# Patient Record
Sex: MALE | Race: WHITE | NOT HISPANIC OR LATINO
[De-identification: names, ages, dates, MRNs, and addresses within clinical notes are randomized per-mention and may not be internally consistent; named-entity substitution may affect disease eponyms.]

---

## 2018-04-27 ENCOUNTER — APPOINTMENT (OUTPATIENT)
Dept: ORTHOPEDIC SURGERY | Facility: CLINIC | Age: 78
End: 2018-04-27
Payer: MEDICARE

## 2018-04-27 PROCEDURE — 99213 OFFICE O/P EST LOW 20 MIN: CPT | Mod: 25

## 2018-04-27 PROCEDURE — 20610 DRAIN/INJ JOINT/BURSA W/O US: CPT | Mod: RT

## 2018-04-27 PROCEDURE — 73564 X-RAY EXAM KNEE 4 OR MORE: CPT | Mod: RT

## 2018-07-31 ENCOUNTER — INPATIENT (INPATIENT)
Facility: HOSPITAL | Age: 78
LOS: 6 days | Discharge: HOME | DRG: 25 | End: 2018-08-07
Attending: NEUROLOGICAL SURGERY | Admitting: INTERNAL MEDICINE
Payer: MEDICARE

## 2018-07-31 ENCOUNTER — EMERGENCY (EMERGENCY)
Facility: HOSPITAL | Age: 78
LOS: 1 days | Discharge: HOME | End: 2018-07-31
Attending: EMERGENCY MEDICINE
Payer: MEDICARE

## 2018-07-31 VITALS
SYSTOLIC BLOOD PRESSURE: 138 MMHG | HEART RATE: 61 BPM | DIASTOLIC BLOOD PRESSURE: 94 MMHG | OXYGEN SATURATION: 96 % | TEMPERATURE: 98 F | RESPIRATION RATE: 15 BRPM

## 2018-07-31 VITALS
DIASTOLIC BLOOD PRESSURE: 85 MMHG | HEIGHT: 71 IN | OXYGEN SATURATION: 98 % | RESPIRATION RATE: 15 BRPM | SYSTOLIC BLOOD PRESSURE: 190 MMHG | WEIGHT: 212.97 LBS | TEMPERATURE: 98 F | HEART RATE: 67 BPM

## 2018-07-31 LAB
ALBUMIN SERPL ELPH-MCNC: 4.2 G/DL — SIGNIFICANT CHANGE UP (ref 3.3–5)
ALP SERPL-CCNC: 52 U/L — SIGNIFICANT CHANGE UP (ref 40–120)
ALT FLD-CCNC: 42 U/L — SIGNIFICANT CHANGE UP (ref 12–78)
ANION GAP SERPL CALC-SCNC: 7 MMOL/L — SIGNIFICANT CHANGE UP (ref 5–17)
APTT BLD: 25.8 SEC — LOW (ref 27.5–37.4)
AST SERPL-CCNC: 30 U/L — SIGNIFICANT CHANGE UP (ref 15–37)
BASOPHILS # BLD AUTO: 0.03 K/UL — SIGNIFICANT CHANGE UP (ref 0–0.2)
BASOPHILS NFR BLD AUTO: 0.4 % — SIGNIFICANT CHANGE UP (ref 0–2)
BILIRUB SERPL-MCNC: 0.7 MG/DL — SIGNIFICANT CHANGE UP (ref 0.2–1.2)
BUN SERPL-MCNC: 19 MG/DL — SIGNIFICANT CHANGE UP (ref 7–23)
CALCIUM SERPL-MCNC: 9.1 MG/DL — SIGNIFICANT CHANGE UP (ref 8.5–10.1)
CHLORIDE SERPL-SCNC: 108 MMOL/L — SIGNIFICANT CHANGE UP (ref 96–108)
CK MB BLD-MCNC: 1 % — SIGNIFICANT CHANGE UP (ref 0–3.5)
CK MB CFR SERPL CALC: 1.8 NG/ML — SIGNIFICANT CHANGE UP (ref 0–3.6)
CK SERPL-CCNC: 186 U/L — SIGNIFICANT CHANGE UP (ref 26–308)
CO2 SERPL-SCNC: 26 MMOL/L — SIGNIFICANT CHANGE UP (ref 22–31)
CREAT SERPL-MCNC: 1.2 MG/DL — SIGNIFICANT CHANGE UP (ref 0.5–1.3)
EOSINOPHIL # BLD AUTO: 0.02 K/UL — SIGNIFICANT CHANGE UP (ref 0–0.5)
EOSINOPHIL NFR BLD AUTO: 0.2 % — SIGNIFICANT CHANGE UP (ref 0–6)
GLUCOSE SERPL-MCNC: 90 MG/DL — SIGNIFICANT CHANGE UP (ref 70–99)
HCT VFR BLD CALC: 40.1 % — SIGNIFICANT CHANGE UP (ref 39–50)
HGB BLD-MCNC: 13.9 G/DL — SIGNIFICANT CHANGE UP (ref 13–17)
IMM GRANULOCYTES NFR BLD AUTO: 0.2 % — SIGNIFICANT CHANGE UP (ref 0–1.5)
INR BLD: 1.06 RATIO — SIGNIFICANT CHANGE UP (ref 0.88–1.16)
LIDOCAIN IGE QN: 164 U/L — SIGNIFICANT CHANGE UP (ref 73–393)
LYMPHOCYTES # BLD AUTO: 1.95 K/UL — SIGNIFICANT CHANGE UP (ref 1–3.3)
LYMPHOCYTES # BLD AUTO: 23.7 % — SIGNIFICANT CHANGE UP (ref 13–44)
MCHC RBC-ENTMCNC: 31.2 PG — SIGNIFICANT CHANGE UP (ref 27–34)
MCHC RBC-ENTMCNC: 34.7 GM/DL — SIGNIFICANT CHANGE UP (ref 32–36)
MCV RBC AUTO: 89.9 FL — SIGNIFICANT CHANGE UP (ref 80–100)
MONOCYTES # BLD AUTO: 0.54 K/UL — SIGNIFICANT CHANGE UP (ref 0–0.9)
MONOCYTES NFR BLD AUTO: 6.6 % — SIGNIFICANT CHANGE UP (ref 2–14)
NEUTROPHILS # BLD AUTO: 5.68 K/UL — SIGNIFICANT CHANGE UP (ref 1.8–7.4)
NEUTROPHILS NFR BLD AUTO: 68.9 % — SIGNIFICANT CHANGE UP (ref 43–77)
PLATELET # BLD AUTO: 215 K/UL — SIGNIFICANT CHANGE UP (ref 150–400)
POTASSIUM SERPL-MCNC: 4.2 MMOL/L — SIGNIFICANT CHANGE UP (ref 3.5–5.3)
POTASSIUM SERPL-SCNC: 4.2 MMOL/L — SIGNIFICANT CHANGE UP (ref 3.5–5.3)
PROT SERPL-MCNC: 8.4 G/DL — HIGH (ref 6–8.3)
PROTHROM AB SERPL-ACNC: 11.6 SEC — SIGNIFICANT CHANGE UP (ref 9.8–12.7)
RBC # BLD: 4.46 M/UL — SIGNIFICANT CHANGE UP (ref 4.2–5.8)
RBC # FLD: 12.5 % — SIGNIFICANT CHANGE UP (ref 10.3–14.5)
SODIUM SERPL-SCNC: 141 MMOL/L — SIGNIFICANT CHANGE UP (ref 135–145)
TROPONIN I SERPL-MCNC: <.015 NG/ML — SIGNIFICANT CHANGE UP (ref 0.01–0.04)
WBC # BLD: 8.24 K/UL — SIGNIFICANT CHANGE UP (ref 3.8–10.5)
WBC # FLD AUTO: 8.24 K/UL — SIGNIFICANT CHANGE UP (ref 3.8–10.5)

## 2018-07-31 PROCEDURE — 70450 CT HEAD/BRAIN W/O DYE: CPT | Mod: 26

## 2018-07-31 PROCEDURE — 71045 X-RAY EXAM CHEST 1 VIEW: CPT

## 2018-07-31 PROCEDURE — A9579: CPT

## 2018-07-31 PROCEDURE — 36415 COLL VENOUS BLD VENIPUNCTURE: CPT

## 2018-07-31 PROCEDURE — 82550 ASSAY OF CK (CPK): CPT

## 2018-07-31 PROCEDURE — 99285 EMERGENCY DEPT VISIT HI MDM: CPT | Mod: 25

## 2018-07-31 PROCEDURE — 70548 MR ANGIOGRAPHY NECK W/DYE: CPT | Mod: 26

## 2018-07-31 PROCEDURE — 70544 MR ANGIOGRAPHY HEAD W/O DYE: CPT | Mod: 26,59

## 2018-07-31 PROCEDURE — 83690 ASSAY OF LIPASE: CPT

## 2018-07-31 PROCEDURE — 70544 MR ANGIOGRAPHY HEAD W/O DYE: CPT

## 2018-07-31 PROCEDURE — 85730 THROMBOPLASTIN TIME PARTIAL: CPT

## 2018-07-31 PROCEDURE — 80053 COMPREHEN METABOLIC PANEL: CPT

## 2018-07-31 PROCEDURE — 85610 PROTHROMBIN TIME: CPT

## 2018-07-31 PROCEDURE — 85027 COMPLETE CBC AUTOMATED: CPT

## 2018-07-31 PROCEDURE — 99285 EMERGENCY DEPT VISIT HI MDM: CPT

## 2018-07-31 PROCEDURE — 70548 MR ANGIOGRAPHY NECK W/DYE: CPT

## 2018-07-31 PROCEDURE — 70450 CT HEAD/BRAIN W/O DYE: CPT

## 2018-07-31 PROCEDURE — 93005 ELECTROCARDIOGRAM TRACING: CPT

## 2018-07-31 PROCEDURE — 82962 GLUCOSE BLOOD TEST: CPT

## 2018-07-31 PROCEDURE — 84484 ASSAY OF TROPONIN QUANT: CPT

## 2018-07-31 PROCEDURE — 96374 THER/PROPH/DIAG INJ IV PUSH: CPT | Mod: 59

## 2018-07-31 PROCEDURE — 82553 CREATINE MB FRACTION: CPT

## 2018-07-31 PROCEDURE — 71045 X-RAY EXAM CHEST 1 VIEW: CPT | Mod: 26

## 2018-07-31 PROCEDURE — 70553 MRI BRAIN STEM W/O & W/DYE: CPT

## 2018-07-31 PROCEDURE — 70553 MRI BRAIN STEM W/O & W/DYE: CPT | Mod: 26

## 2018-07-31 RX ORDER — SODIUM CHLORIDE 9 MG/ML
3 INJECTION INTRAMUSCULAR; INTRAVENOUS; SUBCUTANEOUS ONCE
Qty: 0 | Refills: 0 | Status: COMPLETED | OUTPATIENT
Start: 2018-07-31 | End: 2018-07-31

## 2018-07-31 RX ADMIN — SODIUM CHLORIDE 3 MILLILITER(S): 9 INJECTION INTRAMUSCULAR; INTRAVENOUS; SUBCUTANEOUS at 16:55

## 2018-07-31 NOTE — ED ADULT NURSE NOTE - NSIMPLEMENTINTERV_GEN_ALL_ED
Implemented All Fall Risk Interventions:  [place] to call system. Call bell, personal items and telephone within reach. Instruct patient to call for assistance. Room bathroom lighting operational. Non-slip footwear when patient is off stretcher. Physically safe environment: no spills, clutter or unnecessary equipment. Stretcher in lowest position, wheels locked, appropriate side rails in place. Provide visual cue, wrist band, yellow gown, etc. Monitor gait and stability. Monitor for mental status changes and reorient to person, place, and time. Review medications for side effects contributing to fall risk. Reinforce activity limits and safety measures with patient and family.

## 2018-07-31 NOTE — ED ADULT TRIAGE NOTE - CHIEF COMPLAINT QUOTE
C/O PAIN AND TIGHTNESS TO l SIDE OF THROAT X 1 WEEK    DR OBREGON SENT HIM HERE BECAUSE SPEECH WAS SLURRED l SIDED DROOP  PT STATES BEEN GOING ON FOR A WEEK

## 2018-07-31 NOTE — ED PROVIDER NOTE - CARE PLAN
Principal Discharge DX:	Brain mass  Assessment and plan of treatment:	transfer  Secondary Diagnosis:	Facial droop

## 2018-07-31 NOTE — ED ADULT NURSE NOTE - OBJECTIVE STATEMENT
Pt presents to the Ed s/p slurred speech  and left facial droop, yet one can understand when he speaks, moving all of his extremities independently, all pulses are palpable . EKG done, pt placed on cardiac monitor.

## 2018-07-31 NOTE — ED PROVIDER NOTE - OBJECTIVE STATEMENT
Pt is a 78 yo male who presents to the ED with a cc of slurred speech and left sided facial droop.  PMHx of HTN, HLD.  Pt reports that symptoms first began approx 1 week ago.  He reports that at that time he noted a left side facial droop, some changes in his voice, and drooling out of the left side of his mouth.  He reports that bedside these symptoms he had no complaints or changes in activity and so he did not follow up with anyone regarding these.  Today he reports that he woke up and was having breakfast when he noted difficulty swallowing.  He felt like there was something stuck on the left side of his throat for several seconds but he was able to dislodged this.  He then reports that he went to play a double header.  Several people at the game commented on his left sided facial droop and so he became concerned and followed up with his PMD.  Pt was then sent to the ED for further elv.  He denies HA but does report and intermittent tingling to the left side of his head.  Reports further blurred vision out of his left eye.  Solitario N/V/D/C, CP, SOB, abd pain, ext numbness or weakness

## 2018-08-01 VITALS
SYSTOLIC BLOOD PRESSURE: 183 MMHG | TEMPERATURE: 98 F | DIASTOLIC BLOOD PRESSURE: 88 MMHG | HEART RATE: 84 BPM | OXYGEN SATURATION: 100 %

## 2018-08-01 DIAGNOSIS — D49.6 NEOPLASM OF UNSPECIFIED BEHAVIOR OF BRAIN: ICD-10-CM

## 2018-08-01 DIAGNOSIS — Z90.89 ACQUIRED ABSENCE OF OTHER ORGANS: Chronic | ICD-10-CM

## 2018-08-01 DIAGNOSIS — Z29.9 ENCOUNTER FOR PROPHYLACTIC MEASURES, UNSPECIFIED: ICD-10-CM

## 2018-08-01 DIAGNOSIS — C44.711 BASAL CELL CARCINOMA OF SKIN OF UNSPECIFIED LOWER LIMB, INCLUDING HIP: Chronic | ICD-10-CM

## 2018-08-01 DIAGNOSIS — E78.4 OTHER HYPERLIPIDEMIA: ICD-10-CM

## 2018-08-01 DIAGNOSIS — I10 ESSENTIAL (PRIMARY) HYPERTENSION: ICD-10-CM

## 2018-08-01 DIAGNOSIS — D17.0 BENIGN LIPOMATOUS NEOPLASM OF SKIN AND SUBCUTANEOUS TISSUE OF HEAD, FACE AND NECK: Chronic | ICD-10-CM

## 2018-08-01 LAB
ALBUMIN SERPL ELPH-MCNC: 4.3 G/DL — SIGNIFICANT CHANGE UP (ref 3.3–5)
ALP SERPL-CCNC: 45 U/L — SIGNIFICANT CHANGE UP (ref 40–120)
ALT FLD-CCNC: 26 U/L — SIGNIFICANT CHANGE UP (ref 10–45)
ANION GAP SERPL CALC-SCNC: 10 MMOL/L — SIGNIFICANT CHANGE UP (ref 5–17)
AST SERPL-CCNC: 22 U/L — SIGNIFICANT CHANGE UP (ref 10–40)
BILIRUB SERPL-MCNC: 0.8 MG/DL — SIGNIFICANT CHANGE UP (ref 0.2–1.2)
BUN SERPL-MCNC: 16 MG/DL — SIGNIFICANT CHANGE UP (ref 7–23)
CALCIUM SERPL-MCNC: 8.9 MG/DL — SIGNIFICANT CHANGE UP (ref 8.4–10.5)
CHLORIDE SERPL-SCNC: 104 MMOL/L — SIGNIFICANT CHANGE UP (ref 96–108)
CO2 SERPL-SCNC: 25 MMOL/L — SIGNIFICANT CHANGE UP (ref 22–31)
CREAT SERPL-MCNC: 1.13 MG/DL — SIGNIFICANT CHANGE UP (ref 0.5–1.3)
GLUCOSE SERPL-MCNC: 112 MG/DL — HIGH (ref 70–99)
POTASSIUM SERPL-MCNC: 4.1 MMOL/L — SIGNIFICANT CHANGE UP (ref 3.5–5.3)
POTASSIUM SERPL-SCNC: 4.1 MMOL/L — SIGNIFICANT CHANGE UP (ref 3.5–5.3)
PROT SERPL-MCNC: 7 G/DL — SIGNIFICANT CHANGE UP (ref 6–8.3)
SODIUM SERPL-SCNC: 139 MMOL/L — SIGNIFICANT CHANGE UP (ref 135–145)

## 2018-08-01 PROCEDURE — 99223 1ST HOSP IP/OBS HIGH 75: CPT

## 2018-08-01 PROCEDURE — 74177 CT ABD & PELVIS W/CONTRAST: CPT | Mod: 26

## 2018-08-01 PROCEDURE — 71260 CT THORAX DX C+: CPT | Mod: 26

## 2018-08-01 RX ORDER — HEPARIN SODIUM 5000 [USP'U]/ML
5000 INJECTION INTRAVENOUS; SUBCUTANEOUS EVERY 8 HOURS
Qty: 0 | Refills: 0 | Status: DISCONTINUED | OUTPATIENT
Start: 2018-08-01 | End: 2018-08-02

## 2018-08-01 RX ORDER — METOPROLOL TARTRATE 50 MG
0 TABLET ORAL
Qty: 0 | Refills: 0 | COMMUNITY

## 2018-08-01 RX ORDER — ASCORBIC ACID 60 MG
500 TABLET,CHEWABLE ORAL DAILY
Qty: 0 | Refills: 0 | Status: DISCONTINUED | OUTPATIENT
Start: 2018-08-01 | End: 2018-08-03

## 2018-08-01 RX ORDER — ALLOPURINOL 300 MG
300 TABLET ORAL DAILY
Qty: 0 | Refills: 0 | Status: DISCONTINUED | OUTPATIENT
Start: 2018-08-01 | End: 2018-08-03

## 2018-08-01 RX ORDER — ASPIRIN/CALCIUM CARB/MAGNESIUM 324 MG
81 TABLET ORAL DAILY
Qty: 0 | Refills: 0 | Status: DISCONTINUED | OUTPATIENT
Start: 2018-08-01 | End: 2018-08-01

## 2018-08-01 RX ORDER — ROSUVASTATIN CALCIUM 5 MG/1
20 TABLET ORAL AT BEDTIME
Qty: 0 | Refills: 0 | Status: DISCONTINUED | OUTPATIENT
Start: 2018-08-01 | End: 2018-08-03

## 2018-08-01 RX ORDER — LOSARTAN POTASSIUM 100 MG/1
50 TABLET, FILM COATED ORAL DAILY
Qty: 0 | Refills: 0 | Status: DISCONTINUED | OUTPATIENT
Start: 2018-08-01 | End: 2018-08-03

## 2018-08-01 RX ORDER — CHOLECALCIFEROL (VITAMIN D3) 125 MCG
2000 CAPSULE ORAL DAILY
Qty: 0 | Refills: 0 | Status: DISCONTINUED | OUTPATIENT
Start: 2018-08-01 | End: 2018-08-03

## 2018-08-01 RX ORDER — LEVETIRACETAM 250 MG/1
1000 TABLET, FILM COATED ORAL
Qty: 0 | Refills: 0 | Status: DISCONTINUED | OUTPATIENT
Start: 2018-08-01 | End: 2018-08-03

## 2018-08-01 RX ORDER — LEVETIRACETAM 250 MG/1
1000 TABLET, FILM COATED ORAL ONCE
Qty: 0 | Refills: 0 | Status: COMPLETED | OUTPATIENT
Start: 2018-08-01 | End: 2018-08-01

## 2018-08-01 RX ORDER — LOSARTAN POTASSIUM 100 MG/1
0 TABLET, FILM COATED ORAL
Qty: 0 | Refills: 0 | COMMUNITY

## 2018-08-01 RX ORDER — METOPROLOL TARTRATE 50 MG
25 TABLET ORAL DAILY
Qty: 0 | Refills: 0 | Status: DISCONTINUED | OUTPATIENT
Start: 2018-08-01 | End: 2018-08-03

## 2018-08-01 RX ORDER — PREGABALIN 225 MG/1
1000 CAPSULE ORAL DAILY
Qty: 0 | Refills: 0 | Status: DISCONTINUED | OUTPATIENT
Start: 2018-08-01 | End: 2018-08-03

## 2018-08-01 RX ORDER — NIFEDIPINE 30 MG
90 TABLET, EXTENDED RELEASE 24 HR ORAL DAILY
Qty: 0 | Refills: 0 | Status: DISCONTINUED | OUTPATIENT
Start: 2018-08-01 | End: 2018-08-03

## 2018-08-01 RX ADMIN — Medication 25 MILLIGRAM(S): at 05:40

## 2018-08-01 RX ADMIN — LEVETIRACETAM 1000 MILLIGRAM(S): 250 TABLET, FILM COATED ORAL at 21:09

## 2018-08-01 RX ADMIN — PREGABALIN 1000 MICROGRAM(S): 225 CAPSULE ORAL at 12:01

## 2018-08-01 RX ADMIN — Medication 90 MILLIGRAM(S): at 05:40

## 2018-08-01 RX ADMIN — LEVETIRACETAM 400 MILLIGRAM(S): 250 TABLET, FILM COATED ORAL at 01:53

## 2018-08-01 RX ADMIN — Medication 300 MILLIGRAM(S): at 12:01

## 2018-08-01 RX ADMIN — Medication 2000 UNIT(S): at 12:01

## 2018-08-01 RX ADMIN — LEVETIRACETAM 1000 MILLIGRAM(S): 250 TABLET, FILM COATED ORAL at 09:07

## 2018-08-01 RX ADMIN — Medication 81 MILLIGRAM(S): at 12:01

## 2018-08-01 RX ADMIN — ROSUVASTATIN CALCIUM 20 MILLIGRAM(S): 5 TABLET ORAL at 21:09

## 2018-08-01 RX ADMIN — Medication 1 TABLET(S): at 12:01

## 2018-08-01 RX ADMIN — LOSARTAN POTASSIUM 50 MILLIGRAM(S): 100 TABLET, FILM COATED ORAL at 05:40

## 2018-08-01 RX ADMIN — HEPARIN SODIUM 5000 UNIT(S): 5000 INJECTION INTRAVENOUS; SUBCUTANEOUS at 05:41

## 2018-08-01 RX ADMIN — Medication 500 MILLIGRAM(S): at 12:01

## 2018-08-01 RX ADMIN — HEPARIN SODIUM 5000 UNIT(S): 5000 INJECTION INTRAVENOUS; SUBCUTANEOUS at 14:52

## 2018-08-01 RX ADMIN — HEPARIN SODIUM 5000 UNIT(S): 5000 INJECTION INTRAVENOUS; SUBCUTANEOUS at 21:09

## 2018-08-01 NOTE — PROGRESS NOTE ADULT - SUBJECTIVE AND OBJECTIVE BOX
Patient seen and examined at bedside.    T(C): 36.7 (08-01-18 @ 00:32), Max: 36.9 (07-31-18 @ 16:36)  HR: 65 (08-01-18 @ 05:41) (61 - 84)  BP: 146/69 (08-01-18 @ 05:41) (129/80 - 190/85)  RR: 18 (08-01-18 @ 05:41) (14 - 18)  SpO2: 96% (08-01-18 @ 05:41) (95% - 100%)  Wt(kg): --    Exam:  AOx3, FC, PERRL, EOMI, V1-3 intact, no facial, palate cole symmetric, tongue midline, shrug 5/5  5/5 throughout, no drift  SILT  No clonus or babinski

## 2018-08-01 NOTE — CONSULT NOTE ADULT - SUBJECTIVE AND OBJECTIVE BOX
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  He then went to play baseball as scheduled for 4 hrs, then felt concerned went to see his PMD, who sent him to [facility] concerned for CVA.  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 for Neurosurgical evaluation.  Denies focal weakness, dysphagia, LOC, or seizure like activity. (01 Aug 2018 03:51)      the above notes were taken from the medical record here and I went over the case with the pt in the ER. He stated that he had nothing in the ROS to indicate he may have a primary lesion elsewhere Ie. cough fever bowel problems bone pain etc. He was seen by the neurosurgery team and at this point I would approach this pt like I would if I were seeing a pt with a unknown primary. In pts where the ROS is negative, a ct of the chest abdomen and pelvis is recommended. He did have a prostatectomy in  and his psa levels have been zero.       MEDICATIONS  (STANDING):  allopurinol 300 milliGRAM(s) Oral daily  ascorbic acid 500 milliGRAM(s) Oral daily  aspirin enteric coated 81 milliGRAM(s) Oral daily  cholecalciferol 2000 Unit(s) Oral daily  cyanocobalamin 1000 MICROGram(s) Oral daily  heparin  Injectable 5000 Unit(s) SubCutaneous every 8 hours  levETIRAcetam 1000 milliGRAM(s) Oral two times a day  losartan 50 milliGRAM(s) Oral daily  metoprolol succinate ER 25 milliGRAM(s) Oral daily  multivitamin 1 Tablet(s) Oral daily  NIFEdipine XL 90 milliGRAM(s) Oral daily  rosuvastatin 20 milliGRAM(s) Oral at bedtime    MEDICATIONS  (PRN):      T(F): 98 (18 @ 08:57), Max: 98.7 (18 @ 07:36)  HR: 65 (18 @ 08:57) (61 - 84)  BP: 118/64 (18 @ 08:57) (118/64 - 190/85)  RR: 18 (18 @ 08:57) (14 - 20)  SpO2: 98% (18 @ 08:57) (95% - 100%)    LABS:    CBC Full  -  ( 2018 17:19 )  WBC Count : 8.24 K/uL  Hemoglobin : 13.9 g/dL  Hematocrit : 40.1 %  Platelet Count - Automated : 215 K/uL  Mean Cell Volume : 89.9 fl  Mean Cell Hemoglobin : 31.2 pg  Mean Cell Hemoglobin Concentration : 34.7 gm/dL  Auto Neutrophil # : 5.68 K/uL  Auto Lymphocyte # : 1.95 K/uL  Auto Monocyte # : 0.54 K/uL  Auto Eosinophil # : 0.02 K/uL  Auto Basophil # : 0.03 K/uL  Auto Neutrophil % : 68.9 %  Auto Lymphocyte % : 23.7 %  Auto Monocyte % : 6.6 %  Auto Eosinophil % : 0.2 %  Auto Basophil % : 0.4 %        139  |  104  |  16  ----------------------------<  112<H>  4.1   |  25  |  1.13    Ca    8.9      01 Aug 2018 06:02    TPro  7.0  /  Alb  4.3  /  TBili  0.8  /  DBili  x   /  AST  22  /  ALT  26  /  AlkPhos  45      PT/INR - ( 2018 17:19 )   PT: 11.6 sec;   INR: 1.06 ratio         PTT - ( 2018 17:19 )  PTT:25.8 sec      ROS:  Negative except for:    PAST MEDICAL & SURGICAL HISTORY:  Basal cell carcinoma (BCC) of left lower leg: s/p resection  Prostate cancer: s/p radical prostatectomy  HLD (hyperlipidemia)  HTN (hypertension)  Lipoma of neck: s/p resection at age 30  Basal cell carcinoma (BCC) of lower leg: s/p resection  History of tonsillectomy: at age 30      SOCIAL HISTORY:    FAMILY HISTORY:  Family history of diabetes mellitus (Father): Father   Family history of prostate cancer in father (Father):   Family history of breast cancer in mother (Mother, Sibling): Mother and sister  Family history of lung cancer (Mother): mother , long standing smoker      Allergies    No Known Allergies    Intolerances        PHYSICAL EXAM  General: adult in NAD  HEENT: clear oropharynx, anicteric sclera, pink conjunctivae  Neck: supple  CV: normal S1S2 with no murmur rubs or gallops  Lungs: clear to auscultation, no wheezes, no rales  Abdomen: soft non-tender non-distended, no hepato/splenomegaly  Ext: no clubbing cyanosis or edema  Skin: no rashes and no petichiae  Neuro: alert and oriented X3 no focal deficits slight right facial droop    BLOOD SMEAR INTERPRETATION:    RADIOLOGY :

## 2018-08-01 NOTE — H&P ADULT - PMH
HLD (hyperlipidemia)    HTN (hypertension) Basal cell carcinoma (BCC) of left lower leg  s/p resection  HLD (hyperlipidemia)    HTN (hypertension)    Prostate cancer  s/p radical prostatectomy

## 2018-08-01 NOTE — H&P ADULT - ASSESSMENT
76 yo male, 76 yo male Hx HTN, HLD, prostate ca s/p prostatectomy, basal cell skin ca (resected from leg) p/w 1 wk of L facial droop, slurred speech and one episode of L neck muscle spasm admitted with R parietal brain lesion concerning for malignancy.

## 2018-08-01 NOTE — CONSULT NOTE ADULT - ASSESSMENT
Bethel Day, 78 yo male, xfer from OSH, had intermittent moderate L facial "twitching" today. MRI showed solidly enhancing right parietal mass, minimal edema.     Plan: medicine consult for met childs, ct cap w iggy, keppra 1 g bid, no decadron given lack of flair

## 2018-08-01 NOTE — PROGRESS NOTE ADULT - ATTENDING COMMENTS
Agree with resident note. Patient personally seen and examined. All current imaging studies reviewed.  for r craniotomy in am with stereotactic guidance  d/w pt in detail including all risks and possible complications

## 2018-08-01 NOTE — CONSULT NOTE ADULT - SUBJECTIVE AND OBJECTIVE BOX
p ([id number])     HPI:  Bethel Day, 78 yo male, xfer from OSH, had intermittent moderate L facial/throat "twitching" today, found to have R parietal mass on CT/MRI, transferred here for further evaluation.  Denies focal weakness, LOC, or sz otherwise.  No fever, no CP. MRI showed solidly enhancing right parietal mass, minimal edema.    PAST MEDICAL HISTORY   HLD (hyperlipidemia)  HTN (hypertension)    PAST SURGICAL HISTORY   No significant past surgical history        MEDICATIONS:  Antibiotics:    Neuro:    Anticoagulation:    Other:      SOCIAL HISTORY:   Occupation:   Marital Status:     FAMILY HISTORY:  No pertinent family history in first degree relatives      REVIEW OF SYSTEMS:  Check here if all are normal other than Neurological/HPI [x]  General:  Eyes:  ENT:  Cardiac:  Respiratory:  GI:  Musculoskeletal:   Skin:  Neurologic:   Psychiatric:     PHYSICAL EXAMINATION:   T(C): 36.7 (08-01-18 @ 00:32), Max: 36.9 (07-31-18 @ 16:36)  HR: 75 (08-01-18 @ 00:32) (61 - 84)  BP: 159/79 (08-01-18 @ 00:32) (138/94 - 190/85)  RR: 18 (08-01-18 @ 00:32) (14 - 18)  SpO2: 98% (08-01-18 @ 00:32) (96% - 100%)  Wt(kg): --Height (cm): 180.34 (07-31 @ 16:36)  Weight (kg): 96.6 (07-31 @ 16:36)    General Examination:     Neurologic Examination:           AOx3, FC, PERRL, EOMI, V1-3 intact, no facial, palate cole symmetric, tongue midline, shrug 5/5  5/5 throughout, no drift  SILT  No clonus or babinski    LABS:                        13.9   8.24  )-----------( 215      ( 31 Jul 2018 17:19 )             40.1     07-31    141  |  108  |  19  ----------------------------<  90  4.2   |  26  |  1.20    Ca    9.1      31 Jul 2018 17:20    TPro  8.4<H>  /  Alb  4.2  /  TBili  0.7  /  DBili  x   /  AST  30  /  ALT  42  /  AlkPhos  52  07-31    PT/INR - ( 31 Jul 2018 17:19 )   PT: 11.6 sec;   INR: 1.06 ratio         PTT - ( 31 Jul 2018 17:19 )  PTT:25.8 sec      RADIOLOGY & ADDITIONAL STUDIES: p ([id number])     HPI:  Bethel Day, 76 yo male, xfer from OSH, had intermittent moderate L facial/throat "twitching" today and slurring of speech for the past week, found to have R parietal mass on CT/MRI, transferred here for further evaluation.  Denies focal weakness, LOC, or sz otherwise.  No fever, no CP. MRI showed solidly enhancing right parietal mass, minimal edema.    PAST MEDICAL HISTORY   HLD (hyperlipidemia)  HTN (hypertension)    PAST SURGICAL HISTORY   No significant past surgical history        MEDICATIONS:  Antibiotics:    Neuro:    Anticoagulation:    Other:      SOCIAL HISTORY:   Occupation:   Marital Status:     FAMILY HISTORY:  No pertinent family history in first degree relatives      REVIEW OF SYSTEMS:  Check here if all are normal other than Neurological/HPI [x]  General:  Eyes:  ENT:  Cardiac:  Respiratory:  GI:  Musculoskeletal:   Skin:  Neurologic:   Psychiatric:     PHYSICAL EXAMINATION:   T(C): 36.7 (08-01-18 @ 00:32), Max: 36.9 (07-31-18 @ 16:36)  HR: 75 (08-01-18 @ 00:32) (61 - 84)  BP: 159/79 (08-01-18 @ 00:32) (138/94 - 190/85)  RR: 18 (08-01-18 @ 00:32) (14 - 18)  SpO2: 98% (08-01-18 @ 00:32) (96% - 100%)  Wt(kg): --Height (cm): 180.34 (07-31 @ 16:36)  Weight (kg): 96.6 (07-31 @ 16:36)    General Examination:     Neurologic Examination:           AOx3, FC, PERRL, EOMI, V1-3 intact, no facial, palate cole symmetric, tongue midline, shrug 5/5  5/5 throughout, no drift  SILT  No clonus or babinski    LABS:                        13.9   8.24  )-----------( 215      ( 31 Jul 2018 17:19 )             40.1     07-31    141  |  108  |  19  ----------------------------<  90  4.2   |  26  |  1.20    Ca    9.1      31 Jul 2018 17:20    TPro  8.4<H>  /  Alb  4.2  /  TBili  0.7  /  DBili  x   /  AST  30  /  ALT  42  /  AlkPhos  52  07-31    PT/INR - ( 31 Jul 2018 17:19 )   PT: 11.6 sec;   INR: 1.06 ratio         PTT - ( 31 Jul 2018 17:19 )  PTT:25.8 sec      RADIOLOGY & ADDITIONAL STUDIES:

## 2018-08-01 NOTE — ED ADULT NURSE NOTE - OBJECTIVE STATEMENT
76 yo M pmh of HTN, HLD, Prostate CA came to ED c/o right sided facial droop x 1 week.  PT transfer from [place] for neuro consult.  No complaints at this time.  Denies chest pain, back pain, SOB, fevers/chills, n/v/d, lightheadedness, dizziness, changes in urinary or bowel habits.  A&Ox4, neuro intact, +strength and sensation bilaterally.  VSS.  Skin w/d/i.  Safety and comfort maintained. Pending neuro consults.

## 2018-08-01 NOTE — ED ADULT NURSE NOTE - NEURO MENTATION
Called and spoke to patient regarding lab results. D-dimer negative, ESR normal.  Elevated CRP; no hx of the same per patient. Low Hgb, consistent with ROSA; recommended starting OTC ferrous sulfate once daily. Patient reports feeling better since starting the medication (PPI and naproxen) several days ago. Will not add steroids at this point, as pt feeling better and is following up with Dr. Ashli Young (PCP) next month for re-evaluation of symptoms. normal

## 2018-08-01 NOTE — H&P ADULT - HISTORY OF PRESENT ILLNESS
78 yo male, xfer from OS, had intermittent moderate L facial/throat "twitching" today, found to have R parietal mass on CT/MRI, transferred here for further evaluation.  Denies focal weakness, LOC, or sz otherwise.  No fever, no CP 78 yo male Hx HTN, HLD, prostate ca s/p prostatectomy, 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  He then went to play baseball as scheduled for 4 hrs, then felt concerned went to see his PMD, who sent him to [facility] concerned for CVA.  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  Denies focal weakness, dysphagia, LOC, or seizure like activity.

## 2018-08-01 NOTE — H&P ADULT - NSHPATTENDINGPLANDISCUSS_GEN_ALL_CORE
Dr. Mccracken who requested for initial evaluation/H&P and will assume care of this patient on 8/1/18 am.

## 2018-08-01 NOTE — ED PROVIDER NOTE - OBJECTIVE STATEMENT
78 yo male, xfer from OS, had intermittent moderate L facial/throat "twitching" today, found to have R parietal mass on CT/MRI, transferred here for further evaluation.  Denies focal weakness, LOC, or sz otherwise.  No fever, no CP.

## 2018-08-01 NOTE — H&P ADULT - NSHPLABSRESULTS_GEN_ALL_CORE
Labs personally reviewed  CXR film personally reviewed  EKG tracing personally reviewed Labs personally reviewed  Imaging films and reports personally reviewed  EKG tracing personally reviewed

## 2018-08-01 NOTE — H&P ADULT - FAMILY HISTORY
No pertinent family history in first degree relatives Mother  Still living? No  Family history of lung cancer, Age at diagnosis: Age Unknown  Family history of breast cancer in mother, Age at diagnosis: Age Unknown     Sibling  Still living? Yes, Estimated age: Age Unknown  Family history of breast cancer in mother, Age at diagnosis: Age Unknown     Father  Still living? No  Family history of prostate cancer in father, Age at diagnosis: Age Unknown  Family history of diabetes mellitus, Age at diagnosis: Age Unknown

## 2018-08-01 NOTE — CONSULT NOTE ADULT - ASSESSMENT
76 yo male Hx HTN, HLD, prostate ca s/p prostatectomy, 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  He then went to play baseball as scheduled for 4 hrs, then felt concerned went to see his PMD, who sent him to [facility] concerned for CVA.  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  Denies focal weakness, dysphagia, LOC, or seizure like activity. (01 Aug 2018 03:51)     8/1 the above notes were taken from the medical record here and I went over the case with the pt in the ER. He stated that he had nothing in the ROS to indicate he may have a primary lesion elsewhere Ie. cough fever bowel problems bone pain etc. He was seen by the neurosurgery team and at this point I would approach this pt like I would if I were seeing a pt with a unknown primary. In pts where the ROS is negative, a ct of the chest abdomen and pelvis is recommended. He did have a prostatectomy in 1995 and his psa levels have been zero.

## 2018-08-01 NOTE — H&P ADULT - PROBLEM SELECTOR PLAN 1
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  d/w Dr. De Santiago

## 2018-08-01 NOTE — ED ADULT NURSE NOTE - CHPI ED NUR SYMPTOMS NEG
no blurred vision/no change in level of consciousness/no confusion/no nausea/no numbness/no loss of consciousness/no weakness/no dizziness/no vomiting/no fever

## 2018-08-01 NOTE — ED ADULT NURSE REASSESSMENT NOTE - COMFORT CARE
plan of care explained/warm blanket provided/side rails up/darkened lights/treatment delay explained/wait time explained

## 2018-08-01 NOTE — H&P ADULT - PSH
No significant past surgical history Basal cell carcinoma (BCC) of lower leg  s/p resection  History of tonsillectomy  at age 30  Lipoma of neck  s/p resection at age 30

## 2018-08-01 NOTE — ED PROVIDER NOTE - PHYSICAL EXAMINATION
GEN: calm, cooperative, ENT: mucous membranes moist, HEAD: NCAT, CV: S1 S2 no murmurs, RESP: no respiratory distress, ABD: no abdominal TTP, MSK: moves all extremities, NEURO: awake, alert, oriented x3, FC, ROBERTS, no focal weakness noted, PSYCH: affect normal

## 2018-08-01 NOTE — H&P ADULT - NEUROLOGICAL DETAILS
loss of L facial folds, gobbled speech, moves all extremities/normal strength/responds to verbal commands/alert and oriented x 3

## 2018-08-01 NOTE — H&P ADULT - PROBLEM SELECTOR PLAN 2
- will control with Toprol, Nifedipine and Losartan  - last cardiac cath 2 yrs ago with "50% blockage of a small vessel and 20% of another", no chest pain, STEWART or syncope

## 2018-08-01 NOTE — ED ADULT NURSE NOTE - NSIMPLEMENTINTERV_GEN_ALL_ED
Implemented All Universal Safety Interventions:  [place] to call system. Call bell, personal items and telephone within reach. Instruct patient to call for assistance. Room bathroom lighting operational. Non-slip footwear when patient is off stretcher. Physically safe environment: no spills, clutter or unnecessary equipment. Stretcher in lowest position, wheels locked, appropriate side rails in place.

## 2018-08-01 NOTE — ED PROVIDER NOTE - INTERPRETATION
EKG reviewed for rate, rhythm, axis, intervals and segments, including QRS morphology, P wave appearance T wave appearance, MO interval, and QT interval.  I find the EKG to be unremarkable in all of these regards except as follows: 1st deg AVB

## 2018-08-01 NOTE — H&P ADULT - NSHPSOCIALHISTORY_GEN_ALL_CORE
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

## 2018-08-02 ENCOUNTER — TRANSCRIPTION ENCOUNTER (OUTPATIENT)
Age: 78
End: 2018-08-02

## 2018-08-02 PROBLEM — I10 ESSENTIAL (PRIMARY) HYPERTENSION: Chronic | Status: ACTIVE | Noted: 2018-07-31

## 2018-08-02 PROBLEM — E78.5 HYPERLIPIDEMIA, UNSPECIFIED: Chronic | Status: ACTIVE | Noted: 2018-07-31

## 2018-08-02 LAB
ANION GAP SERPL CALC-SCNC: 13 MMOL/L — SIGNIFICANT CHANGE UP (ref 5–17)
APTT BLD: 27.1 SEC — LOW (ref 27.5–37.4)
BLD GP AB SCN SERPL QL: NEGATIVE — SIGNIFICANT CHANGE UP
BUN SERPL-MCNC: 16 MG/DL — SIGNIFICANT CHANGE UP (ref 7–23)
CALCIUM SERPL-MCNC: 9.3 MG/DL — SIGNIFICANT CHANGE UP (ref 8.4–10.5)
CHLORIDE SERPL-SCNC: 103 MMOL/L — SIGNIFICANT CHANGE UP (ref 96–108)
CO2 SERPL-SCNC: 25 MMOL/L — SIGNIFICANT CHANGE UP (ref 22–31)
CREAT SERPL-MCNC: 1.18 MG/DL — SIGNIFICANT CHANGE UP (ref 0.5–1.3)
GLUCOSE SERPL-MCNC: 128 MG/DL — HIGH (ref 70–99)
HCT VFR BLD CALC: 47.2 % — SIGNIFICANT CHANGE UP (ref 39–50)
HGB BLD-MCNC: 15.9 G/DL — SIGNIFICANT CHANGE UP (ref 13–17)
INR BLD: 1.02 RATIO — SIGNIFICANT CHANGE UP (ref 0.88–1.16)
MCHC RBC-ENTMCNC: 31.2 PG — SIGNIFICANT CHANGE UP (ref 27–34)
MCHC RBC-ENTMCNC: 33.7 GM/DL — SIGNIFICANT CHANGE UP (ref 32–36)
MCV RBC AUTO: 92.5 FL — SIGNIFICANT CHANGE UP (ref 80–100)
PLATELET # BLD AUTO: 232 K/UL — SIGNIFICANT CHANGE UP (ref 150–400)
POTASSIUM SERPL-MCNC: 4.2 MMOL/L — SIGNIFICANT CHANGE UP (ref 3.5–5.3)
POTASSIUM SERPL-SCNC: 4.2 MMOL/L — SIGNIFICANT CHANGE UP (ref 3.5–5.3)
PROTHROM AB SERPL-ACNC: 11 SEC — SIGNIFICANT CHANGE UP (ref 9.8–12.7)
RBC # BLD: 5.1 M/UL — SIGNIFICANT CHANGE UP (ref 4.2–5.8)
RBC # FLD: 12.8 % — SIGNIFICANT CHANGE UP (ref 10.3–14.5)
RH IG SCN BLD-IMP: POSITIVE — SIGNIFICANT CHANGE UP
RH IG SCN BLD-IMP: POSITIVE — SIGNIFICANT CHANGE UP
SODIUM SERPL-SCNC: 141 MMOL/L — SIGNIFICANT CHANGE UP (ref 135–145)
WBC # BLD: 6.57 K/UL — SIGNIFICANT CHANGE UP (ref 3.8–10.5)
WBC # FLD AUTO: 6.57 K/UL — SIGNIFICANT CHANGE UP (ref 3.8–10.5)

## 2018-08-02 PROCEDURE — 70552 MRI BRAIN STEM W/DYE: CPT | Mod: 26

## 2018-08-02 PROCEDURE — 99223 1ST HOSP IP/OBS HIGH 75: CPT

## 2018-08-02 RX ORDER — SODIUM CHLORIDE 9 MG/ML
1000 INJECTION INTRAMUSCULAR; INTRAVENOUS; SUBCUTANEOUS
Qty: 0 | Refills: 0 | Status: DISCONTINUED | OUTPATIENT
Start: 2018-08-02 | End: 2018-08-03

## 2018-08-02 RX ADMIN — ROSUVASTATIN CALCIUM 20 MILLIGRAM(S): 5 TABLET ORAL at 21:08

## 2018-08-02 RX ADMIN — LEVETIRACETAM 1000 MILLIGRAM(S): 250 TABLET, FILM COATED ORAL at 18:14

## 2018-08-02 RX ADMIN — Medication 90 MILLIGRAM(S): at 05:03

## 2018-08-02 RX ADMIN — LEVETIRACETAM 1000 MILLIGRAM(S): 250 TABLET, FILM COATED ORAL at 05:03

## 2018-08-02 RX ADMIN — Medication 25 MILLIGRAM(S): at 05:03

## 2018-08-02 RX ADMIN — Medication 300 MILLIGRAM(S): at 11:24

## 2018-08-02 RX ADMIN — LOSARTAN POTASSIUM 50 MILLIGRAM(S): 100 TABLET, FILM COATED ORAL at 05:03

## 2018-08-02 RX ADMIN — HEPARIN SODIUM 5000 UNIT(S): 5000 INJECTION INTRAVENOUS; SUBCUTANEOUS at 05:03

## 2018-08-02 RX ADMIN — PREGABALIN 1000 MICROGRAM(S): 225 CAPSULE ORAL at 11:24

## 2018-08-02 RX ADMIN — Medication 1 TABLET(S): at 11:24

## 2018-08-02 RX ADMIN — Medication 500 MILLIGRAM(S): at 11:24

## 2018-08-02 RX ADMIN — Medication 2000 UNIT(S): at 11:24

## 2018-08-02 NOTE — CHART NOTE - NSCHARTNOTEFT_GEN_A_CORE
Risk Stratification for planned procedure:    Vitals/Labs/Chart reviewed. EKG reviewed. NSR, no ischemic changes.  Pt feels well. No Chest pain, No shortness of breath. Baseline very active person, exercise and run daily.   Pt is able to perform > 4 METs of activity.   No signs of acute ischemia nor acute cardio/pulmonary decompensation.    Discussed with Dr. Chin (pt's cardiologist).   2016 angio: 20% midLAD, 50% diagnonal, 25% RCA  2017 TTE: mild LVH, mod AR. normal EF.    Patient is at intermediate risk for high risk procedure (neurosurgical procedure)  No further work cardiac nor medical work up needed.   Medically optimized.    d/w pt, pt's wife and the son at bedside.     - Dr. FERNY Lynn ([facility])  - [phone]

## 2018-08-02 NOTE — PROGRESS NOTE ADULT - SUBJECTIVE AND OBJECTIVE BOX
Patient is a 77y old  Male who presents with a chief complaint of slurred speech and left facial droop (01 Aug 2018 10:07)      SUBJECTIVE / OVERNIGHT EVENTS:  Pt seen and examined at bedside.   No overnight event.  Feeling okay. anxious about the surgery tomorrow.  no cp, no sob, no n/v/d.   no vision changes.  +slurred speech  the son and the wife at bedside.      Vital Signs Last 24 Hrs  T(C): 36.5 (02 Aug 2018 16:53), Max: 36.8 (02 Aug 2018 08:22)  T(F): 97.7 (02 Aug 2018 16:53), Max: 98.3 (02 Aug 2018 08:22)  HR: 60 (02 Aug 2018 16:53) (60 - 70)  BP: 130/77 (02 Aug 2018 16:53) (125/68 - 132/73)  BP(mean): --  RR: 18 (02 Aug 2018 16:53) (18 - 18)  SpO2: 95% (02 Aug 2018 16:53) (93% - 95%)  I&O's Summary    01 Aug 2018 07:01  -  02 Aug 2018 07:00  --------------------------------------------------------  IN: 320 mL / OUT: 0 mL / NET: 320 mL    02 Aug 2018 07:01  -  02 Aug 2018 18:54  --------------------------------------------------------  IN: 240 mL / OUT: 0 mL / NET: 240 mL        PHYSICAL EXAM:  GENERAL: NAD, Comfortable, sitting up  HEAD:  Atraumatic, Normocephalic  EYES: EOMI, PERRLA, conjunctiva and sclera clear  NECK: Supple, No JVD  CHEST/LUNG: Clear to auscultation bilaterally; No wheeze  HEART: Regular rate and rhythm; No murmurs, rubs, or gallops  ABDOMEN: Soft, Nontender, Nondistended; Bowel sounds present  Neuro: AAO x 3, +facial droop, mild slurred speech, no focal deficit, 5/5 b/l extremities  EXTREMITIES:  2+ Peripheral Pulses, No clubbing, cyanosis, or edema  SKIN: No rashes or lesions    LABS:                        15.9   6.57  )-----------( 232      ( 02 Aug 2018 10:12 )             47.2     08-02    141  |  103  |  16  ----------------------------<  128<H>  4.2   |  25  |  1.18    Ca    9.3      02 Aug 2018 07:27    TPro  7.0  /  Alb  4.3  /  TBili  0.8  /  DBili  x   /  AST  22  /  ALT  26  /  AlkPhos  45  08-01    PT/INR - ( 02 Aug 2018 14:47 )   PT: 11.0 sec;   INR: 1.02 ratio         PTT - ( 02 Aug 2018 14:47 )  PTT:27.1 sec  CAPILLARY BLOOD GLUCOSE                RADIOLOGY & ADDITIONAL TESTS:    Imaging Personally Reviewed:  [x] YES  [ ] NO    Consultant(s) Notes Reviewed:  [x] YES  [ ] NO      MEDICATIONS  (STANDING):  allopurinol 300 milliGRAM(s) Oral daily  ascorbic acid 500 milliGRAM(s) Oral daily  cholecalciferol 2000 Unit(s) Oral daily  cyanocobalamin 1000 MICROGram(s) Oral daily  levETIRAcetam 1000 milliGRAM(s) Oral two times a day  losartan 50 milliGRAM(s) Oral daily  metoprolol succinate ER 25 milliGRAM(s) Oral daily  multivitamin 1 Tablet(s) Oral daily  NIFEdipine XL 90 milliGRAM(s) Oral daily  rosuvastatin 20 milliGRAM(s) Oral at bedtime  sodium chloride 0.9%. 1000 milliLiter(s) (75 mL/Hr) IV Continuous <Continuous>    MEDICATIONS  (PRN):      Care Discussed with Consultants/Other Providers [x] YES  [ ] NO    HEALTH ISSUES - PROBLEM Dx:  Prophylactic measure: Prophylactic measure  Other hyperlipidemia: Other hyperlipidemia  Essential hypertension: Essential hypertension  Brain tumor: Brain tumor

## 2018-08-02 NOTE — PROGRESS NOTE ADULT - ASSESSMENT
8/2 the pt had a ct of the chest and abdomen and pelvis and it was negative. A long discussion was held with him about the likelihood of neurosurgery and need to obtain a diagnosis.

## 2018-08-02 NOTE — PROGRESS NOTE ADULT - ATTENDING COMMENTS
- Dr[facility] F[facility]Y [facility] ([facility])  - [phone]    Met with the family at bedside. All questions answered to their satisfaction.   Reassurance provided.  d/w pt's Cardiologist Dr. Chin and reviewed TTE/cath, etc.   d/w anesthesia.  d/w NP

## 2018-08-02 NOTE — PROGRESS NOTE ADULT - SUBJECTIVE AND OBJECTIVE BOX
Patient is a 77y old  Male who presents with a chief complaint of slurred speech and left facial droop (01 Aug 2018 10:07)      HPI:  78 yo male Hx HTN, HLD, prostate ca s/p prostatectomy, basal cell skin ca (resected from leg) p/w 1 wk of L facial droop, slurred speech and one episode of L neck muscle spasm earlier on 18, pressure like, lasted for 20 minutes.  He then went to play baseball as scheduled for 4 hrs, then felt concerned went to see his PMD, who sent him to [facility] concerned for CVA.  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  Denies focal weakness, dysphagia, LOC, or seizure like activity. (01 Aug 2018 03:51)      the above notes were taken from the medical record here and I went over the case with the pt in the ER. He stated that he had nothing in the ROS to indicate he may have a primary lesion elsewhere Ie. cough fever bowel problems bone pain etc. He was seen by the neurosurgery team and at this point I would approach this pt like I would if I were seeing a pt with a unknown primary. In pts where the ROS is negative, a ct of the chest abdomen and pelvis is recommended. He did have a prostatectomy in  and his psa levels have been zero.      the pt had a ct of the chest and abdomen and pelvis and it was negative. A long discussion was held with him about the likelihood of neurosurgery and need to obtain a diagnosis.    MEDICATIONS  (STANDING):  allopurinol 300 milliGRAM(s) Oral daily  ascorbic acid 500 milliGRAM(s) Oral daily  cholecalciferol 2000 Unit(s) Oral daily  cyanocobalamin 1000 MICROGram(s) Oral daily  heparin  Injectable 5000 Unit(s) SubCutaneous every 8 hours  levETIRAcetam 1000 milliGRAM(s) Oral two times a day  losartan 50 milliGRAM(s) Oral daily  metoprolol succinate ER 25 milliGRAM(s) Oral daily  multivitamin 1 Tablet(s) Oral daily  NIFEdipine XL 90 milliGRAM(s) Oral daily  rosuvastatin 20 milliGRAM(s) Oral at bedtime    MEDICATIONS  (PRN):      T(F): 98 (18 @ 08:57), Max: 98.7 (18 @ 07:36)  HR: 65 (18 @ 08:57) (61 - 84)  BP: 118/64 (18 @ 08:57) (118/64 - 190/85)  RR: 18 (18 @ 08:57) (14 - 20)  SpO2: 98% (18 @ 08:57) (95% - 100%)    LABS:    CBC Full  -  ( 2018 17:19 )  WBC Count : 8.24 K/uL  Hemoglobin : 13.9 g/dL  Hematocrit : 40.1 %  Platelet Count - Automated : 215 K/uL  Mean Cell Volume : 89.9 fl  Mean Cell Hemoglobin : 31.2 pg  Mean Cell Hemoglobin Concentration : 34.7 gm/dL  Auto Neutrophil # : 5.68 K/uL  Auto Lymphocyte # : 1.95 K/uL  Auto Monocyte # : 0.54 K/uL  Auto Eosinophil # : 0.02 K/uL  Auto Basophil # : 0.03 K/uL  Auto Neutrophil % : 68.9 %  Auto Lymphocyte % : 23.7 %  Auto Monocyte % : 6.6 %  Auto Eosinophil % : 0.2 %  Auto Basophil % : 0.4 %        139  |  104  |  16  ----------------------------<  112<H>  4.1   |  25  |  1.13    Ca    8.9      01 Aug 2018 06:02    TPro  7.0  /  Alb  4.3  /  TBili  0.8  /  DBili  x   /  AST  22  /  ALT  26  /  AlkPhos  45      PT/INR - ( 2018 17:19 )   PT: 11.6 sec;   INR: 1.06 ratio         PTT - ( 2018 17:19 )  PTT:25.8 sec      ROS:  Negative except for:    PAST MEDICAL & SURGICAL HISTORY:  Basal cell carcinoma (BCC) of left lower leg: s/p resection  Prostate cancer: s/p radical prostatectomy  HLD (hyperlipidemia)  HTN (hypertension)  Lipoma of neck: s/p resection at age 30  Basal cell carcinoma (BCC) of lower leg: s/p resection  History of tonsillectomy: at age 30      SOCIAL HISTORY:    FAMILY HISTORY:  Family history of diabetes mellitus (Father): Father   Family history of prostate cancer in father (Father):   Family history of breast cancer in mother (Mother, Sibling): Mother and sister  Family history of lung cancer (Mother): mother , long standing smoker      Allergies    No Known Allergies    Intolerances        PHYSICAL EXAM  General: adult in NAD  HEENT: clear oropharynx, anicteric sclera, pink conjunctivae  Neck: supple  CV: normal S1S2 with no murmur rubs or gallops  Lungs: clear to auscultation, no wheezes, no rales  Abdomen: soft non-tender non-distended, no hepato/splenomegaly  Ext: no clubbing cyanosis or edema  Skin: no rashes and no petechiae  Neuro: alert and oriented X3 no focal deficits slight right facial droop    BLOOD SMEAR INTERPRETATION:    RADIOLOGY :

## 2018-08-02 NOTE — PROGRESS NOTE ADULT - PROBLEM SELECTOR PLAN 1
Solitary R parietal brain lesion with Hx prostate ca and basal cell skin ca - rarely mets to brain.   - CT chest/A/P shows no evidence of malignancy/ metastasis   - c/w Keppra bid for seizure prophylaxis  - no steroid since minimal cerebral edma  - Neuro check Q 4 hr   - Neurosurg eval appreciated.    - Plan for brain biopsy with possible excision tomorrow 8/3/18.  - medical clearance documented in my note earlier today.   - c/w betablocker.

## 2018-08-02 NOTE — PROGRESS NOTE ADULT - ASSESSMENT
Bethel Edwardswanda, 76 yo male, xfer from OSH, had intermittent moderate L facial "twitching" today. MRI showed solidly enhancing right parietal mass, minimal edema.   - CT CAP negative  - Plan for OR tomorrow for biopsy  - Please document medical clearance  - Hold ASA and all other anticoagulation  - NPO after midnight

## 2018-08-02 NOTE — PROGRESS NOTE ADULT - SUBJECTIVE AND OBJECTIVE BOX
Patient seen and examined at bedside.    T(C): 36.6 (08-01-18 @ 21:36), Max: 37.1 (08-01-18 @ 07:36)  HR: 61 (08-01-18 @ 21:36) (59 - 70)  BP: 125/68 (08-01-18 @ 21:36) (118/64 - 163/73)  RR: 18 (08-01-18 @ 21:36) (18 - 20)  SpO2: 93% (08-01-18 @ 21:36) (93% - 99%)  Wt(kg): --    Exam:  AOx3, FC, PERRL, EOMI, V1-3 intact, no facial, palate cole symmetric, tongue midline, shrug 5/5  5/5 throughout, no drift  SILT  No clonus or babinski

## 2018-08-02 NOTE — PROGRESS NOTE ADULT - ASSESSMENT
76 yo male Hx HTN, HLD, prostate ca s/p prostatectomy, basal cell skin ca (resected from leg) p/w 1 wk of L facial droop, slurred speech and one episode of L neck muscle spasm admitted with R parietal brain lesion concerning for malignancy.

## 2018-08-03 ENCOUNTER — RESULT REVIEW (OUTPATIENT)
Age: 78
End: 2018-08-03

## 2018-08-03 ENCOUNTER — APPOINTMENT (OUTPATIENT)
Dept: NEUROSURGERY | Facility: CLINIC | Age: 78
End: 2018-08-03

## 2018-08-03 LAB
ANION GAP SERPL CALC-SCNC: 11 MMOL/L — SIGNIFICANT CHANGE UP (ref 5–17)
ANION GAP SERPL CALC-SCNC: 12 MMOL/L — SIGNIFICANT CHANGE UP (ref 5–17)
BLD GP AB SCN SERPL QL: NEGATIVE — SIGNIFICANT CHANGE UP
BUN SERPL-MCNC: 17 MG/DL — SIGNIFICANT CHANGE UP (ref 7–23)
BUN SERPL-MCNC: 19 MG/DL — SIGNIFICANT CHANGE UP (ref 7–23)
CALCIUM SERPL-MCNC: 8.7 MG/DL — SIGNIFICANT CHANGE UP (ref 8.4–10.5)
CALCIUM SERPL-MCNC: 8.8 MG/DL — SIGNIFICANT CHANGE UP (ref 8.4–10.5)
CHLORIDE SERPL-SCNC: 104 MMOL/L — SIGNIFICANT CHANGE UP (ref 96–108)
CHLORIDE SERPL-SCNC: 105 MMOL/L — SIGNIFICANT CHANGE UP (ref 96–108)
CO2 SERPL-SCNC: 22 MMOL/L — SIGNIFICANT CHANGE UP (ref 22–31)
CO2 SERPL-SCNC: 24 MMOL/L — SIGNIFICANT CHANGE UP (ref 22–31)
CREAT SERPL-MCNC: 1.03 MG/DL — SIGNIFICANT CHANGE UP (ref 0.5–1.3)
CREAT SERPL-MCNC: 1.27 MG/DL — SIGNIFICANT CHANGE UP (ref 0.5–1.3)
GLUCOSE SERPL-MCNC: 192 MG/DL — HIGH (ref 70–99)
GLUCOSE SERPL-MCNC: 94 MG/DL — SIGNIFICANT CHANGE UP (ref 70–99)
HCT VFR BLD CALC: 35.4 % — LOW (ref 39–50)
HCT VFR BLD CALC: 40.1 % — SIGNIFICANT CHANGE UP (ref 39–50)
HGB BLD-MCNC: 12.4 G/DL — LOW (ref 13–17)
HGB BLD-MCNC: 13.7 G/DL — SIGNIFICANT CHANGE UP (ref 13–17)
INR BLD: 1.06 RATIO — SIGNIFICANT CHANGE UP (ref 0.88–1.16)
MAGNESIUM SERPL-MCNC: 2.1 MG/DL — SIGNIFICANT CHANGE UP (ref 1.6–2.6)
MCHC RBC-ENTMCNC: 31.1 PG — SIGNIFICANT CHANGE UP (ref 27–34)
MCHC RBC-ENTMCNC: 31.3 PG — SIGNIFICANT CHANGE UP (ref 27–34)
MCHC RBC-ENTMCNC: 34.2 GM/DL — SIGNIFICANT CHANGE UP (ref 32–36)
MCHC RBC-ENTMCNC: 34.9 GM/DL — SIGNIFICANT CHANGE UP (ref 32–36)
MCV RBC AUTO: 89.7 FL — SIGNIFICANT CHANGE UP (ref 80–100)
MCV RBC AUTO: 90.9 FL — SIGNIFICANT CHANGE UP (ref 80–100)
PHOSPHATE SERPL-MCNC: 3.4 MG/DL — SIGNIFICANT CHANGE UP (ref 2.5–4.5)
PLATELET # BLD AUTO: 194 K/UL — SIGNIFICANT CHANGE UP (ref 150–400)
PLATELET # BLD AUTO: 210 K/UL — SIGNIFICANT CHANGE UP (ref 150–400)
POTASSIUM SERPL-MCNC: 3.8 MMOL/L — SIGNIFICANT CHANGE UP (ref 3.5–5.3)
POTASSIUM SERPL-MCNC: 4.1 MMOL/L — SIGNIFICANT CHANGE UP (ref 3.5–5.3)
POTASSIUM SERPL-SCNC: 3.8 MMOL/L — SIGNIFICANT CHANGE UP (ref 3.5–5.3)
POTASSIUM SERPL-SCNC: 4.1 MMOL/L — SIGNIFICANT CHANGE UP (ref 3.5–5.3)
PROTHROM AB SERPL-ACNC: 11.5 SEC — SIGNIFICANT CHANGE UP (ref 9.8–12.7)
RBC # BLD: 3.95 M/UL — LOW (ref 4.2–5.8)
RBC # BLD: 4.42 M/UL — SIGNIFICANT CHANGE UP (ref 4.2–5.8)
RBC # FLD: 11.5 % — SIGNIFICANT CHANGE UP (ref 10.3–14.5)
RBC # FLD: 11.6 % — SIGNIFICANT CHANGE UP (ref 10.3–14.5)
RH IG SCN BLD-IMP: POSITIVE — SIGNIFICANT CHANGE UP
SODIUM SERPL-SCNC: 138 MMOL/L — SIGNIFICANT CHANGE UP (ref 135–145)
SODIUM SERPL-SCNC: 140 MMOL/L — SIGNIFICANT CHANGE UP (ref 135–145)
WBC # BLD: 6 K/UL — SIGNIFICANT CHANGE UP (ref 3.8–10.5)
WBC # BLD: 8.3 K/UL — SIGNIFICANT CHANGE UP (ref 3.8–10.5)
WBC # FLD AUTO: 6 K/UL — SIGNIFICANT CHANGE UP (ref 3.8–10.5)
WBC # FLD AUTO: 8.3 K/UL — SIGNIFICANT CHANGE UP (ref 3.8–10.5)

## 2018-08-03 PROCEDURE — 88341 IMHCHEM/IMCYTCHM EA ADD ANTB: CPT | Mod: 26,59

## 2018-08-03 PROCEDURE — 95961 ELECTRODE STIMULATION BRAIN: CPT | Mod: 26,52

## 2018-08-03 PROCEDURE — 88342 IMHCHEM/IMCYTCHM 1ST ANTB: CPT | Mod: 26,59

## 2018-08-03 PROCEDURE — 61781 SCAN PROC CRANIAL INTRA: CPT

## 2018-08-03 PROCEDURE — 61510 CRNEC TREPH EXC BRN TUM STTL: CPT

## 2018-08-03 PROCEDURE — 88360 TUMOR IMMUNOHISTOCHEM/MANUAL: CPT | Mod: 26

## 2018-08-03 PROCEDURE — 88334 PATH CONSLTJ SURG CYTO XM EA: CPT | Mod: 26,59

## 2018-08-03 PROCEDURE — 99232 SBSQ HOSP IP/OBS MODERATE 35: CPT | Mod: 57

## 2018-08-03 PROCEDURE — 99292 CRITICAL CARE ADDL 30 MIN: CPT | Mod: 25

## 2018-08-03 PROCEDURE — 99291 CRITICAL CARE FIRST HOUR: CPT

## 2018-08-03 PROCEDURE — 88331 PATH CONSLTJ SURG 1 BLK 1SPC: CPT | Mod: 26

## 2018-08-03 PROCEDURE — 88307 TISSUE EXAM BY PATHOLOGIST: CPT | Mod: 26

## 2018-08-03 RX ORDER — INSULIN LISPRO 100/ML
VIAL (ML) SUBCUTANEOUS
Qty: 0 | Refills: 0 | Status: DISCONTINUED | OUTPATIENT
Start: 2018-08-03 | End: 2018-08-05

## 2018-08-03 RX ORDER — CEFAZOLIN SODIUM 1 G
2000 VIAL (EA) INJECTION EVERY 8 HOURS
Qty: 0 | Refills: 0 | Status: COMPLETED | OUTPATIENT
Start: 2018-08-03 | End: 2018-08-04

## 2018-08-03 RX ORDER — SENNA PLUS 8.6 MG/1
2 TABLET ORAL AT BEDTIME
Qty: 0 | Refills: 0 | Status: DISCONTINUED | OUTPATIENT
Start: 2018-08-03 | End: 2018-08-07

## 2018-08-03 RX ORDER — PSYLLIUM SEED (WITH DEXTROSE)
1 POWDER (GRAM) ORAL DAILY
Qty: 0 | Refills: 0 | Status: DISCONTINUED | OUTPATIENT
Start: 2018-08-03 | End: 2018-08-07

## 2018-08-03 RX ORDER — ALLOPURINOL 300 MG
300 TABLET ORAL DAILY
Qty: 0 | Refills: 0 | Status: DISCONTINUED | OUTPATIENT
Start: 2018-08-03 | End: 2018-08-07

## 2018-08-03 RX ORDER — NIFEDIPINE 30 MG
90 TABLET, EXTENDED RELEASE 24 HR ORAL DAILY
Qty: 0 | Refills: 0 | Status: DISCONTINUED | OUTPATIENT
Start: 2018-08-03 | End: 2018-08-07

## 2018-08-03 RX ORDER — LOSARTAN POTASSIUM 100 MG/1
50 TABLET, FILM COATED ORAL DAILY
Qty: 0 | Refills: 0 | Status: DISCONTINUED | OUTPATIENT
Start: 2018-08-03 | End: 2018-08-07

## 2018-08-03 RX ORDER — ONDANSETRON 8 MG/1
4 TABLET, FILM COATED ORAL EVERY 6 HOURS
Qty: 0 | Refills: 0 | Status: DISCONTINUED | OUTPATIENT
Start: 2018-08-03 | End: 2018-08-07

## 2018-08-03 RX ORDER — ACETAMINOPHEN 500 MG
1000 TABLET ORAL ONCE
Qty: 0 | Refills: 0 | Status: COMPLETED | OUTPATIENT
Start: 2018-08-03 | End: 2018-08-03

## 2018-08-03 RX ORDER — METOPROLOL TARTRATE 50 MG
25 TABLET ORAL DAILY
Qty: 0 | Refills: 0 | Status: DISCONTINUED | OUTPATIENT
Start: 2018-08-03 | End: 2018-08-05

## 2018-08-03 RX ORDER — DEXAMETHASONE 0.5 MG/5ML
4 ELIXIR ORAL EVERY 6 HOURS
Qty: 0 | Refills: 0 | Status: DISCONTINUED | OUTPATIENT
Start: 2018-08-03 | End: 2018-08-04

## 2018-08-03 RX ORDER — DOCUSATE SODIUM 100 MG
100 CAPSULE ORAL THREE TIMES A DAY
Qty: 0 | Refills: 0 | Status: DISCONTINUED | OUTPATIENT
Start: 2018-08-03 | End: 2018-08-07

## 2018-08-03 RX ORDER — ACETAMINOPHEN 500 MG
650 TABLET ORAL EVERY 6 HOURS
Qty: 0 | Refills: 0 | Status: DISCONTINUED | OUTPATIENT
Start: 2018-08-03 | End: 2018-08-07

## 2018-08-03 RX ORDER — DEXTROSE MONOHYDRATE, SODIUM CHLORIDE, AND POTASSIUM CHLORIDE 50; .745; 4.5 G/1000ML; G/1000ML; G/1000ML
1000 INJECTION, SOLUTION INTRAVENOUS
Qty: 0 | Refills: 0 | Status: DISCONTINUED | OUTPATIENT
Start: 2018-08-03 | End: 2018-08-04

## 2018-08-03 RX ORDER — LEVETIRACETAM 250 MG/1
1000 TABLET, FILM COATED ORAL EVERY 12 HOURS
Qty: 0 | Refills: 0 | Status: DISCONTINUED | OUTPATIENT
Start: 2018-08-03 | End: 2018-08-07

## 2018-08-03 RX ADMIN — SENNA PLUS 2 TABLET(S): 8.6 TABLET ORAL at 21:42

## 2018-08-03 RX ADMIN — Medication 400 MILLIGRAM(S): at 13:15

## 2018-08-03 RX ADMIN — Medication 4 MILLIGRAM(S): at 17:51

## 2018-08-03 RX ADMIN — Medication 25 MILLIGRAM(S): at 13:27

## 2018-08-03 RX ADMIN — Medication 1000 MILLIGRAM(S): at 13:30

## 2018-08-03 RX ADMIN — Medication 100 MILLIGRAM(S): at 21:42

## 2018-08-03 RX ADMIN — LOSARTAN POTASSIUM 50 MILLIGRAM(S): 100 TABLET, FILM COATED ORAL at 05:39

## 2018-08-03 RX ADMIN — Medication 1: at 22:59

## 2018-08-03 RX ADMIN — LEVETIRACETAM 1000 MILLIGRAM(S): 250 TABLET, FILM COATED ORAL at 17:51

## 2018-08-03 RX ADMIN — Medication 25 MILLIGRAM(S): at 05:39

## 2018-08-03 RX ADMIN — Medication 90 MILLIGRAM(S): at 05:39

## 2018-08-03 RX ADMIN — LEVETIRACETAM 1000 MILLIGRAM(S): 250 TABLET, FILM COATED ORAL at 05:39

## 2018-08-03 NOTE — PROGRESS NOTE ADULT - PROBLEM SELECTOR PLAN 1
Solitary R parietal brain lesion with Hx prostate ca and basal cell skin ca - rarely mets to brain. Unlikely to be related.   - CT chest/A/P shows no evidence of malignancy/ metastasis   - no seizure. hold Keppra bid for seizure prophylaxis  - no steroid since minimal cerebral edma  - Neuro check Q 4 hr   - Neurosurg eval appreciated.    - Plan for brain biopsy with possible excision tomorrow 8/3/18.  - medical clearance documented in my note earlier today.   - c/w betablocker. - s/p craniotomy for excision of brain neoplasm  08/03/2018 - POD - 0   - Admitted to neuro ICU for close monitoring.   - Solitary R parietal brain lesion with Hx prostate ca and basal cell skin ca - rarely mets to brain. Unlikely to be related.   - CT chest/A/P shows no evidence of malignancy/ metastasis   - started Decadron q6hr  - on Keppra BID for seizure ppx  - Neuro check Q2-4hrs  - c/w betablocker.  - f/u path  - onc on the case.

## 2018-08-03 NOTE — PROGRESS NOTE ADULT - ASSESSMENT
ASSESSMENT/PLAN:  POD #0 S/P L crani for evacuation of L frontal Brain tumor     NEURO: Neuro checks q 1hr   Deacdron taper per NSG - quick taper with minimal cerebral edema   Hold AED with no known hx of seizure   CT in am   MRI +/- contrast per NSG at time PTD  F/U pathology  Monitor subgaleal drain output  Activity: [X] OOB as tolerated    PULM: Maintain O2 sats >93 %  If BP stable D/C chi tonight     CV: Hx of HTN and HLD   SBP goal- 110- < 140  Resume home meds    RENAL:  Fluids: NS at 70 cc/hr and if taking adequate po IVL     GI:  Diet: start clears and advance diet   GI prophylaxis  [X] PPI : On steroids   Bowel regimen [Xcolace [X] senna   ENDO:   Goal euglycemia (-180)    HEME/ONC:  VTE prophylaxis: [X] SCDs [] chemoprophylaxis held since fresh post op and risk of bleeding into operative bed. Will Ct in am and if no heme will start lovenox  Patient is at high risk of DVT / PE with known high grade brain tumor    ID: monitor for fever    SOCIAL/FAMILY:  [x] updated at bedside     CODE STATUS:  [x] Full Code     DISPOSITION:  [X] ICU     [X] Patient is at high risk of neurologic deterioration/death due to: christi op brain swelling and risk of herniation   Time spent:  45 critical care minutes

## 2018-08-03 NOTE — PROGRESS NOTE ADULT - ASSESSMENT
8/2 the pt had a ct of the chest and abdomen and pelvis and it was negative. A long discussion was held with him about the likelihood of neurosurgery and need to obtain a diagnosis.   8/3 pt off to the OR and will await path report. Labs and radiology reviewed.

## 2018-08-03 NOTE — PROGRESS NOTE ADULT - SUBJECTIVE AND OBJECTIVE BOX
carrie attending note    POD#0 R crani for tumor biopsy  c/o minimal post op R sided pain    vitals/labs/meds reviewed  fully oriented, L facial, mild dysarthria  BUE no drift, full strength  BLE full strength    -frozen high grade glioma, f/u path  -cont decadron for cerebral edema  -CTH in am  --160  -high risk VTE at admission due to brain tumor  -SCDs and chemoppx to start tomorrow if am CTH stable    at risk for deterioraion/death due to post op hemorrhage, cerebral edema, brain compression  additional 35min critical care time

## 2018-08-03 NOTE — PROGRESS NOTE ADULT - SUBJECTIVE AND OBJECTIVE BOX
Patient is a 77y old  Male who presents with a chief complaint of slurred speech and left facial droop (01 Aug 2018 10:07)      SUBJECTIVE / OVERNIGHT EVENTS:  Pt seen and examined at bedside in the neuro ICU.   s/p OR, brain mass excision.   Feels well. awake alert.  No chest pain, no shortness of breath.   No N/V/D. No abdominal pain.        Vital Signs Last 24 Hrs  T(C): 36.5 (03 Aug 2018 15:00), Max: 37 (03 Aug 2018 12:35)  T(F): 97.7 (03 Aug 2018 15:00), Max: 98.6 (03 Aug 2018 12:35)  HR: 73 (03 Aug 2018 20:00) (64 - 91)  BP: 127/68 (03 Aug 2018 00:17) (127/68 - 127/68)  BP(mean): --  RR: 17 (03 Aug 2018 20:00) (13 - 28)  SpO2: 97% (03 Aug 2018 20:00) (93% - 99%)  I&O's Summary    02 Aug 2018 07:01  -  03 Aug 2018 07:00  --------------------------------------------------------  IN: 560 mL / OUT: 0 mL / NET: 560 mL    03 Aug 2018 07:01  -  03 Aug 2018 21:09  --------------------------------------------------------  IN: 1135 mL / OUT: 1355 mL / NET: -220 mL        PHYSICAL EXAM:  GENERAL: NAD, Comfortable, sitting up  HEAD:  Atraumatic, left Craniectomy, dressing d/c/i  EYES: EOMI, PERRLA, conjunctiva and sclera clear  NECK: Supple, No JVD  CHEST/LUNG: Clear to auscultation bilaterally; No wheeze  HEART: Regular rate and rhythm; No murmurs, rubs, or gallops  ABDOMEN: Soft, Nontender, Nondistended; Bowel sounds present  Neuro: AAO x 3, +facial droop baseline, mild slurred speech, no focal deficit, 5/5 b/l extremities  EXTREMITIES:  2+ Peripheral Pulses, No clubbing, cyanosis, or edema  SKIN: No rashes or lesions      LABS:                        13.7   6.0   )-----------( 194      ( 03 Aug 2018 07:14 )             40.1     08-03    140  |  104  |  19  ----------------------------<  94  3.8   |  24  |  1.27    Ca    8.8      03 Aug 2018 07:14      PT/INR - ( 03 Aug 2018 07:14 )   PT: 11.5 sec;   INR: 1.06 ratio         PTT - ( 02 Aug 2018 14:47 )  PTT:27.1 sec  CAPILLARY BLOOD GLUCOSE                RADIOLOGY & ADDITIONAL TESTS:    Imaging Personally Reviewed:  [x] YES  [ ] NO    Consultant(s) Notes Reviewed:  [x] YES  [ ] NO      MEDICATIONS  (STANDING):  allopurinol 300 milliGRAM(s) Oral daily  ceFAZolin   IVPB 2000 milliGRAM(s) IV Intermittent every 8 hours  dexamethasone  Injectable 4 milliGRAM(s) IV Push every 6 hours  docusate sodium 100 milliGRAM(s) Oral three times a day  insulin lispro (HumaLOG) corrective regimen sliding scale   SubCutaneous Before meals and at bedtime  levETIRAcetam 1000 milliGRAM(s) Oral every 12 hours  losartan 50 milliGRAM(s) Oral daily  metoprolol succinate ER 25 milliGRAM(s) Oral daily  multivitamin 1 Tablet(s) Oral daily  NIFEdipine XL 90 milliGRAM(s) Oral daily  senna 2 Tablet(s) Oral at bedtime  sodium chloride 0.9% with potassium chloride 20 mEq/L 1000 milliLiter(s) (75 mL/Hr) IV Continuous <Continuous>    MEDICATIONS  (PRN):  acetaminophen   Tablet 650 milliGRAM(s) Oral every 6 hours PRN For Temp greater than 38 C (100.4 F)  acetaminophen   Tablet. 650 milliGRAM(s) Oral every 6 hours PRN Mild Pain (1 - 3)  ondansetron Injectable 4 milliGRAM(s) IV Push every 6 hours PRN Nausea and/or Vomiting      Care Discussed with Consultants/Other Providers [x] YES  [ ] NO    HEALTH ISSUES - PROBLEM Dx:  Prophylactic measure: Prophylactic measure  Other hyperlipidemia: Other hyperlipidemia  Essential hypertension: Essential hypertension  Brain tumor: Brain tumor

## 2018-08-03 NOTE — PROGRESS NOTE ADULT - SUBJECTIVE AND OBJECTIVE BOX
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  He then went to play baseball as scheduled for 4 hrs, then felt concerned went to see his PMD, who sent him to [facility] concerned for CVA.  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  Denies focal weakness, dysphagia, LOC, or seizure like activity. (01 Aug 2018 03:51)      the above notes were taken from the medical record here and I went over the case with the pt in the ER. He stated that he had nothing in the ROS to indicate he may have a primary lesion elsewhere Ie. cough fever bowel problems bone pain etc. He was seen by the neurosurgery team and at this point I would approach this pt like I would if I were seeing a pt with a unknown primary. In pts where the ROS is negative, a ct of the chest abdomen and pelvis is recommended. He did have a prostatectomy in  and his psa levels have been zero.      the pt had a ct of the chest and abdomen and pelvis and it was negative. A long discussion was held with him about the likelihood of neurosurgery and need to obtain a diagnosis. 8/3 pt off to the OR and will await path report. Labs and radiology reviewed.    MEDICATIONS  (STANDING):  acetaminophen  IVPB. 1000 milliGRAM(s) IV Intermittent once  allopurinol 300 milliGRAM(s) Oral daily  dexamethasone  Injectable 4 milliGRAM(s) IV Push every 6 hours  docusate sodium 100 milliGRAM(s) Oral three times a day  levETIRAcetam 1000 milliGRAM(s) Oral every 12 hours  losartan 50 milliGRAM(s) Oral daily  metoprolol succinate ER 25 milliGRAM(s) Oral daily  multivitamin 1 Tablet(s) Oral daily  NIFEdipine XL 90 milliGRAM(s) Oral daily  senna 2 Tablet(s) Oral at bedtime  sodium chloride 0.9% with potassium chloride 20 mEq/L 1000 milliLiter(s) (75 mL/Hr) IV Continuous <Continuous>    MEDICATIONS  (PRN):  acetaminophen   Tablet 650 milliGRAM(s) Oral every 6 hours PRN For Temp greater than 38 C (100.4 F)  acetaminophen   Tablet. 650 milliGRAM(s) Oral every 6 hours PRN Mild Pain (1 - 3)  ondansetron Injectable 4 milliGRAM(s) IV Push every 6 hours PRN Nausea and/or Vomiting    Vital Signs Last 24 Hrs  T(C): 37 (03 Aug 2018 12:35), Max: 37 (03 Aug 2018 12:35)  T(F): 98.6 (03 Aug 2018 12:35), Max: 98.6 (03 Aug 2018 12:35)  HR: 88 (03 Aug 2018 13:15) (60 - 90)  BP: 127/68 (03 Aug 2018 00:17) (127/68 - 130/77)  BP(mean): --  RR: 15 (03 Aug 2018 13:15) (13 - 28)  SpO2: 99% (03 Aug 2018 13:15) (93% - 99%))    LABS:    CBC Full  -  ( 2018 17:19 )  WBC Count : 8.24 K/uL  Hemoglobin : 13.9 g/dL  Hematocrit : 40.1 %  Platelet Count - Automated : 215 K/uL  Mean Cell Volume : 89.9 fl  Mean Cell Hemoglobin : 31.2 pg  Mean Cell Hemoglobin Concentration : 34.7 gm/dL  Auto Neutrophil # : 5.68 K/uL  Auto Lymphocyte # : 1.95 K/uL  Auto Monocyte # : 0.54 K/uL  Auto Eosinophil # : 0.02 K/uL  Auto Basophil # : 0.03 K/uL  Auto Neutrophil % : 68.9 %  Auto Lymphocyte % : 23.7 %  Auto Monocyte % : 6.6 %  Auto Eosinophil % : 0.2 %  Auto Basophil % : 0.4 %        139  |  104  |  16  ----------------------------<  112<H>  4.1   |  25  |  1.13    Ca    8.9      01 Aug 2018 06:02    TPro  7.0  /  Alb  4.3  /  TBili  0.8  /  DBili  x   /  AST  22  /  ALT  26  /  AlkPhos  45  08-01    PT/INR - ( 2018 17:19 )   PT: 11.6 sec;   INR: 1.06 ratio         PTT - ( 2018 17:19 )  PTT:25.8 sec      ROS:  Negative except for:    PAST MEDICAL & SURGICAL HISTORY:  Basal cell carcinoma (BCC) of left lower leg: s/p resection  Prostate cancer: s/p radical prostatectomy  HLD (hyperlipidemia)  HTN (hypertension)  Lipoma of neck: s/p resection at age 30  Basal cell carcinoma (BCC) of lower leg: s/p resection  History of tonsillectomy: at age 30      SOCIAL HISTORY:    FAMILY HISTORY:  Family history of diabetes mellitus (Father): Father   Family history of prostate cancer in father (Father):   Family history of breast cancer in mother (Mother, Sibling): Mother and sister  Family history of lung cancer (Mother): mother , long standing smoker      Allergies    No Known Allergies    Intolerances        PHYSICAL EXAM off to OR   General: adult in NAD  HEENT: clear oropharynx, anicteric sclera, pink conjunctivae  Neck: supple  CV: normal S1S2 with no murmur rubs or gallops  Lungs: clear to auscultation, no wheezes, no rales  Abdomen: soft non-tender non-distended, no hepato/splenomegaly  Ext: no clubbing cyanosis or edema  Skin: no rashes and no petechiae  Neuro: alert and oriented X3 no focal deficits slight right facial droop    BLOOD SMEAR INTERPRETATION:    RADIOLOGY :

## 2018-08-03 NOTE — PROGRESS NOTE ADULT - SUBJECTIVE AND OBJECTIVE BOX
Patient seen and examined at bedside.    T(C): 36.7 (08-03-18 @ 00:17), Max: 36.8 (08-02-18 @ 08:22)  HR: 64 (08-03-18 @ 00:17) (60 - 70)  BP: 127/68 (08-03-18 @ 00:17) (127/68 - 132/73)  RR: 18 (08-03-18 @ 00:17) (18 - 18)  SpO2: 98% (08-03-18 @ 00:17) (94% - 98%)  Wt(kg): --    Exam:  AOx3, FC, PERRL, EOMI, V1-3 intact, no facial, palate cole symmetric, tongue midline, shrug 5/5  5/5 throughout, no drift  SILT  No clonus or babinski

## 2018-08-03 NOTE — PROGRESS NOTE ADULT - SUBJECTIVE AND OBJECTIVE BOX
SUMMARY:HPI:  78 yo male Hx HTN, HLD, prostate ca s/p prostatectomy, 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  He then went to play baseball as scheduled for 4 hrs, then felt concerned went to see his PMD, who sent him to [facility] concerned for CVA.  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  Denies focal weakness, dysphagia, LOC, or seizure like activity. (01 Aug 2018 03:51)    OVERNIGHT EVENTS:     ADMISSION SCORES:   GCS: HH: MF: NIHSS: RASS: CAM-ICU: ICP:  CLINICAL TRIALS:  Allergies    No Known Allergies    Intolerances        REVIEW OF SYSTEMS: [ ] Unable to Assess due to neurologic exam   [ ] All ROS addressed below are non-contributory, except:  Neuro: [ ] Headache [ ] Back pain [ ] Numbness [ ] Weakness [ ] Ataxia [ ] Dizziness [ ] Aphasia [ ] Dysarthria [ ] Visual disturbance  Resp: [ ] Shortness of breath/dyspnea, [ ] Orthopnea [ ] Cough  CV: [ ] Chest pain [ ] Palpitation [ ] Lightheadedness [ ] Syncope  Renal: [ ] Thirst [ ] Edema  GI: [ ] Nausea [ ] Emesis [ ] Abdominal pain [ ] Constipation [ ] Diarrhea  Hem: [ ] Hematemesis [ ] bright red blood per rectum  ID: [ ] Fever [ ] Chills [ ] Dysuria  ENT: [ ] Rhinorrhea    DEVICES:   [ ] Restraints [ ] ET tube [ ] central line [ ] arterial line [ ] mejia [ ] NGT/OGT [ ] EVD [ ] LD [ ] ELISEO/HMV [ ] Trach [ ] PEG [ ] Chest Tube     VITALS: [ ] Reviewed  Vital Signs Last 24 Hrs  T(C): 37 (03 Aug 2018 12:35), Max: 37 (03 Aug 2018 12:35)  T(F): 98.6 (03 Aug 2018 12:35), Max: 98.6 (03 Aug 2018 12:35)  HR: 88 (03 Aug 2018 13:15) (60 - 90)  BP: 127/68 (03 Aug 2018 00:17) (127/68 - 130/77)  BP(mean): --  RR: 15 (03 Aug 2018 13:15) (13 - 28)  SpO2: 99% (03 Aug 2018 13:15) (93% - 99%)  CAPILLARY BLOOD GLUCOSE            LABS:  PT/INR - ( 03 Aug 2018 07:14 )   PT: 11.5 sec;   INR: 1.06 ratio         PTT - ( 02 Aug 2018 14:47 )  PTT:27.1 sec  08-03    140  |  104  |  19  ----------------------------<  94  3.8   |  24  |  1.27    Ca    8.8      03 Aug 2018 07:14                            13.7   6.0   )-----------( 194      ( 03 Aug 2018 07:14 )             40.1       STROKE CORE MEASURES:      MEDICATION LEVELS:     IMAGING/DATA: [ ] Reviewed    IVF FLUIDS/MEDICATIONS: [ ] Reviewed  MEDICATIONS  (STANDING):  acetaminophen  IVPB. 1000 milliGRAM(s) IV Intermittent once  allopurinol 300 milliGRAM(s) Oral daily  dexamethasone  Injectable 4 milliGRAM(s) IV Push every 6 hours  docusate sodium 100 milliGRAM(s) Oral three times a day  levETIRAcetam 1000 milliGRAM(s) Oral every 12 hours  losartan 50 milliGRAM(s) Oral daily  metoprolol succinate ER 25 milliGRAM(s) Oral daily  multivitamin 1 Tablet(s) Oral daily  NIFEdipine XL 90 milliGRAM(s) Oral daily  senna 2 Tablet(s) Oral at bedtime  sodium chloride 0.9% with potassium chloride 20 mEq/L 1000 milliLiter(s) (75 mL/Hr) IV Continuous <Continuous>    MEDICATIONS  (PRN):  acetaminophen   Tablet 650 milliGRAM(s) Oral every 6 hours PRN For Temp greater than 38 C (100.4 F)  acetaminophen   Tablet. 650 milliGRAM(s) Oral every 6 hours PRN Mild Pain (1 - 3)  ondansetron Injectable 4 milliGRAM(s) IV Push every 6 hours PRN Nausea and/or Vomiting    I&O's Summary    02 Aug 2018 07:01  -  03 Aug 2018 07:00  --------------------------------------------------------  IN: 560 mL / OUT: 0 mL / NET: 560 mL        EXAMINATION:  PHYSICAL EXAM:    Constitutional: No Acute Distress     Neurological: Awake, alert oriented to person, place and time, Following Commands, PERRL, EOMI, No Gaze Preference,    Motor exam:          Upper extremity                         Delt     Bicep     Tricep    HG                                                 R         5/5 5/5 5/5 5/5                                               L          5/5 5/5 5/5 5/5          Lower extremity                        HF         KF        KE       DF         PF                                                  R        5/5 5/5 5/5 5/5 5/5                                               L         5/5 5/5 5/5 5/5 5/5                                                 Sensation: [ ] intact to light touch  [ ] decreased:     Reflexes: Deep Tendon Reflexes Intact     Pulmonary: Clear to Auscultation, No rales, No rhonchi, No wheezes     Cardiovascular: S1, S2, Regular rate and rhythm     Gastrointestinal: Soft, Non-tender, Non-distended     Extremities: No calf tenderness

## 2018-08-03 NOTE — PROGRESS NOTE ADULT - ASSESSMENT
78 yo male Hx HTN, HLD, prostate ca s/p prostatectomy, basal cell skin ca (resected from leg) p/w 1 wk of L facial droop, slurred speech and one episode of L neck muscle spasm admitted with R parietal brain lesion concerning for malignancy.

## 2018-08-03 NOTE — PROGRESS NOTE ADULT - ATTENDING COMMENTS
- [facility]  - [phone]    Met with the family at bedside. All questions answered to their satisfaction.   Reassurance provided.  d/w pt's Cardiologist Dr. Chin and reviewed TTE/cath, etc.   d/w anesthesia.  d/w NP - Dr. FERNY Lynn ([facility])  - [phone]

## 2018-08-03 NOTE — BRIEF OPERATIVE NOTE - PROCEDURE
<<-----Click on this checkbox to enter Procedure Craniectomy or craniotomy for excision of brain neoplasm  08/03/2018    Active  [id number]

## 2018-08-03 NOTE — PROGRESS NOTE ADULT - ASSESSMENT
Bethel Day, 78 yo male, xfer from [facility], had intermittent moderate L facial "twitching" today. MRI showed solidly enhancing right parietal mass, minimal edema.   - OR today with Dr. Rivers

## 2018-08-04 LAB
GLUCOSE BLDC GLUCOMTR-MCNC: 130 MG/DL — HIGH (ref 70–99)
GLUCOSE BLDC GLUCOMTR-MCNC: 138 MG/DL — HIGH (ref 70–99)
GLUCOSE BLDC GLUCOMTR-MCNC: 144 MG/DL — HIGH (ref 70–99)

## 2018-08-04 PROCEDURE — 99291 CRITICAL CARE FIRST HOUR: CPT

## 2018-08-04 PROCEDURE — 70553 MRI BRAIN STEM W/O & W/DYE: CPT | Mod: 26

## 2018-08-04 PROCEDURE — 70450 CT HEAD/BRAIN W/O DYE: CPT | Mod: 26

## 2018-08-04 RX ORDER — ENOXAPARIN SODIUM 100 MG/ML
40 INJECTION SUBCUTANEOUS
Qty: 0 | Refills: 0 | Status: DISCONTINUED | OUTPATIENT
Start: 2018-08-04 | End: 2018-08-07

## 2018-08-04 RX ORDER — DEXAMETHASONE 0.5 MG/5ML
3 ELIXIR ORAL EVERY 6 HOURS
Qty: 0 | Refills: 0 | Status: DISCONTINUED | OUTPATIENT
Start: 2018-08-04 | End: 2018-08-05

## 2018-08-04 RX ORDER — DEXAMETHASONE 0.5 MG/5ML
4 ELIXIR ORAL EVERY 6 HOURS
Qty: 0 | Refills: 0 | Status: DISCONTINUED | OUTPATIENT
Start: 2018-08-04 | End: 2018-08-04

## 2018-08-04 RX ORDER — ALPRAZOLAM 0.25 MG
0.5 TABLET ORAL DAILY
Qty: 0 | Refills: 0 | Status: DISCONTINUED | OUTPATIENT
Start: 2018-08-04 | End: 2018-08-05

## 2018-08-04 RX ADMIN — Medication 4 MILLIGRAM(S): at 00:35

## 2018-08-04 RX ADMIN — Medication 4 MILLIGRAM(S): at 13:05

## 2018-08-04 RX ADMIN — LEVETIRACETAM 1000 MILLIGRAM(S): 250 TABLET, FILM COATED ORAL at 17:06

## 2018-08-04 RX ADMIN — Medication 4 MILLIGRAM(S): at 06:03

## 2018-08-04 RX ADMIN — Medication 1 TABLET(S): at 13:05

## 2018-08-04 RX ADMIN — Medication 0.5 MILLIGRAM(S): at 11:11

## 2018-08-04 RX ADMIN — Medication 3 MILLIGRAM(S): at 17:06

## 2018-08-04 RX ADMIN — LEVETIRACETAM 1000 MILLIGRAM(S): 250 TABLET, FILM COATED ORAL at 06:03

## 2018-08-04 RX ADMIN — Medication 100 MILLIGRAM(S): at 13:05

## 2018-08-04 RX ADMIN — Medication 3 MILLIGRAM(S): at 23:54

## 2018-08-04 RX ADMIN — LOSARTAN POTASSIUM 50 MILLIGRAM(S): 100 TABLET, FILM COATED ORAL at 06:03

## 2018-08-04 RX ADMIN — DEXTROSE MONOHYDRATE, SODIUM CHLORIDE, AND POTASSIUM CHLORIDE 75 MILLILITER(S): 50; .745; 4.5 INJECTION, SOLUTION INTRAVENOUS at 06:20

## 2018-08-04 RX ADMIN — ENOXAPARIN SODIUM 40 MILLIGRAM(S): 100 INJECTION SUBCUTANEOUS at 17:06

## 2018-08-04 RX ADMIN — Medication 100 MILLIGRAM(S): at 06:04

## 2018-08-04 NOTE — PROGRESS NOTE ADULT - SUBJECTIVE AND OBJECTIVE BOX
POD#1 R crani for tumor biopsy    Vital Signs Last 24 Hrs  T(C): 36.6 (03 Aug 2018 23:00), Max: 37 (03 Aug 2018 12:35)  T(F): 97.8 (03 Aug 2018 23:00), Max: 98.6 (03 Aug 2018 12:35)  HR: 64 (04 Aug 2018 00:00) (61 - 91)  BP: --  BP(mean): --  RR: 20 (04 Aug 2018 00:00) (13 - 28)  SpO2: 99% (04 Aug 2018 00:00) (93% - 99%)    EXAM  AOx3, FC, PERRL, EOMI, mild facial   5/5 throughout, no drift  SILT  no clonus

## 2018-08-04 NOTE — PHYSICAL THERAPY INITIAL EVALUATION ADULT - CRITERIA FOR SKILLED THERAPEUTIC INTERVENTIONS
functional limitations in following categories/impairments found/therapy frequency/anticipated equipment needs at discharge/anticipated discharge recommendation/risk reduction/prevention/rehab potential

## 2018-08-04 NOTE — PROGRESS NOTE ADULT - SUBJECTIVE AND OBJECTIVE BOX
carrie attending note    POD#1 R crani for tumor biopsy  choked with diet today, started on dysphagia diet, s/s eval pending    vitals/labs/meds reviewed  fully oriented, L facial, mild dysarthria  BUE no drift, full strength  BLE full strength    -frozen high grade glioma, f/u path  -cont decadron for cerebral edema  --160  -high risk VTE at admission due to brain tumor  -SCDs and lovenox ppx    transfer to floor  non critical care

## 2018-08-04 NOTE — PROGRESS NOTE ADULT - SUBJECTIVE AND OBJECTIVE BOX
Patient is a 77y old  Male who presents with a chief complaint of slurred speech and left facial droop (01 Aug 2018 10:07)      SUBJECTIVE / OVERNIGHT EVENTS:  remained in the neuro ICU.  awake alert, at baseline mental status.  feel well. no pain.  no cp, no sob, no n/v/d. no abdominal pain.  no headache, no dizziness.       Vital Signs Last 24 Hrs  T(C): 36.8 (04 Aug 2018 15:00), Max: 36.8 (04 Aug 2018 15:00)  T(F): 98.3 (04 Aug 2018 15:00), Max: 98.3 (04 Aug 2018 15:00)  HR: 55 (04 Aug 2018 15:00) (45 - 77)  BP: 113/48 (04 Aug 2018 15:00) (113/48 - 113/48)  BP(mean): 62 (04 Aug 2018 15:00) (62 - 62)  RR: 13 (04 Aug 2018 15:00) (13 - 21)  SpO2: 98% (04 Aug 2018 15:00) (95% - 99%)  I&O's Summary    03 Aug 2018 07:01  -  04 Aug 2018 07:00  --------------------------------------------------------  IN: 2325 mL / OUT: 2210 mL / NET: 115 mL    04 Aug 2018 07:01  -  04 Aug 2018 18:53  --------------------------------------------------------  IN: 580 mL / OUT: 800 mL / NET: -220 mL        PHYSICAL EXAM:  GENERAL: NAD, Comfortable, sitting up  HEAD:  Atraumatic, left Craniectomy, dressing d/c/i  EYES: EOMI, PERRLA, conjunctiva and sclera clear  NECK: Supple, No JVD  CHEST/LUNG: Clear to auscultation bilaterally; No wheeze  HEART: Regular rate and rhythm; No murmurs, rubs, or gallops  ABDOMEN: Soft, Nontender, Nondistended; Bowel sounds present  Neuro: AAO x 3, + mild facial droop baseline, mild occasional slurred speech, no focal deficit, 5/5 b/l extremities  EXTREMITIES:  2+ Peripheral Pulses, No clubbing, cyanosis, or edema  SKIN: No rashes or lesions      LABS:                        12.4   8.3   )-----------( 210      ( 03 Aug 2018 21:15 )             35.4     08-03    138  |  105  |  17  ----------------------------<  192<H>  4.1   |  22  |  1.03    Ca    8.7      03 Aug 2018 21:15  Phos  3.4     08-03  Mg     2.1     08-03      PT/INR - ( 03 Aug 2018 07:14 )   PT: 11.5 sec;   INR: 1.06 ratio           CAPILLARY BLOOD GLUCOSE      POCT Blood Glucose.: 138 mg/dL (04 Aug 2018 16:42)  POCT Blood Glucose.: 144 mg/dL (04 Aug 2018 07:54)            RADIOLOGY & ADDITIONAL TESTS:    Imaging Personally Reviewed:  [x] YES  [ ] NO    Consultant(s) Notes Reviewed:  [x] YES  [ ] NO      MEDICATIONS  (STANDING):  allopurinol 300 milliGRAM(s) Oral daily  ALPRAZolam 0.5 milliGRAM(s) Oral daily  dexamethasone     Tablet 3 milliGRAM(s) Oral every 6 hours  docusate sodium 100 milliGRAM(s) Oral three times a day  enoxaparin Injectable 40 milliGRAM(s) SubCutaneous <User Schedule>  insulin lispro (HumaLOG) corrective regimen sliding scale   SubCutaneous Before meals and at bedtime  levETIRAcetam 1000 milliGRAM(s) Oral every 12 hours  losartan 50 milliGRAM(s) Oral daily  metoprolol succinate ER 25 milliGRAM(s) Oral daily  multivitamin 1 Tablet(s) Oral daily  NIFEdipine XL 90 milliGRAM(s) Oral daily  psyllium Powder 1 Packet(s) Oral daily  senna 2 Tablet(s) Oral at bedtime    MEDICATIONS  (PRN):  acetaminophen   Tablet 650 milliGRAM(s) Oral every 6 hours PRN For Temp greater than 38 C (100.4 F)  acetaminophen   Tablet. 650 milliGRAM(s) Oral every 6 hours PRN Mild Pain (1 - 3)  ondansetron Injectable 4 milliGRAM(s) IV Push every 6 hours PRN Nausea and/or Vomiting      Care Discussed with Consultants/Other Providers [x] YES  [ ] NO    HEALTH ISSUES - PROBLEM Dx:  Prophylactic measure: Prophylactic measure  Other hyperlipidemia: Other hyperlipidemia  Essential hypertension: Essential hypertension  Brain tumor: Brain tumor

## 2018-08-04 NOTE — PROGRESS NOTE ADULT - ASSESSMENT
ASSESSMENT/PLAN:  POD #0 S/P L crani for evacuation of L frontal Brain tumor     NEURO: Neuro checks q 4hr   Deacdron taper per NSG - quick taper with little cerebral edema on imaging   Hold AED with no known hx of seizure   CT today  MRI +/- contrast per NSG at time PTD  F/U pathology  Monitor subgaleal drain output  Activity: [X] OOB as tolerated    PULM: Maintain O2 sats >93 %  If BP stable D/C chi tonight     CV: Hx of HTN and HLD   SBP goal- 110- < 140  Resume home meds    RENAL:  Fluids: NS at 70 cc/hr     GI:  Diet: Eval S/S  GI prophylaxis  [X] PPI : On steroids   Bowel regimen [Xcolace [X] senna   ENDO:   Goal euglycemia (-180)    HEME/ONC:  VTE prophylaxis: [X] SCDs [X] chemoprophylaxis  Patient is at high risk of DVT / PE with known high grade brain tumor    ID: monitor for fever    SOCIAL/FAMILY:  [x] updated at bedside     CODE STATUS:  [x] Full Code     DISPOSITION:  [X] ICU     [X] Patient is at high risk of neurologic deterioration/death due to: christi op brain swelling and risk of herniation   Time spent:  45 critical care minutes ASSESSMENT/PLAN:  POD #0 S/P L crani for evacuation of L frontal Brain tumor     NEURO: Neuro checks q 4hr   Deacdron decreased to 3mg po q6h  Hold AED with no known hx of seizure   CT today   MRI +/- contrast per NSG at time PTD  F/U pathology  Monitor subgaleal drain output  Activity: [X] OOB as tolerated    PULM: Maintain O2 sats >93 %  If BP stable D/C chi tonight     CV: Hx of HTN and HLD   SBP goal- 110- < 140  Resume home meds    RENAL:  Fluids: NS at 70 cc/hr     GI:  Diet: Eval S/S  GI prophylaxis  [X] PPI : On steroids   Bowel regimen [Xcolace [X] senna   ENDO:   Goal euglycemia (-180)    HEME/ONC:  Decrease in Hgb noted - monitor for now  VTE prophylaxis: [X] SCDs [X] chemoprophylaxis - will start Lovenox 40 mg sc daily  Patient is at high risk of DVT / PE with known high grade brain tumor    ID: monitor for fever    SOCIAL/FAMILY:  [x] updated at bedside     CODE STATUS:  [x] Full Code     DISPOSITION:  [X] ICU     [X] Patient is at high risk of neurologic deterioration/death due to: christi op brain swelling and risk of herniation   Time spent:  45 critical care minutes

## 2018-08-04 NOTE — PROGRESS NOTE ADULT - SUBJECTIVE AND OBJECTIVE BOX
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  He then went to play baseball as scheduled for 4 hrs, then felt concerned went to see his PMD, who sent him to [facility] concerned for CVA.  At [place], CTH showed R brain lesion, further characterized by MRI head and MRA head and neck - consistent with 2.1x1.7x1.4cm solitary R parietal mass w/o sig edema, .  Denies focal weakness, dysphagia, LOC, or seizure like activity.   on 8/3/18 S/P R crani for resection of tumor.    OVERNIGHT EVENTS:       Allergies    No Known Allergies    Intolerances        REVIEW OF SYSTEMS:    [ X] All ROS addressed below are non-contributory, except:  Neuro: [X ] Headache [ ] Back pain [ ] Numbness [ ] Weakness [ ] Ataxia [ ] Dizziness [ ] Aphasia [ ] Dysarthria [ ] Visual disturbance  Resp: [ ] Shortness of breath/dyspnea, [ ] Orthopnea [ ] Cough  CV: [ ] Chest pain [ ] Palpitation [ ] Lightheadedness [ ] Syncope  Renal: [ ] Thirst [ ] Edema  GI: [ ] Nausea [ ] Emesis [ ] Abdominal pain [ ] Constipation [ ] Diarrhea  Hem: [ ] Hematemesis [ ] bright red blood per rectum  ID: [ ] Fever [ ] Chills [ ] Dysuria  ENT: [ ] Rhinorrhea    VITALS: [X ] Reviewed  Vital Signs Last 24 Hrs  T(C): 36.7 (04 Aug 2018 03:00), Max: 37 (03 Aug 2018 12:35)  T(F): 98.1 (04 Aug 2018 03:00), Max: 98.6 (03 Aug 2018 12:35)  HR: 58 (04 Aug 2018 06:00) (45 - 91)  BP: --  RR: 21 (04 Aug 2018 06:00) (13 - 28)  SpO2: 99% (04 Aug 2018 06:00) (93% - 99%)  CAPILLARY BLOOD GLUCOSE            LABS:  PT/INR - ( 03 Aug 2018 07:14 )   PT: 11.5 sec;   INR: 1.06 ratio         PTT - ( 02 Aug 2018 14:47 )  PTT:27.1 sec  08-03    138  |  105  |  17  ----------------------------<  192<H>  4.1   |  22  |  1.03    Ca    8.7      03 Aug 2018 21:15  Phos  3.4     08-03  Mg     2.1     08-03                            12.4   8.3   )-----------( 210      ( 03 Aug 2018 21:15 )             35.4     IMAGING/DATA: [ ] Reviewed    IVF FLUIDS/MEDICATIONS: [X ] Reviewed  MEDICATIONS  (STANDING):  allopurinol 300 milliGRAM(s) Oral daily  dexamethasone     Tablet 4 milliGRAM(s) Oral every 6 hours  docusate sodium 100 milliGRAM(s) Oral three times a day  insulin lispro (HumaLOG) corrective regimen sliding scale   SubCutaneous Before meals and at bedtime  levETIRAcetam 1000 milliGRAM(s) Oral every 12 hours  losartan 50 milliGRAM(s) Oral daily  metoprolol succinate ER 25 milliGRAM(s) Oral daily  multivitamin 1 Tablet(s) Oral daily  NIFEdipine XL 90 milliGRAM(s) Oral daily  psyllium Powder 1 Packet(s) Oral daily  senna 2 Tablet(s) Oral at bedtime    MEDICATIONS  (PRN):  acetaminophen   Tablet 650 milliGRAM(s) Oral every 6 hours PRN For Temp greater than 38 C (100.4 F)  acetaminophen   Tablet. 650 milliGRAM(s) Oral every 6 hours PRN Mild Pain (1 - 3)  ondansetron Injectable 4 milliGRAM(s) IV Push every 6 hours PRN Nausea and/or Vomiting    I&O's Summary    03 Aug 2018 07:01  -  04 Aug 2018 07:00  --------------------------------------------------------  IN: 2325 mL / OUT: 2210 mL / NET: 115 mL        EXAMINATION:  PHYSICAL EXAM:    Constitutional: No Acute Distress     Neurological: Awake, alert oriented to person, place and time, Following Commands, PERRL, EOMI, No Gaze Preference, ,L hemifacial, Slightly dysarthric, No dysmetria, No ataxia, No nystagmus     Motor exam:          Upper extremity                         Delt     Bicep     Tricep    HG                                                 R         5/5 5/5 5/5 5/5                                               L          5/5 5/5 5/5 5/5          Lower extremity                        HF         KF        KE       DF         PF                                                  R        5/5 5/5 5/5 5/5 5/5                                               L         5/5 5/5 5/5 5/5 5/5                                                 Sensation: [X ] intact to light touch     Pulmonary: Clear to Auscultation, No rales, No rhonchi, No wheezes     Cardiovascular: S1, S2, Regular rate and rhythm     Gastrointestinal: Soft, Non-tender, Non-distended     Extremities: No calf tenderness SUMMARY:HPI:  76 yo male Hx HTN, HLD, prostate ca s/p prostatectomy, 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  He then went to play baseball as scheduled for 4 hrs, then felt concerned went to see his PMD, who sent him to [facility] concerned for CVA.  At [place], CTH showed R brain lesion, further characterized by MRI head and MRA head and neck - consistent with 2.1x1.7x1.4cm solitary R parietal mass w/o sig edema, .  Denies focal weakness, dysphagia, LOC, or seizure like activity.   on 8/3/18 S/P R crani for resection of tumor.    OVERNIGHT EVENTS: None    Allergies: No Known Allergies      REVIEW OF SYSTEMS:    [ X] All ROS addressed below are non-contributory, except:  Neuro: [X ] Headache [ ] Back pain [ ] Numbness [ ] Weakness [ ] Ataxia [ ] Dizziness [ ] Aphasia [ ] Dysarthria [ ] Visual disturbance  Resp: [ ] Shortness of breath/dyspnea, [ ] Orthopnea [ ] Cough  CV: [ ] Chest pain [ ] Palpitation [ ] Lightheadedness [ ] Syncope  Renal: [ ] Thirst [ ] Edema  GI: [ ] Nausea [ ] Emesis [ ] Abdominal pain [ ] Constipation [ ] Diarrhea  Hem: [ ] Hematemesis [ ] bright red blood per rectum  ID: [ ] Fever [ ] Chills [ ] Dysuria  ENT: [ ] Rhinorrhea    VITALS: [X ] Reviewed  Vital Signs Last 24 Hrs  T(C): 36.7 (04 Aug 2018 03:00), Max: 37 (03 Aug 2018 12:35)  T(F): 98.1 (04 Aug 2018 03:00), Max: 98.6 (03 Aug 2018 12:35)  HR: 58 (04 Aug 2018 06:00) (45 - 91)  BP: --  RR: 21 (04 Aug 2018 06:00) (13 - 28)  SpO2: 99% (04 Aug 2018 06:00) (93% - 99%)  CAPILLARY BLOOD GLUCOSE            LABS:  PT/INR - ( 03 Aug 2018 07:14 )   PT: 11.5 sec;   INR: 1.06 ratio         PTT - ( 02 Aug 2018 14:47 )  PTT:27.1 sec  08-03    138  |  105  |  17  ----------------------------<  192<H>  4.1   |  22  |  1.03    Ca    8.7      03 Aug 2018 21:15  Phos  3.4     08-03  Mg     2.1     08-03                            12.4   8.3   )-----------( 210      ( 03 Aug 2018 21:15 )             35.4     IMAGING/DATA: [ ] Reviewed    IVF FLUIDS/MEDICATIONS: [X ] Reviewed  MEDICATIONS  (STANDING):  allopurinol 300 milliGRAM(s) Oral daily  dexamethasone     Tablet 4 milliGRAM(s) Oral every 6 hours  docusate sodium 100 milliGRAM(s) Oral three times a day  insulin lispro (HumaLOG) corrective regimen sliding scale   SubCutaneous Before meals and at bedtime  levETIRAcetam 1000 milliGRAM(s) Oral every 12 hours  losartan 50 milliGRAM(s) Oral daily  metoprolol succinate ER 25 milliGRAM(s) Oral daily  multivitamin 1 Tablet(s) Oral daily  NIFEdipine XL 90 milliGRAM(s) Oral daily  psyllium Powder 1 Packet(s) Oral daily  senna 2 Tablet(s) Oral at bedtime    MEDICATIONS  (PRN):  acetaminophen   Tablet 650 milliGRAM(s) Oral every 6 hours PRN For Temp greater than 38 C (100.4 F)  acetaminophen   Tablet. 650 milliGRAM(s) Oral every 6 hours PRN Mild Pain (1 - 3)  ondansetron Injectable 4 milliGRAM(s) IV Push every 6 hours PRN Nausea and/or Vomiting    I&O's Summary    03 Aug 2018 07:01  -  04 Aug 2018 07:00  --------------------------------------------------------  IN: 2325 mL / OUT: 2210 mL / NET: 115 mL        EXAMINATION:  PHYSICAL EXAM:  Constitutional: No Acute Distress   Neurological: Awake, alert oriented to person, place and time, Following Commands, PERRL, EOMI, No Gaze Preference, ,L hemifacial, Slightly dysarthric, No dysmetria, No ataxia, No nystagmus   Motor exam:          Upper extremity                         Delt     Bicep     Tricep    HG                                                 R         5/5 5/5 5/5 5/5                                               L          5/5 5/5 5/5 5/5          Lower extremity                        HF         KF        KE       DF         PF                                                  R        5/5 5/5 5/5 5/5 5/5                                              L         5/5 5/5 5/5 5/5 5/5                                                 Sensation: [X ] intact to light touch   Pulmonary: Clear to Auscultation, No rales, No rhonchi, No wheezes   Cardiovascular: S1, S2, Regular rate and rhythm   Gastrointestinal: Soft, Non-tender, Non-distended   Extremities: No calf tenderness

## 2018-08-04 NOTE — PROGRESS NOTE ADULT - PROBLEM SELECTOR PLAN 1
- s/p craniotomy for excision of brain neoplasm  08/03/2018 - POD - 1  - continue to monitor in the neuro ICU  - Solitary R parietal brain lesion with Hx prostate ca and basal cell skin ca - rarely mets to brain. Unlikely to be related.   - CT chest/A/P shows no evidence of malignancy/ metastasis   - started Decadron q6hr, taper as appropriate per neurosurgery.   - on Keppra BID for seizure ppx  - Neuro check Q2-4hrs  - c/w betablocker.  - f/u path  - onc already on the case.  - MRI brain reviewed personally.

## 2018-08-04 NOTE — PHYSICAL THERAPY INITIAL EVALUATION ADULT - ADDITIONAL COMMENTS
Patient lives [place] private house with spouse has 1 step to negotiate. patient added very active , goes to gym and plays softball.

## 2018-08-04 NOTE — PHYSICAL THERAPY INITIAL EVALUATION ADULT - PLANNED THERAPY INTERVENTIONS, PT EVAL
stair training : 2steps / upand down / independent/bed mobility training/gait training/transfer training/balance training

## 2018-08-04 NOTE — PROGRESS NOTE ADULT - ASSESSMENT
-frozen high grade glioma, f/u path  -cont decadron for cerebral edema  -CTH in am  --160  -SCDs and chemoppx to start tomorrow if am CTH stable

## 2018-08-05 LAB — GLUCOSE BLDC GLUCOMTR-MCNC: 126 MG/DL — HIGH (ref 70–99)

## 2018-08-05 PROCEDURE — 99233 SBSQ HOSP IP/OBS HIGH 50: CPT

## 2018-08-05 RX ORDER — ALPRAZOLAM 0.25 MG
0.5 TABLET ORAL DAILY
Qty: 0 | Refills: 0 | Status: DISCONTINUED | OUTPATIENT
Start: 2018-08-05 | End: 2018-08-07

## 2018-08-05 RX ORDER — SODIUM CHLORIDE 9 MG/ML
1000 INJECTION INTRAMUSCULAR; INTRAVENOUS; SUBCUTANEOUS
Qty: 0 | Refills: 0 | Status: DISCONTINUED | OUTPATIENT
Start: 2018-08-05 | End: 2018-08-07

## 2018-08-05 RX ORDER — BACLOFEN 100 %
5 POWDER (GRAM) MISCELLANEOUS ONCE
Qty: 0 | Refills: 0 | Status: COMPLETED | OUTPATIENT
Start: 2018-08-05 | End: 2018-08-05

## 2018-08-05 RX ORDER — DEXAMETHASONE 0.5 MG/5ML
3 ELIXIR ORAL THREE TIMES A DAY
Qty: 0 | Refills: 0 | Status: DISCONTINUED | OUTPATIENT
Start: 2018-08-05 | End: 2018-08-07

## 2018-08-05 RX ORDER — METOCLOPRAMIDE HCL 10 MG
10 TABLET ORAL ONCE
Qty: 0 | Refills: 0 | Status: COMPLETED | OUTPATIENT
Start: 2018-08-05 | End: 2018-08-05

## 2018-08-05 RX ORDER — BENZOCAINE AND MENTHOL 5; 1 G/100ML; G/100ML
1 LIQUID ORAL THREE TIMES A DAY
Qty: 0 | Refills: 0 | Status: DISCONTINUED | OUTPATIENT
Start: 2018-08-05 | End: 2018-08-07

## 2018-08-05 RX ADMIN — BENZOCAINE AND MENTHOL 1 LOZENGE: 5; 1 LIQUID ORAL at 19:59

## 2018-08-05 RX ADMIN — ENOXAPARIN SODIUM 40 MILLIGRAM(S): 100 INJECTION SUBCUTANEOUS at 17:52

## 2018-08-05 RX ADMIN — LEVETIRACETAM 1000 MILLIGRAM(S): 250 TABLET, FILM COATED ORAL at 17:52

## 2018-08-05 RX ADMIN — LEVETIRACETAM 1000 MILLIGRAM(S): 250 TABLET, FILM COATED ORAL at 05:32

## 2018-08-05 RX ADMIN — Medication 0.5 MILLIGRAM(S): at 21:00

## 2018-08-05 RX ADMIN — Medication 3 MILLIGRAM(S): at 13:20

## 2018-08-05 RX ADMIN — Medication 3 MILLIGRAM(S): at 21:10

## 2018-08-05 RX ADMIN — Medication 300 MILLIGRAM(S): at 13:16

## 2018-08-05 RX ADMIN — SENNA PLUS 2 TABLET(S): 8.6 TABLET ORAL at 21:10

## 2018-08-05 RX ADMIN — Medication 1 TABLET(S): at 13:16

## 2018-08-05 RX ADMIN — LOSARTAN POTASSIUM 50 MILLIGRAM(S): 100 TABLET, FILM COATED ORAL at 11:11

## 2018-08-05 RX ADMIN — Medication 10 MILLIGRAM(S): at 03:30

## 2018-08-05 RX ADMIN — Medication 3 MILLIGRAM(S): at 05:32

## 2018-08-05 RX ADMIN — Medication 5 MILLIGRAM(S): at 19:59

## 2018-08-05 RX ADMIN — Medication 100 MILLIGRAM(S): at 05:32

## 2018-08-05 RX ADMIN — Medication 5 MILLIGRAM(S): at 11:11

## 2018-08-05 NOTE — SWALLOW BEDSIDE ASSESSMENT ADULT - SWALLOW EVAL: DIAGNOSIS
Pt presents with: 1) oropharyngeal dysphagia marked by impaired oral management and overt s/s of laryngeal penetration or aspiration with all PO fluids. 2) Odynophagia and resting throat pain 3) Mild dysarthria 4) Intractable hiccups despite medicationx2, team made aware.

## 2018-08-05 NOTE — SWALLOW BEDSIDE ASSESSMENT ADULT - SWALLOW EVAL: RECOMMENDED FEEDING/EATING TECHNIQUES
crush medication (when feasible)/small sips/bites/maintain upright posture during/after eating for 30 mins/check mouth frequently for oral residue/pocketing/position upright (90 degrees)/Suction oral cavity as needed during and after meals

## 2018-08-05 NOTE — SWALLOW BEDSIDE ASSESSMENT ADULT - ASR SWALLOW ASPIRATION MONITOR
fever/Pt with baseline throat clear and cough which he reports 2/2 throat "tickling" s/p extubation/gurgly voice/pneumonia/upper respiratory infection/change of breathing pattern

## 2018-08-05 NOTE — PROGRESS NOTE ADULT - ASSESSMENT
ASSESSMENT/PLAN:  POD #2 S/P L crani for evacuation of L frontal Brain tumor     NEURO: Neuro checks q 4hr   Deacdron decreased to 3mg po q8h  Hold AED with no known hx of seizure   CT today   MRI +/- contrast per NSG done  F/U pathology  Monitor subgaleal drain output  Continue Keppra for recent seizure  Activity: [X] OOB as tolerated  Xanax prn     PULM: Maintain O2 sats >93 %  If BP stable D/C chi tonight     CV: Hx of HTN and HLD   SBP goal- 110- < 140  Resume home meds    RENAL:  Fluids: NS at 70 cc/hr     GI:  Diet: Eval S/S  GI prophylaxis  [X] PPI : On steroids   Bowel regimen [Xcolace [X] senna   Hiccups - poor response to reglan ; trial baclofen   No BM - monitor post op po diet restricted - Dysphagia 1 with Necatar     ENDO:   Goal euglycemia (-180)  D/C FS     HEME/ONC:  Neuro Onc as OPt   Decrease in Hgb  monitor for now  VTE prophylaxis: [X] SCDs [X] chemoprophylaxis -  Lovenox 40 mg sc daily  Patient is at high risk of DVT / PE with known high grade brain tumor    ID: monitor for fever    SOCIAL/FAMILY:  [x] updated at bedside     CODE STATUS:  [x] Full Code     DISPOSITION:  [X] ICU     Time spent:  30 critical care minutes

## 2018-08-05 NOTE — PROGRESS NOTE ADULT - ASSESSMENT
77M POD#3 s/p R crani for resection of glioma. Patient is neurologically stable and pending transfer to floor.    q1 neuro checks  pain control  tx to floor in am

## 2018-08-05 NOTE — SWALLOW BEDSIDE ASSESSMENT ADULT - ASR SWALLOW DENTITION
missing left upper and lowers molars; reports remaining dentition adequate for mastication->appears in poor repair

## 2018-08-05 NOTE — SWALLOW BEDSIDE ASSESSMENT ADULT - SLP GENERAL OBSERVATIONS
Pt OOB in chair, A&Ox4, mild dysarthria, c/o throat tickle/pain, baseline throat clear noted. Vocal quality mildly hoarse. Able to follow commands. Left facial droop V1-V3 intact; however suspect reduced intraoral sensation on left given piece of chicken from meal 24 hours ago retrieved from left sulcus.

## 2018-08-05 NOTE — SWALLOW BEDSIDE ASSESSMENT ADULT - CONSISTENCIES ADMINISTERED
puree thin puree thick thin liquid/honey thick/nectar thick Chewables were not administered given oral deficts

## 2018-08-05 NOTE — SWALLOW BEDSIDE ASSESSMENT ADULT - COMMENTS
7/31 CT Head: Masslike mildly hyperdense cortical thickening within the right frontal lobe for which differential diagnosis includes but is not limited to primary glial neoplasm, metastatic disease, or lymphoma. No surrounding the vasogenic edema or appreciable mass effect. Recommend further evaluation with a contrast-enhanced brain MRI examination, if there are no clinical contraindications. MRI: Right posterolateral frontal mass lesion for which the most likely diagnosis is a high-grade primary glial neoplasm. Metastatic disease, lymphoma, and a tumefactive demyelinating lesion are considered less likely but cannot be entirely excluded. 8/1 CT C/A/P: No evidence of primary malignancy or metastatic disease in the chest, abdomen, or pelvis. /p right crani; Deacdron taper per NSG - quick taper with minimal cerebral edema Hold AED with no known hx of seizure 8/4 CT Head: Status post resection of a right frontal mass with expected postsurgical changes and a small amount of hemorrhage within the postoperative bed. MRI: Status post resection of right frontal lobe mass with postsurgical hemorrhage. Curvilinear enhancement along the anterior and medial aspect of the postsurgical cavity may represent residual neoplasm versus postsurgical change. Close continued follow-up is recommended. Passed dysphagia screen and Regular texture diet initiated. S/p choking incident with PO diet changed to D2 with Honey. + reglan for hiccups 8/5

## 2018-08-05 NOTE — SWALLOW BEDSIDE ASSESSMENT ADULT - ORAL PHASE
trace-mild on left/Decreased anterior-posterior movement of the bolus/Stasis in lateral sulci/Lingual stasis Stasis in lateral sulci/Decreased anterior-posterior movement of the bolus/Lingual stasis/mild on left Stasis in lateral sulci/> with honey thickened fluid/Decreased anterior-posterior movement of the bolus

## 2018-08-05 NOTE — PROGRESS NOTE ADULT - SUBJECTIVE AND OBJECTIVE BOX
Patient is a 77y old  Male who presents with a chief complaint of slurred speech and left facial droop (01 Aug 2018 10:07)      SUBJECTIVE / OVERNIGHT EVENTS:  +cough, dysphagia  seen by speech.   switched to dysphagia, no liquid.   comfortable. resting.   no complaints.   anxious about the biopsy result.     Vital Signs Last 24 Hrs  T(C): 36.4 (05 Aug 2018 16:00), Max: 36.8 (04 Aug 2018 19:00)  T(F): 97.5 (05 Aug 2018 16:00), Max: 98.3 (04 Aug 2018 19:00)  HR: 67 (05 Aug 2018 16:00) (42 - 67)  BP: 135/53 (05 Aug 2018 16:00) (121/47 - 137/61)  BP(mean): 72 (05 Aug 2018 16:00) (63 - 77)  RR: 17 (05 Aug 2018 16:00) (14 - 191)  SpO2: 96% (05 Aug 2018 16:00) (94% - 98%)  I&O's Summary    04 Aug 2018 07:01  -  05 Aug 2018 07:00  --------------------------------------------------------  IN: 760 mL / OUT: 1390 mL / NET: -630 mL    05 Aug 2018 07:01  -  05 Aug 2018 18:11  --------------------------------------------------------  IN: 250 mL / OUT: 640 mL / NET: -390 mL        PHYSICAL EXAM:  GENERAL: NAD, Comfortable, sitting up  HEAD:  Atraumatic, left Craniectomy, dressing d/c/i  EYES: EOMI, PERRLA, conjunctiva and sclera clear  NECK: Supple, No JVD  CHEST/LUNG: Clear to auscultation bilaterally; No wheeze  HEART: Regular rate and rhythm; No murmurs, rubs, or gallops  ABDOMEN: Soft, Nontender, Nondistended; Bowel sounds present  Neuro: AAO x 3, +mild facial droop baseline, mild occasional slurred speech, no focal deficit, 5/5 b/l extremities  EXTREMITIES:  2+ Peripheral Pulses, No clubbing, cyanosis, or edema  SKIN: No rashes or lesions      LABS:                        12.4   8.3   )-----------( 210      ( 03 Aug 2018 21:15 )             35.4     08-03    138  |  105  |  17  ----------------------------<  192<H>  4.1   |  22  |  1.03    Ca    8.7      03 Aug 2018 21:15  Phos  3.4     08-03  Mg     2.1     08-03        CAPILLARY BLOOD GLUCOSE      POCT Blood Glucose.: 126 mg/dL (05 Aug 2018 08:25)  POCT Blood Glucose.: 130 mg/dL (04 Aug 2018 22:02)            RADIOLOGY & ADDITIONAL TESTS:    Imaging Personally Reviewed:  [x] YES  [ ] NO    Consultant(s) Notes Reviewed:  [x] YES  [ ] NO      MEDICATIONS  (STANDING):  allopurinol 300 milliGRAM(s) Oral daily  dexamethasone     Tablet 3 milliGRAM(s) Oral three times a day  docusate sodium 100 milliGRAM(s) Oral three times a day  enoxaparin Injectable 40 milliGRAM(s) SubCutaneous <User Schedule>  levETIRAcetam 1000 milliGRAM(s) Oral every 12 hours  losartan 50 milliGRAM(s) Oral daily  multivitamin 1 Tablet(s) Oral daily  NIFEdipine XL 90 milliGRAM(s) Oral daily  psyllium Powder 1 Packet(s) Oral daily  senna 2 Tablet(s) Oral at bedtime  sodium chloride 0.9%. 1000 milliLiter(s) (50 mL/Hr) IV Continuous <Continuous>    MEDICATIONS  (PRN):  acetaminophen   Tablet 650 milliGRAM(s) Oral every 6 hours PRN For Temp greater than 38 C (100.4 F)  acetaminophen   Tablet. 650 milliGRAM(s) Oral every 6 hours PRN Mild Pain (1 - 3)  ALPRAZolam 0.5 milliGRAM(s) Oral daily PRN anxiety  ondansetron Injectable 4 milliGRAM(s) IV Push every 6 hours PRN Nausea and/or Vomiting      Care Discussed with Consultants/Other Providers [x] YES  [ ] NO    HEALTH ISSUES - PROBLEM Dx:  Prophylactic measure: Prophylactic measure  Other hyperlipidemia: Other hyperlipidemia  Essential hypertension: Essential hypertension  Brain tumor: Brain tumor

## 2018-08-05 NOTE — PROGRESS NOTE ADULT - SUBJECTIVE AND OBJECTIVE BOX
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  He then went to play baseball as scheduled for 4 hrs, then felt concerned went to see his PMD, who sent him to [facility] concerned for CVA.  At [place], CTH showed R brain lesion, further characterized by MRI head and MRA head and neck - consistent with 2.1x1.7x1.4cm solitary R parietal mass w/o sig edema, .  Denies focal weakness, dysphagia, LOC, or seizure like activity.   on 8/3/18 S/P R crani for resection of tumor.  Allergies    No Known Allergies    Intolerances        REVIEW OF SYSTEMS:   [X ] All ROS addressed below are non-contributory, except:  Neuro: [ ] Headache [ ] Back pain [ ] Numbness [ ] Weakness [ ] Ataxia [ ] Dizziness [ ] Aphasia [ ] Dysarthria [ ] Visual disturbance  Resp: [ ] Shortness of breath/dyspnea, [ ] Orthopnea [ ] Cough  CV: [ ] Chest pain [ ] Palpitation [ ] Lightheadedness [ ] Syncope  Renal: [ ] Thirst [ ] Edema  GI: [ ] Nausea [ ] Emesis [ ] Abdominal pain [ ] Constipation [ ] Diarrhea  Hem: [ ] Hematemesis [ ] bright red blood per rectum  ID: [ ] Fever [ ] Chills [ ] Dysuria  ENT: [ ] Rhinorrhea      VITALS: [X ] Reviewed  Vital Signs Last 24 Hrs  T(C): 36.3 (05 Aug 2018 03:00), Max: 36.8 (04 Aug 2018 15:00)  T(F): 97.4 (05 Aug 2018 03:00), Max: 98.3 (04 Aug 2018 15:00)  HR: 44 (05 Aug 2018 08:00) (42 - 72)  BP: 123/44 (05 Aug 2018 08:00) (113/48 - 133/48)  BP(mean): 63 (05 Aug 2018 08:00) (62 - 70)  RR: 19 (05 Aug 2018 08:00) (13 - 191)  SpO2: 94% (05 Aug 2018 08:00) (94% - 98%)  CAPILLARY BLOOD GLUCOSE      POCT Blood Glucose.: 130 mg/dL (04 Aug 2018 22:02)  POCT Blood Glucose.: 138 mg/dL (04 Aug 2018 16:42)        LABS:    08-03    138  |  105  |  17  ----------------------------<  192<H>  4.1   |  22  |  1.03    Ca    8.7      03 Aug 2018 21:15  Phos  3.4     08-03  Mg     2.1     08-03                            12.4   8.3   )-----------( 210      ( 03 Aug 2018 21:15 )             35.4       STROKE CORE MEASURES:      MEDICATION LEVELS:     IMAGING/DATA: [ ] Reviewed    IVF FLUIDS/MEDICATIONS: [ ] Reviewed  MEDICATIONS  (STANDING):  allopurinol 300 milliGRAM(s) Oral daily  ALPRAZolam 0.5 milliGRAM(s) Oral daily  dexamethasone     Tablet 3 milliGRAM(s) Oral every 6 hours  docusate sodium 100 milliGRAM(s) Oral three times a day  enoxaparin Injectable 40 milliGRAM(s) SubCutaneous <User Schedule>  insulin lispro (HumaLOG) corrective regimen sliding scale   SubCutaneous Before meals and at bedtime  levETIRAcetam 1000 milliGRAM(s) Oral every 12 hours  losartan 50 milliGRAM(s) Oral daily  multivitamin 1 Tablet(s) Oral daily  NIFEdipine XL 90 milliGRAM(s) Oral daily  psyllium Powder 1 Packet(s) Oral daily  senna 2 Tablet(s) Oral at bedtime    MEDICATIONS  (PRN):  acetaminophen   Tablet 650 milliGRAM(s) Oral every 6 hours PRN For Temp greater than 38 C (100.4 F)  acetaminophen   Tablet. 650 milliGRAM(s) Oral every 6 hours PRN Mild Pain (1 - 3)  ondansetron Injectable 4 milliGRAM(s) IV Push every 6 hours PRN Nausea and/or Vomiting    I&O's Summary    04 Aug 2018 07:01  -  05 Aug 2018 07:00  --------------------------------------------------------  IN: 760 mL / OUT: 1390 mL / NET: -630 mL        EXAMINATION:  PHYSICAL EXAM:    Constitutional: No Acute Distress     Neurological: Awake, alert oriented to person, place and time, Following Commands, PERRL, EOMI, No Gaze Preference, L facial ; dysarthria;  No nystagmus; visual field intact     Motor exam:          Upper extremity                         Delt     Bicep     Tricep    HG                                                 R         5/5 5/5 5/5 5/5                                               L          5/5 5/5 5/5 5/5          Lower extremity                        HF         KF        KE       DF         PF                                                  R        5/5 5/5 5/5 5/5 5/5                                               L         5/5 5/5 5/5 5/5 5/5                                                 Sensation: [X ] intact to light touch     Reflexes: Deep Tendon Reflexes Intact     Pulmonary: Clear to Auscultation, No rales, No rhonchi, No wheezes     Cardiovascular: S1, S2, Regular rate and rhythm     Gastrointestinal: Soft, Non-tender, Non-distended     Extremities: No calf tenderness

## 2018-08-05 NOTE — PROGRESS NOTE ADULT - SUBJECTIVE AND OBJECTIVE BOX
Visit Summary: Patient seen and evaluated at bedside.    Overnight Events: no acute events o/n    Exam:  T(C): 36.8 (08-04-18 @ 23:00), Max: 36.8 (08-04-18 @ 15:00)  HR: 53 (08-04-18 @ 19:00) (45 - 72)  BP: 133/48 (08-04-18 @ 19:00) (113/48 - 133/48)  RR: 18 (08-04-18 @ 19:00) (13 - 21)  SpO2: 96% (08-04-18 @ 19:00) (95% - 99%)  Wt(kg): --    AAOx3, EOS, FC  L facial droop  ROBERTS 5/5, no drift  incision c/d/i                        12.4   8.3   )-----------( 210      ( 03 Aug 2018 21:15 )             35.4     08-03    138  |  105  |  17  ----------------------------<  192<H>  4.1   |  22  |  1.03    Ca    8.7      03 Aug 2018 21:15  Phos  3.4     08-03  Mg     2.1     08-03    PT/INR - ( 03 Aug 2018 07:14 )   PT: 11.5 sec;   INR: 1.06 ratio

## 2018-08-05 NOTE — SWALLOW BEDSIDE ASSESSMENT ADULT - PHARYNGEAL PHASE
Decreased laryngeal elevation Delayed pharyngeal swallow/Decreased laryngeal elevation/Throat clear post oral intake/Delayed throat clear post oral intake/Cough post oral intake/Delayed cough post oral intake

## 2018-08-05 NOTE — PROGRESS NOTE ADULT - PROBLEM SELECTOR PLAN 1
- s/p craniotomy for excision of brain neoplasm  08/03/2018 - POD - 2  - neuro ICU monitoring appreciated. Plan to transfer to the floor.   - Solitary R parietal brain lesion with Hx prostate ca and basal cell skin ca - rarely mets to brain. Unlikely to be related.   - CT chest/A/P shows no evidence of malignancy/ metastasis   - c/w Decadron, taper as appropriate per neurosurgery.   - on Keppra BID for seizure ppx  - Neuro check Q4hrs  - c/w betablocker.  - f/u path  - onc already on the case.  - MRI brain reviewed.

## 2018-08-05 NOTE — SWALLOW BEDSIDE ASSESSMENT ADULT - SLP PERTINENT HISTORY OF CURRENT PROBLEM
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  He then went to play baseball as scheduled for 4 hrs, then felt concerned went to see his PMD, who sent him to [facility] concerned for CVA.  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  Denies focal weakness, dysphagia, LOC, or seizure like activity.

## 2018-08-06 PROCEDURE — 99233 SBSQ HOSP IP/OBS HIGH 50: CPT

## 2018-08-06 RX ORDER — METOCLOPRAMIDE HCL 10 MG
10 TABLET ORAL ONCE
Qty: 0 | Refills: 0 | Status: COMPLETED | OUTPATIENT
Start: 2018-08-06 | End: 2018-08-07

## 2018-08-06 RX ADMIN — Medication 3 MILLIGRAM(S): at 05:44

## 2018-08-06 RX ADMIN — Medication 1 TABLET(S): at 11:43

## 2018-08-06 RX ADMIN — Medication 3 MILLIGRAM(S): at 15:22

## 2018-08-06 RX ADMIN — BENZOCAINE AND MENTHOL 1 LOZENGE: 5; 1 LIQUID ORAL at 21:07

## 2018-08-06 RX ADMIN — SENNA PLUS 2 TABLET(S): 8.6 TABLET ORAL at 21:07

## 2018-08-06 RX ADMIN — Medication 300 MILLIGRAM(S): at 11:43

## 2018-08-06 RX ADMIN — LOSARTAN POTASSIUM 50 MILLIGRAM(S): 100 TABLET, FILM COATED ORAL at 05:44

## 2018-08-06 RX ADMIN — Medication 3 MILLIGRAM(S): at 21:07

## 2018-08-06 RX ADMIN — LEVETIRACETAM 1000 MILLIGRAM(S): 250 TABLET, FILM COATED ORAL at 17:22

## 2018-08-06 RX ADMIN — Medication 0.5 MILLIGRAM(S): at 21:07

## 2018-08-06 RX ADMIN — LEVETIRACETAM 1000 MILLIGRAM(S): 250 TABLET, FILM COATED ORAL at 05:44

## 2018-08-06 RX ADMIN — ENOXAPARIN SODIUM 40 MILLIGRAM(S): 100 INJECTION SUBCUTANEOUS at 17:22

## 2018-08-06 NOTE — PROGRESS NOTE ADULT - SUBJECTIVE AND OBJECTIVE BOX
Patient is a 77y old  Male who presents with a chief complaint of slurred speech and left facial droop (01 Aug 2018 10:07)      SUBJECTIVE / OVERNIGHT EVENTS:  sitting up. comfortable.  have a lot of questions about the biopsy result which is pending.  no cp, no sob, no n/v/d. no abdominal pain.  no headache, no dizziness.       Vital Signs Last 24 Hrs  T(C): 36.2 (06 Aug 2018 15:00), Max: 36.7 (05 Aug 2018 23:00)  T(F): 97.1 (06 Aug 2018 15:00), Max: 98.1 (05 Aug 2018 23:00)  HR: 73 (06 Aug 2018 15:00) (52 - 73)  BP: 138/54 (06 Aug 2018 15:00) (138/54 - 147/62)  BP(mean): 73 (06 Aug 2018 15:00) (73 - 87)  RR: 16 (06 Aug 2018 15:00) (14 - 21)  SpO2: 99% (06 Aug 2018 15:00) (94% - 100%)  I&O's Summary    05 Aug 2018 07:01  -  06 Aug 2018 07:00  --------------------------------------------------------  IN: 850 mL / OUT: 700 mL / NET: 150 mL    06 Aug 2018 07:01  -  06 Aug 2018 19:22  --------------------------------------------------------  IN: 550 mL / OUT: 0 mL / NET: 550 mL        PHYSICAL EXAM:  GENERAL: NAD, Comfortable, sitting up out of bed.  HEAD:  Atraumatic, left Craniectomy, staples in place. dressing off.  EYES: EOMI, PERRLA, conjunctiva and sclera clear  NECK: Supple, No JVD  CHEST/LUNG: Clear to auscultation bilaterally; No wheeze  HEART: Regular rate and rhythm; No murmurs, rubs, or gallops  ABDOMEN: Soft, Nontender, Nondistended; Bowel sounds present  Neuro: AAO x 3, +mild facial droop baseline, mild occasional slurred speech, no focal deficit, 5/5 b/l extremities  EXTREMITIES:  2+ Peripheral Pulses, No clubbing, cyanosis, or edema  SKIN: No rashes or lesions    LABS:            CAPILLARY BLOOD GLUCOSE                RADIOLOGY & ADDITIONAL TESTS:    Imaging Personally Reviewed:  [x] YES  [ ] NO    Consultant(s) Notes Reviewed:  [x] YES  [ ] NO      MEDICATIONS  (STANDING):  allopurinol 300 milliGRAM(s) Oral daily  dexamethasone     Tablet 3 milliGRAM(s) Oral three times a day  docusate sodium 100 milliGRAM(s) Oral three times a day  enoxaparin Injectable 40 milliGRAM(s) SubCutaneous <User Schedule>  levETIRAcetam 1000 milliGRAM(s) Oral every 12 hours  losartan 50 milliGRAM(s) Oral daily  multivitamin 1 Tablet(s) Oral daily  NIFEdipine XL 90 milliGRAM(s) Oral daily  psyllium Powder 1 Packet(s) Oral daily  senna 2 Tablet(s) Oral at bedtime  sodium chloride 0.9%. 1000 milliLiter(s) (50 mL/Hr) IV Continuous <Continuous>    MEDICATIONS  (PRN):  acetaminophen   Tablet 650 milliGRAM(s) Oral every 6 hours PRN For Temp greater than 38 C (100.4 F)  acetaminophen   Tablet. 650 milliGRAM(s) Oral every 6 hours PRN Mild Pain (1 - 3)  ALPRAZolam 0.5 milliGRAM(s) Oral daily PRN anxiety  benzocaine 15 mG/menthol 3.6 mG Lozenge 1 Lozenge Oral three times a day PRN Sore Throat  ondansetron Injectable 4 milliGRAM(s) IV Push every 6 hours PRN Nausea and/or Vomiting      Care Discussed with Consultants/Other Providers [x] YES  [ ] NO    HEALTH ISSUES - PROBLEM Dx:  Prophylactic measure: Prophylactic measure  Other hyperlipidemia: Other hyperlipidemia  Essential hypertension: Essential hypertension  Brain tumor: Brain tumor

## 2018-08-06 NOTE — DIETITIAN INITIAL EVALUATION ADULT. - ENERGY NEEDS
height: 5'11" weight: 211 pounds BMI: 29 IBW: 172 pounds (+/-10%)  pertinent info: patient admitted with right frontal tumor, s/p craniotomy for resection  skin: + 1 dependent, + 2 left/right ankle edema, no pressure injuries per documentation

## 2018-08-06 NOTE — PROGRESS NOTE ADULT - SUBJECTIVE AND OBJECTIVE BOX
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  He then went to play baseball as scheduled for 4 hrs, then felt concerned went to see his PMD, who sent him to [facility] concerned for CVA.  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  Denies focal weakness, dysphagia, LOC, or seizure like activity. (01 Aug 2018 03:51)      the above notes were taken from the medical record here and I went over the case with the pt in the ER. He stated that he had nothing in the ROS to indicate he may have a primary lesion elsewhere Ie. cough fever bowel problems bone pain etc. He was seen by the neurosurgery team and at this point I would approach this pt like I would if I were seeing a pt with a unknown primary. In pts where the ROS is negative, a ct of the chest abdomen and pelvis is recommended. He did have a prostatectomy in  and his psa levels have been zero.      the pt had a ct of the chest and abdomen and pelvis and it was negative. A long discussion was held with him about the likelihood of neurosurgery and need to obtain a diagnosis. 8/3 pt off to the OR and will await path report. Labs and radiology reviewed.  met with pt and his friends and path final and margins are pending. 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    Vital Signs Last 24 Hrs  T(C): 36.4 (06 Aug 2018 07:00), Max: 36.7 (05 Aug 2018 23:00)  T(F): 97.5 (06 Aug 2018 07:00), Max: 98.1 (05 Aug 2018 23:00)  HR: 62 (06 Aug 2018 11:00) (52 - 70)  BP: 147/62 (06 Aug 2018 11:00) (133/57 - 147/62)  BP(mean): 76 (06 Aug 2018 11:00) (72 - 87)  RR: 15 (06 Aug 2018 11:00) (14 - 21)  SpO2: 98% (06 Aug 2018 11:00) (94% - 100%)  LABS:  MEDICATIONS  (STANDING):  allopurinol 300 milliGRAM(s) Oral daily  dexamethasone     Tablet 3 milliGRAM(s) Oral three times a day  docusate sodium 100 milliGRAM(s) Oral three times a day  enoxaparin Injectable 40 milliGRAM(s) SubCutaneous <User Schedule>  levETIRAcetam 1000 milliGRAM(s) Oral every 12 hours  losartan 50 milliGRAM(s) Oral daily  multivitamin 1 Tablet(s) Oral daily  NIFEdipine XL 90 milliGRAM(s) Oral daily  psyllium Powder 1 Packet(s) Oral daily  senna 2 Tablet(s) Oral at bedtime  sodium chloride 0.9%. 1000 milliLiter(s) (50 mL/Hr) IV Continuous <Continuous>  Auto Monocyte # : 0.54 K/uL  Auto Eosinophil # : 0.02 K/uL  Auto Basophil # : 0.03 K/uL  Auto Neutrophil % : 68.9 %  Auto Lymphocyte % : 23.7 %  Auto Monocyte % : 6.6 %  Auto Eosinophil % : 0.2 %  Auto Basophil % : 0.4 %        139  |  104  |  16  ----------------------------<  112<H>  4.1   |  25  |  1.13    Ca    8.9      01 Aug 2018 06:02    TPro  7.0  /  Alb  4.3  /  TBili  0.8  /  DBili  x   /  AST  22  /  ALT  26  /  AlkPhos  45  08-    PT/INR - ( 2018 17:19 )   PT: 11.6 sec;   INR: 1.06 ratio         PTT - ( 2018 17:19 )  PTT:25.8 sec      ROS:  Negative except for:    PAST MEDICAL & SURGICAL HISTORY:  Basal cell carcinoma (BCC) of left lower leg: s/p resection  Prostate cancer: s/p radical prostatectomy  HLD (hyperlipidemia)  HTN (hypertension)  Lipoma of neck: s/p resection at age 30  Basal cell carcinoma (BCC) of lower leg: s/p resection  History of tonsillectomy: at age 30      SOCIAL HISTORY:    FAMILY HISTORY:  Family history of diabetes mellitus (Father): Father   Family history of prostate cancer in father (Father):   Family history of breast cancer in mother (Mother, Sibling): Mother and sister  Family history of lung cancer (Mother): mother , long standing smoker      Allergies    No Known Allergies    Intolerances        PHYSICAL EXAM off to OR   General: adult in NAD  HEENT: clear oropharynx, anicteric sclera, pink conjunctivae  Neck: supple  CV: normal S1S2 with no murmur rubs or gallops  Lungs: clear to auscultation, no wheezes, no rales  Abdomen: soft non-tender non-distended, no hepato/splenomegaly  Ext: no clubbing cyanosis or edema  Skin: no rashes and no petechiae  Neuro: alert and oriented X3 no focal deficits slight right facial droop  pe pt was sitting up in the bedside chair and was not in distress mouth clear heart rr abdomen soft extremities no edema.

## 2018-08-06 NOTE — PROGRESS NOTE ADULT - SUBJECTIVE AND OBJECTIVE BOX
Pt seen and examined on evening rounds; ambulating about ICU  pt awaiting floor bed; will cont to monitor while in ICU

## 2018-08-06 NOTE — PROGRESS NOTE ADULT - SUBJECTIVE AND OBJECTIVE BOX
POD#3 R crani for tumor biopsy    Vital Signs Last 24 Hrs  T(C): 36.7 (05 Aug 2018 23:00), Max: 36.7 (05 Aug 2018 23:00)  T(F): 98.1 (05 Aug 2018 23:00), Max: 98.1 (05 Aug 2018 23:00)  HR: 53 (05 Aug 2018 23:00) (42 - 70)  BP: 143/58 (05 Aug 2018 23:00) (121/47 - 143/58)  BP(mean): 77 (05 Aug 2018 23:00) (63 - 77)  RR: 19 (05 Aug 2018 23:00) (14 - 19)  SpO2: 94% (05 Aug 2018 23:00) (94% - 98%)    EXAM  AOx3, FC, PERRL, EOMI, V1-3 intact, l facial, palate cole symmetric, tongue midline, shrug 5/5  5/5 throughout, no drift  SILT  No clonus or babinski  mild dysarthria

## 2018-08-06 NOTE — PROGRESS NOTE ADULT - PROBLEM SELECTOR PLAN 1
- s/p craniotomy for excision of brain neoplasm  08/03/2018 - POD - 3  - neuro ICU monitoring appreciated.   - Solitary R parietal brain lesion with Hx prostate ca and basal cell skin ca - rarely mets to brain. Unlikely to be related.   - CT chest/A/P shows no evidence of malignancy/ metastasis   - c/w Decadron, taper as appropriate per neurosurgery.   - on Keppra BID for seizure ppx  - Neuro check Q4hrs  - c/w betablocker.  - f/u path, prelimp indicating high grade lesion.  - onc f/u appreciated.   - MRI brain reviewed.

## 2018-08-06 NOTE — DIETITIAN INITIAL EVALUATION ADULT. - FACTORS AFF FOOD INTAKE
swallow evaluation noted from 8/5- patient found to have oropharyngeal dysphagia with recommendations for dysphagia I diet with no PO fluids/difficulty swallowing

## 2018-08-06 NOTE — PROGRESS NOTE ADULT - ASSESSMENT
8/6 met with pt and his friends and path final and margins are pending. 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

## 2018-08-06 NOTE — DIETITIAN INITIAL EVALUATION ADULT. - NS AS NUTRI INTERV COLLABORAT
Collaboration with other providers/repeat swallow evaluation as medically appropriate to determine eligibility for diet upgrade, discussed with team

## 2018-08-06 NOTE — DIETITIAN INITIAL EVALUATION ADULT. - ADHERENCE
regular texture foods PTA, followed overall healthy diet with emphasis on lean proteins, limits red meat

## 2018-08-06 NOTE — PROGRESS NOTE ADULT - PROBLEM SELECTOR PLAN 2
- c/w Toprol, Nifedipine and Losartan  - no chest pain, STEWART or syncope
- stable BP  - c/w Toprol, Nifedipine and Losartan  - no chest pain, STEWART or syncope
- stable BP  - c/w Toprol, Nifedipine and Losartan  - no chest pain, STEWART or syncope

## 2018-08-06 NOTE — DIETITIAN INITIAL EVALUATION ADULT. - OTHER INFO
Patient seen for initial nutrition assessment. He denies food allergies, swallow evaluation noted as above, reports throat less sore and per team speech better compared to yesterday. Denies GI distress, no BM since admission but overall PO intake decreased on current modified texture diet.

## 2018-08-07 ENCOUNTER — TRANSCRIPTION ENCOUNTER (OUTPATIENT)
Age: 78
End: 2018-08-07

## 2018-08-07 VITALS
DIASTOLIC BLOOD PRESSURE: 62 MMHG | OXYGEN SATURATION: 96 % | RESPIRATION RATE: 18 BRPM | SYSTOLIC BLOOD PRESSURE: 139 MMHG | TEMPERATURE: 99 F | HEART RATE: 64 BPM

## 2018-08-07 LAB — SURGICAL PATHOLOGY STUDY: SIGNIFICANT CHANGE UP

## 2018-08-07 PROCEDURE — 99233 SBSQ HOSP IP/OBS HIGH 50: CPT

## 2018-08-07 PROCEDURE — 74230 X-RAY XM SWLNG FUNCJ C+: CPT | Mod: 26

## 2018-08-07 RX ORDER — ACETAMINOPHEN 500 MG
2 TABLET ORAL
Qty: 0 | Refills: 0 | COMMUNITY
Start: 2018-08-07

## 2018-08-07 RX ORDER — DEXAMETHASONE 0.5 MG/5ML
2 ELIXIR ORAL EVERY 8 HOURS
Qty: 0 | Refills: 0 | Status: DISCONTINUED | OUTPATIENT
Start: 2018-08-07 | End: 2018-08-07

## 2018-08-07 RX ORDER — LEVETIRACETAM 250 MG/1
1 TABLET, FILM COATED ORAL
Qty: 20 | Refills: 0 | OUTPATIENT
Start: 2018-08-07

## 2018-08-07 RX ORDER — CYCLOBENZAPRINE HYDROCHLORIDE 10 MG/1
1 TABLET, FILM COATED ORAL
Qty: 0 | Refills: 0 | COMMUNITY

## 2018-08-07 RX ORDER — ASPIRIN/CALCIUM CARB/MAGNESIUM 324 MG
0 TABLET ORAL
Qty: 0 | Refills: 0 | COMMUNITY

## 2018-08-07 RX ORDER — DOCUSATE SODIUM 100 MG
1 CAPSULE ORAL
Qty: 0 | Refills: 0 | COMMUNITY
Start: 2018-08-07

## 2018-08-07 RX ORDER — PSYLLIUM SEED (WITH DEXTROSE)
0 POWDER (GRAM) ORAL
Qty: 0 | Refills: 0 | COMMUNITY
Start: 2018-08-07

## 2018-08-07 RX ORDER — ALPRAZOLAM 0.25 MG
1 TABLET ORAL
Qty: 10 | Refills: 0 | OUTPATIENT
Start: 2018-08-07

## 2018-08-07 RX ORDER — DICLOFENAC SODIUM 75 MG/1
1 TABLET, DELAYED RELEASE ORAL
Qty: 0 | Refills: 0 | COMMUNITY

## 2018-08-07 RX ORDER — LOSARTAN POTASSIUM 100 MG/1
50 TABLET, FILM COATED ORAL
Qty: 0 | Refills: 0 | COMMUNITY

## 2018-08-07 RX ORDER — SENNA PLUS 8.6 MG/1
2 TABLET ORAL
Qty: 0 | Refills: 0 | COMMUNITY
Start: 2018-08-07

## 2018-08-07 RX ORDER — DEXAMETHASONE 0.5 MG/5ML
1 ELIXIR ORAL
Qty: 30 | Refills: 0 | OUTPATIENT
Start: 2018-08-07

## 2018-08-07 RX ORDER — METOPROLOL TARTRATE 50 MG
25 TABLET ORAL
Qty: 0 | Refills: 0 | COMMUNITY

## 2018-08-07 RX ORDER — LOSARTAN POTASSIUM 100 MG/1
1 TABLET, FILM COATED ORAL
Qty: 0 | Refills: 0 | COMMUNITY
Start: 2018-08-07

## 2018-08-07 RX ADMIN — Medication 1 TABLET(S): at 14:29

## 2018-08-07 RX ADMIN — Medication 10 MILLIGRAM(S): at 01:43

## 2018-08-07 RX ADMIN — LOSARTAN POTASSIUM 50 MILLIGRAM(S): 100 TABLET, FILM COATED ORAL at 05:22

## 2018-08-07 RX ADMIN — LEVETIRACETAM 1000 MILLIGRAM(S): 250 TABLET, FILM COATED ORAL at 18:00

## 2018-08-07 RX ADMIN — Medication 3 MILLIGRAM(S): at 05:22

## 2018-08-07 RX ADMIN — Medication 2 MILLIGRAM(S): at 14:29

## 2018-08-07 RX ADMIN — LEVETIRACETAM 1000 MILLIGRAM(S): 250 TABLET, FILM COATED ORAL at 05:22

## 2018-08-07 RX ADMIN — ENOXAPARIN SODIUM 40 MILLIGRAM(S): 100 INJECTION SUBCUTANEOUS at 18:00

## 2018-08-07 RX ADMIN — Medication 300 MILLIGRAM(S): at 13:00

## 2018-08-07 RX ADMIN — Medication 0.5 MILLIGRAM(S): at 01:43

## 2018-08-07 NOTE — DISCHARGE NOTE ADULT - PATIENT PORTAL LINK FT
You can access the [facility], offered by [facility], by registering with the following website: [email or web address]

## 2018-08-07 NOTE — PROGRESS NOTE ADULT - SUBJECTIVE AND OBJECTIVE BOX
Patient is a 77y old  Male who presents with a chief complaint of slurred speech and left facial droop (01 Aug 2018 10:07)      HPI:  78 yo male Hx HTN, HLD, prostate ca s/p prostatectomy, basal cell skin ca (resected from leg) p/w 1 wk of L facial droop, slurred speech and one episode of L neck muscle spasm earlier on 18, pressure like, lasted for 20 minutes.  He then went to play baseball as scheduled for 4 hrs, then felt concerned went to see his PMD, who sent him to [facility] concerned for CVA.  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  Denies focal weakness, dysphagia, LOC, or seizure like activity. (01 Aug 2018 03:51)      the above notes were taken from the medical record here and I went over the case with the pt in the ER. He stated that he had nothing in the ROS to indicate he may have a primary lesion elsewhere Ie. cough fever bowel problems bone pain etc. He was seen by the neurosurgery team and at this point I would approach this pt like I would if I were seeing a pt with a unknown primary. In pts where the ROS is negative, a ct of the chest abdomen and pelvis is recommended. He did have a prostatectomy in  and his psa levels have been zero.      the pt had a ct of the chest and abdomen and pelvis and it was negative. A long discussion was held with him about the likelihood of neurosurgery and need to obtain a diagnosis. 8/3 pt off to the OR and will await path report. Labs and radiology reviewed.  met with pt and his friends and path final and margins are pending. 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  met with the pt as he was to be discharged and briefly told him he had a brain tumor high grade that may or may not have been fully resected and is will be treated with radiation and likely oral chemo. He will follow up with the neurosurgery team in a week or so.          139  |  104  |  16  ----------------------------<  112<H>  4.1   |  25  |  1.13    Ca    8.9      01 Aug 2018 06:02    TPro  7.0  /  Alb  4.3  /  TBili  0.8  /  DBili  x   /  AST  22  /  ALT  26  /  AlkPhos  45  08-    PT/INR - ( 2018 17:19 )   PT: 11.6 sec;   INR: 1.06 ratio         PTT - ( 2018 17:19 )  PTT:25.8 sec      ROS:  Negative except for:    PAST MEDICAL & SURGICAL HISTORY:  Basal cell carcinoma (BCC) of left lower leg: s/p resection  Prostate cancer: s/p radical prostatectomy  HLD (hyperlipidemia)  HTN (hypertension)  Lipoma of neck: s/p resection at age 30  Basal cell carcinoma (BCC) of lower leg: s/p resection  History of tonsillectomy: at age 30      SOCIAL HISTORY:    FAMILY HISTORY:  Family history of diabetes mellitus (Father): Father   Family history of prostate cancer in father (Father):   Family history of breast cancer in mother (Mother, Sibling): Mother and sister  Family history of lung cancer (Mother): mother , long standing smoker      Allergies    No Known Allergies    Intolerances        PHYSICAL EXAM off to OR   General: adult in NAD healing wounds on the scalp from surgery   HEENT: clear oropharynx, anicteric sclera, pink conjunctivae  Neck: supple  CV: normal S1S2 with no murmur rubs or gallops  Lungs: clear to auscultation, no wheezes, no rales  Abdomen: soft non-tender non-distended, no hepato/splenomegaly  Ext: no clubbing cyanosis or edema  Skin: no rashes and no petechiae  Neuro: alert and oriented X3 no focal deficits slight right facial droop  pe pt was sitting up in the bedside chair and was not in distress mouth clear heart rr abdomen soft extremities no edema.

## 2018-08-07 NOTE — SWALLOW VFSS/MBS ASSESSMENT ADULT - SLP PERTINENT HISTORY OF CURRENT PROBLEM
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  He then went to play baseball as scheduled for 4 hrs, then felt concerned went to see his PMD, who sent him to [facility] concerned for CVA.  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 for Neurosurgical evaluation.  Denies focal weakness, dysphagia, LOC, or seizure like activity.

## 2018-08-07 NOTE — PROGRESS NOTE ADULT - ASSESSMENT
ASSESSMENT/PLAN:  POD #3 S/P L crani for evacuation of L frontal Brain tumor     NEURO: Neuro checks q 4hr   Deacdron  3mg po q8h  Hold AED with no known hx of seizure   MRI +/- contrast per NSG done  F/U pathology  Monitor subgaleal drain output  Continue Keppra for recent seizure  Activity: [X] OOB as tolerated  Xanax prn     PULM: Maintain O2 sats >93 %  If BP stable D/C chi tonight     CV: Hx of HTN and HLD   SBP goal- 110- < 140  Resume home meds    RENAL:  Fluids: NS at 70 cc/hr     GI:  Diet: Eval S/S  GI prophylaxis  [X] PPI : On steroids   Bowel regimen [Xcolace [X] senna   Hiccups - poor response to reglan ; trial baclofen   No BM - monitor post op po diet restricted - Dysphagia 1 with Necatar     ENDO:   Goal euglycemia (-180)  D/C FS     HEME/ONC:  Neuro Onc as OPt   Decrease in Hgb  monitor for now  VTE prophylaxis: [X] SCDs [X] chemoprophylaxis -  Lovenox 40 mg sc daily  Patient is at high risk of DVT / PE with known high grade brain tumor    ID: monitor for fever    SOCIAL/FAMILY:  [x] updated at bedside     CODE STATUS:  [x] Full Code     DISPOSITION:  [X] ICU     Time spent:  30 critical care minutes

## 2018-08-07 NOTE — DISCHARGE NOTE ADULT - MEDICATION SUMMARY - MEDICATIONS TO TAKE
I will START or STAY ON the medications listed below when I get home from the hospital:    dexamethasone 2 mg oral tablet  -- 1 tab(s) by mouth every 8 hours  -- Indication: For cerebral edema    acetaminophen 325 mg oral tablet  -- 2 tab(s) by mouth every 6 hours, As needed, Mild Pain (1 - 3)  -- Indication: For Pain    losartan 50 mg oral tablet  -- 1 tab(s) by mouth once a day  -- Indication: For High blood pressure    levETIRAcetam 1000 mg oral tablet  -- 1 tab(s) by mouth every 12 hours  -- Indication: For seizure prophylaxis    Onglyza 5 mg oral tablet  -- 1 tab(s) by mouth once a day  -- Indication: For Hyperglycemia    allopurinol 300 mg oral tablet  -- 1 tab(s) by mouth once a day  -- Indication: For gout    Crestor 20 mg oral tablet  -- 1 tab(s) by mouth once a day (at bedtime)  -- Indication: For Hyperlipidemia    ALPRAZolam 0.5 mg oral tablet  -- 1 tab(s) by mouth once a day, As needed, anxiety MDD:1  -- Indication: For anxiety    metoprolol  -- 25  by mouth once a day  -- Indication: For High blood pressure    NIFEdipine 90 mg oral tablet, extended release  -- 1 tab(s) by mouth once a day  -- Indication: For High blood pressure    senna oral tablet  -- 2 tab(s) by mouth once a day (at bedtime)  -- Indication: For constipation    psyllium 3.4 g/7 g oral powder for reconstitution  --  by mouth   -- Indication: For constipation    docusate sodium 100 mg oral capsule  -- 1 cap(s) by mouth 3 times a day  -- Indication: For constipation    Co Q-10 100 mg oral capsule  -- 2 cap(s) by mouth once a day  -- Indication: For routine    Omega-3  -- 1200 milligram(s) by mouth once a day  -- Indication: For routine    Multiple Vitamins oral tablet  -- 1 tab(s) by mouth once a day  -- Indication: For routine    Vitamin B12 1000 mcg oral tablet  -- 2  by mouth once a day  -- Indication: For routine    Vitamin C 500 mg oral tablet  -- 1 tab(s) by mouth once a day  -- Indication: For routine    Vitamin D3 2000 intl units oral capsule  -- 1 cap(s) by mouth once a day  -- Indication: For routine I will START or STAY ON the medications listed below when I get home from the hospital:    dexamethasone 2 mg oral tablet  -- 1 tab(s) by mouth every 8 hours  -- Indication: For cerebral edema    acetaminophen 325 mg oral tablet  -- 2 tab(s) by mouth every 6 hours, As needed, Mild Pain (1 - 3)  -- Indication: For Pain    losartan 50 mg oral tablet  -- 1 tab(s) by mouth once a day  -- Indication: For High blood pressure    levETIRAcetam 1000 mg oral tablet  -- 1 tab(s) by mouth every 12 hours  -- Indication: For seizure prophylaxis    Onglyza 5 mg oral tablet  -- 1 tab(s) by mouth once a day  -- Indication: For Hyperglycemia    allopurinol 300 mg oral tablet  -- 1 tab(s) by mouth once a day  -- Indication: For gout    Crestor 20 mg oral tablet  -- 1 tab(s) by mouth once a day (at bedtime)  -- Indication: For Hyperlipidemia    ALPRAZolam 0.5 mg oral tablet  -- 1 tab(s) by mouth once a day, As needed, anxiety MDD:1  -- Indication: For anxiety    NIFEdipine 90 mg oral tablet, extended release  -- 1 tab(s) by mouth once a day  -- Indication: For High blood pressure    senna oral tablet  -- 2 tab(s) by mouth once a day (at bedtime)  -- Indication: For constipation    psyllium 3.4 g/7 g oral powder for reconstitution  --  by mouth   -- Indication: For constipation    docusate sodium 100 mg oral capsule  -- 1 cap(s) by mouth 3 times a day  -- Indication: For constipation    Co Q-10 100 mg oral capsule  -- 2 cap(s) by mouth once a day  -- Indication: For routine    Omega-3  -- 1200 milligram(s) by mouth once a day  -- Indication: For routine    Multiple Vitamins oral tablet  -- 1 tab(s) by mouth once a day  -- Indication: For routine    Vitamin B12 1000 mcg oral tablet  -- 2  by mouth once a day  -- Indication: For routine    Vitamin C 500 mg oral tablet  -- 1 tab(s) by mouth once a day  -- Indication: For routine    Vitamin D3 2000 intl units oral capsule  -- 1 cap(s) by mouth once a day  -- Indication: For routine

## 2018-08-07 NOTE — SWALLOW VFSS/MBS ASSESSMENT ADULT - ORAL PHASE
mild/Residue in oral cavity/Reduced anterior - posterior transport Residue in oral cavity/Reduced anterior - posterior transport/mild-moderate Reduced anterior - posterior transport/Residue in oral cavity Residue in oral cavity/Reduced anterior - posterior transport

## 2018-08-07 NOTE — SWALLOW VFSS/MBS ASSESSMENT ADULT - DIAGNOSTIC IMPRESSIONS
Pt presents with an oropharyngeal dysphagia marked by reduced labial seal resulting in lateral loss, reduced bolus formation and transfer, reduced bolus propulsion with thick puree and solid consistencies resulting in severe residue in the valleculae, silent laryngeal penetration with nectar thickened fluid and honey thickened fluid without retrieval, and silent aspiration of nectar thickened fluid. A left head rotation improves airway protection with honey thickened fluid. Baseline throat clear noted upon encountered which continued throughout exam without observable cause on fluoroscopy.   Disorders include reduced labial and lingual strength/ROM/Rate of motion (on L), reduced BOT to posterior pharyngeal wall contact, reduced hyo-laryngeal excursion, reduced laryngeal closure, mildly reduced pharyngeal contractility, reduced supraglottic sensation.

## 2018-08-07 NOTE — DISCHARGE NOTE ADULT - HOSPITAL COURSE
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  He then went to play baseball as scheduled for 4 hrs, then felt concerned went to see his PMD, who sent him to [facility] concerned for CVA.  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  Denies focal weakness, dysphagia, LOC, or seizure like activity.    adm. 8/1 and underwent R craniotomy for tumor resection, tolerated procedure well.  Seen and evaluated by PT and deemed to have no inpatient rehab facility needs.  Pt. with dysarthria and dysphagia  postoperatively.  Seen by speech service and placed on dysphagia diet no liquids orally.  8/7 pt. had MBS and had diet modified to dysphagia 2 honey thickened diet with head turned left only, with   aspiration precautions.  need to follow this swallowing program clearly reviewed with patient multiple times and by multiple practitioners.  Pt. without inpatient needs, stable for discharge home today  to followup in office for postop check and staple removal next wednesday.  need for outpatient adjuvant therapies depending on pathology result as per neurosurgeon.  pt. to call dr. pinedo's office to confirm postop appt.

## 2018-08-07 NOTE — SWALLOW VFSS/MBS ASSESSMENT ADULT - SLP GENERAL OBSERVATIONS
Pt secure in ARNOL, A&Ox4, mild dysarthria, c/o throat tickle/pain, baseline throat clear noted. Vocal quality mildly hoarse. Able to follow commands. Left facial droop V1-V3 intact; however suspect reduced intraoral sensation on left given piece of chicken from meal 24 hours ago retrieved from left sulcus. Pt secure in ARNOL, A&Ox4, mild dysarthria, Vocal quality mildly hoarse. Able to follow commands. Left facial droop V1-V3 intact; however suspect reduced intraoral sensation on left given drooling noted.

## 2018-08-07 NOTE — SWALLOW VFSS/MBS ASSESSMENT ADULT - RECOMMENDED CONSISTENCY
Dysphagia 2 with honey thickened fluid ***ALL PO FLUID IN LEFT HEAD ROTATION***  MD/team Please enter the following as notes to RN: 1) Left head rotation with honey thickened fluid 2) Crush meds or provide via alternate source, 3) Provide small single bites and sips at slow rate-NO STRAWS 4) Encourage successive swallows for oral and pharyngeal clearance 5) Alternate food and liquids to aid in oral and pharyngeal clearance 6) Aspiration precautions. Monitor for s/s aspiration/laryngeal penetration. If noted:  D/C p.o. intake, provide non-oral nutrition/hydration/meds

## 2018-08-07 NOTE — DISCHARGE NOTE ADULT - INSTRUCTIONS
regular diet.  please do not engage in strenuous activity, heavy lifting, drive, or return to work or school until cleared by surgeon.   Shower:  please keep incision clean and dry, do not submerge wound in water for prolonged periods of time, pat dry after showering, and do not use any creams or ointments on incision.   please notify physician if fevers, bleeding, swelling, pain not relieved by medication, lethargy, mental status changes, seizure activity, new bowel or bladder dysfunction, difficulty breathing, chest pain, new motor weakness, increased nausea or vomiting, inability to tolerate foods or liquids.

## 2018-08-07 NOTE — PROGRESS NOTE ADULT - SUBJECTIVE AND OBJECTIVE BOX
Vital Signs Last 24 Hrs  T(C): 36.7 (06 Aug 2018 20:00), Max: 36.7 (06 Aug 2018 20:00)  T(F): 98 (06 Aug 2018 20:00), Max: 98 (06 Aug 2018 20:00)  HR: 63 (06 Aug 2018 20:00) (52 - 73)  BP: 146/62 (06 Aug 2018 20:00) (138/54 - 147/62)  BP(mean): 83 (06 Aug 2018 20:00) (73 - 87)  RR: 15 (06 Aug 2018 20:00) (14 - 21)  SpO2: 97% (06 Aug 2018 20:00) (95% - 100%)    EXAM  AOx3, FC, PERRL, EOMI, lfacial , dysarthria  5/5 throughout, no drift  SILT  no clonus

## 2018-08-07 NOTE — DISCHARGE NOTE ADULT - CARE PROVIDER_API CALL
Vinny Rivers), Neurological Surgery  [address] [place] [address]  Phone: [phone]  Fax: [phone]

## 2018-08-07 NOTE — DISCHARGE NOTE ADULT - MEDICATION SUMMARY - MEDICATIONS TO STOP TAKING
I will STOP taking the medications listed below when I get home from the hospital:    Aspir 81 oral delayed release tablet  -- orally 2 times a day    cyclobenzaprine 10 mg oral tablet  -- 1 tab(s) by mouth once a day, As Needed    diclofenac sodium 75 mg oral delayed release tablet  -- 1 tab(s) by mouth 2 times a day, As Needed I will STOP taking the medications listed below when I get home from the hospital:    metoprolol  -- 25  by mouth once a day    Aspir 81 oral delayed release tablet  -- orally 2 times a day    cyclobenzaprine 10 mg oral tablet  -- 1 tab(s) by mouth once a day, As Needed    diclofenac sodium 75 mg oral delayed release tablet  -- 1 tab(s) by mouth 2 times a day, As Needed

## 2018-08-07 NOTE — PROGRESS NOTE ADULT - SUBJECTIVE AND OBJECTIVE BOX
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  He then went to play baseball as scheduled for 4 hrs, then felt concerned went to see his PMD, who sent him to [facility] concerned for CVA.  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  Denies focal weakness, dysphagia, LOC, or seizure like activity.   on 8/3/18 S/P R crani for resection of tumor.  OVERNIGHT EVENTS:   no acute events overnight   Allergies    No Known Allergies    Intolerances    REVIEW OF SYSTEMS:  [ X] All ROS addressed below are non-contributory,   Neuro: [ ] Headache [ ] Back pain [ ] Numbness [ ] Weakness [ ] Ataxia [ ] Dizziness [ ] Aphasia [ ] Dysarthria [ ] Visual disturbance  Resp: [ ] Shortness of breath/dyspnea, [ ] Orthopnea [ ] Cough  CV: [ ] Chest pain [ ] Palpitation [ ] Lightheadedness [ ] Syncope  Renal: [ ] Thirst [ ] Edema  GI: [ ] Nausea [ ] Emesis [ ] Abdominal pain [ ] Constipation [ ] Diarrhea  Hem: [ ] Hematemesis [ ] bright red blood per rectum  ID: [ ] Fever [ ] Chills [ ] Dysuria  ENT: [ ] Rhinorrhea      VITALS: [ X] Reviewed  Vital Signs Last 24 Hrs  T(C): 36.7 (07 Aug 2018 04:00), Max: 36.7 (06 Aug 2018 20:00)  T(F): 98.1 (07 Aug 2018 04:00), Max: 98.1 (07 Aug 2018 04:00)  HR: 52 (07 Aug 2018 04:00) (52 - 73)  BP: 121/58 (07 Aug 2018 04:00) (121/58 - 147/62)  BP(mean): 72 (07 Aug 2018 04:00) (72 - 83)  RR: 31 (07 Aug 2018 04:00) (14 - 31)  SpO2: 94% (07 Aug 2018 04:00) (94% - 100%)  CAPILLARY BLOOD GLUCOSE  LABS:    STROKE CORE MEASURES:      MEDICATION LEVELS:     IMAGING/DATA: [X ] Reviewed    IVF FLUIDS/MEDICATIONS: [X Reviewed  MEDICATIONS  (STANDING):  allopurinol 300 milliGRAM(s) Oral daily  dexamethasone     Tablet 3 milliGRAM(s) Oral three times a day  docusate sodium 100 milliGRAM(s) Oral three times a day  enoxaparin Injectable 40 milliGRAM(s) SubCutaneous <User Schedule>  levETIRAcetam 1000 milliGRAM(s) Oral every 12 hours  losartan 50 milliGRAM(s) Oral daily  multivitamin 1 Tablet(s) Oral daily  NIFEdipine XL 90 milliGRAM(s) Oral daily  psyllium Powder 1 Packet(s) Oral daily  senna 2 Tablet(s) Oral at bedtime  sodium chloride 0.9%. 1000 milliLiter(s) (50 mL/Hr) IV Continuous <Continuous>    MEDICATIONS  (PRN):  acetaminophen   Tablet 650 milliGRAM(s) Oral every 6 hours PRN For Temp greater than 38 C (100.4 F)  acetaminophen   Tablet. 650 milliGRAM(s) Oral every 6 hours PRN Mild Pain (1 - 3)  ALPRAZolam 0.5 milliGRAM(s) Oral daily PRN anxiety  benzocaine 15 mG/menthol 3.6 mG Lozenge 1 Lozenge Oral three times a day PRN Sore Throat  ondansetron Injectable 4 milliGRAM(s) IV Push every 6 hours PRN Nausea and/or Vomiting    I&O's Summary    06 Aug 2018 07:01  -  07 Aug 2018 07:00  --------------------------------------------------------  IN: 1150 mL / OUT: 0 mL / NET: 1150 mL        EXAMINATION:  PHYSICAL EXAM:  Neurological: Awake, alert oriented to person, place and time, Following Commands, PERRL, EOMI, No Gaze Preference, L facial ; dysarthria;  +_ left sided  nystagmus; visual field intact     Motor exam:          Upper extremity                         Delt     Bicep     Tricep    HG                                                 R         5/5        5/5        5/5       5/5                                               L          5/5        5/5        5/5       5/5          Lower extremity                        HF         KF        KE       DF         PF                                                  R        5/5 5/5 5/5 5/5 5/5                                               L         5/5 5/5 5/5 5/5 5/5                                                 Sensation: [X ] intact to light touch         Pulmonary: Clear to Auscultation, No rales, No rhonchi, No wheezes     Cardiovascular: S1, S2, Regular rate and rhythm     Gastrointestinal: Soft, Non-tender, Non-distended     Extremities: No calf tenderness     Incision:

## 2018-08-07 NOTE — PROGRESS NOTE ADULT - PROVIDER SPECIALTY LIST ADULT
Heme/Onc
Internal Medicine
NSICU
Neurosurgery
Heme/Onc
Internal Medicine

## 2018-08-07 NOTE — SWALLOW VFSS/MBS ASSESSMENT ADULT - ADDITIONAL RECOMMENDATIONS
Restorative swallow therapy. Patient is scheduled for discharge. MD please provide prescription for dysphagia therapy and repeat Modified Barium Swallow. Repeat instrumental at the discretion of treating SLP.   Maintain good oral hygiene.

## 2018-08-07 NOTE — SWALLOW VFSS/MBS ASSESSMENT ADULT - RESIDUE IN VALLECULAE
Severe stranding to the posterior pharyngeal wall/Severe Severe/Moderate Mild/> residue with chin tuck/Moderate

## 2018-08-07 NOTE — SWALLOW VFSS/MBS ASSESSMENT ADULT - LARYNGEAL PENETRATION DURING THE SWALLOW - SILENT
Mild/over the arytenoids; to the level of the vocal cords; eliminated with left head rotation/Trace over the arytenoids/Mild/Moderate

## 2018-08-07 NOTE — DISCHARGE NOTE ADULT - CARE PLAN
Principal Discharge DX:	Brain tumor  Goal:	return of functional status  Assessment and plan of treatment:	followup office after craniotomy.

## 2018-08-07 NOTE — PROGRESS NOTE ADULT - ASSESSMENT
8/7 met with the pt as he was to be discharged and briefly told him he had a brain tumor high grade that may or may not have been fully resected and is will be treated with radiation and likely oral chemo. He will follow up with the neurosurgery team in a week or so.

## 2018-08-15 ENCOUNTER — APPOINTMENT (OUTPATIENT)
Dept: NEUROSURGERY | Facility: CLINIC | Age: 78
End: 2018-08-15
Payer: MEDICARE

## 2018-08-15 ENCOUNTER — APPOINTMENT (OUTPATIENT)
Dept: NEUROLOGY | Facility: CLINIC | Age: 78
End: 2018-08-15
Payer: MEDICARE

## 2018-08-15 VITALS
HEART RATE: 60 BPM | WEIGHT: 195 LBS | TEMPERATURE: 98 F | BODY MASS INDEX: 27.3 KG/M2 | SYSTOLIC BLOOD PRESSURE: 162 MMHG | DIASTOLIC BLOOD PRESSURE: 69 MMHG | HEIGHT: 71 IN | OXYGEN SATURATION: 95 %

## 2018-08-15 DIAGNOSIS — G93.9 DISORDER OF BRAIN, UNSPECIFIED: ICD-10-CM

## 2018-08-15 PROCEDURE — 99024 POSTOP FOLLOW-UP VISIT: CPT

## 2018-08-15 PROCEDURE — 99205 OFFICE O/P NEW HI 60 MIN: CPT

## 2018-08-16 ENCOUNTER — APPOINTMENT (OUTPATIENT)
Dept: RADIATION ONCOLOGY | Facility: CLINIC | Age: 78
End: 2018-08-16
Payer: MEDICARE

## 2018-08-16 ENCOUNTER — OUTPATIENT (OUTPATIENT)
Dept: OUTPATIENT SERVICES | Facility: HOSPITAL | Age: 78
LOS: 1 days | Discharge: HOME | End: 2018-08-16
Payer: MEDICARE

## 2018-08-16 VITALS
BODY MASS INDEX: 27.83 KG/M2 | HEART RATE: 63 BPM | RESPIRATION RATE: 16 BRPM | SYSTOLIC BLOOD PRESSURE: 132 MMHG | OXYGEN SATURATION: 100 % | DIASTOLIC BLOOD PRESSURE: 63 MMHG | WEIGHT: 199.51 LBS

## 2018-08-16 DIAGNOSIS — Z78.9 OTHER SPECIFIED HEALTH STATUS: ICD-10-CM

## 2018-08-16 DIAGNOSIS — Z90.89 ACQUIRED ABSENCE OF OTHER ORGANS: Chronic | ICD-10-CM

## 2018-08-16 DIAGNOSIS — D17.0 BENIGN LIPOMATOUS NEOPLASM OF SKIN AND SUBCUTANEOUS TISSUE OF HEAD, FACE AND NECK: Chronic | ICD-10-CM

## 2018-08-16 DIAGNOSIS — C71.9 MALIGNANT NEOPLASM OF BRAIN, UNSPECIFIED: ICD-10-CM

## 2018-08-16 DIAGNOSIS — C44.711 BASAL CELL CARCINOMA OF SKIN OF UNSPECIFIED LOWER LIMB, INCLUDING HIP: Chronic | ICD-10-CM

## 2018-08-16 PROCEDURE — 99205 OFFICE O/P NEW HI 60 MIN: CPT | Mod: 25

## 2018-08-16 RX ORDER — MULTIVITAMIN
TABLET ORAL
Refills: 0 | Status: ACTIVE | COMMUNITY

## 2018-08-16 RX ORDER — CALCIUM CARBONATE/VITAMIN D3 600 MG-10
TABLET ORAL
Refills: 0 | Status: ACTIVE | COMMUNITY

## 2018-08-16 RX ORDER — KRILL/OM-3/DHA/EPA/PHOSPHO/AST 1000-230MG
CAPSULE ORAL
Refills: 0 | Status: ACTIVE | COMMUNITY

## 2018-08-16 RX ORDER — PNV NO.95/FERROUS FUM/FOLIC AC 28MG-0.8MG
TABLET ORAL
Refills: 0 | Status: ACTIVE | COMMUNITY

## 2018-08-16 RX ORDER — UBIDECARENONE/VIT E ACET 100MG-5
50 MCG CAPSULE ORAL
Refills: 0 | Status: ACTIVE | COMMUNITY

## 2018-08-16 RX ORDER — ALPRAZOLAM 0.5 MG/1
0.5 TABLET ORAL
Qty: 10 | Refills: 0 | Status: ACTIVE | COMMUNITY
Start: 2018-08-07

## 2018-08-16 RX ORDER — UBIDECARENONE 400 MG
CAPSULE ORAL
Refills: 0 | Status: ACTIVE | COMMUNITY

## 2018-08-16 RX ORDER — LEVETIRACETAM 1000 MG/1
1000 TABLET, FILM COATED ORAL
Qty: 20 | Refills: 0 | Status: COMPLETED | COMMUNITY
Start: 2018-08-07

## 2018-08-16 RX ORDER — B-COMPLEX WITH VITAMIN C
TABLET ORAL
Refills: 0 | Status: ACTIVE | COMMUNITY

## 2018-08-16 RX ORDER — DOXYCYCLINE HYCLATE 100 MG/1
100 CAPSULE ORAL
Qty: 20 | Refills: 0 | Status: COMPLETED | COMMUNITY
Start: 2018-06-06

## 2018-08-16 RX ORDER — METHYLPREDNISOLONE 4 MG/1
4 TABLET ORAL
Qty: 21 | Refills: 0 | Status: COMPLETED | COMMUNITY
Start: 2018-06-06

## 2018-08-16 RX ORDER — MULTIVIT-MIN/FOLIC/VIT K/LYCOP 400-300MCG
1000 TABLET ORAL
Refills: 0 | Status: ACTIVE | COMMUNITY

## 2018-08-22 ENCOUNTER — INPATIENT (INPATIENT)
Facility: HOSPITAL | Age: 78
LOS: 0 days | Discharge: HOME | DRG: 101 | End: 2018-08-23
Attending: FAMILY MEDICINE | Admitting: FAMILY MEDICINE
Payer: MEDICARE

## 2018-08-22 ENCOUNTER — OTHER (OUTPATIENT)
Age: 78
End: 2018-08-22

## 2018-08-22 VITALS
SYSTOLIC BLOOD PRESSURE: 167 MMHG | RESPIRATION RATE: 16 BRPM | DIASTOLIC BLOOD PRESSURE: 73 MMHG | WEIGHT: 192.9 LBS | HEIGHT: 71 IN | TEMPERATURE: 98 F | OXYGEN SATURATION: 97 % | HEART RATE: 76 BPM

## 2018-08-22 DIAGNOSIS — R56.9 UNSPECIFIED CONVULSIONS: ICD-10-CM

## 2018-08-22 DIAGNOSIS — E78.5 HYPERLIPIDEMIA, UNSPECIFIED: ICD-10-CM

## 2018-08-22 DIAGNOSIS — I10 ESSENTIAL (PRIMARY) HYPERTENSION: ICD-10-CM

## 2018-08-22 DIAGNOSIS — Z90.89 ACQUIRED ABSENCE OF OTHER ORGANS: Chronic | ICD-10-CM

## 2018-08-22 DIAGNOSIS — C71.9 MALIGNANT NEOPLASM OF BRAIN, UNSPECIFIED: ICD-10-CM

## 2018-08-22 DIAGNOSIS — C44.711 BASAL CELL CARCINOMA OF SKIN OF UNSPECIFIED LOWER LIMB, INCLUDING HIP: Chronic | ICD-10-CM

## 2018-08-22 DIAGNOSIS — Z29.9 ENCOUNTER FOR PROPHYLACTIC MEASURES, UNSPECIFIED: ICD-10-CM

## 2018-08-22 DIAGNOSIS — D17.0 BENIGN LIPOMATOUS NEOPLASM OF SKIN AND SUBCUTANEOUS TISSUE OF HEAD, FACE AND NECK: Chronic | ICD-10-CM

## 2018-08-22 DIAGNOSIS — R06.00 DYSPNEA, UNSPECIFIED: ICD-10-CM

## 2018-08-22 LAB
ALBUMIN SERPL ELPH-MCNC: 3.6 G/DL — SIGNIFICANT CHANGE UP (ref 3.3–5)
ALP SERPL-CCNC: 50 U/L — SIGNIFICANT CHANGE UP (ref 40–120)
ALT FLD-CCNC: 39 U/L — SIGNIFICANT CHANGE UP (ref 12–78)
AMMONIA BLD-MCNC: <17 UMOL/L — SIGNIFICANT CHANGE UP (ref 11–32)
ANION GAP SERPL CALC-SCNC: 7 MMOL/L — SIGNIFICANT CHANGE UP (ref 5–17)
APPEARANCE UR: CLEAR — SIGNIFICANT CHANGE UP
APTT BLD: 25.7 SEC — LOW (ref 27.5–37.4)
AST SERPL-CCNC: 19 U/L — SIGNIFICANT CHANGE UP (ref 15–37)
BASOPHILS # BLD AUTO: 0.02 K/UL — SIGNIFICANT CHANGE UP (ref 0–0.2)
BASOPHILS NFR BLD AUTO: 0.2 % — SIGNIFICANT CHANGE UP (ref 0–2)
BILIRUB SERPL-MCNC: 0.9 MG/DL — SIGNIFICANT CHANGE UP (ref 0.2–1.2)
BILIRUB UR-MCNC: NEGATIVE — SIGNIFICANT CHANGE UP
BUN SERPL-MCNC: 19 MG/DL — SIGNIFICANT CHANGE UP (ref 7–23)
CALCIUM SERPL-MCNC: 8.6 MG/DL — SIGNIFICANT CHANGE UP (ref 8.5–10.1)
CHLORIDE SERPL-SCNC: 107 MMOL/L — SIGNIFICANT CHANGE UP (ref 96–108)
CK MB BLD-MCNC: 1.7 % — SIGNIFICANT CHANGE UP (ref 0–3.5)
CK MB CFR SERPL CALC: 1.6 NG/ML — SIGNIFICANT CHANGE UP (ref 0–3.6)
CK SERPL-CCNC: 93 U/L — SIGNIFICANT CHANGE UP (ref 26–308)
CO2 SERPL-SCNC: 27 MMOL/L — SIGNIFICANT CHANGE UP (ref 22–31)
COLOR SPEC: YELLOW — SIGNIFICANT CHANGE UP
CREAT SERPL-MCNC: 1 MG/DL — SIGNIFICANT CHANGE UP (ref 0.5–1.3)
DIFF PNL FLD: NEGATIVE — SIGNIFICANT CHANGE UP
EOSINOPHIL # BLD AUTO: 0.21 K/UL — SIGNIFICANT CHANGE UP (ref 0–0.5)
EOSINOPHIL NFR BLD AUTO: 2.3 % — SIGNIFICANT CHANGE UP (ref 0–6)
GLUCOSE SERPL-MCNC: 99 MG/DL — SIGNIFICANT CHANGE UP (ref 70–99)
GLUCOSE UR QL: NEGATIVE — SIGNIFICANT CHANGE UP
HCT VFR BLD CALC: 41.4 % — SIGNIFICANT CHANGE UP (ref 39–50)
HGB BLD-MCNC: 14.3 G/DL — SIGNIFICANT CHANGE UP (ref 13–17)
IMM GRANULOCYTES NFR BLD AUTO: 0.3 % — SIGNIFICANT CHANGE UP (ref 0–1.5)
INR BLD: 1.14 RATIO — SIGNIFICANT CHANGE UP (ref 0.88–1.16)
KETONES UR-MCNC: NEGATIVE — SIGNIFICANT CHANGE UP
LEUKOCYTE ESTERASE UR-ACNC: NEGATIVE — SIGNIFICANT CHANGE UP
LYMPHOCYTES # BLD AUTO: 1.65 K/UL — SIGNIFICANT CHANGE UP (ref 1–3.3)
LYMPHOCYTES # BLD AUTO: 17.7 % — SIGNIFICANT CHANGE UP (ref 13–44)
MCHC RBC-ENTMCNC: 31.3 PG — SIGNIFICANT CHANGE UP (ref 27–34)
MCHC RBC-ENTMCNC: 34.5 GM/DL — SIGNIFICANT CHANGE UP (ref 32–36)
MCV RBC AUTO: 90.6 FL — SIGNIFICANT CHANGE UP (ref 80–100)
MONOCYTES # BLD AUTO: 0.77 K/UL — SIGNIFICANT CHANGE UP (ref 0–0.9)
MONOCYTES NFR BLD AUTO: 8.3 % — SIGNIFICANT CHANGE UP (ref 2–14)
NEUTROPHILS # BLD AUTO: 6.63 K/UL — SIGNIFICANT CHANGE UP (ref 1.8–7.4)
NEUTROPHILS NFR BLD AUTO: 71.2 % — SIGNIFICANT CHANGE UP (ref 43–77)
NITRITE UR-MCNC: NEGATIVE — SIGNIFICANT CHANGE UP
PH UR: 5 — SIGNIFICANT CHANGE UP (ref 5–8)
PLATELET # BLD AUTO: 173 K/UL — SIGNIFICANT CHANGE UP (ref 150–400)
POTASSIUM SERPL-MCNC: 4.3 MMOL/L — SIGNIFICANT CHANGE UP (ref 3.5–5.3)
POTASSIUM SERPL-SCNC: 4.3 MMOL/L — SIGNIFICANT CHANGE UP (ref 3.5–5.3)
PROT SERPL-MCNC: 7.3 G/DL — SIGNIFICANT CHANGE UP (ref 6–8.3)
PROT UR-MCNC: NEGATIVE — SIGNIFICANT CHANGE UP
PROTHROM AB SERPL-ACNC: 12.5 SEC — SIGNIFICANT CHANGE UP (ref 9.8–12.7)
RBC # BLD: 4.57 M/UL — SIGNIFICANT CHANGE UP (ref 4.2–5.8)
RBC # FLD: 12.7 % — SIGNIFICANT CHANGE UP (ref 10.3–14.5)
SODIUM SERPL-SCNC: 141 MMOL/L — SIGNIFICANT CHANGE UP (ref 135–145)
SP GR SPEC: 1.01 — SIGNIFICANT CHANGE UP (ref 1.01–1.02)
TROPONIN I SERPL-MCNC: <.015 NG/ML — SIGNIFICANT CHANGE UP (ref 0.01–0.04)
TSH SERPL-MCNC: 0.78 UIU/ML — SIGNIFICANT CHANGE UP (ref 0.36–3.74)
UROBILINOGEN FLD QL: NEGATIVE — SIGNIFICANT CHANGE UP
WBC # BLD: 9.31 K/UL — SIGNIFICANT CHANGE UP (ref 3.8–10.5)
WBC # FLD AUTO: 9.31 K/UL — SIGNIFICANT CHANGE UP (ref 3.8–10.5)

## 2018-08-22 PROCEDURE — 71045 X-RAY EXAM CHEST 1 VIEW: CPT | Mod: 26

## 2018-08-22 PROCEDURE — 99285 EMERGENCY DEPT VISIT HI MDM: CPT

## 2018-08-22 PROCEDURE — 70450 CT HEAD/BRAIN W/O DYE: CPT | Mod: 26

## 2018-08-22 PROCEDURE — 77263 THER RADIOLOGY TX PLNG CPLX: CPT

## 2018-08-22 PROCEDURE — 99223 1ST HOSP IP/OBS HIGH 75: CPT | Mod: GC,AI

## 2018-08-22 PROCEDURE — 93010 ELECTROCARDIOGRAM REPORT: CPT

## 2018-08-22 RX ORDER — ALLOPURINOL 300 MG
300 TABLET ORAL DAILY
Qty: 0 | Refills: 0 | Status: DISCONTINUED | OUTPATIENT
Start: 2018-08-22 | End: 2018-08-23

## 2018-08-22 RX ORDER — ATORVASTATIN CALCIUM 80 MG/1
20 TABLET, FILM COATED ORAL AT BEDTIME
Qty: 0 | Refills: 0 | Status: DISCONTINUED | OUTPATIENT
Start: 2018-08-22 | End: 2018-08-23

## 2018-08-22 RX ORDER — DEXAMETHASONE 0.5 MG/5ML
2 ELIXIR ORAL EVERY 8 HOURS
Qty: 0 | Refills: 0 | Status: DISCONTINUED | OUTPATIENT
Start: 2018-08-22 | End: 2018-08-23

## 2018-08-22 RX ORDER — ALPRAZOLAM 0.25 MG
0.5 TABLET ORAL DAILY
Qty: 0 | Refills: 0 | Status: DISCONTINUED | OUTPATIENT
Start: 2018-08-22 | End: 2018-08-23

## 2018-08-22 RX ORDER — ASCORBIC ACID 60 MG
500 TABLET,CHEWABLE ORAL DAILY
Qty: 0 | Refills: 0 | Status: DISCONTINUED | OUTPATIENT
Start: 2018-08-22 | End: 2018-08-23

## 2018-08-22 RX ORDER — DOCUSATE SODIUM 100 MG
100 CAPSULE ORAL THREE TIMES A DAY
Qty: 0 | Refills: 0 | Status: DISCONTINUED | OUTPATIENT
Start: 2018-08-22 | End: 2018-08-23

## 2018-08-22 RX ORDER — ENOXAPARIN SODIUM 100 MG/ML
40 INJECTION SUBCUTANEOUS DAILY
Qty: 0 | Refills: 0 | Status: DISCONTINUED | OUTPATIENT
Start: 2018-08-22 | End: 2018-08-22

## 2018-08-22 RX ORDER — SODIUM CHLORIDE 9 MG/ML
1000 INJECTION INTRAMUSCULAR; INTRAVENOUS; SUBCUTANEOUS
Qty: 0 | Refills: 0 | Status: DISCONTINUED | OUTPATIENT
Start: 2018-08-22 | End: 2018-08-23

## 2018-08-22 RX ORDER — CHOLECALCIFEROL (VITAMIN D3) 125 MCG
2000 CAPSULE ORAL DAILY
Qty: 0 | Refills: 0 | Status: DISCONTINUED | OUTPATIENT
Start: 2018-08-22 | End: 2018-08-22

## 2018-08-22 RX ORDER — NIFEDIPINE 30 MG
90 TABLET, EXTENDED RELEASE 24 HR ORAL DAILY
Qty: 0 | Refills: 0 | Status: DISCONTINUED | OUTPATIENT
Start: 2018-08-22 | End: 2018-08-23

## 2018-08-22 RX ORDER — PSYLLIUM SEED (WITH DEXTROSE)
1 POWDER (GRAM) ORAL AT BEDTIME
Qty: 0 | Refills: 0 | Status: DISCONTINUED | OUTPATIENT
Start: 2018-08-22 | End: 2018-08-23

## 2018-08-22 RX ORDER — PREGABALIN 225 MG/1
1000 CAPSULE ORAL DAILY
Qty: 0 | Refills: 0 | Status: DISCONTINUED | OUTPATIENT
Start: 2018-08-22 | End: 2018-08-23

## 2018-08-22 RX ORDER — SENNA PLUS 8.6 MG/1
2 TABLET ORAL AT BEDTIME
Qty: 0 | Refills: 0 | Status: DISCONTINUED | OUTPATIENT
Start: 2018-08-22 | End: 2018-08-23

## 2018-08-22 RX ORDER — LOSARTAN POTASSIUM 100 MG/1
50 TABLET, FILM COATED ORAL DAILY
Qty: 0 | Refills: 0 | Status: DISCONTINUED | OUTPATIENT
Start: 2018-08-22 | End: 2018-08-23

## 2018-08-22 RX ORDER — CHOLECALCIFEROL (VITAMIN D3) 125 MCG
2000 CAPSULE ORAL DAILY
Qty: 0 | Refills: 0 | Status: DISCONTINUED | OUTPATIENT
Start: 2018-08-22 | End: 2018-08-23

## 2018-08-22 RX ORDER — LEVETIRACETAM 250 MG/1
1000 TABLET, FILM COATED ORAL ONCE
Qty: 0 | Refills: 0 | Status: COMPLETED | OUTPATIENT
Start: 2018-08-22 | End: 2018-08-22

## 2018-08-22 RX ORDER — PSYLLIUM SEED (WITH DEXTROSE)
3.7 POWDER (GRAM) ORAL AT BEDTIME
Qty: 0 | Refills: 0 | Status: DISCONTINUED | OUTPATIENT
Start: 2018-08-22 | End: 2018-08-22

## 2018-08-22 RX ADMIN — Medication 1 PACKET(S): at 21:20

## 2018-08-22 RX ADMIN — SENNA PLUS 2 TABLET(S): 8.6 TABLET ORAL at 21:20

## 2018-08-22 RX ADMIN — LEVETIRACETAM 440 MILLIGRAM(S): 250 TABLET, FILM COATED ORAL at 17:18

## 2018-08-22 RX ADMIN — SODIUM CHLORIDE 125 MILLILITER(S): 9 INJECTION INTRAMUSCULAR; INTRAVENOUS; SUBCUTANEOUS at 13:46

## 2018-08-22 RX ADMIN — ATORVASTATIN CALCIUM 20 MILLIGRAM(S): 80 TABLET, FILM COATED ORAL at 21:20

## 2018-08-22 RX ADMIN — Medication 2 MILLIGRAM(S): at 21:20

## 2018-08-22 RX ADMIN — Medication 100 MILLIGRAM(S): at 21:20

## 2018-08-22 RX ADMIN — SODIUM CHLORIDE 125 MILLILITER(S): 9 INJECTION INTRAMUSCULAR; INTRAVENOUS; SUBCUTANEOUS at 17:44

## 2018-08-22 NOTE — ED ADULT NURSE NOTE - NSIMPLEMENTINTERV_GEN_ALL_ED
Implemented All Fall Risk Interventions:  [place] to call system. Call bell, personal items and telephone within reach. Instruct patient to call for assistance. Room bathroom lighting operational. Non-slip footwear when patient is off stretcher. Physically safe environment: no spills, clutter or unnecessary equipment. Stretcher in lowest position, wheels locked, appropriate side rails in place. Provide visual cue, wrist band, yellow gown, etc. Monitor gait and stability. Monitor for mental status changes and reorient to person, place, and time. Review medications for side effects contributing to fall risk. Reinforce activity limits and safety measures with patient and family.

## 2018-08-22 NOTE — ED ADULT NURSE NOTE - CHIEF COMPLAINT QUOTE
pt at gym and had sudden onset of sob, and altered ms at 1210 pm today , right sided facial droop with slurred speech noted, pt s/p brain surgery 8/3/18 for malignant tumor

## 2018-08-22 NOTE — ED ADULT NURSE NOTE - PSH
Basal cell carcinoma (BCC) of lower leg  s/p resection  History of tonsillectomy  at age 30  Lipoma of neck  s/p resection at age 30

## 2018-08-22 NOTE — ED ADULT TRIAGE NOTE - CHIEF COMPLAINT QUOTE
pt at gym and had sudden onset of sob, and altered ms at 1210 pm today , right sided facial droop with slurred speech noted, pt s/p brain surgery 8/3/18 for malignant tumor pt at gym and had sudden onset of sob, and altered ms at 1210 pm today , right sided facial droop with slurred speech noted, pt s/p brain surgery 8/3/18 for malignant tumor, pt reports left eye twitching and trouble breathing lasting 30 seconds

## 2018-08-22 NOTE — ED ADULT NURSE NOTE - PMH
Basal cell carcinoma (BCC) of left lower leg  s/p resection  HLD (hyperlipidemia)    HTN (hypertension)    Prostate cancer  s/p radical prostatectomy

## 2018-08-22 NOTE — H&P ADULT - PROBLEM SELECTOR PLAN 2
HLD- stable   Continue Crestor 8/3 underwent R craniotomy for tumor resection  Outpatient fu for chemo and radiation as per neurosurgery

## 2018-08-22 NOTE — ED PROVIDER NOTE - NS ED ROS FT
GEN: no fever, no chills, no weakness  HENT: no eye pain, no visual changes, no ear pain, no visual or hearing changes, no sore throat, no swelling or neck pain  CV: no chest pain, no palpitations, no dizziness, no swelling  RESP: no coughing, +sob, no IWOB, no STEWART  GI: no abd pain, no distension, no nausea, no vomiting, no diarrhea, no constipation  : no dysuria,  no frequency, no hematuria, no discharge, no flank pain  MUSCULOSKELETAL: no myalgia, no arthralgia, no joint swelling, no bruising   SKIN: no rash, no wounds, no itching  NEURO: no change in mentation, no visual changes, no HA, no focal weakness, no trouble speaking, no gait abnormalities, no dizziness, + eye twitching  PSYCH: no suidical ideation, no homicidal ideation, no depression, no anxiety, no hallucinations

## 2018-08-22 NOTE — H&P ADULT - PROBLEM SELECTOR PLAN 4
IMPROVE VTE Individual Risk Assessment        RISK                                                          Points  [  ] Previous VTE                                                3  [  ] Thrombophilia                                             2  [  ] Lower limb paralysis                                   2        (unable to hold up >15 seconds)    [ x ] Current Cancer                                            2         (within 6 months)  [  ] Immobilization > 24 hrs                              1  [  ] ICU/CCU stay > 24 hours                            1  [ x ] Age > 60                                                    1  IMPROVE VTE Score ___3______  Lovenox 40 mg SC qd  Fall risk precaution   Aspiration precautions HTN- stable   Continue Losartan and nifedipine  DASH diet

## 2018-08-22 NOTE — H&P ADULT - FAMILY HISTORY
Family history of lung cancer, mother , long standing smoker     Family history of prostate cancer in father,      Family history of diabetes mellitus, Father      Sibling  Still living? Yes, Estimated age: Age Unknown  Family history of breast cancer in mother, Age at diagnosis: Age Unknown

## 2018-08-22 NOTE — ED PROVIDER NOTE - PROGRESS NOTE DETAILS
pt not a tPA candidate, suspect seizure, ct brain w/o acute pathology, neuro consulted, rec'd loading dose keppra, will see, pt to be admitted for further eval and mgmt

## 2018-08-22 NOTE — H&P ADULT - NSHPPHYSICALEXAM_GEN_ALL_CORE
Physical Exam:  General: Well developed, well nourished, NAD  HEENT: NCAT, PERRLA, EOMI bl, moist mucous membranes   Neck: Supple, nontender, no mass  Neurology: A&Ox3, nonfocal, CN II-XII grossly intact, sensation intact, no gait abnormalities   Respiratory: CTA B/L, No W/R/R  CV: RRR, +S1/S2, no murmurs, rubs or gallops  Abdominal: Soft, NT, ND +BSx4  Extremities: No C/C/E, + peripheral pulses  MSK: Normal ROM, no joint erythema or warmth, no joint swelling   Skin: warm, dry, normal color Vital Signs Last 24 Hrs  T(C): 36.5 (22 Aug 2018 12:59), Max: 36.5 (22 Aug 2018 12:59)  T(F): 97.7 (22 Aug 2018 12:59), Max: 97.7 (22 Aug 2018 12:59)  HR: 76 (22 Aug 2018 12:59) (76 - 76)  BP: 167/73 (22 Aug 2018 12:59) (167/73 - 167/73)  BP(mean): --  RR: 16 (22 Aug 2018 12:59) (16 - 16)  SpO2: 97% (22 Aug 2018 12:59) (97% - 97%)    Physical Exam:  General: Well developed, well nourished, NAD  HEENT: NCAT, PERRLA, EOMI bl, moist mucous membranes   Neck: Supple, nontender, no mass  Neurology: A&Ox3, nonfocal, CN II-XII grossly intact, sensation intact, no gait abnormalities   Respiratory: CTA B/L, No W/R/R  CV: RRR, +S1/S2, no murmurs, rubs or gallops  Abdominal: Soft, NT, ND +BSx4  Extremities: No C/C/E, + peripheral pulses  MSK: Normal ROM, no joint erythema or warmth, no joint swelling   Skin: warm, dry, normal color

## 2018-08-22 NOTE — CONSULT NOTE ADULT - PROBLEM SELECTOR RECOMMENDATION 2
Episode of Dyspnea  will check CXR  on room air 97 pct  no resp distress at present  HD stable  will follow and monitor  discussed with son and patient at the bedside

## 2018-08-22 NOTE — ED PROVIDER NOTE - PHYSICAL EXAMINATION
GEN: awake, alert, well appearing, NAD   HENT: atraumatic, normocephalic, NAKUL, EOMI, no midline instability, oropharynx w/o erythema or exudates, no lymphadenopathy  CV: normal rate and rhythm, S1, S2, no MRG, equal pulses throughout, no JVD  RESP: no distress, no IWOB, no retraction, clear to auscultation bilaterally   ABD: soft, nontender, nondistended, no rebound, no guarding, normoactive bowel sounds, no organomegally  MUSCULOSKELETAL: strenght 5/5 x 4, full range of motion, CMS intact   SKIN: normal color, no turgor, no wounds or rash   NEURO: Awake alert oriented x 3, +left facial asymmetry, mild slurred speech, no pronator drift, moving all extremities  PSYCH: no suicial ideation, no homicidal ideation, no depression, no anxiety, no hallucination

## 2018-08-22 NOTE — H&P ADULT - NSHPSOCIALHISTORY_GEN_ALL_CORE
Patient is  and lives at home with wife and daughter. Former smoker 40 packs per year, quit 30 years ago. Admits to drinking 2-3 glasses of wine daily but has stopped drinking 3 weeks ago since he had craniotomy performed. Ambulates without assistance. Patient is  and lives at home with wife and daughter. Former smoker 40 packs per year, quit 30 years ago. Admits to drinking 2-3 glasses of wine daily but has stopped drinking 3 weeks ago since he had craniotomy performed. Ambulates without assistance. Able to carry out ADL independently

## 2018-08-22 NOTE — H&P ADULT - NSHPREVIEWOFSYSTEMS_GEN_ALL_CORE
Constitutional: denies fever, chills, diaphoresis   HEENT: denies blurry vision, difficulty hearing  Respiratory: denies SOB, STEWART, cough, sputum production, wheezing, hemoptysis  Cardiovascular: denies chest pain, palpitations, edema  Gastrointestinal: denies nausea, vomiting, diarrhea, constipation, abdominal pain, melena, hematochezia   Genitourinary: denies dysuria, frequency, urgency, hematuria   Musculoskeletal: denies myalgias, joint swelling, muscle weakness  Neurologic: denies headache, weakness, dizziness, paresthesias, numbness/tingling

## 2018-08-22 NOTE — ED ADULT NURSE NOTE - OBJECTIVE STATEMENT
Pt is 77y M, came to ED with c/o slurred speech, hx of glioblastoma resection, states currently no focal deficits, no slurred speech Pt is 77y M, came to ED with c/o slurred speech, hx of glioblastoma resection, states currently no focal deficits, no slurred speech, CT scan completed, placed on cardiac monitor, EKG completed, advised on plan of care, verbalized understanding

## 2018-08-22 NOTE — H&P ADULT - PROBLEM SELECTOR PLAN 1
Episode of SOB and left eye twitching likely 2/2   Continue Keppra 1000 bid   Neurology recs appreciated Admit to tele   Episode of SOB and left eye twitching suspected seizure activity will evaluate  Continue Keppra 1000 bid   Neurology recs appreciated  Seizure precautions   Ativan PRN for seizures  Will monitor electrolytes for cause of twitching  Patient was placed on Keppra for seizure ppx after surgery however, he reports he stopped taking Keppra 2 days ago because he ran out of his medication  Patient had similar presentation with facial twitching on last admission prior to being diagnosed with glioblastoma   ? symptoms due to glioblastoma not completely resected

## 2018-08-22 NOTE — H&P ADULT - HISTORY OF PRESENT ILLNESS
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 Patient reports that while in the gym 20 minutes he started experiencing SOB. Patient started walking around trying to "walk it off" and suddenly he developed left eye twitching that lasted around 20 seconds. Patient denies LOC, dizziness, weakness, chest pain, palpitations, worsening dysarthria, unstable gait, loss of bowel or urinary continence, tremors or worsening facial drooping during the episode. Has residual L. facial asymmetry and dysarthria that has improved after craniotomy for glioblastoma removal. Patient also reports that he stopped taking Keppra 2 days ago because he ran out of his medication.     In ED vitals are T 97.7 HR 76 /73 RR 16 sat 97 @ RA. CBC w/ diff and CMP unremarkable. Lactate 1.7 Ammonia, serum <17, Creatinine kinase 93, CKMB 1.6 Trop negative x1    CT brain:  Right frontal parietal craniotomy with evolving subacute/chronic   hemorrhagic component in the postoperative bed. Residual tumor not   excluded without IV contrast.    No new acute intracranial hemorrhage or gross acute territorial infarct. 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 Patient reports that while in the gym 20 minutes he started experiencing SOB which resolved spontanously. Patient started walking around trying to "walk it off" and suddenly he developed left eye twitching that lasted around 20 seconds. Patient denies LOC, dizziness, weakness, chest pain, palpitations, worsening dysarthria, unstable gait, loss of bowel or urinary continence, tremors or worsening facial drooping during the episode. Has residual L. facial asymmetry and dysarthria that has improved after craniotomy for glioblastoma removal. Patient was placed on Keppra for seizure ppx after surgery however, he reports he stopped taking Keppra 2 days ago because he ran out of his medication.     In ED vitals are T 97.7 HR 76 /73 RR 16 sat 97 @ RA. CBC w/ diff and CMP unremarkable. Lactate 1.7 Ammonia, serum <17, Creatinine kinase 93, CKMB 1.6 Trop negative x1    CT brain:  Right frontal parietal craniotomy with evolving subacute/chronic   hemorrhagic component in the postoperative bed. Residual tumor not   excluded without IV contrast.  No new acute intracranial hemorrhage or gross acute territorial infarct.  meds  ekg

## 2018-08-22 NOTE — ED PROVIDER NOTE - OBJECTIVE STATEMENT
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 denies focal weakness, slurred speech. pt reports L facial asymmtry and slurred speech has been improving since craniotomy. reports pt last took anti-epileptic 2 days prior as he has ran out and no refill was provided.

## 2018-08-22 NOTE — H&P ADULT - ASSESSMENT
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

## 2018-08-22 NOTE — H&P ADULT - PROBLEM SELECTOR PLAN 5
IMPROVE VTE Individual Risk Assessment        RISK                                                          Points  [  ] Previous VTE                                                3  [  ] Thrombophilia                                             2  [  ] Lower limb paralysis                                   2        (unable to hold up >15 seconds)    [ x ] Current Cancer                                            2         (within 6 months)  [  ] Immobilization > 24 hrs                              1  [  ] ICU/CCU stay > 24 hours                            1  [ x ] Age > 60                                                    1  IMPROVE VTE Score ___3______  Lovenox 40 mg SC qd  Fall risk precaution   Aspiration precautions

## 2018-08-22 NOTE — ED ADULT NURSE NOTE - CHPI ED NUR SYMPTOMS NEG
no blurred vision/no confusion/no fever/no loss of consciousness/no change in level of consciousness/no dizziness

## 2018-08-23 ENCOUNTER — TRANSCRIPTION ENCOUNTER (OUTPATIENT)
Age: 78
End: 2018-08-23

## 2018-08-23 VITALS
DIASTOLIC BLOOD PRESSURE: 75 MMHG | HEART RATE: 71 BPM | OXYGEN SATURATION: 97 % | RESPIRATION RATE: 18 BRPM | TEMPERATURE: 98 F | SYSTOLIC BLOOD PRESSURE: 149 MMHG

## 2018-08-23 LAB
ANION GAP SERPL CALC-SCNC: 6 MMOL/L — SIGNIFICANT CHANGE UP (ref 5–17)
BASOPHILS # BLD AUTO: 0.02 K/UL — SIGNIFICANT CHANGE UP (ref 0–0.2)
BASOPHILS NFR BLD AUTO: 0.3 % — SIGNIFICANT CHANGE UP (ref 0–2)
BUN SERPL-MCNC: 14 MG/DL — SIGNIFICANT CHANGE UP (ref 7–23)
CALCIUM SERPL-MCNC: 8.4 MG/DL — LOW (ref 8.5–10.1)
CHLORIDE SERPL-SCNC: 110 MMOL/L — HIGH (ref 96–108)
CO2 SERPL-SCNC: 27 MMOL/L — SIGNIFICANT CHANGE UP (ref 22–31)
CREAT SERPL-MCNC: 0.93 MG/DL — SIGNIFICANT CHANGE UP (ref 0.5–1.3)
EOSINOPHIL # BLD AUTO: 0.04 K/UL — SIGNIFICANT CHANGE UP (ref 0–0.5)
EOSINOPHIL NFR BLD AUTO: 0.6 % — SIGNIFICANT CHANGE UP (ref 0–6)
GLUCOSE SERPL-MCNC: 122 MG/DL — HIGH (ref 70–99)
HCT VFR BLD CALC: 38.1 % — LOW (ref 39–50)
HGB BLD-MCNC: 13 G/DL — SIGNIFICANT CHANGE UP (ref 13–17)
IMM GRANULOCYTES NFR BLD AUTO: 0.3 % — SIGNIFICANT CHANGE UP (ref 0–1.5)
LYMPHOCYTES # BLD AUTO: 0.99 K/UL — LOW (ref 1–3.3)
LYMPHOCYTES # BLD AUTO: 13.8 % — SIGNIFICANT CHANGE UP (ref 13–44)
MAGNESIUM SERPL-MCNC: 2.3 MG/DL — SIGNIFICANT CHANGE UP (ref 1.6–2.6)
MCHC RBC-ENTMCNC: 30.9 PG — SIGNIFICANT CHANGE UP (ref 27–34)
MCHC RBC-ENTMCNC: 34.1 GM/DL — SIGNIFICANT CHANGE UP (ref 32–36)
MCV RBC AUTO: 90.5 FL — SIGNIFICANT CHANGE UP (ref 80–100)
MONOCYTES # BLD AUTO: 0.27 K/UL — SIGNIFICANT CHANGE UP (ref 0–0.9)
MONOCYTES NFR BLD AUTO: 3.8 % — SIGNIFICANT CHANGE UP (ref 2–14)
NEUTROPHILS # BLD AUTO: 5.83 K/UL — SIGNIFICANT CHANGE UP (ref 1.8–7.4)
NEUTROPHILS NFR BLD AUTO: 81.2 % — HIGH (ref 43–77)
PHOSPHATE SERPL-MCNC: 1.8 MG/DL — LOW (ref 2.5–4.5)
PLATELET # BLD AUTO: 166 K/UL — SIGNIFICANT CHANGE UP (ref 150–400)
POTASSIUM SERPL-MCNC: 4.4 MMOL/L — SIGNIFICANT CHANGE UP (ref 3.5–5.3)
POTASSIUM SERPL-SCNC: 4.4 MMOL/L — SIGNIFICANT CHANGE UP (ref 3.5–5.3)
RBC # BLD: 4.21 M/UL — SIGNIFICANT CHANGE UP (ref 4.2–5.8)
RBC # FLD: 12.5 % — SIGNIFICANT CHANGE UP (ref 10.3–14.5)
SODIUM SERPL-SCNC: 143 MMOL/L — SIGNIFICANT CHANGE UP (ref 135–145)
T3 SERPL-MCNC: 83 NG/DL — SIGNIFICANT CHANGE UP (ref 80–200)
T4 AB SER-ACNC: 5.8 UG/DL — SIGNIFICANT CHANGE UP (ref 4.6–12)
WBC # BLD: 7.17 K/UL — SIGNIFICANT CHANGE UP (ref 3.8–10.5)
WBC # FLD AUTO: 7.17 K/UL — SIGNIFICANT CHANGE UP (ref 3.8–10.5)

## 2018-08-23 PROCEDURE — 93010 ELECTROCARDIOGRAM REPORT: CPT

## 2018-08-23 PROCEDURE — 99239 HOSP IP/OBS DSCHRG MGMT >30: CPT

## 2018-08-23 RX ORDER — LEVETIRACETAM 250 MG/1
1000 TABLET, FILM COATED ORAL ONCE
Qty: 0 | Refills: 0 | Status: COMPLETED | OUTPATIENT
Start: 2018-08-23 | End: 2018-08-23

## 2018-08-23 RX ORDER — SODIUM,POTASSIUM PHOSPHATES 278-250MG
1 POWDER IN PACKET (EA) ORAL ONCE
Qty: 0 | Refills: 0 | Status: COMPLETED | OUTPATIENT
Start: 2018-08-23 | End: 2018-08-23

## 2018-08-23 RX ORDER — LEVETIRACETAM 250 MG/1
1 TABLET, FILM COATED ORAL
Qty: 60 | Refills: 0 | OUTPATIENT
Start: 2018-08-23 | End: 2018-09-21

## 2018-08-23 RX ORDER — ALPRAZOLAM 0.25 MG
1 TABLET ORAL
Qty: 10 | Refills: 0 | OUTPATIENT
Start: 2018-08-23 | End: 2018-09-01

## 2018-08-23 RX ADMIN — Medication 100 MILLIGRAM(S): at 05:25

## 2018-08-23 RX ADMIN — Medication 100 MILLIGRAM(S): at 13:12

## 2018-08-23 RX ADMIN — Medication 1 TABLET(S): at 11:25

## 2018-08-23 RX ADMIN — Medication 2 MILLIGRAM(S): at 13:12

## 2018-08-23 RX ADMIN — Medication 2 MILLIGRAM(S): at 05:25

## 2018-08-23 RX ADMIN — Medication 2000 UNIT(S): at 11:25

## 2018-08-23 RX ADMIN — PREGABALIN 1000 MICROGRAM(S): 225 CAPSULE ORAL at 11:25

## 2018-08-23 RX ADMIN — Medication 500 MILLIGRAM(S): at 11:25

## 2018-08-23 RX ADMIN — LOSARTAN POTASSIUM 50 MILLIGRAM(S): 100 TABLET, FILM COATED ORAL at 05:25

## 2018-08-23 RX ADMIN — SODIUM CHLORIDE 125 MILLILITER(S): 9 INJECTION INTRAMUSCULAR; INTRAVENOUS; SUBCUTANEOUS at 11:25

## 2018-08-23 RX ADMIN — LEVETIRACETAM 1000 MILLIGRAM(S): 250 TABLET, FILM COATED ORAL at 17:36

## 2018-08-23 RX ADMIN — Medication 90 MILLIGRAM(S): at 05:25

## 2018-08-23 RX ADMIN — Medication 1 PACKET(S): at 11:25

## 2018-08-23 NOTE — DIETITIAN INITIAL EVALUATION ADULT. - PROBLEM SELECTOR PLAN 1
Admit to tele   Episode of SOB and left eye twitching suspected seizure activity will evaluate  Continue Keppra 1000 bid   Neurology recs appreciated  Seizure precautions   Ativan PRN for seizures  Will monitor electrolytes for cause of twitching  Patient was placed on Keppra for seizure ppx after surgery however, he reports he stopped taking Keppra 2 days ago because he ran out of his medication  Patient had similar presentation with facial twitching on last admission prior to being diagnosed with glioblastoma   ? symptoms due to glioblastoma not completely resected

## 2018-08-23 NOTE — DIETITIAN INITIAL EVALUATION ADULT. - NS AS NUTRI INTERV COLLABORAT
Collaboration with other providers/Spoke with Speech therapist and MD for pt to receive dysphagia 3 cut up meat, with pureed sides and honey thickened liquids.

## 2018-08-23 NOTE — DISCHARGE NOTE ADULT - ADDITIONAL INSTRUCTIONS
-Please follow up with your primary doctor within one week.  -Please follow up with neurology outpatient (information below).  -Patient and family to set up follow up appointments.  -Continue taking your medications as directed.  -If symptoms persist/worsen, please call your PMD or return to the ED.

## 2018-08-23 NOTE — DIETITIAN INITIAL EVALUATION ADULT. - NUTRITION INTERVENTION
Meals and Snack Meals and Snack/Collaboration and Referral of Nutrition Care/Medical Food Supplements

## 2018-08-23 NOTE — DIETITIAN INITIAL EVALUATION ADULT. - FACTORS AFF FOOD INTAKE
difficulty chewing/difficulty swallowing/Pending speech and swallow eval Pt seen by SLP on 8/23 recommendations for Dysphagia 3, pureed sides with honey thick liquids./difficulty chewing/difficulty swallowing

## 2018-08-23 NOTE — DISCHARGE NOTE ADULT - PLAN OF CARE
stable You came in because of shortness of breath and twitching of your left eye. You came in because of shortness of breath and twitching of your left eye. You had not been taking your Keppra for the last two days. We did not find any acute issues on your head imaging. We restarted you on this medication. Please continue taking Keppra as prescribed. You had some shortness of breath when you came in. We took an xray of your chest which showed clear lungs. Continue to exercise only as tolerated. Continue taking your Keppra as prescribed. Please follow up with your neurosurgeon as an outpatient. Continue Losartan and nifedipine, with a low salt diet. Continue Crestor. prevent seizures You came in because of shortness of breath and twitching of your left eye. You had not been taking your Keppra for the last two days and may have had a partial seizure. We did not find any acute issues on your brain CT. We restarted you on the Keppra and there was no recurrence of symptoms. Please continue taking Keppra as prescribed and do not allow yourself to run out of medications. Follow up with neurologist, Dr. Lynn (phone number below), in 1-2 weeks.   Do not strain yourself / exert yourself with exercise -- follow up with your neurosurgeon and follow his instructions about increasing your exercise regimen. You had some shortness of breath when you came in. Your oxygen saturation is normal. We took an xray of your chest which showed clear lungs. You have not been short of breath for 24 hours. Pulmonologist recommends outpatient follow up with your physicians. Continue taking your Keppra as prescribed. Please follow up with your neurosurgeon as an outpatient regarding further treatment / radiation treatments. Continue home medications losartan and nifedipine, with a low salt diet. Follow up with your PMD.

## 2018-08-23 NOTE — DIETITIAN INITIAL EVALUATION ADULT. - OTHER INFO
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 Pt reports currently fair appetite and PO intake, reports dislike of texture of foods, however states he is trying to eat as much as he can. Pt's admission weight 192.14lbs. Pt reports current weight around 190-195lbs, reports UBW of 212-217lbs. Pt states about 20lb weight loss over the past month secondary to dislike of texture of food, and medical events. Per previous RD note pt's wt (8/6/18) 211lbs. Pt denies any GI distress, +BM today. Pt with no edema or pressure injuries noted at this time.

## 2018-08-23 NOTE — DISCHARGE NOTE ADULT - PROVIDER TOKENS
GEETHA:'[id number]' TOKAB:'[id number]',GEETHA:'[id number]' TOKEN:'[id number]:'[id number]:'[id number]'

## 2018-08-23 NOTE — DIETITIAN INITIAL EVALUATION ADULT. - SIGNS/SYMPTOMS
as evidenced by dysphagia, pt with poor PO intake. as evidenced by Brandie-pharyngeal phase dysphagia, pt with poor PO intake.

## 2018-08-23 NOTE — DISCHARGE NOTE ADULT - PATIENT PORTAL LINK FT
You can access the [facility] Portal, offered by [facility], by registering with the following website: [email or web address]

## 2018-08-23 NOTE — DISCHARGE NOTE ADULT - INSTRUCTIONS
low salt diet, Dysphagia 3 cut-up, pureed sides, Honey thick liquids low salt, low cholesterol diet, Cut-up food, pureed sides, Honey-thick liquids

## 2018-08-23 NOTE — PROGRESS NOTE ADULT - SUBJECTIVE AND OBJECTIVE BOX
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30  Basal cell carcinoma (BCC) of lower leg: s/p resection  History of tonsillectomy: at age 30  No significant past surgical history        Medication list         MEDICATIONS  (STANDING):  allopurinol 300 milliGRAM(s) Oral daily  ascorbic acid 500 milliGRAM(s) Oral daily  atorvastatin 20 milliGRAM(s) Oral at bedtime  cholecalciferol 2000 Unit(s) Oral daily  cyanocobalamin 1000 MICROGram(s) Oral daily  dexamethasone     Tablet 2 milliGRAM(s) Oral every 8 hours  docusate sodium 100 milliGRAM(s) Oral three times a day  losartan 50 milliGRAM(s) Oral daily  multivitamin 1 Tablet(s) Oral daily  NIFEdipine XL 90 milliGRAM(s) Oral daily  psyllium Powder 1 Packet(s) Oral at bedtime  senna 2 Tablet(s) Oral at bedtime  sodium chloride 0.9%. 1000 milliLiter(s) (125 mL/Hr) IV Continuous <Continuous>    MEDICATIONS  (PRN):  ALPRAZolam 0.5 milliGRAM(s) Oral daily PRN anxiety         Vitals log        ICU Vital Signs Last 24 Hrs  T(C): 36.7 (23 Aug 2018 04:45), Max: 37 (22 Aug 2018 19:45)  T(F): 98 (23 Aug 2018 04:45), Max: 98.6 (22 Aug 2018 19:45)  HR: 65 (23 Aug 2018 04:45) (63 - 76)  BP: 123/68 (23 Aug 2018 04:45) (114/60 - 167/73)  BP(mean): --  ABP: --  ABP(mean): --  RR: 15 (23 Aug 2018 04:45) (14 - 16)  SpO2: 95% (23 Aug 2018 04:45) (93% - 99%)           Input and Output:  I&O's Detail    22 Aug 2018 07:01  -  23 Aug 2018 06:35  --------------------------------------------------------  IN:    sodium chloride 0.9%.: 1500 mL  Total IN: 1500 mL    OUT:  Total OUT: 0 mL    Total NET: 1500 mL          Lab Data                        13.0   7.17  )-----------( 166      ( 23 Aug 2018 06:18 )             38.1     08-22    141  |  107  |  19  ----------------------------<  99  4.3   |  27  |  1.00    Ca    8.6      22 Aug 2018 13:21    TPro  7.3  /  Alb  3.6  /  TBili  0.9  /  DBili  x   /  AST  19  /  ALT  39  /  AlkPhos  50  08-22      CARDIAC MARKERS ( 22 Aug 2018 13:21 )  <.015 ng/mL / x     / 93 U/L / x     / 1.6 ng/mL        Review of Systems	      Objective     Physical Examination    heart s1s2  lung dec BS  abd soft      Pertinent Lab findings & Imaging      Cheryl:  NO   Adequate UO     I&O's Detail    22 Aug 2018 07:01  -  23 Aug 2018 06:35  --------------------------------------------------------  IN:    sodium chloride 0.9%.: 1500 mL  Total IN: 1500 mL    OUT:  Total OUT: 0 mL    Total NET: 1500 mL               Discussed with:     Cultures:	        Radiology

## 2018-08-23 NOTE — DISCHARGE NOTE ADULT - MEDICATION SUMMARY - MEDICATIONS TO TAKE
I will START or STAY ON the medications listed below when I get home from the hospital:    dexamethasone 2 mg oral tablet  -- 1 tab(s) by mouth every 8 hours  -- Indication: For Glioblastoma s/p resection    acetaminophen 325 mg oral tablet  -- 2 tab(s) by mouth every 6 hours, As needed, Mild Pain (1 - 3)  -- Indication: For Pain as needed    losartan 50 mg oral tablet  -- 1 tab(s) by mouth once a day  -- Indication: For HTN (hypertension)    levETIRAcetam 1000 mg oral tablet  -- 1 tab(s) by mouth every 12 hours  -- Indication: For Seizure    Onglyza 5 mg oral tablet  -- 1 tab(s) by mouth once a day  -- Indication: For Diabetes    allopurinol 300 mg oral tablet  -- 1 tab(s) by mouth once a day  -- Indication: For Decreased uric acid    Crestor 20 mg oral tablet  -- 1 tab(s) by mouth once a day (at bedtime)  -- Indication: For HLD (hyperlipidemia)    ALPRAZolam 0.5 mg oral tablet  -- 1 tab(s) by mouth once a day, As needed, anxiety MDD:1 tab  -- Indication: For anxiety    NIFEdipine 90 mg oral tablet, extended release  -- 1 tab(s) by mouth once a day  -- Indication: For HTN (hypertension)    senna oral tablet  -- 2 tab(s) by mouth once a day (at bedtime)  -- Indication: For Constipation    psyllium 3.4 g/7 g oral powder for reconstitution  --  by mouth   -- Indication: For Constipation    docusate sodium 100 mg oral capsule  -- 1 cap(s) by mouth 3 times a day  -- Indication: For Constipation    Co Q-10 100 mg oral capsule  -- 2 cap(s) by mouth once a day  -- Indication: For Home supplement    Omega-3  -- 1200 milligram(s) by mouth once a day  -- Indication: For Home supplement    Multiple Vitamins oral tablet  -- 1 tab(s) by mouth once a day  -- Indication: For Home vitamin    Vitamin B12 1000 mcg oral tablet  -- 2  by mouth once a day  -- Indication: For Home vitamin    Vitamin C 500 mg oral tablet  -- 1 tab(s) by mouth once a day  -- Indication: For Home vitamin    Vitamin D3 2000 intl units oral capsule  -- 1 cap(s) by mouth once a day  -- Indication: For Home vitamin

## 2018-08-23 NOTE — DISCHARGE NOTE ADULT - CARE PROVIDERS DIRECT ADDRESSES
,DirectAddress_Unknown ,DirectAddress_Unknown,DirectAddress_Unknown ,DirectAddress_Unknown,DirectAddress_Unknown,[email or web address]

## 2018-08-23 NOTE — DISCHARGE NOTE ADULT - CARE PROVIDER_API CALL
Shivani Lynn (DO), Neurology Medicine  [address] [place] [address]  Phone: [phone]  Fax: [phone] Shivani Lynn (DO), Neurology Medicine  [address] [place] [address]  Phone: [phone]  Fax: [phone]    William Angeles (DO), Family Medicine  [address] [place] [address]  Phone: [phone]  Fax: [phone] Shivani Lynn (), Neurology Medicine  [address] [place] [address]  Phone: [phone]  Fax: [phone]    William Angeles (), Family Medicine  [address] [place] [address]  Phone: [phone]  Fax: [phone]    Vinny Rivers), Neurological Surgery  [address]  [address] [place] [address]  Phone: [phone]  Fax: [phone]

## 2018-08-23 NOTE — DISCHARGE NOTE ADULT - HOSPITAL COURSE
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 Patient reported that while in the gym 20 minutes he started experiencing SOB which resolved spontanously. Patient started walking around trying to "walk it off" and suddenly he developed left eye twitching that lasted around 20 seconds. Patient denied LOC, dizziness, weakness, chest pain, palpitations, worsening dysarthria, unstable gait, loss of bowel or urinary continence, tremors or worsening facial drooping during the episode. Has residual L. facial asymmetry and dysarthria that has improved after craniotomy for glioblastoma removal. Patient was placed on Keppra for seizure ppx after surgery however, he reported he stopped taking Keppra 2 days ago because he ran out of his medication. In ED vitals were T 97.7 HR 76 /73 RR 16 sat 97 @ RA. CBC w/ diff and CMP unremarkable. Lactate 1.7 Ammonia, serum <17, Creatinine kinase 93, CKMB 1.6 Trop negative x1 CT brain:  Right frontal parietal craniotomy with evolving subacute/chronic hemorrhagic component in the postoperative bed. Residual tumor not excluded without IV contrast. No new acute intracranial hemorrhage or gross acute territorial infarct. CXR was negative and showed clear lungs; evaluated by neurology who recommended to continue Keppra 1000mg BID; speech and swallow evaluation recommended dysphagia 3 diet, with cut up foods, pureed snacks, and head-turn to left while swallowing.    Patient improved clinically throughout hospital course. Patient seen and examined on day of discharge.    Vital Signs Last 24 Hrs  T(C): 37 (23 Aug 2018 12:30), Max: 37.1 (23 Aug 2018 07:40)  T(F): 98.6 (23 Aug 2018 12:30), Max: 98.7 (23 Aug 2018 07:40)  HR: 64 (23 Aug 2018 12:30) (63 - 76)  BP: 138/69 (23 Aug 2018 12:30) (114/60 - 167/73)  BP(mean): --  RR: 18 (23 Aug 2018 12:30) (14 - 18)  SpO2: 95% (23 Aug 2018 12:30) (93% - 99%)    Physical Exam:  General: Well developed, well nourished, NAD  HEENT: NCAT, PERRLA, EOMI bl, moist mucous membranes   Neck: Supple, nontender, no mass  Neurology: A&Ox3, nonfocal, CN II-XII grossly intact, sensation intact, no gait abnormalities   Respiratory: CTA B/L, No W/R/R  CV: RRR, +S1/S2, no murmurs, rubs or gallops  Abdominal: Soft, NT, ND +BSx4  Extremities: No C/C/E, + peripheral pulses  MSK: Normal ROM, no joint erythema or warmth, no joint swelling   Skin: warm, dry, normal color, no rash or abnormal lesions    Patient is medically stable for discharge to home with outpatient follow up.

## 2018-08-23 NOTE — PROGRESS NOTE ADULT - SUBJECTIVE AND OBJECTIVE BOX
Neurology follow up note    DOROTHY [address]      Interval History:    Patient feels ok no new complaints.    MEDICATIONS    allopurinol 300 milliGRAM(s) Oral daily  ALPRAZolam 0.5 milliGRAM(s) Oral daily PRN  ascorbic acid 500 milliGRAM(s) Oral daily  atorvastatin 20 milliGRAM(s) Oral at bedtime  cholecalciferol 2000 Unit(s) Oral daily  cyanocobalamin 1000 MICROGram(s) Oral daily  dexamethasone     Tablet 2 milliGRAM(s) Oral every 8 hours  docusate sodium 100 milliGRAM(s) Oral three times a day  losartan 50 milliGRAM(s) Oral daily  multivitamin 1 Tablet(s) Oral daily  NIFEdipine XL 90 milliGRAM(s) Oral daily  psyllium Powder 1 Packet(s) Oral at bedtime  senna 2 Tablet(s) Oral at bedtime  sodium chloride 0.9%. 1000 milliLiter(s) IV Continuous <Continuous>      Allergies    No Known Allergies    Intolerances        Height (cm): 180.34 ( @ 12:59)  Weight (kg): 87.5 ( @ 12:59)  BMI (kg/m2): 26.9 ( @ 12:59)    Vital Signs Last 24 Hrs  T(C): 37.1 (23 Aug 2018 07:40), Max: 37.1 (23 Aug 2018 07:40)  T(F): 98.7 (23 Aug 2018 07:40), Max: 98.7 (23 Aug 2018 07:40)  HR: 66 (23 Aug 2018 07:40) (63 - 76)  BP: 132/76 (23 Aug 2018 07:40) (114/60 - 167/73)  BP(mean): --  RR: 15 (23 Aug 2018 07:40) (14 - 16)  SpO2: 95% (23 Aug 2018 07:40) (93% - 99%)      REVIEW OF SYSTEMS:     Constitutional: No fever, chills, fatigue, weakness  Eyes: no eye pain, visual disturbances, or discharge  ENT:  No difficulty hearing, tinnitus, vertigo; No sinus or throat pain  Neck: No pain or stiffness  Respiratory: No cough, dyspnea, wheezing   Cardiovascular: No chest pain, palpitations,   Gastrointestinal: No abdominal or epigastric pain. No nausea, vomiting  No diarrhea or constipation.   Genitourinary: No dysuria, frequency, hematuria or incontinence  Neurological: No headaches, lightheadedness, vertigo, numbness or tremors  Psychiatric: No depression, anxiety, mood swings or difficulty sleeping  Musculoskeletal: No joint pain or swelling; No muscle, back or extremity pain  Skin: No itching, burning, rashes or lesions   Lymph Nodes: No enlarged glands  Endocrine: No heat or cold intolerance; No hair loss   Allergy and Immunologic: No hives or eczema    On Neurological Examination:    The patient is awake, alert.      Extraocular movements were intact.      Pupils were equal, round and reactive bilaterally 3 mm to 2.      Speech, positive dysarthria was noted at baseline.      Positive left facial symmetry with left facial droop baseline.      Motor, bilateral upper and lower 5/5.      Sensory, bilateral upper and lower intact to light touch.      No drift.  Finger-to-nose within normal limits.      Follow simple commands  Follow complex commands      GENERAL Exam: Nontoxic , No Acute Distress   	  HEENT:  normocephalic, atraumatic  		  LUNGS: Clear bilaterally    	  HEART: Normal S1S2   No murmur RRR        	  GI/ ABDOMEN:  Soft  Non tender    EXTREMITIES:   No Edema  No Clubbing  No Cyanosis No Edema    MUSCULOSKELETAL: Normal Range of Motion   	   SKIN: Normal  No Ecchymosis               LABS:  CBC Full  -  ( 23 Aug 2018 06:18 )  WBC Count : 7.17 K/uL  Hemoglobin : 13.0 g/dL  Hematocrit : 38.1 %  Platelet Count - Automated : 166 K/uL  Mean Cell Volume : 90.5 fl  Mean Cell Hemoglobin : 30.9 pg  Mean Cell Hemoglobin Concentration : 34.1 gm/dL  Auto Neutrophil # : 5.83 K/uL  Auto Lymphocyte # : 0.99 K/uL  Auto Monocyte # : 0.27 K/uL  Auto Eosinophil # : 0.04 K/uL  Auto Basophil # : 0.02 K/uL  Auto Neutrophil % : 81.2 %  Auto Lymphocyte % : 13.8 %  Auto Monocyte % : 3.8 %  Auto Eosinophil % : 0.6 %  Auto Basophil % : 0.3 %    Urinalysis Basic - ( 22 Aug 2018 16:45 )    Color: Yellow / Appearance: Clear / S.015 / pH: x  Gluc: x / Ketone: Negative  / Bili: Negative / Urobili: Negative   Blood: x / Protein: Negative / Nitrite: Negative   Leuk Esterase: Negative / RBC: x / WBC x   Sq Epi: x / Non Sq Epi: x / Bacteria: x      08-23    143  |  110<H>  |  14  ----------------------------<  122<H>  4.4   |  27  |  0.93    Ca    8.4<L>      23 Aug 2018 06:18  Phos  1.8       Mg     2.3         TPro  7.3  /  Alb  3.6  /  TBili  0.9  /  DBili  x   /  AST  19  /  ALT  39  /  AlkPhos  50      Hemoglobin A1C:     LIVER FUNCTIONS - ( 22 Aug 2018 13:21 )  Alb: 3.6 g/dL / Pro: 7.3 g/dL / ALK PHOS: 50 U/L / ALT: 39 U/L / AST: 19 U/L / GGT: x           Vitamin B12   PT/INR - ( 22 Aug 2018 13:21 )   PT: 12.5 sec;   INR: 1.14 ratio         PTT - ( 22 Aug 2018 13:21 )  PTT:25.7 sec      RADIOLOGY    ANALYSIS AND PLAN:  This is a 77-year-old with a history of glioblastoma, left facial twitching.  1.	For left facial twitching, questionable this could have been a seizure event that is precipitated by stressful event.  The patient was weightlifting versus possibly history of underlying brain pathology from the history of glioblastoma.  The patient has stopped his Keppra two days ago.  I would recommend to re-institute patient's Keppra, start the patient on 1000 mg twice a day.  2.	For history of glioblastoma, it appears from our CAT scan even though without contrast, no residual or recurrence.  The patient has a followup appointment to see his outside surgeon next week.  3.	I would recommend seizure precautions.  4.	Ativan 1 mg IV push q. 6h p.r.n. for breakthrough seizures.  5.	From Neurology standpoint only the patient appears to be stable.  6.	monitor electrolytes   7.	I explained to the patient he should not be driving.  No heights, no ladders.  He understands this.    Neurologic standpoint only cleared for discharge planning     Thank you for the courtesy of consultation.    Physical therapy evaluation as tolerated  OOB to chair/ambulation with assistance only if possible.    Greater than 45 minutes spent in direct patient care reviewing  the notes, lab data/ imaging , discussion with multidisciplinary team. Neurology follow up note    DOROTHY [address]      Interval History:    Patient feels ok no new complaints.    MEDICATIONS    allopurinol 300 milliGRAM(s) Oral daily  ALPRAZolam 0.5 milliGRAM(s) Oral daily PRN  ascorbic acid 500 milliGRAM(s) Oral daily  atorvastatin 20 milliGRAM(s) Oral at bedtime  cholecalciferol 2000 Unit(s) Oral daily  cyanocobalamin 1000 MICROGram(s) Oral daily  dexamethasone     Tablet 2 milliGRAM(s) Oral every 8 hours  docusate sodium 100 milliGRAM(s) Oral three times a day  losartan 50 milliGRAM(s) Oral daily  multivitamin 1 Tablet(s) Oral daily  NIFEdipine XL 90 milliGRAM(s) Oral daily  psyllium Powder 1 Packet(s) Oral at bedtime  senna 2 Tablet(s) Oral at bedtime  sodium chloride 0.9%. 1000 milliLiter(s) IV Continuous <Continuous>      Allergies    No Known Allergies    Intolerances        Height (cm): 180.34 ( @ 12:59)  Weight (kg): 87.5 ( @ 12:59)  BMI (kg/m2): 26.9 ( @ 12:59)    Vital Signs Last 24 Hrs  T(C): 37.1 (23 Aug 2018 07:40), Max: 37.1 (23 Aug 2018 07:40)  T(F): 98.7 (23 Aug 2018 07:40), Max: 98.7 (23 Aug 2018 07:40)  HR: 66 (23 Aug 2018 07:40) (63 - 76)  BP: 132/76 (23 Aug 2018 07:40) (114/60 - 167/73)  BP(mean): --  RR: 15 (23 Aug 2018 07:40) (14 - 16)  SpO2: 95% (23 Aug 2018 07:40) (93% - 99%)      REVIEW OF SYSTEMS:     Constitutional: No fever, chills, fatigue, weakness  Eyes: no eye pain, visual disturbances, or discharge  ENT:  No difficulty hearing, tinnitus, vertigo; No sinus or throat pain  Neck: No pain or stiffness  Respiratory: No cough, dyspnea, wheezing   Cardiovascular: No chest pain, palpitations,   Gastrointestinal: No abdominal or epigastric pain. No nausea, vomiting  No diarrhea or constipation.   Genitourinary: No dysuria, frequency, hematuria or incontinence  Neurological: No headaches, lightheadedness, vertigo, numbness or tremors  Psychiatric: No depression, anxiety, mood swings or difficulty sleeping  Musculoskeletal: No joint pain or swelling; No muscle, back or extremity pain  Skin: No itching, burning, rashes or lesions   Lymph Nodes: No enlarged glands  Endocrine: No heat or cold intolerance; No hair loss   Allergy and Immunologic: No hives or eczema    On Neurological Examination:    The patient is awake, alert.      Extraocular movements were intact.      Pupils were equal, round and reactive bilaterally 3 mm to 2.      Speech, positive dysarthria was noted at baseline.      Positive left facial symmetry with left facial droop baseline.      Motor, bilateral upper and lower 5/5.      Sensory, bilateral upper and lower intact to light touch.      No drift.  Finger-to-nose within normal limits.      Follow simple commands  Follow complex commands      GENERAL Exam: Nontoxic , No Acute Distress   	  HEENT:  normocephalic, atraumatic  		  LUNGS: Clear bilaterally    	  HEART: Normal S1S2   No murmur RRR        	  GI/ ABDOMEN:  Soft  Non tender    EXTREMITIES:   No Edema  No Clubbing  No Cyanosis No Edema    MUSCULOSKELETAL: Normal Range of Motion   	   SKIN: Normal  No Ecchymosis               LABS:  CBC Full  -  ( 23 Aug 2018 06:18 )  WBC Count : 7.17 K/uL  Hemoglobin : 13.0 g/dL  Hematocrit : 38.1 %  Platelet Count - Automated : 166 K/uL  Mean Cell Volume : 90.5 fl  Mean Cell Hemoglobin : 30.9 pg  Mean Cell Hemoglobin Concentration : 34.1 gm/dL  Auto Neutrophil # : 5.83 K/uL  Auto Lymphocyte # : 0.99 K/uL  Auto Monocyte # : 0.27 K/uL  Auto Eosinophil # : 0.04 K/uL  Auto Basophil # : 0.02 K/uL  Auto Neutrophil % : 81.2 %  Auto Lymphocyte % : 13.8 %  Auto Monocyte % : 3.8 %  Auto Eosinophil % : 0.6 %  Auto Basophil % : 0.3 %    Urinalysis Basic - ( 22 Aug 2018 16:45 )    Color: Yellow / Appearance: Clear / S.015 / pH: x  Gluc: x / Ketone: Negative  / Bili: Negative / Urobili: Negative   Blood: x / Protein: Negative / Nitrite: Negative   Leuk Esterase: Negative / RBC: x / WBC x   Sq Epi: x / Non Sq Epi: x / Bacteria: x      08-23    143  |  110<H>  |  14  ----------------------------<  122<H>  4.4   |  27  |  0.93    Ca    8.4<L>      23 Aug 2018 06:18  Phos  1.8       Mg     2.3         TPro  7.3  /  Alb  3.6  /  TBili  0.9  /  DBili  x   /  AST  19  /  ALT  39  /  AlkPhos  50      Hemoglobin A1C:     LIVER FUNCTIONS - ( 22 Aug 2018 13:21 )  Alb: 3.6 g/dL / Pro: 7.3 g/dL / ALK PHOS: 50 U/L / ALT: 39 U/L / AST: 19 U/L / GGT: x           Vitamin B12   PT/INR - ( 22 Aug 2018 13:21 )   PT: 12.5 sec;   INR: 1.14 ratio         PTT - ( 22 Aug 2018 13:21 )  PTT:25.7 sec      RADIOLOGY    ANALYSIS AND PLAN:  This is a 77-year-old with a history of glioblastoma, left facial twitching.  1.	For left facial twitching, questionable this could have been a seizure event that is precipitated by stressful event.  The patient was weightlifting versus possibly history of underlying brain pathology from the history of glioblastoma.  The patient has stopped his Keppra two days ago.  I would recommend to re-institute patient's Keppra, start the patient on 1000 mg twice a day.  2.	For history of glioblastoma, it appears from our CAT scan even though without contrast, no residual or recurrence.  The patient has a followup appointment to see his outside surgeon next week.  3.	There is no EEG technician  this week   4.	I would recommend seizure precautions.  5.	Ativan 1 mg IV push q. 6h p.r.n. for breakthrough seizures.  6.	From Neurology standpoint only the patient appears to be stable.  7.	monitor electrolytes   8.	I explained to the patient he should not be driving.  No heights, no ladders.  He understands this.    Neurologic standpoint only cleared for discharge planning     Thank you for the courtesy of consultation.    Physical therapy evaluation as tolerated  OOB to chair/ambulation with assistance only if possible.    Greater than 45 minutes spent in direct patient care reviewing  the notes, lab data/ imaging , discussion with multidisciplinary team.

## 2018-08-23 NOTE — DISCHARGE NOTE ADULT - CARE PLAN
Principal Discharge DX:	Seizure  Goal:	stable  Assessment and plan of treatment:	You came in because of shortness of breath and twitching of your left eye.  Secondary Diagnosis:	Dyspnea  Secondary Diagnosis:	Glioblastoma  Secondary Diagnosis:	HTN (hypertension)  Secondary Diagnosis:	HLD (hyperlipidemia) Principal Discharge DX:	Seizure  Goal:	stable  Assessment and plan of treatment:	You came in because of shortness of breath and twitching of your left eye. You had not been taking your Keppra for the last two days. We did not find any acute issues on your head imaging. We restarted you on this medication. Please continue taking Keppra as prescribed.  Secondary Diagnosis:	Dyspnea  Assessment and plan of treatment:	You had some shortness of breath when you came in. We took an xray of your chest which showed clear lungs. Continue to exercise only as tolerated.  Secondary Diagnosis:	Glioblastoma  Assessment and plan of treatment:	Continue taking your Keppra as prescribed. Please follow up with your neurosurgeon as an outpatient.  Secondary Diagnosis:	HTN (hypertension)  Assessment and plan of treatment:	Continue Losartan and nifedipine, with a low salt diet.  Secondary Diagnosis:	HLD (hyperlipidemia)  Assessment and plan of treatment:	Continue Crestor. Principal Discharge DX:	Seizure  Goal:	prevent seizures  Assessment and plan of treatment:	You came in because of shortness of breath and twitching of your left eye. You had not been taking your Keppra for the last two days and may have had a partial seizure. We did not find any acute issues on your brain CT. We restarted you on the Keppra and there was no recurrence of symptoms. Please continue taking Keppra as prescribed and do not allow yourself to run out of medications. Follow up with neurologist, Dr. Lynn (phone number below), in 1-2 weeks.   Do not strain yourself / exert yourself with exercise -- follow up with your neurosurgeon and follow his instructions about increasing your exercise regimen.  Secondary Diagnosis:	Dyspnea  Assessment and plan of treatment:	You had some shortness of breath when you came in. Your oxygen saturation is normal. We took an xray of your chest which showed clear lungs. You have not been short of breath for 24 hours. Pulmonologist recommends outpatient follow up with your physicians.  Secondary Diagnosis:	Glioblastoma  Assessment and plan of treatment:	Continue taking your Keppra as prescribed. Please follow up with your neurosurgeon as an outpatient regarding further treatment / radiation treatments.  Secondary Diagnosis:	HTN (hypertension)  Assessment and plan of treatment:	Continue home medications losartan and nifedipine, with a low salt diet. Follow up with your PMD.  Secondary Diagnosis:	HLD (hyperlipidemia)  Assessment and plan of treatment:	Continue Crestor.

## 2018-08-23 NOTE — SWALLOW BEDSIDE ASSESSMENT ADULT - ORAL PREPARATORY PHASE
Decreased mastication ability/Reduced oral grading Reduced oral grading/Decreased mastication ability

## 2018-08-23 NOTE — PROGRESS NOTE ADULT - PROBLEM SELECTOR PLAN 1
dyspnea episode, CXR neg, tolerating room air, no evidence of resp distress  GBM  s/p surgery  Neuro follow up  AED and seizure precs  supportive care and regimen as needed  dc planning  will follow

## 2018-08-23 NOTE — SWALLOW BEDSIDE ASSESSMENT ADULT - COMMENTS
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 Pt c untimely trigger of swallow c throat clear/ cough reflex response c thin and nectar thick liquids. MBS 8/7 at [facility] [place] completed. Spoke c Dr Ortega. education provided

## 2018-08-23 NOTE — SWALLOW BEDSIDE ASSESSMENT ADULT - SWALLOW EVAL: RECOMMENDED FEEDING/EATING TECHNIQUES
alternate food with liquid/check mouth frequently for oral residue/pocketing/crush medication (when feasible)/maintain upright posture during/after eating for 30 mins

## 2018-08-23 NOTE — DIETITIAN INITIAL EVALUATION ADULT. - NS AS NUTRI INTERV MEALS SNACK
General/healthful diet/Continue with DASH/TLC diet. General/healthful diet/Continue with current DASH/TLC diet, consistency based on SLP recommendations for dysphagia 3 with pureed sides with honey thicken liquids.

## 2018-08-23 NOTE — SWALLOW BEDSIDE ASSESSMENT ADULT - PHARYNGEAL PHASE
Within functional limits Decreased laryngeal elevation/Throat clear post oral intake/Delayed pharyngeal swallow/Delayed cough post oral intake Delayed pharyngeal swallow/Decreased laryngeal elevation/Multiple swallows

## 2018-08-23 NOTE — DIETITIAN INITIAL EVALUATION ADULT. - PROBLEM SELECTOR PLAN 2
8/3 underwent R craniotomy for tumor resection  Outpatient fu for chemo and radiation as per neurosurgery

## 2018-08-23 NOTE — DIETITIAN INITIAL EVALUATION ADULT. - ADHERENCE
Pt reports he was consuming a chopped diet with honey thickened liquids PTA that he was told to follow by his doctor. Pt reports he consumed chopped meats, however admits that he sometime consumed pasta. Pt reports he began consuming Ensure abut 3-4 days PTA./fair Pt reports he was consuming a chopped diet with honey thickened liquids PTA that he was told to follow by his doctor. Pt reports he consumed chopped meats, however admits that he sometime consumed pasta. Pt reports he began consuming Ensure about 3-4 days PTA./fair

## 2018-08-24 ENCOUNTER — OTHER (OUTPATIENT)
Age: 78
End: 2018-08-24

## 2018-08-24 LAB — LEVETIRACETAM SERPL-MCNC: <2 MCG/ML — LOW (ref 12–46)

## 2018-08-28 ENCOUNTER — APPOINTMENT (OUTPATIENT)
Dept: NEUROLOGY | Facility: CLINIC | Age: 78
End: 2018-08-28
Payer: MEDICARE

## 2018-08-28 ENCOUNTER — APPOINTMENT (OUTPATIENT)
Dept: HEMATOLOGY ONCOLOGY | Facility: CLINIC | Age: 78
End: 2018-08-28

## 2018-08-28 ENCOUNTER — OUTPATIENT (OUTPATIENT)
Dept: OUTPATIENT SERVICES | Facility: HOSPITAL | Age: 78
LOS: 1 days | Discharge: HOME | End: 2018-08-28

## 2018-08-28 VITALS
DIASTOLIC BLOOD PRESSURE: 80 MMHG | HEIGHT: 71 IN | RESPIRATION RATE: 16 BRPM | SYSTOLIC BLOOD PRESSURE: 130 MMHG | BODY MASS INDEX: 28 KG/M2 | OXYGEN SATURATION: 98 % | WEIGHT: 200 LBS | HEART RATE: 86 BPM

## 2018-08-28 DIAGNOSIS — D17.0 BENIGN LIPOMATOUS NEOPLASM OF SKIN AND SUBCUTANEOUS TISSUE OF HEAD, FACE AND NECK: Chronic | ICD-10-CM

## 2018-08-28 DIAGNOSIS — D64.9 ANEMIA, UNSPECIFIED: ICD-10-CM

## 2018-08-28 DIAGNOSIS — C44.711 BASAL CELL CARCINOMA OF SKIN OF UNSPECIFIED LOWER LIMB, INCLUDING HIP: Chronic | ICD-10-CM

## 2018-08-28 DIAGNOSIS — Z90.89 ACQUIRED ABSENCE OF OTHER ORGANS: Chronic | ICD-10-CM

## 2018-08-28 PROCEDURE — 99496 TRANSJ CARE MGMT HIGH F2F 7D: CPT

## 2018-08-28 PROCEDURE — 77338 DESIGN MLC DEVICE FOR IMRT: CPT | Mod: 26

## 2018-08-28 PROCEDURE — 77300 RADIATION THERAPY DOSE PLAN: CPT | Mod: 26

## 2018-08-28 PROCEDURE — 77301 RADIOTHERAPY DOSE PLAN IMRT: CPT | Mod: 26

## 2018-08-28 RX ORDER — LEVETIRACETAM 500 MG/1
500 TABLET, FILM COATED ORAL
Refills: 0 | Status: DISCONTINUED | COMMUNITY
End: 2018-08-28

## 2018-08-29 ENCOUNTER — APPOINTMENT (OUTPATIENT)
Dept: NEUROSURGERY | Facility: CLINIC | Age: 78
End: 2018-08-29

## 2018-08-29 NOTE — ED ADULT TRIAGE NOTE - SPO2 (%)
Patient: Chilo Hurt    Procedure Summary     Date:  08/29/18 Room / Location:  [facility] PACU    Anesthesia Start:  0828 Anesthesia Stop:  0840    Procedure:  ELECTROCONVULSIVE THERAPY Diagnosis:  Depression    Scheduled Providers:   Provider:  Edward Hooks MD    Anesthesia Type:  general ASA Status:  2          Anesthesia Type: general  Last vitals  BP   135/94 (08/29/18 0733)   Temp   36.9 °C (98.5 °F) (08/29/18 0733)   Pulse   83 (08/29/18 0733)   Resp   18 (08/29/18 0733)     SpO2   95 % (08/29/18 0733)     Post Anesthesia Care and Evaluation    Patient location during evaluation: PACU  Patient participation: complete - patient participated  Level of consciousness: awake and alert  Pain management: adequate  Airway patency: patent  Anesthetic complications: No anesthetic complications    Cardiovascular status: acceptable  Respiratory status: acceptable  Hydration status: acceptable    Comments: ---------------------------               08/29/18 0733         ---------------------------   BP:          135/94         Pulse:         83           Resp:          18           Temp:   36.9 °C (98.5 °F)   SpO2:          95%         ---------------------------       98

## 2018-08-31 ENCOUNTER — APPOINTMENT (OUTPATIENT)
Dept: NEUROSURGERY | Facility: CLINIC | Age: 78
End: 2018-08-31

## 2018-08-31 ENCOUNTER — OTHER (OUTPATIENT)
Age: 78
End: 2018-08-31

## 2018-08-31 PROCEDURE — G6002: CPT | Mod: 26

## 2018-09-04 ENCOUNTER — RESULT REVIEW (OUTPATIENT)
Age: 78
End: 2018-09-04

## 2018-09-04 ENCOUNTER — APPOINTMENT (OUTPATIENT)
Dept: HEMATOLOGY ONCOLOGY | Facility: CLINIC | Age: 78
End: 2018-09-04

## 2018-09-04 VITALS
RESPIRATION RATE: 18 BRPM | BODY MASS INDEX: 28.59 KG/M2 | SYSTOLIC BLOOD PRESSURE: 140 MMHG | HEART RATE: 60 BPM | WEIGHT: 205 LBS | DIASTOLIC BLOOD PRESSURE: 70 MMHG

## 2018-09-04 LAB
BASOPHILS # BLD AUTO: 0 K/UL — SIGNIFICANT CHANGE UP (ref 0–0.2)
BASOPHILS NFR BLD AUTO: 0.1 % — SIGNIFICANT CHANGE UP (ref 0–2)
EOSINOPHIL # BLD AUTO: 0.2 K/UL — SIGNIFICANT CHANGE UP (ref 0–0.5)
EOSINOPHIL NFR BLD AUTO: 1.6 % — SIGNIFICANT CHANGE UP (ref 0–6)
HCT VFR BLD CALC: 40.1 % — SIGNIFICANT CHANGE UP (ref 39–50)
HGB BLD-MCNC: 13.6 G/DL — SIGNIFICANT CHANGE UP (ref 13–17)
LYMPHOCYTES # BLD AUTO: 2.8 K/UL — SIGNIFICANT CHANGE UP (ref 1–3.3)
LYMPHOCYTES # BLD AUTO: 23 % — SIGNIFICANT CHANGE UP (ref 13–44)
MCHC RBC-ENTMCNC: 31.2 PG — SIGNIFICANT CHANGE UP (ref 27–34)
MCHC RBC-ENTMCNC: 34 G/DL — SIGNIFICANT CHANGE UP (ref 32–36)
MCV RBC AUTO: 91.7 FL — SIGNIFICANT CHANGE UP (ref 80–100)
MONOCYTES # BLD AUTO: 0.9 K/UL — SIGNIFICANT CHANGE UP (ref 0–0.9)
MONOCYTES NFR BLD AUTO: 7.5 % — SIGNIFICANT CHANGE UP (ref 2–14)
NEUTROPHILS # BLD AUTO: 8.2 K/UL — HIGH (ref 1.8–7.4)
NEUTROPHILS NFR BLD AUTO: 67.8 % — SIGNIFICANT CHANGE UP (ref 43–77)
PLATELET # BLD AUTO: 182 K/UL — SIGNIFICANT CHANGE UP (ref 150–400)
RBC # BLD: 4.37 M/UL — SIGNIFICANT CHANGE UP (ref 4.2–5.8)
RBC # FLD: 12.1 % — SIGNIFICANT CHANGE UP (ref 10.3–14.5)
WBC # BLD: 12 K/UL — HIGH (ref 3.8–10.5)
WBC # FLD AUTO: 12 K/UL — HIGH (ref 3.8–10.5)

## 2018-09-04 PROCEDURE — G6002: CPT | Mod: 26

## 2018-09-05 PROCEDURE — G6002: CPT | Mod: 26

## 2018-09-06 PROCEDURE — G6002: CPT | Mod: 26

## 2018-09-07 ENCOUNTER — APPOINTMENT (OUTPATIENT)
Dept: NEUROSURGERY | Facility: CLINIC | Age: 78
End: 2018-09-07
Payer: MEDICARE

## 2018-09-07 VITALS
HEIGHT: 71 IN | TEMPERATURE: 98.6 F | WEIGHT: 200 LBS | OXYGEN SATURATION: 95 % | DIASTOLIC BLOOD PRESSURE: 72 MMHG | BODY MASS INDEX: 28 KG/M2 | SYSTOLIC BLOOD PRESSURE: 152 MMHG | HEART RATE: 72 BPM

## 2018-09-07 PROCEDURE — 77427 RADIATION TX MANAGEMENT X5: CPT

## 2018-09-07 PROCEDURE — G6002: CPT | Mod: 26

## 2018-09-07 PROCEDURE — 99024 POSTOP FOLLOW-UP VISIT: CPT

## 2018-09-10 ENCOUNTER — RESULT REVIEW (OUTPATIENT)
Age: 78
End: 2018-09-10

## 2018-09-10 ENCOUNTER — APPOINTMENT (OUTPATIENT)
Dept: HEMATOLOGY ONCOLOGY | Facility: CLINIC | Age: 78
End: 2018-09-10

## 2018-09-10 LAB
BASOPHILS # BLD AUTO: 0 K/UL — SIGNIFICANT CHANGE UP (ref 0–0.2)
BASOPHILS NFR BLD AUTO: 0.4 % — SIGNIFICANT CHANGE UP (ref 0–2)
EOSINOPHIL # BLD AUTO: 0.1 K/UL — SIGNIFICANT CHANGE UP (ref 0–0.5)
EOSINOPHIL NFR BLD AUTO: 1.1 % — SIGNIFICANT CHANGE UP (ref 0–6)
HCT VFR BLD CALC: 40.6 % — SIGNIFICANT CHANGE UP (ref 39–50)
HGB BLD-MCNC: 13.3 G/DL — SIGNIFICANT CHANGE UP (ref 13–17)
LYMPHOCYTES # BLD AUTO: 1.1 K/UL — SIGNIFICANT CHANGE UP (ref 1–3.3)
LYMPHOCYTES # BLD AUTO: 10.7 % — LOW (ref 13–44)
MCHC RBC-ENTMCNC: 30.3 PG — SIGNIFICANT CHANGE UP (ref 27–34)
MCHC RBC-ENTMCNC: 32.8 G/DL — SIGNIFICANT CHANGE UP (ref 32–36)
MCV RBC AUTO: 92.4 FL — SIGNIFICANT CHANGE UP (ref 80–100)
MONOCYTES # BLD AUTO: 0.4 K/UL — SIGNIFICANT CHANGE UP (ref 0–0.9)
MONOCYTES NFR BLD AUTO: 4.1 % — SIGNIFICANT CHANGE UP (ref 2–14)
NEUTROPHILS # BLD AUTO: 8.7 K/UL — HIGH (ref 1.8–7.4)
NEUTROPHILS NFR BLD AUTO: 83.7 % — HIGH (ref 43–77)
PLATELET # BLD AUTO: 210 K/UL — SIGNIFICANT CHANGE UP (ref 150–400)
RBC # BLD: 4.39 M/UL — SIGNIFICANT CHANGE UP (ref 4.2–5.8)
RBC # FLD: 12.3 % — SIGNIFICANT CHANGE UP (ref 10.3–14.5)
WBC # BLD: 10.4 K/UL — SIGNIFICANT CHANGE UP (ref 3.8–10.5)
WBC # FLD AUTO: 10.4 K/UL — SIGNIFICANT CHANGE UP (ref 3.8–10.5)

## 2018-09-10 PROCEDURE — G6002: CPT | Mod: 26

## 2018-09-11 VITALS
RESPIRATION RATE: 18 BRPM | DIASTOLIC BLOOD PRESSURE: 74 MMHG | SYSTOLIC BLOOD PRESSURE: 122 MMHG | WEIGHT: 199 LBS | BODY MASS INDEX: 27.75 KG/M2 | HEART RATE: 64 BPM

## 2018-09-11 PROCEDURE — G6002: CPT | Mod: 26

## 2018-09-11 NOTE — PATIENT PROFILE ADULT. - HEALTHCARE INFORMATION NEEDED, PROFILE
Spoke to mother. Increase tegretol to 200 mg po tid. Thank you. Roxanne kirkpatrick 9/11/18 5:58 pm   none

## 2018-09-11 NOTE — REVIEW OF SYSTEMS
[Fatigue: Grade 0] : Fatigue: Grade 0 [Dizziness: Grade 0] : Dizziness: Grade 0  [Headache: Grade 0] : Headache: Grade 0 [Lethargy: Grade 0] : Lethargy: Grade 0

## 2018-09-11 NOTE — DISEASE MANAGEMENT
[Clinical] : TNM Stage: c [TTNM] : x [NTNM] : x [MTNM] : x [de-identified] : 8957 [de-identified] : 1283 [de-identified] : R partial brain

## 2018-09-11 NOTE — HISTORY OF PRESENT ILLNESS
[FreeTextEntry1] : 9/11/18: Here for an OTV. Completed 1400/6000 cGy. No pain. Denies any HA, dizziness. Has constipation treated with Colace, Senna, suppositories. Continues on chemotherapy. Continues on Decadron Qday\par \par 9/4/18- Mr. Day presents today for OTV. He ahs completed 400/6000 cGy to the right partial brain. Taking chemotherapy as prescribed. Denies headaches, nausea. Energy levels are thus far unchanged.\par \par ONCOLOGY HISTORY\par Mr. 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 CT of the head showed right brain lesion. MRI showed a 2.1 x 1.7 x 1.4cm right parietal mass without significant edema. He underwent gross total removal of the tumor with Dr. Rivers on 8/3/18. Pathology was consistent with GBM, with IDH-1 status pending.\par \par Postoperatively patient had dysarthria and dysphagia. He was discharged on 8/7/18.\par At the time of initial consult the patient reported slurred speech and dysphagia which has improved. He continues to use honey thickened diet but is able to swallow pills today. No episodes of choking. He denies headache, weakness, vision changes, seizures, arm or leg weakness or seizure activity. He finished his Decadron taper. He goes to the gym daily and walks 2 miles a day

## 2018-09-11 NOTE — VITALS
[Maximal Pain Intensity: 0/10] : 0/10 [Least Pain Intensity: 0/10] : 0/10 [80: Normal activity with effort; some signs or symptoms of disease.] : 80: Normal activity with effort; some signs or symptoms of disease.

## 2018-09-12 PROCEDURE — G6002: CPT | Mod: 26

## 2018-09-13 PROCEDURE — G6002: CPT | Mod: 26

## 2018-09-14 PROCEDURE — 77427 RADIATION TX MANAGEMENT X5: CPT

## 2018-09-14 PROCEDURE — G6002: CPT | Mod: 26

## 2018-09-17 ENCOUNTER — APPOINTMENT (OUTPATIENT)
Dept: HEMATOLOGY ONCOLOGY | Facility: CLINIC | Age: 78
End: 2018-09-17

## 2018-09-17 ENCOUNTER — RESULT REVIEW (OUTPATIENT)
Age: 78
End: 2018-09-17

## 2018-09-17 ENCOUNTER — APPOINTMENT (OUTPATIENT)
Dept: NEUROLOGY | Facility: CLINIC | Age: 78
End: 2018-09-17
Payer: MEDICARE

## 2018-09-17 VITALS
SYSTOLIC BLOOD PRESSURE: 130 MMHG | HEART RATE: 72 BPM | DIASTOLIC BLOOD PRESSURE: 80 MMHG | OXYGEN SATURATION: 98 % | RESPIRATION RATE: 16 BRPM

## 2018-09-17 DIAGNOSIS — R56.9 UNSPECIFIED CONVULSIONS: ICD-10-CM

## 2018-09-17 LAB
BASOPHILS # BLD AUTO: 0 K/UL — SIGNIFICANT CHANGE UP (ref 0–0.2)
BASOPHILS NFR BLD AUTO: 0.3 % — SIGNIFICANT CHANGE UP (ref 0–2)
EOSINOPHIL # BLD AUTO: 0.2 K/UL — SIGNIFICANT CHANGE UP (ref 0–0.5)
EOSINOPHIL NFR BLD AUTO: 1.7 % — SIGNIFICANT CHANGE UP (ref 0–6)
HCT VFR BLD CALC: 39.3 % — SIGNIFICANT CHANGE UP (ref 39–50)
HGB BLD-MCNC: 13.2 G/DL — SIGNIFICANT CHANGE UP (ref 13–17)
LYMPHOCYTES # BLD AUTO: 1.4 K/UL — SIGNIFICANT CHANGE UP (ref 1–3.3)
LYMPHOCYTES # BLD AUTO: 15.7 % — SIGNIFICANT CHANGE UP (ref 13–44)
MCHC RBC-ENTMCNC: 31.1 PG — SIGNIFICANT CHANGE UP (ref 27–34)
MCHC RBC-ENTMCNC: 33.5 G/DL — SIGNIFICANT CHANGE UP (ref 32–36)
MCV RBC AUTO: 92.6 FL — SIGNIFICANT CHANGE UP (ref 80–100)
MONOCYTES # BLD AUTO: 0.4 K/UL — SIGNIFICANT CHANGE UP (ref 0–0.9)
MONOCYTES NFR BLD AUTO: 4.7 % — SIGNIFICANT CHANGE UP (ref 2–14)
NEUTROPHILS # BLD AUTO: 6.8 K/UL — SIGNIFICANT CHANGE UP (ref 1.8–7.4)
NEUTROPHILS NFR BLD AUTO: 77.7 % — HIGH (ref 43–77)
PLATELET # BLD AUTO: 243 K/UL — SIGNIFICANT CHANGE UP (ref 150–400)
RBC # BLD: 4.24 M/UL — SIGNIFICANT CHANGE UP (ref 4.2–5.8)
RBC # FLD: 12 % — SIGNIFICANT CHANGE UP (ref 10.3–14.5)
WBC # BLD: 8.8 K/UL — SIGNIFICANT CHANGE UP (ref 3.8–10.5)
WBC # FLD AUTO: 8.8 K/UL — SIGNIFICANT CHANGE UP (ref 3.8–10.5)

## 2018-09-17 PROCEDURE — G6002: CPT | Mod: 26

## 2018-09-17 PROCEDURE — 99215 OFFICE O/P EST HI 40 MIN: CPT

## 2018-09-18 VITALS
OXYGEN SATURATION: 98 % | DIASTOLIC BLOOD PRESSURE: 82 MMHG | SYSTOLIC BLOOD PRESSURE: 146 MMHG | WEIGHT: 197.75 LBS | BODY MASS INDEX: 27.58 KG/M2 | HEART RATE: 64 BPM | RESPIRATION RATE: 18 BRPM

## 2018-09-18 PROCEDURE — G6002: CPT | Mod: 26

## 2018-09-18 NOTE — REVIEW OF SYSTEMS
[Fatigue: Grade 0] : Fatigue: Grade 0 [Dizziness: Grade 0] : Dizziness: Grade 0  [Lethargy: Grade 0] : Lethargy: Grade 0 [Cognitive Disturbance: Grade 0] : Cognitive Disturbance: Grade 0 [Concentration Impairment: Grade 0] : Concentration Impairment: Grade 0 [Facial Muscle Weakness: Grade 0] : Facial Muscle Weakness: Grade 0 [Headache: Grade 0] : Headache: Grade 0

## 2018-09-19 PROCEDURE — G6002: CPT | Mod: 26

## 2018-09-20 PROCEDURE — G6002: CPT | Mod: 26

## 2018-09-21 PROCEDURE — 77427 RADIATION TX MANAGEMENT X5: CPT

## 2018-09-21 PROCEDURE — G6002: CPT | Mod: 26

## 2018-09-24 ENCOUNTER — RESULT REVIEW (OUTPATIENT)
Age: 78
End: 2018-09-24

## 2018-09-24 ENCOUNTER — APPOINTMENT (OUTPATIENT)
Dept: HEMATOLOGY ONCOLOGY | Facility: CLINIC | Age: 78
End: 2018-09-24

## 2018-09-24 LAB
BASOPHILS # BLD AUTO: 0 K/UL — SIGNIFICANT CHANGE UP (ref 0–0.2)
BASOPHILS NFR BLD AUTO: 0.4 % — SIGNIFICANT CHANGE UP (ref 0–2)
EOSINOPHIL # BLD AUTO: 0.2 K/UL — SIGNIFICANT CHANGE UP (ref 0–0.5)
EOSINOPHIL NFR BLD AUTO: 2.3 % — SIGNIFICANT CHANGE UP (ref 0–6)
HCT VFR BLD CALC: 39.8 % — SIGNIFICANT CHANGE UP (ref 39–50)
HGB BLD-MCNC: 13.4 G/DL — SIGNIFICANT CHANGE UP (ref 13–17)
LYMPHOCYTES # BLD AUTO: 1.5 K/UL — SIGNIFICANT CHANGE UP (ref 1–3.3)
LYMPHOCYTES # BLD AUTO: 16.5 % — SIGNIFICANT CHANGE UP (ref 13–44)
MCHC RBC-ENTMCNC: 31 PG — SIGNIFICANT CHANGE UP (ref 27–34)
MCHC RBC-ENTMCNC: 33.8 G/DL — SIGNIFICANT CHANGE UP (ref 32–36)
MCV RBC AUTO: 91.6 FL — SIGNIFICANT CHANGE UP (ref 80–100)
MONOCYTES # BLD AUTO: 0.4 K/UL — SIGNIFICANT CHANGE UP (ref 0–0.9)
MONOCYTES NFR BLD AUTO: 4 % — SIGNIFICANT CHANGE UP (ref 2–14)
NEUTROPHILS # BLD AUTO: 6.9 K/UL — SIGNIFICANT CHANGE UP (ref 1.8–7.4)
NEUTROPHILS NFR BLD AUTO: 76.8 % — SIGNIFICANT CHANGE UP (ref 43–77)
PLATELET # BLD AUTO: 270 K/UL — SIGNIFICANT CHANGE UP (ref 150–400)
RBC # BLD: 4.34 M/UL — SIGNIFICANT CHANGE UP (ref 4.2–5.8)
RBC # FLD: 12 % — SIGNIFICANT CHANGE UP (ref 10.3–14.5)
WBC # BLD: 9 K/UL — SIGNIFICANT CHANGE UP (ref 3.8–10.5)
WBC # FLD AUTO: 9 K/UL — SIGNIFICANT CHANGE UP (ref 3.8–10.5)

## 2018-09-24 PROCEDURE — G6002: CPT | Mod: 26

## 2018-09-25 ENCOUNTER — OUTPATIENT (OUTPATIENT)
Dept: OUTPATIENT SERVICES | Facility: HOSPITAL | Age: 78
LOS: 1 days | Discharge: HOME | End: 2018-09-25

## 2018-09-25 DIAGNOSIS — D64.9 ANEMIA, UNSPECIFIED: ICD-10-CM

## 2018-09-25 DIAGNOSIS — C44.711 BASAL CELL CARCINOMA OF SKIN OF UNSPECIFIED LOWER LIMB, INCLUDING HIP: Chronic | ICD-10-CM

## 2018-09-25 DIAGNOSIS — D17.0 BENIGN LIPOMATOUS NEOPLASM OF SKIN AND SUBCUTANEOUS TISSUE OF HEAD, FACE AND NECK: Chronic | ICD-10-CM

## 2018-09-25 DIAGNOSIS — Z90.89 ACQUIRED ABSENCE OF OTHER ORGANS: Chronic | ICD-10-CM

## 2018-09-25 PROCEDURE — G6002: CPT | Mod: 26

## 2018-09-26 VITALS
HEART RATE: 68 BPM | RESPIRATION RATE: 18 BRPM | OXYGEN SATURATION: 95 % | DIASTOLIC BLOOD PRESSURE: 75 MMHG | SYSTOLIC BLOOD PRESSURE: 134 MMHG

## 2018-09-26 VITALS — BODY MASS INDEX: 27.6 KG/M2 | WEIGHT: 197.86 LBS

## 2018-09-26 PROCEDURE — G6002: CPT | Mod: 26

## 2018-09-26 NOTE — REVIEW OF SYSTEMS
[Fatigue: Grade 1 - Fatigue relieved by rest] : Fatigue: Grade 1 - Fatigue relieved by rest [Cognitive Disturbance: Grade 0] : Cognitive Disturbance: Grade 0 [Concentration Impairment: Grade 0] : Concentration Impairment: Grade 0 [Dizziness: Grade 0] : Dizziness: Grade 0  [Facial Muscle Weakness: Grade 0] : Facial Muscle Weakness: Grade 0 [Headache: Grade 0] : Headache: Grade 0 [Lethargy: Grade 0] : Lethargy: Grade 0

## 2018-09-27 PROCEDURE — G6002: CPT | Mod: 26

## 2018-09-28 PROCEDURE — 77427 RADIATION TX MANAGEMENT X5: CPT

## 2018-09-28 PROCEDURE — G6002: CPT | Mod: 26

## 2018-09-28 NOTE — PHYSICAL EXAM
[General Appearance - Well Developed] : well developed [General Appearance - Well Nourished] : well nourished [Heart Sounds] : normal S1 and S2 [Bowel Sounds] : normal bowel sounds [Normal] : oriented to person, place and time, the affect was normal, the mood was normal and not anxious [Oriented To Time, Place, And Person] : oriented to person, place, and time [de-identified] : cranial nerves 2-12 grossly intact

## 2018-09-28 NOTE — HISTORY OF PRESENT ILLNESS
[FreeTextEntry1] : \par 9/4/18- Mr. Day presents today for OTV. He ahs completed 400/6000 cGy to the right partial brain. Taking chemotherapy as prescribed. Denies headaches, nausea. Energy levels are thus far unchanged.\par \par 9/11/18: Here for an OTV. Completed 1400/6000 cGy. No pain. Denies any HA, dizziness. Has constipation treated with Colace, Senna, suppositories. Continues on chemotherapy. Continues on Decadron Qday\par \par 9/18/18- Mr. Day presents today for OTV. He has completed 2400/6000 cGy to the right partial brain. Taking temodar every night. Denies headaches, weakness, blurry vision. Denies nausea. Notes constipation, for which he is taking senna and colace. Weight done 2 pounds, he says he is eating well. Dex 2mg daily. \par \par ONCOLOGY HISTORY\par Mr. 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 on August 1st with one week history of left facial droop, slurred speech, dysphagia. CT of the head showed right brain lesion. MRI showed a 2.1 x 1.7 x 1.4cm right parietal mass without significant edema. He underwent gross total removal of the tumor with Dr. Rivers on 8/3/18. Pathology was consistent with GBM, with IDH-1 status pending.\par \par Postoperatively patient had dysarthria and dysphagia. He was discharged on 8/7/18.\par At the time of initial consult the patient reported slurred speech and dysphagia which has improved. He continues to use honey thickened diet but is able to swallow pills today. No episodes of choking. He denies headache, weakness, vision changes, seizures, arm or leg weakness or seizure activity. He finished his Decadron taper. He goes to the gym daily and walks 2 miles a day

## 2018-09-28 NOTE — DISEASE MANAGEMENT
[Clinical] : TNM Stage: c [N/A] : Currently not applicable [TTNM] : x [NTNM] : x [MTNM] : x [de-identified] : 3685 [de-identified] : 6201 [de-identified] : R partial brain

## 2018-09-28 NOTE — DISEASE MANAGEMENT
[Clinical] : TNM Stage: c [N/A] : Currently not applicable [TTNM] : x [NTNM] : x [MTNM] : x [de-identified] : 4746 [de-identified] : 8605 [de-identified] : R partial brain

## 2018-09-28 NOTE — HISTORY OF PRESENT ILLNESS
[FreeTextEntry1] : \par 9/4/18- Mr. Day presents today for OTV. He ahs completed 400/6000 cGy to the right partial brain. Taking chemotherapy as prescribed. Denies headaches, nausea. Energy levels are thus far unchanged.\par \par 9/11/18: Here for an OTV. Completed 1400/6000 cGy. No pain. Denies any HA, dizziness. Has constipation treated with Colace, Senna, suppositories. Continues on chemotherapy. Continues on Decadron Qday\par \par 9/18/18- Mr. Day presents today for OTV. He has completed 2400/6000 cGy to the right partial brain. Taking temodar every night. Denies headaches, weakness, blurry vision. Denies nausea. Notes constipation, for which he is taking senna and colace. Weight done 2 pounds, he says he is eating well. Dex 2mg daily. \par \par 9/25/18-\par Mr. Day presents today for OTV. He is still taking dexamethasone 2mg daily, keppra, and temozolamide. Feels well. Denies headaches, nausea. Denies focal weakness, blurry vision, numbness, tingling, dizziness.\par \par ONCOLOGY HISTORY\par Mr. 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 CT of the head showed right brain lesion. MRI showed a 2.1 x 1.7 x 1.4cm right parietal mass without significant edema. He underwent gross total removal of the tumor with Dr. Rivers on 8/3/18. Pathology was consistent with GBM, with IDH-1 status pending.\par \par Postoperatively patient had dysarthria and dysphagia. He was discharged on 8/7/18.\par At the time of initial consult the patient reported slurred speech and dysphagia which has improved. He continues to use honey thickened diet but is able to swallow pills today. No episodes of choking. He denies headache, weakness, vision changes, seizures, arm or leg weakness or seizure activity. He finished his Decadron taper. He goes to the gym daily and walks 2 miles a day

## 2018-09-28 NOTE — PHYSICAL EXAM
[General Appearance - Well Developed] : well developed [General Appearance - Well Nourished] : well nourished [Bowel Sounds] : normal bowel sounds [Normal] : oriented to person, place and time, the affect was normal, the mood was normal and not anxious [Oriented To Time, Place, And Person] : oriented to person, place, and time [de-identified] : cranial nerves 2-12 grossly intact [de-identified] : cranial nerves 2-12 grossly intact

## 2018-10-01 ENCOUNTER — APPOINTMENT (OUTPATIENT)
Dept: HEMATOLOGY ONCOLOGY | Facility: CLINIC | Age: 78
End: 2018-10-01

## 2018-10-01 ENCOUNTER — RESULT REVIEW (OUTPATIENT)
Age: 78
End: 2018-10-01

## 2018-10-01 LAB
BASOPHILS # BLD AUTO: 0.1 K/UL — SIGNIFICANT CHANGE UP (ref 0–0.2)
BASOPHILS NFR BLD AUTO: 0.6 % — SIGNIFICANT CHANGE UP (ref 0–2)
EOSINOPHIL # BLD AUTO: 0.4 K/UL — SIGNIFICANT CHANGE UP (ref 0–0.5)
EOSINOPHIL NFR BLD AUTO: 4.1 % — SIGNIFICANT CHANGE UP (ref 0–6)
HCT VFR BLD CALC: 41.2 % — SIGNIFICANT CHANGE UP (ref 39–50)
HGB BLD-MCNC: 13.6 G/DL — SIGNIFICANT CHANGE UP (ref 13–17)
LYMPHOCYTES # BLD AUTO: 1.6 K/UL — SIGNIFICANT CHANGE UP (ref 1–3.3)
LYMPHOCYTES # BLD AUTO: 18.7 % — SIGNIFICANT CHANGE UP (ref 13–44)
MCHC RBC-ENTMCNC: 30.3 PG — SIGNIFICANT CHANGE UP (ref 27–34)
MCHC RBC-ENTMCNC: 33 G/DL — SIGNIFICANT CHANGE UP (ref 32–36)
MCV RBC AUTO: 91.9 FL — SIGNIFICANT CHANGE UP (ref 80–100)
MONOCYTES # BLD AUTO: 0.6 K/UL — SIGNIFICANT CHANGE UP (ref 0–0.9)
MONOCYTES NFR BLD AUTO: 7 % — SIGNIFICANT CHANGE UP (ref 2–14)
NEUTROPHILS # BLD AUTO: 6.1 K/UL — SIGNIFICANT CHANGE UP (ref 1.8–7.4)
NEUTROPHILS NFR BLD AUTO: 69.6 % — SIGNIFICANT CHANGE UP (ref 43–77)
PLATELET # BLD AUTO: 229 K/UL — SIGNIFICANT CHANGE UP (ref 150–400)
RBC # BLD: 4.49 M/UL — SIGNIFICANT CHANGE UP (ref 4.2–5.8)
RBC # FLD: 12.2 % — SIGNIFICANT CHANGE UP (ref 10.3–14.5)
WBC # BLD: 8.8 K/UL — SIGNIFICANT CHANGE UP (ref 3.8–10.5)
WBC # FLD AUTO: 8.8 K/UL — SIGNIFICANT CHANGE UP (ref 3.8–10.5)

## 2018-10-01 PROCEDURE — G6002: CPT | Mod: 26

## 2018-10-02 PROCEDURE — G6002: CPT | Mod: 26

## 2018-10-03 VITALS
OXYGEN SATURATION: 97 % | HEART RATE: 60 BPM | DIASTOLIC BLOOD PRESSURE: 57 MMHG | SYSTOLIC BLOOD PRESSURE: 126 MMHG | TEMPERATURE: 98 F | RESPIRATION RATE: 14 BRPM | WEIGHT: 197.53 LBS | BODY MASS INDEX: 27.55 KG/M2

## 2018-10-03 PROCEDURE — G6002: CPT | Mod: 26

## 2018-10-03 NOTE — REVIEW OF SYSTEMS
[Headache: Grade 1 - Mild pain] : Headache: Grade 1 - Mild pain [Somnolence: Grade 0] : Somnolence: Grade 0

## 2018-10-03 NOTE — PHYSICAL EXAM
[de-identified] : cranial nerves 2-12 grossly intact [de-identified] : Left facial droop, JO-ANN intact bilaterally, PERRL.

## 2018-10-03 NOTE — DISEASE MANAGEMENT
[TTNM] : x [NTNM] : x [MTNM] : x [de-identified] : 2160 [de-identified] : 5841 [de-identified] : R partial brain

## 2018-10-03 NOTE — HISTORY OF PRESENT ILLNESS
[FreeTextEntry1] : \par 9/4/18- Mr. Day presents today for OTV. He ahs completed 400/6000 cGy to the right partial brain. Taking chemotherapy as prescribed. Denies headaches, nausea. Energy levels are thus far unchanged.\par \par 9/11/18: Here for an OTV. Completed 1400/6000 cGy. No pain. Denies any HA, dizziness. Has constipation treated with Colace, Senna, suppositories. Continues on chemotherapy. Continues on Decadron Qday\par \par 9/18/18- Mr. Day presents today for OTV. He has completed 2400/6000 cGy to the right partial brain. Taking temodar every night. Denies headaches, weakness, blurry vision. Denies nausea. Notes constipation, for which he is taking senna and colace. Weight done 2 pounds, he says he is eating well. Dex 2mg daily. \par \par 9/25/18-\par Mr. Day presents today for OTV. He is still taking dexamethasone 2mg daily, keppra, and temozolamide. Feels well. Denies headaches, nausea. Denies focal weakness, blurry vision, numbness, tingling, dizziness.\par \par 10/3/18- Ms. Day presents today for OTV. He has completed 4600/6000 cGy to the right partial brain.Today he denies headache, vision is blurry. Denies acute changes. Denies difficulty with balance and confusion. Still on dexamethasone 2 mg daily. Slight fatigue this week. \par \par ONCOLOGY HISTORY\par Mr. 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 CT of the head showed right brain lesion. MRI showed a 2.1 x 1.7 x 1.4cm right parietal mass without significant edema. He underwent gross total removal of the tumor with Dr. Rivers on 8/3/18. Pathology was consistent with GBM, with IDH-1 status pending.\par \par Postoperatively patient had dysarthria and dysphagia. He was discharged on 8/7/18.\par At the time of initial consult the patient reported slurred speech and dysphagia which has improved. He continues to use honey thickened diet but is able to swallow pills today. No episodes of choking. He denies headache, weakness, vision changes, seizures, arm or leg weakness or seizure activity. He finished his Decadron taper. He goes to the gym daily and walks 2 miles a day

## 2018-10-04 PROCEDURE — G6002: CPT | Mod: 26

## 2018-10-05 PROCEDURE — 77427 RADIATION TX MANAGEMENT X5: CPT

## 2018-10-05 PROCEDURE — G6002: CPT | Mod: 26

## 2018-10-08 ENCOUNTER — RESULT REVIEW (OUTPATIENT)
Age: 78
End: 2018-10-08

## 2018-10-08 ENCOUNTER — APPOINTMENT (OUTPATIENT)
Dept: HEMATOLOGY ONCOLOGY | Facility: CLINIC | Age: 78
End: 2018-10-08

## 2018-10-08 LAB
BASOPHILS # BLD AUTO: 0 K/UL — SIGNIFICANT CHANGE UP (ref 0–0.2)
BASOPHILS NFR BLD AUTO: 0.5 % — SIGNIFICANT CHANGE UP (ref 0–2)
EOSINOPHIL # BLD AUTO: 0.2 K/UL — SIGNIFICANT CHANGE UP (ref 0–0.5)
EOSINOPHIL NFR BLD AUTO: 4.2 % — SIGNIFICANT CHANGE UP (ref 0–6)
HCT VFR BLD CALC: 39.3 % — SIGNIFICANT CHANGE UP (ref 39–50)
HGB BLD-MCNC: 13.2 G/DL — SIGNIFICANT CHANGE UP (ref 13–17)
LYMPHOCYTES # BLD AUTO: 1.3 K/UL — SIGNIFICANT CHANGE UP (ref 1–3.3)
LYMPHOCYTES # BLD AUTO: 22.3 % — SIGNIFICANT CHANGE UP (ref 13–44)
MCHC RBC-ENTMCNC: 30.9 PG — SIGNIFICANT CHANGE UP (ref 27–34)
MCHC RBC-ENTMCNC: 33.5 G/DL — SIGNIFICANT CHANGE UP (ref 32–36)
MCV RBC AUTO: 92.2 FL — SIGNIFICANT CHANGE UP (ref 80–100)
MONOCYTES # BLD AUTO: 0.5 K/UL — SIGNIFICANT CHANGE UP (ref 0–0.9)
MONOCYTES NFR BLD AUTO: 9.7 % — SIGNIFICANT CHANGE UP (ref 2–14)
NEUTROPHILS # BLD AUTO: 3.6 K/UL — SIGNIFICANT CHANGE UP (ref 1.8–7.4)
NEUTROPHILS NFR BLD AUTO: 63.3 % — SIGNIFICANT CHANGE UP (ref 43–77)
PLATELET # BLD AUTO: 189 K/UL — SIGNIFICANT CHANGE UP (ref 150–400)
RBC # BLD: 4.26 M/UL — SIGNIFICANT CHANGE UP (ref 4.2–5.8)
RBC # FLD: 12.2 % — SIGNIFICANT CHANGE UP (ref 10.3–14.5)
WBC # BLD: 5.7 K/UL — SIGNIFICANT CHANGE UP (ref 3.8–10.5)
WBC # FLD AUTO: 5.7 K/UL — SIGNIFICANT CHANGE UP (ref 3.8–10.5)

## 2018-10-08 PROCEDURE — G6002: CPT | Mod: 26

## 2018-10-09 PROCEDURE — G6002: CPT | Mod: 26

## 2018-10-09 NOTE — REVIEW OF SYSTEMS
[Fatigue: Grade 1 - Fatigue relieved by rest] : Fatigue: Grade 1 - Fatigue relieved by rest [Cognitive Disturbance: Grade 0] : Cognitive Disturbance: Grade 0 [Concentration Impairment: Grade 0] : Concentration Impairment: Grade 0 [Dizziness: Grade 0] : Dizziness: Grade 0  [Facial Muscle Weakness: Grade 0] : Facial Muscle Weakness: Grade 0 [Headache: Grade 1 - Mild pain] : Headache: Grade 1 - Mild pain [Lethargy: Grade 0] : Lethargy: Grade 0 [Somnolence: Grade 0] : Somnolence: Grade 0

## 2018-10-10 VITALS
SYSTOLIC BLOOD PRESSURE: 144 MMHG | DIASTOLIC BLOOD PRESSURE: 71 MMHG | OXYGEN SATURATION: 98 % | WEIGHT: 198.63 LBS | BODY MASS INDEX: 27.7 KG/M2 | HEART RATE: 60 BPM | RESPIRATION RATE: 18 BRPM

## 2018-10-10 PROCEDURE — G6002: CPT | Mod: 26

## 2018-10-11 PROCEDURE — G6002: CPT | Mod: 26

## 2018-10-11 NOTE — HISTORY OF PRESENT ILLNESS
[FreeTextEntry1] : \par 9/4/18- Mr. Day presents today for OTV. He ahs completed 400/6000 cGy to the right partial brain. Taking chemotherapy as prescribed. Denies headaches, nausea. Energy levels are thus far unchanged.\par \par 9/11/18: Here for an OTV. Completed 1400/6000 cGy. No pain. Denies any HA, dizziness. Has constipation treated with Colace, Senna, suppositories. Continues on chemotherapy. Continues on Decadron Qday\par \par 9/18/18- Mr. Day presents today for OTV. He has completed 2400/6000 cGy to the right partial brain. Taking temodar every night. Denies headaches, weakness, blurry vision. Denies nausea. Notes constipation, for which he is taking senna and colace. Weight done 2 pounds, he says he is eating well. Dex 2mg daily. \par \par 9/25/18-\par Mr. Day presents today for OTV. He is still taking dexamethasone 2mg daily, keppra, and temozolamide. Feels well. Denies headaches, nausea. Denies focal weakness, blurry vision, numbness, tingling, dizziness.\par \par 10/3/18- Ms. Day presents today for OTV. He has completed 4600/6000 cGy to the right partial brain.Today he denies headache, vision is blurry. Denies acute changes. Denies difficulty with balance and confusion. Still on dexamethasone 2 mg daily. Slight fatigue this week. \par \par 10/10/18- Mr. Day presents today for OTV, He has completed 5600/6000 cGy to the right parietal brain. Labs WNL on 10/8.\par Today he feels well. Very slight headache yesterday, none today. Worked out this weekend. Denies nausea, visual changes. Last temodar tonight. Finishes RT friday. Slight fatigue. \par \par ONCOLOGY HISTORY\par Mr. 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 CT of the head showed right brain lesion. MRI showed a 2.1 x 1.7 x 1.4cm right parietal mass without significant edema. He underwent gross total removal of the tumor with Dr. Rivers on 8/3/18. Pathology was consistent with GBM, with IDH-1 status pending.\par \par Postoperatively patient had dysarthria and dysphagia. He was discharged on 8/7/18.\par At the time of initial consult the patient reported slurred speech and dysphagia which has improved. He continues to use honey thickened diet but is able to swallow pills today. No episodes of choking. He denies headache, weakness, vision changes, seizures, arm or leg weakness or seizure activity. He finished his Decadron taper. He goes to the gym daily and walks 2 miles a day

## 2018-10-11 NOTE — DISEASE MANAGEMENT
[Clinical] : TNM Stage: c [N/A] : Currently not applicable [TTNM] : x [NTNM] : x [MTNM] : x [de-identified] : 7135 [de-identified] : 5901 [de-identified] : R partial brain

## 2018-10-11 NOTE — PHYSICAL EXAM
[General Appearance - Well Developed] : well developed [General Appearance - Well Nourished] : well nourished [Normal] : oriented to person, place and time, the affect was normal, the mood was normal and not anxious [Oriented To Time, Place, And Person] : oriented to person, place, and time [de-identified] : cranial nerves 2-12 grossly intact [de-identified] : Left facial droop, JO-ANN intact bilaterally, PERRL.

## 2018-10-12 PROCEDURE — G6002: CPT | Mod: 26

## 2018-10-12 PROCEDURE — 77427 RADIATION TX MANAGEMENT X5: CPT

## 2018-10-15 ENCOUNTER — RESULT REVIEW (OUTPATIENT)
Age: 78
End: 2018-10-15

## 2018-10-15 ENCOUNTER — APPOINTMENT (OUTPATIENT)
Dept: HEMATOLOGY ONCOLOGY | Facility: CLINIC | Age: 78
End: 2018-10-15

## 2018-10-15 LAB
BASOPHILS # BLD AUTO: 0 K/UL — SIGNIFICANT CHANGE UP (ref 0–0.2)
BASOPHILS NFR BLD AUTO: 0.3 % — SIGNIFICANT CHANGE UP (ref 0–2)
EOSINOPHIL # BLD AUTO: 0.2 K/UL — SIGNIFICANT CHANGE UP (ref 0–0.5)
EOSINOPHIL NFR BLD AUTO: 2.9 % — SIGNIFICANT CHANGE UP (ref 0–6)
HCT VFR BLD CALC: 40.8 % — SIGNIFICANT CHANGE UP (ref 39–50)
HGB BLD-MCNC: 13.7 G/DL — SIGNIFICANT CHANGE UP (ref 13–17)
LYMPHOCYTES # BLD AUTO: 1.2 K/UL — SIGNIFICANT CHANGE UP (ref 1–3.3)
LYMPHOCYTES # BLD AUTO: 14.7 % — SIGNIFICANT CHANGE UP (ref 13–44)
MCHC RBC-ENTMCNC: 30.8 PG — SIGNIFICANT CHANGE UP (ref 27–34)
MCHC RBC-ENTMCNC: 33.6 G/DL — SIGNIFICANT CHANGE UP (ref 32–36)
MCV RBC AUTO: 91.7 FL — SIGNIFICANT CHANGE UP (ref 80–100)
MONOCYTES # BLD AUTO: 0.6 K/UL — SIGNIFICANT CHANGE UP (ref 0–0.9)
MONOCYTES NFR BLD AUTO: 7.1 % — SIGNIFICANT CHANGE UP (ref 2–14)
NEUTROPHILS # BLD AUTO: 6.2 K/UL — SIGNIFICANT CHANGE UP (ref 1.8–7.4)
NEUTROPHILS NFR BLD AUTO: 74.9 % — SIGNIFICANT CHANGE UP (ref 43–77)
PLATELET # BLD AUTO: 175 K/UL — SIGNIFICANT CHANGE UP (ref 150–400)
RBC # BLD: 4.45 M/UL — SIGNIFICANT CHANGE UP (ref 4.2–5.8)
RBC # FLD: 12.3 % — SIGNIFICANT CHANGE UP (ref 10.3–14.5)
WBC # BLD: 8.3 K/UL — SIGNIFICANT CHANGE UP (ref 3.8–10.5)
WBC # FLD AUTO: 8.3 K/UL — SIGNIFICANT CHANGE UP (ref 3.8–10.5)

## 2018-10-24 ENCOUNTER — RESULT REVIEW (OUTPATIENT)
Age: 78
End: 2018-10-24

## 2018-10-24 ENCOUNTER — APPOINTMENT (OUTPATIENT)
Dept: MRI IMAGING | Facility: IMAGING CENTER | Age: 78
End: 2018-10-24
Payer: MEDICARE

## 2018-10-24 ENCOUNTER — APPOINTMENT (OUTPATIENT)
Dept: HEMATOLOGY ONCOLOGY | Facility: CLINIC | Age: 78
End: 2018-10-24

## 2018-10-24 ENCOUNTER — OUTPATIENT (OUTPATIENT)
Dept: OUTPATIENT SERVICES | Facility: HOSPITAL | Age: 78
LOS: 1 days | End: 2018-10-24
Payer: MEDICARE

## 2018-10-24 ENCOUNTER — APPOINTMENT (OUTPATIENT)
Dept: NEUROLOGY | Facility: CLINIC | Age: 78
End: 2018-10-24
Payer: MEDICARE

## 2018-10-24 VITALS
OXYGEN SATURATION: 98 % | SYSTOLIC BLOOD PRESSURE: 112 MMHG | RESPIRATION RATE: 16 BRPM | HEART RATE: 66 BPM | HEIGHT: 71 IN | DIASTOLIC BLOOD PRESSURE: 72 MMHG | BODY MASS INDEX: 27.3 KG/M2 | WEIGHT: 195 LBS

## 2018-10-24 DIAGNOSIS — D17.0 BENIGN LIPOMATOUS NEOPLASM OF SKIN AND SUBCUTANEOUS TISSUE OF HEAD, FACE AND NECK: Chronic | ICD-10-CM

## 2018-10-24 DIAGNOSIS — C44.711 BASAL CELL CARCINOMA OF SKIN OF UNSPECIFIED LOWER LIMB, INCLUDING HIP: Chronic | ICD-10-CM

## 2018-10-24 DIAGNOSIS — Z90.89 ACQUIRED ABSENCE OF OTHER ORGANS: Chronic | ICD-10-CM

## 2018-10-24 DIAGNOSIS — Z00.8 ENCOUNTER FOR OTHER GENERAL EXAMINATION: ICD-10-CM

## 2018-10-24 LAB
BASOPHILS # BLD AUTO: 0 K/UL — SIGNIFICANT CHANGE UP (ref 0–0.2)
BASOPHILS NFR BLD AUTO: 0.2 % — SIGNIFICANT CHANGE UP (ref 0–2)
EOSINOPHIL # BLD AUTO: 0.2 K/UL — SIGNIFICANT CHANGE UP (ref 0–0.5)
EOSINOPHIL NFR BLD AUTO: 3.5 % — SIGNIFICANT CHANGE UP (ref 0–6)
HCT VFR BLD CALC: 40.1 % — SIGNIFICANT CHANGE UP (ref 39–50)
HGB BLD-MCNC: 13.7 G/DL — SIGNIFICANT CHANGE UP (ref 13–17)
LYMPHOCYTES # BLD AUTO: 1.5 K/UL — SIGNIFICANT CHANGE UP (ref 1–3.3)
LYMPHOCYTES # BLD AUTO: 24.2 % — SIGNIFICANT CHANGE UP (ref 13–44)
MCHC RBC-ENTMCNC: 31.3 PG — SIGNIFICANT CHANGE UP (ref 27–34)
MCHC RBC-ENTMCNC: 34.2 G/DL — SIGNIFICANT CHANGE UP (ref 32–36)
MCV RBC AUTO: 91.6 FL — SIGNIFICANT CHANGE UP (ref 80–100)
MONOCYTES # BLD AUTO: 0.6 K/UL — SIGNIFICANT CHANGE UP (ref 0–0.9)
MONOCYTES NFR BLD AUTO: 9.5 % — SIGNIFICANT CHANGE UP (ref 2–14)
NEUTROPHILS # BLD AUTO: 3.9 K/UL — SIGNIFICANT CHANGE UP (ref 1.8–7.4)
NEUTROPHILS NFR BLD AUTO: 62.6 % — SIGNIFICANT CHANGE UP (ref 43–77)
PLATELET # BLD AUTO: 155 K/UL — SIGNIFICANT CHANGE UP (ref 150–400)
RBC # BLD: 4.38 M/UL — SIGNIFICANT CHANGE UP (ref 4.2–5.8)
RBC # FLD: 12 % — SIGNIFICANT CHANGE UP (ref 10.3–14.5)
WBC # BLD: 6.3 K/UL — SIGNIFICANT CHANGE UP (ref 3.8–10.5)
WBC # FLD AUTO: 6.3 K/UL — SIGNIFICANT CHANGE UP (ref 3.8–10.5)

## 2018-10-24 PROCEDURE — 82565 ASSAY OF CREATININE: CPT

## 2018-10-24 PROCEDURE — 70553 MRI BRAIN STEM W/O & W/DYE: CPT | Mod: 26

## 2018-10-24 PROCEDURE — 70553 MRI BRAIN STEM W/O & W/DYE: CPT

## 2018-10-24 PROCEDURE — 99215 OFFICE O/P EST HI 40 MIN: CPT

## 2018-10-24 PROCEDURE — A9585: CPT

## 2018-10-24 RX ORDER — TEMOZOLOMIDE 20 MG/1
20 CAPSULE ORAL
Qty: 21 | Refills: 1 | Status: DISCONTINUED | COMMUNITY
Start: 2018-08-16 | End: 2018-10-24

## 2018-10-24 RX ORDER — TEMOZOLOMIDE 140 MG/1
140 CAPSULE ORAL
Qty: 21 | Refills: 1 | Status: DISCONTINUED | COMMUNITY
Start: 2018-08-16 | End: 2018-10-24

## 2018-10-25 PROCEDURE — 80053 COMPREHEN METABOLIC PANEL: CPT

## 2018-10-25 PROCEDURE — 71045 X-RAY EXAM CHEST 1 VIEW: CPT

## 2018-10-25 PROCEDURE — 80048 BASIC METABOLIC PNL TOTAL CA: CPT

## 2018-10-25 PROCEDURE — 86900 BLOOD TYPING SEROLOGIC ABO: CPT

## 2018-10-25 PROCEDURE — 36430 TRANSFUSION BLD/BLD COMPNT: CPT

## 2018-10-25 PROCEDURE — 88331 PATH CONSLTJ SURG 1 BLK 1SPC: CPT

## 2018-10-25 PROCEDURE — 83605 ASSAY OF LACTIC ACID: CPT

## 2018-10-25 PROCEDURE — 85730 THROMBOPLASTIN TIME PARTIAL: CPT

## 2018-10-25 PROCEDURE — 84100 ASSAY OF PHOSPHORUS: CPT

## 2018-10-25 PROCEDURE — 74230 X-RAY XM SWLNG FUNCJ C+: CPT

## 2018-10-25 PROCEDURE — 88360 TUMOR IMMUNOHISTOCHEM/MANUAL: CPT

## 2018-10-25 PROCEDURE — 84480 ASSAY TRIIODOTHYRONINE (T3): CPT

## 2018-10-25 PROCEDURE — 84484 ASSAY OF TROPONIN QUANT: CPT

## 2018-10-25 PROCEDURE — 83735 ASSAY OF MAGNESIUM: CPT

## 2018-10-25 PROCEDURE — 82962 GLUCOSE BLOOD TEST: CPT

## 2018-10-25 PROCEDURE — 70552 MRI BRAIN STEM W/DYE: CPT

## 2018-10-25 PROCEDURE — 84436 ASSAY OF TOTAL THYROXINE: CPT

## 2018-10-25 PROCEDURE — 84443 ASSAY THYROID STIM HORMONE: CPT

## 2018-10-25 PROCEDURE — C1713: CPT

## 2018-10-25 PROCEDURE — C1889: CPT

## 2018-10-25 PROCEDURE — 85027 COMPLETE CBC AUTOMATED: CPT

## 2018-10-25 PROCEDURE — 86901 BLOOD TYPING SEROLOGIC RH(D): CPT

## 2018-10-25 PROCEDURE — 88333 PATH CONSLTJ SURG CYTO XM 1: CPT

## 2018-10-25 PROCEDURE — 99285 EMERGENCY DEPT VISIT HI MDM: CPT | Mod: 25

## 2018-10-25 PROCEDURE — 81003 URINALYSIS AUTO W/O SCOPE: CPT

## 2018-10-25 PROCEDURE — 97162 PT EVAL MOD COMPLEX 30 MIN: CPT

## 2018-10-25 PROCEDURE — 85610 PROTHROMBIN TIME: CPT

## 2018-10-25 PROCEDURE — 80177 DRUG SCRN QUAN LEVETIRACETAM: CPT

## 2018-10-25 PROCEDURE — 70450 CT HEAD/BRAIN W/O DYE: CPT

## 2018-10-25 PROCEDURE — 88341 IMHCHEM/IMCYTCHM EA ADD ANTB: CPT

## 2018-10-25 PROCEDURE — 82140 ASSAY OF AMMONIA: CPT

## 2018-10-25 PROCEDURE — 82550 ASSAY OF CK (CPK): CPT

## 2018-10-25 PROCEDURE — 88342 IMHCHEM/IMCYTCHM 1ST ANTB: CPT

## 2018-10-25 PROCEDURE — 96374 THER/PROPH/DIAG INJ IV PUSH: CPT

## 2018-10-25 PROCEDURE — 82553 CREATINE MB FRACTION: CPT

## 2018-10-25 PROCEDURE — P9037: CPT

## 2018-10-25 PROCEDURE — 99285 EMERGENCY DEPT VISIT HI MDM: CPT

## 2018-10-25 PROCEDURE — C1769: CPT

## 2018-10-25 PROCEDURE — 92526 ORAL FUNCTION THERAPY: CPT

## 2018-10-25 PROCEDURE — A9585: CPT

## 2018-10-25 PROCEDURE — 92610 EVALUATE SWALLOWING FUNCTION: CPT

## 2018-10-25 PROCEDURE — 92611 MOTION FLUOROSCOPY/SWALLOW: CPT

## 2018-10-25 PROCEDURE — 70553 MRI BRAIN STEM W/O & W/DYE: CPT

## 2018-10-25 PROCEDURE — 71260 CT THORAX DX C+: CPT

## 2018-10-25 PROCEDURE — 93005 ELECTROCARDIOGRAM TRACING: CPT

## 2018-10-25 PROCEDURE — 86850 RBC ANTIBODY SCREEN: CPT

## 2018-10-25 PROCEDURE — 74177 CT ABD & PELVIS W/CONTRAST: CPT

## 2018-10-25 PROCEDURE — 88307 TISSUE EXAM BY PATHOLOGIST: CPT

## 2018-11-05 ENCOUNTER — LABORATORY RESULT (OUTPATIENT)
Age: 78
End: 2018-11-05

## 2018-11-08 NOTE — PROGRESS NOTE ADULT - SUBJECTIVE AND OBJECTIVE BOX
carrie attending note    POD#2 R crani for tumor biopsy  choked with diet today, started on dysphagia diet, s/s eval pending    vitals/labs/meds reviewed  fully oriented, L facial, mild dysarthria  BUE no drift, full strength  BLE full strength    -frozen high grade glioma, f/u path  -cont decadron for cerebral edema  --160  -high risk VTE at admission due to brain tumor  -SCDs and lovenox ppx    transfer to floor  non critical care [>50% of Time Spent on Counseling for ____] : Greater than 50% of the encounter time was spent on counseling for [unfilled] [Time Spent: ___ minutes] : I have spent [unfilled] minutes of face to face time with the patient

## 2018-11-28 LAB
ALBUMIN SERPL ELPH-MCNC: 4.2 G/DL
ALP BLD-CCNC: 49 U/L
ALT SERPL-CCNC: 24 U/L
ANION GAP SERPL CALC-SCNC: 9 MMOL/L
AST SERPL-CCNC: 19 U/L
BASOPHILS # BLD AUTO: 0.03 K/UL
BASOPHILS NFR BLD AUTO: 0.5 %
BILIRUB SERPL-MCNC: 0.7 MG/DL
BUN SERPL-MCNC: 14 MG/DL
CALCIUM SERPL-MCNC: 9.5 MG/DL
CHLORIDE SERPL-SCNC: 106 MMOL/L
CO2 SERPL-SCNC: 27 MMOL/L
CREAT SERPL-MCNC: 1.02 MG/DL
EOSINOPHIL # BLD AUTO: 0.16 K/UL
EOSINOPHIL NFR BLD AUTO: 2.5 %
GLUCOSE SERPL-MCNC: 100 MG/DL
HCT VFR BLD CALC: 40.2 %
HGB BLD-MCNC: 13.2 G/DL
IMM GRANULOCYTES NFR BLD AUTO: 0.3 %
LYMPHOCYTES # BLD AUTO: 1.86 K/UL
LYMPHOCYTES NFR BLD AUTO: 29.6 %
MAN DIFF?: NORMAL
MCHC RBC-ENTMCNC: 29.8 PG
MCHC RBC-ENTMCNC: 32.8 GM/DL
MCV RBC AUTO: 90.7 FL
MONOCYTES # BLD AUTO: 0.44 K/UL
MONOCYTES NFR BLD AUTO: 7 %
NEUTROPHILS # BLD AUTO: 3.78 K/UL
NEUTROPHILS NFR BLD AUTO: 60.1 %
PLATELET # BLD AUTO: 201 K/UL
POTASSIUM SERPL-SCNC: 4.8 MMOL/L
PROT SERPL-MCNC: 6.6 G/DL
RBC # BLD: 4.43 M/UL
RBC # FLD: 13.2 %
SODIUM SERPL-SCNC: 142 MMOL/L
WBC # FLD AUTO: 6.29 K/UL

## 2018-11-29 ENCOUNTER — APPOINTMENT (OUTPATIENT)
Dept: RADIATION ONCOLOGY | Facility: CLINIC | Age: 78
End: 2018-11-29

## 2018-12-03 ENCOUNTER — RESULT REVIEW (OUTPATIENT)
Age: 78
End: 2018-12-03

## 2018-12-03 ENCOUNTER — OUTPATIENT (OUTPATIENT)
Dept: OUTPATIENT SERVICES | Facility: HOSPITAL | Age: 78
LOS: 1 days | Discharge: HOME | End: 2018-12-03

## 2018-12-03 ENCOUNTER — APPOINTMENT (OUTPATIENT)
Dept: NEUROLOGY | Facility: CLINIC | Age: 78
End: 2018-12-03
Payer: MEDICARE

## 2018-12-03 ENCOUNTER — APPOINTMENT (OUTPATIENT)
Dept: HEMATOLOGY ONCOLOGY | Facility: CLINIC | Age: 78
End: 2018-12-03

## 2018-12-03 VITALS
DIASTOLIC BLOOD PRESSURE: 78 MMHG | WEIGHT: 196 LBS | HEART RATE: 64 BPM | OXYGEN SATURATION: 97 % | RESPIRATION RATE: 16 BRPM | HEIGHT: 71 IN | SYSTOLIC BLOOD PRESSURE: 120 MMHG | BODY MASS INDEX: 27.44 KG/M2

## 2018-12-03 DIAGNOSIS — Z90.89 ACQUIRED ABSENCE OF OTHER ORGANS: Chronic | ICD-10-CM

## 2018-12-03 DIAGNOSIS — D64.9 ANEMIA, UNSPECIFIED: ICD-10-CM

## 2018-12-03 DIAGNOSIS — D17.0 BENIGN LIPOMATOUS NEOPLASM OF SKIN AND SUBCUTANEOUS TISSUE OF HEAD, FACE AND NECK: Chronic | ICD-10-CM

## 2018-12-03 DIAGNOSIS — C44.711 BASAL CELL CARCINOMA OF SKIN OF UNSPECIFIED LOWER LIMB, INCLUDING HIP: Chronic | ICD-10-CM

## 2018-12-03 LAB
BASOPHILS # BLD AUTO: 0 K/UL — SIGNIFICANT CHANGE UP (ref 0–0.2)
EOSINOPHIL # BLD AUTO: 0 K/UL — SIGNIFICANT CHANGE UP (ref 0–0.5)
EOSINOPHIL NFR BLD AUTO: 2 % — SIGNIFICANT CHANGE UP (ref 0–6)
HCT VFR BLD CALC: 39.1 % — SIGNIFICANT CHANGE UP (ref 39–50)
HGB BLD-MCNC: 13.4 G/DL — SIGNIFICANT CHANGE UP (ref 13–17)
LYMPHOCYTES # BLD AUTO: 1.5 K/UL — SIGNIFICANT CHANGE UP (ref 1–3.3)
LYMPHOCYTES # BLD AUTO: 30 % — SIGNIFICANT CHANGE UP (ref 13–44)
MCHC RBC-ENTMCNC: 30.6 PG — SIGNIFICANT CHANGE UP (ref 27–34)
MCHC RBC-ENTMCNC: 34.2 G/DL — SIGNIFICANT CHANGE UP (ref 32–36)
MCV RBC AUTO: 89.3 FL — SIGNIFICANT CHANGE UP (ref 80–100)
MONOCYTES # BLD AUTO: 0.5 K/UL — SIGNIFICANT CHANGE UP (ref 0–0.9)
MONOCYTES NFR BLD AUTO: 8 % — SIGNIFICANT CHANGE UP (ref 2–14)
NEUTROPHILS # BLD AUTO: 3.5 K/UL — SIGNIFICANT CHANGE UP (ref 1.8–7.4)
NEUTROPHILS NFR BLD AUTO: 60 % — SIGNIFICANT CHANGE UP (ref 43–77)
PLAT MORPH BLD: NORMAL — SIGNIFICANT CHANGE UP
PLATELET # BLD AUTO: 173 K/UL — SIGNIFICANT CHANGE UP (ref 150–400)
RBC # BLD: 4.37 M/UL — SIGNIFICANT CHANGE UP (ref 4.2–5.8)
RBC # FLD: 11.8 % — SIGNIFICANT CHANGE UP (ref 10.3–14.5)
RBC BLD AUTO: NORMAL — SIGNIFICANT CHANGE UP
WBC # BLD: 5.5 K/UL — SIGNIFICANT CHANGE UP (ref 3.8–10.5)
WBC # FLD AUTO: 5.5 K/UL — SIGNIFICANT CHANGE UP (ref 3.8–10.5)

## 2018-12-03 PROCEDURE — 99213 OFFICE O/P EST LOW 20 MIN: CPT

## 2018-12-03 RX ORDER — TEMOZOLOMIDE 140 MG/1
140 CAPSULE ORAL
Qty: 5 | Refills: 0 | Status: DISCONTINUED | COMMUNITY
Start: 2018-10-24 | End: 2018-12-03

## 2018-12-03 RX ORDER — DEXAMETHASONE 2 MG/1
2 TABLET ORAL
Qty: 14 | Refills: 0 | Status: DISCONTINUED | COMMUNITY
Start: 2018-10-11 | End: 2018-12-03

## 2018-12-03 RX ORDER — TEMOZOLOMIDE 180 MG/1
180 CAPSULE ORAL
Qty: 5 | Refills: 0 | Status: DISCONTINUED | COMMUNITY
Start: 2018-10-24 | End: 2018-12-03

## 2018-12-03 RX ORDER — DEXAMETHASONE 2 MG/1
2 TABLET ORAL DAILY
Qty: 30 | Refills: 1 | Status: DISCONTINUED | COMMUNITY
Start: 2018-08-07 | End: 2018-12-03

## 2018-12-04 NOTE — H&P ADULT - OPHTHALMOLOGIC
Assessment:      UTI      Plan:  Plan:      1  Medications: ciprofloxacin  2  Maintain adequate hydration  3  Follow up if symptoms not improving, and prn  Subjective:      Westley Almaguer is a 34 y o  female who complains of burning with urination, dysuria and frequency for 3 hour  Patient also complains of no other symthoms  Patient denies congestion, cough, fever, headache, rhinitis, sorethroat and vaginal discharge  Patient does not have a history of recurrent UTI  Patient does not have a history of pyelonephritis  The following portions of the patient's history were reviewed and updated as appropriate: allergies, current medications, past family history, past medical history, past social history, past surgical history and problem list     Review of Systems  A comprehensive review of systems was negative except for: Eyes: positive for none  Ears, nose, mouth, throat, and face: positive for none  Respiratory: positive for Symptoms; Respiratory w/o Neg: none  Cardiovascular: positive for none  Gastrointestinal: positive for none      Objective:      /68 (BP Location: Left arm, Patient Position: Sitting, Cuff Size: Adult)   Pulse 86   Temp 97 7 °F (36 5 °C) (Tympanic)   Resp 16   Ht 5' 6 5" (1 689 m)   Wt 88 5 kg (195 lb)   SpO2 99%   BMI 31 00 kg/m²   General: alert and oriented, in no acute distress   Abdomen: soft, non-tender, without masses or organomegaly in the lower abdomen   Back: CVA tenderness absent   : defer exam     Laboratory:   Urine dipstick shows negative for all components  Micro exam: negative for WBCs or RBCs 
details…

## 2018-12-18 ENCOUNTER — APPOINTMENT (OUTPATIENT)
Dept: NEUROLOGY | Facility: CLINIC | Age: 78
End: 2018-12-18
Payer: MEDICARE

## 2018-12-18 PROCEDURE — 99496 TRANSJ CARE MGMT HIGH F2F 7D: CPT

## 2018-12-26 LAB
BASOPHILS # BLD AUTO: 0.01 K/UL
BASOPHILS NFR BLD AUTO: 0.1 %
EOSINOPHIL # BLD AUTO: 0.04 K/UL
EOSINOPHIL NFR BLD AUTO: 0.5 %
HCT VFR BLD CALC: 40.9 %
HGB BLD-MCNC: 13.5 G/DL
IMM GRANULOCYTES NFR BLD AUTO: 0.1 %
LYMPHOCYTES # BLD AUTO: 1.98 K/UL
LYMPHOCYTES NFR BLD AUTO: 24.7 %
MAN DIFF?: NORMAL
MCHC RBC-ENTMCNC: 30.2 PG
MCHC RBC-ENTMCNC: 33 GM/DL
MCV RBC AUTO: 91.5 FL
MONOCYTES # BLD AUTO: 0.74 K/UL
MONOCYTES NFR BLD AUTO: 9.2 %
NEUTROPHILS # BLD AUTO: 5.25 K/UL
NEUTROPHILS NFR BLD AUTO: 65.4 %
PLATELET # BLD AUTO: 207 K/UL
RBC # BLD: 4.47 M/UL
RBC # FLD: 13.6 %
WBC # FLD AUTO: 8.03 K/UL

## 2019-01-03 LAB
BASOPHILS # BLD AUTO: 0.02 K/UL
BASOPHILS NFR BLD AUTO: 0.3 %
EOSINOPHIL # BLD AUTO: 0.08 K/UL
EOSINOPHIL NFR BLD AUTO: 1.1 %
HCT VFR BLD CALC: 40.3 %
HGB BLD-MCNC: 12.8 G/DL
IMM GRANULOCYTES NFR BLD AUTO: 0.3 %
LYMPHOCYTES # BLD AUTO: 1.16 K/UL
LYMPHOCYTES NFR BLD AUTO: 15.9 %
MAN DIFF?: NORMAL
MCHC RBC-ENTMCNC: 30.6 PG
MCHC RBC-ENTMCNC: 31.8 GM/DL
MCV RBC AUTO: 96.4 FL
MONOCYTES # BLD AUTO: 0.64 K/UL
MONOCYTES NFR BLD AUTO: 8.8 %
NEUTROPHILS # BLD AUTO: 5.36 K/UL
NEUTROPHILS NFR BLD AUTO: 73.6 %
PLATELET # BLD AUTO: 123 K/UL
RBC # BLD: 4.18 M/UL
RBC # FLD: 14.3 %
WBC # FLD AUTO: 7.28 K/UL

## 2019-01-07 ENCOUNTER — APPOINTMENT (OUTPATIENT)
Dept: MRI IMAGING | Facility: IMAGING CENTER | Age: 79
End: 2019-01-07
Payer: MEDICARE

## 2019-01-07 ENCOUNTER — APPOINTMENT (OUTPATIENT)
Dept: HEMATOLOGY ONCOLOGY | Facility: CLINIC | Age: 79
End: 2019-01-07

## 2019-01-07 ENCOUNTER — RESULT REVIEW (OUTPATIENT)
Age: 79
End: 2019-01-07

## 2019-01-07 ENCOUNTER — OUTPATIENT (OUTPATIENT)
Dept: OUTPATIENT SERVICES | Facility: HOSPITAL | Age: 79
LOS: 1 days | Discharge: HOME | End: 2019-01-07

## 2019-01-07 ENCOUNTER — OUTPATIENT (OUTPATIENT)
Dept: OUTPATIENT SERVICES | Facility: HOSPITAL | Age: 79
LOS: 1 days | End: 2019-01-07
Payer: MEDICARE

## 2019-01-07 ENCOUNTER — APPOINTMENT (OUTPATIENT)
Dept: NEUROLOGY | Facility: CLINIC | Age: 79
End: 2019-01-07
Payer: MEDICARE

## 2019-01-07 VITALS
RESPIRATION RATE: 16 BRPM | SYSTOLIC BLOOD PRESSURE: 134 MMHG | WEIGHT: 200 LBS | HEIGHT: 71 IN | DIASTOLIC BLOOD PRESSURE: 84 MMHG | HEART RATE: 67 BPM | OXYGEN SATURATION: 98 % | BODY MASS INDEX: 28 KG/M2

## 2019-01-07 DIAGNOSIS — C71.9 MALIGNANT NEOPLASM OF BRAIN, UNSPECIFIED: ICD-10-CM

## 2019-01-07 DIAGNOSIS — D17.0 BENIGN LIPOMATOUS NEOPLASM OF SKIN AND SUBCUTANEOUS TISSUE OF HEAD, FACE AND NECK: Chronic | ICD-10-CM

## 2019-01-07 DIAGNOSIS — Z90.89 ACQUIRED ABSENCE OF OTHER ORGANS: Chronic | ICD-10-CM

## 2019-01-07 DIAGNOSIS — C44.711 BASAL CELL CARCINOMA OF SKIN OF UNSPECIFIED LOWER LIMB, INCLUDING HIP: Chronic | ICD-10-CM

## 2019-01-07 DIAGNOSIS — D64.9 ANEMIA, UNSPECIFIED: ICD-10-CM

## 2019-01-07 LAB
BASOPHILS # BLD AUTO: 0 K/UL — SIGNIFICANT CHANGE UP (ref 0–0.2)
BASOPHILS NFR BLD AUTO: 0 % — SIGNIFICANT CHANGE UP (ref 0–2)
EOSINOPHIL # BLD AUTO: 0.1 K/UL — SIGNIFICANT CHANGE UP (ref 0–0.5)
EOSINOPHIL NFR BLD AUTO: 0.8 % — SIGNIFICANT CHANGE UP (ref 0–6)
HCT VFR BLD CALC: 39.8 % — SIGNIFICANT CHANGE UP (ref 39–50)
HGB BLD-MCNC: 13.3 G/DL — SIGNIFICANT CHANGE UP (ref 13–17)
LYMPHOCYTES # BLD AUTO: 0.8 K/UL — LOW (ref 1–3.3)
LYMPHOCYTES # BLD AUTO: 10.4 % — LOW (ref 13–44)
MCHC RBC-ENTMCNC: 30.6 PG — SIGNIFICANT CHANGE UP (ref 27–34)
MCHC RBC-ENTMCNC: 33.3 G/DL — SIGNIFICANT CHANGE UP (ref 32–36)
MCV RBC AUTO: 91.8 FL — SIGNIFICANT CHANGE UP (ref 80–100)
MONOCYTES # BLD AUTO: 0.3 K/UL — SIGNIFICANT CHANGE UP (ref 0–0.9)
MONOCYTES NFR BLD AUTO: 4.3 % — SIGNIFICANT CHANGE UP (ref 2–14)
NEUTROPHILS # BLD AUTO: 6.3 K/UL — SIGNIFICANT CHANGE UP (ref 1.8–7.4)
NEUTROPHILS NFR BLD AUTO: 84.5 % — HIGH (ref 43–77)
PLATELET # BLD AUTO: 134 K/UL — LOW (ref 150–400)
RBC # BLD: 4.33 M/UL — SIGNIFICANT CHANGE UP (ref 4.2–5.8)
RBC # FLD: 12.9 % — SIGNIFICANT CHANGE UP (ref 10.3–14.5)
WBC # BLD: 7.5 K/UL — SIGNIFICANT CHANGE UP (ref 3.8–10.5)
WBC # FLD AUTO: 7.5 K/UL — SIGNIFICANT CHANGE UP (ref 3.8–10.5)

## 2019-01-07 PROCEDURE — 70553 MRI BRAIN STEM W/O & W/DYE: CPT | Mod: 26

## 2019-01-07 PROCEDURE — 70553 MRI BRAIN STEM W/O & W/DYE: CPT

## 2019-01-07 PROCEDURE — A9585: CPT

## 2019-01-07 PROCEDURE — 99215 OFFICE O/P EST HI 40 MIN: CPT

## 2019-01-09 ENCOUNTER — APPOINTMENT (OUTPATIENT)
Dept: NEUROSURGERY | Facility: CLINIC | Age: 79
End: 2019-01-09
Payer: MEDICARE

## 2019-01-09 VITALS
TEMPERATURE: 98.3 F | RESPIRATION RATE: 16 BRPM | HEART RATE: 67 BPM | DIASTOLIC BLOOD PRESSURE: 69 MMHG | OXYGEN SATURATION: 95 % | SYSTOLIC BLOOD PRESSURE: 111 MMHG | HEIGHT: 71 IN | BODY MASS INDEX: 27.58 KG/M2 | WEIGHT: 197 LBS

## 2019-01-09 PROCEDURE — 99215 OFFICE O/P EST HI 40 MIN: CPT

## 2019-01-11 NOTE — HISTORY OF PRESENT ILLNESS
[FreeTextEntry1] : 77 yo male presents to the office for a follow up s/p gross total removal of the tumor on 8/3/18. Pathology showed GBM. Initially, he presented with  left facial droop, slurred speech, dysphagia. CT of the head showed right brain lesion. MRI showed a 2.1 x 1.7 x 1.4cm right parietal mass without significant edema. \par \par Today, he remains with slurred speech and dysphagia which has improved. He still has not been evaluated at [facility]. He states he did not go. \par He denies headache, weakness, vision changes, seizures, or seizure activity. Keppra 1000mg BID and Decadron 2 mg daily for recent cerebral edema. \par He completed chemo and radiation  8/31/18 - 10/12/18\par 12/10/18  2 cycles of temozolomide \par 12/10/18  presented to ED s/p fall and unable to get up; imaging with increased cerebral edema. \par MRI stable\par \par Plan: fu with Dr. Harman\par \par

## 2019-01-11 NOTE — ASSESSMENT
[FreeTextEntry1] : 2019\par \par \par \par Re:	Bethel Day \par :	1940\par \par The patient is status post craniotomy for tumor on August 3, 2018.  At the present time, he remains neurologically intact.  There is an increasing zone of enhancement subjacent to the craniotomy site that measures 2.5 x 3.2 x 2.6, and this has increased in size.  This is concerning for progression of disease.  He remains on Temodar.  He remains intact.  I will speak to Dr. Harman regarding the options.\par \par Of concern, there is a fair amount of edema and T2 abnormality around the mass.  We may consider resecting this recurrence and I will speak to Dr. Harman.\par \par \par \par Vinny Rivers M.D., F.A.C.S.\par [facility]\par \par \par

## 2019-01-11 NOTE — PHYSICAL EXAM
[General Appearance - Alert] : alert [General Appearance - In No Acute Distress] : in no acute distress [Fluency] : fluency intact [Cranial Nerves Oculomotor (III)] : extraocular motion intact [Cranial Nerves Vestibulocochlear (VIII)] : hearing was intact bilaterally [Cranial Nerves Glossopharyngeal (IX)] : tongue and palate midline [Cranial Nerves Accessory (XI - Cranial And Spinal)] : head turning and shoulder shrug symmetric [Cranial Nerves Hypoglossal (XII)] : there was no tongue deviation with protrusion [Motor Strength] : muscle strength was normal in all four extremities [Motor Handedness Right-Handed] : the patient is right hand dominant [Sensation Tactile Decrease] : light touch was intact [Extraocular Movements] : extraocular movements were intact [Outer Ear] : the ears and nose were normal in appearance [Neck Appearance] : the appearance of the neck was normal [] : no respiratory distress [Respiration, Rhythm And Depth] : normal respiratory rhythm and effort [Exaggerated Use Of Accessory Muscles For Inspiration] : no accessory muscle use [Heart Rate And Rhythm] : heart rate was normal and rhythm regular [Abnormal Walk] : normal gait [Involuntary Movements] : no involuntary movements were seen [Skin Color & Pigmentation] : normal skin color and pigmentation [Skin Turgor] : normal skin turgor [Past-pointing] : there was no past-pointing [Coordination - Dysmetria Impaired Finger-to-Nose Bilateral] : not present [FreeTextEntry5] : left droop,  [de-identified] : s

## 2019-01-12 NOTE — ED ADULT NURSE NOTE - NS PRO PASSIVE SMOKE EXP
Provider notified about elevated temp
Questioned if patient can have nicotine patch applied and nicotine inhaler at same time
patient sodium 133
No

## 2019-01-28 ENCOUNTER — APPOINTMENT (OUTPATIENT)
Dept: NEUROLOGY | Facility: CLINIC | Age: 79
End: 2019-01-28
Payer: MEDICARE

## 2019-01-28 ENCOUNTER — RESULT REVIEW (OUTPATIENT)
Age: 79
End: 2019-01-28

## 2019-01-28 ENCOUNTER — APPOINTMENT (OUTPATIENT)
Dept: HEMATOLOGY ONCOLOGY | Facility: CLINIC | Age: 79
End: 2019-01-28

## 2019-01-28 LAB
BASOPHILS # BLD AUTO: 0 K/UL — SIGNIFICANT CHANGE UP (ref 0–0.2)
BASOPHILS NFR BLD AUTO: 0.2 % — SIGNIFICANT CHANGE UP (ref 0–2)
EOSINOPHIL # BLD AUTO: 0.1 K/UL — SIGNIFICANT CHANGE UP (ref 0–0.5)
EOSINOPHIL NFR BLD AUTO: 0.9 % — SIGNIFICANT CHANGE UP (ref 0–6)
HCT VFR BLD CALC: 40.4 % — SIGNIFICANT CHANGE UP (ref 39–50)
HGB BLD-MCNC: 14.1 G/DL — SIGNIFICANT CHANGE UP (ref 13–17)
LYMPHOCYTES # BLD AUTO: 1.2 K/UL — SIGNIFICANT CHANGE UP (ref 1–3.3)
LYMPHOCYTES # BLD AUTO: 14.4 % — SIGNIFICANT CHANGE UP (ref 13–44)
MCHC RBC-ENTMCNC: 32.1 PG — SIGNIFICANT CHANGE UP (ref 27–34)
MCHC RBC-ENTMCNC: 34.8 G/DL — SIGNIFICANT CHANGE UP (ref 32–36)
MCV RBC AUTO: 92.1 FL — SIGNIFICANT CHANGE UP (ref 80–100)
MONOCYTES # BLD AUTO: 0.5 K/UL — SIGNIFICANT CHANGE UP (ref 0–0.9)
MONOCYTES NFR BLD AUTO: 6.2 % — SIGNIFICANT CHANGE UP (ref 2–14)
NEUTROPHILS # BLD AUTO: 6.4 K/UL — SIGNIFICANT CHANGE UP (ref 1.8–7.4)
NEUTROPHILS NFR BLD AUTO: 78.4 % — HIGH (ref 43–77)
PLATELET # BLD AUTO: 203 K/UL — SIGNIFICANT CHANGE UP (ref 150–400)
RBC # BLD: 4.38 M/UL — SIGNIFICANT CHANGE UP (ref 4.2–5.8)
RBC # FLD: 13 % — SIGNIFICANT CHANGE UP (ref 10.3–14.5)
WBC # BLD: 8.1 K/UL — SIGNIFICANT CHANGE UP (ref 3.8–10.5)
WBC # FLD AUTO: 8.1 K/UL — SIGNIFICANT CHANGE UP (ref 3.8–10.5)

## 2019-01-28 PROCEDURE — 99215 OFFICE O/P EST HI 40 MIN: CPT

## 2019-01-28 NOTE — PHYSICAL EXAM
[General Appearance - Alert] : alert [General Appearance - In No Acute Distress] : in no acute distress [Oriented To Time, Place, And Person] : oriented to person, place, and time [Impaired Insight] : insight and judgment were intact [Affect] : the affect was normal [Mood] : the mood was normal [Memory Recent] : recent memory was not impaired [Memory Remote] : remote memory was not impaired [Person] : oriented to person [Place] : oriented to place [Time] : oriented to time [Short Term Intact] : short term memory intact [Remote Intact] : remote memory intact [Registration Intact] : recent registration memory intact [Span Intact] : the attention span was normal [Concentration Intact] : normal concentrating ability [Visual Intact] : visual attention was ~T not ~L decreased [Naming Objects] : no difficulty naming common objects [Repeating Phrases] : no difficulty repeating a phrase [Fluency] : fluency intact [Comprehension] : comprehension intact [Reading] : reading intact [Current Events] : adequate knowledge of current events [Past History] : adequate knowledge of personal past history [Cranial Nerves Optic (II)] : visual acuity intact bilaterally,  visual fields full to confrontation, pupils equal round and reactive to light [Cranial Nerves Oculomotor (III)] : extraocular motion intact [Cranial Nerves Trigeminal (V)] : facial sensation intact symmetrically [Cranial Nerves Vestibulocochlear (VIII)] : hearing was intact bilaterally [Cranial Nerves Glossopharyngeal (IX)] : tongue and palate midline [Cranial Nerves Accessory (XI - Cranial And Spinal)] : head turning and shoulder shrug symmetric [Cranial Nerves Hypoglossal (XII)] : there was no tongue deviation with protrusion [Motor Tone] : muscle tone was normal in all four extremities [Motor Strength] : muscle strength was normal in all four extremities [Involuntary Movements] : no involuntary movements were seen [No Muscle Atrophy] : normal bulk in all four extremities [Sensation Tactile Decrease] : light touch was intact [Abnormal Walk] : normal gait [Balance] : balance was intact [Sclera] : the sclera and conjunctiva were normal [PERRL With Normal Accommodation] : pupils were equal in size, round, reactive to light, with normal accommodation [Extraocular Movements] : extraocular movements were intact [Full Visual Field] : full visual field [Oropharynx] : the oropharynx was normal [Respiration, Rhythm And Depth] : normal respiratory rhythm and effort [Skin Color & Pigmentation] : normal skin color and pigmentation [Past-pointing] : there was no past-pointing [Tremor] : no tremor present [Dysdiadochokinesia Bilaterally] : not present [Coordination - Dysmetria Impaired Finger-to-Nose Bilateral] : not present [Coordination - Dysmetria Impaired Heel-to-Shin Bilateral] : not present [FreeTextEntry5] : left facial droop  [FreeTextEntry7] : decreased sensation L facial  [FreeTextEntry1] : mild edema b/l ankles

## 2019-01-28 NOTE — DATA REVIEWED
[de-identified] : I personally reviewed MR imaging dated\par 1/7/19	12/11/18	10/24/18	8/4/18\par \par In reviewing these images, I find on 12/11/18 there was MARKED INTERVAL PROGRESSION OF HYPERINTENSITY in the right posterior frontal lobe on the T2 weighted images, with signal change likely representing an admixture of treatment effects, cerebral edema, or low grade neoplasm. \par I am concerned about subtle interval increased enhancement within the tumor bed, although there is not >25% increase in the size of the lesion.\par On the contrast enhanced images, there is right posterior frontal enhancement that is stable since 12/11/18 and between 12/11/18 and 10/24/18, but progressive between 8/4/18 and 1/7/19.\par Overall I find the images to be minimally worse, but not meeting criteria for POD.\par Selected imaging was provided to the patient, along with a detailed verbal explanation of the issues at hand as outlined above.\par

## 2019-01-28 NOTE — DISCUSSION/SUMMARY
[FreeTextEntry1] : Brain tumor - He is clinically stable, although he has been unable to taper off decadron. We reviewed his recent imaging with Dr. Rivers, and discussed concern for POD vs. treatment effects. We recommended resection, which would likely improve his quality of life, as well as provide pathology to guide further treatment (radiation necrosis vs actively growing tumor). He agrees to resection and will meet with Dr. Rivers tomorrow. i called Matthew's office and spoke with the team.\par \par DISPO - All questions answered. Follow-up visit after surgery.

## 2019-01-28 NOTE — HISTORY OF PRESENT ILLNESS
[FreeTextEntry1] : This is a pleasant 78 year old man with right frontal GBM. 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 \par - completed chemoradiation - 8/31/18 - 10/12/18\par - 11/12/18 - temozolomide cycle #1, dose 320mg (150mg/m2) \par - 12/10/18 - temozolomide cycle #2, dose 400mg \par - 12/10/18 - presented to ED s/p fall and unable to get up; imaging with increased cerebral edema \par - 1/10/19 - temozolomide cycle #3, dose 400mg \par \par He presents today for routine follow-up. \par \par Facial twitching has resolved on Keppra 1000mg BID. \par \par Currently on decadron 2mg daily. Left side coordination and strength are good. \par \par No headaches, no n/v, no diplopia. \par \par Tolerated temozolomide cycle well last month. \par \par He is going to the gym regularly and planning to start playing baseball again. \par

## 2019-01-29 ENCOUNTER — APPOINTMENT (OUTPATIENT)
Dept: NEUROSURGERY | Facility: CLINIC | Age: 79
End: 2019-01-29
Payer: MEDICARE

## 2019-01-29 VITALS
HEIGHT: 71 IN | WEIGHT: 195 LBS | HEART RATE: 66 BPM | BODY MASS INDEX: 27.3 KG/M2 | TEMPERATURE: 98.3 F | RESPIRATION RATE: 16 BRPM | SYSTOLIC BLOOD PRESSURE: 134 MMHG | DIASTOLIC BLOOD PRESSURE: 74 MMHG | OXYGEN SATURATION: 94 %

## 2019-01-29 DIAGNOSIS — C71.9 MALIGNANT NEOPLASM OF BRAIN, UNSPECIFIED: ICD-10-CM

## 2019-01-29 PROCEDURE — 99215 OFFICE O/P EST HI 40 MIN: CPT | Mod: 57

## 2019-01-31 ENCOUNTER — OTHER (OUTPATIENT)
Age: 79
End: 2019-01-31

## 2019-02-03 ENCOUNTER — FORM ENCOUNTER (OUTPATIENT)
Age: 79
End: 2019-02-03

## 2019-02-04 ENCOUNTER — APPOINTMENT (OUTPATIENT)
Dept: MRI IMAGING | Facility: HOSPITAL | Age: 79
End: 2019-02-04

## 2019-02-04 ENCOUNTER — OUTPATIENT (OUTPATIENT)
Dept: OUTPATIENT SERVICES | Facility: HOSPITAL | Age: 79
LOS: 1 days | End: 2019-02-04
Payer: MEDICARE

## 2019-02-04 VITALS
HEART RATE: 79 BPM | HEIGHT: 71.5 IN | TEMPERATURE: 98 F | RESPIRATION RATE: 17 BRPM | DIASTOLIC BLOOD PRESSURE: 80 MMHG | OXYGEN SATURATION: 96 % | WEIGHT: 203.05 LBS | SYSTOLIC BLOOD PRESSURE: 128 MMHG

## 2019-02-04 DIAGNOSIS — G93.9 DISORDER OF BRAIN, UNSPECIFIED: ICD-10-CM

## 2019-02-04 DIAGNOSIS — Z90.79 ACQUIRED ABSENCE OF OTHER GENITAL ORGAN(S): Chronic | ICD-10-CM

## 2019-02-04 DIAGNOSIS — D17.0 BENIGN LIPOMATOUS NEOPLASM OF SKIN AND SUBCUTANEOUS TISSUE OF HEAD, FACE AND NECK: Chronic | ICD-10-CM

## 2019-02-04 DIAGNOSIS — C44.711 BASAL CELL CARCINOMA OF SKIN OF UNSPECIFIED LOWER LIMB, INCLUDING HIP: Chronic | ICD-10-CM

## 2019-02-04 DIAGNOSIS — Z90.89 ACQUIRED ABSENCE OF OTHER ORGANS: Chronic | ICD-10-CM

## 2019-02-04 DIAGNOSIS — Z98.890 OTHER SPECIFIED POSTPROCEDURAL STATES: Chronic | ICD-10-CM

## 2019-02-04 DIAGNOSIS — C71.9 MALIGNANT NEOPLASM OF BRAIN, UNSPECIFIED: ICD-10-CM

## 2019-02-04 DIAGNOSIS — Z01.818 ENCOUNTER FOR OTHER PREPROCEDURAL EXAMINATION: ICD-10-CM

## 2019-02-04 DIAGNOSIS — C71.1 MALIGNANT NEOPLASM OF FRONTAL LOBE: ICD-10-CM

## 2019-02-04 DIAGNOSIS — Z98.41 CATARACT EXTRACTION STATUS, RIGHT EYE: Chronic | ICD-10-CM

## 2019-02-04 LAB — HBA1C BLD-MCNC: 5.7 % — HIGH (ref 4–5.6)

## 2019-02-04 PROCEDURE — 70553 MRI BRAIN STEM W/O & W/DYE: CPT | Mod: 26

## 2019-02-04 PROCEDURE — 86901 BLOOD TYPING SEROLOGIC RH(D): CPT

## 2019-02-04 PROCEDURE — 87640 STAPH A DNA AMP PROBE: CPT

## 2019-02-04 PROCEDURE — 86850 RBC ANTIBODY SCREEN: CPT

## 2019-02-04 PROCEDURE — 86900 BLOOD TYPING SEROLOGIC ABO: CPT

## 2019-02-04 PROCEDURE — 70553 MRI BRAIN STEM W/O & W/DYE: CPT

## 2019-02-04 PROCEDURE — A9585: CPT

## 2019-02-04 PROCEDURE — 83036 HEMOGLOBIN GLYCOSYLATED A1C: CPT

## 2019-02-04 PROCEDURE — 87641 MR-STAPH DNA AMP PROBE: CPT

## 2019-02-04 PROCEDURE — 85027 COMPLETE CBC AUTOMATED: CPT

## 2019-02-04 PROCEDURE — 80048 BASIC METABOLIC PNL TOTAL CA: CPT

## 2019-02-04 PROCEDURE — G0463: CPT

## 2019-02-04 RX ORDER — SAXAGLIPTIN 5 MG/1
1 TABLET, FILM COATED ORAL
Qty: 0 | Refills: 0 | COMMUNITY

## 2019-02-04 RX ORDER — ASCORBIC ACID 60 MG
1 TABLET,CHEWABLE ORAL
Qty: 0 | Refills: 0 | COMMUNITY

## 2019-02-04 RX ORDER — ROSUVASTATIN CALCIUM 5 MG/1
1 TABLET ORAL
Qty: 0 | Refills: 0 | COMMUNITY

## 2019-02-04 RX ORDER — OMEGA-3 ACID ETHYL ESTERS 1 G
1200 CAPSULE ORAL
Qty: 0 | Refills: 0 | COMMUNITY

## 2019-02-04 RX ORDER — NIFEDIPINE 30 MG
1 TABLET, EXTENDED RELEASE 24 HR ORAL
Qty: 0 | Refills: 0 | COMMUNITY

## 2019-02-04 RX ORDER — SODIUM CHLORIDE 9 MG/ML
3 INJECTION INTRAMUSCULAR; INTRAVENOUS; SUBCUTANEOUS EVERY 8 HOURS
Qty: 0 | Refills: 0 | Status: DISCONTINUED | OUTPATIENT
Start: 2019-02-07 | End: 2019-02-07

## 2019-02-04 RX ORDER — CHOLECALCIFEROL (VITAMIN D3) 125 MCG
1 CAPSULE ORAL
Qty: 0 | Refills: 0 | COMMUNITY

## 2019-02-04 RX ORDER — PSYLLIUM SEED (WITH DEXTROSE)
1 POWDER (GRAM) ORAL
Qty: 0 | Refills: 0 | COMMUNITY

## 2019-02-04 RX ORDER — LIDOCAINE HCL 20 MG/ML
0.2 VIAL (ML) INJECTION ONCE
Qty: 0 | Refills: 0 | Status: DISCONTINUED | OUTPATIENT
Start: 2019-02-07 | End: 2019-02-07

## 2019-02-04 RX ORDER — PREGABALIN 225 MG/1
2 CAPSULE ORAL
Qty: 0 | Refills: 0 | COMMUNITY

## 2019-02-04 RX ORDER — ALLOPURINOL 300 MG
1 TABLET ORAL
Qty: 0 | Refills: 0 | COMMUNITY

## 2019-02-04 RX ORDER — UBIDECARENONE 100 MG
2 CAPSULE ORAL
Qty: 0 | Refills: 0 | COMMUNITY

## 2019-02-04 RX ORDER — CEFAZOLIN SODIUM 1 G
2000 VIAL (EA) INJECTION ONCE
Qty: 0 | Refills: 0 | Status: DISCONTINUED | OUTPATIENT
Start: 2019-02-07 | End: 2019-02-09

## 2019-02-04 NOTE — H&P PST ADULT - HISTORY OF PRESENT ILLNESS
78 year old male with history of brain cancer s/p surgery, chemotherapy and radiation.   Now for surgery due to regrowth of tumor.

## 2019-02-04 NOTE — H&P PST ADULT - PSH
Basal cell carcinoma (BCC) of lower leg  s/p resection  H/O bilateral cataract extraction  3-4 years ago  History of tonsillectomy  at age 30  Lipoma of neck  s/p resection at age 30  S/P brain surgery  8/2018  S/P colonoscopic polypectomy  3 years benign polyps  S/P prostatectomy  1995  S/P tonsillectomy  age 30

## 2019-02-04 NOTE — H&P PST ADULT - PMH
Basal cell carcinoma (BCC) of left lower leg  s/p resection  right lower leg top  left lower leg  Bilateral hearing loss, unspecified hearing loss type    Glioblastoma  biopsy 8/2018  surgery  6 weeks radiation  35 days of oral chemotherapy last 3 weeks  HLD (hyperlipidemia)    HTN (hypertension)     activity  [employer] 1962-65  [facility] [place]  Prostate cancer  s/p radical prostatectomy 1995  Type 2 diabetes mellitus without complication, without long-term current use of insulin  stop oral meds  3 weeks ago Basal cell carcinoma (BCC) of left lower leg  s/p resection  right lower leg top  left lower leg  Bilateral hearing loss, unspecified hearing loss type    Fall, sequela    Glioblastoma  biopsy 8/2018  surgery  6 weeks radiation  35 days of oral chemotherapy last 3 weeks  HLD (hyperlipidemia)    HTN (hypertension)     activity  [employer] 1962-65    [place]  Prostate cancer  s/p radical prostatectomy 1995  Type 2 diabetes mellitus without complication, without long-term current use of insulin  stop oral meds  3 weeks ago

## 2019-02-04 NOTE — H&P PST ADULT - ASSESSMENT
CAPRINI SCORE [CLOT]    AGE RELATED RISK FACTORS                                                       MOBILITY RELATED FACTORS  [ ] Age 41-60 years                                            (1 Point)                  [ ] Bed rest                                                        (1 Point)  [ ] Age: 61-74 years                                           (2 Points)                 [ ] Plaster cast                                                   (2 Points)  [x ] Age= 75 years                                              (3 Points)                 [ ] Bed bound for more than 72 hours                 (2 Points)    DISEASE RELATED RISK FACTORS                                               GENDER SPECIFIC FACTORS  [ ] Edema in the lower extremities                       (1 Point)                  [ ] Pregnancy                                                     (1 Point)  [ ] Varicose veins                                               (1 Point)                  [ ] Post-partum < 6 weeks                                   (1 Point)             [x ] BMI > 25 Kg/m2                                            (1 Point)                  [ ] Hormonal therapy  or oral contraception          (1 Point)                 [ ] Sepsis (in the previous month)                        (1 Point)                  [ ] History of pregnancy complications                 (1 point)  [ ] Pneumonia or serious lung disease                                               [ ] Unexplained or recurrent                     (1 Point)           (in the previous month)                               (1 Point)  [ ] Abnormal pulmonary function test                     (1 Point)                 SURGERY RELATED RISK FACTORS  [ ] Acute myocardial infarction                              (1 Point)                 [ ]  Section                                             (1 Point)  [ ] Congestive heart failure (in the previous month)  (1 Point)               [ ] Minor surgery                                                  (1 Point)   [ ] Inflammatory bowel disease                             (1 Point)                 [ ] Arthroscopic surgery                                        (2 Points)  [ ] Central venous access                                      (2 Points)                [x ] General surgery lasting more than 45 minutes   (2 Points)       [ ] Stroke (in the previous month)                          (5 Points)               [ ] Elective arthroplasty                                         (5 Points)                                                                                                                                               HEMATOLOGY RELATED FACTORS                                                 TRAUMA RELATED RISK FACTORS  [ ] Prior episodes of VTE                                     (3 Points)                 [ ] Fracture of the hip, pelvis, or leg                       (5 Points)  [ ] Positive family history for VTE                         (3 Points)                 [ ] Acute spinal cord injury (in the previous month)  (5 Points)  [ ] Prothrombin 33011 A                                     (3 Points)                 [ ] Paralysis  (less than 1 month)                             (5 Points)  [ ] Factor V Leiden                                             (3 Points)                  [ ] Multiple Trauma within 1 month                        (5 Points)  [ ] Lupus anticoagulants                                     (3 Points)                                                           [ ] Anticardiolipin antibodies                               (3 Points)                                                       [ ] High homocysteine in the blood                      (3 Points)                                             [ ] Other congenital or acquired thrombophilia      (3 Points)                                                [ ] Heparin induced thrombocytopenia                  (3 Points)                                          Total Score [        6  ]  The Caprini score indicates that this patient is at high risk for a VTE event (score = 6).    Ssurgical patients in this group will benefit from both pharmacologic prophylaxis and intermittent compression devices.    The surgical team will determine the balance between VTE risk and bleeding risk, and other clinical considerations.

## 2019-02-04 NOTE — H&P PST ADULT - NEUROLOGICAL COMMENTS
had brain surgery was on Keppra. Keppra was stopped twitching left face s/p first brain surgery placed back on Keppra

## 2019-02-04 NOTE — H&P PST ADULT - GENITOURINARY
HISTORY OF PRESENT ILLNESS:  Benedicto Abel is a 54 year old male who comes in today complaining of URI   started 3 days ago. Symptoms are improved. Associated symptoms include sinus congestion, postnasal drip Drainage or sputum is characterized as clear. For symptom relief Benedicto Abel has tried over-the-counter Tylenol      No current outpatient prescriptions on file prior to visit.  No current facility-administered medications on file prior to visit.   ALLERGIES:  No Known Allergies   reports that he has never smoked. He has never used smokeless tobacco.       REVIEW OF SYSTEMS:  A three point review of systems was performed.  All pertinent positives and negatives were noted in the History of Present Illness:      PHYSICAL EXAMINATION:  Visit Vitals  Pulse 67   Temp 98 °F (36.7 °C) (Oral)   Resp 16   Ht 5' 10\" (1.778 m)   Wt 63 kg   SpO2 97%   BMI 19.94 kg/m²     General: Healthy  Lymphatic: No lymphadenopathy  Eyes: conjunctivae/sclerae normal. No erythema, edema or exudate.  Nose: nares with clear discharge  Mouth: Positive findings: posterior pharyngeal drainage.  Ears: Positive findings: right tympanic membrane - bulging, left tympanic membrane - bulging   Sinuses:  nontender  Heart:  regular rate and rhythm  Lungs:  lungs are clear to auscultation          ASSESSMENT:  1. Acute URI          PLAN:    No orders of the defined types were placed in this encounter.    Patient education materials given.  Patient to follow up with Primary Care Provider if symptoms worsen or fail to improve.     negative
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
## 2019-02-05 PROBLEM — C71.9 MALIGNANT NEOPLASM OF BRAIN, UNSPECIFIED: Chronic | Status: ACTIVE | Noted: 2019-02-04

## 2019-02-05 PROBLEM — C44.719 BASAL CELL CARCINOMA OF SKIN OF LEFT LOWER LIMB, INCLUDING HIP: Chronic | Status: ACTIVE | Noted: 2018-08-01

## 2019-02-05 PROBLEM — C61 MALIGNANT NEOPLASM OF PROSTATE: Chronic | Status: ACTIVE | Noted: 2018-08-01

## 2019-02-05 NOTE — PATIENT PROFILE ADULT. - LONGEST PERIOD TOBACCO-FREE
"ED Provider Note    Scribed for Richie Morel M.D. by Romeo Kemp. 2/5/2019  3:07 PM    Primary care provider: Pcp Pt States None  Means of arrival: EMS  History obtained from: Patient  History limited by: None    CHIEF COMPLAINT  Chief Complaint   Patient presents with   • T-5000 FALL     fall down 5 stairs       [facility]  Rosalba Wagner is a 89 y.o. female who presents to the Emergency Department via EMS for injuries sustained during a fall that occured earlier today. Patient was walking down a flight of stairs with her walker when a wheel got caught, causing her to fall down the stairs onto her right shoulder. She denies any loss of consciousness or head injury. She now complains of right shoulder pain and mild neck pain. She denies abdominal pain.    REVIEW OF SYSTEMS  Pertinent negatives include no abdominal pain, head injury, loss of consciousness.  See HPI for further details.     PAST MEDICAL HISTORY   has a past medical history of Chest pain, atypical (8/21/2012); Dementia; Left arm numbness (8/21/2012); OSTEOPOROSIS (8/21/2012); and Syncope (8/21/2012).    SURGICAL HISTORY  patient denies any surgical history    SOCIAL HISTORY  Social History   Substance Use Topics   • Smoking status: Former Smoker     Quit date: 9/11/1984   • Smokeless tobacco: Never Used   • Alcohol use No      History   Drug Use No       FAMILY HISTORY  Family History   Problem Relation Age of Onset   • No Known Problems Mother    • No Known Problems Father        CURRENT MEDICATIONS    Current Outpatient Prescriptions on File Prior to Encounter   Medication Sig Dispense Refill   • therapeutic multivitamin-minerals (THERAGRAN-M) Tab Take 1 Tab by mouth every day.     • NON SPECIFIED Place 1 Drop in left eye every day. RX Eye drop Unknown Name and Strength        ALLERGIES  No Known Allergies    PHYSICAL EXAM  VITAL SIGNS: Pulse 91   Temp 36.3 °C (97.3 °F) (Oral)   Resp 18   Ht 1.575 m (5' 2\")   Wt 44.5 kg (98 lb)   SpO2 93% "   BMI 17.92 kg/m²   Constitutional: Well developed, Well nourished, No acute distress, Non-toxic appearance.   HENT: Normocephalic, Atraumatic, Bilateral external ears normal, Oropharynx is clear mucous membranes are moist. No oral exudates or nasal discharge.   Eyes: Pupils are equal round and reactive, EOMI, Conjunctiva normal, No discharge.   Neck: Normal range of motion, No tenderness, Supple, No stridor. No meningismus.  Lymphatic: No lymphadenopathy noted.   Cardiovascular: Regular rate and rhythm without murmur rub or gallop.  Thorax & Lungs: Clear breath sounds bilaterally without wheezes, rhonchi or rales. There is no chest wall tenderness.   Abdomen: Soft non-tender non-distended. There is no rebound or guarding. No organomegaly is appreciated. Bowel sounds are normal.  Skin: Without rash. Abrasion of the right proximal tibial and left distal dibial   Back: No CVA or spinal tenderness.   Extremities: Intact distal pulses, No edema, No cyanosis, No clubbing. Capillary refill is less than 2 seconds. Tenderness of right shoulder, good range of motion  Musculoskeletal: Good range of motion in all major joints. No tenderness to palpation or major deformities noted.   Neurologic: Alert & oriented x 3, Normal motor function, Normal sensory function, No focal deficits noted. Reflexes are normal.  Psychiatric: Affect normal, Judgment normal, Mood normal. There is no suicidal ideation or patient reported hallucinations.       DIAGNOSTIC STUDIES / PROCEDURES    RADIOLOGY  DX-SHOULDER 2+ RIGHT   Final Result      1.  Acute, closed, mildly displaced fracture of the distal clavicle.   2.  No humeral fractures.        The radiologist's interpretation of all radiological studies have been reviewed by me.    COURSE & MEDICAL DECISION MAKING  Nursing notes, DANIELLE PMSFHx reviewed in chart.    3:07 PM Patient seen and examined at bedside. Informed her that imaging would assess her shoulder injury. Ordered for DX right shoulder  to evaluate.    X-ray reveals an acute closed mildly displaced fracture of the distal clavicle but no humerus fracture.  This should not impair her function and she is pain controlled at this time.    She uses a walker and we will be discharging her home with follow-up to her primary care doctor as she will not need orthopedic intervention for this injury.  She is placed in a sling and given instructions on pain control with over-the-counter agents    Patient has had high blood pressure while in the emergency department, felt likely secondary to medical condition. Counseled patient to monitor blood pressure at home and follow up with primary care physician.    I have signed into and reviewed the patient's prescription monitoring program data prior to prescribing a scheduled drug. The patient will not drink alcohol nor drive with prescribed medications. The patient will return for new or worsening symptoms and is stable at the time of discharge.    DISPOSITION:  Patient will be discharged home in stable condition.    FINAL IMPRESSION  1. Closed nondisplaced fracture of acromial end of right clavicle, initial encounter          Romeo NAVARRETE (Urvashi), am scribing for, and in the presence of, Richie Morel M.D..    Electronically signed by: Romeo Kemp (Urvashi), 2/5/2019    IRichie M.D. personally performed the services described in this documentation, as scribed by Romeo Kemp in my presence, and it is both accurate and complete. E.    The note accurately reflects work and decisions made by me.  Richie Morel  2/5/2019  4:33 PM         quit in 1988

## 2019-02-06 ENCOUNTER — TRANSCRIPTION ENCOUNTER (OUTPATIENT)
Age: 79
End: 2019-02-06

## 2019-02-06 NOTE — HISTORY OF PRESENT ILLNESS
[FreeTextEntry1] : 77 yo male presents to the office for a follow up s/p gross total removal of a right frontal GBM on 8/3/18 and to discuss possible reresection for POD. Initially, he presented with left facial droop, slurred speech, dysphagia. CT of the head showed right brain lesion. MRI showed a 2.1 x 1.7 x 1.4cm right parietal mass without significant edema. \par \par Today, he remains with slurred speech and dysphagia which has improved.\par He denies headache, weakness, vision changes, seizures, or seizure activity. He attends the gym and plans on playing baseball during season. \par  Keppra 1000mg BID and Decadron 2 mg daily for recent cerebral edema.\par He completed chemo and radiation 8/31/18 - 10/12/18\par 12/10/18 2 cycles of temozolomide \par 12/10/18 presented to ED s/p fall and unable to get up; imaging with increased cerebral edema. \par MRI stable.\par 1/10/19 cycle #3 Temozolomide.\par MRI with minimal progression between Aug 2018- Jan 2019.\par Discussion taken place that Dr. Rivers feels this tumor should be operated on. \par \par Plan: OR next Thursday\par MRI stereotactic w wo pre op\par medical clearance\par

## 2019-02-06 NOTE — ASSESSMENT
[FreeTextEntry1] : 2019\par \par \par \par Re:	Bethel Day \par :	1940\par \par The patient is back today, and I have discussed the case again with Dr. Harman.  In my opinion, and Dr. Harman agrees, he is a candidate for reoperation for the recurrent glioma.  We are planning this for next week with [employer] navigation.  He will obtain a new scan so there will be a current scan (the last one was performed 2019, and he will obtain medical clearance.  All indications, risks, and possible complications were explained to the patient and his son.\par \par \par \par Vinny Rivers M.D., F.A.C.S.\par [facility]\par \par

## 2019-02-06 NOTE — PHYSICAL EXAM
[FreeTextEntry1] : Awake, alert, and oriented x 3. VSS. In no apparent distress.   Short and long term memory intact.  Speech is clear and appropriate.  Affect is normal.  Voice is strong.  Respirations easy and even.  Normal skin color and pigmentation.  The sclera and conjunctiva normal.  Ears, nose, and neck normal in appearance.  EOMI, no nystagmus, facial sensation intact decreased on left face, hearing intact bilaterally, tongue and palate midline, head turning and shoulder shrug symmetric and no tongue deviation with protrusion.  No pronator drift.   No past-pointing, no tremors noted, no dysdiadochokinesia, and finger to nose dysmetria was not present.  Romberg negative.  \par Right hand dominant.\par \par Rises from a seated position in a comfortable fashion.\par \par Gait is well coordinated and stable without the use of an assistive device.   Able to perform tandem walk without loss of balance.   Motor strength in the upper extremities 5/5 in the biceps, triceps, and hand .  Motor strength in the lower extremities is 5/5 in the iliopsoas, quadriceps, and hamstrings.  \par \par

## 2019-02-07 ENCOUNTER — RESULT REVIEW (OUTPATIENT)
Age: 79
End: 2019-02-07

## 2019-02-07 ENCOUNTER — APPOINTMENT (OUTPATIENT)
Dept: NEUROSURGERY | Facility: CLINIC | Age: 79
End: 2019-02-07

## 2019-02-07 ENCOUNTER — INPATIENT (INPATIENT)
Facility: HOSPITAL | Age: 79
LOS: 2 days | Discharge: HOME | DRG: 25 | End: 2019-02-10
Attending: NEUROLOGICAL SURGERY | Admitting: NEUROLOGICAL SURGERY
Payer: MEDICARE

## 2019-02-07 VITALS
HEIGHT: 71 IN | OXYGEN SATURATION: 97 % | SYSTOLIC BLOOD PRESSURE: 121 MMHG | WEIGHT: 203.05 LBS | DIASTOLIC BLOOD PRESSURE: 73 MMHG | RESPIRATION RATE: 16 BRPM | HEART RATE: 57 BPM | TEMPERATURE: 98 F

## 2019-02-07 DIAGNOSIS — D17.0 BENIGN LIPOMATOUS NEOPLASM OF SKIN AND SUBCUTANEOUS TISSUE OF HEAD, FACE AND NECK: Chronic | ICD-10-CM

## 2019-02-07 DIAGNOSIS — Z90.89 ACQUIRED ABSENCE OF OTHER ORGANS: Chronic | ICD-10-CM

## 2019-02-07 DIAGNOSIS — Z90.79 ACQUIRED ABSENCE OF OTHER GENITAL ORGAN(S): Chronic | ICD-10-CM

## 2019-02-07 DIAGNOSIS — C44.711 BASAL CELL CARCINOMA OF SKIN OF UNSPECIFIED LOWER LIMB, INCLUDING HIP: Chronic | ICD-10-CM

## 2019-02-07 DIAGNOSIS — G93.9 DISORDER OF BRAIN, UNSPECIFIED: ICD-10-CM

## 2019-02-07 DIAGNOSIS — Z98.890 OTHER SPECIFIED POSTPROCEDURAL STATES: Chronic | ICD-10-CM

## 2019-02-07 DIAGNOSIS — Z98.41 CATARACT EXTRACTION STATUS, RIGHT EYE: Chronic | ICD-10-CM

## 2019-02-07 LAB
GLUCOSE BLDC GLUCOMTR-MCNC: 122 MG/DL — HIGH (ref 70–99)
HCT VFR BLD CALC: 38.8 % — LOW (ref 39–50)
HGB BLD-MCNC: 14.1 G/DL — SIGNIFICANT CHANGE UP (ref 13–17)
MCHC RBC-ENTMCNC: 33.2 PG — SIGNIFICANT CHANGE UP (ref 27–34)
MCHC RBC-ENTMCNC: 36.3 GM/DL — HIGH (ref 32–36)
MCV RBC AUTO: 91.5 FL — SIGNIFICANT CHANGE UP (ref 80–100)
PLATELET # BLD AUTO: 136 K/UL — LOW (ref 150–400)
RBC # BLD: 4.24 M/UL — SIGNIFICANT CHANGE UP (ref 4.2–5.8)
RBC # FLD: 13 % — SIGNIFICANT CHANGE UP (ref 10.3–14.5)
WBC # BLD: 14.3 K/UL — HIGH (ref 3.8–10.5)
WBC # FLD AUTO: 14.3 K/UL — HIGH (ref 3.8–10.5)

## 2019-02-07 PROCEDURE — 61510 CRNEC TREPH EXC BRN TUM STTL: CPT

## 2019-02-07 PROCEDURE — 99291 CRITICAL CARE FIRST HOUR: CPT

## 2019-02-07 PROCEDURE — 88331 PATH CONSLTJ SURG 1 BLK 1SPC: CPT | Mod: 26

## 2019-02-07 PROCEDURE — 69990 MICROSURGERY ADD-ON: CPT | Mod: 59

## 2019-02-07 PROCEDURE — 88307 TISSUE EXAM BY PATHOLOGIST: CPT | Mod: 26

## 2019-02-07 PROCEDURE — 61781 SCAN PROC CRANIAL INTRA: CPT

## 2019-02-07 RX ORDER — NIFEDIPINE 30 MG
90 TABLET, EXTENDED RELEASE 24 HR ORAL DAILY
Qty: 0 | Refills: 0 | Status: DISCONTINUED | OUTPATIENT
Start: 2019-02-07 | End: 2019-02-10

## 2019-02-07 RX ORDER — ATORVASTATIN CALCIUM 80 MG/1
80 TABLET, FILM COATED ORAL AT BEDTIME
Qty: 0 | Refills: 0 | Status: DISCONTINUED | OUTPATIENT
Start: 2019-02-07 | End: 2019-02-10

## 2019-02-07 RX ORDER — VITAMIN E 100 UNIT
400 CAPSULE ORAL DAILY
Qty: 0 | Refills: 0 | Status: DISCONTINUED | OUTPATIENT
Start: 2019-02-07 | End: 2019-02-10

## 2019-02-07 RX ORDER — LEVETIRACETAM 250 MG/1
1000 TABLET, FILM COATED ORAL
Qty: 0 | Refills: 0 | Status: DISCONTINUED | OUTPATIENT
Start: 2019-02-07 | End: 2019-02-10

## 2019-02-07 RX ORDER — DEXAMETHASONE 0.5 MG/5ML
4 ELIXIR ORAL EVERY 6 HOURS
Qty: 0 | Refills: 0 | Status: DISCONTINUED | OUTPATIENT
Start: 2019-02-07 | End: 2019-02-10

## 2019-02-07 RX ORDER — PANTOPRAZOLE SODIUM 20 MG/1
40 TABLET, DELAYED RELEASE ORAL
Qty: 0 | Refills: 0 | Status: DISCONTINUED | OUTPATIENT
Start: 2019-02-07 | End: 2019-02-10

## 2019-02-07 RX ORDER — PSYLLIUM SEED (WITH DEXTROSE)
1 POWDER (GRAM) ORAL DAILY
Qty: 0 | Refills: 0 | Status: DISCONTINUED | OUTPATIENT
Start: 2019-02-07 | End: 2019-02-10

## 2019-02-07 RX ORDER — BENZOCAINE AND MENTHOL 5; 1 G/100ML; G/100ML
1 LIQUID ORAL THREE TIMES A DAY
Qty: 0 | Refills: 0 | Status: DISCONTINUED | OUTPATIENT
Start: 2019-02-07 | End: 2019-02-10

## 2019-02-07 RX ORDER — ALPRAZOLAM 0.25 MG
0.5 TABLET ORAL AT BEDTIME
Qty: 0 | Refills: 0 | Status: DISCONTINUED | OUTPATIENT
Start: 2019-02-07 | End: 2019-02-10

## 2019-02-07 RX ORDER — DOCUSATE SODIUM 100 MG
100 CAPSULE ORAL
Qty: 0 | Refills: 0 | Status: DISCONTINUED | OUTPATIENT
Start: 2019-02-07 | End: 2019-02-09

## 2019-02-07 RX ORDER — NIFEDIPINE 30 MG
90 TABLET, EXTENDED RELEASE 24 HR ORAL DAILY
Qty: 0 | Refills: 0 | Status: DISCONTINUED | OUTPATIENT
Start: 2019-02-07 | End: 2019-02-07

## 2019-02-07 RX ORDER — OMEGA-3 ACID ETHYL ESTERS 1 G
2 CAPSULE ORAL
Qty: 0 | Refills: 0 | Status: DISCONTINUED | OUTPATIENT
Start: 2019-02-07 | End: 2019-02-10

## 2019-02-07 RX ORDER — LOSARTAN POTASSIUM 100 MG/1
50 TABLET, FILM COATED ORAL DAILY
Qty: 0 | Refills: 0 | Status: DISCONTINUED | OUTPATIENT
Start: 2019-02-07 | End: 2019-02-10

## 2019-02-07 RX ORDER — ACETAMINOPHEN 500 MG
650 TABLET ORAL EVERY 6 HOURS
Qty: 0 | Refills: 0 | Status: DISCONTINUED | OUTPATIENT
Start: 2019-02-07 | End: 2019-02-10

## 2019-02-07 RX ORDER — METOPROLOL TARTRATE 50 MG
25 TABLET ORAL DAILY
Qty: 0 | Refills: 0 | Status: DISCONTINUED | OUTPATIENT
Start: 2019-02-07 | End: 2019-02-08

## 2019-02-07 RX ORDER — DEXTROSE MONOHYDRATE, SODIUM CHLORIDE, AND POTASSIUM CHLORIDE 50; .745; 4.5 G/1000ML; G/1000ML; G/1000ML
1000 INJECTION, SOLUTION INTRAVENOUS
Qty: 0 | Refills: 0 | Status: DISCONTINUED | OUTPATIENT
Start: 2019-02-07 | End: 2019-02-08

## 2019-02-07 RX ORDER — LOSARTAN POTASSIUM 100 MG/1
50 TABLET, FILM COATED ORAL DAILY
Qty: 0 | Refills: 0 | Status: DISCONTINUED | OUTPATIENT
Start: 2019-02-07 | End: 2019-02-07

## 2019-02-07 RX ORDER — SENNA PLUS 8.6 MG/1
1 TABLET ORAL DAILY
Qty: 0 | Refills: 0 | Status: DISCONTINUED | OUTPATIENT
Start: 2019-02-07 | End: 2019-02-08

## 2019-02-07 RX ORDER — METOPROLOL TARTRATE 50 MG
25 TABLET ORAL DAILY
Qty: 0 | Refills: 0 | Status: DISCONTINUED | OUTPATIENT
Start: 2019-02-07 | End: 2019-02-07

## 2019-02-07 RX ADMIN — SODIUM CHLORIDE 3 MILLILITER(S): 9 INJECTION INTRAMUSCULAR; INTRAVENOUS; SUBCUTANEOUS at 05:51

## 2019-02-07 RX ADMIN — Medication 4 MILLIGRAM(S): at 17:40

## 2019-02-07 RX ADMIN — Medication 0.5 MILLIGRAM(S): at 23:12

## 2019-02-07 RX ADMIN — Medication 2 GRAM(S): at 17:41

## 2019-02-07 RX ADMIN — DEXTROSE MONOHYDRATE, SODIUM CHLORIDE, AND POTASSIUM CHLORIDE 70 MILLILITER(S): 50; .745; 4.5 INJECTION, SOLUTION INTRAVENOUS at 19:08

## 2019-02-07 RX ADMIN — PANTOPRAZOLE SODIUM 40 MILLIGRAM(S): 20 TABLET, DELAYED RELEASE ORAL at 12:26

## 2019-02-07 RX ADMIN — Medication 4 MILLIGRAM(S): at 23:12

## 2019-02-07 RX ADMIN — Medication 400 INTERNATIONAL UNIT(S): at 17:41

## 2019-02-07 RX ADMIN — Medication 1 TABLET(S): at 12:26

## 2019-02-07 RX ADMIN — LEVETIRACETAM 1000 MILLIGRAM(S): 250 TABLET, FILM COATED ORAL at 17:40

## 2019-02-07 RX ADMIN — Medication 1 PACKET(S): at 17:41

## 2019-02-07 RX ADMIN — Medication 4 MILLIGRAM(S): at 12:27

## 2019-02-07 NOTE — CHART NOTE - NSCHARTNOTEFT_GEN_A_CORE
CAPRINI SCORE [CLOT] Score on Admission for     AGE RELATED RISK FACTORS                                                       MOBILITY RELATED FACTORS  [ ] Age 41-60 years                                            (1 Point)                  [ ] Bed rest                                                        (1 Point)  [ ] Age: 61-74 years                                           (2 Points)                 [ ] Plaster cast                                                   (2 Points)  [ X] Age= 75 years                                              (3 Points)                 [ ] Bed bound for more than 72 hours                 (2 Points)    DISEASE RELATED RISK FACTORS                                               GENDER SPECIFIC FACTORS  [ ] Edema in the lower extremities                       (1 Point)                  [ ] Pregnancy                                                     (1 Point)  [ ] Varicose veins                                               (1 Point)                  [ ] Post-partum < 6 weeks                                   (1 Point)             [X ] BMI > 25 Kg/m2                                            (1 Point)                  [ ] Hormonal therapy  or oral contraception          (1 Point)                 [ ] Sepsis (in the previous month)                        (1 Point)                  [ ] History of pregnancy complications                 (1 point)  [ ] Pneumonia or serious lung disease                                               [ ] Unexplained or recurrent                     (1 Point)           (in the previous month)                               (1 Point)  [ ] Abnormal pulmonary function test                     (1 Point)                 SURGERY RELATED RISK FACTORS (include planned surgeries)  [ ] Acute myocardial infarction                              (1 Point)                 [ ]  Section                                             (1 Point)  [ ] Congestive heart failure (in the previous month)  (1 Point)         [ ] Minor surgery                                                  (1 Point)   [ ] Inflammatory bowel disease                             (1 Point)                 [ ] Arthroscopic surgery                                        (2 Points)  [ ] Central venous access                                      (2 Points)                [ X] General surgery lasting more than 45 minutes   (2 Points)       [ ] Stroke (in the previous month)                          (5 Points)               [ ] Elective arthroplasty                                         (5 Points)            [X ] current or past malignancy                              (2 Points)                                                                                                       HEMATOLOGY RELATED FACTORS                                                 TRAUMA RELATED RISK FACTORS  [ ] Prior episodes of VTE                                     (3 Points)                [ ] Fracture of the hip, pelvis, or leg                       (5 Points)  [ ] Positive family history for VTE                         (3 Points)                 [ ] Acute spinal cord injury (in the previous month)  (5 Points)  [ ] Prothrombin 58389 A                                     (3 Points)                 [ ] Paralysis  (less than 1 month)                             (5 Points)  [ ] Factor V Leiden                                             (3 Points)                  [ ] Multiple Trauma within 1 month                        (5 Points)  [ ] Lupus anticoagulants                                     (3 Points)                                                           [ ] Anticardiolipin antibodies                               (3 Points)                                                       [ ] High homocysteine in the blood                      (3 Points)                                             [ ] Other congenital or acquired thrombophilia      (3 Points)                                                [ ] Heparin induced thrombocytopenia                  (3 Points)                                          Total Score [     8     ]    Risk:  Very low 0   Low 1 to 2   Moderate 3 to 4   High =5       VTE Prophylasix Recommednations:  [ X] mechanical pneumatic compression devices                                      [ ] contraindicated: _____________________  [ ] chemo prophylasix                                                                                   [X ] contraindicated _____Fresh post op patient for tumor resection ________________    **** HIGH LIKELIHOOD DVT PRESENT ON ADMISSION  [X ] (please order LE dopplers within 24 hours of admission)

## 2019-02-07 NOTE — PROGRESS NOTE ADULT - SUBJECTIVE AND OBJECTIVE BOX
POD#0 R crani for tumor resection    Vitals/labs/meds/imaging reviewed  fully oriented, L facial baseline  LUE HG 3/5 and 4/5 bi/tri; LLE 4+/5  R side full strength    CTH in am  keppra for seizures  decadron for cerebral edema  pain control  -160    additional critical care time 20min

## 2019-02-07 NOTE — PROGRESS NOTE ADULT - SUBJECTIVE AND OBJECTIVE BOX
SUMMARY:  78 year old man with history of GBM s/p surgery, chemotherapy and radiation underwent right craniotomy for resection of recurrent tumor on 2/7/19.    HOSPITAL COURSE:  2/7: loss of MEPs intra-op, post-op increased left sided weakness which improved    24 HOUR EVENTS:  Admitted to [facility].    VITALS/DATA/ORDERS: [x] Reviewed    EXAMINATION:  Awake, alert, fully oriented, making jokes, PERR, mild dysarthria, mild left facial asymmetry, LUE hand  2/5 proximally 3/5 biceps/triceps 4/5, LLE 4/5, full strength on the right

## 2019-02-07 NOTE — PROGRESS NOTE ADULT - ASSESSMENT
ASSESSMENT/PLAN: recurrent glioblastoma multiforme status post re-resection, post-operative day 0    NEURO:  CT Head in AM  MRI post-op  Steroids for cerebral edema  Seizure prophylaxis   Pain control  Activity: [x] OOB as tolerated [] Bedrest [x] PT [x] OT [] PMNR    PULM:  Incentive spirometry, mobilize as tolerated    CV:  -150mmHg    RENAL:  IVF until good PO intake    GI:  Diet: Dysphagia screen and then advance diet as tolerated  GI prophylaxis [] not indicated [] PPI [] other:  Bowel regimen [] colace [] senna [] other:    ENDO:   Goal euglycemia (-180)    HEME/ONC:  VTE prophylaxis: [] SCDs [] chemoprophylaxis [] hold chemoprophylaxis due to: [] high risk of DVT/PE on admission due to:    ID:  Noemi-op antibiotics    MISC:    SOCIAL/FAMILY:  [] awaiting [] updated at bedside [] family meeting    CODE STATUS:  [] Full Code [] DNR [] DNI [] Palliative/Comfort Care    DISPOSITION:  [] ICU [] Stroke Unit [] Floor [] EMU [] RCU [] PCU    [] Patient is at high risk of neurologic deterioration/death due to:     Time seen:  Time spent: ___ [] critical care minutes    Contact: [phone] ASSESSMENT/PLAN: recurrent glioblastoma multiforme status post re-resection, post-operative day 0    NEURO:  CT Head in AM  MRI post-op  Steroids for cerebral edema  Seizure prophylaxis   Pain control  Activity: [x] OOB as tolerated [] Bedrest [x] PT [x] OT [] PMNR    PULM:  Incentive spirometry, mobilize as tolerated    CV:  -150mmHg  Continue home BP meds  Statin for HLD    RENAL:  IVF until good PO intake    GI:  Diet: Dysphagia screen and then advance diet as tolerated  GI prophylaxis [] not indicated [x] PPI [] other:  Bowel regimen [x] colace [x] senna [] other:    ENDO:   Goal euglycemia (-180)\  DMII: RISS    HEME/ONC:  VTE prophylaxis: [x] SCDs [] chemoprophylaxis [x] hold chemoprophylaxis due to: fresh post-op [x] high risk of DVT/PE on admission due to: malignancy    ID:  Noemi-op antibiotics    SOCIAL/FAMILY:  [x] awaiting [] updated at bedside [] family meeting    CODE STATUS:  [x] Full Code [] DNR [] DNI [] Palliative/Comfort Care    DISPOSITION:  [x] ICU [] Stroke Unit [] Floor [] EMU [] RCU [] PCU    [x] Patient is at high risk of neurologic deterioration/death due to: intracerebral hemorrhage     Time spent: 45 [x] critical care minutes    Contact: [phone]

## 2019-02-08 DIAGNOSIS — Z29.9 ENCOUNTER FOR PROPHYLACTIC MEASURES, UNSPECIFIED: ICD-10-CM

## 2019-02-08 DIAGNOSIS — D72.829 ELEVATED WHITE BLOOD CELL COUNT, UNSPECIFIED: ICD-10-CM

## 2019-02-08 DIAGNOSIS — E78.5 HYPERLIPIDEMIA, UNSPECIFIED: ICD-10-CM

## 2019-02-08 DIAGNOSIS — C61 MALIGNANT NEOPLASM OF PROSTATE: ICD-10-CM

## 2019-02-08 DIAGNOSIS — I10 ESSENTIAL (PRIMARY) HYPERTENSION: ICD-10-CM

## 2019-02-08 LAB
ANION GAP SERPL CALC-SCNC: 14 MMOL/L — SIGNIFICANT CHANGE UP (ref 5–17)
BUN SERPL-MCNC: 17 MG/DL — SIGNIFICANT CHANGE UP (ref 7–23)
CALCIUM SERPL-MCNC: 9.4 MG/DL — SIGNIFICANT CHANGE UP (ref 8.4–10.5)
CHLORIDE SERPL-SCNC: 104 MMOL/L — SIGNIFICANT CHANGE UP (ref 96–108)
CO2 SERPL-SCNC: 21 MMOL/L — LOW (ref 22–31)
CREAT SERPL-MCNC: 0.77 MG/DL — SIGNIFICANT CHANGE UP (ref 0.5–1.3)
GLUCOSE SERPL-MCNC: 134 MG/DL — HIGH (ref 70–99)
MAGNESIUM SERPL-MCNC: 2.1 MG/DL — SIGNIFICANT CHANGE UP (ref 1.6–2.6)
PHOSPHATE SERPL-MCNC: 4.7 MG/DL — HIGH (ref 2.5–4.5)
POTASSIUM SERPL-MCNC: 4.2 MMOL/L — SIGNIFICANT CHANGE UP (ref 3.5–5.3)
POTASSIUM SERPL-SCNC: 4.2 MMOL/L — SIGNIFICANT CHANGE UP (ref 3.5–5.3)
SODIUM SERPL-SCNC: 139 MMOL/L — SIGNIFICANT CHANGE UP (ref 135–145)

## 2019-02-08 PROCEDURE — 99223 1ST HOSP IP/OBS HIGH 75: CPT

## 2019-02-08 PROCEDURE — 99233 SBSQ HOSP IP/OBS HIGH 50: CPT

## 2019-02-08 PROCEDURE — 70450 CT HEAD/BRAIN W/O DYE: CPT | Mod: 26

## 2019-02-08 RX ORDER — METOPROLOL TARTRATE 50 MG
25 TABLET ORAL DAILY
Qty: 0 | Refills: 0 | Status: DISCONTINUED | OUTPATIENT
Start: 2019-02-08 | End: 2019-02-10

## 2019-02-08 RX ORDER — ENOXAPARIN SODIUM 100 MG/ML
40 INJECTION SUBCUTANEOUS
Qty: 0 | Refills: 0 | Status: DISCONTINUED | OUTPATIENT
Start: 2019-02-08 | End: 2019-02-10

## 2019-02-08 RX ORDER — ALPRAZOLAM 0.25 MG
0.25 TABLET ORAL ONCE
Qty: 0 | Refills: 0 | Status: DISCONTINUED | OUTPATIENT
Start: 2019-02-08 | End: 2019-02-09

## 2019-02-08 RX ORDER — SENNA PLUS 8.6 MG/1
2 TABLET ORAL AT BEDTIME
Qty: 0 | Refills: 0 | Status: DISCONTINUED | OUTPATIENT
Start: 2019-02-08 | End: 2019-02-10

## 2019-02-08 RX ORDER — CHLORHEXIDINE GLUCONATE 213 G/1000ML
1 SOLUTION TOPICAL
Qty: 0 | Refills: 0 | Status: DISCONTINUED | OUTPATIENT
Start: 2019-02-08 | End: 2019-02-08

## 2019-02-08 RX ADMIN — ATORVASTATIN CALCIUM 80 MILLIGRAM(S): 80 TABLET, FILM COATED ORAL at 21:29

## 2019-02-08 RX ADMIN — Medication 4 MILLIGRAM(S): at 23:40

## 2019-02-08 RX ADMIN — LEVETIRACETAM 1000 MILLIGRAM(S): 250 TABLET, FILM COATED ORAL at 05:42

## 2019-02-08 RX ADMIN — ENOXAPARIN SODIUM 40 MILLIGRAM(S): 100 INJECTION SUBCUTANEOUS at 17:44

## 2019-02-08 RX ADMIN — Medication 1 PACKET(S): at 14:53

## 2019-02-08 RX ADMIN — PANTOPRAZOLE SODIUM 40 MILLIGRAM(S): 20 TABLET, DELAYED RELEASE ORAL at 09:21

## 2019-02-08 RX ADMIN — Medication 4 MILLIGRAM(S): at 17:44

## 2019-02-08 RX ADMIN — Medication 1 TABLET(S): at 11:54

## 2019-02-08 RX ADMIN — Medication 0.5 MILLIGRAM(S): at 21:29

## 2019-02-08 RX ADMIN — Medication 2 GRAM(S): at 05:42

## 2019-02-08 RX ADMIN — LOSARTAN POTASSIUM 50 MILLIGRAM(S): 100 TABLET, FILM COATED ORAL at 05:43

## 2019-02-08 RX ADMIN — Medication 2 GRAM(S): at 18:55

## 2019-02-08 RX ADMIN — Medication 400 INTERNATIONAL UNIT(S): at 14:53

## 2019-02-08 RX ADMIN — Medication 90 MILLIGRAM(S): at 05:42

## 2019-02-08 RX ADMIN — Medication 4 MILLIGRAM(S): at 05:43

## 2019-02-08 RX ADMIN — Medication 4 MILLIGRAM(S): at 11:54

## 2019-02-08 RX ADMIN — LEVETIRACETAM 1000 MILLIGRAM(S): 250 TABLET, FILM COATED ORAL at 17:44

## 2019-02-08 NOTE — PROGRESS NOTE ADULT - ASSESSMENT
ASSESSMENT/PLAN: recurrent glioblastoma multiforme status post re-resection, post-operative day 1    NEURO:  CT Head in AM  MRI post-op  Steroids for cerebral edema  Seizure prophylaxis   Pain control  Activity: [x] OOB as tolerated [] Bedrest [x] PT [x] OT [] PMNR    PULM:  Incentive spirometry, mobilize as tolerated    CV:  -150mmHg  Continue home BP meds  Statin for HLD    RENAL:  IVF until good PO intake    GI:  Diet: Dysphagia screen and then advance diet as tolerated  GI prophylaxis [] not indicated [x] PPI [] other:  Bowel regimen [x] colace [x] senna [] other:    ENDO:   Goal euglycemia (-180)\  DMII: RISS    HEME/ONC:  VTE prophylaxis: [x] SCDs [] chemoprophylaxis [x] hold chemoprophylaxis due to: fresh post-op [x] high risk of DVT/PE on admission due to: malignancy    ID:  Noemi-op antibiotics    SOCIAL/FAMILY:  [x] awaiting [] updated at bedside [] family meeting    CODE STATUS:  [x] Full Code [] DNR [] DNI [] Palliative/Comfort Care    DISPOSITION:  [x] ICU [] Stroke Unit [] Floor [] EMU [] RCU [] PCU    [x] Patient is at high risk of neurologic deterioration/death due to: intracerebral hemorrhage     Time spent: 45 [x] critical care minutes    Contact: [phone] ASSESSMENT/PLAN: recurrent glioblastoma multiforme status post re-resection, post-operative day 1    NEURO:  MRI post-op  Steroids for cerebral edema  Seizure prophylaxis   Pain control  Activity: [x] OOB as tolerated [] Bedrest [x] PT [x] OT [] PMNR    PULM:  Incentive spirometry    CV:  -150mmHg  Continue home BP meds  Statin for HLD    RENAL:  IVL    GI:  Diet: Diet as tolerated  GI prophylaxis [] not indicated [x] PPI [] other:  Bowel regimen [x] colace [x] senna [] other:    ENDO:   Goal euglycemia (-180)  DMII: RISS    HEME/ONC:  VTE prophylaxis: [x] SCDs [x] chemoprophylaxis [x] high risk of DVT/PE on admission due to: malignancy    ID:  Afebrile    SOCIAL/FAMILY:  [x] awaiting [] updated at bedside [] family meeting    CODE STATUS:  [x] Full Code [] DNR [] DNI [] Palliative/Comfort Care    DISPOSITION:  [] ICU [] Stroke Unit [x] Floor [] EMU [] RCU [] PCU    Contact: [phone]

## 2019-02-08 NOTE — CONSULT NOTE ADULT - PROBLEM SELECTOR RECOMMENDATION 2
likely due to steroids   - elevated lactate noted 3.5, pt does not have any s/s of infection   - s/p 1 dose of Ancef for procedure   - monitor off abx for now  - repeat Lactate level, d/c with Neuro surg NP   - monitor vitals

## 2019-02-08 NOTE — CONSULT NOTE ADULT - CONSULT REASON
recurrent glioblastoma s/p right craniotomy, [facility]

## 2019-02-08 NOTE — PROGRESS NOTE ADULT - SUBJECTIVE AND OBJECTIVE BOX
Pt transferred to floor.  He is eating dinner.  He feels ok.  Dressing dry.  Left arm 4/5 weakness with decreased finger extension, mild left facial droop with some dysarthria.  Left leg ok. CT today with trace postoperative blood.  Answered pt questions regarding his condition.  Await path.

## 2019-02-08 NOTE — PROGRESS NOTE ADULT - SUBJECTIVE AND OBJECTIVE BOX
SUMMARY:  78 year old man with history of GBM s/p surgery, chemotherapy and radiation underwent right craniotomy for resection of recurrent tumor on 2/7/19.    HOSPITAL COURSE:  2/7: loss of MEPs intra-op, post-op increased left sided weakness which improved    24 HOUR EVENTS: Neurologically stable    VITALS/DATA/ORDERS: [x] Reviewed    EXAMINATION:  Awake, alert, fully oriented, making jokes, PERR, mild dysarthria, mild left facial asymmetry, LUE hand  2/5 proximally 3/5 biceps/triceps 4/5, LLE 4/5, full strength on the right SUMMARY:  78 year old man with history of GBM s/p surgery, chemotherapy and radiation underwent right craniotomy for resection of recurrent tumor on 2/7/19.    HOSPITAL COURSE:  2/7: loss of MEPs intra-op, post-op increased left sided weakness which improved    24 HOUR EVENTS:   Afebrile. Improved left sided strength.    REVIEW OF SYSTEMS:  No headache, no chest pain, no shortness of breath    VITALS/DATA/ORDERS: [x] Reviewed    EXAMINATION:  Awake, alert, fully oriented, making jokes, PERRL, mild dysarthria, mild left facial asymmetry, LUE hand  2/5 proximally 4/5 biceps/triceps 4+/5, LLE 4+/5, full strength on the right

## 2019-02-08 NOTE — CONSULT NOTE ADULT - ASSESSMENT
78 year old man with PMhx of HTN, HLD, prostate ca s/p prostatectomy, basal cell skin ca (resected from leg), glioblastoma ( dx 8/18) s/p surgery, chemotherapy and radiation and seizure disorder now s/p R craniectomy due to tumor regrowth

## 2019-02-08 NOTE — CONSULT NOTE ADULT - PROBLEM SELECTOR RECOMMENDATION 9
S/p R craniectomy due to tumor regrowth 2/7/18  MRI post-op pending  Steroids for cerebral edema  Seizure ppx: c/w Keppra   c/w decadron 4 mg IV q6h    Pain well controlled  c/w bowel regimen

## 2019-02-08 NOTE — CONSULT NOTE ADULT - SUBJECTIVE AND OBJECTIVE BOX
HPI:  78 year old man with PMhx of HTN, HLD, prostate ca s/p prostatectomy, basal cell skin ca (resected from leg), glioblastoma ( dx 8/18) s/p surgery, chemotherapy and radiation and seizure disorder presented for right craniectomy due to regrowth of tumor. Pt under went right craniotomy for resection of recurrent tumor on 2/7/19. He experienced loss of MEPs intra-op, post-op and increased left sided weakness which is improving. Pt denies any CP, SOB, abdominal pain and n/v.     Allergies: NKDA    PSHx:   Basal cell carcinoma (BCC) of lower leg  s/p resection  History of tonsillectomy  at age 30  Lipoma of neck  s/p resection at age 30.  prostatectomy 1999    PMHx:  Basal cell carcinoma (BCC) of left lower leg  s/p resection  HLD (hyperlipidemia)    HTN (hypertension)    Prostate cancer  s/p radical prostatectomy    SHx:  Pt is a former smoker, used to smoke 1 PPD,for ~15 y.rs, quite 30 years ago     FHx: Mother with lung CA unknown age of dx.       ROS:  CONSTITUTIONAL: No weakness, fevers or chills  EYES: no blurry vision or eye pain.   ENT: No throat pain. No dysphagia.    NECK: No pain or stiffness  RESPIRATORY: No cough, wheezing, hemoptysis; No shortness of breath  CARDIOVASCULAR: No chest pain or palpitations.  GASTROINTESTINAL: No abdominal pain. No nausea or vomiting; No diarrhea or constipation. No melena or hematochezia.  GENITOURINARY: No dysuria, frequency or hematuria  NEUROLOGICAL: LUE weakness, slurring speech. No dizziness or falls.   SKIN: No itching, burning, rashes, or lesions.   LYMPHATIC: No masses or swelling.   All other review of systems is negative unless indicated above.        MEDICATIONS  (STANDING):  ALPRAZolam 0.5 milliGRAM(s) Oral at bedtime  ALPRAZolam 0.25 milliGRAM(s) Oral once  atorvastatin 80 milliGRAM(s) Oral at bedtime  ceFAZolin   IVPB 2000 milliGRAM(s) IV Intermittent once  dexamethasone  Injectable 4 milliGRAM(s) IV Push every 6 hours  enoxaparin Injectable 40 milliGRAM(s) SubCutaneous <User Schedule>  levETIRAcetam 1000 milliGRAM(s) Oral two times a day  losartan 50 milliGRAM(s) Oral daily  metoprolol succinate ER 25 milliGRAM(s) Oral daily  multivitamin 1 Tablet(s) Oral daily  NIFEdipine XL 90 milliGRAM(s) Oral daily  omega-3-Acid Ethyl Esters 2 Gram(s) Oral two times a day  pantoprazole    Tablet 40 milliGRAM(s) Oral before breakfast  psyllium Powder 1 Packet(s) Oral daily  senna 2 Tablet(s) Oral at bedtime  vitamin E 400 International Unit(s) Oral daily    MEDICATIONS  (PRN):  acetaminophen   Tablet .. 650 milliGRAM(s) Oral every 6 hours PRN Temp greater or equal to 38C (100.4F), Mild Pain (1 - 3)  benzocaine 15 mG/menthol 3.6 mG Lozenge 1 Lozenge Oral three times a day PRN Sore Throat  docusate sodium 100 milliGRAM(s) Oral two times a day PRN Constipation      Vital Signs Last 24 Hrs  T(C): 36.9 (08 Feb 2019 16:11), Max: 37.2 (08 Feb 2019 03:00)  T(F): 98.4 (08 Feb 2019 16:11), Max: 98.9 (08 Feb 2019 03:00)  HR: 60 (08 Feb 2019 16:11) (44 - 72)  BP: 113/67 (08 Feb 2019 16:11) (113/67 - 153/75)  BP(mean): --  RR: 18 (08 Feb 2019 16:11) (15 - 20)  SpO2: 95% (08 Feb 2019 16:11) (93% - 97%)  CAPILLARY BLOOD GLUCOSE        I&O's Summary    07 Feb 2019 07:01  -  08 Feb 2019 07:00  --------------------------------------------------------  IN: 1970 mL / OUT: 3550 mL / NET: -1580 mL    08 Feb 2019 07:01  -  08 Feb 2019 17:58  --------------------------------------------------------  IN: 140 mL / OUT: 250 mL / NET: -110 mL        PHYSICAL EXAM:  GENERAL: NAD, well-developed  HEAD:  R craniotomy site dressing c/d/i   EYES: EOMI, PERRLA, conjunctiva and sclera clear  CHEST/LUNG: Clear to auscultation bilaterally; No wheeze  HEART: Regular rate and rhythm; No murmurs, rubs, or gallops  ABDOMEN: Soft, Nontender, Nondistended; Bowel sounds present  EXTREMITIES:  2+ Peripheral Pulses, No clubbing, cyanosis, or edema  NEUROLOGY: AAOx3, slurred speech, mild left facial asymmetry, LUE hand  1/5 proximally, 4/5 biceps/triceps 4+/5, LLE 4+/5, full strength on the right  SKIN: No rashes or lesions    LABS:                        14.1   14.3  )-----------( 136      ( 07 Feb 2019 23:29 )             38.8     02-07    139  |  104  |  17  ----------------------------<  134<H>  4.2   |  21<L>  |  0.77    Ca    9.4      07 Feb 2019 23:29  Phos  4.7     02-07  Mg     2.1     02-07                RADIOLOGY & ADDITIONAL TESTS:    Imaging Personally Reviewed:    Consultant(s) Notes Reviewed:      Care Discussed with Consultants/Other Providers:    Case Discussed with Family:    Goals of Care: HPI:  78 year old man with PMhx of HTN, HLD, prostate ca s/p prostatectomy, basal cell skin ca (resected from leg), glioblastoma ( dx 8/18) s/p surgery, chemotherapy and radiation and seizure disorder presented for right craniectomy due to regrowth of tumor. Pt under went right craniotomy for resection of recurrent tumor on 2/7/19. He experienced loss of MEPs intra-op, post-op and increased left sided weakness which is improving. Pt denies any CP, SOB, abdominal pain and n/v.     Allergies: NKDA    PSHx:   Basal cell carcinoma (BCC) of lower leg  s/p resection  History of tonsillectomy  at age 30  Lipoma of neck  s/p resection at age 30.  prostatectomy 1999    PMHx:  Basal cell carcinoma (BCC) of left lower leg  s/p resection  HLD (hyperlipidemia)    HTN (hypertension)    Prostate cancer  s/p radical prostatectomy    SHx:  Pt is a former smoker, used to smoke 1 PPD,for ~15 y.rs, quite 30 years ago     FHx: Mother with lung CA unknown age of dx.       ROS:  CONSTITUTIONAL: No weakness, fevers or chills  EYES: no blurry vision or eye pain.   ENT: No throat pain. No dysphagia.    NECK: No pain or stiffness  RESPIRATORY: No cough, wheezing, hemoptysis; No shortness of breath  CARDIOVASCULAR: No chest pain or palpitations.  GASTROINTESTINAL: No abdominal pain. No nausea or vomiting; No diarrhea or constipation. No melena or hematochezia.  GENITOURINARY: No dysuria, frequency or hematuria  NEUROLOGICAL: LUE weakness, slurring speech. No dizziness or falls.   SKIN: No itching, burning, rashes, or lesions.   LYMPHATIC: No masses or swelling.   All other review of systems is negative unless indicated above.        MEDICATIONS  (STANDING):  ALPRAZolam 0.5 milliGRAM(s) Oral at bedtime  ALPRAZolam 0.25 milliGRAM(s) Oral once  atorvastatin 80 milliGRAM(s) Oral at bedtime  ceFAZolin   IVPB 2000 milliGRAM(s) IV Intermittent once  dexamethasone  Injectable 4 milliGRAM(s) IV Push every 6 hours  enoxaparin Injectable 40 milliGRAM(s) SubCutaneous <User Schedule>  levETIRAcetam 1000 milliGRAM(s) Oral two times a day  losartan 50 milliGRAM(s) Oral daily  metoprolol succinate ER 25 milliGRAM(s) Oral daily  multivitamin 1 Tablet(s) Oral daily  NIFEdipine XL 90 milliGRAM(s) Oral daily  omega-3-Acid Ethyl Esters 2 Gram(s) Oral two times a day  pantoprazole    Tablet 40 milliGRAM(s) Oral before breakfast  psyllium Powder 1 Packet(s) Oral daily  senna 2 Tablet(s) Oral at bedtime  vitamin E 400 International Unit(s) Oral daily    MEDICATIONS  (PRN):  acetaminophen   Tablet .. 650 milliGRAM(s) Oral every 6 hours PRN Temp greater or equal to 38C (100.4F), Mild Pain (1 - 3)  benzocaine 15 mG/menthol 3.6 mG Lozenge 1 Lozenge Oral three times a day PRN Sore Throat  docusate sodium 100 milliGRAM(s) Oral two times a day PRN Constipation      Vital Signs Last 24 Hrs  T(C): 36.9 (08 Feb 2019 16:11), Max: 37.2 (08 Feb 2019 03:00)  T(F): 98.4 (08 Feb 2019 16:11), Max: 98.9 (08 Feb 2019 03:00)  HR: 60 (08 Feb 2019 16:11) (44 - 72)  BP: 113/67 (08 Feb 2019 16:11) (113/67 - 153/75)  BP(mean): --  RR: 18 (08 Feb 2019 16:11) (15 - 20)  SpO2: 95% (08 Feb 2019 16:11) (93% - 97%)  CAPILLARY BLOOD GLUCOSE        I&O's Summary    07 Feb 2019 07:01  -  08 Feb 2019 07:00  --------------------------------------------------------  IN: 1970 mL / OUT: 3550 mL / NET: -1580 mL    08 Feb 2019 07:01  -  08 Feb 2019 17:58  --------------------------------------------------------  IN: 140 mL / OUT: 250 mL / NET: -110 mL        PHYSICAL EXAM:  GENERAL: NAD, well-developed  HEAD:  R craniotomy site dressing c/d/i   EYES: EOMI, PERRLA, conjunctiva and sclera clear  CHEST/LUNG: Clear to auscultation bilaterally; No wheeze  HEART: Regular rate and rhythm; No murmurs, rubs, or gallops  ABDOMEN: Soft, Nontender, Nondistended; Bowel sounds present  EXTREMITIES:  2+ Peripheral Pulses, No clubbing, cyanosis, or edema  NEUROLOGY: AAOx3, slurred speech, mild left facial asymmetry, LUE hand  1/5 proximally, 4/5 biceps/triceps 4+/5, LLE 4+/5, full strength on the right  SKIN: No rashes or lesions    LABS:                        14.1   14.3  )-----------( 136      ( 07 Feb 2019 23:29 )             38.8     02-07    139  |  104  |  17  ----------------------------<  134<H>  4.2   |  21<L>  |  0.77    Ca    9.4      07 Feb 2019 23:29  Phos  4.7     02-07  Mg     2.1     02-07

## 2019-02-09 LAB
ANION GAP SERPL CALC-SCNC: 14 MMOL/L — SIGNIFICANT CHANGE UP (ref 5–17)
BUN SERPL-MCNC: 26 MG/DL — HIGH (ref 7–23)
CALCIUM SERPL-MCNC: 9.8 MG/DL — SIGNIFICANT CHANGE UP (ref 8.4–10.5)
CHLORIDE SERPL-SCNC: 101 MMOL/L — SIGNIFICANT CHANGE UP (ref 96–108)
CO2 SERPL-SCNC: 24 MMOL/L — SIGNIFICANT CHANGE UP (ref 22–31)
CREAT SERPL-MCNC: 0.9 MG/DL — SIGNIFICANT CHANGE UP (ref 0.5–1.3)
GLUCOSE SERPL-MCNC: 145 MG/DL — HIGH (ref 70–99)
HCT VFR BLD CALC: 41.3 % — SIGNIFICANT CHANGE UP (ref 39–50)
HGB BLD-MCNC: 13.6 G/DL — SIGNIFICANT CHANGE UP (ref 13–17)
MCHC RBC-ENTMCNC: 31.2 PG — SIGNIFICANT CHANGE UP (ref 27–34)
MCHC RBC-ENTMCNC: 32.9 GM/DL — SIGNIFICANT CHANGE UP (ref 32–36)
MCV RBC AUTO: 94.7 FL — SIGNIFICANT CHANGE UP (ref 80–100)
PLATELET # BLD AUTO: 151 K/UL — SIGNIFICANT CHANGE UP (ref 150–400)
POTASSIUM SERPL-MCNC: 3.8 MMOL/L — SIGNIFICANT CHANGE UP (ref 3.5–5.3)
POTASSIUM SERPL-SCNC: 3.8 MMOL/L — SIGNIFICANT CHANGE UP (ref 3.5–5.3)
RBC # BLD: 4.36 M/UL — SIGNIFICANT CHANGE UP (ref 4.2–5.8)
RBC # FLD: 14.2 % — SIGNIFICANT CHANGE UP (ref 10.3–14.5)
SODIUM SERPL-SCNC: 139 MMOL/L — SIGNIFICANT CHANGE UP (ref 135–145)
WBC # BLD: 11.68 K/UL — HIGH (ref 3.8–10.5)
WBC # FLD AUTO: 11.68 K/UL — HIGH (ref 3.8–10.5)

## 2019-02-09 PROCEDURE — 99233 SBSQ HOSP IP/OBS HIGH 50: CPT

## 2019-02-09 PROCEDURE — 70553 MRI BRAIN STEM W/O & W/DYE: CPT | Mod: 26

## 2019-02-09 RX ORDER — ALPRAZOLAM 0.25 MG
0.5 TABLET ORAL ONCE
Qty: 0 | Refills: 0 | Status: DISCONTINUED | OUTPATIENT
Start: 2019-02-09 | End: 2019-02-09

## 2019-02-09 RX ORDER — DOCUSATE SODIUM 100 MG
100 CAPSULE ORAL THREE TIMES A DAY
Qty: 0 | Refills: 0 | Status: DISCONTINUED | OUTPATIENT
Start: 2019-02-09 | End: 2019-02-10

## 2019-02-09 RX ADMIN — Medication 1 PACKET(S): at 12:35

## 2019-02-09 RX ADMIN — Medication 4 MILLIGRAM(S): at 12:35

## 2019-02-09 RX ADMIN — Medication 2 GRAM(S): at 17:42

## 2019-02-09 RX ADMIN — Medication 1 TABLET(S): at 12:35

## 2019-02-09 RX ADMIN — Medication 4 MILLIGRAM(S): at 21:18

## 2019-02-09 RX ADMIN — Medication 4 MILLIGRAM(S): at 17:43

## 2019-02-09 RX ADMIN — LEVETIRACETAM 1000 MILLIGRAM(S): 250 TABLET, FILM COATED ORAL at 17:42

## 2019-02-09 RX ADMIN — ATORVASTATIN CALCIUM 80 MILLIGRAM(S): 80 TABLET, FILM COATED ORAL at 21:18

## 2019-02-09 RX ADMIN — Medication 4 MILLIGRAM(S): at 05:03

## 2019-02-09 RX ADMIN — PANTOPRAZOLE SODIUM 40 MILLIGRAM(S): 20 TABLET, DELAYED RELEASE ORAL at 05:03

## 2019-02-09 RX ADMIN — LOSARTAN POTASSIUM 50 MILLIGRAM(S): 100 TABLET, FILM COATED ORAL at 05:03

## 2019-02-09 RX ADMIN — Medication 0.5 MILLIGRAM(S): at 10:00

## 2019-02-09 RX ADMIN — Medication 100 MILLIGRAM(S): at 12:36

## 2019-02-09 RX ADMIN — Medication 400 INTERNATIONAL UNIT(S): at 12:35

## 2019-02-09 RX ADMIN — Medication 0.5 MILLIGRAM(S): at 21:18

## 2019-02-09 RX ADMIN — Medication 90 MILLIGRAM(S): at 05:04

## 2019-02-09 RX ADMIN — Medication 100 MILLIGRAM(S): at 21:18

## 2019-02-09 RX ADMIN — Medication 2 GRAM(S): at 05:04

## 2019-02-09 RX ADMIN — SENNA PLUS 2 TABLET(S): 8.6 TABLET ORAL at 21:18

## 2019-02-09 RX ADMIN — ENOXAPARIN SODIUM 40 MILLIGRAM(S): 100 INJECTION SUBCUTANEOUS at 17:42

## 2019-02-09 RX ADMIN — LEVETIRACETAM 1000 MILLIGRAM(S): 250 TABLET, FILM COATED ORAL at 05:03

## 2019-02-09 NOTE — PROGRESS NOTE ADULT - ASSESSMENT
78yoM w/ PMHx significant for HTN, HLD, prostate ca s/p prostatectomy, basal cell skin ca (resected from leg), glioblastoma ( dx 8/18) s/p surgery, chemotherapy and radiation and seizure disorder now s/p R craniectomy due to tumor regrowth on 2/7/18.

## 2019-02-09 NOTE — PROGRESS NOTE ADULT - PROBLEM SELECTOR PLAN 2
Downtrending, afebrile, likely due to steroids as above  - elevated lactate noted 3.5, pt does not have any s/s of infection or abdominal pain; recommend trending with repeat level to be ordered  - s/p 1 dose of Ancef for procedure   - continue to monitor off Abx for now

## 2019-02-09 NOTE — PROGRESS NOTE ADULT - SUBJECTIVE AND OBJECTIVE BOX
CHIEF COMPLAINT: patient reports no pain, c/o needing assistance at times and worried about his independence    Vital Signs Last 24 Hrs  T(C): 36.4 (09 Feb 2019 12:57), Max: 36.9 (08 Feb 2019 16:11)  T(F): 97.6 (09 Feb 2019 12:57), Max: 98.4 (08 Feb 2019 16:11)  HR: 69 (09 Feb 2019 12:57) (59 - 75)  BP: 133/76 (09 Feb 2019 12:57) (113/67 - 136/68)  BP(mean): --  RR: 18 (09 Feb 2019 12:57) (17 - 18)  SpO2: 93% (09 Feb 2019 12:57) (92% - 95%)    PHYSICAL EXAM:    General: No Acute Distress     Neurological: Awake, alert, oriented x3, PERRL, mild dysarthria, mild left facial asymmetry, LUE hand  2/5, biceps/triceps 4+/5, LLE 4+/5, right side 5/5    Pulmonary: Clear to Auscultation, No Rales, No Rhonchi, No Wheezes     Cardiovascular: S1, S2, Regular Rate and Rhythm     Gastrointestinal: Soft, Nontender, Nondistended     Incision: +staples c/d/i    LABS:                        13.6   11.68 )-----------( 151      ( 09 Feb 2019 08:58 )             41.3    02-09    139  |  101  |  26<H>  ----------------------------<  145<H>  3.8   |  24  |  0.90    Ca    9.8      09 Feb 2019 07:11  Phos  4.7     02-07  Mg     2.1     02-07      Hemoglobin A1C, Whole Blood: 5.7 % (02-04 @ 18:54)      02-08 @ 07:01  -  02-09 @ 07:00  --------------------------------------------------------  IN: 140 mL / OUT: 250 mL / NET: -110 mL        MEDICATIONS:  Anticoagulation:  enoxaparin Injectable 40 milliGRAM(s) SubCutaneous <User Schedule>    Endo:  atorvastatin 80 milliGRAM(s) Oral at bedtime  dexamethasone  Injectable 4 milliGRAM(s) IV Push every 6 hours    Neuro:  acetaminophen   Tablet .. 650 milliGRAM(s) Oral every 6 hours PRN Temp greater or equal to 38C (100.4F), Mild Pain (1 - 3)  ALPRAZolam 0.5 milliGRAM(s) Oral at bedtime  levETIRAcetam 1000 milliGRAM(s) Oral two times a day    Cardiac:  losartan 50 milliGRAM(s) Oral daily  metoprolol succinate ER 25 milliGRAM(s) Oral daily  NIFEdipine XL 90 milliGRAM(s) Oral daily    GI/:  docusate sodium 100 milliGRAM(s) Oral three times a day  pantoprazole    Tablet 40 milliGRAM(s) Oral before breakfast  psyllium Powder 1 Packet(s) Oral daily  senna 2 Tablet(s) Oral at bedtime    Other:   benzocaine 15 mG/menthol 3.6 mG Lozenge 1 Lozenge Oral three times a day PRN Sore Throat  multivitamin 1 Tablet(s) Oral daily  omega-3-Acid Ethyl Esters 2 Gram(s) Oral two times a day  vitamin E 400 International Unit(s) Oral daily    DIET: [x] Regular [] CCD [] Renal [] Puree [] Dysphagia [] Tube Feeds:     IMAGING:   < from: MR Head w/wo IV Cont (02.09.19 @ 11:46) >  INTERPRETATION:  .    CLINICAL INFORMATION: History of GBM status post resection. History of   radiation therapy.    TECHNIQUE: Multiplanar multisequential MRIof the brain was acquired with   and without the administration of IV gadolinium. 9 cc's of IV Gadavist   was administered for the purposes of this examination. 1 cc was discarded.    COMPARISON: Most recent prior contrast enhanced brain MRI examination   from 2/4/2019. Prior head CT examination from 2/8/2019. Most remote head   CT examination from 7/31/2018. Prior brain MRI examination from 7/31/2018.    FINDINGS: Right-sided craniotomy changes are again noted. Subjacent   extra-axial and focal hemorrhage appears unchanged when compared to the   prior CT exam. Extra-axial pneumocephalus along the right frontal   convexity also appears unchanged.    Subjacent to the craniotomy site, there is a new surgical cavity within   the right anteroinferior parietal lobe. Patchy areas of wispy enhancement   are noted along the postsurgical margins. A small amount of postoperative   hemorrhage and devitalized tissue is also notable at the surgical   resection site. Surrounding masslike nonenhancing T2/FLAIR prolongation   within the right frontoparietal and temporal lobe white matter appears   similar to the prior MRI examination.    A few additional areas of T2/FLAIR prolongation are noted within the   bihemispheric white matter without associated mass effect or enhancement,   or restricted diffusion.    Ventricular size and configuration is unremarkable. Flow-voids are noted   throughout the major intracranial vessels, on the T2 weighted images,   consistent with their patency. The sella turcica and posterior fossa are   unremarkable.    The paranasal sinuses and mastoid air cells remain well aerated. There is   evidence of bilateral cataract removal.     IMPRESSION: Postoperative changes as discussed with a new surgical cavity   withinthe right anteroinferior parietal lobe. Small amount of patchy   wispy enhancement is noted along the postsurgical margins which is   nonspecific and may be related to postoperative changes. Residual   enhancing neoplasm cannot be completely excluded. Unchanged masslike   nonenhancing T2/FLAIR prolongation within the surrounding white matter   may represent a combination of nonenhancing tumor, posttreatment changes,   and/or vasogenic edema.    Small amount of postoperative hemorrhage, as discussed, which appears   unchanged when compared with the most recent prior CT exam.        < end of copied text >

## 2019-02-09 NOTE — PROGRESS NOTE ADULT - PROBLEM SELECTOR PLAN 1
- MRI post-op pending  - management per primary team, neurosurgery: continue Decadron 4mg IV q6H for cerebral edema, and Keppra for seizure PPx   - pain well controlled  - continue bowel regimen - MRI post-op pending  - management per primary team, neurosurgery: continue Decadron 4mg IV q6H for cerebral edema, and Keppra for seizure PPx   - pain well controlled  - continue bowel regimen  - PT/OT and disposition per primary team

## 2019-02-09 NOTE — PROGRESS NOTE ADULT - SUBJECTIVE AND OBJECTIVE BOX
Pt alert, speech improved.  ROBERTS with mild left hand/arm weakness.  Wound intact.  MRI with mild peripheral enhancement of cavity possible post op change.  Pt seems like good acute rehab candidate.

## 2019-02-09 NOTE — PROGRESS NOTE ADULT - ASSESSMENT
78 year old male with history of brain cancer (GBM) s/p surgery, chemotherapy and radiation.   Now for surgery due to regrowth of tumor. (04 Feb 2019 14:51)    PAST MEDICAL & SURGICAL HISTORY:  Fall, sequela  Bilateral hearing loss, unspecified hearing loss type   activity: [employer] 1962-65    [place]  Type 2 diabetes mellitus without complication, without long-term current use of insulin: stop oral meds  3 weeks ago  Glioblastoma: biopsy 8/2018  surgery  6 weeks radiation  35 days of oral chemotherapy last 3 weeks  Basal cell carcinoma (BCC) of left lower leg: s/p resection  right lower leg top  left lower leg  Prostate cancer: s/p radical prostatectomy 1995  HLD (hyperlipidemia)  HTN (hypertension)  S/P colonoscopic polypectomy: 3 years benign polyps  S/P tonsillectomy: age 30  H/O bilateral cataract extraction: 3-4 years ago  S/P prostatectomy: 1995  S/P brain surgery: 8/2018  Lipoma of neck: s/p resection at age 30  Basal cell carcinoma (BCC) of lower leg: s/p resection  History of tonsillectomy: at age 30    PROCEDURE:  2/7/19 s/p right craniotomy for recurrent tumor resection, pathology pending.    PLAN:  Neuro: cont decadron taper for cerebral edema, cont keppra for seizure prophylaxis  Respiratory: patient instructed on incentive spirometer  CV: cont losartan, metoprolol, and nifedipine for HTN  Heme/Onc: f/u pathology results          DVT ppx: [] SQH [x] SQL and venodynes bilaterally  Renal: IVL  ID: afebrile  GI: bowel regimen  PT/OT: ordered  Hospitalist medicine following    discussed with Dr Rutledge covering for Dr Tita Dyer # [id number]    Assessment:  Please Check When Present   []  GCS  E   V  M     Heart Failure: []Acute, [] acute on chronic , []chronic  Heart Failure:  [] Diastolic (HFpEF), [] Systolic (HFrEF), []Combined (HFpEF and HFrEF), [] RHF, [] Pulm HTN, [] Other    [] MARYJANE, [] ATN, [] AIN, [] other  [] CKD1, [] CKD2, [] CKD 3, [] CKD 4, [] CKD 5, []ESRD    Encephalopathy: [] Metabolic, [] Hepatic, [] toxic, [] Neurological, [] Other    Abnormal Nutritional Status: [] malnutrition (see nutrition note), [ ]underweight: BMI < 19, [] morbid obesity: BMI >40, [] Cachexia    [] Sepsis  [] hypovolemic shock,[] cardiogenic shock, [] hemorrhagic shock, [] neurogenic shock  [] Acute Respiratory Failure  [x]Cerebral edema, [] Brain compression/ herniation,   [] Functional quadriplegia  [] Acute blood loss anemia

## 2019-02-09 NOTE — PROGRESS NOTE ADULT - SUBJECTIVE AND OBJECTIVE BOX
Patient is a 78y old  Male who presents with a chief complaint of recurrent glioblastoma requiring right craniotomy, (08 Feb 2019 17:47)    SUBJECTIVE / OVERNIGHT EVENTS: Patient seen and examined. No acute overnight events, no subjective complaints. He is enthusiastic about continuing his therapy to regain strength as he has led what appears to be a very active lifestyle prior to these acute clinical events.    OBJECTIVE:  Vital Signs Last 24 Hrs  T(F): 97.9 (09 Feb 2019 07:09), Max: 98.4 (08 Feb 2019 16:11)  HR: 66 (09 Feb 2019 07:09) (59 - 75)  BP: 135/75 (09 Feb 2019 07:09) (113/67 - 153/75)  RR: 17 (09 Feb 2019 07:09) (17 - 18)  SpO2: 94% (09 Feb 2019 07:09) (92% - 95%)    I&O's Summary  08 Feb 2019 07:01  -  09 Feb 2019 07:00  --------------------------------------------------------  IN: 140 mL / OUT: 250 mL / NET: -110 mL    Physical Examination:  GEN: elderly man, sitting up in bed in NAD  PSYCH: A&Ox3, mood and affect appear appropriate   NEURO: slurred speech, mild left facial asymmetry, LUE hand  1/5, LUE otherwise 4/5, LLE 4/5, RUE/RLE 5/5  HEAD: NC, R craniotomy site dressing C/D/I  RESPI: no accessory muscle use, B/L air entry, CTAB   CARDIO: regular rate/rhythm, no LE edema B/L  ABD: soft, NT, ND, +BS  EXT: patient able to move all extremities spontaneously  VASC: peripheral pulses palpated    Labs:                     13.6   11.68 )-----------( 151      ( 09 Feb 2019 08:58 )             41.3     02-09  139  |  101  |  26<H>  ----------------------------<  145<H>  3.8   |  24  |  0.90    Ca    9.8      09 Feb 2019 07:11    Consultant(s) Notes Reviewed: [facility]  Care Discussed with Consultants/Other Providers: Neurosurgery DAKSHA Abdi    MEDICATIONS  (STANDING):  ALPRAZolam 0.5 milliGRAM(s) Oral at bedtime  ALPRAZolam 0.5 milliGRAM(s) Oral once  atorvastatin 80 milliGRAM(s) Oral at bedtime  dexamethasone  Injectable 4 milliGRAM(s) IV Push every 6 hours  docusate sodium 100 milliGRAM(s) Oral three times a day  enoxaparin Injectable 40 milliGRAM(s) SubCutaneous <User Schedule>  levETIRAcetam 1000 milliGRAM(s) Oral two times a day  losartan 50 milliGRAM(s) Oral daily  metoprolol succinate ER 25 milliGRAM(s) Oral daily  multivitamin 1 Tablet(s) Oral daily  NIFEdipine XL 90 milliGRAM(s) Oral daily  omega-3-Acid Ethyl Esters 2 Gram(s) Oral two times a day  pantoprazole    Tablet 40 milliGRAM(s) Oral before breakfast  psyllium Powder 1 Packet(s) Oral daily  senna 2 Tablet(s) Oral at bedtime  vitamin E 400 International Unit(s) Oral daily    MEDICATIONS  (PRN):  acetaminophen   Tablet .. 650 milliGRAM(s) Oral every 6 hours PRN Temp greater or equal to 38C (100.4F), Mild Pain (1 - 3)  benzocaine 15 mG/menthol 3.6 mG Lozenge 1 Lozenge Oral three times a day PRN Sore Throat

## 2019-02-10 ENCOUNTER — TRANSCRIPTION ENCOUNTER (OUTPATIENT)
Age: 79
End: 2019-02-10

## 2019-02-10 VITALS — OXYGEN SATURATION: 96 % | SYSTOLIC BLOOD PRESSURE: 154 MMHG | DIASTOLIC BLOOD PRESSURE: 73 MMHG | HEART RATE: 68 BPM

## 2019-02-10 PROCEDURE — 97162 PT EVAL MOD COMPLEX 30 MIN: CPT

## 2019-02-10 PROCEDURE — 97165 OT EVAL LOW COMPLEX 30 MIN: CPT

## 2019-02-10 PROCEDURE — 88331 PATH CONSLTJ SURG 1 BLK 1SPC: CPT

## 2019-02-10 PROCEDURE — C1889: CPT

## 2019-02-10 PROCEDURE — C1769: CPT

## 2019-02-10 PROCEDURE — 85014 HEMATOCRIT: CPT

## 2019-02-10 PROCEDURE — 82947 ASSAY GLUCOSE BLOOD QUANT: CPT

## 2019-02-10 PROCEDURE — 83735 ASSAY OF MAGNESIUM: CPT

## 2019-02-10 PROCEDURE — 88307 TISSUE EXAM BY PATHOLOGIST: CPT

## 2019-02-10 PROCEDURE — 82330 ASSAY OF CALCIUM: CPT

## 2019-02-10 PROCEDURE — 82962 GLUCOSE BLOOD TEST: CPT

## 2019-02-10 PROCEDURE — 82435 ASSAY OF BLOOD CHLORIDE: CPT

## 2019-02-10 PROCEDURE — C1713: CPT

## 2019-02-10 PROCEDURE — 85027 COMPLETE CBC AUTOMATED: CPT

## 2019-02-10 PROCEDURE — 70450 CT HEAD/BRAIN W/O DYE: CPT

## 2019-02-10 PROCEDURE — 84100 ASSAY OF PHOSPHORUS: CPT

## 2019-02-10 PROCEDURE — 80048 BASIC METABOLIC PNL TOTAL CA: CPT

## 2019-02-10 PROCEDURE — 82565 ASSAY OF CREATININE: CPT

## 2019-02-10 PROCEDURE — 82803 BLOOD GASES ANY COMBINATION: CPT

## 2019-02-10 PROCEDURE — A9585: CPT

## 2019-02-10 PROCEDURE — 70553 MRI BRAIN STEM W/O & W/DYE: CPT

## 2019-02-10 PROCEDURE — 83605 ASSAY OF LACTIC ACID: CPT

## 2019-02-10 PROCEDURE — 84132 ASSAY OF SERUM POTASSIUM: CPT

## 2019-02-10 PROCEDURE — 84295 ASSAY OF SERUM SODIUM: CPT

## 2019-02-10 RX ORDER — DOCUSATE SODIUM 100 MG
1 CAPSULE ORAL
Qty: 0 | Refills: 0 | COMMUNITY

## 2019-02-10 RX ORDER — PREGABALIN 225 MG/1
4000 CAPSULE ORAL
Qty: 0 | Refills: 0 | COMMUNITY

## 2019-02-10 RX ORDER — DOCUSATE SODIUM 100 MG
1 CAPSULE ORAL
Qty: 0 | Refills: 0 | COMMUNITY
Start: 2019-02-10

## 2019-02-10 RX ORDER — ASPIRIN/CALCIUM CARB/MAGNESIUM 324 MG
2 TABLET ORAL
Qty: 0 | Refills: 0 | COMMUNITY

## 2019-02-10 RX ORDER — DEXAMETHASONE 0.5 MG/5ML
4 ELIXIR ORAL EVERY 8 HOURS
Qty: 0 | Refills: 0 | Status: DISCONTINUED | OUTPATIENT
Start: 2019-02-10 | End: 2019-02-10

## 2019-02-10 RX ORDER — PSYLLIUM SEED (WITH DEXTROSE)
1 POWDER (GRAM) ORAL
Qty: 0 | Refills: 0 | COMMUNITY

## 2019-02-10 RX ORDER — DEXAMETHASONE 0.5 MG/5ML
2 ELIXIR ORAL
Qty: 60 | Refills: 0 | OUTPATIENT
Start: 2019-02-10

## 2019-02-10 RX ORDER — LOSARTAN POTASSIUM 100 MG/1
1 TABLET, FILM COATED ORAL
Qty: 0 | Refills: 0 | COMMUNITY

## 2019-02-10 RX ORDER — VITAMIN E 100 UNIT
1 CAPSULE ORAL
Qty: 0 | Refills: 0 | COMMUNITY

## 2019-02-10 RX ORDER — ACETAMINOPHEN 500 MG
2 TABLET ORAL
Qty: 0 | Refills: 0 | DISCHARGE
Start: 2019-02-10

## 2019-02-10 RX ORDER — LEVETIRACETAM 250 MG/1
1 TABLET, FILM COATED ORAL
Qty: 0 | Refills: 0 | COMMUNITY
Start: 2019-02-10

## 2019-02-10 RX ORDER — LOSARTAN POTASSIUM 100 MG/1
1 TABLET, FILM COATED ORAL
Qty: 0 | Refills: 0 | COMMUNITY
Start: 2019-02-10

## 2019-02-10 RX ORDER — SENNA PLUS 8.6 MG/1
1 TABLET ORAL
Qty: 0 | Refills: 0 | COMMUNITY

## 2019-02-10 RX ORDER — DEXAMETHASONE 0.5 MG/5ML
1 ELIXIR ORAL
Qty: 0 | Refills: 0 | COMMUNITY

## 2019-02-10 RX ORDER — LEVETIRACETAM 250 MG/1
1 TABLET, FILM COATED ORAL
Qty: 0 | Refills: 0 | COMMUNITY

## 2019-02-10 RX ADMIN — Medication 25 MILLIGRAM(S): at 06:37

## 2019-02-10 RX ADMIN — LEVETIRACETAM 1000 MILLIGRAM(S): 250 TABLET, FILM COATED ORAL at 06:37

## 2019-02-10 RX ADMIN — Medication 4 MILLIGRAM(S): at 06:37

## 2019-02-10 RX ADMIN — Medication 2 GRAM(S): at 06:37

## 2019-02-10 RX ADMIN — Medication 100 MILLIGRAM(S): at 06:37

## 2019-02-10 RX ADMIN — PANTOPRAZOLE SODIUM 40 MILLIGRAM(S): 20 TABLET, DELAYED RELEASE ORAL at 06:37

## 2019-02-10 RX ADMIN — LOSARTAN POTASSIUM 50 MILLIGRAM(S): 100 TABLET, FILM COATED ORAL at 06:37

## 2019-02-10 RX ADMIN — Medication 90 MILLIGRAM(S): at 06:37

## 2019-02-10 NOTE — DISCHARGE NOTE ADULT - CARE PROVIDER_API CALL
Vinny Rivers)  Neurological Surgery  [address] [place] [address], [address] [place] [address]  Phone: [phone]  Fax: [phone]  Follow Up Time:

## 2019-02-10 NOTE — CHART NOTE - NSCHARTNOTEFT_GEN_A_CORE
addendum: PT/OT evaluated patient and recommended home PT/OT, referral made by case management.    Patient quite anxious to get home today.  Discussed with patient and family wound care, follow up plans, activity, and medications. Questions answered, and they verbalized understanding.   RX sent to home pharmacy    d/c IVL and d/c home today will f/u pathology as an outpatient with Dr Harman.     will discuss with Dr Rivers  #[phone]

## 2019-02-10 NOTE — PHYSICAL THERAPY INITIAL EVALUATION ADULT - DISCHARGE DISPOSITION, PT EVAL
Home with Home PT for strengthening, bed mob, transfer, gait and balance training, home with assist as needed/home/home w/ assist/home w/ home PT

## 2019-02-10 NOTE — DISCHARGE NOTE ADULT - SECONDARY DIAGNOSIS.
Basal cell carcinoma (BCC) of left lower leg HLD (hyperlipidemia) HTN (hypertension) Prostate cancer

## 2019-02-10 NOTE — DISCHARGE NOTE ADULT - HOME CARE AGENCY
[facility] [phone] for VN, PT an OT evaluations. Visiting Nurse will call to schedule visit day after discharge pending authorization form insurance.

## 2019-02-10 NOTE — PROGRESS NOTE ADULT - ASSESSMENT
78 year old male with history of brain cancer (GBM) s/p surgery, chemotherapy and radiation.   Now for surgery due to regrowth of tumor    PAST MEDICAL & SURGICAL HISTORY:  Fall, sequela  Bilateral hearing loss, unspecified hearing loss type   activity: [employer]-65    [place]  Type 2 diabetes mellitus without complication, without long-term current use of insulin: stop oral meds  3 weeks ago  Glioblastoma: biopsy 8/2018  surgery  6 weeks radiation  35 days of oral chemotherapy last 3 weeks  Basal cell carcinoma (BCC) of left lower leg: s/p resection  right lower leg top  left lower leg  Prostate cancer: s/p radical prostatectomy 1995  HLD (hyperlipidemia)  HTN (hypertension)  S/P colonoscopic polypectomy: 3 years benign polyps  S/P tonsillectomy: age 30  H/O bilateral cataract extraction: 3-4 years ago  S/P prostatectomy: 1995  S/P brain surgery: 8/2018  Lipoma of neck: s/p resection at age 30  Basal cell carcinoma (BCC) of lower leg: s/p resection  History of tonsillectomy: at age 30    PROCEDURE:  2/7/19 s/p right craniotomy for recurrent tumor resection, pathology pending.    PLAN:  Neuro: cont decadron taper for cerebral edema, cont keppra for seizure prophylaxis  Respiratory: patient instructed on incentive spirometer  CV: cont losartan, metoprolol, and nifedipine for HTN  Heme/Onc: f/u pathology results          DVT ppx: [] SQH [x] SQL and venodynes bilaterally  Renal: IVL  ID: afebrile  GI: bowel regimen  PT/OT: ordered  Hospitalist medicine following

## 2019-02-10 NOTE — OCCUPATIONAL THERAPY INITIAL EVALUATION ADULT - LIVES WITH, PROFILE
spouse/Pt lives with his wife and daughter in a 2 story private home with a tub shower. Pt was independent with ADLs, IADLs, functional mobility, driving prior to admission.

## 2019-02-10 NOTE — PHYSICAL THERAPY INITIAL EVALUATION ADULT - PERTINENT HX OF CURRENT PROBLEM, REHAB EVAL
78yoM with hx of brain CA s/p sx, chemo and RT, now a/w regrowth of tumor, s/p above procedure, MRI head 1/9, s/p R anteroinferior parietal lobe tumor resection, small amount of postoperative hemorrhage.

## 2019-02-10 NOTE — DISCHARGE NOTE ADULT - NS AS ACTIVITY OBS
Bathing allowed/No Heavy lifting/straining/Do not make important decisions/Showering allowed/Do not drive or operate machinery/Stairs allowed/Walking-Indoors allowed/Walking-Outdoors allowed/do not return to school/work until cleared by surgeon

## 2019-02-10 NOTE — OCCUPATIONAL THERAPY INITIAL EVALUATION ADULT - ANTICIPATED DISCHARGE DISPOSITION, OT EVAL
Home OT to increase independence with ADLs and functional mobility/transfers. Recommend supervision/assist with all functional activities./home w/ OT

## 2019-02-10 NOTE — DISCHARGE NOTE ADULT - HOSPITAL COURSE
78 year old male with history of brain cancer s/p surgery, chemotherapy and radiation.  Now for surgery due to regrowth of tumor. (04 Feb 2019 14:51)    Patient admitted via SDA and underwent on 2/7/19 s/p right craniotomy for recurrent tumor resection, pathology pending at time of discharge. 78 year old male with history of brain cancer s/p surgery, chemotherapy and radiation.  Now for surgery due to regrowth of tumor. (04 Feb 2019 14:51)    Patient admitted via SDA and underwent on 2/7/19 s/p right craniotomy for recurrent tumor resection, pathology pending at time of discharge. He was cared for in NSCU per post op routine. Post operatively he had left sided weakness which improved. He was subsequently transferred to the floor. He was evaluated by PT/OT and recommended home PT/OT. He will follow up with his neuro-oncologist Dr Harman once pathology results. On the day of discharge he was medically and neurologically stable for discharge to home.

## 2019-02-10 NOTE — OCCUPATIONAL THERAPY INITIAL EVALUATION ADULT - DIAGNOSIS, OT EVAL
Pt with impaired motor control, strength, balance, coordination impacting pt's ability to complete ADLs, IADLs, functional mobility, drive.

## 2019-02-10 NOTE — DISCHARGE NOTE ADULT - PATIENT PORTAL LINK FT
You can access the [facility] Patient Portal, offered by [facility], by registering with the following website: [email or web address]

## 2019-02-10 NOTE — OCCUPATIONAL THERAPY INITIAL EVALUATION ADULT - BALANCE TRAINING, PT EVAL
Goal: Pt will demonstrate G dynamic standing balance for improved safety with IADL completion in 4 weeks.

## 2019-02-10 NOTE — DISCHARGE NOTE ADULT - PLAN OF CARE
Please continue medications and follow up with your primary care physician within 1-2 weeks. Please continue medications and follow up with your primary care physician and/or oncologist within 1-2 weeks. 2/7/19 s/p right craniotomy for recurrent tumor resection, pathology pending at time of discharge. Dr Rivers- neurosurgeon- please call office for appointment in 10-14 days. Staple removal at that time.   Primary care physician in 1-2 weeks.   Dr Harman- neuro- oncologist-  please call office for appointment in 2-4 weeks.   please notify physician if fevers, bleeding, swelling, pain not relieved by medication, increased sluggishness or irritability, increased nausea or vomiting, inability to tolerate foods or liquids.   please do not engage in strenuous activity, heavy lifting, drive, or return to work or school until cleared by surgeon.   please keep incision clean and dry, do not submerge wound in water for prolonged periods of time, pat dry after showering, and do not use any creams or ointments to incision.

## 2019-02-10 NOTE — DISCHARGE NOTE ADULT - NS AS DC STROKE ED MATERIALS
Stroke Warning Signs and Symptoms/Call 911 for Stroke/Prescribed Medications/Risk Factors for Stroke/Need for Followup After Discharge/Stroke Education Booklet

## 2019-02-10 NOTE — OCCUPATIONAL THERAPY INITIAL EVALUATION ADULT - ADDITIONAL COMMENTS
MR Head 2/9/19: Postoperative changes as discussed with a new surgical cavity within the right anteroinferior parietal lobe. Small amount of patchy wispy enhancement is noted along the postsurgical margins which is nonspecific and may be related to postoperative changes. Residual enhancing neoplasm cannot be completely excluded. Unchanged masslike nonenhancing T2/FLAIR prolongation within the surrounding white matter may represent a combination of nonenhancing tumor, posttreatment changes, and/or vasogenic edema. Small amount of postoperative hemorrhage, as discussed, which appears unchanged when compared with the most recent prior CT exam.  CT Head 2/8/19: Redemonstration of right temporal craniotomy. Minimal subjacent extra-axial hemorrhage. Minimal parenchymal hemorrhage in the superior right temporal operative bed. New vasogenic edema surrounds the surgical bed.  New subarachnoid hemorrhage in the right lateral frontal region. Pt s/p right crani for brain tumor 2/7/19.  MR Head 2/9/19: Postoperative changes as discussed with a new surgical cavity within the right anteroinferior parietal lobe. Small amount of patchy wispy enhancement is noted along the postsurgical margins which is nonspecific and may be related to postoperative changes. Residual enhancing neoplasm cannot be completely excluded. Unchanged masslike nonenhancing T2/FLAIR prolongation within the surrounding white matter may represent a combination of nonenhancing tumor, posttreatment changes, and/or vasogenic edema. Small amount of postoperative hemorrhage, as discussed, which appears unchanged when compared with the most recent prior CT exam.  CT Head 2/8/19: Redemonstration of right temporal craniotomy. Minimal subjacent extra-axial hemorrhage. Minimal parenchymal hemorrhage in the superior right temporal operative bed. New vasogenic edema surrounds the surgical bed.  New subarachnoid hemorrhage in the right lateral frontal region.

## 2019-02-10 NOTE — PHYSICAL THERAPY INITIAL EVALUATION ADULT - ACTIVE RANGE OF MOTION EXAMINATION, REHAB EVAL
bilateral  lower extremity Active ROM was WFL (within functional limits)/deficits as listed below/Right UE Active ROM was WFL (within functional limits)/L finger flexion severely impaired d/t weakness

## 2019-02-10 NOTE — OCCUPATIONAL THERAPY INITIAL EVALUATION ADULT - PLANNED THERAPY INTERVENTIONS, OT EVAL
IADL retraining/neuromuscular re-education/ADL retraining/fine motor coordination training/transfer training/balance training

## 2019-02-10 NOTE — OCCUPATIONAL THERAPY INITIAL EVALUATION ADULT - MANUAL MUSCLE TESTING RESULTS, REHAB EVAL
grossly assessed due to/RUE at least 3+/5; b/l LE at least 3+/5; L shoulder/elbow 2+/5; L wrist and digits 2-/5

## 2019-02-10 NOTE — DISCHARGE NOTE ADULT - CARE PLAN
Principal Discharge DX:	Glioblastoma  Goal:	2/7/19 s/p right craniotomy for recurrent tumor resection, pathology pending at time of discharge.  Assessment and plan of treatment:	Dr Rivers- neurosurgeon- please call office for appointment in 10-14 days. Staple removal at that time.   Primary care physician in 1-2 weeks.   Dr Harman- neuro- oncologist-  please call office for appointment in 2-4 weeks.   please notify physician if fevers, bleeding, swelling, pain not relieved by medication, increased sluggishness or irritability, increased nausea or vomiting, inability to tolerate foods or liquids.   please do not engage in strenuous activity, heavy lifting, drive, or return to work or school until cleared by surgeon.   please keep incision clean and dry, do not submerge wound in water for prolonged periods of time, pat dry after showering, and do not use any creams or ointments to incision.  Secondary Diagnosis:	HLD (hyperlipidemia)  Assessment and plan of treatment:	Please continue medications and follow up with your primary care physician within 1-2 weeks.  Secondary Diagnosis:	HTN (hypertension)  Assessment and plan of treatment:	Please continue medications and follow up with your primary care physician within 1-2 weeks.  Secondary Diagnosis:	Prostate cancer  Assessment and plan of treatment:	Please continue medications and follow up with your primary care physician within 1-2 weeks.  Secondary Diagnosis:	Basal cell carcinoma (BCC) of left lower leg  Assessment and plan of treatment:	Please continue medications and follow up with your primary care physician and/or oncologist within 1-2 weeks.

## 2019-02-10 NOTE — DISCHARGE NOTE ADULT - REASON FOR ADMISSION
2/7/19 s/p right craniotomy for recurrent tumor resection, pathology pending. 2/7/19 s/p right craniotomy for recurrent tumor resection, pathology pending at time of discharge.

## 2019-02-10 NOTE — DISCHARGE NOTE ADULT - MEDICATION SUMMARY - MEDICATIONS TO STOP TAKING
I will STOP taking the medications listed below when I get home from the hospital:    Aspir 81 oral delayed release tablet  -- 2 tab(s) by mouth once a day

## 2019-02-10 NOTE — DISCHARGE NOTE ADULT - MEDICATION SUMMARY - MEDICATIONS TO TAKE
I will START or STAY ON the medications listed below when I get home from the hospital:    dexamethasone 2 mg oral tablet  -- 2 tab(s) by mouth 3 times a day for 3 days, 1 tab 3x daily for 3 days, then 1 tab 2x daily and DO NOT TAPER FURTHER   -- It is very important that you take or use this exactly as directed.  Do not skip doses or discontinue unless directed by your doctor.  Obtain medical advice before taking any non-prescription drugs as some may affect the action of this medication.  Take with food or milk.    -- Indication: For Post op cerebral edema    acetaminophen 325 mg oral tablet  -- 2 tab(s) by mouth every 6 hours, As needed, Temp greater or equal to 38C (100.4F), Mild Pain (1 - 3)  -- Indication: For Pain/temperature    losartan 50 mg oral tablet  -- 1 tab(s) by mouth once a day  -- Indication: For HTN    levETIRAcetam 1000 mg oral tablet  -- 1 tab(s) by mouth 2 times a day  -- Indication: For anti seizure    rosuvastatin 20 mg oral tablet  -- 1 tab(s) by mouth once a day (at bedtime)  -- Indication: For cholesterol    ALPRAZolam 0.5 mg oral tablet  -- 1 tab(s) by mouth 3 times a day, As Needed  -- Indication: For anxiety    metoprolol succinate 25 mg oral tablet, extended release  -- 0.5 tab(s) by mouth once a day  -- Indication: For Blood pressure    NIFEdipine 90 mg oral tablet, extended release  -- 1 tab(s) by mouth once a day  -- Indication: For Blood pressure    Pepcid 20 mg oral tablet  -- 1 tab(s) by mouth 2 times a day  -- Indication: For GI prophylaxis    docusate sodium 100 mg oral capsule  -- 1 cap(s) by mouth 3 times a day  -- Indication: For constipation    Metamucil  -- 1 dose(s) by mouth once a day, As Needed  tablespoon   -- Indication: For constipation    Senna 8.6 mg oral tablet  -- 1 tab(s) by mouth once a day (at bedtime), As Needed  -- Indication: For constipation    Omega Essentials 667 mg oral capsule  -- 1 cap(s) by mouth once a day  -- Indication: For Supplement    Multiple Vitamins oral tablet  -- 1 tab(s) by mouth once a day  -- Indication: For Supplement    B Complex 50 oral tablet, extended release  -- 1 tab(s) by mouth once a day  -- Indication: For Supplement    vitamin E 400 intl units oral capsule  -- 1 cap(s) by mouth once a day  -- Indication: For Supplement    Vitamin B-12  -- 4000 milligram(s) by mouth once a day  -- Indication: For Supplement

## 2019-02-10 NOTE — PROGRESS NOTE ADULT - SUBJECTIVE AND OBJECTIVE BOX
Vital Signs Last 24 Hrs  T(C): 36.9 (10 Feb 2019 07:50), Max: 36.9 (10 Feb 2019 07:50)  T(F): 98.4 (10 Feb 2019 07:50), Max: 98.4 (10 Feb 2019 07:50)  HR: 62 (10 Feb 2019 07:50) (62 - 79)  BP: 142/75 (10 Feb 2019 07:50) (107/66 - 147/71)  BP(mean): --  RR: 18 (10 Feb 2019 07:50) (17 - 18)  SpO2: 95% (10 Feb 2019 07:50) (93% - 95%)    PHYSICAL EXAM:  Neurological: Awake, alert, oriented x3, PERRL, mild dysarthria, mild left facial asymmetry, LUE hand  2/5, biceps/triceps 4+/5, LLE 4+/5, right side 5/5  Incision: +staples c/d/i

## 2019-02-10 NOTE — PHYSICAL THERAPY INITIAL EVALUATION ADULT - ADDITIONAL COMMENTS
as per pt, resides in a  with spouse, no stair to enter, one flight up to home gym with railings, PTA, pt amb (I), (I) with ADls.

## 2019-02-10 NOTE — DISCHARGE NOTE ADULT - MEDICATION SUMMARY - MEDICATIONS TO CHANGE
I will SWITCH the dose or number of times a day I take the medications listed below when I get home from the hospital:    dexamethasone 2 mg oral tablet  -- 1 tab(s) by mouth 2 times a day

## 2019-02-10 NOTE — OCCUPATIONAL THERAPY INITIAL EVALUATION ADULT - RANGE OF MOTION EXAMINATION, UPPER EXTREMITY
Right UE Active ROM was WFL (within functional limits)/Left UE Passive ROM was WFL  (within functional limits)

## 2019-02-13 PROBLEM — E11.9 TYPE 2 DIABETES MELLITUS WITHOUT COMPLICATIONS: Chronic | Status: ACTIVE | Noted: 2019-02-04

## 2019-02-13 PROBLEM — H91.93 UNSPECIFIED HEARING LOSS, BILATERAL: Chronic | Status: ACTIVE | Noted: 2019-02-04

## 2019-02-15 LAB — SURGICAL PATHOLOGY STUDY: SIGNIFICANT CHANGE UP

## 2019-02-19 ENCOUNTER — FORM ENCOUNTER (OUTPATIENT)
Age: 79
End: 2019-02-19

## 2019-02-20 ENCOUNTER — OUTPATIENT (OUTPATIENT)
Dept: OUTPATIENT SERVICES | Facility: HOSPITAL | Age: 79
LOS: 1 days | End: 2019-02-20
Payer: MEDICARE

## 2019-02-20 ENCOUNTER — APPOINTMENT (OUTPATIENT)
Dept: NEUROSURGERY | Facility: CLINIC | Age: 79
End: 2019-02-20
Payer: MEDICARE

## 2019-02-20 VITALS
DIASTOLIC BLOOD PRESSURE: 60 MMHG | HEIGHT: 71 IN | BODY MASS INDEX: 28.14 KG/M2 | OXYGEN SATURATION: 95 % | SYSTOLIC BLOOD PRESSURE: 158 MMHG | HEART RATE: 82 BPM | WEIGHT: 201 LBS | RESPIRATION RATE: 16 BRPM | TEMPERATURE: 97.8 F

## 2019-02-20 DIAGNOSIS — Z90.89 ACQUIRED ABSENCE OF OTHER ORGANS: Chronic | ICD-10-CM

## 2019-02-20 DIAGNOSIS — C44.711 BASAL CELL CARCINOMA OF SKIN OF UNSPECIFIED LOWER LIMB, INCLUDING HIP: Chronic | ICD-10-CM

## 2019-02-20 DIAGNOSIS — Z98.890 OTHER SPECIFIED POSTPROCEDURAL STATES: Chronic | ICD-10-CM

## 2019-02-20 DIAGNOSIS — Z90.79 ACQUIRED ABSENCE OF OTHER GENITAL ORGAN(S): Chronic | ICD-10-CM

## 2019-02-20 DIAGNOSIS — Z98.41 CATARACT EXTRACTION STATUS, RIGHT EYE: Chronic | ICD-10-CM

## 2019-02-20 DIAGNOSIS — D17.0 BENIGN LIPOMATOUS NEOPLASM OF SKIN AND SUBCUTANEOUS TISSUE OF HEAD, FACE AND NECK: Chronic | ICD-10-CM

## 2019-02-20 DIAGNOSIS — C71.9 MALIGNANT NEOPLASM OF BRAIN, UNSPECIFIED: ICD-10-CM

## 2019-02-20 PROCEDURE — 99024 POSTOP FOLLOW-UP VISIT: CPT

## 2019-02-20 PROCEDURE — 93970 EXTREMITY STUDY: CPT

## 2019-02-20 PROCEDURE — 93970 EXTREMITY STUDY: CPT | Mod: 26

## 2019-02-22 NOTE — HISTORY OF PRESENT ILLNESS
[FreeTextEntry1] : 79 yo male presents to the office for a follow up s/p gross total removal of a right frontal GBM on 8/3/18 and to discuss possible reresection for POD. Initially, he presented with left facial droop, slurred speech, dysphagia. CT of the head showed right brain lesion. MRI showed a 2.1 x 1.7 x 1.4cm right parietal mass without significant edema. \par \par Today, he remains with slurred speech and dysphagia which has improved.\par He denies headache, weakness, vision changes, seizures, or seizure activity. He attends the gym and plans on playing baseball during season. \par  Keppra 1000mg BID and Decadron 2 mg daily for recent cerebral edema.\par He completed chemo and radiation 8/31/18 - 10/12/18\par 12/10/18 2 cycles of temozolomide \par 12/10/18 presented to ED s/p fall and unable to get up; imaging with increased cerebral edema. \par MRI stable.\par 1/10/19 cycle #3 Temozolomide.\par MRI with minimal progression between Aug 2018- Jan 2019.\par 2/7/19 he underwent right craniotomy for recurrent tumor. Path pending\par Pt presents today for post operative check. He has left sided weakness which is improving.\par He is unable to open and close his left hand. \par He also has bilateral lower extremity edema. We sent pt down to vascular labs for doppler study.\par \par Plan: Dopplers\par Pt \par Cane\par FU Dr. Harman\par \par \par \par

## 2019-02-22 NOTE — ASSESSMENT
[FreeTextEntry1] : 2019\par \par \par \par Re:	Bethle Day \par :	1940\par \par The patient is 2 weeks status post right craniotomy for recurrent glioblastoma.  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  He had right upper extremity weakness which is resolved to a large extent.  He still has a mild right facial and he is 4/5 in the right upper extremity.  He has trouble with calf and ankle edema.  We are sending him down for Dopplers of the lower extremities today.  His wound is flat, dry, and well healed and the staples will be removed.  He will be seen by Dr. Harman on Wednesday for adjuvant treatment.  \par \par He has done well status post the craniotomy and I will see him in 2 months’ time.\par \par \par \par Vinny Rivers M.D., F.A.C.S.\par [facility]\par \par

## 2019-02-27 ENCOUNTER — APPOINTMENT (OUTPATIENT)
Dept: NEUROLOGY | Facility: CLINIC | Age: 79
End: 2019-02-27
Payer: MEDICARE

## 2019-02-27 VITALS
DIASTOLIC BLOOD PRESSURE: 72 MMHG | OXYGEN SATURATION: 98 % | RESPIRATION RATE: 16 BRPM | BODY MASS INDEX: 28 KG/M2 | HEIGHT: 71 IN | HEART RATE: 66 BPM | WEIGHT: 200 LBS | SYSTOLIC BLOOD PRESSURE: 126 MMHG

## 2019-02-27 PROCEDURE — 99215 OFFICE O/P EST HI 40 MIN: CPT

## 2019-02-27 NOTE — DATA REVIEWED
[de-identified] : I personally reviewed MR imaging dated 2/9/19	2/4/19	1/7/19	12/11/18  In reviewing these images, I find MARKED PERSISTENT RIGHT POSTERIOR FRONTAL SUBCORTICAL HYPERINTENSITY on the T2 weighted images, with signal change likely representing cerebral edema.  I am concerned about persistent edema. On the contrast enhanced images, there is minimal residual enhancement around the anterior portion of the resection cavity which may represent post-operative changes (blood/duragel). Overall I find the images to be stable in terms of cerebral edema and improved in terms of enhancing disease.  Selected imaging was provided to the patient, along with a detailed verbal explanation of the issues at hand as outlined above.

## 2019-02-27 NOTE — PHYSICAL EXAM
[General Appearance - Alert] : alert [General Appearance - In No Acute Distress] : in no acute distress [Oriented To Time, Place, And Person] : oriented to person, place, and time [Impaired Insight] : insight and judgment were intact [Affect] : the affect was normal [Mood] : the mood was normal [Memory Recent] : recent memory was not impaired [Memory Remote] : remote memory was not impaired [Person] : oriented to person [Place] : oriented to place [Time] : oriented to time [Short Term Intact] : short term memory intact [Remote Intact] : remote memory intact [Registration Intact] : recent registration memory intact [Span Intact] : the attention span was normal [Concentration Intact] : normal concentrating ability [Visual Intact] : visual attention was ~T not ~L decreased [Naming Objects] : no difficulty naming common objects [Repeating Phrases] : no difficulty repeating a phrase [Fluency] : fluency intact [Comprehension] : comprehension intact [Reading] : reading intact [Current Events] : adequate knowledge of current events [Past History] : adequate knowledge of personal past history [Vocabulary] : adequate range of vocabulary [Cranial Nerves Optic (II)] : visual acuity intact bilaterally,  visual fields full to confrontation, pupils equal round and reactive to light [Cranial Nerves Oculomotor (III)] : extraocular motion intact [Cranial Nerves Trigeminal (V)] : facial sensation intact symmetrically [Cranial Nerves Vestibulocochlear (VIII)] : hearing was intact bilaterally [Cranial Nerves Glossopharyngeal (IX)] : tongue and palate midline [Cranial Nerves Accessory (XI - Cranial And Spinal)] : head turning and shoulder shrug symmetric [Cranial Nerves Hypoglossal (XII)] : there was no tongue deviation with protrusion [Motor Tone] : muscle tone was normal in all four extremities [Involuntary Movements] : no involuntary movements were seen [No Muscle Atrophy] : normal bulk in all four extremities [Sensation Tactile Decrease] : light touch was intact [Balance] : balance was intact [Dysdiadochokinesia On The Left] : present on the left side [Coordination - Dysmetria Impaired Finger-to-Nose Left Only] : present on the left side [Sclera] : the sclera and conjunctiva were normal [PERRL With Normal Accommodation] : pupils were equal in size, round, reactive to light, with normal accommodation [Extraocular Movements] : extraocular movements were intact [Full Visual Field] : full visual field [Oropharynx] : the oropharynx was normal [Respiration, Rhythm And Depth] : normal respiratory rhythm and effort [Skin Color & Pigmentation] : normal skin color and pigmentation [Past-pointing] : there was no past-pointing [Tremor] : no tremor present [Coordination - Dysmetria Impaired Heel-to-Shin Bilateral] : not present [FreeTextEntry5] : left facial droop, mildly slurred speech [FreeTextEntry6] : weak hand  LUE [FreeTextEntry7] : decreased sensation L facial  [FreeTextEntry8] : slight limp, but gait steady [FreeTextEntry1] : incision site well healed

## 2019-02-27 NOTE — DISCUSSION/SUMMARY
[FreeTextEntry1] : Brain tumor - Doing well post-operatively. Imaging and pathology reviewed with pt and son. As there was treatment effect, would recommend continued temozolomide, along with Avastin for control of edema and hopefully improvement in left hand strength. Avastin regimen and potential side effects reviewed with pt and son, and consent signed. To begin Avastin on 3/13 (>1 month after surgery), with infusions q2 weeks. Temozolomide cycle #4 to begin 3/11, dose 400mg. We reviewed med administration, and written instructions and calendar provided. \par \par Cerebral edema - Decadron bolus 12mg tomorrow morning, followed by decadron 4mg daily in AM. Will start taper after first Avastin infusion. \par \par Seizures - None, continue Keppra. \par \par DISPO - All questions answered. Follow-up visit in 1 month.

## 2019-02-27 NOTE — REASON FOR VISIT
[Follow-Up: _____] : a [unfilled] follow-up visit [Family Member] : family member [FreeTextEntry1] : brain tumor

## 2019-02-27 NOTE — HISTORY OF PRESENT ILLNESS
[FreeTextEntry1] : This is a pleasant 78 year old man with right frontal GBM. 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 \par He is doing well post-operatively. \par No headaches, no n/v, no diplopia. \par Main complaint is decreased hand  on the left, although improving since surgery. He has home PT/OT and is also doing exercises on his own. \par Speech mildly slurred. \par \par He is on a decadron taper, currently on 2mg BID. \par \par No further seizures or seizure-like symptoms (no facial twitching) on Keppra 1000mg BID. \par \par Edema b/l LE (left>right), dopplers last week were negative for DVT. \par

## 2019-02-28 ENCOUNTER — TRANSCRIPTION ENCOUNTER (OUTPATIENT)
Age: 79
End: 2019-02-28

## 2019-02-28 LAB
ALBUMIN SERPL ELPH-MCNC: 4.4 G/DL
ALP BLD-CCNC: 69 U/L
ALT SERPL-CCNC: 65 U/L
ANION GAP SERPL CALC-SCNC: 15 MMOL/L
APPEARANCE: CLEAR
AST SERPL-CCNC: 32 U/L
BASOPHILS # BLD AUTO: 0.01 K/UL
BASOPHILS NFR BLD AUTO: 0.1 %
BILIRUB SERPL-MCNC: 0.6 MG/DL
BILIRUBIN URINE: NEGATIVE
BLOOD URINE: NEGATIVE
BUN SERPL-MCNC: 25 MG/DL
CALCIUM SERPL-MCNC: 9.5 MG/DL
CHLORIDE SERPL-SCNC: 98 MMOL/L
CO2 SERPL-SCNC: 28 MMOL/L
COLOR: YELLOW
CREAT SERPL-MCNC: 0.88 MG/DL
EOSINOPHIL # BLD AUTO: 0.01 K/UL
EOSINOPHIL NFR BLD AUTO: 0.1 %
GLUCOSE QUALITATIVE U: NEGATIVE
GLUCOSE SERPL-MCNC: 104 MG/DL
HAV IGM SER QL: NONREACTIVE
HBV CORE IGM SER QL: NONREACTIVE
HBV SURFACE AG SER QL: NONREACTIVE
HCT VFR BLD CALC: 44.9 %
HCV AB SER QL: NONREACTIVE
HCV S/CO RATIO: 0.2 S/CO
HGB BLD-MCNC: 14.3 G/DL
IMM GRANULOCYTES NFR BLD AUTO: 0.9 %
KETONES URINE: NEGATIVE
LEUKOCYTE ESTERASE URINE: NEGATIVE
LYMPHOCYTES # BLD AUTO: 1.07 K/UL
LYMPHOCYTES NFR BLD AUTO: 12.1 %
MAN DIFF?: NORMAL
MCHC RBC-ENTMCNC: 31.3 PG
MCHC RBC-ENTMCNC: 31.8 GM/DL
MCV RBC AUTO: 98.2 FL
MONOCYTES # BLD AUTO: 0.28 K/UL
MONOCYTES NFR BLD AUTO: 3.2 %
NEUTROPHILS # BLD AUTO: 7.39 K/UL
NEUTROPHILS NFR BLD AUTO: 83.6 %
NITRITE URINE: NEGATIVE
PH URINE: 5.5
PLATELET # BLD AUTO: 189 K/UL
POTASSIUM SERPL-SCNC: 3.4 MMOL/L
PROT SERPL-MCNC: 6.9 G/DL
PROTEIN URINE: NEGATIVE
RBC # BLD: 4.57 M/UL
RBC # FLD: 13.7 %
SODIUM SERPL-SCNC: 141 MMOL/L
SPECIFIC GRAVITY URINE: 1.02
UROBILINOGEN URINE: NORMAL
WBC # FLD AUTO: 8.84 K/UL

## 2019-03-04 ENCOUNTER — OUTPATIENT (OUTPATIENT)
Dept: OUTPATIENT SERVICES | Facility: HOSPITAL | Age: 79
LOS: 1 days | Discharge: HOME | End: 2019-03-04
Payer: MEDICARE

## 2019-03-04 DIAGNOSIS — Z90.89 ACQUIRED ABSENCE OF OTHER ORGANS: Chronic | ICD-10-CM

## 2019-03-04 DIAGNOSIS — D64.9 ANEMIA, UNSPECIFIED: ICD-10-CM

## 2019-03-04 DIAGNOSIS — Z98.890 OTHER SPECIFIED POSTPROCEDURAL STATES: Chronic | ICD-10-CM

## 2019-03-04 DIAGNOSIS — D17.0 BENIGN LIPOMATOUS NEOPLASM OF SKIN AND SUBCUTANEOUS TISSUE OF HEAD, FACE AND NECK: Chronic | ICD-10-CM

## 2019-03-04 DIAGNOSIS — C44.711 BASAL CELL CARCINOMA OF SKIN OF UNSPECIFIED LOWER LIMB, INCLUDING HIP: Chronic | ICD-10-CM

## 2019-03-04 DIAGNOSIS — Z98.41 CATARACT EXTRACTION STATUS, RIGHT EYE: Chronic | ICD-10-CM

## 2019-03-04 DIAGNOSIS — Z90.79 ACQUIRED ABSENCE OF OTHER GENITAL ORGAN(S): Chronic | ICD-10-CM

## 2019-03-13 ENCOUNTER — RESULT REVIEW (OUTPATIENT)
Age: 79
End: 2019-03-13

## 2019-03-13 ENCOUNTER — APPOINTMENT (OUTPATIENT)
Dept: INFUSION THERAPY | Facility: HOSPITAL | Age: 79
End: 2019-03-13

## 2019-03-13 ENCOUNTER — LABORATORY RESULT (OUTPATIENT)
Age: 79
End: 2019-03-13

## 2019-03-13 LAB
BASOPHILS # BLD AUTO: 0 K/UL — SIGNIFICANT CHANGE UP (ref 0–0.2)
BASOPHILS NFR BLD AUTO: 0.1 % — SIGNIFICANT CHANGE UP (ref 0–2)
EOSINOPHIL # BLD AUTO: 0.1 K/UL — SIGNIFICANT CHANGE UP (ref 0–0.5)
EOSINOPHIL NFR BLD AUTO: 0.7 % — SIGNIFICANT CHANGE UP (ref 0–6)
HCT VFR BLD CALC: 41.2 % — SIGNIFICANT CHANGE UP (ref 39–50)
HGB BLD-MCNC: 14.6 G/DL — SIGNIFICANT CHANGE UP (ref 13–17)
LYMPHOCYTES # BLD AUTO: 1.6 K/UL — SIGNIFICANT CHANGE UP (ref 1–3.3)
LYMPHOCYTES # BLD AUTO: 12.9 % — LOW (ref 13–44)
MCHC RBC-ENTMCNC: 33.1 PG — SIGNIFICANT CHANGE UP (ref 27–34)
MCHC RBC-ENTMCNC: 35.4 G/DL — SIGNIFICANT CHANGE UP (ref 32–36)
MCV RBC AUTO: 93.4 FL — SIGNIFICANT CHANGE UP (ref 80–100)
MONOCYTES # BLD AUTO: 0.4 K/UL — SIGNIFICANT CHANGE UP (ref 0–0.9)
MONOCYTES NFR BLD AUTO: 3.6 % — SIGNIFICANT CHANGE UP (ref 2–14)
NEUTROPHILS # BLD AUTO: 10 K/UL — HIGH (ref 1.8–7.4)
NEUTROPHILS NFR BLD AUTO: 82.8 % — HIGH (ref 43–77)
PLATELET # BLD AUTO: 221 K/UL — SIGNIFICANT CHANGE UP (ref 150–400)
RBC # BLD: 4.42 M/UL — SIGNIFICANT CHANGE UP (ref 4.2–5.8)
RBC # FLD: 13.1 % — SIGNIFICANT CHANGE UP (ref 10.3–14.5)
WBC # BLD: 12.1 K/UL — HIGH (ref 3.8–10.5)
WBC # FLD AUTO: 12.1 K/UL — HIGH (ref 3.8–10.5)

## 2019-03-13 PROCEDURE — 93010 ELECTROCARDIOGRAM REPORT: CPT

## 2019-03-14 DIAGNOSIS — Z51.11 ENCOUNTER FOR ANTINEOPLASTIC CHEMOTHERAPY: ICD-10-CM

## 2019-03-14 DIAGNOSIS — C71.9 MALIGNANT NEOPLASM OF BRAIN, UNSPECIFIED: ICD-10-CM

## 2019-03-27 ENCOUNTER — RESULT REVIEW (OUTPATIENT)
Age: 79
End: 2019-03-27

## 2019-03-27 ENCOUNTER — APPOINTMENT (OUTPATIENT)
Dept: NEUROLOGY | Facility: CLINIC | Age: 79
End: 2019-03-27
Payer: MEDICARE

## 2019-03-27 ENCOUNTER — LABORATORY RESULT (OUTPATIENT)
Age: 79
End: 2019-03-27

## 2019-03-27 ENCOUNTER — APPOINTMENT (OUTPATIENT)
Dept: INFUSION THERAPY | Facility: HOSPITAL | Age: 79
End: 2019-03-27

## 2019-03-27 VITALS
HEART RATE: 76 BPM | DIASTOLIC BLOOD PRESSURE: 80 MMHG | BODY MASS INDEX: 28.28 KG/M2 | OXYGEN SATURATION: 96 % | SYSTOLIC BLOOD PRESSURE: 124 MMHG | WEIGHT: 202 LBS | RESPIRATION RATE: 16 BRPM | HEIGHT: 71 IN

## 2019-03-27 DIAGNOSIS — Z51.11 ENCOUNTER FOR ANTINEOPLASTIC CHEMOTHERAPY: ICD-10-CM

## 2019-03-27 DIAGNOSIS — G93.6 CEREBRAL EDEMA: ICD-10-CM

## 2019-03-27 DIAGNOSIS — C71.1 MALIGNANT NEOPLASM OF FRONTAL LOBE: ICD-10-CM

## 2019-03-27 LAB
BASOPHILS # BLD AUTO: 0 K/UL — SIGNIFICANT CHANGE UP (ref 0–0.2)
BASOPHILS NFR BLD AUTO: 0.1 % — SIGNIFICANT CHANGE UP (ref 0–2)
EOSINOPHIL # BLD AUTO: 0.1 K/UL — SIGNIFICANT CHANGE UP (ref 0–0.5)
EOSINOPHIL NFR BLD AUTO: 1.3 % — SIGNIFICANT CHANGE UP (ref 0–6)
HCT VFR BLD CALC: 40.1 % — SIGNIFICANT CHANGE UP (ref 39–50)
HGB BLD-MCNC: 14 G/DL — SIGNIFICANT CHANGE UP (ref 13–17)
LYMPHOCYTES # BLD AUTO: 1 K/UL — SIGNIFICANT CHANGE UP (ref 1–3.3)
LYMPHOCYTES # BLD AUTO: 9.3 % — LOW (ref 13–44)
MCHC RBC-ENTMCNC: 32.7 PG — SIGNIFICANT CHANGE UP (ref 27–34)
MCHC RBC-ENTMCNC: 34.8 G/DL — SIGNIFICANT CHANGE UP (ref 32–36)
MCV RBC AUTO: 93.8 FL — SIGNIFICANT CHANGE UP (ref 80–100)
MONOCYTES # BLD AUTO: 0.5 K/UL — SIGNIFICANT CHANGE UP (ref 0–0.9)
MONOCYTES NFR BLD AUTO: 4.6 % — SIGNIFICANT CHANGE UP (ref 2–14)
NEUTROPHILS # BLD AUTO: 9.4 K/UL — HIGH (ref 1.8–7.4)
NEUTROPHILS NFR BLD AUTO: 84.7 % — HIGH (ref 43–77)
PLATELET # BLD AUTO: 172 K/UL — SIGNIFICANT CHANGE UP (ref 150–400)
RBC # BLD: 4.28 M/UL — SIGNIFICANT CHANGE UP (ref 4.2–5.8)
RBC # FLD: 13.1 % — SIGNIFICANT CHANGE UP (ref 10.3–14.5)
WBC # BLD: 11.1 K/UL — HIGH (ref 3.8–10.5)
WBC # FLD AUTO: 11.1 K/UL — HIGH (ref 3.8–10.5)

## 2019-03-27 PROCEDURE — 99214 OFFICE O/P EST MOD 30 MIN: CPT

## 2019-03-27 NOTE — PHYSICAL EXAM
[General Appearance - Alert] : alert [General Appearance - In No Acute Distress] : in no acute distress [Oriented To Time, Place, And Person] : oriented to person, place, and time [Impaired Insight] : insight and judgment were intact [Affect] : the affect was normal [Mood] : the mood was normal [Memory Remote] : remote memory was not impaired [Person] : oriented to person [Place] : oriented to place [Time] : oriented to time [Short Term Intact] : short term memory intact [Remote Intact] : remote memory intact [Registration Intact] : recent registration memory intact [Span Intact] : the attention span was normal [Concentration Intact] : normal concentrating ability [Visual Intact] : visual attention was ~T not ~L decreased [Naming Objects] : no difficulty naming common objects [Repeating Phrases] : no difficulty repeating a phrase [Fluency] : fluency intact [Comprehension] : comprehension intact [Reading] : reading intact [Current Events] : adequate knowledge of current events [Past History] : adequate knowledge of personal past history [Vocabulary] : adequate range of vocabulary [Cranial Nerves Optic (II)] : visual acuity intact bilaterally,  visual fields full to confrontation, pupils equal round and reactive to light [Cranial Nerves Oculomotor (III)] : extraocular motion intact [Cranial Nerves Trigeminal (V)] : facial sensation intact symmetrically [Cranial Nerves Vestibulocochlear (VIII)] : hearing was intact bilaterally [Cranial Nerves Glossopharyngeal (IX)] : tongue and palate midline [Cranial Nerves Accessory (XI - Cranial And Spinal)] : head turning and shoulder shrug symmetric [Cranial Nerves Hypoglossal (XII)] : there was no tongue deviation with protrusion [Motor Tone] : muscle tone was normal in all four extremities [Involuntary Movements] : no involuntary movements were seen [No Muscle Atrophy] : normal bulk in all four extremities [Sensation Tactile Decrease] : light touch was intact [Past-pointing] : there was no past-pointing [Tremor] : no tremor present [Dysdiadochokinesia On The Left] : present on the left side [Coordination - Dysmetria Impaired Finger-to-Nose Left Only] : present on the left side [FreeTextEntry5] : left facial droop, mildly slurred speech - unchanged  [FreeTextEntry6] : weak hand  LUE [FreeTextEntry7] : decreased sensation L facial  [FreeTextEntry8] : slight limp; gait mildly unsteady on turns [Sclera] : the sclera and conjunctiva were normal [PERRL With Normal Accommodation] : pupils were equal in size, round, reactive to light, with normal accommodation [Extraocular Movements] : extraocular movements were intact [Full Visual Field] : full visual field [Oropharynx] : the oropharynx was normal [Respiration, Rhythm And Depth] : normal respiratory rhythm and effort [FreeTextEntry1] : edema LLE [Skin Color & Pigmentation] : normal skin color and pigmentation [] : no rash

## 2019-03-27 NOTE — HISTORY OF PRESENT ILLNESS
[FreeTextEntry1] : This is a pleasant 78 year old man with right frontal GBM. 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, MGMT unmethylated \par - completed chemoradiation - 8/31/18 - 10/12/18\par - 11/12/18 - temozolomide cycle #1, dose 320mg (150mg/m2) \par - 12/10/18 - temozolomide cycle #2, dose 400mg \par - 12/10/18 - presented to ED s/p fall and unable to get up; imaging with increased cerebral edema \par - 1/10/19 - temozolomide cycle #3, dose 400mg \par - POD \par - 2/7/19 - s/p re-resection ([facility]); PATH: recurrent GBM, 30-40% necrosis \par - 3/11/19 - temozolomide cycle #4, dose 400mg \par - 3/13/19 - started Avastin infusions q2 weeks \par \par \par He presents today for follow-up prior to his second Avastin infusion, accompanied by his son and wife. \par He had no issues with first Avastin infusion or with last cycle of temozolomide. \par \par He says he is doing well overall, but feeling tired. \par Left hand weakness/ is improving with continued OT and exercises he does on his own. \par He is going to the gym regularly. \par \par No headaches, no n/v, no diplopia. \par Speech mildly slurred, unchanged. \par \par Remains on decadron 4mg daily in AM. \par \par No further seizures or seizure-like symptoms (no facial twitching) on Keppra 1000mg BID. \par \par Edema LLE, dopplers were negative for DVT. \par \par

## 2019-03-27 NOTE — REASON FOR VISIT
[Follow-Up: _____] : a [unfilled] follow-up visit [Family Member] : family member [Spouse] : spouse [FreeTextEntry1] : brain tumor

## 2019-03-27 NOTE — DISCUSSION/SUMMARY
[FreeTextEntry1] : Cerebral edema - Advised to decrease decadron to 2mg daily in AM starting tomorrow. We discussed that LLE edema should improve as he is able to taper off steroids. \par \par Brain tumor - Clinically stable and tolerating Avastin and temozolomide well thus far. To continue Avastin infusions q2 weeks as scheduled, and plan for temozolomide cycle #5 to begin 4/8/19, dose 400mg. Written instructions provided. \par He notes that he may need to have a wisdom tooth extracted, but is not sure when. We discussed the need to hold Avastin for a few weeks before and after any procedure, so he will let us know of any plans for tooth extraction so the Avastin can be held. \par We reviewed his current treatment plan, as well as other treatments that may be options should his tumor progress in the future. \par \par Seizures - None, continue Keppra. \par \par DISPO - All questions answered. Follow-up with MRI in 1 month. \par \par

## 2019-03-29 ENCOUNTER — OUTPATIENT (OUTPATIENT)
Dept: OUTPATIENT SERVICES | Facility: HOSPITAL | Age: 79
LOS: 1 days | Discharge: HOME | End: 2019-03-29

## 2019-03-29 DIAGNOSIS — D17.0 BENIGN LIPOMATOUS NEOPLASM OF SKIN AND SUBCUTANEOUS TISSUE OF HEAD, FACE AND NECK: Chronic | ICD-10-CM

## 2019-03-29 DIAGNOSIS — Z98.890 OTHER SPECIFIED POSTPROCEDURAL STATES: Chronic | ICD-10-CM

## 2019-03-29 DIAGNOSIS — C71.9 MALIGNANT NEOPLASM OF BRAIN, UNSPECIFIED: ICD-10-CM

## 2019-03-29 DIAGNOSIS — C44.711 BASAL CELL CARCINOMA OF SKIN OF UNSPECIFIED LOWER LIMB, INCLUDING HIP: Chronic | ICD-10-CM

## 2019-03-29 DIAGNOSIS — Z90.89 ACQUIRED ABSENCE OF OTHER ORGANS: Chronic | ICD-10-CM

## 2019-03-29 DIAGNOSIS — Z90.79 ACQUIRED ABSENCE OF OTHER GENITAL ORGAN(S): Chronic | ICD-10-CM

## 2019-03-29 DIAGNOSIS — Z98.41 CATARACT EXTRACTION STATUS, RIGHT EYE: Chronic | ICD-10-CM

## 2019-04-03 ENCOUNTER — APPOINTMENT (OUTPATIENT)
Dept: NEUROSURGERY | Facility: CLINIC | Age: 79
End: 2019-04-03

## 2019-04-10 ENCOUNTER — APPOINTMENT (OUTPATIENT)
Dept: INFUSION THERAPY | Facility: HOSPITAL | Age: 79
End: 2019-04-10

## 2019-04-11 ENCOUNTER — INPATIENT (INPATIENT)
Facility: HOSPITAL | Age: 79
LOS: 10 days | Discharge: HOME HEALTH | DRG: 175 | End: 2019-04-22
Attending: INTERNAL MEDICINE | Admitting: INTERNAL MEDICINE
Payer: MEDICARE

## 2019-04-11 VITALS
RESPIRATION RATE: 18 BRPM | OXYGEN SATURATION: 97 % | HEIGHT: 71 IN | SYSTOLIC BLOOD PRESSURE: 165 MMHG | WEIGHT: 169.98 LBS | HEART RATE: 90 BPM | DIASTOLIC BLOOD PRESSURE: 87 MMHG

## 2019-04-11 DIAGNOSIS — Z98.890 OTHER SPECIFIED POSTPROCEDURAL STATES: Chronic | ICD-10-CM

## 2019-04-11 DIAGNOSIS — D17.0 BENIGN LIPOMATOUS NEOPLASM OF SKIN AND SUBCUTANEOUS TISSUE OF HEAD, FACE AND NECK: Chronic | ICD-10-CM

## 2019-04-11 DIAGNOSIS — Z90.79 ACQUIRED ABSENCE OF OTHER GENITAL ORGAN(S): Chronic | ICD-10-CM

## 2019-04-11 DIAGNOSIS — Z90.89 ACQUIRED ABSENCE OF OTHER ORGANS: Chronic | ICD-10-CM

## 2019-04-11 DIAGNOSIS — C44.711 BASAL CELL CARCINOMA OF SKIN OF UNSPECIFIED LOWER LIMB, INCLUDING HIP: Chronic | ICD-10-CM

## 2019-04-11 DIAGNOSIS — I26.99 OTHER PULMONARY EMBOLISM WITHOUT ACUTE COR PULMONALE: ICD-10-CM

## 2019-04-11 DIAGNOSIS — Z98.41 CATARACT EXTRACTION STATUS, RIGHT EYE: Chronic | ICD-10-CM

## 2019-04-11 LAB
ALBUMIN SERPL ELPH-MCNC: 3.5 G/DL — SIGNIFICANT CHANGE UP (ref 3.3–5)
ALP SERPL-CCNC: 64 U/L — SIGNIFICANT CHANGE UP (ref 40–120)
ALT FLD-CCNC: 31 U/L — SIGNIFICANT CHANGE UP (ref 10–45)
ANION GAP SERPL CALC-SCNC: 14 MMOL/L — SIGNIFICANT CHANGE UP (ref 5–17)
ANION GAP SERPL CALC-SCNC: 15 MMOL/L — SIGNIFICANT CHANGE UP (ref 5–17)
APTT BLD: 28.4 SEC — SIGNIFICANT CHANGE UP (ref 27.5–36.3)
AST SERPL-CCNC: 69 U/L — HIGH (ref 10–40)
BASOPHILS # BLD AUTO: 0 K/UL — SIGNIFICANT CHANGE UP (ref 0–0.2)
BASOPHILS NFR BLD AUTO: 0.2 % — SIGNIFICANT CHANGE UP (ref 0–2)
BILIRUB SERPL-MCNC: 1 MG/DL — SIGNIFICANT CHANGE UP (ref 0.2–1.2)
BUN SERPL-MCNC: 23 MG/DL — SIGNIFICANT CHANGE UP (ref 7–23)
BUN SERPL-MCNC: 24 MG/DL — HIGH (ref 7–23)
CALCIUM SERPL-MCNC: 9.3 MG/DL — SIGNIFICANT CHANGE UP (ref 8.4–10.5)
CALCIUM SERPL-MCNC: 9.5 MG/DL — SIGNIFICANT CHANGE UP (ref 8.4–10.5)
CHLORIDE SERPL-SCNC: 97 MMOL/L — SIGNIFICANT CHANGE UP (ref 96–108)
CHLORIDE SERPL-SCNC: 97 MMOL/L — SIGNIFICANT CHANGE UP (ref 96–108)
CO2 SERPL-SCNC: 25 MMOL/L — SIGNIFICANT CHANGE UP (ref 22–31)
CO2 SERPL-SCNC: 25 MMOL/L — SIGNIFICANT CHANGE UP (ref 22–31)
CREAT SERPL-MCNC: 0.99 MG/DL — SIGNIFICANT CHANGE UP (ref 0.5–1.3)
CREAT SERPL-MCNC: 1.02 MG/DL — SIGNIFICANT CHANGE UP (ref 0.5–1.3)
EOSINOPHIL # BLD AUTO: 0 K/UL — SIGNIFICANT CHANGE UP (ref 0–0.5)
EOSINOPHIL NFR BLD AUTO: 0.4 % — SIGNIFICANT CHANGE UP (ref 0–6)
GAS PNL BLDV: SIGNIFICANT CHANGE UP
GLUCOSE SERPL-MCNC: 108 MG/DL — HIGH (ref 70–99)
GLUCOSE SERPL-MCNC: 109 MG/DL — HIGH (ref 70–99)
HCOV PNL SPEC NAA+PROBE: DETECTED
HCT VFR BLD CALC: 36.8 % — LOW (ref 39–50)
HGB BLD-MCNC: 12.9 G/DL — LOW (ref 13–17)
INR BLD: 1.23 RATIO — HIGH (ref 0.88–1.16)
LYMPHOCYTES # BLD AUTO: 1.8 K/UL — SIGNIFICANT CHANGE UP (ref 1–3.3)
LYMPHOCYTES # BLD AUTO: 21.4 % — SIGNIFICANT CHANGE UP (ref 13–44)
MCHC RBC-ENTMCNC: 34.2 PG — HIGH (ref 27–34)
MCHC RBC-ENTMCNC: 35.2 GM/DL — SIGNIFICANT CHANGE UP (ref 32–36)
MCV RBC AUTO: 97.1 FL — SIGNIFICANT CHANGE UP (ref 80–100)
MONOCYTES # BLD AUTO: 0.8 K/UL — SIGNIFICANT CHANGE UP (ref 0–0.9)
MONOCYTES NFR BLD AUTO: 9.3 % — SIGNIFICANT CHANGE UP (ref 2–14)
NEUTROPHILS # BLD AUTO: 5.7 K/UL — SIGNIFICANT CHANGE UP (ref 1.8–7.4)
NEUTROPHILS NFR BLD AUTO: 68.7 % — SIGNIFICANT CHANGE UP (ref 43–77)
PLATELET # BLD AUTO: 201 K/UL — SIGNIFICANT CHANGE UP (ref 150–400)
POTASSIUM SERPL-MCNC: 4.1 MMOL/L — SIGNIFICANT CHANGE UP (ref 3.5–5.3)
POTASSIUM SERPL-MCNC: 5.6 MMOL/L — HIGH (ref 3.5–5.3)
POTASSIUM SERPL-SCNC: 4.1 MMOL/L — SIGNIFICANT CHANGE UP (ref 3.5–5.3)
POTASSIUM SERPL-SCNC: 5.6 MMOL/L — HIGH (ref 3.5–5.3)
PROT SERPL-MCNC: 7.3 G/DL — SIGNIFICANT CHANGE UP (ref 6–8.3)
PROTHROM AB SERPL-ACNC: 14.1 SEC — HIGH (ref 10–12.9)
RAPID RVP RESULT: DETECTED
RBC # BLD: 3.78 M/UL — LOW (ref 4.2–5.8)
RBC # FLD: 13.7 % — SIGNIFICANT CHANGE UP (ref 10.3–14.5)
SODIUM SERPL-SCNC: 136 MMOL/L — SIGNIFICANT CHANGE UP (ref 135–145)
SODIUM SERPL-SCNC: 137 MMOL/L — SIGNIFICANT CHANGE UP (ref 135–145)
TROPONIN T, HIGH SENSITIVITY RESULT: 16 NG/L — SIGNIFICANT CHANGE UP (ref 0–51)
TROPONIN T, HIGH SENSITIVITY RESULT: 16 NG/L — SIGNIFICANT CHANGE UP (ref 0–51)
WBC # BLD: 8.4 K/UL — SIGNIFICANT CHANGE UP (ref 3.8–10.5)
WBC # FLD AUTO: 8.4 K/UL — SIGNIFICANT CHANGE UP (ref 3.8–10.5)

## 2019-04-11 PROCEDURE — 71045 X-RAY EXAM CHEST 1 VIEW: CPT | Mod: 26

## 2019-04-11 PROCEDURE — 99291 CRITICAL CARE FIRST HOUR: CPT

## 2019-04-11 PROCEDURE — 99233 SBSQ HOSP IP/OBS HIGH 50: CPT

## 2019-04-11 PROCEDURE — 70450 CT HEAD/BRAIN W/O DYE: CPT | Mod: 26

## 2019-04-11 PROCEDURE — 71275 CT ANGIOGRAPHY CHEST: CPT | Mod: 26

## 2019-04-11 PROCEDURE — 99223 1ST HOSP IP/OBS HIGH 75: CPT | Mod: GC

## 2019-04-11 PROCEDURE — 93010 ELECTROCARDIOGRAM REPORT: CPT

## 2019-04-11 RX ORDER — HEPARIN SODIUM 5000 [USP'U]/ML
3500 INJECTION INTRAVENOUS; SUBCUTANEOUS EVERY 6 HOURS
Qty: 0 | Refills: 0 | Status: DISCONTINUED | OUTPATIENT
Start: 2019-04-11 | End: 2019-04-12

## 2019-04-11 RX ORDER — HEPARIN SODIUM 5000 [USP'U]/ML
7000 INJECTION INTRAVENOUS; SUBCUTANEOUS EVERY 6 HOURS
Qty: 0 | Refills: 0 | Status: DISCONTINUED | OUTPATIENT
Start: 2019-04-11 | End: 2019-04-12

## 2019-04-11 RX ORDER — HEPARIN SODIUM 5000 [USP'U]/ML
INJECTION INTRAVENOUS; SUBCUTANEOUS
Qty: 25000 | Refills: 0 | Status: DISCONTINUED | OUTPATIENT
Start: 2019-04-11 | End: 2019-04-11

## 2019-04-11 RX ORDER — ACETAMINOPHEN 500 MG
650 TABLET ORAL ONCE
Qty: 0 | Refills: 0 | Status: COMPLETED | OUTPATIENT
Start: 2019-04-11 | End: 2019-04-11

## 2019-04-11 RX ORDER — HEPARIN SODIUM 5000 [USP'U]/ML
6500 INJECTION INTRAVENOUS; SUBCUTANEOUS EVERY 6 HOURS
Qty: 0 | Refills: 0 | Status: DISCONTINUED | OUTPATIENT
Start: 2019-04-11 | End: 2019-04-11

## 2019-04-11 RX ORDER — HEPARIN SODIUM 5000 [USP'U]/ML
7000 INJECTION INTRAVENOUS; SUBCUTANEOUS ONCE
Qty: 0 | Refills: 0 | Status: COMPLETED | OUTPATIENT
Start: 2019-04-11 | End: 2019-04-11

## 2019-04-11 RX ORDER — HEPARIN SODIUM 5000 [USP'U]/ML
6500 INJECTION INTRAVENOUS; SUBCUTANEOUS ONCE
Qty: 0 | Refills: 0 | Status: DISCONTINUED | OUTPATIENT
Start: 2019-04-11 | End: 2019-04-11

## 2019-04-11 RX ORDER — HEPARIN SODIUM 5000 [USP'U]/ML
INJECTION INTRAVENOUS; SUBCUTANEOUS
Qty: 25000 | Refills: 0 | Status: DISCONTINUED | OUTPATIENT
Start: 2019-04-11 | End: 2019-04-12

## 2019-04-11 RX ORDER — HEPARIN SODIUM 5000 [USP'U]/ML
3000 INJECTION INTRAVENOUS; SUBCUTANEOUS EVERY 6 HOURS
Qty: 0 | Refills: 0 | Status: DISCONTINUED | OUTPATIENT
Start: 2019-04-11 | End: 2019-04-11

## 2019-04-11 RX ORDER — ENOXAPARIN SODIUM 100 MG/ML
89 INJECTION SUBCUTANEOUS ONCE
Qty: 0 | Refills: 0 | Status: DISCONTINUED | OUTPATIENT
Start: 2019-04-11 | End: 2019-04-11

## 2019-04-11 RX ADMIN — Medication 650 MILLIGRAM(S): at 21:37

## 2019-04-11 RX ADMIN — HEPARIN SODIUM 7000 UNIT(S): 5000 INJECTION INTRAVENOUS; SUBCUTANEOUS at 19:39

## 2019-04-11 RX ADMIN — HEPARIN SODIUM 1600 UNIT(S)/HR: 5000 INJECTION INTRAVENOUS; SUBCUTANEOUS at 19:40

## 2019-04-11 NOTE — ED ADULT NURSE NOTE - PMH
Basal cell carcinoma (BCC) of left lower leg  s/p resection  right lower leg top  left lower leg  Bilateral hearing loss, unspecified hearing loss type    Fall, sequela    Glioblastoma  biopsy 8/2018  surgery  6 weeks radiation  35 days of oral chemotherapy last 3 weeks  HLD (hyperlipidemia)    HTN (hypertension)     activity  [employer] 1962-65    [place]  Prostate cancer  s/p radical prostatectomy 1995  Type 2 diabetes mellitus without complication, without long-term current use of insulin  stop oral meds  3 weeks ago

## 2019-04-11 NOTE — H&P ADULT - ASSESSMENT
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 MICU c/s for hypoxic resp distress. Patient stable at the time of eval.    - C/w high flow NC as needed. Wean off as tolerated.  - Consider ABG to correlate with pulse ox if patient desats again.  - Check RVP for sputum production (recent chemo, immunocompromised?)  - C/w heparin gtt .Heme consult ordered. Pulm consult ordered  - Repeat CTH when AC therapeutic per neurosx recs.  - Admit to tele with continuous pulse oximetry.

## 2019-04-11 NOTE — ED ADULT NURSE REASSESSMENT NOTE - COMFORT CARE
treatment delay explained/wait time explained/plan of care explained/darkened lights/side rails up/warm blanket provided

## 2019-04-11 NOTE — ED PROVIDER NOTE - PMH
Basal cell carcinoma (BCC) of left lower leg  s/p resection  right lower leg top  left lower leg  Bilateral hearing loss, unspecified hearing loss type    Fall, sequela    Glioblastoma  biopsy 8/2018  surgery  6 weeks radiation  35 days of oral chemotherapy last 3 weeks  HLD (hyperlipidemia)    HTN (hypertension)     activity  [employer] 1962-65    [place]  Prostate cancer  s/p radical prostatectomy 1995  Type 2 diabetes mellitus without complication, without long-term current use of insulin  stop oral meds  3 weeks ago

## 2019-04-11 NOTE — ED PROVIDER NOTE - OBJECTIVE STATEMENT
79 y/o M w/ PMH of glioblastoma s/p resection, HTN, HLD, DM, prostate Ca presenting w/ weakness and shortness of breath. Pt reports for the past 2-3 weeks he has been having increased weakness, shortness of breath, and dyspnea on exertion. Additionally pt reports swelling of his left leg along the same time. He reports having an ultrasound done a few weeks ago and he said he was told it was normal. Pt has not experienced these symptoms before. Denies chest pain. No additional complaints.

## 2019-04-11 NOTE — ED ADULT NURSE REASSESSMENT NOTE - NS ED NURSE REASSESS COMMENT FT1
Spoke with inpatient NP covering pt about pt having fever of 100.8F Spoke with inpatient NP covering pt about pt having fever of 100.8F. She states she will place appropriate orders and come see pt.

## 2019-04-11 NOTE — ED PROVIDER NOTE - PROGRESS NOTE DETAILS
Dr. Jose Chauhan, PGY-1: Pt found to have b/l PEs. Paged out to neurosurgery given pt's hx of brain mass. Recommended CT non con to ensure no active bleeding given pt's cancer history. Will start heparin post CT scan. Dr. Jose Chauhan, PGY-1: CTH w/o active bleeding. Starting AC Dr. Jose Chauhan, PGY-1: pt had episode of desaturation to the mid 80s while on NC. Pt had been in the high 90s prior while on NC during his ED stay. Consulted PERT to hugo henry who will see him in the ED shortly.

## 2019-04-11 NOTE — H&P ADULT - HISTORY OF PRESENT ILLNESS
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 seen by MICU, rejected for transfer to MICU. presently patient has no acute complaints. earlier was in resp distress, placed on Hi [employer]hav[place] O2.  Patient noticed 3 weeks ago LLE swelling. Recent US LE non-revealing. Patient noticed further swelling in the past few days and prompted his ED visit today. Patient otherwise denied sob, n/v, dizziness, palpitation, HA, vision change or increasing weakness. Patient reported no hx of blood clots and no family hx of blood clots. In the ED patient Found to have bilateral PE with left lingula infarct. Patient was started on heparin gtt after repeat CTH without acute findings and NeuroSX clearance.

## 2019-04-11 NOTE — CHART NOTE - NSCHARTNOTEFT_GEN_A_CORE
Called regarding starting heparin drip for sympomatic PE in patient with hx of craniotomy for glioblastoma in February    No absolute neurosurgical contraindication to systemic anti coagulation, but there does exist an increased risk of intracranial hemorrhage which can result in significant morbidity including but not limited to headache, seizure, stroke, paralysis, and in severe cases even death.    The risks and benefits of starting systemic anticoagulation must be considered carefully in the setting of the patients medical history and current clinical condition.    - Recommend CTH before starting AC, may proceed as long as no acute blood, should get repeat CTH once therapeutic  - Neurochecks

## 2019-04-11 NOTE — H&P ADULT - NSHPPHYSICALEXAM_GEN_ALL_CORE
T(F): 100.8 (04-11-19 @ 21:24), Max: 100.8 (04-11-19 @ 21:24)  HR: 98 (04-11-19 @ 21:24) (90 - 98)  BP: 147/73 (04-11-19 @ 21:24) (132/86 - 165/87)  RR: 24 (04-11-19 @ 21:24) (18 - 24)  SpO2: 96% (04-11-19 @ 21:24) (86% - 100%)      GENERAL: frail  well-developed male, on HI Jason O2  HEAD:  Atraumatic, Normocephalic  EYES: EOMI, PERRLA, conjunctiva and sclera clear  NECK: Supple, No JVD  CHEST/LUNG: Clear to auscultation bilaterally; No wheeze  HEART: Regular rate and rhythm; No murmurs, rubs, or gallops  ABDOMEN: Soft, Nontender, Nondistended; Bowel sounds present  EXTREMITIES:  2+ Peripheral Pulses, No clubbing, cyanosis, has LLE edema  PSYCH: AAOx3  NEUROLOGY: non-focal  SKIN: No rashes or lesions

## 2019-04-11 NOTE — CHART NOTE - NSCHARTNOTEFT_GEN_A_CORE
CC:    HPI:  Notified by RN patient with temperature of F. Patient seen and examined  by me at bedside.   Patient is alert, NAD.  Patient denied headache, dizziness, chest pain, shortness of breath, cough, rhinorrhea, N/V/D, urinary symptoms, or abdominal pain.      ROS:  CONSTITUTIONAL:  No fever, chills, rigors  CARDIOVASCULAR:  No chest pain or palpitations  RESPIRATORY:   No SOB, cough, dyspnea on exertion.  No wheezing  GASTROINTESTINAL:  No abd pain, N/V, diarrhea/constipation  EXTREMITIES:  No swelling or joint pain  GENITOURINARY:  No burning on urination, increased frequency or urgency.  No flank pain  NEUROLOGIC:  No HA, visual disturbances  SKIN: No rashes        PAST MEDICAL & SURGICAL HISTORY:          VITAL SIGNS:  T(C): 38.2 (04-11-19 @ 21:24), Max: 38.2 (04-11-19 @ 21:24)  HR: 98 (04-11-19 @ 21:24) (90 - 98)  BP: 147/73 (04-11-19 @ 21:24) (132/86 - 165/87)  RR: 24 (04-11-19 @ 21:24) (18 - 24)  SpO2: 96% (04-11-19 @ 21:24) (86% - 100%)  Wt(kg): --      Physical Exam:  General: WN/WD NAD, AOx3, nontoxic appearing  Head:  NC/AT  CV: RRR, S1S2   Respiratory: CTA B/L, nonlabored. No rales/rhonchi/wheezes.  Abdominal: (+) bowel sounds x4. Soft, NT, ND, no palpable mass, no guarding, or rebound tenderness  Genitourinary: ? Luna   MSK: No BLLE edema, + peripheral pulses, FROM all 4 extremity  Skin: (+) warm, dry   Psych: Appropriate affect       LABORATORY:                        12.9   8.4   )-----------( 201      ( 11 Apr 2019 15:15 )             36.8       04-11    137  |  97  |  23  ----------------------------<  109<H>  4.1   |  25  |  1.02    Ca    9.3      11 Apr 2019 16:58    TPro  7.3  /  Alb  3.5  /  TBili  1.0  /  DBili  x   /  AST  69<H>  /  ALT  31  /  AlkPhos  64  04-11                  RADIOLOGY:            ASSESSMENT/PLAN:   HPI:      Patient now with temperature of    1) Fever  -Tylenol and cooling measures PRN pyrexia  -BCx x2 ordered  -UA ordered  -CXR  -c/w   -Will continue to closely monitor patient/vitals   -Will endorse to primary team in AM      Julia Robb PA-C  Dept of Medicine CC: temperature 100.8F    HPI:  Notified by RN patient with temperature of 100.8F. Patient seen and examined by me at bedside. Patient is alert, NAD. He denied headache, dizziness, chest pain, rhinorrhea, N/V/D, urinary symptoms, or abdominal pain. He stated his wife and daughter at home are sick. He endorsed an intermittent productive cough.       ROS:  CONSTITUTIONAL:  (+) fever. NO chills, rigors  CARDIOVASCULAR:  No chest pain or palpitations  RESPIRATORY:   (+) intermittent productive cough. No SOB, wheezing  GASTROINTESTINAL:  No abd pain, N/V/D  GENITOURINARY:  No burning on urination, increased frequency or urgency  NEUROLOGIC:  No HA, visual disturbances  SKIN: No rashes      PAST MEDICAL & SURGICAL HISTORY:  Fall, sequela  Bilateral hearing loss, unspecified hearing loss type   activity: [employer] 1962-65 ([place])  Type 2 diabetes mellitus without complication, without long-term current use of insulin: stop oral meds  3 weeks ago  Glioblastoma: biopsy 8/2018  Basal cell carcinoma (BCC) of left lower leg: s/p resection  Prostate cancer: s/p radical prostatectomy 1995  HLD (hyperlipidemia)  HTN (hypertension)  S/P colonoscopic polypectomy: 3 years benign polyps  H/O bilateral cataract extraction: 3-4 years ago  S/P prostatectomy: 1995  S/P brain surgery: 8/2018  Lipoma of neck: s/p resection at age 30  History of tonsillectomy: at age 30        VITAL SIGNS:  T(C): 38.2 (04-11-19 @ 21:24), Max: 38.2 (04-11-19 @ 21:24)  HR: 98 (04-11-19 @ 21:24) (90 - 98)  BP: 147/73 (04-11-19 @ 21:24) (132/86 - 165/87)  RR: 24 (04-11-19 @ 21:24) (18 - 24)  SpO2: 96% (04-11-19 @ 21:24) (86% - 100%)      Physical Exam:  General: WN/WD, NAD, AOx3, nontoxic appearing  Head:  NC/AT  CV: RRR, S1S2   Respiratory: (+) on high-flow. CTA B/L, nonlabored. No rales/rhonchi/wheezes.  Abdominal: (+) bowel sounds x4. Large, soft, NT, ND, no palpable mass, no guarding, or rebound tenderness  MSK: (+) 1-2+ LLE edema. + peripheral pulses, FROM all 4 extremity  Skin: (+) warm, dry   Psych: Appropriate affect       LABORATORY:                        12.9   8.4   )-----------( 201      ( 11 Apr 2019 15:15 )             36.8       04-11    137  |  97  |  23  ----------------------------<  109<H>  4.1   |  25  |  1.02    Ca    9.3      11 Apr 2019 16:58    TPro  7.3  /  Alb  3.5  /  TBili  1.0  /  DBili  x   /  AST  69<H>  /  ALT  31  /  AlkPhos  64  04-11      Blood Gas Venous - Lactate: 2.8 mmoL/L (04.11.19 @ 15:15)          RADIOLOGY:  < from: CT Angio Chest w/ IV Cont (04.11.19 @ 16:16) >  IMPRESSION:   Bilateral main, lobar and segmental pulmonary emboli with left pulmonary   infarcts described above. No CT evidence of right heart strain.  < end of copied text >      < from: CT Head No Cont (04.11.19 @ 17:20) >  IMPRESSION:  Status post resection of a right parietal glioblastoma. Resolution of the   postop changes compared with the prior 2/8/2019. Recommend brain MR with   contrast to evaluate for residual disease.  < end of copied text >    < from: Xray Chest 1 View AP/PA (04.11.19 @ 16:32) >  ******PRELIMINARY REPORT******        INTERPRETATION:  Left pleural effusion. The right pleural effusion seen   on the concurrent chest CT is not clearly visualized.  Left lingular and left lower lobe infarcts are better seen on CT.  Follow-up official report.  < end of copied text >            ASSESSMENT/PLAN:   78 year old man with PMhx of HTN, HLD, prostate ca s/p prostatectomy, basal cell skin ca (resected from leg), glioblastoma ( dx 8/18) s/p surgery, chemotherapy and radiation and seizure disorder presented to ED with LLE swelling and found to have bilateral PE. MICU c/s for hypoxic resp distress. Patient stable at the time of eval.    Patient now presenting acutely with temperature of 100.8F.     #Fever- ?reactive secondary to PEs; r/o infectious etiology  -Labs without leukocytosis  -Lactate 2.8 on blood gas   -Tylenol and cooling measures PRN pyrexia  -BCx x2 ordered  -UA ordered  -RVP collected, in lab  -CXR (4/11) with left pleural effusion and left lingular/LLL infarcts  -CTA chest (4/11) with "Bilateral main, lobar and segmental pulmonary emboli with left pulmonary infarcts"  -Continue with high-flow NC, wean as tolerated  -Will monitor patient off antibiotics at this time; will initiate broad-spectrum antibiotics if patient clinically decompensates  -Will continue to closely monitor patient/vitals     #LLE swelling  -LLE swelling on exam, DP pulse intact, extremity warm to touch, sensation intact to light touch  -Urgent bilateral lower extremity dopplers ordered to r/o DVT given new diagnosis of PE  -Continue with heparin gtt for PE  -Heme/onc consult pending    Julia Robb PA-C  Dept of Medicine  [id number] CC: temperature 100.8F    HPI:  Notified by RN patient with temperature of 100.8F. Patient seen and examined by me at bedside. Patient is alert, NAD. He denied headache, dizziness, chest pain, rhinorrhea, N/V/D, urinary symptoms, or abdominal pain. He stated his wife and daughter at home are sick. He endorsed an intermittent productive cough.       ROS:  CONSTITUTIONAL:  (+) fever. NO chills, rigors  CARDIOVASCULAR:  No chest pain or palpitations  RESPIRATORY:   (+) intermittent productive cough. No SOB, wheezing  GASTROINTESTINAL:  No abd pain, N/V/D  GENITOURINARY:  No burning on urination, increased frequency or urgency  NEUROLOGIC:  No HA, visual disturbances  SKIN: No rashes      PAST MEDICAL & SURGICAL HISTORY:  Fall, sequela  Bilateral hearing loss, unspecified hearing loss type   activity: [employer] 1962-65 ([place])  Type 2 diabetes mellitus without complication, without long-term current use of insulin: stop oral meds  3 weeks ago  Glioblastoma: biopsy 8/2018  Basal cell carcinoma (BCC) of left lower leg: s/p resection  Prostate cancer: s/p radical prostatectomy 1995  HLD (hyperlipidemia)  HTN (hypertension)  S/P colonoscopic polypectomy: 3 years benign polyps  H/O bilateral cataract extraction: 3-4 years ago  S/P prostatectomy: 1995  S/P brain surgery: 8/2018  Lipoma of neck: s/p resection at age 30  History of tonsillectomy: at age 30        VITAL SIGNS:  T(C): 38.2 (04-11-19 @ 21:24), Max: 38.2 (04-11-19 @ 21:24)  HR: 98 (04-11-19 @ 21:24) (90 - 98)  BP: 147/73 (04-11-19 @ 21:24) (132/86 - 165/87)  RR: 24 (04-11-19 @ 21:24) (18 - 24)  SpO2: 96% (04-11-19 @ 21:24) (86% - 100%)      Physical Exam:  General: WN/WD, NAD, AOx3, nontoxic appearing  Head:  NC/AT  CV: RRR, S1S2   Respiratory: (+) on high-flow. CTA B/L, nonlabored. No rales/rhonchi/wheezes.  Abdominal: (+) bowel sounds x4. Large, soft, NT, ND, no palpable mass, no guarding, or rebound tenderness  MSK: (+) 1-2+ LLE edema. + peripheral pulses, FROM all 4 extremity  Skin: (+) warm, dry   Psych: Appropriate affect       LABORATORY:                        12.9   8.4   )-----------( 201      ( 11 Apr 2019 15:15 )             36.8       04-11    137  |  97  |  23  ----------------------------<  109<H>  4.1   |  25  |  1.02    Ca    9.3      11 Apr 2019 16:58    TPro  7.3  /  Alb  3.5  /  TBili  1.0  /  DBili  x   /  AST  69<H>  /  ALT  31  /  AlkPhos  64  04-11      Blood Gas Venous - Lactate: 2.8 mmoL/L (04.11.19 @ 15:15)          RADIOLOGY:  < from: CT Angio Chest w/ IV Cont (04.11.19 @ 16:16) >  IMPRESSION:   Bilateral main, lobar and segmental pulmonary emboli with left pulmonary   infarcts described above. No CT evidence of right heart strain.  < end of copied text >      < from: CT Head No Cont (04.11.19 @ 17:20) >  IMPRESSION:  Status post resection of a right parietal glioblastoma. Resolution of the   postop changes compared with the prior 2/8/2019. Recommend brain MR with   contrast to evaluate for residual disease.  < end of copied text >    < from: Xray Chest 1 View AP/PA (04.11.19 @ 16:32) >  ******PRELIMINARY REPORT******        INTERPRETATION:  Left pleural effusion. The right pleural effusion seen   on the concurrent chest CT is not clearly visualized.  Left lingular and left lower lobe infarcts are better seen on CT.  Follow-up official report.  < end of copied text >            ASSESSMENT/PLAN:   78 year old man with PMhx of HTN, HLD, prostate ca s/p prostatectomy, basal cell skin ca (resected from leg), glioblastoma ( dx 8/18) s/p surgery, chemotherapy and radiation and seizure disorder presented to ED with LLE swelling and found to have bilateral PEs.  Patient now presenting acutely with temperature of 100.8F.     #Fever- ?reactive secondary to PEs; r/o infectious etiology  -Labs without leukocytosis  -Lactate 2.8 on blood gas   -Tylenol and cooling measures PRN pyrexia  -BCx x2 ordered  -UA ordered  -RVP collected, in lab  -CXR (4/11) with left pleural effusion and left lingular/LLL infarcts  -CTA chest (4/11) with "Bilateral main, lobar and segmental pulmonary emboli with left pulmonary infarcts"  -Continue with high-flow NC, wean as tolerated  -Repeat blood gas ordered  -Will monitor patient off antibiotics at this time; will initiate broad-spectrum antibiotics if patient clinically decompensates  -Will continue to closely monitor patient/vitals     #LLE swelling  -LLE swelling on exam, DP pulse intact, extremity warm to touch, sensation intact to light touch  -Urgent bilateral lower extremity dopplers ordered to r/o DVT given new diagnosis of PE  -Continue with heparin gtt for PE  -Heme/onc consult pending    Julia Robb PA-C  Dept of Medicine  [id number] CC: temperature 100.8F    HPI:  Notified by RN patient with temperature of 100.8F. Patient seen and examined by me at bedside. Patient is alert, NAD. He denied headache, dizziness, chest pain, rhinorrhea, N/V/D, urinary symptoms, or abdominal pain. He stated his wife and daughter at home are sick. He endorsed an intermittent productive cough.       ROS:  CONSTITUTIONAL:  (+) fever. NO chills, rigors  CARDIOVASCULAR:  No chest pain or palpitations  RESPIRATORY:   (+) intermittent productive cough. No SOB, wheezing  GASTROINTESTINAL:  No abd pain, N/V/D  GENITOURINARY:  No burning on urination, increased frequency or urgency  NEUROLOGIC:  No HA, visual disturbances  SKIN: No rashes      PAST MEDICAL & SURGICAL HISTORY:  Fall, sequela  Bilateral hearing loss, unspecified hearing loss type   activity: [employer] 1962-65 ([place])  Type 2 diabetes mellitus without complication, without long-term current use of insulin: stop oral meds  3 weeks ago  Glioblastoma: biopsy 8/2018  Basal cell carcinoma (BCC) of left lower leg: s/p resection  Prostate cancer: s/p radical prostatectomy 1995  HLD (hyperlipidemia)  HTN (hypertension)  S/P colonoscopic polypectomy: 3 years benign polyps  H/O bilateral cataract extraction: 3-4 years ago  S/P prostatectomy: 1995  S/P brain surgery: 8/2018  Lipoma of neck: s/p resection at age 30  History of tonsillectomy: at age 30        VITAL SIGNS:  T(C): 38.2 (04-11-19 @ 21:24), Max: 38.2 (04-11-19 @ 21:24)  HR: 98 (04-11-19 @ 21:24) (90 - 98)  BP: 147/73 (04-11-19 @ 21:24) (132/86 - 165/87)  RR: 24 (04-11-19 @ 21:24) (18 - 24)  SpO2: 96% (04-11-19 @ 21:24) (86% - 100%)      Physical Exam:  General: WN/WD, NAD, AOx3, nontoxic appearing  Head:  NC/AT  CV: RRR, S1S2   Respiratory: (+) on high-flow. CTA B/L, nonlabored. No rales/rhonchi/wheezes.  Abdominal: (+) bowel sounds x4. Large, soft, NT, ND, no palpable mass, no guarding, or rebound tenderness  MSK: (+) 2+ LLE edema. + peripheral pulses, FROM all 4 extremity  Skin: (+) warm, dry   Psych: Appropriate affect       LABORATORY:                        12.9   8.4   )-----------( 201      ( 11 Apr 2019 15:15 )             36.8       04-11    137  |  97  |  23  ----------------------------<  109<H>  4.1   |  25  |  1.02    Ca    9.3      11 Apr 2019 16:58    TPro  7.3  /  Alb  3.5  /  TBili  1.0  /  DBili  x   /  AST  69<H>  /  ALT  31  /  AlkPhos  64  04-11      Blood Gas Venous - Lactate: 2.8 mmoL/L (04.11.19 @ 15:15)          RADIOLOGY:  < from: CT Angio Chest w/ IV Cont (04.11.19 @ 16:16) >  IMPRESSION:   Bilateral main, lobar and segmental pulmonary emboli with left pulmonary   infarcts described above. No CT evidence of right heart strain.  < end of copied text >      < from: CT Head No Cont (04.11.19 @ 17:20) >  IMPRESSION:  Status post resection of a right parietal glioblastoma. Resolution of the   postop changes compared with the prior 2/8/2019. Recommend brain MR with   contrast to evaluate for residual disease.  < end of copied text >    < from: Xray Chest 1 View AP/PA (04.11.19 @ 16:32) >  ******PRELIMINARY REPORT******        INTERPRETATION:  Left pleural effusion. The right pleural effusion seen   on the concurrent chest CT is not clearly visualized.  Left lingular and left lower lobe infarcts are better seen on CT.  Follow-up official report.  < end of copied text >            ASSESSMENT/PLAN:   78 year old man with PMhx of HTN, HLD, prostate ca s/p prostatectomy, basal cell skin ca (resected from leg), glioblastoma ( dx 8/18) s/p surgery, chemotherapy and radiation and seizure disorder presented to ED with LLE swelling and found to have bilateral PEs.  Patient now presenting acutely with temperature of 100.8F.     #Fever- ?reactive secondary to PEs; r/o infectious etiology  -Labs without leukocytosis  -Lactate 2.8 on blood gas   -Tylenol and cooling measures PRN pyrexia  -BCx x2 ordered  -UA ordered  -RVP collected, in lab  -CXR (4/11) with left pleural effusion and left lingular/LLL infarcts  -CTA chest (4/11) with "Bilateral main, lobar and segmental pulmonary emboli with left pulmonary infarcts"  -Continue with high-flow NC, wean as tolerated  -Repeat blood gas ordered  -Will monitor patient off antibiotics at this time; will initiate broad-spectrum antibiotics if patient clinically decompensates  -Will continue to closely monitor patient/vitals     #LLE swelling  -LLE swelling on exam, DP pulse intact, extremity warm to touch, sensation intact to light touch  -Urgent bilateral lower extremity dopplers ordered to r/o DVT given new diagnosis of PE  -Continue with heparin gtt for PE  -Heme/onc consult pending    Julia Robb PA-C  Dept of Medicine  [id number]

## 2019-04-11 NOTE — ED PROVIDER NOTE - CLINICAL SUMMARY MEDICAL DECISION MAKING FREE TEXT BOX
77 y/o M w/ PMH of glioblastoma s/p resection, HTN, HLD, DM, prostate Ca now w/ dyspnea and hypoxia. Upon arrival to ED pt was hypoxic to mid 80s on room air and was placed on NC w/ improved to mid to high 90s. Given pt's cancer hx and unilateral leg swelling in conjunctive with reported symptoms, there is a high suspicion for PE. Will get CTA chest to eval for PE. CBC, CMP, coags, trop, VBG, CXR. Reassess.

## 2019-04-11 NOTE — CONSULT NOTE ADULT - SUBJECTIVE AND OBJECTIVE BOX
CHIEF COMPLAINT: LLE swelling    HPI: 78 year old man with PMhx of HTN, HLD, prostate ca s/p prostatectomy, basal cell skin ca (resected from leg), glioblastoma ( dx ) s/p surgery, chemotherapy and radiation and seizure disorder presented to ED with LLE swelling and found to have bilateral PE. MICU c/s for hypoxic resp distress.    PAST MEDICAL & SURGICAL HISTORY:  Fall, sequela  Bilateral hearing loss, unspecified hearing loss type   activity: [employer] -    [place]  Type 2 diabetes mellitus without complication, without long-term current use of insulin: stop oral meds  3 weeks ago  Glioblastoma: biopsy 2018  surgery  6 weeks radiation  35 days of oral chemotherapy last 3 weeks  Basal cell carcinoma (BCC) of left lower leg: s/p resection  right lower leg top  left lower leg  Prostate cancer: s/p radical prostatectomy   HLD (hyperlipidemia)  HTN (hypertension)  S/P colonoscopic polypectomy: 3 years benign polyps  S/P tonsillectomy: age 30  H/O bilateral cataract extraction: 3-4 years ago  S/P prostatectomy:   S/P brain surgery: 2018  Lipoma of neck: s/p resection at age 30  Basal cell carcinoma (BCC) of lower leg: s/p resection  History of tonsillectomy: at age 30      FAMILY HISTORY:  Family history of diabetes mellitus: Father   Family history of prostate cancer in father:   Family history of breast cancer in mother (Sibling): Mother and sister  Family history of lung cancer: mother , long standing smoker      SOCIAL HISTORY:  Smoking: [ ] Never Smoked [ ] Former Smoker (__ packs x ___ years) [ ] Current Smoker  (__ packs x ___ years)  Substance Use: [ ] Never Used [ ] Used ____  EtOH Use:  Marital Status: [ ] Single [ ]  [ ]  [ ]   Sexual History:   Occupation:  Recent Travel:  Country of Birth:  Advance Directives:    Allergies    No Known Allergies    Intolerances        HOME MEDICATIONS:    REVIEW OF SYSTEMS:  Constitutional: [ ] negative [ ] fevers [ ] chills [ ] weight loss [ ] weight gain  HEENT: [ ] negative [ ] dry eyes [ ] eye irritation [ ] postnasal drip [ ] nasal congestion  CV: [ ] negative  [ ] chest pain [ ] orthopnea [ ] palpitations [ ] murmur  Resp: [ ] negative [ ] cough [ ] shortness of breath [ ] dyspnea [ ] wheezing [ ] sputum [ ] hemoptysis  GI: [ ] negative [ ] nausea [ ] vomiting [ ] diarrhea [ ] constipation [ ] abd pain [ ] dysphagia   : [ ] negative [ ] dysuria [ ] nocturia [ ] hematuria [ ] increased urinary frequency  Musculoskeletal: [ ] negative [ ] back pain [ ] myalgias [ ] arthralgias [ ] fracture  Skin: [ ] negative [ ] rash [ ] itch  Neurological: [ ] negative [ ] headache [ ] dizziness [ ] syncope [ ] weakness [ ] numbness  Psychiatric: [ ] negative [ ] anxiety [ ] depression  Endocrine: [ ] negative [ ] diabetes [ ] thyroid problem  Hematologic/Lymphatic: [ ] negative [ ] anemia [ ] bleeding problem  Allergic/Immunologic: [ ] negative [ ] itchy eyes [ ] nasal discharge [ ] hives [ ] angioedema  [ ] All other systems negative  [ ] Unable to assess ROS because ________    OBJECTIVE:  ICU Vital Signs Last 24 Hrs  T(C): 36.7 (2019 18:18), Max: 36.8 (2019 14:35)  T(F): 98 (2019 18:18), Max: 98.2 (2019 14:35)  HR: 94 (2019 19:30) (90 - 97)  BP: 165/76 (2019 19:30) (132/86 - 165/87)  BP(mean): --  ABP: --  ABP(mean): --  RR: 24 (2019 19:30) (18 - 24)  SpO2: 100% (2019 19:30) (86% - 100%)        CAPILLARY BLOOD GLUCOSE      POCT Blood Glucose.: 100 mg/dL (2019 14:18)      PHYSICAL EXAM:  General:   HEENT:   Lymph Nodes:  Neck:   Respiratory:   Cardiovascular:   Abdomen:   Extremities:   Skin:   Neurological:  Psychiatry:    LINES:     HOSPITAL MEDICATIONS:  heparin  Infusion.  Unit(s)/Hr IV Continuous <Continuous>                              LABS:                        12.9   8.4   )-----------( 201      ( 2019 15:15 )             36.8     Hgb Trend: 12.9<--  04-11    137  |  97  |  23  ----------------------------<  109<H>  4.1   |  25  |  1.02    Ca    9.3      2019 16:58    TPro  7.3  /  Alb  3.5  /  TBili  1.0  /  DBili  x   /  AST  69<H>  /  ALT  31  /  AlkPhos  64      Creatinine Trend: 1.02<--, 0.99<--  PT/INR - ( 2019 15:15 )   PT: 14.1 sec;   INR: 1.23 ratio         PTT - ( 2019 15:15 )  PTT:28.4 sec      Venous Blood Gas:   @ 15:15  7.33/61/20/  VBG Lactate: 2.8      MICROBIOLOGY:     RADIOLOGY:  [ ] Reviewed and interpreted by me    EKG: CHIEF COMPLAINT: LLE swelling    HPI: 78 year old man with PMhx of HTN, HLD, prostate ca s/p prostatectomy, basal cell skin ca (resected from leg), glioblastoma ( dx ) s/p surgery, chemotherapy and radiation and seizure disorder presented to ED with LLE swelling and found to have bilateral PE. MICU c/s for hypoxic resp distress. Upon eval, patient has no acute complaints. Reported left chest pain with deep breath. Also phlgem production. Patient noticed 3 weeks ago LLE swelling. Recent US LE non-revealing. Patient noticed further swelling in the past few days and prompted his ED visit today. Patient otherwise denied sob, n/v, dizziness, palpitation, HA, vision change or increasing weakness. Patient reported no hx of blood clots and no family hx of blood clots. In the ED, patient remains VSSAF. Found to have bilateral PE with left lingula infarct. Patient was started on heparin gtt after repeat CTH without acute findings and NeuroSX clearance. MICU consulted after patient noted desating on 6L NC. Decision made to keep patient on high flow.    PAST MEDICAL & SURGICAL HISTORY:  Fall, sequela  Bilateral hearing loss, unspecified hearing loss type   activity: [employer] -    [place]  Type 2 diabetes mellitus without complication, without long-term current use of insulin: stop oral meds  3 weeks ago  Glioblastoma: biopsy 2018  surgery  6 weeks radiation  35 days of oral chemotherapy last 3 weeks  Basal cell carcinoma (BCC) of left lower leg: s/p resection  right lower leg top  left lower leg  Prostate cancer: s/p radical prostatectomy   HLD (hyperlipidemia)  HTN (hypertension)  S/P colonoscopic polypectomy: 3 years benign polyps  S/P tonsillectomy: age 30  H/O bilateral cataract extraction: 3-4 years ago  S/P prostatectomy:   S/P brain surgery: 2018  Lipoma of neck: s/p resection at age 30  Basal cell carcinoma (BCC) of lower leg: s/p resection  History of tonsillectomy: at age 30      FAMILY HISTORY:  Family history of diabetes mellitus: Father   Family history of prostate cancer in father:   Family history of breast cancer in mother (Sibling): Mother and sister  Family history of lung cancer: mother , long standing smoker      SOCIAL HISTORY:  No smoking, social alcohol use.    Allergies    No Known Allergies    Intolerances    HOME MEDICATIONS:    REVIEW OF SYSTEMS:    CONSTITUTIONAL: No weakness, fevers or chills  EYES/ENT: No visual changes;  No vertigo or throat pain   NECK: No pain or stiffness  RESPIRATORY: No coughing, phelgm production  CARDIOVASCULAR: No chest pain or palpitations  GASTROINTESTINAL: No abdominal or epigastric pain. No nausea, vomiting, or hematemesis; No diarrhea or constipation. No melena or hematochezia.  GENITOURINARY: No dysuria, frequency or hematuria  NEUROLOGICAL: No numbness or weakness  SKIN: LE swelling L.  All other review of systems is negative unless indicated above.    OBJECTIVE:  T(C): 36.7 (19 @ 18:18), Max: 36.8 (19 @ 14:35)  HR: 97 (19 @ 20:05) (90 - 98)  BP: 141/72 (19 @ 20:05) (132/86 - 165/87)  RR: 24 (19 @ 20:05) (18 - 24)  SpO2: 97% (19 @ 20:05) (86% - 100%)  Wt(kg): --  GENERAL: NAD, well-developed  HEAD:  Atraumatic, Normocephalic  EYES: EOMI, PERRLA, conjunctiva and sclera clear  NECK: Supple, No JVD  CHEST/LUNG: Clear to auscultation bilaterally; No wheeze  HEART: systolic murmur, tachy  ABDOMEN: Soft, Nontender, Nondistended; Bowel sounds present  EXTREMITIES:  3+ lower extremity swelling.  NEUROLOGY: RUE weakness  SKIN: No rashes or lesions    HOSPITAL MEDICATIONS:  heparin  Infusion.  Unit(s)/Hr IV Continuous <Continuous>    LABS:                        12.9   8.4   )-----------( 201      ( 2019 15:15 )             36.8     Hgb Trend: 12.9<--      137  |  97  |  23  ----------------------------<  109<H>  4.1   |  25  |  1.02    Ca    9.3      2019 16:58    TPro  7.3  /  Alb  3.5  /  TBili  1.0  /  DBili  x   /  AST  69<H>  /  ALT  31  /  AlkPhos  64      Creatinine Trend: 1.02<--, 0.99<--  PT/INR - ( 2019 15:15 )   PT: 14.1 sec;   INR: 1.23 ratio         PTT - ( 2019 15:15 )  PTT:28.4 sec      Venous Blood Gas:   @ 15:15  7.33/61/20//  VBG Lactate: 2.8    EKG: sinus tachy, S1 Q3 T3 CHIEF COMPLAINT: LLE swelling    HPI: 78 year old man with PMhx of HTN, HLD, prostate ca s/p prostatectomy, basal cell skin ca (resected from leg), glioblastoma ( dx ) s/p surgery, chemotherapy and radiation and seizure disorder presented to ED with LLE swelling and found to have bilateral PE. MICU c/s for hypoxic resp distress. Upon eval, patient has no acute complaints. Reported left chest pain with deep breath. Also phlgem production. Patient noticed 3 weeks ago LLE swelling. Recent US LE non-revealing. Patient noticed further swelling in the past few days and prompted his ED visit today. Patient otherwise denied sob, n/v, dizziness, palpitation, HA, vision change or increasing weakness. Patient reported no hx of blood clots and no family hx of blood clots. In the ED, patient remains VSSAF. Found to have bilateral PE with left lingula infarct. Patient was started on heparin gtt after repeat CTH without acute findings and NeuroSX clearance. MICU consulted after patient noted desating on 6L NC. Decision made to keep patient on high flow.    PAST MEDICAL & SURGICAL HISTORY:  Fall, sequela  Bilateral hearing loss, unspecified hearing loss type   activity: [employer] -    [place]  Type 2 diabetes mellitus without complication, without long-term current use of insulin: stop oral meds  3 weeks ago  Glioblastoma: biopsy 2018  surgery  6 weeks radiation  35 days of oral chemotherapy last 3 weeks  Basal cell carcinoma (BCC) of left lower leg: s/p resection  right lower leg top  left lower leg  Prostate cancer: s/p radical prostatectomy   HLD (hyperlipidemia)  HTN (hypertension)  S/P colonoscopic polypectomy: 3 years benign polyps  S/P tonsillectomy: age 30  H/O bilateral cataract extraction: 3-4 years ago  S/P prostatectomy:   S/P brain surgery: 2018  Lipoma of neck: s/p resection at age 30  Basal cell carcinoma (BCC) of lower leg: s/p resection  History of tonsillectomy: at age 30      FAMILY HISTORY:  Family history of diabetes mellitus: Father   Family history of prostate cancer in father:   Family history of breast cancer in mother (Sibling): Mother and sister  Family history of lung cancer: mother , long standing smoker      SOCIAL HISTORY:  No smoking, social alcohol use.    Allergies    No Known Allergies    Intolerances    HOME MEDICATIONS:    REVIEW OF SYSTEMS:    CONSTITUTIONAL: No weakness, fevers or chills  EYES/ENT: No visual changes;  No vertigo or throat pain   NECK: No pain or stiffness  RESPIRATORY: No coughing, phelgm production  CARDIOVASCULAR: No chest pain or palpitations  GASTROINTESTINAL: No abdominal or epigastric pain. No nausea, vomiting, or hematemesis; No diarrhea or constipation. No melena or hematochezia.  GENITOURINARY: No dysuria, frequency or hematuria  NEUROLOGICAL: No numbness or weakness  SKIN: LE swelling L.  All other review of systems is negative unless indicated above.    OBJECTIVE:  T(C): 36.7 (19 @ 18:18), Max: 36.8 (19 @ 14:35)  HR: 97 (19 @ 20:05) (90 - 98)  BP: 141/72 (19 @ 20:05) (132/86 - 165/87)  RR: 24 (19 @ 20:05) (18 - 24)  SpO2: 97% (19 @ 20:05) (86% - 100%)  Wt(kg): --  GENERAL: NAD, well-developed  HEAD:  Atraumatic, Normocephalic  EYES: EOMI, PERRLA, conjunctiva and sclera clear  NECK: Supple, No JVD  CHEST/LUNG: Clear to auscultation bilaterally; No wheeze  HEART: systolic murmur, tachy  ABDOMEN: Soft, Nontender, Nondistended; Bowel sounds present  EXTREMITIES:  3+ lower extremity swelling.  NEUROLOGY: RUE weakness  SKIN: No rashes or lesions    HOSPITAL MEDICATIONS:  heparin  Infusion.  Unit(s)/Hr IV Continuous <Continuous>    LABS:                        12.9   8.4   )-----------( 201      ( 2019 15:15 )             36.8     Hgb Trend: 12.9<--      137  |  97  |  23  ----------------------------<  109<H>  4.1   |  25  |  1.02    Ca    9.3      2019 16:58    TPro  7.3  /  Alb  3.5  /  TBili  1.0  /  DBili  x   /  AST  69<H>  /  ALT  31  /  AlkPhos  64      Creatinine Trend: 1.02<--, 0.99<--  PT/INR - ( 2019 15:15 )   PT: 14.1 sec;   INR: 1.23 ratio         PTT - ( 2019 15:15 )  PTT:28.4 sec      Venous Blood Gas:   @ 15:15  7.33/61/20//  VBG Lactate: 2.8    Radiology:  Reviewed and interpreted by me.    < from: CT Head No Cont (19 @ 17:20) >  Status post resection of a right parietal glioblastoma. Resolution of the   postop changes compared with the prior 2019    < end of copied text >  < from: CT Angio Chest w/ IV Cont (19 @ 16:16) >    Bilateral main, lobar and segmental pulmonary emboli with left pulmonary   infarcts described above. No CT evidence of right heart strain.  +L sided infarcts    < end of copied text >    EKG: sinus tachy, S1 Q3 T3

## 2019-04-11 NOTE — ED ADULT NURSE NOTE - OBJECTIVE STATEMENT
Patient   is  alert  and oriented x3.  Color is good  and  skin warm to touch.  He  is  c/o  weakness, cough  and  abdominal  discomfort.   Sob  and  hypoxia  noted upon  to  ER arrival.

## 2019-04-11 NOTE — ED PROVIDER NOTE - PHYSICAL EXAMINATION
Gen: NAD, AOx3, able to make needs known, non-toxic  Head: NCAT  HEENT: EOMI, oral mucosa moist, normal conjunctiva  Lung: CTAB, mildly tachypneic, no wheezes/rhonchi/rales B/L, speaking in full sentences  CV: Tachycardic, no murmurs  Abd: soft, NTND, no guarding  MSK: LLE markedly edematous w/ +2 edema. RLE mild edema.  Neuro: No focal sensory or motor deficits  Skin: Warm, well perfused  Psych: normal affect

## 2019-04-11 NOTE — ED PROVIDER NOTE - CARE PLAN
Principal Discharge DX:	Pulmonary embolism Principal Discharge DX:	Pulmonary embolism  Goal:	hypoxic resp failure

## 2019-04-11 NOTE — ED PROVIDER NOTE - NS ED ROS FT
GENERAL: No fever or chills  EYES: No change in vision  HEENT: No trouble swallowing or speaking  CARDIAC: No chest pain  PULMONARY: per HPI  GI: No abdominal pain, no nausea or no vomiting, no diarrhea or constipation  : No changes in urination  SKIN: No rashes  NEURO: No headache, no numbness  MSK: No joint pain  Otherwise as HPI or negative.

## 2019-04-12 LAB
APPEARANCE UR: CLEAR — SIGNIFICANT CHANGE UP
APTT BLD: 150 SEC — CRITICAL HIGH (ref 27.5–36.3)
APTT BLD: 79.8 SEC — HIGH (ref 27.5–36.3)
APTT BLD: 86.7 SEC — HIGH (ref 27.5–36.3)
APTT BLD: >200 SEC — CRITICAL HIGH (ref 27.5–36.3)
BILIRUB UR-MCNC: NEGATIVE — SIGNIFICANT CHANGE UP
COLOR SPEC: YELLOW — SIGNIFICANT CHANGE UP
DIFF PNL FLD: NEGATIVE — SIGNIFICANT CHANGE UP
GAS PNL BLDA: SIGNIFICANT CHANGE UP
GLUCOSE UR QL: NEGATIVE — SIGNIFICANT CHANGE UP
HCT VFR BLD CALC: 30.7 % — LOW (ref 39–50)
HCT VFR BLD CALC: 38.4 % — LOW (ref 39–50)
HGB BLD-MCNC: 10.4 G/DL — LOW (ref 13–17)
HGB BLD-MCNC: 12 G/DL — LOW (ref 13–17)
KETONES UR-MCNC: ABNORMAL
LEUKOCYTE ESTERASE UR-ACNC: NEGATIVE — SIGNIFICANT CHANGE UP
MCHC RBC-ENTMCNC: 29.9 PG — SIGNIFICANT CHANGE UP (ref 27–34)
MCHC RBC-ENTMCNC: 31.3 GM/DL — LOW (ref 32–36)
MCHC RBC-ENTMCNC: 33.6 PG — SIGNIFICANT CHANGE UP (ref 27–34)
MCHC RBC-ENTMCNC: 33.9 GM/DL — SIGNIFICANT CHANGE UP (ref 32–36)
MCV RBC AUTO: 95.7 FL — SIGNIFICANT CHANGE UP (ref 80–100)
MCV RBC AUTO: 99.3 FL — SIGNIFICANT CHANGE UP (ref 80–100)
NITRITE UR-MCNC: NEGATIVE — SIGNIFICANT CHANGE UP
PH UR: 7.5 — SIGNIFICANT CHANGE UP (ref 5–8)
PLATELET # BLD AUTO: 169 K/UL — SIGNIFICANT CHANGE UP (ref 150–400)
PLATELET # BLD AUTO: 280 K/UL — SIGNIFICANT CHANGE UP (ref 150–400)
PROT UR-MCNC: ABNORMAL
RBC # BLD: 3.09 M/UL — LOW (ref 4.2–5.8)
RBC # BLD: 4.01 M/UL — LOW (ref 4.2–5.8)
RBC # FLD: 13 % — SIGNIFICANT CHANGE UP (ref 10.3–14.5)
RBC # FLD: 13.5 % — SIGNIFICANT CHANGE UP (ref 10.3–14.5)
SP GR SPEC: >1.05 (ref 1.01–1.02)
UROBILINOGEN FLD QL: SIGNIFICANT CHANGE UP
WBC # BLD: 7 K/UL — SIGNIFICANT CHANGE UP (ref 3.8–10.5)
WBC # BLD: 8.2 K/UL — SIGNIFICANT CHANGE UP (ref 3.8–10.5)
WBC # FLD AUTO: 7 K/UL — SIGNIFICANT CHANGE UP (ref 3.8–10.5)
WBC # FLD AUTO: 8.2 K/UL — SIGNIFICANT CHANGE UP (ref 3.8–10.5)

## 2019-04-12 PROCEDURE — 70450 CT HEAD/BRAIN W/O DYE: CPT | Mod: 26

## 2019-04-12 RX ORDER — HEPARIN SODIUM 5000 [USP'U]/ML
3500 INJECTION INTRAVENOUS; SUBCUTANEOUS EVERY 6 HOURS
Qty: 0 | Refills: 0 | Status: DISCONTINUED | OUTPATIENT
Start: 2019-04-12 | End: 2019-04-12

## 2019-04-12 RX ORDER — HEPARIN SODIUM 5000 [USP'U]/ML
1300 INJECTION INTRAVENOUS; SUBCUTANEOUS
Qty: 25000 | Refills: 0 | Status: DISCONTINUED | OUTPATIENT
Start: 2019-04-12 | End: 2019-04-12

## 2019-04-12 RX ORDER — HEPARIN SODIUM 5000 [USP'U]/ML
7000 INJECTION INTRAVENOUS; SUBCUTANEOUS EVERY 6 HOURS
Qty: 0 | Refills: 0 | Status: DISCONTINUED | OUTPATIENT
Start: 2019-04-12 | End: 2019-04-12

## 2019-04-12 RX ORDER — ENOXAPARIN SODIUM 100 MG/ML
90 INJECTION SUBCUTANEOUS EVERY 12 HOURS
Qty: 0 | Refills: 0 | Status: DISCONTINUED | OUTPATIENT
Start: 2019-04-12 | End: 2019-04-22

## 2019-04-12 RX ORDER — MORPHINE SULFATE 50 MG/1
4 CAPSULE, EXTENDED RELEASE ORAL ONCE
Qty: 0 | Refills: 0 | Status: DISCONTINUED | OUTPATIENT
Start: 2019-04-12 | End: 2019-04-12

## 2019-04-12 RX ORDER — ACETAMINOPHEN 500 MG
650 TABLET ORAL ONCE
Qty: 0 | Refills: 0 | Status: DISCONTINUED | OUTPATIENT
Start: 2019-04-12 | End: 2019-04-15

## 2019-04-12 RX ADMIN — HEPARIN SODIUM 0 UNIT(S)/HR: 5000 INJECTION INTRAVENOUS; SUBCUTANEOUS at 04:34

## 2019-04-12 RX ADMIN — HEPARIN SODIUM 1300 UNIT(S)/HR: 5000 INJECTION INTRAVENOUS; SUBCUTANEOUS at 12:14

## 2019-04-12 RX ADMIN — Medication 650 MILLIGRAM(S): at 07:31

## 2019-04-12 RX ADMIN — MORPHINE SULFATE 4 MILLIGRAM(S): 50 CAPSULE, EXTENDED RELEASE ORAL at 17:00

## 2019-04-12 RX ADMIN — MORPHINE SULFATE 4 MILLIGRAM(S): 50 CAPSULE, EXTENDED RELEASE ORAL at 16:46

## 2019-04-12 RX ADMIN — ENOXAPARIN SODIUM 90 MILLIGRAM(S): 100 INJECTION SUBCUTANEOUS at 23:39

## 2019-04-12 NOTE — CONSULT NOTE ADULT - SUBJECTIVE AND OBJECTIVE BOX
Chief Complaint:  Patient is a 78y old  Male who presents with a chief complaint of dyspnea (2019 21:53)      HPI:  patient with a GBM and had surgery last year and has been following with Neuro-onciology, Dr. Boudreaux.  The details of his treatment are not clear, but sounds like he has been getting RT and "pills."  he is admitted here with a PE and has been started on IV heparin.  He is complaining of left chest muscle pain.  His breathing is not labored.    Medications:  acetaminophen   Tablet .. 650 milliGRAM(s) Oral once  heparin  Infusion.  Unit(s)/Hr IV Continuous <Continuous>  heparin  Injectable 7000 Unit(s) IV Push every 6 hours PRN  heparin  Injectable 3500 Unit(s) IV Push every 6 hours PRN    Allergies:  No Known Allergies    FAMILY HISTORY:  Family history of diabetes mellitus: Father   Family history of prostate cancer in father:   Family history of breast cancer in mother (Sibling): Mother and sister  Family history of lung cancer: mother , long standing smoker      Social history:  no tobacco    PAST MEDICAL & SURGICAL HISTORY:  Fall, sequela  Bilateral hearing loss, unspecified hearing loss type   activity: [employer] -    [place]  Type 2 diabetes mellitus without complication, without long-term current use of insulin: stop oral meds  3 weeks ago  Glioblastoma: biopsy 2018  surgery  6 weeks radiation  35 days of oral chemotherapy last 3 weeks  Basal cell carcinoma (BCC) of left lower leg: s/p resection  right lower leg top  left lower leg  Prostate cancer: s/p radical prostatectomy   HLD (hyperlipidemia)  HTN (hypertension)  S/P colonoscopic polypectomy: 3 years benign polyps  S/P tonsillectomy: age 30  H/O bilateral cataract extraction: 3-4 years ago  S/P prostatectomy:   S/P brain surgery: 2018  Lipoma of neck: s/p resection at age 30  Basal cell carcinoma (BCC) of lower leg: s/p resection  History of tonsillectomy: at age 30    REVIEW OF SYSTEMS      General: Weakness.  Decreased appetite.  No fevers, chills, or sweats	    Skin/Breast: has bruising.  No rash or itchiness  	  	  ENMT:	No earaches, nosebleeds, sore throat    Respiratory and Thorax: SOB better with O2 mask.  No cough, wheeze, or hemoptysis  	  Cardiovascular:	As above.  No palpitations    Gastrointestinal:	No N/V/D, abd pain    Genitourinary:	No dysuria or hematuria    Musculoskeletal:	Legs are swollen but no pain    Neurological:	No HA or dizziness    Vitals:  Vital Signs Last 24 Hrs  T(C): 36.6 (2019 07:31), Max: 38.2 (2019 21:24)  T(F): 97.8 (2019 07:31), Max: 100.8 (2019 21:24)  HR: 92 (2019 08:14) (82 - 98)  BP: 150/73 (2019 07:31) (120/70 - 165/87)  BP(mean): --  RR: 26 (2019 08:14) (18 - 26)  SpO2: 97% (2019 08:14) (86% - 100%)    Pex:  alert NAD, on NRB  EOMI anicteric sclera  Neck  No LNA  Cv s1 S2 RRR  Lungs clear B/L anteriorly  abd soft NT ND +BS  B/L LE edema no tenderness  ecchymoses on arms    Labs:                        10.4   7.0   )-----------( 169      ( 2019 01:40 )             30.7     CBC Full  -  ( 2019 01:40 )  WBC Count : 7.0 K/uL  RBC Count : 3.09 M/uL  Hemoglobin : 10.4 g/dL  Hematocrit : 30.7 %  Platelet Count - Automated : 169 K/uL  Mean Cell Volume : 99.3 fl  Mean Cell Hemoglobin : 33.6 pg  Mean Cell Hemoglobin Concentration : 33.9 gm/dL  Auto Neutrophil # : x  Auto Lymphocyte # : x  Auto Monocyte # : x  Auto Eosinophil # : x  Auto Basophil # : x  Auto Neutrophil % : x  Auto Lymphocyte % : x  Auto Monocyte % : x  Auto Eosinophil % : x  Auto Basophil % : x        137  |  97  |  23  ----------------------------<  109<H>  4.1   |  25  |  1.02    Ca    9.3      2019 16:58    TPro  7.3  /  Alb  3.5  /  TBili  1.0  /  DBili  x   /  AST  69<H>  /  ALT  31  /  AlkPhos  64  04-11    PT/INR - ( 2019 15:15 )   PT: 14.1 sec;   INR: 1.23 ratio         PTT - ( 2019 02:47 )  PTT:150.0 sec  [id number]

## 2019-04-12 NOTE — ED ADULT NURSE REASSESSMENT NOTE - NS ED NURSE REASSESS COMMENT FT1
PTT results 150 at this time. Heparin paused for 60 minutes at this time as per full anticoag nomogram. No s.s acute distress. Comfort and safety measures maintained.

## 2019-04-12 NOTE — ED ADULT NURSE REASSESSMENT NOTE - NS ED NURSE REASSESS COMMENT FT1
Pt a/ox3 complaining of L sided rib pain. NP contacted for pain medication. Pt turned and repositioned, linens changed. Pts vitals stable, call bell within reach comfort and safety measures in place

## 2019-04-12 NOTE — PROGRESS NOTE ADULT - ASSESSMENT
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 MICU consult done, rejected for ICU placement. done 2/2  for hypoxic resp distress. Patient stable at the time of adnission    - C/w high flow NC as needed. Wean off as tolerated.  - Consider ABG to correlate with pulse ox if patient desats again.  - RVP positive for CORONAVIRUS  - C/w heparin gtt transition to sc Lovenox  - Repeat CTH when AC therapeutic per neurosx recs.  - cont on tele with continuous pulse oximetry.

## 2019-04-12 NOTE — CHART NOTE - NSCHARTNOTEFT_GEN_A_CORE
[id number]  DOROTHY SEGUNDO    Received phone call from ED RN with concern for left facial droop. Patient seen and assessed at bedside, NAD, A&O x3. Patient was without complaints at this time.     Physical Exam:  General: WN/WD, NAD, AOx3, nontoxic appearing, on high-flow NC  Mental status: Awake, alert, and in no apparent distress. Oriented to person, place and time. Language function is normal. Recent memory and concentration were normal.   Head:  NC/AT  Cranial Nerves: (+) mild asymmetry of left lip when compared to right (unchanged from 2/9/19). PERRL. EOMI. The palate was upgoing symmetrically and tongue was midline. Shoulder shrug was full bilaterally.  Motor exam: hand  3/5 LUE when compared to RUE. There was no pronator drift.  Skin: (+) warm, dry   Psych: Appropriate affect         78 year old man with PMhx of HTN, HLD, prostate ca s/p prostatectomy, basal cell skin ca (resected from leg), glioblastoma ( dx 8/18) s/p surgery, chemotherapy and radiation and seizure disorder presented to ED with LLE swelling and found to have bilateral PEs.  Of note, patient is s/p resection of glioblastoma 8/18. He is currently on a heparin gtt for PEs. Physical exam with asymmetry of left lip when compared to right lip and 3/5 hand  LUE when compared to RUE.   Upon review of chart from patient's admission from 2/7/19-2/10/19, facial asymmetry and LUE weakness was documented at that time.  Continue with neuro checks Q4h  Repeat CTH pending for AM  Neurosurgery is following patient  Will continue to closely monitor patient         Julia Robb PA-C  Dept of Medicine  [id number]

## 2019-04-12 NOTE — CONSULT NOTE ADULT - SUBJECTIVE AND OBJECTIVE BOX
PULMONARY CONSULT  Jose Marks MD  [phone]    Initial HPI on admission:  HPI:  78 year old man with PMhx of HTN, HLD, prostate ca s/p prostatectomy, basal cell skin ca (resected from leg), glioblastoma ( dx ) s/p surgery, chemotherapy and radiation and seizure disorder presented to ED with LLE swelling and found to have bilateral PE. seen by MICU, rejected for transfer to MICU. presently patient has no acute complaints. earlier was in resp distress, placed on Hi Bhavin O2.  Patient noticed 3 weeks ago LLE swelling. Recent US LE non-revealing. Patient noticed further swelling in the past few days and prompted his ED visit today. Patient otherwise denied sob, n/v, dizziness, palpitation, HA, vision change or increasing weakness. Patient reported no hx of blood clots and no family hx of blood clots. In the ED patient Found to have bilateral PE with left lingula infarct. Patient was started on heparin gtt after repeat CTH without acute findings and NeuroSX clearance. (2019 21:53)        PAST MEDICAL & SURGICAL HISTORY:  Fall, sequela  Bilateral hearing loss, unspecified hearing loss type   activity: [employer] -    [place]  Type 2 diabetes mellitus without complication, without long-term current use of insulin: stop oral meds  3 weeks ago  Glioblastoma: biopsy 2018  surgery  6 weeks radiation  35 days of oral chemotherapy last 3 weeks  Basal cell carcinoma (BCC) of left lower leg: s/p resection  right lower leg top  left lower leg  Prostate cancer: s/p radical prostatectomy   HLD (hyperlipidemia)  HTN (hypertension)  S/P colonoscopic polypectomy: 3 years benign polyps  S/P tonsillectomy: age 30  H/O bilateral cataract extraction: 3-4 years ago  S/P prostatectomy:   S/P brain surgery: 2018  Lipoma of neck: s/p resection at age 30  Basal cell carcinoma (BCC) of lower leg: s/p resection  History of tonsillectomy: at age 30    Allergies    No Known Allergies    Intolerances      FAMILY HISTORY:  Family history of diabetes mellitus: Father   Family history of prostate cancer in father:   Family history of breast cancer in mother (Sibling): Mother and sister  Family history of lung cancer: mother , long standing smoker    Social history:  no tobacco    REVIEW OF SYSTEMS    General: Weakness.  Decreased appetite.  No fevers, chills, or sweats	  Skin/Breast: has bruising.  No rash or itchiness  ENMT:	No earaches, nosebleeds, sore throat  Respiratory and Thorax: SOB better with O2 mask.  No cough, wheeze, or hemoptysis  Cardiovascular:	As above.  No palpitations  Gastrointestinal:	No N/V/D, abd pain  Genitourinary:	No dysuria or hematuria  Musculoskeletal:	Legs are swollen but no pain  Neurological:	No HA or dizziness    Medications:  MEDICATIONS  (STANDING):  acetaminophen   Tablet .. 650 milliGRAM(s) Oral once  heparin  Infusion. 1300 Unit(s)/Hr (13 mL/Hr) IV Continuous <Continuous>  morphine  - Injectable 4 milliGRAM(s) IV Push once    MEDICATIONS  (PRN):  heparin  Injectable 7000 Unit(s) IV Push every 6 hours PRN For aPTT less than 40  heparin  Injectable 3500 Unit(s) IV Push every 6 hours PRN For aPTT between 40 - 57    Vital Signs Last 24 Hrs  T(C): 37.3 (2019 15:28), Max: 38.2 (2019 21:24)  T(F): 99.1 (2019 15:28), Max: 100.8 (2019 21:24)  HR: 94 (2019 15:28) (82 - 98)  BP: 152/72 (2019 15:28) (120/70 - 165/76)  BP(mean): --  RR: 24 (2019 15:28) (22 - 26)  SpO2: 97% (2019 15:28) (88% - 100%)    ABG - ( 2019 08:21 )  pH, Arterial: 7.45  pH, Blood: x     /  pCO2: 37    /  pO2: 138   / HCO3: 26    / Base Excess: 2.0   /  SaO2: 99        LABS:                        10.4   7.0   )-----------( 169      ( 2019 01:40 )             30.7     04-11    137  |  97  |  23  ----------------------------<  109<H>  4.1   |  25  |  1.02    Ca    9.3      2019 16:58    TPro  7.3  /  Alb  3.5  /  TBili  1.0  /  DBili  x   /  AST  69<H>  /  ALT  31  /  AlkPhos  64  04-11      PT/INR - ( 2019 15:15 )   PT: 14.1 sec;   INR: 1.23 ratio         PTT - ( 2019 11:10 )  PTT:79.8 sec  Urinalysis Basic - ( 2019 02:44 )    Color: Yellow / Appearance: Clear / SG: >1.050 / pH: x  Gluc: x / Ketone: Small  / Bili: Negative / Urobili: <2 mg/dL   Blood: x / Protein: 100 mg/dL / Nitrite: Negative   Leuk Esterase: Negative / RBC: 3 /HPF / WBC 3 /HPF   Sq Epi: x / Non Sq Epi: 3 /HPF / Bacteria: Negative      Physical Examination:    General: No acute distress.      HEENT: Non toxic, breathing comfortably on ventimask: sat 99%    PULM: Clear without wheeze or rhonchi    CVS: Regular rate and rhythm, no murmurs, rubIncr girth LLE below knee; + edema    SKIN: Warm and well perfused, no rashes noted.    NEURO: Alert, oriented, interactive, nonfocal    RADIOLOGY REVIEWED PERSONALLY  CXR:    CTA chest:    PROCEDURE DATE:  2019      INTERPRETATION:  CTA CHEST WITH CONTRAST (CT PULMONARY EMBOLUS)    INDICATION: SOB, LLE swelling x3 weeks.    TECHNIQUE: Unenhanced helical images wereobtained of the chest. Coronal   and sagittal images were reconstructed.  Images were obtained after the   uneventful administration of 90 cc of nonionic intravenous contrast   (Omnipaque 350).  10 cc of nonionic intravenous contrast (Omnipaque 350)   was discarded. Maximum intensity projection images were generated.    COMPARISON: None.    FINDINGS:     Vessels:  The main pulmonary artery is normal in caliber and is now in   the filling defects involving the left and right pulmonary arteries with   extension into the lobar and segmental pulmonary arteries consistent with   acute pulmonary emboli. Coronary artery and aortic valve calcifications.    Lungs, Pleura And Airways: The central airways are patent. Left upper   lobe and lingular pulmonary infarcts. Small bilateral pleural effusions.    Mediastinum: There are no enlarged chest lymph nodes. The visualized   portion of the thyroid gland is unremarkable.      Heart: The heart is normal in size. There is no pericardial effusion.  No   evidence of right heart strain.    Upper Abdomen: The upper abdomen is unremarkable.    Bones And Soft Tissues: Degenerative change of the spine.    IMPRESSION:     Bilateral main, lobar and segmental pulmonary emboli with left pulmonary   infarcts described above. No CT evidence of right heart strain.    CT Head:    INTERPRETATION:  HISTORY: History of of glioblastoma multiform status   post resection, requiring anticoagulation. Evaluate for intracranial mass.    Technique: CT ofthe head was performed without intravenous contrast.    Multiple contiguous axial images were acquired from the skullbase to the   vertex without the administration of intravenous contrast.  Coronal and   sagittal reformations were made.    COMPARISON: Prior head CT dated  2019, brain MRI dated 2019.    FINDINGS:  The ventricles and sulci are appropriate for the patient age. There is no   intraparenchymal hematoma, mass effect or midline shift. The patient is   status post right parietal craniotomy with encephalomalacia within the   right posterior parietal surgical cavity. There is overlying dural   thickening and calcifications, postoperative.    No abnormal extra-axial fluid collections are present. Hypodensity within   the periventricular and subcortical white matter, nonspecific but likely   representing chronic microvascular ischemic change. . The gray-white   differentiation is preserved. Atherosclerotic calcifications involving   the bilateral intracranial internal carotidarteries and the    Status post lens replacements. There are secretions and mucosal   thickening involving the left maxillary sinus.     IMPRESSION:  Status post resection of a right parietal glioblastoma. Resolution of the   postop changes compared with the prior 2019. Recommend brain MR with   contrast to evaluate for residual disease.

## 2019-04-12 NOTE — CONSULT NOTE ADULT - ATTENDING COMMENTS
Bilateral lobar to segmental pulmonary embili with hemodyanamic stability: asymptomatic at this time  LLL pulmonary infarcts with intermittent L basilar CP   GBM s/p resection, RT, on oral chemotherapy  CT head without hemorrhagic component    REC    Full dose AC: can continue IVH for now, transition to lovenox  IVC filter contraindicated in setting of GBM  Neuro-oncology evaluation  Transition to nasal oxygen: maintatin sat > 90%
care provided 4/11/19  Patient seen and examined.  Agree with resident note as above.  Patient with hx as noted including GBM with recent resection, LE edema who presented to ER with worsening dyspnea and hypoxia, found to have bilateral PE.  Vitals stable, patient easily arousable.  Troponin negative, no evidence of R heart strain on CTA.  Briefly desaturated in ER and endorsing +phlegm/cough.  Recs as above - tele and continuous pulse ox, O2 to maintain sat>90%, infectious workup including RVP, anticoag with heparin, neuro checks and CT head in AM as per NSGY.

## 2019-04-12 NOTE — ED ADULT NURSE REASSESSMENT NOTE - NS ED NURSE REASSESS COMMENT FT1
NP called, and asked about CT head orders as well as neuro check orders. Pt has slight L sided facial droop, unsure of baseline. Pt a/ox3 with slow speech. pt has no complaints at this time, vitals stable

## 2019-04-12 NOTE — ED ADULT NURSE REASSESSMENT NOTE - NS ED NURSE REASSESS COMMENT FT1
received report from RUSSELL Reyes. patient is resting comfortably in bed c/o 4/10 left sided rib pain. Admitting team aware. waiting for medication order to administer to patient. patient is AAOx3. PERRLA. speech is clear. no facial droop is noted. patient moving all extremities with equal sensation. patient left arm weaker than right- baseline as per patient since last surgery. bilateral legs equal in strength. left leg swelling noted- +4 pitting edema to left foot. pulses noted. skin intact. patient NSR on monitor. patient on high-flow NC as ordered. tolerating well. Heparin drip infusing at 13ml/ hr as ordered. patient aware of plan of care. call bell in reach. will continue to monitor. ABG pending. RN called respiratory to carry out order. received report from RUSSELL Reyes. patient is resting comfortably in bed c/o 4/10 left sided rib pain. Admitting team (Shaji) aware. waiting for medication order to administer to patient. patient is AAOx3. PERRLA. speech is clear. L sided facial droop is noted- baseline as per patient and MD Girard from Medicine team aware. patient moving all extremities with equal sensation. patient left arm weaker than right- baseline as per patient since last surgery. bilateral legs equal in strength. left leg swelling noted- +4 pitting edema to left foot. pulses noted. skin intact. patient NSR on monitor. patient on high-flow NC as ordered. tolerating well. Heparin drip infusing at 13ml/ hr as ordered. patient aware of plan of care. call bell in reach. will continue to monitor. ABG pending. RN called respiratory to carry out order.

## 2019-04-13 LAB
HCT VFR BLD CALC: 33 % — LOW (ref 39–50)
HGB BLD-MCNC: 10.8 G/DL — LOW (ref 13–17)
MCHC RBC-ENTMCNC: 31.7 PG — SIGNIFICANT CHANGE UP (ref 27–34)
MCHC RBC-ENTMCNC: 32.7 GM/DL — SIGNIFICANT CHANGE UP (ref 32–36)
MCV RBC AUTO: 96.8 FL — SIGNIFICANT CHANGE UP (ref 80–100)
PLATELET # BLD AUTO: 250 K/UL — SIGNIFICANT CHANGE UP (ref 150–400)
RBC # BLD: 3.41 M/UL — LOW (ref 4.2–5.8)
RBC # FLD: 13.7 % — SIGNIFICANT CHANGE UP (ref 10.3–14.5)
WBC # BLD: 8.61 K/UL — SIGNIFICANT CHANGE UP (ref 3.8–10.5)
WBC # FLD AUTO: 8.61 K/UL — SIGNIFICANT CHANGE UP (ref 3.8–10.5)

## 2019-04-13 PROCEDURE — 99223 1ST HOSP IP/OBS HIGH 75: CPT

## 2019-04-13 PROCEDURE — 93970 EXTREMITY STUDY: CPT | Mod: 26

## 2019-04-13 RX ORDER — MORPHINE SULFATE 50 MG/1
1 CAPSULE, EXTENDED RELEASE ORAL ONCE
Qty: 0 | Refills: 0 | Status: DISCONTINUED | OUTPATIENT
Start: 2019-04-13 | End: 2019-04-13

## 2019-04-13 RX ORDER — OXYCODONE HYDROCHLORIDE 5 MG/1
5 TABLET ORAL EVERY 12 HOURS
Qty: 0 | Refills: 0 | Status: DISCONTINUED | OUTPATIENT
Start: 2019-04-13 | End: 2019-04-17

## 2019-04-13 RX ADMIN — MORPHINE SULFATE 1 MILLIGRAM(S): 50 CAPSULE, EXTENDED RELEASE ORAL at 05:33

## 2019-04-13 RX ADMIN — OXYCODONE HYDROCHLORIDE 5 MILLIGRAM(S): 5 TABLET ORAL at 15:16

## 2019-04-13 RX ADMIN — OXYCODONE HYDROCHLORIDE 5 MILLIGRAM(S): 5 TABLET ORAL at 15:57

## 2019-04-13 RX ADMIN — MORPHINE SULFATE 1 MILLIGRAM(S): 50 CAPSULE, EXTENDED RELEASE ORAL at 06:20

## 2019-04-13 RX ADMIN — ENOXAPARIN SODIUM 90 MILLIGRAM(S): 100 INJECTION SUBCUTANEOUS at 21:16

## 2019-04-13 RX ADMIN — ENOXAPARIN SODIUM 90 MILLIGRAM(S): 100 INJECTION SUBCUTANEOUS at 08:32

## 2019-04-13 NOTE — PROGRESS NOTE ADULT - ASSESSMENT
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 MICU consult done, rejected for ICU placement. done 2/2  for hypoxic resp distress. Patient stable at the time of adnission    - C/w O2 NC as needed. Wean off as tolerated. May need O2 at home  - RVP positive for CORONAVIRUS  - C/w  sc Lovenox, plan to DC on same, ptn needs teachings about self administering  - cont on tele with continuous pulse oximetry.

## 2019-04-13 NOTE — PROGRESS NOTE ADULT - ASSESSMENT
GBM on temodar s/p partial resection recently  new pulmonary emboli was on iv heparin now lovenox  reasonable to d/c on lovenox when no longer requiring oxygen  eliquis is other option  would like neuro opinion dr vela about this decision

## 2019-04-13 NOTE — CHART NOTE - NSCHARTNOTEFT_GEN_A_CORE
NEURO-ONCOLOGY: JONH [phone]    patient seen and examined at 7:40AM 4/13/19.    Briefly, patient is well known to me.  He is a 77 yo RH man with right frontal GBM.  8/3/18 - s/p resection (Chalif)   10/12/18 - s/p chemoradiation treatment (Jyoti) followed by s/p temozolomide x 3 cycles  2/7/19 - s/p re-resection (Chalif) for disease progression, with 30-40% treatment effect.  followed by avastin + temozolomide maintenance therapy (Avastin held for upcoming tooth extraction).    In the past, he had some focal left facial twitching which resolved with keppra [id number] and may represent focal seizure activity.    He is now admitted after about one week of shortness of breath at home, then unable to get up from sofa, so brought by ambulance to [facility].  CTA showed bialteral main, lobar, and segmental pulmonary emboli, without overt right heart strain on CT imaging.  4/12/19 - head CT demonstrated no hemorrhage, no significant cerebral edema nor recurrent tumor.    EXAM  alert and oriented  mild baseline left drift  on supplementary O2.    LABS  136| 97| 24 /108  5.6H|25|0.99\    8.2\12/280     /38.4\    IMPRESSION/PLAN:    BRAIN TUMOR -- stable on imaging and by symptoms, will resume Avastin/TMZ as outpatient after he sees me and it is determined to be appropriate therapy.  PULMONARY EMBOLI -- although he has not been treated with much Avastin and despite GBM association with DVT and PEs (~40% patients with GBM experience DVT in some studies), the Avastin may have contributed to his event. NO OBJECTION TO FULL ANTICOAGULATION with LOVENOX (1mg/kg SQ twice daily).   CEREBRAL EDEMA -- none on the CT scan, no indication for decadron at this time.  SEIZURES -- he was taking keppra [id number] as outpatient, would continue.  DISPO -- he may follow-up with me once discharged. can call [phone] for appointment.    total time spent was 60 minutes

## 2019-04-13 NOTE — CHART NOTE - NSCHARTNOTEFT_GEN_A_CORE
Medicine(s 78167)    pt. c/o intermittent chest discomfort upon deep inspiration or movement >3 weeks. VSS.   d/w Dr. MARGARITA Gama above & LLE U/S  results + large DVT LLE  - c/w present LOvenox 1mg/kg BID for DVT/PE  -Oxycodone added for prn pain likely due to PE and/or M/S

## 2019-04-14 ENCOUNTER — TRANSCRIPTION ENCOUNTER (OUTPATIENT)
Age: 79
End: 2019-04-14

## 2019-04-14 LAB
HCT VFR BLD CALC: 28.9 % — LOW (ref 39–50)
HGB BLD-MCNC: 9.3 G/DL — LOW (ref 13–17)
MCHC RBC-ENTMCNC: 31.6 PG — SIGNIFICANT CHANGE UP (ref 27–34)
MCHC RBC-ENTMCNC: 32.2 GM/DL — SIGNIFICANT CHANGE UP (ref 32–36)
MCV RBC AUTO: 98.3 FL — SIGNIFICANT CHANGE UP (ref 80–100)
PLATELET # BLD AUTO: 271 K/UL — SIGNIFICANT CHANGE UP (ref 150–400)
RBC # BLD: 2.94 M/UL — LOW (ref 4.2–5.8)
RBC # FLD: 13.6 % — SIGNIFICANT CHANGE UP (ref 10.3–14.5)
WBC # BLD: 8.99 K/UL — SIGNIFICANT CHANGE UP (ref 3.8–10.5)
WBC # FLD AUTO: 8.99 K/UL — SIGNIFICANT CHANGE UP (ref 3.8–10.5)

## 2019-04-14 RX ORDER — MORPHINE SULFATE 50 MG/1
1 CAPSULE, EXTENDED RELEASE ORAL ONCE
Qty: 0 | Refills: 0 | Status: DISCONTINUED | OUTPATIENT
Start: 2019-04-14 | End: 2019-04-14

## 2019-04-14 RX ADMIN — MORPHINE SULFATE 1 MILLIGRAM(S): 50 CAPSULE, EXTENDED RELEASE ORAL at 13:00

## 2019-04-14 RX ADMIN — OXYCODONE HYDROCHLORIDE 5 MILLIGRAM(S): 5 TABLET ORAL at 16:11

## 2019-04-14 RX ADMIN — OXYCODONE HYDROCHLORIDE 5 MILLIGRAM(S): 5 TABLET ORAL at 04:30

## 2019-04-14 RX ADMIN — ENOXAPARIN SODIUM 90 MILLIGRAM(S): 100 INJECTION SUBCUTANEOUS at 21:27

## 2019-04-14 RX ADMIN — OXYCODONE HYDROCHLORIDE 5 MILLIGRAM(S): 5 TABLET ORAL at 03:12

## 2019-04-14 RX ADMIN — ENOXAPARIN SODIUM 90 MILLIGRAM(S): 100 INJECTION SUBCUTANEOUS at 10:16

## 2019-04-14 RX ADMIN — OXYCODONE HYDROCHLORIDE 5 MILLIGRAM(S): 5 TABLET ORAL at 17:11

## 2019-04-14 RX ADMIN — MORPHINE SULFATE 1 MILLIGRAM(S): 50 CAPSULE, EXTENDED RELEASE ORAL at 12:44

## 2019-04-14 NOTE — DISCHARGE NOTE PROVIDER - CARE PROVIDERS DIRECT ADDRESSES
,DirectAddress_Unknown,[email or web address],DirectAddress_Unknown ,DirectAddress_Unknown,[email or web address],DirectAddress_Unknown,[email or web address]

## 2019-04-14 NOTE — DISCHARGE NOTE PROVIDER - CARE PROVIDER_API CALL
William Angeles (DO)  Family Medicine  [address] [place] [address]  Phone: [phone]  Fax: [phone]  Follow Up Time:     Tong Fox)  Critical Care Medicine; Internal Medicine; Pulmonary Disease  [address] [facility], [address]  [place] [address]  Phone: [phone]  Fax: [phone]  Follow Up Time:     James Britton)  Hematology; Medical Oncology  [address], [address]  [place] [address]  Phone: [phone]  Fax: [phone]  Follow Up Time: William Angeles (DO)  Family Medicine  [address] [place] [address]  Phone: [phone]  Fax: [phone]  Follow Up Time:     Tong Fox)  Critical Care Medicine; Internal Medicine; Pulmonary Disease  [address] [facility], [address]  [place] [address]  Phone: [phone]  Fax: [phone]  Follow Up Time:     James Britton)  Hematology; Medical Oncology  [address] [facility], [address]  [place] [address]  Phone: [phone]  Fax: [phone]  Follow Up Time:     Rafy Can)  Dermatology  [address], [address]  [place] [address]  Phone: [phone]  Fax: [phone]  Follow Up Time:

## 2019-04-14 NOTE — DISCHARGE NOTE PROVIDER - NSDCCPCAREPLAN_GEN_ALL_CORE_FT
PRINCIPAL DISCHARGE DIAGNOSIS  Diagnosis: Pulmonary embolism  Assessment and Plan of Treatment: Take your anticoagulation (lovenox, coumadin) as directed.  Follow up with your health care provider within one week. Call for appointment.  If you develop shortness of breath or if your shortness of breath worsens call your Health Care Provider or go to the Emergency Department. PRINCIPAL DISCHARGE DIAGNOSIS  Diagnosis: Pulmonary embolism  Assessment and Plan of Treatment: Take your anticoagulation (lovenox) as directed.  Follow up with your health care provider within one week. Call for appointment.  If you develop shortness of breath or if your shortness of breath worsens call your Health Care Provider or go to the Emergency Department.        SECONDARY DISCHARGE DIAGNOSES  Diagnosis: Prostate cancer  Assessment and Plan of Treatment: Follow up with your oncologist after discharge from rehab.  Call to make an appointment.    Diagnosis: Acneiform drug eruption  Assessment and Plan of Treatment: Improving  Follow up with Dr. Rafy Can upon discharge from rehab, [phone].

## 2019-04-14 NOTE — DISCHARGE NOTE PROVIDER - PROVIDER TOKENS
PROVIDER:[TOKEN:[[id number]]],PROVIDER:[TOKEN:[[id number]]],PROVIDER:[TOKEN:[[id number]]] PROVIDER:[TOKEN:[[id number]]],PROVIDER:[TOKEN:[[id number]]],PROVIDER:[TOKEN:[[id number]]],PROVIDER:[TOKEN:[[id number]]]

## 2019-04-14 NOTE — DISCHARGE NOTE PROVIDER - HOSPITAL COURSE
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 MICU consult done, rejected for ICU placement. done 2/2 for hypoxic resp distress.     - C/w O2 NC as needed. Wean off as tolerated. May need O2 at home    - RVP positive for CORONAVIRUS    - C/w  sc Lovenox, plan to DC on same 78 year old man with PMhx of HTN, HLD, prostate ca s/p prostatectomy, basal cell skin ca (resected from leg), glioblastoma ( dx 8/18) s/p surgery, chemotherapy and radiation and seizure disorder presented to ED with LLE swelling and found to have bilateral PE treated with Lovenox.  Will continue Lovenox outpatient. MICU consult done, rejected for ICU placement done 2/2 for hypoxic respiratory distress.  Evaluated by PT recommended for Subacute Rehab.  Patient stable for discharge to rehab.

## 2019-04-14 NOTE — PROGRESS NOTE ADULT - ASSESSMENT
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 MICU consult done, rejected for ICU placement. done 2/2  for hypoxic resp distress. Patient stable at the time of adnission    - C/w O2 supplementation as needed. Wean off as tolerated. May need O2 at home  - RVP positive for CORONAVIRUS  - C/w  sc Lovenox, plan to DC on same, ptn needs teachings about self administering  - cont on tele with continuous pulse oximetry.

## 2019-04-15 LAB
HCT VFR BLD CALC: 26.1 % — LOW (ref 39–50)
HGB BLD-MCNC: 8.8 G/DL — LOW (ref 13–17)
MCHC RBC-ENTMCNC: 32.4 PG — SIGNIFICANT CHANGE UP (ref 27–34)
MCHC RBC-ENTMCNC: 33.7 GM/DL — SIGNIFICANT CHANGE UP (ref 32–36)
MCV RBC AUTO: 96 FL — SIGNIFICANT CHANGE UP (ref 80–100)
PLATELET # BLD AUTO: 316 K/UL — SIGNIFICANT CHANGE UP (ref 150–400)
RBC # BLD: 2.72 M/UL — LOW (ref 4.2–5.8)
RBC # FLD: 13.3 % — SIGNIFICANT CHANGE UP (ref 10.3–14.5)
WBC # BLD: 8.55 K/UL — SIGNIFICANT CHANGE UP (ref 3.8–10.5)
WBC # FLD AUTO: 8.55 K/UL — SIGNIFICANT CHANGE UP (ref 3.8–10.5)

## 2019-04-15 RX ORDER — IPRATROPIUM/ALBUTEROL SULFATE 18-103MCG
3 AEROSOL WITH ADAPTER (GRAM) INHALATION
Qty: 0 | Refills: 0 | Status: DISCONTINUED | OUTPATIENT
Start: 2019-04-15 | End: 2019-04-18

## 2019-04-15 RX ORDER — ACETAMINOPHEN 500 MG
1000 TABLET ORAL ONCE
Qty: 0 | Refills: 0 | Status: COMPLETED | OUTPATIENT
Start: 2019-04-15 | End: 2019-04-15

## 2019-04-15 RX ADMIN — OXYCODONE HYDROCHLORIDE 5 MILLIGRAM(S): 5 TABLET ORAL at 06:00

## 2019-04-15 RX ADMIN — OXYCODONE HYDROCHLORIDE 5 MILLIGRAM(S): 5 TABLET ORAL at 05:06

## 2019-04-15 RX ADMIN — ENOXAPARIN SODIUM 90 MILLIGRAM(S): 100 INJECTION SUBCUTANEOUS at 22:05

## 2019-04-15 RX ADMIN — Medication 3 MILLILITER(S): at 18:50

## 2019-04-15 RX ADMIN — Medication 1000 MILLIGRAM(S): at 13:32

## 2019-04-15 RX ADMIN — Medication 1000 MILLIGRAM(S): at 14:20

## 2019-04-15 RX ADMIN — OXYCODONE HYDROCHLORIDE 5 MILLIGRAM(S): 5 TABLET ORAL at 22:04

## 2019-04-15 RX ADMIN — ENOXAPARIN SODIUM 90 MILLIGRAM(S): 100 INJECTION SUBCUTANEOUS at 11:17

## 2019-04-15 NOTE — PROGRESS NOTE ADULT - ASSESSMENT
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 MICU consult done, rejected for ICU placement. done 2/2  for hypoxic resp distress. Patient stable at the time of adnission    - C/w O2 supplementation as needed. Wean off as tolerated. May need O2 at home. would prefer to DC to [facility]  - RVP positive for CORONAVIRUS  - C/w  sc Lovenox, plan to DC on same, ptn needs teachings about self administering  - cont on tele with continuous pulse oximetry.

## 2019-04-15 NOTE — PROGRESS NOTE ADULT - ASSESSMENT
GBM s/o resection/RT  ACute bilateral PE, hemodynamically stable  Coronivirus viral infection  LLL pulmonary infartcts  Hypoxemic resp failure: oxygen requirements ongoing and multifactorial - largely PE and infarct related, some element of viral bronchitis    REC    Continue full dose lovenox  Add duoneb qid  Titrate O2 to maintain sat > 88%

## 2019-04-15 NOTE — PROVIDER CONTACT NOTE (OTHER) - ASSESSMENT
Pt laying in bed, pushing to void intermittently, voided 150cc and post-void US reveals 507cc. Pt only voiding 100cc every 8hrs, but denies any pain/pressure to lower abdomen. Abdomen distention is noted.

## 2019-04-15 NOTE — PHYSICAL THERAPY INITIAL EVALUATION ADULT - IMPAIRMENTS CONTRIBUTING TO GAIT DEVIATIONS, PT EVAL
decreased endurance/impaired postural control/decreased strength/impaired balance/decreased flexibility

## 2019-04-15 NOTE — PHYSICAL THERAPY INITIAL EVALUATION ADULT - PERTINENT HX OF CURRENT PROBLEM, REHAB EVAL
77yo M with PMhx of HTN, HLD, prostate ca s/p prostatectomy, basal cell skin ca , glioblastoma s/p surgery, chemotherapy and radiation and seizure disorder presented to ED with LLE swelling and found to have bilateral PE. In the ED patient Found to have bilateral PE with left lingula infarct. Patient was started on heparin gtt after repeat CTH without acute findings and NeuroSX clearance.

## 2019-04-15 NOTE — PROGRESS NOTE ADULT - ASSESSMENT
Patient with a GBM being treated by neuro-onc.  He has been getting Avastin unclear if provoked the PE.  he will be following up with them after d/c for further management.  He is now on Lovenox.      He does have anemia suspected to be AOCd due to his malignancy and treatment.  the Hgb has been trending lower so will order an anemia evaluation to confirm.  Hgb adequate and for now could just monitor.

## 2019-04-15 NOTE — PHYSICAL THERAPY INITIAL EVALUATION ADULT - ADDITIONAL COMMENTS
pt lives in private home with wife and dtr. Per pt, he is I at baseline, but in more recent weeks has been requiring a cane.

## 2019-04-16 LAB
HCT VFR BLD CALC: 28.9 % — LOW (ref 39–50)
HGB BLD-MCNC: 9.5 G/DL — LOW (ref 13–17)
MCHC RBC-ENTMCNC: 31.8 PG — SIGNIFICANT CHANGE UP (ref 27–34)
MCHC RBC-ENTMCNC: 32.9 GM/DL — SIGNIFICANT CHANGE UP (ref 32–36)
MCV RBC AUTO: 96.7 FL — SIGNIFICANT CHANGE UP (ref 80–100)
PLATELET # BLD AUTO: 327 K/UL — SIGNIFICANT CHANGE UP (ref 150–400)
RBC # BLD: 2.99 M/UL — LOW (ref 4.2–5.8)
RBC # FLD: 13.5 % — SIGNIFICANT CHANGE UP (ref 10.3–14.5)
WBC # BLD: 6.76 K/UL — SIGNIFICANT CHANGE UP (ref 3.8–10.5)
WBC # FLD AUTO: 6.76 K/UL — SIGNIFICANT CHANGE UP (ref 3.8–10.5)

## 2019-04-16 RX ORDER — OMEGA-3 ACID ETHYL ESTERS 1 G
1 CAPSULE ORAL
Qty: 0 | Refills: 0 | COMMUNITY

## 2019-04-16 RX ORDER — METOPROLOL TARTRATE 50 MG
0.5 TABLET ORAL
Qty: 0 | Refills: 0 | COMMUNITY

## 2019-04-16 RX ORDER — SENNA PLUS 8.6 MG/1
2 TABLET ORAL AT BEDTIME
Qty: 0 | Refills: 0 | Status: DISCONTINUED | OUTPATIENT
Start: 2019-04-16 | End: 2019-04-22

## 2019-04-16 RX ORDER — NIFEDIPINE 30 MG
1 TABLET, EXTENDED RELEASE 24 HR ORAL
Qty: 0 | Refills: 0 | COMMUNITY

## 2019-04-16 RX ORDER — DOCUSATE SODIUM 100 MG
100 CAPSULE ORAL
Qty: 0 | Refills: 0 | Status: DISCONTINUED | OUTPATIENT
Start: 2019-04-16 | End: 2019-04-22

## 2019-04-16 RX ADMIN — Medication 100 MILLIGRAM(S): at 18:17

## 2019-04-16 RX ADMIN — Medication 5 MILLIGRAM(S): at 18:16

## 2019-04-16 RX ADMIN — Medication 3 MILLILITER(S): at 06:02

## 2019-04-16 RX ADMIN — OXYCODONE HYDROCHLORIDE 5 MILLIGRAM(S): 5 TABLET ORAL at 13:10

## 2019-04-16 RX ADMIN — Medication 3 MILLILITER(S): at 12:10

## 2019-04-16 RX ADMIN — ENOXAPARIN SODIUM 90 MILLIGRAM(S): 100 INJECTION SUBCUTANEOUS at 12:09

## 2019-04-16 RX ADMIN — OXYCODONE HYDROCHLORIDE 5 MILLIGRAM(S): 5 TABLET ORAL at 12:00

## 2019-04-16 RX ADMIN — SENNA PLUS 2 TABLET(S): 8.6 TABLET ORAL at 21:55

## 2019-04-16 RX ADMIN — OXYCODONE HYDROCHLORIDE 5 MILLIGRAM(S): 5 TABLET ORAL at 00:00

## 2019-04-16 RX ADMIN — Medication 3 MILLILITER(S): at 00:21

## 2019-04-16 RX ADMIN — ENOXAPARIN SODIUM 90 MILLIGRAM(S): 100 INJECTION SUBCUTANEOUS at 21:55

## 2019-04-16 RX ADMIN — Medication 3 MILLILITER(S): at 18:17

## 2019-04-16 NOTE — PROGRESS NOTE ADULT - ASSESSMENT
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 MICU consult done, rejected for ICU placement. done 2/2  for hypoxic resp distress. Patient stable at the time of adnission    - C/w O2 supplementation as needed. Wean off as tolerated. May need O2 at home. ptn agrees to DC to [facility]  - RVP positive for CORONAVIRUS  - C/w  sc Lovenox, plan to DC on same, ptn needs teachings about self administering  - cont on tele with continuous pulse oximetry.

## 2019-04-16 NOTE — PROGRESS NOTE ADULT - ASSESSMENT
PE - on Lovenox.    GBM - out-pt f.u with neuro-onc for further management.    Anemia - likely AOCD.  Hgb slightly higher today.  Monitor

## 2019-04-16 NOTE — PROGRESS NOTE ADULT - ASSESSMENT
GBM s/o resection/RT  ACute bilateral PE, hemodynamically stable  Coronivirus viral infection  LLL pulmonary infartcts  Hypoxemic resp failure: oxygen requirements ongoing and multifactorial - largely PE and infarct related, some element of viral bronchitis    REC    Continue full dose lovenox  Continue duoneb  Decrease nasal O2 to 3 liters: titrate to maintain sat > 88%

## 2019-04-16 NOTE — PHARMACOTHERAPY INTERVENTION NOTE - COMMENTS
Medication reconciliation performed and education provided for enoxaparin dose, frequency, indication, and side effects.    Home med list updated in outpatient medication review. Verified medications with patient, PMD Dr. Brooks [phone], and [facility] ([phone] s/w formerly [facility]).    Consider restarting home levetiracetam 1000 mg po q12h and antihypertensives metoprolol and losartan if patient tolerates.    Discussed with Cookie Esquivel (PA). Medication reconciliation performed and education provided for enoxaparin dose, frequency, indication, and side effects.    Home med list updated in outpatient medication review. Verified medications with patient, PMD Dr. Brooks [phone], and [facility] ([phone] s/w [facility]).    Consider restarting home levetiracetam 1000 mg po q12h, rosuvastatin 20 mg qhs (therapeutically interchanged to atorvastatin 80 mg qhs) and antihypertensives metoprolol and losartan if patient tolerates.    Discussed with Cookie Esquivel (PA).

## 2019-04-17 ENCOUNTER — APPOINTMENT (OUTPATIENT)
Dept: NEUROSURGERY | Facility: CLINIC | Age: 79
End: 2019-04-17

## 2019-04-17 LAB
ANION GAP SERPL CALC-SCNC: 14 MMOL/L — SIGNIFICANT CHANGE UP (ref 5–17)
BUN SERPL-MCNC: 16 MG/DL — SIGNIFICANT CHANGE UP (ref 7–23)
CALCIUM SERPL-MCNC: 9.7 MG/DL — SIGNIFICANT CHANGE UP (ref 8.4–10.5)
CHLORIDE SERPL-SCNC: 97 MMOL/L — SIGNIFICANT CHANGE UP (ref 96–108)
CO2 SERPL-SCNC: 26 MMOL/L — SIGNIFICANT CHANGE UP (ref 22–31)
CREAT SERPL-MCNC: 0.87 MG/DL — SIGNIFICANT CHANGE UP (ref 0.5–1.3)
CULTURE RESULTS: SIGNIFICANT CHANGE UP
CULTURE RESULTS: SIGNIFICANT CHANGE UP
GLUCOSE SERPL-MCNC: 116 MG/DL — HIGH (ref 70–99)
HCT VFR BLD CALC: 33.2 % — LOW (ref 39–50)
HGB BLD-MCNC: 10.7 G/DL — LOW (ref 13–17)
MCHC RBC-ENTMCNC: 31.7 PG — SIGNIFICANT CHANGE UP (ref 27–34)
MCHC RBC-ENTMCNC: 32.2 GM/DL — SIGNIFICANT CHANGE UP (ref 32–36)
MCV RBC AUTO: 98.2 FL — SIGNIFICANT CHANGE UP (ref 80–100)
PLATELET # BLD AUTO: 440 K/UL — HIGH (ref 150–400)
POTASSIUM SERPL-MCNC: 3.6 MMOL/L — SIGNIFICANT CHANGE UP (ref 3.5–5.3)
POTASSIUM SERPL-SCNC: 3.6 MMOL/L — SIGNIFICANT CHANGE UP (ref 3.5–5.3)
RBC # BLD: 3.38 M/UL — LOW (ref 4.2–5.8)
RBC # FLD: 13.4 % — SIGNIFICANT CHANGE UP (ref 10.3–14.5)
SODIUM SERPL-SCNC: 137 MMOL/L — SIGNIFICANT CHANGE UP (ref 135–145)
SPECIMEN SOURCE: SIGNIFICANT CHANGE UP
SPECIMEN SOURCE: SIGNIFICANT CHANGE UP
WBC # BLD: 7.76 K/UL — SIGNIFICANT CHANGE UP (ref 3.8–10.5)
WBC # FLD AUTO: 7.76 K/UL — SIGNIFICANT CHANGE UP (ref 3.8–10.5)

## 2019-04-17 RX ORDER — OXYCODONE HYDROCHLORIDE 5 MG/1
15 TABLET ORAL EVERY 12 HOURS
Qty: 0 | Refills: 0 | Status: DISCONTINUED | OUTPATIENT
Start: 2019-04-17 | End: 2019-04-22

## 2019-04-17 RX ADMIN — OXYCODONE HYDROCHLORIDE 5 MILLIGRAM(S): 5 TABLET ORAL at 08:05

## 2019-04-17 RX ADMIN — Medication 100 MILLIGRAM(S): at 17:18

## 2019-04-17 RX ADMIN — Medication 3 MILLILITER(S): at 05:26

## 2019-04-17 RX ADMIN — OXYCODONE HYDROCHLORIDE 5 MILLIGRAM(S): 5 TABLET ORAL at 05:27

## 2019-04-17 RX ADMIN — Medication 5 MILLIGRAM(S): at 10:12

## 2019-04-17 RX ADMIN — Medication 3 MILLILITER(S): at 17:19

## 2019-04-17 RX ADMIN — Medication 3 MILLILITER(S): at 12:04

## 2019-04-17 RX ADMIN — Medication 3 MILLILITER(S): at 00:11

## 2019-04-17 RX ADMIN — Medication 3 MILLILITER(S): at 23:28

## 2019-04-17 RX ADMIN — Medication 100 MILLIGRAM(S): at 05:26

## 2019-04-17 RX ADMIN — SENNA PLUS 2 TABLET(S): 8.6 TABLET ORAL at 21:03

## 2019-04-17 RX ADMIN — OXYCODONE HYDROCHLORIDE 15 MILLIGRAM(S): 5 TABLET ORAL at 18:15

## 2019-04-17 RX ADMIN — OXYCODONE HYDROCHLORIDE 15 MILLIGRAM(S): 5 TABLET ORAL at 17:18

## 2019-04-17 RX ADMIN — ENOXAPARIN SODIUM 90 MILLIGRAM(S): 100 INJECTION SUBCUTANEOUS at 10:05

## 2019-04-17 RX ADMIN — ENOXAPARIN SODIUM 90 MILLIGRAM(S): 100 INJECTION SUBCUTANEOUS at 21:03

## 2019-04-17 NOTE — DIETITIAN INITIAL EVALUATION ADULT. - ORAL INTAKE PTA
Pt lives with wife and daughter at home who cooks for pt. Pt reports good appetite and po intakes PTA/good

## 2019-04-17 NOTE — DIETITIAN INITIAL EVALUATION ADULT. - ENERGY NEEDS
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 +3 bilateral legs edema, no pressure ulcers noted at this time.

## 2019-04-17 NOTE — DIETITIAN INITIAL EVALUATION ADULT. - PERTINENT MEDS FT
MEDICATIONS  (STANDING):  docusate sodium 100 milliGRAM(s) Oral two times a day  senna 2 Tablet(s) Oral at bedtime

## 2019-04-17 NOTE — DIETITIAN INITIAL EVALUATION ADULT. - OTHER INFO
Pt seen for LOS initial assessment. Pt reports UBW as 192 pounds denies recent wt fluctuations. Per chart, current wt is 199 pounds - suspect wt gain 2/2 bilateral LE fluid retention. Pt with good appetite, observed 75% po intakes of meals at time of visit Pt seen for LOS initial assessment. Pt reports UBW as 192 pounds denies recent wt fluctuations. Per chart, current wt is 199 pounds - suspect wt gain 2/2 bilateral LE fluid retention. Pt with good appetite, observed 75% po intakes of meals at time of visit, noted % po intakes per flowsheet. Pt reports feeling constipated, +BM 1 week ago and currently on bowel regimen, agrees to try prune juice. No other GI distress reported. NKFA. PTA takes vitamin E, B12, B complex and multivitamin supplements.

## 2019-04-17 NOTE — DIETITIAN INITIAL EVALUATION ADULT. - NS AS NUTRI INTERV MEALS SNACK
Recommend to continue with current diet order of Mechanical Soft DASH. Reviewed alternate menu options and menu ordering procedure. Will provide prune juice to help with BM and also will add Health Shakes 3 x day.

## 2019-04-17 NOTE — DIETITIAN INITIAL EVALUATION ADULT. - ADHERENCE
Pt states he was on thickened liquid in the past, but has been liberalized and declined to discuss further 'I had gone over this with my doctor'. Pt with right molar pain and needs mechanical soft diet.

## 2019-04-17 NOTE — DIETITIAN INITIAL EVALUATION ADULT. - PERTINENT LABORATORY DATA
Na 137 [135 - 145], K+ 3.6 [3.5 - 5.3], BUN 16 [7 - 23], Cr 0.87 [0.50 - 1.30],  [70 - 99], Ca 9.7 [8.4 - 10.5]

## 2019-04-17 NOTE — PROGRESS NOTE ADULT - ASSESSMENT
GBM s/o resection/RT  ACute bilateral PE, hemodynamically stable  Coronivirus viral infection  LLL pulmonary infartcts  Hypoxemic resp failure: oxygen requirements ongoing and multifactorial - largely PE and infarct related, some element of viral bronchitis    REC    Continue full dose lovenox  Titrate nasal O 2 to maintain sat > 88%  DC planning primary team

## 2019-04-17 NOTE — PROGRESS NOTE ADULT - ASSESSMENT
PE on Lovenox Q12.      GBM - no sign of bleeding.  To f/u as out-pt with neuro-onc for further management.    Anemia - favor AOCD.  Hgb is improving and can just monitor.    Thrombocytosis is new and suspect a reactive process.

## 2019-04-18 LAB
ANION GAP SERPL CALC-SCNC: 9 MMOL/L — SIGNIFICANT CHANGE UP (ref 5–17)
BUN SERPL-MCNC: 14 MG/DL — SIGNIFICANT CHANGE UP (ref 7–23)
CALCIUM SERPL-MCNC: 9.1 MG/DL — SIGNIFICANT CHANGE UP (ref 8.4–10.5)
CHLORIDE SERPL-SCNC: 103 MMOL/L — SIGNIFICANT CHANGE UP (ref 96–108)
CO2 SERPL-SCNC: 27 MMOL/L — SIGNIFICANT CHANGE UP (ref 22–31)
CREAT SERPL-MCNC: 0.89 MG/DL — SIGNIFICANT CHANGE UP (ref 0.5–1.3)
GLUCOSE SERPL-MCNC: 112 MG/DL — HIGH (ref 70–99)
HCT VFR BLD CALC: 28.7 % — LOW (ref 39–50)
HGB BLD-MCNC: 9.2 G/DL — LOW (ref 13–17)
MCHC RBC-ENTMCNC: 31.1 PG — SIGNIFICANT CHANGE UP (ref 27–34)
MCHC RBC-ENTMCNC: 32.1 GM/DL — SIGNIFICANT CHANGE UP (ref 32–36)
MCV RBC AUTO: 97 FL — SIGNIFICANT CHANGE UP (ref 80–100)
PLATELET # BLD AUTO: 411 K/UL — HIGH (ref 150–400)
POTASSIUM SERPL-MCNC: 3.9 MMOL/L — SIGNIFICANT CHANGE UP (ref 3.5–5.3)
POTASSIUM SERPL-SCNC: 3.9 MMOL/L — SIGNIFICANT CHANGE UP (ref 3.5–5.3)
RBC # BLD: 2.96 M/UL — LOW (ref 4.2–5.8)
RBC # FLD: 13.7 % — SIGNIFICANT CHANGE UP (ref 10.3–14.5)
SODIUM SERPL-SCNC: 139 MMOL/L — SIGNIFICANT CHANGE UP (ref 135–145)
WBC # BLD: 5.78 K/UL — SIGNIFICANT CHANGE UP (ref 3.8–10.5)
WBC # FLD AUTO: 5.78 K/UL — SIGNIFICANT CHANGE UP (ref 3.8–10.5)

## 2019-04-18 RX ADMIN — Medication 100 MILLIGRAM(S): at 17:05

## 2019-04-18 RX ADMIN — OXYCODONE HYDROCHLORIDE 15 MILLIGRAM(S): 5 TABLET ORAL at 17:58

## 2019-04-18 RX ADMIN — OXYCODONE HYDROCHLORIDE 15 MILLIGRAM(S): 5 TABLET ORAL at 06:07

## 2019-04-18 RX ADMIN — OXYCODONE HYDROCHLORIDE 15 MILLIGRAM(S): 5 TABLET ORAL at 18:28

## 2019-04-18 RX ADMIN — ENOXAPARIN SODIUM 90 MILLIGRAM(S): 100 INJECTION SUBCUTANEOUS at 21:49

## 2019-04-18 RX ADMIN — Medication 3 MILLILITER(S): at 06:08

## 2019-04-18 RX ADMIN — ENOXAPARIN SODIUM 90 MILLIGRAM(S): 100 INJECTION SUBCUTANEOUS at 08:25

## 2019-04-18 RX ADMIN — Medication 100 MILLIGRAM(S): at 06:09

## 2019-04-18 NOTE — PROGRESS NOTE ADULT - ASSESSMENT
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 MICU consult done, rejected for ICU placement. done 2/2  for hypoxic resp distress. Patient stable at the time of adnission    - C/w O2 supplementation as needed. Wean off as tolerated. May need O2 at home. ptn agrees to DC to [facility]  - RVP positive for CORONAVIRUS  - C/w  sc Lovenox, plan to DC on same, ptn needs teachings about self administering  - cont on tele with continuous pulse oximetry. 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 MICU consult done, rejected for ICU placement. done 2/2  for hypoxic resp distress. Patient stable at the time of adnission    - C/w O2 supplementation as needed. Wean off as tolerated. May need O2 at home. ptn agrees to DC to [facility]. ptn will not need chemotherapy while at [facility]  - RVP positive for CORONAVIRUS  - C/w  sc Lovenox, plan to DC on same, ptn needs teachings about self administering  - cont on tele with continuous pulse oximetry.

## 2019-04-18 NOTE — PROGRESS NOTE ADULT - ASSESSMENT
GBM - being treated by neuro-onc and further management as out-pt.  Developed a PE and is now on Lovenox.  had anemia suspected AOCd and Hgb has been gradually improving last few days.  Also thrombocytosis suspected to be reactive.  Monitor the CBC.

## 2019-04-18 NOTE — PROGRESS NOTE ADULT - ASSESSMENT
GBM s/o resection/RT  ACute bilateral PE, hemodynamically stable  Coronivirus viral infection  LLL pulmonary infartcts  Hypoxemic resp failure: oxygen requirements ongoing and multifactorial - largely PE and infarct related, some element of viral bronchitis    REC    Continue full dose lovenox  Titrate nasal O 2 to maintain sat > 88%  DC nebulizers  DC planning primary team

## 2019-04-19 DIAGNOSIS — L27.0 GENERALIZED SKIN ERUPTION DUE TO DRUGS AND MEDICAMENTS TAKEN INTERNALLY: ICD-10-CM

## 2019-04-19 LAB
HCT VFR BLD CALC: 29.9 % — LOW (ref 39–50)
HGB BLD-MCNC: 9.6 G/DL — LOW (ref 13–17)
MCHC RBC-ENTMCNC: 31.4 PG — SIGNIFICANT CHANGE UP (ref 27–34)
MCHC RBC-ENTMCNC: 32.1 GM/DL — SIGNIFICANT CHANGE UP (ref 32–36)
MCV RBC AUTO: 97.7 FL — SIGNIFICANT CHANGE UP (ref 80–100)
PLATELET # BLD AUTO: 479 K/UL — HIGH (ref 150–400)
RBC # BLD: 3.06 M/UL — LOW (ref 4.2–5.8)
RBC # FLD: 13.3 % — SIGNIFICANT CHANGE UP (ref 10.3–14.5)
WBC # BLD: 6.76 K/UL — SIGNIFICANT CHANGE UP (ref 3.8–10.5)
WBC # FLD AUTO: 6.76 K/UL — SIGNIFICANT CHANGE UP (ref 3.8–10.5)

## 2019-04-19 PROCEDURE — 99223 1ST HOSP IP/OBS HIGH 75: CPT

## 2019-04-19 RX ORDER — DIPHENHYDRAMINE HCL 50 MG
25 CAPSULE ORAL EVERY 4 HOURS
Qty: 0 | Refills: 0 | Status: DISCONTINUED | OUTPATIENT
Start: 2019-04-19 | End: 2019-04-22

## 2019-04-19 RX ADMIN — SENNA PLUS 2 TABLET(S): 8.6 TABLET ORAL at 21:35

## 2019-04-19 RX ADMIN — Medication 25 MILLIGRAM(S): at 13:40

## 2019-04-19 RX ADMIN — ENOXAPARIN SODIUM 90 MILLIGRAM(S): 100 INJECTION SUBCUTANEOUS at 08:43

## 2019-04-19 RX ADMIN — OXYCODONE HYDROCHLORIDE 15 MILLIGRAM(S): 5 TABLET ORAL at 05:20

## 2019-04-19 RX ADMIN — OXYCODONE HYDROCHLORIDE 15 MILLIGRAM(S): 5 TABLET ORAL at 05:50

## 2019-04-19 RX ADMIN — Medication 100 MILLIGRAM(S): at 18:10

## 2019-04-19 RX ADMIN — OXYCODONE HYDROCHLORIDE 15 MILLIGRAM(S): 5 TABLET ORAL at 18:39

## 2019-04-19 RX ADMIN — ENOXAPARIN SODIUM 90 MILLIGRAM(S): 100 INJECTION SUBCUTANEOUS at 20:44

## 2019-04-19 RX ADMIN — Medication 100 MILLIGRAM(S): at 05:20

## 2019-04-19 RX ADMIN — Medication 1 APPLICATION(S): at 18:10

## 2019-04-19 RX ADMIN — OXYCODONE HYDROCHLORIDE 15 MILLIGRAM(S): 5 TABLET ORAL at 18:09

## 2019-04-19 RX ADMIN — Medication 25 MILLIGRAM(S): at 21:33

## 2019-04-19 NOTE — CONSULT NOTE ADULT - ASSESSMENT
79 y/o M w/ prostate cancer s/p prostatectomy, GBM (dx 08/2018, s/p surgery, s/p temozolamide monotherapy followed by temozolamide + bevacizumab started 02/2019) admitted for DVT/PE now on anticogulation a/w rash on his back for 3 weeks. 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
Patient with a GBM s/p surgery and sounds like he has been treated with RT and oral chemo, but details are unclear.  he is here with a PE and is on IV heparin.  Called his neuro-oncologist to get more details about his treatment and to discuss optimal anti-coagulant and left a message and await a call back.  For now continue with iv heparin.  management per primary team.  No role for hypercoagulable evaluation in setting of malignancy and likely sedentary.      Anemia - favor AOCd in setting of malignancy and chemo.  Hgb adequate for now and can just monitor.  If anemia worsens could send an further anemia evaluation at that time.
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 MICU c/s for hypoxic resp distress. Patient stable at the time of eval.    - C/w high flow NC as needed. Wean off as tolerated.  - Consider ABG to correlate with pulse ox if patient desats again.  - Check RVP for sputum production (recent chemo, immunocompromised?)  - C/w heparin gtt per primary team.  - Repeat CTH when AC therapeutic per neurosx recs.  - Admit to tele with continuous pulse oximetry.  - No indication for ICU admission at this time. Plan discussed with attending.    Tacos Harden PGY3

## 2019-04-19 NOTE — PROGRESS NOTE ADULT - ASSESSMENT
PE - likely hypercoagulability due to GBM.  On Lovenox.  GBM management as out-pt with neuro-onc.    Anemia - AOCD - Hgb stable and can just monitor for now.    Reactive thrombocytosis.

## 2019-04-19 NOTE — PROGRESS NOTE ADULT - ASSESSMENT
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 MICU consult done, rejected for ICU placement. done 2/2  for hypoxic resp distress. Patient stable at the time of adnission    - C/w O2 supplementation as needed. Wean off as tolerated. May need O2 at home. ptn agrees to DC to [facility]. ptn will not need chemotherapy while at [facility]  - RVP positive for CORONAVIRUS  - C/w  sc Lovenox, plan to DC on same, ptn needs teachings about self administering  - cont on tele with continuous pulse oximetry.

## 2019-04-19 NOTE — CONSULT NOTE ADULT - PROBLEM SELECTOR RECOMMENDATION 9
- 2/2 bevacizumab  - Grade 1, no indication to stop or alter dose of bevacizumab at this time  - Recommend clindamycin 1% lotion bid to affected areas on trunk   - Recommend triamcinolone 0.1% ointment bid to affected areas on trunk  - After 2 weeks, can use clindamycin and triamcinolone once daily (alternate one drug in the am and 1 drug in the pm)  - Can follow up with Dr. Rafy Can upon discharge from the hospital, [phone]

## 2019-04-20 LAB
ANION GAP SERPL CALC-SCNC: 12 MMOL/L — SIGNIFICANT CHANGE UP (ref 5–17)
BUN SERPL-MCNC: 14 MG/DL — SIGNIFICANT CHANGE UP (ref 7–23)
CALCIUM SERPL-MCNC: 9.1 MG/DL — SIGNIFICANT CHANGE UP (ref 8.4–10.5)
CHLORIDE SERPL-SCNC: 102 MMOL/L — SIGNIFICANT CHANGE UP (ref 96–108)
CO2 SERPL-SCNC: 25 MMOL/L — SIGNIFICANT CHANGE UP (ref 22–31)
CREAT SERPL-MCNC: 0.9 MG/DL — SIGNIFICANT CHANGE UP (ref 0.5–1.3)
GLUCOSE SERPL-MCNC: 92 MG/DL — SIGNIFICANT CHANGE UP (ref 70–99)
HCT VFR BLD CALC: 28.3 % — LOW (ref 39–50)
HGB BLD-MCNC: 9 G/DL — LOW (ref 13–17)
MCHC RBC-ENTMCNC: 31.8 GM/DL — LOW (ref 32–36)
MCHC RBC-ENTMCNC: 32 PG — SIGNIFICANT CHANGE UP (ref 27–34)
MCV RBC AUTO: 100.7 FL — HIGH (ref 80–100)
PLATELET # BLD AUTO: 489 K/UL — HIGH (ref 150–400)
POTASSIUM SERPL-MCNC: 3.9 MMOL/L — SIGNIFICANT CHANGE UP (ref 3.5–5.3)
POTASSIUM SERPL-SCNC: 3.9 MMOL/L — SIGNIFICANT CHANGE UP (ref 3.5–5.3)
RBC # BLD: 2.81 M/UL — LOW (ref 4.2–5.8)
RBC # FLD: 13.3 % — SIGNIFICANT CHANGE UP (ref 10.3–14.5)
SODIUM SERPL-SCNC: 139 MMOL/L — SIGNIFICANT CHANGE UP (ref 135–145)
WBC # BLD: 6.45 K/UL — SIGNIFICANT CHANGE UP (ref 3.8–10.5)
WBC # FLD AUTO: 6.45 K/UL — SIGNIFICANT CHANGE UP (ref 3.8–10.5)

## 2019-04-20 RX ORDER — POLYETHYLENE GLYCOL 3350 17 G/17G
POWDER, FOR SOLUTION ORAL
Qty: 0 | Refills: 0 | Status: DISCONTINUED | OUTPATIENT
Start: 2019-04-20 | End: 2019-04-21

## 2019-04-20 RX ORDER — POLYETHYLENE GLYCOL 3350 17 G/17G
17 POWDER, FOR SOLUTION ORAL ONCE
Qty: 0 | Refills: 0 | Status: COMPLETED | OUTPATIENT
Start: 2019-04-20 | End: 2019-04-20

## 2019-04-20 RX ORDER — CLINDAMYCIN PHOSPHATE GEL USP, 1% 10 MG/G
1 GEL TOPICAL
Qty: 0 | Refills: 0 | Status: DISCONTINUED | OUTPATIENT
Start: 2019-04-20 | End: 2019-04-22

## 2019-04-20 RX ORDER — POLYETHYLENE GLYCOL 3350 17 G/17G
17 POWDER, FOR SOLUTION ORAL DAILY
Qty: 0 | Refills: 0 | Status: DISCONTINUED | OUTPATIENT
Start: 2019-04-21 | End: 2019-04-21

## 2019-04-20 RX ADMIN — CLINDAMYCIN PHOSPHATE GEL USP, 1% 1 APPLICATION(S): 10 GEL TOPICAL at 17:20

## 2019-04-20 RX ADMIN — ENOXAPARIN SODIUM 90 MILLIGRAM(S): 100 INJECTION SUBCUTANEOUS at 07:53

## 2019-04-20 RX ADMIN — OXYCODONE HYDROCHLORIDE 15 MILLIGRAM(S): 5 TABLET ORAL at 20:55

## 2019-04-20 RX ADMIN — POLYETHYLENE GLYCOL 3350 17 GRAM(S): 17 POWDER, FOR SOLUTION ORAL at 13:24

## 2019-04-20 RX ADMIN — OXYCODONE HYDROCHLORIDE 15 MILLIGRAM(S): 5 TABLET ORAL at 21:25

## 2019-04-20 RX ADMIN — Medication 100 MILLIGRAM(S): at 05:38

## 2019-04-20 RX ADMIN — OXYCODONE HYDROCHLORIDE 15 MILLIGRAM(S): 5 TABLET ORAL at 06:13

## 2019-04-20 RX ADMIN — ENOXAPARIN SODIUM 90 MILLIGRAM(S): 100 INJECTION SUBCUTANEOUS at 20:34

## 2019-04-20 RX ADMIN — OXYCODONE HYDROCHLORIDE 15 MILLIGRAM(S): 5 TABLET ORAL at 05:37

## 2019-04-20 RX ADMIN — Medication 1 APPLICATION(S): at 05:38

## 2019-04-20 RX ADMIN — Medication 25 MILLIGRAM(S): at 03:54

## 2019-04-20 RX ADMIN — Medication 1 APPLICATION(S): at 18:01

## 2019-04-20 NOTE — PROVIDER CONTACT NOTE (OTHER) - ASSESSMENT
Pt is A&Ox2-3 disoriented to situation &forgetful, pt paranoid &illogical. Pt refusing other VS to be assessed. No s/s respiratory distress RR 18, SpO2 87% on room air. Pt educated on importance of oxygen compliance &continues to refuse. Pt is A&Ox2-3 disoriented to situation &forgetful, pt paranoid &illogical. Pt states he believes medication is coming through the wall and into his oxygen. Pt refusing other VS to be assessed. No s/s respiratory distress RR 18, SpO2 87% on room air. Pt educated on importance of oxygen compliance &continues to refuse.

## 2019-04-20 NOTE — PROVIDER CONTACT NOTE (OTHER) - SITUATION
Pt is refusing to wear oxygen, pt on continuous pulse oximeter SpO2 87% on room air no s/s respiratory distress

## 2019-04-21 LAB
HCT VFR BLD CALC: 28.9 % — LOW (ref 39–50)
HGB BLD-MCNC: 9.3 G/DL — LOW (ref 13–17)
MCHC RBC-ENTMCNC: 31.8 PG — SIGNIFICANT CHANGE UP (ref 27–34)
MCHC RBC-ENTMCNC: 32.2 GM/DL — SIGNIFICANT CHANGE UP (ref 32–36)
MCV RBC AUTO: 99 FL — SIGNIFICANT CHANGE UP (ref 80–100)
PLATELET # BLD AUTO: 528 K/UL — HIGH (ref 150–400)
RBC # BLD: 2.92 M/UL — LOW (ref 4.2–5.8)
RBC # FLD: 13.3 % — SIGNIFICANT CHANGE UP (ref 10.3–14.5)
WBC # BLD: 6.13 K/UL — SIGNIFICANT CHANGE UP (ref 3.8–10.5)
WBC # FLD AUTO: 6.13 K/UL — SIGNIFICANT CHANGE UP (ref 3.8–10.5)

## 2019-04-21 RX ORDER — PSYLLIUM SEED (WITH DEXTROSE)
1 POWDER (GRAM) ORAL DAILY
Qty: 0 | Refills: 0 | Status: DISCONTINUED | OUTPATIENT
Start: 2019-04-21 | End: 2019-04-22

## 2019-04-21 RX ADMIN — Medication 1 APPLICATION(S): at 18:03

## 2019-04-21 RX ADMIN — Medication 1 PACKET(S): at 18:02

## 2019-04-21 RX ADMIN — OXYCODONE HYDROCHLORIDE 15 MILLIGRAM(S): 5 TABLET ORAL at 20:52

## 2019-04-21 RX ADMIN — ENOXAPARIN SODIUM 90 MILLIGRAM(S): 100 INJECTION SUBCUTANEOUS at 20:22

## 2019-04-21 RX ADMIN — ENOXAPARIN SODIUM 90 MILLIGRAM(S): 100 INJECTION SUBCUTANEOUS at 07:53

## 2019-04-21 RX ADMIN — Medication 100 MILLIGRAM(S): at 18:03

## 2019-04-21 RX ADMIN — OXYCODONE HYDROCHLORIDE 15 MILLIGRAM(S): 5 TABLET ORAL at 20:22

## 2019-04-21 RX ADMIN — Medication 1 APPLICATION(S): at 05:18

## 2019-04-21 RX ADMIN — OXYCODONE HYDROCHLORIDE 15 MILLIGRAM(S): 5 TABLET ORAL at 06:01

## 2019-04-21 RX ADMIN — CLINDAMYCIN PHOSPHATE GEL USP, 1% 1 APPLICATION(S): 10 GEL TOPICAL at 05:19

## 2019-04-21 RX ADMIN — OXYCODONE HYDROCHLORIDE 15 MILLIGRAM(S): 5 TABLET ORAL at 07:01

## 2019-04-21 RX ADMIN — CLINDAMYCIN PHOSPHATE GEL USP, 1% 1 APPLICATION(S): 10 GEL TOPICAL at 18:03

## 2019-04-21 NOTE — PROGRESS NOTE ADULT - ASSESSMENT
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   MICU consult done on admission, rejected for ICU placement. done 2/2  for hypoxic resp distress. Patient stable at the time of admission    - C/w O2 supplementation as needed. Wean off as tolerated. May need O2 at home. ptn agrees to DC to [facility]. ptn will not need chemotherapy while at [facility]  - RVP positive for CORONAVIRUS  - C/w  sc Lovenox, plan to DC on same, ptn needs teachings about self administering  - cont on tele with continuous pulse oximetry.  -in the interim developed acneiform drug eruption 2/2 bevacizumab ptn received in 2/19. improving on topical triamcinolone and clindamycin w prn benadryl. derm consult appreciated. this eruption is NOT a contraindication to cont the drug if necessary

## 2019-04-22 ENCOUNTER — TRANSCRIPTION ENCOUNTER (OUTPATIENT)
Age: 79
End: 2019-04-22

## 2019-04-22 VITALS
RESPIRATION RATE: 18 BRPM | OXYGEN SATURATION: 94 % | HEART RATE: 88 BPM | TEMPERATURE: 99 F | DIASTOLIC BLOOD PRESSURE: 88 MMHG | SYSTOLIC BLOOD PRESSURE: 160 MMHG

## 2019-04-22 LAB
HCT VFR BLD CALC: 30.5 % — LOW (ref 39–50)
HGB BLD-MCNC: 9.6 G/DL — LOW (ref 13–17)
MCHC RBC-ENTMCNC: 31.1 PG — SIGNIFICANT CHANGE UP (ref 27–34)
MCHC RBC-ENTMCNC: 31.5 GM/DL — LOW (ref 32–36)
MCV RBC AUTO: 98.7 FL — SIGNIFICANT CHANGE UP (ref 80–100)
PLATELET # BLD AUTO: 514 K/UL — HIGH (ref 150–400)
RBC # BLD: 3.09 M/UL — LOW (ref 4.2–5.8)
RBC # FLD: 13.5 % — SIGNIFICANT CHANGE UP (ref 10.3–14.5)
WBC # BLD: 6.54 K/UL — SIGNIFICANT CHANGE UP (ref 3.8–10.5)
WBC # FLD AUTO: 6.54 K/UL — SIGNIFICANT CHANGE UP (ref 3.8–10.5)

## 2019-04-22 PROCEDURE — 87798 DETECT AGENT NOS DNA AMP: CPT

## 2019-04-22 PROCEDURE — 99285 EMERGENCY DEPT VISIT HI MDM: CPT | Mod: 25

## 2019-04-22 PROCEDURE — 94640 AIRWAY INHALATION TREATMENT: CPT

## 2019-04-22 PROCEDURE — 80053 COMPREHEN METABOLIC PANEL: CPT

## 2019-04-22 PROCEDURE — 85730 THROMBOPLASTIN TIME PARTIAL: CPT

## 2019-04-22 PROCEDURE — 85014 HEMATOCRIT: CPT

## 2019-04-22 PROCEDURE — 97116 GAIT TRAINING THERAPY: CPT

## 2019-04-22 PROCEDURE — 93970 EXTREMITY STUDY: CPT

## 2019-04-22 PROCEDURE — 36600 WITHDRAWAL OF ARTERIAL BLOOD: CPT

## 2019-04-22 PROCEDURE — 82330 ASSAY OF CALCIUM: CPT

## 2019-04-22 PROCEDURE — 97162 PT EVAL MOD COMPLEX 30 MIN: CPT

## 2019-04-22 PROCEDURE — 80048 BASIC METABOLIC PNL TOTAL CA: CPT

## 2019-04-22 PROCEDURE — 82962 GLUCOSE BLOOD TEST: CPT

## 2019-04-22 PROCEDURE — 87581 M.PNEUMON DNA AMP PROBE: CPT

## 2019-04-22 PROCEDURE — 84484 ASSAY OF TROPONIN QUANT: CPT

## 2019-04-22 PROCEDURE — 70450 CT HEAD/BRAIN W/O DYE: CPT

## 2019-04-22 PROCEDURE — 82435 ASSAY OF BLOOD CHLORIDE: CPT

## 2019-04-22 PROCEDURE — 71275 CT ANGIOGRAPHY CHEST: CPT

## 2019-04-22 PROCEDURE — 83605 ASSAY OF LACTIC ACID: CPT

## 2019-04-22 PROCEDURE — 84132 ASSAY OF SERUM POTASSIUM: CPT

## 2019-04-22 PROCEDURE — 71045 X-RAY EXAM CHEST 1 VIEW: CPT

## 2019-04-22 PROCEDURE — 82947 ASSAY GLUCOSE BLOOD QUANT: CPT

## 2019-04-22 PROCEDURE — 85610 PROTHROMBIN TIME: CPT

## 2019-04-22 PROCEDURE — 97110 THERAPEUTIC EXERCISES: CPT

## 2019-04-22 PROCEDURE — 97530 THERAPEUTIC ACTIVITIES: CPT

## 2019-04-22 PROCEDURE — 87633 RESP VIRUS 12-25 TARGETS: CPT

## 2019-04-22 PROCEDURE — 93005 ELECTROCARDIOGRAM TRACING: CPT

## 2019-04-22 PROCEDURE — 82803 BLOOD GASES ANY COMBINATION: CPT

## 2019-04-22 PROCEDURE — 87040 BLOOD CULTURE FOR BACTERIA: CPT

## 2019-04-22 PROCEDURE — 81001 URINALYSIS AUTO W/SCOPE: CPT

## 2019-04-22 PROCEDURE — 87486 CHLMYD PNEUM DNA AMP PROBE: CPT

## 2019-04-22 PROCEDURE — 84295 ASSAY OF SERUM SODIUM: CPT

## 2019-04-22 PROCEDURE — 85027 COMPLETE CBC AUTOMATED: CPT

## 2019-04-22 RX ORDER — ENOXAPARIN SODIUM 100 MG/ML
90 INJECTION SUBCUTANEOUS
Qty: 0 | Refills: 0 | COMMUNITY
Start: 2019-04-22

## 2019-04-22 RX ORDER — DOCUSATE SODIUM 100 MG
1 CAPSULE ORAL
Qty: 0 | Refills: 0 | DISCHARGE
Start: 2019-04-22

## 2019-04-22 RX ORDER — PREGABALIN 225 MG/1
4000 CAPSULE ORAL
Qty: 0 | Refills: 0 | COMMUNITY

## 2019-04-22 RX ORDER — PSYLLIUM SEED (WITH DEXTROSE)
1 POWDER (GRAM) ORAL
Qty: 0 | Refills: 0 | COMMUNITY

## 2019-04-22 RX ORDER — DIPHENHYDRAMINE HCL 50 MG
1 CAPSULE ORAL
Qty: 0 | Refills: 0 | DISCHARGE
Start: 2019-04-22

## 2019-04-22 RX ORDER — VITAMIN E 100 UNIT
1 CAPSULE ORAL
Qty: 0 | Refills: 0 | COMMUNITY

## 2019-04-22 RX ORDER — ALPRAZOLAM 0.25 MG
1 TABLET ORAL
Qty: 0 | Refills: 0 | COMMUNITY

## 2019-04-22 RX ORDER — SAXAGLIPTIN 5 MG/1
1 TABLET, FILM COATED ORAL
Qty: 0 | Refills: 0 | COMMUNITY

## 2019-04-22 RX ORDER — OXYCODONE HYDROCHLORIDE 5 MG/1
1 TABLET ORAL
Qty: 0 | Refills: 0 | COMMUNITY
Start: 2019-04-22

## 2019-04-22 RX ORDER — OMEGA-3 ACID ETHYL ESTERS 1 G
2 CAPSULE ORAL
Qty: 0 | Refills: 0 | COMMUNITY

## 2019-04-22 RX ORDER — SENNA PLUS 8.6 MG/1
1 TABLET ORAL
Qty: 0 | Refills: 0 | COMMUNITY

## 2019-04-22 RX ORDER — SENNA PLUS 8.6 MG/1
2 TABLET ORAL
Qty: 0 | Refills: 0 | DISCHARGE
Start: 2019-04-22

## 2019-04-22 RX ORDER — METOPROLOL TARTRATE 50 MG
1 TABLET ORAL
Qty: 0 | Refills: 0 | COMMUNITY

## 2019-04-22 RX ORDER — ROSUVASTATIN CALCIUM 5 MG/1
1 TABLET ORAL
Qty: 0 | Refills: 0 | COMMUNITY

## 2019-04-22 RX ORDER — CLINDAMYCIN PHOSPHATE GEL USP, 1% 10 MG/G
1 GEL TOPICAL
Qty: 0 | Refills: 0 | COMMUNITY
Start: 2019-04-22

## 2019-04-22 RX ORDER — PSYLLIUM SEED (WITH DEXTROSE)
1 POWDER (GRAM) ORAL
Qty: 0 | Refills: 0 | DISCHARGE
Start: 2019-04-22

## 2019-04-22 RX ORDER — NIFEDIPINE 30 MG
1 TABLET, EXTENDED RELEASE 24 HR ORAL
Qty: 0 | Refills: 0 | COMMUNITY

## 2019-04-22 RX ADMIN — Medication 1 APPLICATION(S): at 06:06

## 2019-04-22 RX ADMIN — OXYCODONE HYDROCHLORIDE 15 MILLIGRAM(S): 5 TABLET ORAL at 17:03

## 2019-04-22 RX ADMIN — Medication 100 MILLIGRAM(S): at 17:03

## 2019-04-22 RX ADMIN — Medication 100 MILLIGRAM(S): at 06:06

## 2019-04-22 RX ADMIN — OXYCODONE HYDROCHLORIDE 15 MILLIGRAM(S): 5 TABLET ORAL at 08:30

## 2019-04-22 RX ADMIN — ENOXAPARIN SODIUM 90 MILLIGRAM(S): 100 INJECTION SUBCUTANEOUS at 19:52

## 2019-04-22 RX ADMIN — OXYCODONE HYDROCHLORIDE 15 MILLIGRAM(S): 5 TABLET ORAL at 18:00

## 2019-04-22 RX ADMIN — ENOXAPARIN SODIUM 90 MILLIGRAM(S): 100 INJECTION SUBCUTANEOUS at 08:13

## 2019-04-22 RX ADMIN — CLINDAMYCIN PHOSPHATE GEL USP, 1% 1 APPLICATION(S): 10 GEL TOPICAL at 06:06

## 2019-04-22 RX ADMIN — OXYCODONE HYDROCHLORIDE 15 MILLIGRAM(S): 5 TABLET ORAL at 06:06

## 2019-04-22 RX ADMIN — Medication 1 APPLICATION(S): at 17:03

## 2019-04-22 NOTE — PROGRESS NOTE ADULT - PROVIDER SPECIALTY LIST ADULT
Heme/Onc
Internal Medicine
Pulmonology
Heme/Onc
Internal Medicine

## 2019-04-22 NOTE — PROGRESS NOTE ADULT - ASSESSMENT
GBM s/o resection/RT  ACute bilateral PE, hemodynamically stable  Coronivirus viral infection  LLL pulmonary infartcts  Hypoxemic resp failure: oxygen requirements ongoing and multifactorial - largely PE and infarct related, some element of viral bronchitis    REC    Continue full dose lovenox  Check RA sats  DC nebulizers  DC planning primary team

## 2019-04-22 NOTE — PROGRESS NOTE ADULT - SUBJECTIVE AND OBJECTIVE BOX
Patient is a 78y old  Male who presents with a chief complaint of dyspnea (15 Apr 2019 13:32)    Still with SOB and left pleuritic CP.  Appetite fair.  Has weakness.    Denies fevers, chills, sweats, HA, dizziness, nosebleeds, sore throat, hemoptysis, N.V.D, abd pain, dysuria, hematuria, leg pain      Medication:   ALBUTerol/ipratropium for Nebulization 3 milliLiter(s) Nebulizer four times a day  enoxaparin Injectable 90 milliGRAM(s) SubCutaneous every 12 hours  oxyCODONE    IR 5 milliGRAM(s) Oral every 12 hours PRN      Physical exam    T(C): 36.8 (04-15-19 @ 11:31), Max: 36.9 (04-15-19 @ 03:34)  HR: 92 (04-15-19 @ 11:31) (88 - 99)  BP: 139/82 (04-15-19 @ 11:31) (138/77 - 148/80)  RR: 18 (04-15-19 @ 11:31) (18 - 18)  SpO2: 94% (04-15-19 @ 11:31) (93% - 94%)  Wt(kg): --    alert NAD  EOMI anicteric sclera  Neck No LNA  Cv s1 S2 RRR positive murmur  Lungs clear B/L anteriorly  abd soft NT ND +BS  B/L LE edema no tenderness    Labs                        8.8    8.55  )-----------( 316      ( 15 Apr 2019 09:02 )             26.1                   [id number]
Patient is a 78y old  Male who presents with a chief complaint of dyspnea (17 Apr 2019 19:15)      Medication:   ALBUTerol/ipratropium for Nebulization 3 milliLiter(s) Nebulizer four times a day  bisacodyl 5 milliGRAM(s) Oral every 12 hours PRN  docusate sodium 100 milliGRAM(s) Oral two times a day  enoxaparin Injectable 90 milliGRAM(s) SubCutaneous every 12 hours  oxyCODONE  ER Tablet 15 milliGRAM(s) Oral every 12 hours  senna 2 Tablet(s) Oral at bedtime      Physical exam    T(C): 36.7 (04-18-19 @ 04:18), Max: 36.7 (04-18-19 @ 04:18)  HR: 86 (04-18-19 @ 04:18) (86 - 98)  BP: 147/77 (04-18-19 @ 04:18) (143/91 - 149/76)  RR: 18 (04-18-19 @ 04:18) (18 - 18)  SpO2: 91% (04-18-19 @ 04:18) (88% - 94%)  Wt(kg): --    resting NAD  Resp non labored    Labs                        10.7   7.76  )-----------( 440      ( 17 Apr 2019 10:32 )             33.2       04-18    139  |  103  |  14  ----------------------------<  112<H>  3.9   |  27  |  0.89    Ca    9.1      18 Apr 2019 06:06            [id number]
Patient is a 78y old  Male who presents with a chief complaint of dyspnea (20 Apr 2019 12:39)      SUBJECTIVE / OVERNIGHT EVENTS: ptn states the rash feels better less itchy,  looks less inflamed    MEDICATIONS  (STANDING):  clindamycin 1% Solution 1 Application(s) Topical two times a day  docusate sodium 100 milliGRAM(s) Oral two times a day  enoxaparin Injectable 90 milliGRAM(s) SubCutaneous every 12 hours  oxyCODONE  ER Tablet 15 milliGRAM(s) Oral every 12 hours  polyethylene glycol 3350      senna 2 Tablet(s) Oral at bedtime  triamcinolone 0.1% Ointment 1 Application(s) Topical every 12 hours    MEDICATIONS  (PRN):  bisacodyl 5 milliGRAM(s) Oral every 12 hours PRN Constipation  diphenhydrAMINE 25 milliGRAM(s) Oral every 4 hours PRN Rash and/or Itching      Vital Signs Last 24 Hrs  T(F): 97.7 (04-20-19 @ 11:50), Max: 98.9 (04-20-19 @ 04:14)  HR: 89 (04-20-19 @ 11:50) (78 - 90)  BP: 168/96 (04-20-19 @ 11:50) (139/72 - 168/96)  RR: 18 (04-20-19 @ 11:50) (18 - 18)  SpO2: 95% (04-20-19 @ 11:50) (85% - 95%)  Telemetry:   CAPILLARY BLOOD GLUCOSE        I&O's Summary    19 Apr 2019 07:01  -  20 Apr 2019 07:00  --------------------------------------------------------  IN: 1165 mL / OUT: 1250 mL / NET: -85 mL    20 Apr 2019 07:01  -  20 Apr 2019 15:57  --------------------------------------------------------  IN: 720 mL / OUT: 900 mL / NET: -180 mL        PHYSICAL EXAM:  GENERAL: NAD, well-developed  HEAD:  Atraumatic, Normocephalic  EYES: EOMI, PERRLA, conjunctiva and sclera clear  NECK: Supple, No JVD  CHEST/LUNG: Clear to auscultation bilaterally; No wheeze  HEART: Regular rate and rhythm; No murmurs, rubs, or gallops  ABDOMEN: Soft, Nontender, Nondistended; Bowel sounds present  EXTREMITIES:  2+ Peripheral Pulses, No clubbing, cyanosis, or edema  PSYCH: AAOx3  NEUROLOGY: non-focal  SKIN: No rashes or lesions    LABS:                        9.0    6.45  )-----------( 489      ( 20 Apr 2019 10:36 )             28.3     04-20    139  |  102  |  14  ----------------------------<  92  3.9   |  25  |  0.90    Ca    9.1      20 Apr 2019 06:41                RADIOLOGY & ADDITIONAL TESTS:    Imaging Personally Reviewed:    Consultant(s) Notes Reviewed:      Care Discussed with Consultants/Other Providers:
Patient is a 78y old  Male who presents with a chief complaint of dyspnea (20 Apr 2019 15:57)      SUBJECTIVE / OVERNIGHT EVENTS: no new c/o, awaiting [facility] transfer, rash improving    MEDICATIONS  (STANDING):  clindamycin 1% Solution 1 Application(s) Topical two times a day  docusate sodium 100 milliGRAM(s) Oral two times a day  enoxaparin Injectable 90 milliGRAM(s) SubCutaneous every 12 hours  oxyCODONE  ER Tablet 15 milliGRAM(s) Oral every 12 hours  psyllium Powder 1 Packet(s) Oral daily  senna 2 Tablet(s) Oral at bedtime  triamcinolone 0.1% Ointment 1 Application(s) Topical every 12 hours    MEDICATIONS  (PRN):  bisacodyl 5 milliGRAM(s) Oral every 12 hours PRN Constipation  diphenhydrAMINE 25 milliGRAM(s) Oral every 4 hours PRN Rash and/or Itching      Vital Signs Last 24 Hrs  T(F): 97.5 (04-21-19 @ 12:00), Max: 98.3 (04-20-19 @ 20:00)  HR: 88 (04-21-19 @ 12:00) (70 - 88)  BP: 143/85 (04-21-19 @ 12:00) (143/85 - 167/74)  RR: 18 (04-21-19 @ 12:00) (18 - 18)  SpO2: 96% (04-21-19 @ 12:00) (91% - 97%)  Telemetry:   CAPILLARY BLOOD GLUCOSE        I&O's Summary    20 Apr 2019 07:01  -  21 Apr 2019 07:00  --------------------------------------------------------  IN: 1470 mL / OUT: 1700 mL / NET: -230 mL    21 Apr 2019 07:01  -  21 Apr 2019 19:25  --------------------------------------------------------  IN: 600 mL / OUT: 1150 mL / NET: -550 mL        PHYSICAL EXAM:  GENERAL: NAD, well-developed  HEAD:  Atraumatic, Normocephalic  EYES: EOMI, PERRLA, conjunctiva and sclera clear  NECK: Supple, No JVD  CHEST/LUNG: Clear to auscultation bilaterally; No wheeze  HEART: Regular rate and rhythm; No murmurs, rubs, or gallops  ABDOMEN: Soft, Nontender, Nondistended; Bowel sounds present  EXTREMITIES:  2+ Peripheral Pulses, No clubbing, cyanosis, or edema  PSYCH: AAOx3  NEUROLOGY: non-focal  SKIN: No rashes or lesions    LABS:                        9.3    6.13  )-----------( 528      ( 21 Apr 2019 09:18 )             28.9     04-20    139  |  102  |  14  ----------------------------<  92  3.9   |  25  |  0.90    Ca    9.1      20 Apr 2019 06:41                RADIOLOGY & ADDITIONAL TESTS:    Imaging Personally Reviewed:    Consultant(s) Notes Reviewed:      Care Discussed with Consultants/Other Providers:
Follow-up Pulm Progress Note  Jose Marks MD  [phone]    AFebrile  Stable cardio-resp status  OOB in chair: comfortable on nasal oxygen    Vital Signs Last 24 Hrs  T(C): 36.6 (22 Apr 2019 04:32), Max: 36.6 (22 Apr 2019 04:32)  T(F): 97.9 (22 Apr 2019 04:32), Max: 97.9 (22 Apr 2019 04:32)  HR: 79 (22 Apr 2019 04:32) (79 - 88)  BP: 138/73 (22 Apr 2019 04:32) (138/73 - 155/72)  BP(mean): --  RR: 18 (22 Apr 2019 04:32) (18 - 18)  SpO2: 90% (22 Apr 2019 04:32) (90% - 96%)                        9.6    6.54  )-----------( 514      ( 22 Apr 2019 08:08 )             30.5     Physical Examination:  PULM: Clear without wheeze or rhonchi  CVS: Regular rate and rhythm, no murmurs, rubs, or gallops  ABD: Soft, non-tender  EXT:  No clubbing, cyanosis, or edema    RADIOLOGY REVIEWED  CXR:    CT chest:    TTE:    PCT chest:  PROCEDURE DATE:  04/11/2019        INTERPRETATION:  CTA CHEST WITH CONTRAST (CT PULMONARY EMBOLUS)    INDICATION: SOB, LLE swelling x3 weeks.    TECHNIQUE: Unenhanced helical images wereobtained of the chest. Coronal   and sagittal images were reconstructed.  Images were obtained after the   uneventful administration of 90 cc of nonionic intravenous contrast   (Omnipaque 350).  10 cc of nonionic intravenous contrast (Omnipaque 350)   was discarded. Maximum intensity projection images were generated.    COMPARISON: None.    FINDINGS:     Vessels:  The main pulmonary artery is normal in caliber and is now in   the filling defects involving the left and right pulmonary arteries with   extension into the lobar and segmental pulmonary arteries consistent with   acute pulmonary emboli. Coronary artery and aortic valve calcifications.    Lungs, Pleura And Airways: The central airways are patent. Left upper   lobe and lingular pulmonary infarcts. Small bilateral pleural effusions.    Mediastinum: There are no enlarged chest lymph nodes. The visualized   portion of the thyroid gland is unremarkable.      Heart: The heart is normal in size. There is no pericardial effusion.  No   evidence of right heart strain.    Upper Abdomen: The upper abdomen is unremarkable.    Bones And Soft Tissues: Degenerative change of the spine.    IMPRESSION:     Bilateral main, lobar and segmental pulmonary emboli with left pulmonary   infarcts described above. No CT evidence of right heart strain.        TTE:
Follow-up Pulm Progress Note  Jose Marks MD  [phone]    AFebrile  No rest dyspnea  On 5 liters nasal oxygen - sat now 98%    Vital Signs Last 24 Hrs  T(C): 36.4 (16 Apr 2019 11:59), Max: 37.4 (16 Apr 2019 04:03)  T(F): 97.5 (16 Apr 2019 11:59), Max: 99.4 (16 Apr 2019 04:03)  HR: 87 (16 Apr 2019 11:59) (80 - 87)  BP: 148/80 (16 Apr 2019 11:59) (145/75 - 179/99)  BP(mean): --  RR: 18 (16 Apr 2019 11:59) (18 - 18)  SpO2: 94% (16 Apr 2019 11:59) (93% - 94%)                         9.5    6.76  )-----------( 327      ( 16 Apr 2019 09:22 )             28.9   Physical Examination:  PULM: No wheeze/rhonchi; decr BS Lbasilar  CVS: Regular rate and rhythm, no murmurs, rubs, or gallops  ABD: Soft, non-tender  EXT:  No clubbing, cyanosis, or edema    RADIOLOGY REVIEWED  CXR:    CT chest:  16:16) >  PROCEDURE DATE:  04/11/2019        INTERPRETATION:  CTA CHEST WITH CONTRAST (CT PULMONARY EMBOLUS)    INDICATION: SOB, LLE swelling x3 weeks.    TECHNIQUE: Unenhanced helical images wereobtained of the chest. Coronal   and sagittal images were reconstructed.  Images were obtained after the   uneventful administration of 90 cc of nonionic intravenous contrast   (Omnipaque 350).  10 cc of nonionic intravenous contrast (Omnipaque 350)   was discarded. Maximum intensity projection images were generated.    COMPARISON: None.    FINDINGS:     Vessels:  The main pulmonary artery is normal in caliber and is now in   the filling defects involving the left and right pulmonary arteries with   extension into the lobar and segmental pulmonary arteries consistent with   acute pulmonary emboli. Coronary artery and aortic valve calcifications.    Lungs, Pleura And Airways: The central airways are patent. Left upper   lobe and lingular pulmonary infarcts. Small bilateral pleural effusions.    Mediastinum: There are no enlarged chest lymph nodes. The visualized   portion of the thyroid gland is unremarkable.      Heart: The heart is normal in size. There is no pericardial effusion.  No   evidence of right heart strain.    Upper Abdomen: The upper abdomen is unremarkable.    Bones And Soft Tissues: Degenerative change of the spine.    IMPRESSION:     Bilateral main, lobar and segmental pulmonary emboli with left pulmonary   infarcts described above. No CT evidence of right heart strain.        TTE:
Follow-up Pulm Progress Note  Jose Marks MD  [phone]    AFebrile  No rest dyspnea  Ocygen requirements decreasing: sats 92 % on 2 liters nasal oxygen     Vital Signs Last 24 Hrs  T(C): 36.7 (18 Apr 2019 04:18), Max: 36.7 (18 Apr 2019 04:18)  T(F): 98.1 (18 Apr 2019 04:18), Max: 98.1 (18 Apr 2019 04:18)  HR: 86 (18 Apr 2019 04:18) (86 - 98)  BP: 147/77 (18 Apr 2019 04:18) (143/91 - 149/76)  BP(mean): --  RR: 18 (18 Apr 2019 04:18) (18 - 18)  SpO2: 91% (18 Apr 2019 04:18) (91% - 94%)                          9.2    5.78  )-----------( 411      ( 18 Apr 2019 07:40 )             28.7     04-18    139  |  103  |  14  ----------------------------<  112<H>  3.9   |  27  |  0.89    Ca    9.1      18 Apr 2019 06:06      Physical Examination:  PULM: No wheeze/rhonchi; decr BS Lbasilar  CVS: Regular rate and rhythm, no murmurs, rubs, or gallops  ABD: Soft, non-tender  EXT:  No clubbing, cyanosis, or edema    RADIOLOGY REVIEWED  CXR:    CT chest:  PROCEDURE DATE:  04/11/2019        INTERPRETATION:  CTA CHEST WITH CONTRAST (CT PULMONARY EMBOLUS)    INDICATION: SOB, LLE swelling x3 weeks.    TECHNIQUE: Unenhanced helical images wereobtained of the chest. Coronal   and sagittal images were reconstructed.  Images were obtained after the   uneventful administration of 90 cc of nonionic intravenous contrast   (Omnipaque 350).  10 cc of nonionic intravenous contrast (Omnipaque 350)   was discarded. Maximum intensity projection images were generated.    COMPARISON: None.    FINDINGS:     Vessels:  The main pulmonary artery is normal in caliber and is now in   the filling defects involving the left and right pulmonary arteries with   extension into the lobar and segmental pulmonary arteries consistent with   acute pulmonary emboli. Coronary artery and aortic valve calcifications.    Lungs, Pleura And Airways: The central airways are patent. Left upper   lobe and lingular pulmonary infarcts. Small bilateral pleural effusions.    Mediastinum: There are no enlarged chest lymph nodes. The visualized   portion of the thyroid gland is unremarkable.      Heart: The heart is normal in size. There is no pericardial effusion.  No   evidence of right heart strain.    Upper Abdomen: The upper abdomen is unremarkable.    Bones And Soft Tissues: Degenerative change of the spine.    IMPRESSION:     Bilateral main, lobar and segmental pulmonary emboli with left pulmonary   infarcts described above. No CT evidence of right heart strain.        TTE:
Follow-up Pulm Progress Note  Jose Marks MD  [phone]    AFebrile  VIral PCR + coronovirus  C/O of some rest SOB, yet does not appear tachypneic]  On 5 liters nasal oxygen with sat 92%    Medications:  Vital Signs Last 24 Hrs  T(C): 36.8 (15 Apr 2019 11:31), Max: 36.9 (15 Apr 2019 03:34)  T(F): 98.3 (15 Apr 2019 11:31), Max: 98.5 (15 Apr 2019 03:34)  HR: 92 (15 Apr 2019 11:31) (88 - 99)  BP: 139/82 (15 Apr 2019 11:31) (138/77 - 148/80)  BP(mean): --  RR: 18 (15 Apr 2019 11:31) (18 - 18)  SpO2: 94% (15 Apr 2019 11:31) (93% - 94%)      04-14 @ 07:01  -  04-15 @ 07:00  --------------------------------------------------------  IN: 650 mL / OUT: 570 mL / NET: 80 mL    LABS:                        8.8    8.55  )-----------( 316      ( 15 Apr 2019 09:02 )             26.1     CULTURES:  Culture Results:   No growth to date. (04-12 @ 02:38)  Culture Results:   No growth to date. (04-12 @ 02:38)    Most recent blood culture -- 04-12 @ 02:38   -- -- .Blood 04-12 @ 02:38      Physical Examination:  PULM: few scattered exp rhonchi bilaterally  CVS: Regular rate and rhythm, no murmurs, rubs, or gallops  ABD: Soft, non-tender  EXT:  No clubbing, cyanosis, or edema    RADIOLOGY REVIEWED  CXR:    CT chest:  16:16) >  PROCEDURE DATE:  04/11/2019        INTERPRETATION:  CTA CHEST WITH CONTRAST (CT PULMONARY EMBOLUS)    INDICATION: SOB, LLE swelling x3 weeks.    TECHNIQUE: Unenhanced helical images wereobtained of the chest. Coronal   and sagittal images were reconstructed.  Images were obtained after the   uneventful administration of 90 cc of nonionic intravenous contrast   (Omnipaque 350).  10 cc of nonionic intravenous contrast (Omnipaque 350)   was discarded. Maximum intensity projection images were generated.    COMPARISON: None.    FINDINGS:     Vessels:  The main pulmonary artery is normal in caliber and is now in   the filling defects involving the left and right pulmonary arteries with   extension into the lobar and segmental pulmonary arteries consistent with   acute pulmonary emboli. Coronary artery and aortic valve calcifications.    Lungs, Pleura And Airways: The central airways are patent. Left upper   lobe and lingular pulmonary infarcts. Small bilateral pleural effusions.    Mediastinum: There are no enlarged chest lymph nodes. The visualized   portion of the thyroid gland is unremarkable.      Heart: The heart is normal in size. There is no pericardial effusion.  No   evidence of right heart strain.    Upper Abdomen: The upper abdomen is unremarkable.    Bones And Soft Tissues: Degenerative change of the spine.    IMPRESSION:     Bilateral main, lobar and segmental pulmonary emboli with left pulmonary   infarcts described above. No CT evidence of right heart strain.        TTE:
Follow-up Pulm Progress Note  Jose Marks MD  [phone]    AFebrile  No rest dyspnea  On 4 liters nasal oxygen - sat now 93%    Vital Signs Last 24 Hrs  T(C): 36.8 (17 Apr 2019 04:02), Max: 37 (16 Apr 2019 20:57)  T(F): 98.2 (17 Apr 2019 04:02), Max: 98.6 (16 Apr 2019 20:57)  HR: 90 (17 Apr 2019 04:02) (79 - 104)  BP: 152/89 (17 Apr 2019 04:02) (134/81 - 152/89)  BP(mean): --  RR: 18 (17 Apr 2019 04:02) (18 - 18)  SpO2: 94% (17 Apr 2019 04:02) (93% - 94%)                          9.5    6.76  )-----------( 327      ( 16 Apr 2019 09:22 )             28.9       04-17    137  |  97  |  16  ----------------------------<  116<H>  3.6   |  26  |  0.87    Ca    9.7      17 Apr 2019 07:42        Physical Examination:  PULM: No wheeze/rhonchi; decr BS Lbasilar  CVS: Regular rate and rhythm, no murmurs, rubs, or gallops  ABD: Soft, non-tender  EXT:  No clubbing, cyanosis, or edema    RADIOLOGY REVIEWED  CXR:    CT chest:  16:16) >  PROCEDURE DATE:  04/11/2019        INTERPRETATION:  CTA CHEST WITH CONTRAST (CT PULMONARY EMBOLUS)    INDICATION: SOB, LLE swelling x3 weeks.    TECHNIQUE: Unenhanced helical images wereobtained of the chest. Coronal   and sagittal images were reconstructed.  Images were obtained after the   uneventful administration of 90 cc of nonionic intravenous contrast   (Omnipaque 350).  10 cc of nonionic intravenous contrast (Omnipaque 350)   was discarded. Maximum intensity projection images were generated.    COMPARISON: None.    FINDINGS:     Vessels:  The main pulmonary artery is normal in caliber and is now in   the filling defects involving the left and right pulmonary arteries with   extension into the lobar and segmental pulmonary arteries consistent with   acute pulmonary emboli. Coronary artery and aortic valve calcifications.    Lungs, Pleura And Airways: The central airways are patent. Left upper   lobe and lingular pulmonary infarcts. Small bilateral pleural effusions.    Mediastinum: There are no enlarged chest lymph nodes. The visualized   portion of the thyroid gland is unremarkable.      Heart: The heart is normal in size. There is no pericardial effusion.  No   evidence of right heart strain.    Upper Abdomen: The upper abdomen is unremarkable.    Bones And Soft Tissues: Degenerative change of the spine.    IMPRESSION:     Bilateral main, lobar and segmental pulmonary emboli with left pulmonary   infarcts described above. No CT evidence of right heart strain.        TTE:
Follow-up Pulm Progress Note  Jose Marks MD  [phone]    AFebrile  No rest dyspnea    Vital Signs Last 24 Hrs  T(C): 36.4 (19 Apr 2019 04:19), Max: 36.8 (18 Apr 2019 23:01)  T(F): 97.6 (19 Apr 2019 04:19), Max: 98.2 (18 Apr 2019 23:01)  HR: 82 (19 Apr 2019 04:19) (82 - 100)  BP: 161/65 (19 Apr 2019 04:19) (146/80 - 170/87)  BP(mean): --  RR: 18 (19 Apr 2019 04:19) (18 - 18)  SpO2: 95% (19 Apr 2019 04:19) (91% - 95%)                          9.6    6.76  )-----------( 479      ( 19 Apr 2019 10:26 )             29.9       04-18    139  |  103  |  14  ----------------------------<  112<H>  3.9   |  27  |  0.89    Ca    9.1      18 Apr 2019 06:06        Physical Examination:  PULM: No wheeze/rhonchi; decr BS Lbasilar  CVS: Regular rate and rhythm, no murmurs, rubs, or gallops  ABD: Soft, non-tender  EXT:  No clubbing, cyanosis, or edema    RADIOLOGY REVIEWED  CXR:    CT chest:  PROCEDURE DATE:  04/11/2019        INTERPRETATION:  CTA CHEST WITH CONTRAST (CT PULMONARY EMBOLUS)    INDICATION: SOB, LLE swelling x3 weeks.    TECHNIQUE: Unenhanced helical images wereobtained of the chest. Coronal   and sagittal images were reconstructed.  Images were obtained after the   uneventful administration of 90 cc of nonionic intravenous contrast   (Omnipaque 350).  10 cc of nonionic intravenous contrast (Omnipaque 350)   was discarded. Maximum intensity projection images were generated.    COMPARISON: None.    FINDINGS:     Vessels:  The main pulmonary artery is normal in caliber and is now in   the filling defects involving the left and right pulmonary arteries with   extension into the lobar and segmental pulmonary arteries consistent with   acute pulmonary emboli. Coronary artery and aortic valve calcifications.    Lungs, Pleura And Airways: The central airways are patent. Left upper   lobe and lingular pulmonary infarcts. Small bilateral pleural effusions.    Mediastinum: There are no enlarged chest lymph nodes. The visualized   portion of the thyroid gland is unremarkable.      Heart: The heart is normal in size. There is no pericardial effusion.  No   evidence of right heart strain.    Upper Abdomen: The upper abdomen is unremarkable.    Bones And Soft Tissues: Degenerative change of the spine.    IMPRESSION:     Bilateral main, lobar and segmental pulmonary emboli with left pulmonary   infarcts described above. No CT evidence of right heart strain.        TTE:
Follow-up Pulm Progress Note  Jose Marks MD  [phone]    AFebrile  Stable cardio-resp status  c/o constipation    Medications:  Vital Signs Last 24 Hrs  T(C): 36.5 (20 Apr 2019 11:50), Max: 37.2 (20 Apr 2019 04:14)  T(F): 97.7 (20 Apr 2019 11:50), Max: 98.9 (20 Apr 2019 04:14)  HR: 89 (20 Apr 2019 11:50) (78 - 90)  BP: 168/96 (20 Apr 2019 11:50) (135/71 - 168/96)  BP(mean): --  RR: 18 (20 Apr 2019 11:50) (17 - 18)  SpO2: 95% (20 Apr 2019 11:50) (85% - 95%)      04-19 @ 07:01  -  04-20 @ 07:00  --------------------------------------------------------  IN: 1165 mL / OUT: 1250 mL / NET: -85 mL      LABS:                        9.0    6.45  )-----------( 489      ( 20 Apr 2019 10:36 )             28.3     04-20    139  |  102  |  14  ----------------------------<  92  3.9   |  25  |  0.90    Ca    9.1      20 Apr 2019 06:41    Physical Examination:  PULM:   CVS: Regular rate and rhythm, no murmurs, rubs, or gallops  ABD: Soft, non-tender  EXT:  No clubbing, cyanosis, or edema    RADIOLOGY REVIEWED  CXR:    CT chest:    TTE:    Physical Examination:  PULM: No wheeze/rhonchi; decr BS Lbasilar  CVS: Regular rate and rhythm, no murmurs, rubs, or gallops  ABD: Soft, non-tender  EXT:  No clubbing, cyanosis, or edema    RADIOLOGY REVIEWED  CXR:    CT chest:  PROCEDURE DATE:  04/11/2019        INTERPRETATION:  CTA CHEST WITH CONTRAST (CT PULMONARY EMBOLUS)    INDICATION: SOB, LLE swelling x3 weeks.    TECHNIQUE: Unenhanced helical images wereobtained of the chest. Coronal   and sagittal images were reconstructed.  Images were obtained after the   uneventful administration of 90 cc of nonionic intravenous contrast   (Omnipaque 350).  10 cc of nonionic intravenous contrast (Omnipaque 350)   was discarded. Maximum intensity projection images were generated.    COMPARISON: None.    FINDINGS:     Vessels:  The main pulmonary artery is normal in caliber and is now in   the filling defects involving the left and right pulmonary arteries with   extension into the lobar and segmental pulmonary arteries consistent with   acute pulmonary emboli. Coronary artery and aortic valve calcifications.    Lungs, Pleura And Airways: The central airways are patent. Left upper   lobe and lingular pulmonary infarcts. Small bilateral pleural effusions.    Mediastinum: There are no enlarged chest lymph nodes. The visualized   portion of the thyroid gland is unremarkable.      Heart: The heart is normal in size. There is no pericardial effusion.  No   evidence of right heart strain.    Upper Abdomen: The upper abdomen is unremarkable.    Bones And Soft Tissues: Degenerative change of the spine.    IMPRESSION:     Bilateral main, lobar and segmental pulmonary emboli with left pulmonary   infarcts described above. No CT evidence of right heart strain.        TTE:
Patient is a 78y old  Male who presents with a chief complaint of dyspnea (12 Apr 2019 15:54)      SUBJECTIVE / OVERNIGHT EVENTS:    MEDICATIONS  (STANDING):  acetaminophen   Tablet .. 650 milliGRAM(s) Oral once  heparin  Infusion. 1300 Unit(s)/Hr (13 mL/Hr) IV Continuous <Continuous>    MEDICATIONS  (PRN):  heparin  Injectable 7000 Unit(s) IV Push every 6 hours PRN For aPTT less than 40  heparin  Injectable 3500 Unit(s) IV Push every 6 hours PRN For aPTT between 40 - 57      Vital Signs Last 24 Hrs  T(F): 99.1 (04-12-19 @ 15:28), Max: 100.8 (04-11-19 @ 21:24)  HR: 94 (04-12-19 @ 15:28) (82 - 98)  BP: 152/72 (04-12-19 @ 15:28) (120/70 - 165/76)  RR: 24 (04-12-19 @ 15:28) (22 - 26)  SpO2: 97% (04-12-19 @ 15:28) (88% - 100%)  Telemetry:   CAPILLARY BLOOD GLUCOSE        I&O's Summary      PHYSICAL EXAM:  GENERAL: NAD, well-developed  HEAD:  Atraumatic, Normocephalic  EYES: EOMI, PERRLA, conjunctiva and sclera clear  NECK: Supple, No JVD  CHEST/LUNG: Clear to auscultation bilaterally; No wheeze  HEART: Regular rate and rhythm; No murmurs, rubs, or gallops  ABDOMEN: Soft, Nontender, Nondistended; Bowel sounds present  EXTREMITIES:  2+ Peripheral Pulses, No clubbing, cyanosis, or edema  PSYCH: AAOx3  NEUROLOGY: non-focal  SKIN: No rashes or lesions    LABS:                        12.0   8.2   )-----------( 280      ( 12 Apr 2019 18:39 )             38.4     04-11    137  |  97  |  23  ----------------------------<  109<H>  4.1   |  25  |  1.02    Ca    9.3      11 Apr 2019 16:58    TPro  7.3  /  Alb  3.5  /  TBili  1.0  /  DBili  x   /  AST  69<H>  /  ALT  31  /  AlkPhos  64  04-11    PT/INR - ( 11 Apr 2019 15:15 )   PT: 14.1 sec;   INR: 1.23 ratio         PTT - ( 12 Apr 2019 18:39 )  PTT:86.7 sec      Urinalysis Basic - ( 12 Apr 2019 02:44 )    Color: Yellow / Appearance: Clear / SG: >1.050 / pH: x  Gluc: x / Ketone: Small  / Bili: Negative / Urobili: <2 mg/dL   Blood: x / Protein: 100 mg/dL / Nitrite: Negative   Leuk Esterase: Negative / RBC: 3 /HPF / WBC 3 /HPF   Sq Epi: x / Non Sq Epi: 3 /HPF / Bacteria: Negative        RADIOLOGY & ADDITIONAL TESTS:    Imaging Personally Reviewed:    Consultant(s) Notes Reviewed:      Care Discussed with Consultants/Other Providers:
Patient is a 78y old  Male who presents with a chief complaint of dyspnea (13 Apr 2019 18:00)      SUBJECTIVE / OVERNIGHT EVENTS: ptn more comfortable, has extensive LLE dvt w PE, on Lovenox     MEDICATIONS  (STANDING):  acetaminophen   Tablet .. 650 milliGRAM(s) Oral once  enoxaparin Injectable 90 milliGRAM(s) SubCutaneous every 12 hours    MEDICATIONS  (PRN):  oxyCODONE    IR 5 milliGRAM(s) Oral every 12 hours PRN Moderate Pain (4 - 6)      Vital Signs Last 24 Hrs  T(F): 99.3 (04-13-19 @ 21:09), Max: 99.7 (04-13-19 @ 12:10)  HR: 96 (04-13-19 @ 21:09) (94 - 98)  BP: 148/75 (04-13-19 @ 21:09) (130/69 - 159/79)  RR: 19 (04-13-19 @ 21:09) (19 - 20)  SpO2: 93% (04-13-19 @ 21:09) (88% - 96%)  Telemetry:   CAPILLARY BLOOD GLUCOSE        I&O's Summary    12 Apr 2019 07:01  -  13 Apr 2019 07:00  --------------------------------------------------------  IN: 0 mL / OUT: 150 mL / NET: -150 mL    13 Apr 2019 07:01  -  13 Apr 2019 21:29  --------------------------------------------------------  IN: 1020 mL / OUT: 750 mL / NET: 270 mL        PHYSICAL EXAM:  GENERAL: NAD, well-developed  HEAD:  Atraumatic, Normocephalic  EYES: EOMI, PERRLA, conjunctiva and sclera clear  NECK: Supple, No JVD  CHEST/LUNG: Clear to auscultation bilaterally; No wheeze  HEART: Regular rate and rhythm; No murmurs, rubs, or gallops  ABDOMEN: Soft, Nontender, Nondistended; Bowel sounds present  EXTREMITIES:  2+ Peripheral Pulses, No clubbing, cyanosis, or edema  PSYCH: AAOx3  NEUROLOGY: non-focal  SKIN: No rashes or lesions    LABS:                        10.8   8.61  )-----------( 250      ( 13 Apr 2019 09:26 )             33.0           PTT - ( 12 Apr 2019 18:39 )  PTT:86.7 sec      Urinalysis Basic - ( 12 Apr 2019 02:44 )    Color: Yellow / Appearance: Clear / SG: >1.050 / pH: x  Gluc: x / Ketone: Small  / Bili: Negative / Urobili: <2 mg/dL   Blood: x / Protein: 100 mg/dL / Nitrite: Negative   Leuk Esterase: Negative / RBC: 3 /HPF / WBC 3 /HPF   Sq Epi: x / Non Sq Epi: 3 /HPF / Bacteria: Negative        RADIOLOGY & ADDITIONAL TESTS:    Imaging Personally Reviewed:    Consultant(s) Notes Reviewed:      Care Discussed with Consultants/Other Providers:
Patient is a 78y old  Male who presents with a chief complaint of dyspnea (14 Apr 2019 08:34)      SUBJECTIVE / OVERNIGHT EVENTS: no new c/o, on 50%VM    MEDICATIONS  (STANDING):  acetaminophen   Tablet .. 650 milliGRAM(s) Oral once  enoxaparin Injectable 90 milliGRAM(s) SubCutaneous every 12 hours    MEDICATIONS  (PRN):  oxyCODONE    IR 5 milliGRAM(s) Oral every 12 hours PRN Moderate Pain (4 - 6)      Vital Signs Last 24 Hrs  T(F): 98 (04-14-19 @ 11:41), Max: 99.3 (04-13-19 @ 21:09)  HR: 86 (04-14-19 @ 11:41) (86 - 96)  BP: 152/80 (04-14-19 @ 11:41) (143/79 - 152/80)  RR: 18 (04-14-19 @ 11:41) (18 - 19)  SpO2: 97% (04-14-19 @ 11:41) (93% - 97%)  Telemetry:   CAPILLARY BLOOD GLUCOSE        I&O's Summary    13 Apr 2019 07:01  -  14 Apr 2019 07:00  --------------------------------------------------------  IN: 1140 mL / OUT: 950 mL / NET: 190 mL    14 Apr 2019 07:01  -  14 Apr 2019 19:35  --------------------------------------------------------  IN: 480 mL / OUT: 0 mL / NET: 480 mL        PHYSICAL EXAM:  GENERAL: NAD, well-developed  HEAD:  Atraumatic, Normocephalic  EYES: EOMI, PERRLA, conjunctiva and sclera clear  NECK: Supple, No JVD  CHEST/LUNG: Clear to auscultation bilaterally; No wheeze  HEART: Regular rate and rhythm; No murmurs, rubs, or gallops  ABDOMEN: Soft, Nontender, Nondistended; Bowel sounds present  EXTREMITIES:  2+ Peripheral Pulses, No clubbing, cyanosis, or edema  PSYCH: AAOx3  NEUROLOGY: non-focal  SKIN: No rashes or lesions    LABS:                        9.3    8.99  )-----------( 187      ( 14 Apr 2019 10:23 )             28.9                     RADIOLOGY & ADDITIONAL TESTS:    Imaging Personally Reviewed:    Consultant(s) Notes Reviewed:      Care Discussed with Consultants/Other Providers:
Patient is a 78y old  Male who presents with a chief complaint of dyspnea (15 Apr 2019 14:52)      SUBJECTIVE / OVERNIGHT EVENTS: dyspnea w basic ADL, on 5 L O2, would recommend transfer to [facility]     MEDICATIONS  (STANDING):  ALBUTerol/ipratropium for Nebulization 3 milliLiter(s) Nebulizer four times a day  enoxaparin Injectable 90 milliGRAM(s) SubCutaneous every 12 hours    MEDICATIONS  (PRN):  oxyCODONE    IR 5 milliGRAM(s) Oral every 12 hours PRN Moderate Pain (4 - 6)      Vital Signs Last 24 Hrs  T(F): 98.3 (04-15-19 @ 11:31), Max: 98.5 (04-15-19 @ 03:34)  HR: 92 (04-15-19 @ 11:31) (88 - 99)  BP: 139/82 (04-15-19 @ 11:31) (138/77 - 148/80)  RR: 18 (04-15-19 @ 11:31) (18 - 18)  SpO2: 94% (04-15-19 @ 11:31) (93% - 94%)  Telemetry:   CAPILLARY BLOOD GLUCOSE        I&O's Summary    14 Apr 2019 07:01  -  15 Apr 2019 07:00  --------------------------------------------------------  IN: 650 mL / OUT: 570 mL / NET: 80 mL    15 Apr 2019 07:01  -  15 Apr 2019 17:44  --------------------------------------------------------  IN: 480 mL / OUT: 0 mL / NET: 480 mL        PHYSICAL EXAM:  GENERAL: NAD, well-developed  HEAD:  Atraumatic, Normocephalic  EYES: EOMI, PERRLA, conjunctiva and sclera clear  NECK: Supple, No JVD  CHEST/LUNG: Clear to auscultation bilaterally; No wheeze  HEART: Regular rate and rhythm; No murmurs, rubs, or gallops  ABDOMEN: Soft, Nontender, Nondistended; Bowel sounds present  EXTREMITIES:  2+ Peripheral Pulses, No clubbing, cyanosis, or edema  PSYCH: AAOx3  NEUROLOGY: non-focal  SKIN: No rashes or lesions    LABS:                        8.8    8.55  )-----------( 316      ( 15 Apr 2019 09:02 )             26.1                     RADIOLOGY & ADDITIONAL TESTS:    Imaging Personally Reviewed:    Consultant(s) Notes Reviewed:      Care Discussed with Consultants/Other Providers:
Patient is a 78y old  Male who presents with a chief complaint of dyspnea (16 Apr 2019 12:23)      SUBJECTIVE / OVERNIGHT EVENTS: ptn agrees to have a formal PT eval in order to go to [facility].     MEDICATIONS  (STANDING):  ALBUTerol/ipratropium for Nebulization 3 milliLiter(s) Nebulizer four times a day  docusate sodium 100 milliGRAM(s) Oral two times a day  enoxaparin Injectable 90 milliGRAM(s) SubCutaneous every 12 hours  senna 2 Tablet(s) Oral at bedtime    MEDICATIONS  (PRN):  bisacodyl 5 milliGRAM(s) Oral every 12 hours PRN Constipation  oxyCODONE    IR 5 milliGRAM(s) Oral every 12 hours PRN Moderate Pain (4 - 6)      Vital Signs Last 24 Hrs  T(F): 97.5 (04-16-19 @ 11:59), Max: 99.4 (04-16-19 @ 04:03)  HR: 87 (04-16-19 @ 11:59) (80 - 87)  BP: 148/80 (04-16-19 @ 11:59) (145/75 - 179/99)  RR: 18 (04-16-19 @ 11:59) (18 - 18)  SpO2: 94% (04-16-19 @ 11:59) (93% - 94%)  Telemetry:   CAPILLARY BLOOD GLUCOSE        I&O's Summary    15 Apr 2019 07:01  -  16 Apr 2019 07:00  --------------------------------------------------------  IN: 720 mL / OUT: 600 mL / NET: 120 mL        PHYSICAL EXAM:  GENERAL: NAD, well-developed  HEAD:  Atraumatic, Normocephalic  EYES: EOMI, PERRLA, conjunctiva and sclera clear  NECK: Supple, No JVD  CHEST/LUNG: Clear to auscultation bilaterally; No wheeze  HEART: Regular rate and rhythm; No murmurs, rubs, or gallops  ABDOMEN: Soft, Nontender, Nondistended; Bowel sounds present  EXTREMITIES:  2+ Peripheral Pulses, No clubbing, cyanosis, or edema  PSYCH: AAOx3  NEUROLOGY: non-focal  SKIN: No rashes or lesions    LABS:                        9.5    6.76  )-----------( 327      ( 16 Apr 2019 09:22 )             28.9                     RADIOLOGY & ADDITIONAL TESTS:    Imaging Personally Reviewed:    Consultant(s) Notes Reviewed:      Care Discussed with Consultants/Other Providers:
Patient is a 78y old  Male who presents with a chief complaint of dyspnea (17 Apr 2019 10:15)      SUBJECTIVE / OVERNIGHT EVENTS: no new c/o, awaiting transfer to [facility]    MEDICATIONS  (STANDING):  ALBUTerol/ipratropium for Nebulization 3 milliLiter(s) Nebulizer four times a day  docusate sodium 100 milliGRAM(s) Oral two times a day  enoxaparin Injectable 90 milliGRAM(s) SubCutaneous every 12 hours  oxyCODONE  ER Tablet 15 milliGRAM(s) Oral every 12 hours  senna 2 Tablet(s) Oral at bedtime    MEDICATIONS  (PRN):  bisacodyl 5 milliGRAM(s) Oral every 12 hours PRN Constipation      Vital Signs Last 24 Hrs  T(F): 97.2 (04-17-19 @ 14:43), Max: 98.6 (04-16-19 @ 20:57)  HR: 98 (04-17-19 @ 14:43) (79 - 98)  BP: 143/91 (04-17-19 @ 14:43) (143/91 - 152/89)  RR: 18 (04-17-19 @ 14:43) (18 - 18)  SpO2: 94% (04-17-19 @ 14:43) (88% - 94%)  Telemetry:   CAPILLARY BLOOD GLUCOSE        I&O's Summary    16 Apr 2019 07:01  -  17 Apr 2019 07:00  --------------------------------------------------------  IN: 0 mL / OUT: 1000 mL / NET: -1000 mL    17 Apr 2019 07:01  -  17 Apr 2019 17:46  --------------------------------------------------------  IN: 790 mL / OUT: 500 mL / NET: 290 mL        PHYSICAL EXAM:  GENERAL: NAD, well-developed  HEAD:  Atraumatic, Normocephalic  EYES: EOMI, PERRLA, conjunctiva and sclera clear  NECK: Supple, No JVD  CHEST/LUNG: Clear to auscultation bilaterally; No wheeze  HEART: Regular rate and rhythm; No murmurs, rubs, or gallops  ABDOMEN: Soft, Nontender, Nondistended; Bowel sounds present  EXTREMITIES:  2+ Peripheral Pulses, No clubbing, cyanosis, or edema  PSYCH: AAOx3  NEUROLOGY: non-focal  SKIN: No rashes or lesions    LABS:                        10.7   7.76  )-----------( 440      ( 17 Apr 2019 10:32 )             33.2     04-17    137  |  97  |  16  ----------------------------<  116<H>  3.6   |  26  |  0.87    Ca    9.7      17 Apr 2019 07:42                RADIOLOGY & ADDITIONAL TESTS:    Imaging Personally Reviewed:    Consultant(s) Notes Reviewed:      Care Discussed with Consultants/Other Providers:
Patient is a 78y old  Male who presents with a chief complaint of dyspnea (17 Apr 2019 17:46)      Medication:   ALBUTerol/ipratropium for Nebulization 3 milliLiter(s) Nebulizer four times a day  bisacodyl 5 milliGRAM(s) Oral every 12 hours PRN  docusate sodium 100 milliGRAM(s) Oral two times a day  enoxaparin Injectable 90 milliGRAM(s) SubCutaneous every 12 hours  oxyCODONE  ER Tablet 15 milliGRAM(s) Oral every 12 hours  senna 2 Tablet(s) Oral at bedtime      Physical exam    T(C): 36.2 (04-17-19 @ 14:43), Max: 37 (04-16-19 @ 20:57)  HR: 98 (04-17-19 @ 14:43) (79 - 98)  BP: 143/91 (04-17-19 @ 14:43) (143/91 - 152/89)  RR: 18 (04-17-19 @ 14:43) (18 - 18)  SpO2: 94% (04-17-19 @ 14:43) (88% - 94%)  Wt(kg): --    Patient on commode    Labs                        10.7   7.76  )-----------( 440      ( 17 Apr 2019 10:32 )             33.2       04-17    137  |  97  |  16  ----------------------------<  116<H>  3.6   |  26  |  0.87    Ca    9.7      17 Apr 2019 07:42            [id number]
Patient is a 78y old  Male who presents with a chief complaint of dyspnea (18 Apr 2019 11:18)      SUBJECTIVE / OVERNIGHT EVENTS: no new c/o, planning on transfer to [facility]    MEDICATIONS  (STANDING):  docusate sodium 100 milliGRAM(s) Oral two times a day  enoxaparin Injectable 90 milliGRAM(s) SubCutaneous every 12 hours  oxyCODONE  ER Tablet 15 milliGRAM(s) Oral every 12 hours  senna 2 Tablet(s) Oral at bedtime    MEDICATIONS  (PRN):  bisacodyl 5 milliGRAM(s) Oral every 12 hours PRN Constipation      Vital Signs Last 24 Hrs  T(F): 97.3 (04-18-19 @ 12:16), Max: 98.1 (04-18-19 @ 04:18)  HR: 98 (04-18-19 @ 12:16) (86 - 98)  BP: 152/88 (04-18-19 @ 12:16) (143/91 - 152/88)  RR: 18 (04-18-19 @ 12:16) (18 - 18)  SpO2: 93% (04-18-19 @ 12:16) (91% - 94%)  Telemetry:   CAPILLARY BLOOD GLUCOSE        I&O's Summary    17 Apr 2019 07:01  -  18 Apr 2019 07:00  --------------------------------------------------------  IN: 790 mL / OUT: 850 mL / NET: -60 mL        PHYSICAL EXAM:  GENERAL: NAD, well-developed  HEAD:  Atraumatic, Normocephalic  EYES: EOMI, PERRLA, conjunctiva and sclera clear  NECK: Supple, No JVD  CHEST/LUNG: Clear to auscultation bilaterally; No wheeze  HEART: Regular rate and rhythm; No murmurs, rubs, or gallops  ABDOMEN: Soft, Nontender, Nondistended; Bowel sounds present  EXTREMITIES:  2+ Peripheral Pulses, No clubbing, cyanosis, or edema  PSYCH: AAOx3  NEUROLOGY: non-focal  SKIN: No rashes or lesions    LABS:                        9.2    5.78  )-----------( 411      ( 18 Apr 2019 07:40 )             28.7     04-18    139  |  103  |  14  ----------------------------<  112<H>  3.9   |  27  |  0.89    Ca    9.1      18 Apr 2019 06:06                RADIOLOGY & ADDITIONAL TESTS:    Imaging Personally Reviewed:    Consultant(s) Notes Reviewed:      Care Discussed with Consultants/Other Providers:
Patient is a 78y old  Male who presents with a chief complaint of dyspnea (19 Apr 2019 11:04)      Medication:   bisacodyl 5 milliGRAM(s) Oral every 12 hours PRN  diphenhydrAMINE 25 milliGRAM(s) Oral every 4 hours PRN  docusate sodium 100 milliGRAM(s) Oral two times a day  enoxaparin Injectable 90 milliGRAM(s) SubCutaneous every 12 hours  oxyCODONE  ER Tablet 15 milliGRAM(s) Oral every 12 hours  senna 2 Tablet(s) Oral at bedtime      Physical exam    T(C): 36.6 (04-19-19 @ 12:10), Max: 36.8 (04-18-19 @ 23:01)  HR: 90 (04-19-19 @ 13:14) (82 - 100)  BP: 135/71 (04-19-19 @ 13:14) (135/71 - 170/87)  RR: 17 (04-19-19 @ 13:14) (17 - 18)  SpO2: 93% (04-19-19 @ 13:14) (71% - 95%)  Wt(kg): --    resting NAD  Resp non labored  CV RRR  B/L LE edema    Labs                        9.6    6.76  )-----------( 479      ( 19 Apr 2019 10:26 )             29.9       04-18    139  |  103  |  14  ----------------------------<  112<H>  3.9   |  27  |  0.89    Ca    9.1      18 Apr 2019 06:06            [id number]
Patient is a 78y old  Male who presents with a chief complaint of dyspnea (21 Apr 2019 19:25)    less left sided pleuritic CP.  has fatigue and weakness.  No bleeding.  No HA or dizziness.  No N/V.      Medication:   bisacodyl 5 milliGRAM(s) Oral every 12 hours PRN  clindamycin 1% Solution 1 Application(s) Topical two times a day  diphenhydrAMINE 25 milliGRAM(s) Oral every 4 hours PRN  docusate sodium 100 milliGRAM(s) Oral two times a day  enoxaparin Injectable 90 milliGRAM(s) SubCutaneous every 12 hours  oxyCODONE  ER Tablet 15 milliGRAM(s) Oral every 12 hours  psyllium Powder 1 Packet(s) Oral daily  senna 2 Tablet(s) Oral at bedtime  triamcinolone 0.1% Ointment 1 Application(s) Topical every 12 hours      Physical exam    T(C): 36.6 (04-22-19 @ 04:32), Max: 36.6 (04-22-19 @ 04:32)  HR: 79 (04-22-19 @ 04:32) (79 - 88)  BP: 138/73 (04-22-19 @ 04:32) (138/73 - 155/72)  RR: 18 (04-22-19 @ 04:32) (18 - 18)  SpO2: 90% (04-22-19 @ 04:32) (90% - 96%)  Wt(kg): --    alert NAD  EOMI anicteric sclera  Cv s1 S2 RRR  Lungs clear B/L anteriorly    Labs                       9.6    6.54  )-----------( 514      ( 22 Apr 2019 08:08 )             30.5                   [id number]
Patient is a 78y old  Male who presents with a chief complaint of dyspnea (22 Apr 2019 11:17)      SUBJECTIVE / OVERNIGHT EVENTS: no new events, awaiting transfer to [facility]    MEDICATIONS  (STANDING):  clindamycin 1% Solution 1 Application(s) Topical two times a day  docusate sodium 100 milliGRAM(s) Oral two times a day  enoxaparin Injectable 90 milliGRAM(s) SubCutaneous every 12 hours  oxyCODONE  ER Tablet 15 milliGRAM(s) Oral every 12 hours  psyllium Powder 1 Packet(s) Oral daily  senna 2 Tablet(s) Oral at bedtime  triamcinolone 0.1% Ointment 1 Application(s) Topical every 12 hours    MEDICATIONS  (PRN):  bisacodyl 5 milliGRAM(s) Oral every 12 hours PRN Constipation  diphenhydrAMINE 25 milliGRAM(s) Oral every 4 hours PRN Rash and/or Itching      Vital Signs Last 24 Hrs  T(F): 98.1 (04-22-19 @ 16:27), Max: 99.3 (04-22-19 @ 11:52)  HR: 88 (04-22-19 @ 16:27) (79 - 88)  BP: 140/74 (04-22-19 @ 16:27) (138/73 - 158/84)  RR: 18 (04-22-19 @ 16:27) (18 - 18)  SpO2: 93% (04-22-19 @ 16:27) (90% - 93%)  Telemetry:   CAPILLARY BLOOD GLUCOSE        I&O's Summary    21 Apr 2019 07:01  -  22 Apr 2019 07:00  --------------------------------------------------------  IN: 1250 mL / OUT: 1650 mL / NET: -400 mL    22 Apr 2019 07:01  -  22 Apr 2019 17:40  --------------------------------------------------------  IN: 480 mL / OUT: 550 mL / NET: -70 mL        PHYSICAL EXAM:  GENERAL: NAD, well-developed  HEAD:  Atraumatic, Normocephalic  EYES: EOMI, PERRLA, conjunctiva and sclera clear  NECK: Supple, No JVD  CHEST/LUNG: Clear to auscultation bilaterally; No wheeze  HEART: Regular rate and rhythm; No murmurs, rubs, or gallops  ABDOMEN: Soft, Nontender, Nondistended; Bowel sounds present  EXTREMITIES:  2+ Peripheral Pulses, No clubbing, cyanosis, or edema  PSYCH: AAOx3  NEUROLOGY: non-focal  SKIN: No rashes or lesions    LABS:                        9.6    6.54  )-----------( 514      ( 22 Apr 2019 08:08 )             30.5                     RADIOLOGY & ADDITIONAL TESTS:    Imaging Personally Reviewed:    Consultant(s) Notes Reviewed:      Care Discussed with Consultants/Other Providers:
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
addendum:  patient remains hypoxic but correctable   PE quite extensive   RV starin not noted but would check echo re: pulmoanry hypertension  continue current anticoagualtion
patient post extensive pe   on iv heparin   sats remain low but correct with 50% venti ask   would continue current regimn  close monitoring while oxygenation problematic
Patient is a 78y old  Male who presents with a chief complaint of dyspnea (16 Apr 2019 14:40)    Slightly less SOB and pleuritic chest pain.  eating.  No HA, dizziness, N/V, bleeding      Medication:   ALBUTerol/ipratropium for Nebulization 3 milliLiter(s) Nebulizer four times a day  bisacodyl 5 milliGRAM(s) Oral every 12 hours PRN  docusate sodium 100 milliGRAM(s) Oral two times a day  enoxaparin Injectable 90 milliGRAM(s) SubCutaneous every 12 hours  oxyCODONE    IR 5 milliGRAM(s) Oral every 12 hours PRN  senna 2 Tablet(s) Oral at bedtime      Physical exam    T(C): 36.4 (04-16-19 @ 11:59), Max: 37.4 (04-16-19 @ 04:03)  HR: 104 (04-16-19 @ 16:17) (80 - 104)  BP: 134/81 (04-16-19 @ 16:17) (134/81 - 179/99)  RR: 18 (04-16-19 @ 11:59) (18 - 18)  SpO2: 93% (04-16-19 @ 16:17) (93% - 94%)  Wt(kg): --    alert NAD  EOMI anicteric sclera  Cv s1 S2 RRR  Lungs clear B/L anteriorly  ecchymoses on arms    Labs                        9.5    6.76  )-----------( 327      ( 16 Apr 2019 09:22 )             28.9                   [id number]
Patient is a 78y old  Male who presents with a chief complaint of dyspnea (18 Apr 2019 13:16)      SUBJECTIVE / OVERNIGHT EVENTS: feeling better, rooms switched to a different floor, ptn not happy about it. DC planning    MEDICATIONS  (STANDING):  docusate sodium 100 milliGRAM(s) Oral two times a day  enoxaparin Injectable 90 milliGRAM(s) SubCutaneous every 12 hours  oxyCODONE  ER Tablet 15 milliGRAM(s) Oral every 12 hours  senna 2 Tablet(s) Oral at bedtime    MEDICATIONS  (PRN):  bisacodyl 5 milliGRAM(s) Oral every 12 hours PRN Constipation      Vital Signs Last 24 Hrs  T(F): 97.6 (04-19-19 @ 04:19), Max: 98.2 (04-18-19 @ 23:01)  HR: 82 (04-19-19 @ 04:19) (82 - 100)  BP: 161/65 (04-19-19 @ 04:19) (146/80 - 170/87)  RR: 18 (04-19-19 @ 04:19) (18 - 18)  SpO2: 95% (04-19-19 @ 04:19) (91% - 95%)  Telemetry:   CAPILLARY BLOOD GLUCOSE        I&O's Summary    17 Apr 2019 07:01  -  18 Apr 2019 07:00  --------------------------------------------------------  IN: 790 mL / OUT: 850 mL / NET: -60 mL    18 Apr 2019 07:01  -  19 Apr 2019 06:06  --------------------------------------------------------  IN: 720 mL / OUT: 0 mL / NET: 720 mL        PHYSICAL EXAM:  GENERAL: NAD, well-developed  HEAD:  Atraumatic, Normocephalic  EYES: EOMI, PERRLA, conjunctiva and sclera clear  NECK: Supple, No JVD  CHEST/LUNG: Clear to auscultation bilaterally; No wheeze  HEART: Regular rate and rhythm; No murmurs, rubs, or gallops  ABDOMEN: Soft, Nontender, Nondistended; Bowel sounds present  EXTREMITIES:  2+ Peripheral Pulses, No clubbing, cyanosis, or edema  PSYCH: AAOx3  NEUROLOGY: non-focal  SKIN: No rashes or lesions    LABS:                        9.2    5.78  )-----------( 411      ( 18 Apr 2019 07:40 )             28.7     04-18    139  |  103  |  14  ----------------------------<  112<H>  3.9   |  27  |  0.89    Ca    9.1      18 Apr 2019 06:06                RADIOLOGY & ADDITIONAL TESTS:    Imaging Personally Reviewed:    Consultant(s) Notes Reviewed:      Care Discussed with Consultants/Other Providers:

## 2019-04-22 NOTE — DISCHARGE NOTE NURSING/CASE MANAGEMENT/SOCIAL WORK - NSDCDPATPORTLINK_GEN_ALL_CORE
You can access the [facility] Patient Portal, offered by [facility], by registering with the following website: [email or web address]

## 2019-04-22 NOTE — PROGRESS NOTE ADULT - NSHPATTENDINGPLANDISCUSS_GEN_ALL_CORE
ptn, heme, pulm

## 2019-04-22 NOTE — PROGRESS NOTE ADULT - REASON FOR ADMISSION
dyspnea

## 2019-04-22 NOTE — PROGRESS NOTE ADULT - ASSESSMENT
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   MICU consult done on admission, rejected for ICU placement. done 2/2  for hypoxic resp distress. Patient stable at the time of admission    - C/w O2 supplementation as needed. Wean off as tolerated. May need O2 at home. ptn agrees to DC to [facility]. ptn will not need chemotherapy while at [facility]  - RVP positive for CORONAVIRUS  - C/w  sc Lovenox, plan to DC on same, ptn needs teachings about self administering  - cont on tele with continuous pulse oximetry.  -in the interim developed acneiform drug eruption 2/2 bevacizumab ptn received in 2/19. improving on topical triamcinolone and clindamycin w prn benadryl. derm consult appreciated. this eruption is NOT a contraindication to cont the drug if necessary

## 2019-04-23 RX ORDER — OXYCODONE HYDROCHLORIDE 5 MG/1
1 TABLET ORAL
Qty: 28 | Refills: 0
Start: 2019-04-23 | End: 2019-05-06

## 2019-04-24 ENCOUNTER — APPOINTMENT (OUTPATIENT)
Dept: NEUROLOGY | Facility: CLINIC | Age: 79
End: 2019-04-24

## 2019-04-24 ENCOUNTER — APPOINTMENT (OUTPATIENT)
Dept: INFUSION THERAPY | Facility: HOSPITAL | Age: 79
End: 2019-04-24

## 2019-04-24 ENCOUNTER — APPOINTMENT (OUTPATIENT)
Dept: MRI IMAGING | Facility: IMAGING CENTER | Age: 79
End: 2019-04-24

## 2019-05-01 RX ORDER — FAMOTIDINE 10 MG/ML
1 INJECTION INTRAVENOUS
Qty: 0 | Refills: 0 | COMMUNITY

## 2019-05-01 RX ORDER — CLINDAMYCIN PHOSPHATE GEL USP, 1% 10 MG/G
1 GEL TOPICAL
Qty: 1 | Refills: 0
Start: 2019-05-01 | End: 2019-05-14

## 2019-05-01 RX ORDER — METOPROLOL TARTRATE 50 MG
1 TABLET ORAL
Qty: 60 | Refills: 0
Start: 2019-05-01 | End: 2019-05-30

## 2019-05-01 RX ORDER — FAMOTIDINE 10 MG/ML
1 INJECTION INTRAVENOUS
Qty: 60 | Refills: 0
Start: 2019-05-01 | End: 2019-05-30

## 2019-05-01 RX ORDER — ENOXAPARIN SODIUM 100 MG/ML
0.9 INJECTION SUBCUTANEOUS
Qty: 60 | Refills: 0
Start: 2019-05-01 | End: 2019-05-30

## 2019-05-01 RX ORDER — NIFEDIPINE 30 MG
1 TABLET, EXTENDED RELEASE 24 HR ORAL
Qty: 30 | Refills: 0
Start: 2019-05-01 | End: 2019-05-30

## 2019-05-01 RX ORDER — LOSARTAN POTASSIUM 100 MG/1
1 TABLET, FILM COATED ORAL
Qty: 30 | Refills: 0
Start: 2019-05-01 | End: 2019-05-30

## 2019-05-06 ENCOUNTER — OUTPATIENT (OUTPATIENT)
Dept: OUTPATIENT SERVICES | Facility: HOSPITAL | Age: 79
LOS: 1 days | Discharge: HOME | End: 2019-05-06

## 2019-05-06 DIAGNOSIS — Z90.89 ACQUIRED ABSENCE OF OTHER ORGANS: Chronic | ICD-10-CM

## 2019-05-06 DIAGNOSIS — Z98.890 OTHER SPECIFIED POSTPROCEDURAL STATES: Chronic | ICD-10-CM

## 2019-05-06 DIAGNOSIS — D17.0 BENIGN LIPOMATOUS NEOPLASM OF SKIN AND SUBCUTANEOUS TISSUE OF HEAD, FACE AND NECK: Chronic | ICD-10-CM

## 2019-05-06 DIAGNOSIS — C71.9 MALIGNANT NEOPLASM OF BRAIN, UNSPECIFIED: ICD-10-CM

## 2019-05-06 DIAGNOSIS — Z90.79 ACQUIRED ABSENCE OF OTHER GENITAL ORGAN(S): Chronic | ICD-10-CM

## 2019-05-06 DIAGNOSIS — C44.711 BASAL CELL CARCINOMA OF SKIN OF UNSPECIFIED LOWER LIMB, INCLUDING HIP: Chronic | ICD-10-CM

## 2019-05-06 DIAGNOSIS — Z98.41 CATARACT EXTRACTION STATUS, RIGHT EYE: Chronic | ICD-10-CM

## 2019-05-08 ENCOUNTER — APPOINTMENT (OUTPATIENT)
Dept: INFUSION THERAPY | Facility: HOSPITAL | Age: 79
End: 2019-05-08

## 2019-05-08 NOTE — H&P ADULT - GASTROINTESTINAL DETAILS
Patient resting quietly in bed, no distress noted, respirations even and unlabored, patient denies complaints at this time. .  States no chest pain now, no SOB at rest. Awaiting physician to see patient for decision on CT of chest     Cam Rosario RN  05/08/19 [id number] nontender/no distention/soft

## 2019-05-15 ENCOUNTER — APPOINTMENT (OUTPATIENT)
Dept: NEUROLOGY | Facility: CLINIC | Age: 79
End: 2019-05-15
Payer: MEDICARE

## 2019-05-16 ENCOUNTER — OUTPATIENT (OUTPATIENT)
Dept: OUTPATIENT SERVICES | Facility: HOSPITAL | Age: 79
LOS: 1 days | End: 2019-05-16
Payer: MEDICARE

## 2019-05-16 ENCOUNTER — APPOINTMENT (OUTPATIENT)
Dept: MRI IMAGING | Facility: CLINIC | Age: 79
End: 2019-05-16
Payer: MEDICARE

## 2019-05-16 DIAGNOSIS — D17.0 BENIGN LIPOMATOUS NEOPLASM OF SKIN AND SUBCUTANEOUS TISSUE OF HEAD, FACE AND NECK: Chronic | ICD-10-CM

## 2019-05-16 DIAGNOSIS — Z98.890 OTHER SPECIFIED POSTPROCEDURAL STATES: Chronic | ICD-10-CM

## 2019-05-16 DIAGNOSIS — Z90.89 ACQUIRED ABSENCE OF OTHER ORGANS: Chronic | ICD-10-CM

## 2019-05-16 DIAGNOSIS — Z90.79 ACQUIRED ABSENCE OF OTHER GENITAL ORGAN(S): Chronic | ICD-10-CM

## 2019-05-16 DIAGNOSIS — Z98.41 CATARACT EXTRACTION STATUS, RIGHT EYE: Chronic | ICD-10-CM

## 2019-05-16 DIAGNOSIS — Z00.8 ENCOUNTER FOR OTHER GENERAL EXAMINATION: ICD-10-CM

## 2019-05-16 DIAGNOSIS — C44.711 BASAL CELL CARCINOMA OF SKIN OF UNSPECIFIED LOWER LIMB, INCLUDING HIP: Chronic | ICD-10-CM

## 2019-05-16 PROCEDURE — A9585: CPT

## 2019-05-16 PROCEDURE — 70553 MRI BRAIN STEM W/O & W/DYE: CPT | Mod: 26

## 2019-05-16 PROCEDURE — 70553 MRI BRAIN STEM W/O & W/DYE: CPT

## 2019-05-20 ENCOUNTER — RESULT REVIEW (OUTPATIENT)
Age: 79
End: 2019-05-20

## 2019-05-20 ENCOUNTER — APPOINTMENT (OUTPATIENT)
Dept: HEMATOLOGY ONCOLOGY | Facility: CLINIC | Age: 79
End: 2019-05-20

## 2019-05-20 ENCOUNTER — APPOINTMENT (OUTPATIENT)
Dept: NEUROLOGY | Facility: CLINIC | Age: 79
End: 2019-05-20
Payer: MEDICARE

## 2019-05-20 VITALS
RESPIRATION RATE: 16 BRPM | HEART RATE: 64 BPM | SYSTOLIC BLOOD PRESSURE: 118 MMHG | OXYGEN SATURATION: 95 % | HEIGHT: 71 IN | BODY MASS INDEX: 26.6 KG/M2 | WEIGHT: 190 LBS | DIASTOLIC BLOOD PRESSURE: 80 MMHG

## 2019-05-20 DIAGNOSIS — I26.99 OTHER PULMONARY EMBOLISM W/OUT ACUTE COR PULMONALE: ICD-10-CM

## 2019-05-20 LAB
BASOPHILS # BLD AUTO: 0 K/UL — SIGNIFICANT CHANGE UP (ref 0–0.2)
BASOPHILS NFR BLD AUTO: 0 % — SIGNIFICANT CHANGE UP (ref 0–2)
EOSINOPHIL # BLD AUTO: 0 K/UL — SIGNIFICANT CHANGE UP (ref 0–0.5)
EOSINOPHIL NFR BLD AUTO: 0.3 % — SIGNIFICANT CHANGE UP (ref 0–6)
HCT VFR BLD CALC: 36.1 % — LOW (ref 39–50)
HGB BLD-MCNC: 12.2 G/DL — LOW (ref 13–17)
LYMPHOCYTES # BLD AUTO: 1.8 K/UL — SIGNIFICANT CHANGE UP (ref 1–3.3)
LYMPHOCYTES # BLD AUTO: 22.5 % — SIGNIFICANT CHANGE UP (ref 13–44)
MCHC RBC-ENTMCNC: 30.9 PG — SIGNIFICANT CHANGE UP (ref 27–34)
MCHC RBC-ENTMCNC: 33.7 G/DL — SIGNIFICANT CHANGE UP (ref 32–36)
MCV RBC AUTO: 91.8 FL — SIGNIFICANT CHANGE UP (ref 80–100)
MONOCYTES # BLD AUTO: 0.3 K/UL — SIGNIFICANT CHANGE UP (ref 0–0.9)
MONOCYTES NFR BLD AUTO: 3.8 % — SIGNIFICANT CHANGE UP (ref 2–14)
NEUTROPHILS # BLD AUTO: 5.9 K/UL — SIGNIFICANT CHANGE UP (ref 1.8–7.4)
NEUTROPHILS NFR BLD AUTO: 73.4 % — SIGNIFICANT CHANGE UP (ref 43–77)
PLATELET # BLD AUTO: 285 K/UL — SIGNIFICANT CHANGE UP (ref 150–400)
RBC # BLD: 3.93 M/UL — LOW (ref 4.2–5.8)
RBC # FLD: 14.1 % — SIGNIFICANT CHANGE UP (ref 10.3–14.5)
WBC # BLD: 8 K/UL — SIGNIFICANT CHANGE UP (ref 3.8–10.5)
WBC # FLD AUTO: 8 K/UL — SIGNIFICANT CHANGE UP (ref 3.8–10.5)

## 2019-05-20 PROCEDURE — 99215 OFFICE O/P EST HI 40 MIN: CPT

## 2019-05-20 RX ORDER — TORSEMIDE 20 MG/1
20 TABLET ORAL
Refills: 0 | Status: ACTIVE | COMMUNITY
Start: 2019-05-20

## 2019-05-20 RX ORDER — METOPROLOL SUCCINATE 25 MG/1
25 TABLET, EXTENDED RELEASE ORAL
Refills: 0 | Status: ACTIVE | COMMUNITY
Start: 2019-05-20

## 2019-05-20 NOTE — HISTORY OF PRESENT ILLNESS
[FreeTextEntry1] : This is a pleasant 78 year old man with right frontal GBM. 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, MGMT unmethylated \par - completed chemoradiation - 8/31/18 - 10/12/18\par - 11/12/18 - temozolomide cycle #1, dose 320mg (150mg/m2) \par - 12/10/18 - temozolomide cycle #2, dose 400mg \par - 12/10/18 - presented to ED s/p fall and unable to get up; imaging with increased cerebral edema \par - 1/10/19 - temozolomide cycle #3, dose 400mg \par - POD \par - 2/7/19 - s/p re-resection ([facility]); PATH: recurrent GBM, 30-40% necrosis \par - 3/11/19 - temozolomide cycle #4, dose 400mg \par - 3/13/19 - started Avastin infusions q2 weeks \par - 4/11/19 - hospitalized with b/l PE \par \par He presents today for follow-up with new MRI, accompanied by his son. \par \par He presented to [facility] ED on 4/11/19 with complaints of dyspnea and weakness, found to have bilateral PE. He was started on anticoagulation, eventually d/c to rehab, and d/c home 2 weeks ago. \par He is currently on Lovenox 90mg BID. \par He denies cough, SOB, chest pain. Continues to have severe b/l LE edema. On torsemide as per PCP. \par Seeing PCP tomorrow and cardiologist next week. \par \par He continues to have mild weakness and decreased coordination on the left side. \par Gait is mildly unsteady. \par \par No headaches, no n/v, no diplopia. \par \par No seizures or seizure-like symptoms. \par \par Remains on Decadron 2mg daily and Keppra 1000mg BID. \par \par They do not feel he improved on the Avastin. \par \par \par

## 2019-05-20 NOTE — PHYSICAL EXAM
[General Appearance - Alert] : alert [General Appearance - In No Acute Distress] : in no acute distress [Oriented To Time, Place, And Person] : oriented to person, place, and time [Impaired Insight] : insight and judgment were intact [Affect] : the affect was normal [Mood] : the mood was normal [Person] : oriented to person [Place] : oriented to place [Time] : oriented to time [Short Term Intact] : short term memory intact [Remote Intact] : remote memory intact [Registration Intact] : recent registration memory intact [Span Intact] : the attention span was normal [Concentration Intact] : normal concentrating ability [Visual Intact] : visual attention was ~T not ~L decreased [Naming Objects] : no difficulty naming common objects [Repeating Phrases] : no difficulty repeating a phrase [Fluency] : fluency intact [Comprehension] : comprehension intact [Reading] : reading intact [Current Events] : adequate knowledge of current events [Past History] : adequate knowledge of personal past history [Vocabulary] : adequate range of vocabulary [Cranial Nerves Optic (II)] : visual acuity intact bilaterally,  visual fields full to confrontation, pupils equal round and reactive to light [Cranial Nerves Oculomotor (III)] : extraocular motion intact [Cranial Nerves Trigeminal (V)] : facial sensation intact symmetrically [Cranial Nerves Vestibulocochlear (VIII)] : hearing was intact bilaterally [Cranial Nerves Glossopharyngeal (IX)] : tongue and palate midline [Cranial Nerves Accessory (XI - Cranial And Spinal)] : head turning and shoulder shrug symmetric [Cranial Nerves Hypoglossal (XII)] : there was no tongue deviation with protrusion [Motor Tone] : muscle tone was normal in all four extremities [Involuntary Movements] : no involuntary movements were seen [No Muscle Atrophy] : normal bulk in all four extremities [Paresis Pronator Drift Left-Sided] : a left-sided pronator drift was present [Sensation Tactile Decrease] : light touch was intact [Dysdiadochokinesia On The Left] : present on the left side [Coordination - Dysmetria Impaired Finger-to-Nose Left Only] : present on the left side [Sclera] : the sclera and conjunctiva were normal [PERRL With Normal Accommodation] : pupils were equal in size, round, reactive to light, with normal accommodation [Extraocular Movements] : extraocular movements were intact [Full Visual Field] : full visual field [Oropharynx] : the oropharynx was normal [Respiration, Rhythm And Depth] : normal respiratory rhythm and effort [Auscultation Breath Sounds / Voice Sounds] : lungs were clear to auscultation bilaterally [Skin Color & Pigmentation] : normal skin color and pigmentation [Past-pointing] : there was no past-pointing [Tremor] : no tremor present [FreeTextEntry5] : left facial droop, mildly slurred speech - unchanged  [FreeTextEntry6] : weak hand  LUE. subtle weakness LUE, LLE.  [FreeTextEntry7] : decreased sensation L facial  [FreeTextEntry8] : slight limp; gait mildly unsteady on turns [FreeTextEntry1] : edema b/l LE (L>R)

## 2019-05-20 NOTE — DATA REVIEWED
[de-identified] : I personally reviewed MR imaging dated\par 2/9/19	7/31/18	2/4/19	5/16/19\par \par In reviewing these images, I find interval increase and then decrease in the right frontal white matter hyperintensity on the T2 weighted images, with signal change likely representing an admixture of treatment effects, cerebral edema, or low grade neoplasm. \par I am concerned about the marked degree of cerebral edema, despite the rather small resection cavity and minimal contrast enhancement.\par On the contrast enhanced images, there is christi-resection cavity enhancement\par Overall I find the images to be stable or even improved. \par Selected imaging was provided to the patient, along with a detailed verbal explanation of the issues at hand as outlined above.\par

## 2019-05-20 NOTE — DISCUSSION/SUMMARY
[FreeTextEntry1] : Brain tumor - Imaging reviewed with pt and son. He is clinically stable with continued left sided weakness (mild) and discoordination. Would continue temozolomide (cycle # 5 to begin as soon as they receive meds, dose 400mg). We reviewed med administration and written instructions provided. Would hold Avastin, as he did not have any clinical improvement with the infusions, and also developed PE. May consider addition of Avastin in the future if there is increased cerebral edema. \par \par Cerebral edema - Continue decadron 2mg daily for now.\par \par PE - Will switch to once daily dosing of lovenox, dose 120mg. We reviewed med administration. He is administering the injection on his own with no difficulty. He has follow-up scheduled with PCP tomorrow and will also see pulmonary. \par \par DISPO - All questions answered. Follow-up visit in 1 month. \par \par

## 2019-05-22 LAB
ALBUMIN SERPL ELPH-MCNC: 4.3 G/DL
ALP BLD-CCNC: 54 U/L
ALT SERPL-CCNC: 26 U/L
ANION GAP SERPL CALC-SCNC: 12 MMOL/L
AST SERPL-CCNC: 18 U/L
BILIRUB SERPL-MCNC: 0.3 MG/DL
BUN SERPL-MCNC: 19 MG/DL
CALCIUM SERPL-MCNC: 9.4 MG/DL
CHLORIDE SERPL-SCNC: 107 MMOL/L
CO2 SERPL-SCNC: 23 MMOL/L
CREAT SERPL-MCNC: 0.88 MG/DL
GLUCOSE SERPL-MCNC: 120 MG/DL
POTASSIUM SERPL-SCNC: 4.3 MMOL/L
PROT SERPL-MCNC: 6.7 G/DL
SODIUM SERPL-SCNC: 142 MMOL/L

## 2019-06-17 ENCOUNTER — APPOINTMENT (OUTPATIENT)
Dept: NEUROLOGY | Facility: CLINIC | Age: 79
End: 2019-06-17
Payer: MEDICARE

## 2019-06-17 ENCOUNTER — OUTPATIENT (OUTPATIENT)
Dept: OUTPATIENT SERVICES | Facility: HOSPITAL | Age: 79
LOS: 1 days | Discharge: HOME | End: 2019-06-17

## 2019-06-17 ENCOUNTER — RESULT REVIEW (OUTPATIENT)
Age: 79
End: 2019-06-17

## 2019-06-17 ENCOUNTER — APPOINTMENT (OUTPATIENT)
Dept: HEMATOLOGY ONCOLOGY | Facility: CLINIC | Age: 79
End: 2019-06-17

## 2019-06-17 VITALS
DIASTOLIC BLOOD PRESSURE: 68 MMHG | HEIGHT: 71 IN | OXYGEN SATURATION: 96 % | BODY MASS INDEX: 27.3 KG/M2 | HEART RATE: 92 BPM | WEIGHT: 195 LBS | RESPIRATION RATE: 16 BRPM | SYSTOLIC BLOOD PRESSURE: 128 MMHG

## 2019-06-17 DIAGNOSIS — Z90.89 ACQUIRED ABSENCE OF OTHER ORGANS: Chronic | ICD-10-CM

## 2019-06-17 DIAGNOSIS — Z98.41 CATARACT EXTRACTION STATUS, RIGHT EYE: Chronic | ICD-10-CM

## 2019-06-17 DIAGNOSIS — Z98.890 OTHER SPECIFIED POSTPROCEDURAL STATES: Chronic | ICD-10-CM

## 2019-06-17 DIAGNOSIS — C44.711 BASAL CELL CARCINOMA OF SKIN OF UNSPECIFIED LOWER LIMB, INCLUDING HIP: Chronic | ICD-10-CM

## 2019-06-17 DIAGNOSIS — D17.0 BENIGN LIPOMATOUS NEOPLASM OF SKIN AND SUBCUTANEOUS TISSUE OF HEAD, FACE AND NECK: Chronic | ICD-10-CM

## 2019-06-17 DIAGNOSIS — Z90.79 ACQUIRED ABSENCE OF OTHER GENITAL ORGAN(S): Chronic | ICD-10-CM

## 2019-06-17 DIAGNOSIS — C71.9 MALIGNANT NEOPLASM OF BRAIN, UNSPECIFIED: ICD-10-CM

## 2019-06-17 LAB
BASOPHILS # BLD AUTO: 0 K/UL — SIGNIFICANT CHANGE UP (ref 0–0.2)
BASOPHILS NFR BLD AUTO: 0.1 % — SIGNIFICANT CHANGE UP (ref 0–2)
EOSINOPHIL # BLD AUTO: 0.1 K/UL — SIGNIFICANT CHANGE UP (ref 0–0.5)
EOSINOPHIL NFR BLD AUTO: 1.2 % — SIGNIFICANT CHANGE UP (ref 0–6)
HCT VFR BLD CALC: 38.8 % — LOW (ref 39–50)
HGB BLD-MCNC: 13.1 G/DL — SIGNIFICANT CHANGE UP (ref 13–17)
LYMPHOCYTES # BLD AUTO: 1.6 K/UL — SIGNIFICANT CHANGE UP (ref 1–3.3)
LYMPHOCYTES # BLD AUTO: 19.9 % — SIGNIFICANT CHANGE UP (ref 13–44)
MCHC RBC-ENTMCNC: 30.9 PG — SIGNIFICANT CHANGE UP (ref 27–34)
MCHC RBC-ENTMCNC: 33.9 G/DL — SIGNIFICANT CHANGE UP (ref 32–36)
MCV RBC AUTO: 91.2 FL — SIGNIFICANT CHANGE UP (ref 80–100)
MONOCYTES # BLD AUTO: 0.2 K/UL — SIGNIFICANT CHANGE UP (ref 0–0.9)
MONOCYTES NFR BLD AUTO: 3.1 % — SIGNIFICANT CHANGE UP (ref 2–14)
NEUTROPHILS # BLD AUTO: 5.9 K/UL — SIGNIFICANT CHANGE UP (ref 1.8–7.4)
NEUTROPHILS NFR BLD AUTO: 75.6 % — SIGNIFICANT CHANGE UP (ref 43–77)
PLATELET # BLD AUTO: 169 K/UL — SIGNIFICANT CHANGE UP (ref 150–400)
RBC # BLD: 4.25 M/UL — SIGNIFICANT CHANGE UP (ref 4.2–5.8)
RBC # FLD: 14.5 % — SIGNIFICANT CHANGE UP (ref 10.3–14.5)
WBC # BLD: 7.8 K/UL — SIGNIFICANT CHANGE UP (ref 3.8–10.5)
WBC # FLD AUTO: 7.8 K/UL — SIGNIFICANT CHANGE UP (ref 3.8–10.5)

## 2019-06-17 PROCEDURE — 99214 OFFICE O/P EST MOD 30 MIN: CPT

## 2019-06-17 NOTE — REASON FOR VISIT
[Family Member] : family member [Follow-Up: _____] : a [unfilled] follow-up visit [FreeTextEntry1] : brain tumor

## 2019-06-17 NOTE — DISCUSSION/SUMMARY
[FreeTextEntry1] : Brain tumor - His left sided strength and coordination are improving since last visit. He tolerated temozolomide well. Would continue with temozolomide cycle #6, to begin 6/23/19, dose 400mg. Written calendar and instructions provided. We recommended completing 12 cycles of temozolomide, given the necrosis on pathology and he is tolerating the chemo well. All questions answered. \par \par Cerebral edema - No s&s of increased ICP. Will decrease decadron to 1mg daily. \par \par Seizures - None, continue Keppra. \par \par DISPO - All questions answered. Follow-up with MRI in 1 month.

## 2019-06-17 NOTE — HISTORY OF PRESENT ILLNESS
[FreeTextEntry1] : This is a pleasant 78 year old man with right frontal GBM. 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, MGMT unmethylated \par - completed chemoradiation - 8/31/18 - 10/12/18\par - 11/12/18 - temozolomide cycle #1, dose 320mg (150mg/m2) \par - 12/10/18 - temozolomide cycle #2, dose 400mg \par - 12/10/18 - presented to ED s/p fall and unable to get up; imaging with increased cerebral edema \par - 1/10/19 - temozolomide cycle #3, dose 400mg \par - POD \par - 2/7/19 - s/p re-resection ([facility]); PATH: recurrent GBM, 30-40% necrosis \par - 3/11/19 - temozolomide cycle #4, dose 400mg \par - 3/13/19 - started Avastin infusions q2 weeks \par - 4/11/19 - hospitalized with b/l PE \par - 5/26/19 - temozolomide cycle #5, dose 400mg \par \par He presents today for routine follow-up, accompanied by his son. \par \par He is feeling well and staying active. \par \par On Lovenox daily for PE. He denies cough, SOB, chest pain. B/L LE edema (L>R) is improving. On torsemide as per PCP. \par \par He continues to have decreased coordination and hand  on the left side, although improving since last visit. Gait is mildly unsteady. \par \par No headaches, no n/v, no diplopia. \par \par No seizures or seizure-like symptoms. \par \par Remains on Decadron 2mg daily and Keppra 1000mg BID. \par \par \par

## 2019-06-17 NOTE — PHYSICAL EXAM
[General Appearance - Alert] : alert [General Appearance - In No Acute Distress] : in no acute distress [Oriented To Time, Place, And Person] : oriented to person, place, and time [Impaired Insight] : insight and judgment were intact [Affect] : the affect was normal [Mood] : the mood was normal [Memory Recent] : recent memory was not impaired [Memory Remote] : remote memory was not impaired [Person] : oriented to person [Place] : oriented to place [Time] : oriented to time [Short Term Intact] : short term memory intact [Registration Intact] : recent registration memory intact [Remote Intact] : remote memory intact [Span Intact] : the attention span was normal [Visual Intact] : visual attention was ~T not ~L decreased [Concentration Intact] : normal concentrating ability [Naming Objects] : no difficulty naming common objects [Repeating Phrases] : no difficulty repeating a phrase [Reading] : reading intact [Fluency] : fluency intact [Comprehension] : comprehension intact [Current Events] : adequate knowledge of current events [Past History] : adequate knowledge of personal past history [Vocabulary] : adequate range of vocabulary [Cranial Nerves Trigeminal (V)] : facial sensation intact symmetrically [Cranial Nerves Oculomotor (III)] : extraocular motion intact [Cranial Nerves Optic (II)] : visual acuity intact bilaterally,  visual fields full to confrontation, pupils equal round and reactive to light [Cranial Nerves Glossopharyngeal (IX)] : tongue and palate midline [Cranial Nerves Vestibulocochlear (VIII)] : hearing was intact bilaterally [Cranial Nerves Hypoglossal (XII)] : there was no tongue deviation with protrusion [Cranial Nerves Accessory (XI - Cranial And Spinal)] : head turning and shoulder shrug symmetric [Motor Tone] : muscle tone was normal in all four extremities [Involuntary Movements] : no involuntary movements were seen [No Muscle Atrophy] : normal bulk in all four extremities [Sensation Tactile Decrease] : light touch was intact [Paresis Pronator Drift Left-Sided] : a left-sided pronator drift was present [Dysdiadochokinesia On The Left] : present on the left side [Coordination - Dysmetria Impaired Finger-to-Nose Left Only] : present on the left side [Sclera] : the sclera and conjunctiva were normal [PERRL With Normal Accommodation] : pupils were equal in size, round, reactive to light, with normal accommodation [Extraocular Movements] : extraocular movements were intact [Full Visual Field] : full visual field [Respiration, Rhythm And Depth] : normal respiratory rhythm and effort [Oropharynx] : the oropharynx was normal [Auscultation Breath Sounds / Voice Sounds] : lungs were clear to auscultation bilaterally [Skin Color & Pigmentation] : normal skin color and pigmentation [Past-pointing] : there was no past-pointing [FreeTextEntry5] : mild left facial droop - unchanged  [Tremor] : no tremor present [FreeTextEntry7] : decreased sensation L facial  [FreeTextEntry6] : weak hand  LUE (but improved). LLE and LUE strength 5/5.  [FreeTextEntry8] : slight limp; gait mildly unsteady on turns, but improved since last visit.  [FreeTextEntry1] : edema b/l LE (L>R)

## 2019-07-16 ENCOUNTER — OUTPATIENT (OUTPATIENT)
Dept: OUTPATIENT SERVICES | Facility: HOSPITAL | Age: 79
LOS: 1 days | End: 2019-07-16
Payer: MEDICARE

## 2019-07-16 ENCOUNTER — APPOINTMENT (OUTPATIENT)
Dept: MRI IMAGING | Facility: CLINIC | Age: 79
End: 2019-07-16
Payer: MEDICARE

## 2019-07-16 DIAGNOSIS — Z98.890 OTHER SPECIFIED POSTPROCEDURAL STATES: Chronic | ICD-10-CM

## 2019-07-16 DIAGNOSIS — Z90.89 ACQUIRED ABSENCE OF OTHER ORGANS: Chronic | ICD-10-CM

## 2019-07-16 DIAGNOSIS — Z98.41 CATARACT EXTRACTION STATUS, RIGHT EYE: Chronic | ICD-10-CM

## 2019-07-16 DIAGNOSIS — D17.0 BENIGN LIPOMATOUS NEOPLASM OF SKIN AND SUBCUTANEOUS TISSUE OF HEAD, FACE AND NECK: Chronic | ICD-10-CM

## 2019-07-16 DIAGNOSIS — Z90.79 ACQUIRED ABSENCE OF OTHER GENITAL ORGAN(S): Chronic | ICD-10-CM

## 2019-07-16 DIAGNOSIS — Z00.8 ENCOUNTER FOR OTHER GENERAL EXAMINATION: ICD-10-CM

## 2019-07-16 DIAGNOSIS — C44.711 BASAL CELL CARCINOMA OF SKIN OF UNSPECIFIED LOWER LIMB, INCLUDING HIP: Chronic | ICD-10-CM

## 2019-07-16 PROCEDURE — 70553 MRI BRAIN STEM W/O & W/DYE: CPT | Mod: 26

## 2019-07-16 PROCEDURE — A9585: CPT

## 2019-07-16 PROCEDURE — 70553 MRI BRAIN STEM W/O & W/DYE: CPT

## 2019-07-18 ENCOUNTER — APPOINTMENT (OUTPATIENT)
Dept: NEUROLOGY | Facility: CLINIC | Age: 79
End: 2019-07-18
Payer: MEDICARE

## 2019-07-18 VITALS
HEIGHT: 71 IN | OXYGEN SATURATION: 96 % | SYSTOLIC BLOOD PRESSURE: 110 MMHG | BODY MASS INDEX: 27.3 KG/M2 | HEART RATE: 70 BPM | DIASTOLIC BLOOD PRESSURE: 76 MMHG | RESPIRATION RATE: 16 BRPM | WEIGHT: 195 LBS

## 2019-07-18 PROCEDURE — 99215 OFFICE O/P EST HI 40 MIN: CPT

## 2019-07-18 NOTE — DATA REVIEWED
[de-identified] : I personally reviewed MR imaging dated\par 7/31/18	7/16/19	5/16/19	2/9/19\par 1/7/19			\par \par In reviewing these images, I find MINIMAL RIGHT FRONTAL HYPERINTENSITY ON THE 7/31/18 IMAGING, FOLLOWED BY PROGRESSIVE INCREASE IN THE WHITE MATTER HYPERINTENSITY INVOLVING THE DEEP RIGHT FRONTAL LOBE BETWEEN THE TUMOR AND THE LATERAL VENTRICLE on the T2 weighted images, with signal change likely representing an admixture of treatment effects AND cerebral edema. \par I am concerned about PSEUDO-PROGRESSION\par On the contrast enhanced images, there is minimal residual resection cavity enhancement. In reviewing the 1/7/19 study, there is marked interval increased enhancement around the resection cavity, which was subsequently re-resected (and showed considerable necrosis, c/w treatment related effects).\par Overall I find the images to be c/w treatment-related effects. \par Selected imaging was provided to the patient, along with a detailed verbal explanation of the issues at hand as outlined above.\par

## 2019-07-18 NOTE — PHYSICAL EXAM
[General Appearance - Alert] : alert [General Appearance - In No Acute Distress] : in no acute distress [Oriented To Time, Place, And Person] : oriented to person, place, and time [Impaired Insight] : insight and judgment were intact [Affect] : the affect was normal [Mood] : the mood was normal [Memory Recent] : recent memory was not impaired [Memory Remote] : remote memory was not impaired [Person] : oriented to person [Place] : oriented to place [Time] : oriented to time [Short Term Intact] : short term memory intact [Remote Intact] : remote memory intact [Registration Intact] : recent registration memory intact [Span Intact] : the attention span was normal [Concentration Intact] : normal concentrating ability [Visual Intact] : visual attention was ~T not ~L decreased [Naming Objects] : no difficulty naming common objects [Repeating Phrases] : no difficulty repeating a phrase [Fluency] : fluency intact [Comprehension] : comprehension intact [Reading] : reading intact [Current Events] : adequate knowledge of current events [Past History] : adequate knowledge of personal past history [Vocabulary] : adequate range of vocabulary [Cranial Nerves Optic (II)] : visual acuity intact bilaterally,  visual fields full to confrontation, pupils equal round and reactive to light [Cranial Nerves Oculomotor (III)] : extraocular motion intact [Cranial Nerves Trigeminal (V)] : facial sensation intact symmetrically [Cranial Nerves Vestibulocochlear (VIII)] : hearing was intact bilaterally [Cranial Nerves Glossopharyngeal (IX)] : tongue and palate midline [Cranial Nerves Accessory (XI - Cranial And Spinal)] : head turning and shoulder shrug symmetric [Cranial Nerves Hypoglossal (XII)] : there was no tongue deviation with protrusion [Motor Tone] : muscle tone was normal in all four extremities [Involuntary Movements] : no involuntary movements were seen [No Muscle Atrophy] : normal bulk in all four extremities [Sensation Tactile Decrease] : light touch was intact [Dysdiadochokinesia On The Left] : present on the left side [Coordination - Dysmetria Impaired Finger-to-Nose Left Only] : present on the left side [Sclera] : the sclera and conjunctiva were normal [PERRL With Normal Accommodation] : pupils were equal in size, round, reactive to light, with normal accommodation [Extraocular Movements] : extraocular movements were intact [Full Visual Field] : full visual field [Oropharynx] : the oropharynx was normal [Respiration, Rhythm And Depth] : normal respiratory rhythm and effort [Auscultation Breath Sounds / Voice Sounds] : lungs were clear to auscultation bilaterally [Edema] : there was no peripheral edema [Skin Color & Pigmentation] : normal skin color and pigmentation [Past-pointing] : there was no past-pointing [Tremor] : no tremor present [FreeTextEntry5] : mild left facial droop - unchanged  [FreeTextEntry6] : weak hand  LUE (but improved). LLE and LUE strength 5/5.  [FreeTextEntry7] : decreased sensation L facial  [FreeTextEntry8] : gait steady

## 2019-07-18 NOTE — DISCUSSION/SUMMARY
[FreeTextEntry1] : Brain tumor - He is doing well clinically; his left sided strength and coordination continue to improve. Imaging reviewed with pt and son, stable. Would continue with temozolomide cycle #7, to begin 7/22/19, dose 400mg. Written calendar and instructions provided. Will check CBC, CMP today. We again discussed rationale for completing 12 cycles of temozolomide, given the necrosis on pathology and he is tolerating the chemo well. He agrees. All questions answered. \par \par Cerebral edema - No s&s of increased ICP. Will decrease decadron to 0.5mg daily x 2 weeks, then stop if no issues. \par \par Seizures - None, continue Keppra. \par \par PE - No complaints. Would continue Lovenox until October (6 months), then re-evaluate with CT angio. He has also discussed this plan with his PCP. \par \par DISPO - All questions answered. Follow-up visit in 1 month. \par \par

## 2019-07-18 NOTE — HISTORY OF PRESENT ILLNESS
[FreeTextEntry1] : This is a pleasant 78 year old man with right frontal GBM. 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, MGMT unmethylated \par - completed chemoradiation - 8/31/18 - 10/12/18\par - 11/12/18 - temozolomide cycle #1, dose 320mg (150mg/m2) \par - 12/10/18 - temozolomide cycle #2, dose 400mg \par - 12/10/18 - presented to ED s/p fall and unable to get up; imaging with increased cerebral edema \par - 1/10/19 - temozolomide cycle #3, dose 400mg \par - POD \par - 2/7/19 - s/p re-resection ([facility]); PATH: recurrent GBM, 30-40% necrosis \par - 3/11/19 - temozolomide cycle #4, dose 400mg \par - 3/13/19 - started Avastin infusions q2 weeks \par - 4/11/19 - hospitalized with b/l PE \par - 5/26/19 - temozolomide cycle #5, dose 400mg \par - 6/23/19 - temozolomide cycle #6, dose 400mg \par \par He presents today for routine follow-up with new MRI, accompanied by his son. \par \par He is feeling well and staying active. \par His left sided strength and hand  continue to improve, although still difficulty with coordination of the left hand. Gait is steady. He feels his legs are not as strong as he would like them to be. \par \par On Lovenox daily for PE. He denies cough, SOB, chest pain. LE edema improved. \par \par No headaches, no n/v, no diplopia. \par \par No seizures or seizure-like symptoms. \par \par Remains on Decadron 1mg daily and Keppra 1000mg BID. \par \par Tolerating temozolomide well. \par \par \par
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
## 2019-07-22 LAB
ALBUMIN SERPL ELPH-MCNC: 4.4 G/DL
ALP BLD-CCNC: 37 U/L
ALT SERPL-CCNC: 20 U/L
ANION GAP SERPL CALC-SCNC: 13 MMOL/L
AST SERPL-CCNC: 16 U/L
BASOPHILS # BLD AUTO: 0.03 K/UL
BASOPHILS NFR BLD AUTO: 0.4 %
BILIRUB SERPL-MCNC: 0.4 MG/DL
BUN SERPL-MCNC: 24 MG/DL
CALCIUM SERPL-MCNC: 9.5 MG/DL
CHLORIDE SERPL-SCNC: 106 MMOL/L
CO2 SERPL-SCNC: 22 MMOL/L
CREAT SERPL-MCNC: 0.99 MG/DL
EOSINOPHIL # BLD AUTO: 0.05 K/UL
EOSINOPHIL NFR BLD AUTO: 0.6 %
GLUCOSE SERPL-MCNC: 111 MG/DL
HCT VFR BLD CALC: 39.1 %
HGB BLD-MCNC: 12.5 G/DL
IMM GRANULOCYTES NFR BLD AUTO: 0.4 %
LYMPHOCYTES # BLD AUTO: 1.16 K/UL
LYMPHOCYTES NFR BLD AUTO: 14.5 %
MAN DIFF?: NORMAL
MCHC RBC-ENTMCNC: 30.7 PG
MCHC RBC-ENTMCNC: 32 GM/DL
MCV RBC AUTO: 96.1 FL
MONOCYTES # BLD AUTO: 0.34 K/UL
MONOCYTES NFR BLD AUTO: 4.3 %
NEUTROPHILS # BLD AUTO: 6.38 K/UL
NEUTROPHILS NFR BLD AUTO: 79.8 %
PLATELET # BLD AUTO: 169 K/UL
POTASSIUM SERPL-SCNC: 3.8 MMOL/L
PROT SERPL-MCNC: 6.7 G/DL
RBC # BLD: 4.07 M/UL
RBC # FLD: 14.6 %
SODIUM SERPL-SCNC: 141 MMOL/L
WBC # FLD AUTO: 7.99 K/UL

## 2019-08-08 LAB
BASOPHILS # BLD AUTO: 0.02 K/UL
BASOPHILS NFR BLD AUTO: 0.4 %
EOSINOPHIL # BLD AUTO: 0.09 K/UL
EOSINOPHIL NFR BLD AUTO: 1.6 %
HCT VFR BLD CALC: 38.7 %
HGB BLD-MCNC: 12.6 G/DL
IMM GRANULOCYTES NFR BLD AUTO: 0.2 %
LYMPHOCYTES # BLD AUTO: 1.44 K/UL
LYMPHOCYTES NFR BLD AUTO: 26 %
MAN DIFF?: NORMAL
MCHC RBC-ENTMCNC: 31.4 PG
MCHC RBC-ENTMCNC: 32.6 GM/DL
MCV RBC AUTO: 96.5 FL
MONOCYTES # BLD AUTO: 0.43 K/UL
MONOCYTES NFR BLD AUTO: 7.8 %
NEUTROPHILS # BLD AUTO: 3.55 K/UL
NEUTROPHILS NFR BLD AUTO: 64 %
PLATELET # BLD AUTO: 239 K/UL
RBC # BLD: 4.01 M/UL
RBC # FLD: 14.1 %
WBC # FLD AUTO: 5.54 K/UL

## 2019-08-12 ENCOUNTER — APPOINTMENT (OUTPATIENT)
Dept: NEUROLOGY | Facility: CLINIC | Age: 79
End: 2019-08-12
Payer: MEDICARE

## 2019-08-12 PROCEDURE — 99214 OFFICE O/P EST MOD 30 MIN: CPT

## 2019-08-12 NOTE — DISCUSSION/SUMMARY
[FreeTextEntry1] : BRAIN TUMOR -- given the necrosis, will continue temozolomide for total of 12 cycles. Will repeat imaging in one month to ensure no tumoral escape. next cycle to start 8/26/19 at 400mg daily.\par \par CEREBRAL EDEMA -- doing well off decadron\par \par FATIGUE -- likely related to stopping decadron, likely to improve over next few weeks.\par \par SEIZURES -- continue keppra. his last spell was >6 months ago\par \par PULMONARY EMBOLISM -- to continue on lovenox for at least 6 months, many investigators recommend lifelong treatment. I reordered this medication.\par \par DISPO -- to return in one month with repeat brain MRI with contrast.

## 2019-08-12 NOTE — PHYSICAL EXAM
[General Appearance - Alert] : alert [General Appearance - In No Acute Distress] : in no acute distress [Oriented To Time, Place, And Person] : oriented to person, place, and time [Impaired Insight] : insight and judgment were intact [Affect] : the affect was normal [Person] : oriented to person [Place] : oriented to place [Time] : oriented to time [Concentration Intact] : normal concentrating ability [Visual Intact] : visual attention was ~T not ~L decreased [Naming Objects] : no difficulty naming common objects [Repeating Phrases] : no difficulty repeating a phrase [Fluency] : fluency intact [Writing A Sentence] : no difficulty writing a sentence [Comprehension] : comprehension intact [Reading] : reading intact [Past History] : adequate knowledge of personal past history [Cranial Nerves Optic (II)] : visual acuity intact bilaterally,  visual fields full to confrontation, pupils equal round and reactive to light [Cranial Nerves Oculomotor (III)] : extraocular motion intact [Cranial Nerves Trigeminal (V)] : facial sensation intact symmetrically [Cranial Nerves Facial (VII)] : face symmetrical [Cranial Nerves Vestibulocochlear (VIII)] : hearing was intact bilaterally [Cranial Nerves Accessory (XI - Cranial And Spinal)] : head turning and shoulder shrug symmetric [Cranial Nerves Glossopharyngeal (IX)] : tongue and palate midline [Cranial Nerves Hypoglossal (XII)] : there was no tongue deviation with protrusion [Motor Tone] : muscle tone was normal in all four extremities [Motor Strength] : muscle strength was normal in all four extremities [No Muscle Atrophy] : normal bulk in all four extremities [Paresis Pronator Drift Left-Sided] : no pronator drift on the left [Paresis Pronator Drift Right-Sided] : no pronator drift on the right [Motor Strength Upper Extremities Bilaterally] : strength was normal in both upper extremities [Motor Strength Lower Extremities Bilaterally] : strength was normal in both lower extremities [Sensation Tactile Decrease] : light touch was intact [Balance] : balance was intact [Abnormal Walk] : normal gait [Past-pointing] : there was no past-pointing [Tremor] : no tremor present [2+] : Ankle jerk left 2+ [Plantar Reflex Right Only] : normal on the right [Plantar Reflex Left Only] : normal on the left [FreeTextEntry5] : left facial droop - central [FreeTextEntry1] : no dramati swelling of the right arm, some swelling of the fingers c/w disuse. [FreeTextEntry6] : MILD LEFT APPENDICULAR proprioceptive defict.

## 2019-08-12 NOTE — HISTORY OF PRESENT ILLNESS
[FreeTextEntry1] : 77  year old right handed man with a past medical history of glioblastoma of the right frontal lobe presenting for follow-up.\par presented with left sided weakness, notably in the face affecting his swallowing. \par s/p gross total resection of the lesion 8/3/18. 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 On lovenox 120mg/0.8 mL daily\par \par for facial twitching, which led to resolution of facial twitching. on 12/10/18 had episode of falling and unable to get up, even with family member assistance. May have represented seizure.\par He has never had a delilah GTC seizure. Only epileptiform activity was focal facial twitching, which I was not sure was seizure activity, but it has resolved since restarting keppra, so likely focal activity.\par \par no headaches\par no nausea \par no vomiting\par no new neurologic deficits. his left side is not as coordinated as it used to be. the hand in particular is swollen, weak, with slowed rapid alternating movements and the joints are painful. He has been exercising this extremity more often in the hopes of restoring function enough so he can return to his passion (baseball).\par \par son accompanies him and has no additional concerns.\par \par He takes Keppra [id number]\par He stopped decadorn 2 weeks ago and feels quite tired.\par Tolerating temozolomide well.\par \par \par \par \par \par \par \par \par

## 2019-08-28 LAB
BASOPHILS # BLD AUTO: 0.02 K/UL
BASOPHILS NFR BLD AUTO: 0.4 %
EOSINOPHIL # BLD AUTO: 0.08 K/UL
EOSINOPHIL NFR BLD AUTO: 1.6 %
HCT VFR BLD CALC: 37.8 %
HGB BLD-MCNC: 12.3 G/DL
IMM GRANULOCYTES NFR BLD AUTO: 0.2 %
LYMPHOCYTES # BLD AUTO: 1.43 K/UL
LYMPHOCYTES NFR BLD AUTO: 28.2 %
MAN DIFF?: NORMAL
MCHC RBC-ENTMCNC: 32.1 PG
MCHC RBC-ENTMCNC: 32.5 GM/DL
MCV RBC AUTO: 98.7 FL
MONOCYTES # BLD AUTO: 0.51 K/UL
MONOCYTES NFR BLD AUTO: 10.1 %
NEUTROPHILS # BLD AUTO: 3.02 K/UL
NEUTROPHILS NFR BLD AUTO: 59.5 %
PLATELET # BLD AUTO: 240 K/UL
RBC # BLD: 3.83 M/UL
RBC # FLD: 14.3 %
WBC # FLD AUTO: 5.07 K/UL

## 2019-09-10 ENCOUNTER — FORM ENCOUNTER (OUTPATIENT)
Age: 79
End: 2019-09-10

## 2019-09-11 ENCOUNTER — APPOINTMENT (OUTPATIENT)
Dept: MRI IMAGING | Facility: CLINIC | Age: 79
End: 2019-09-11
Payer: MEDICARE

## 2019-09-11 ENCOUNTER — OUTPATIENT (OUTPATIENT)
Dept: OUTPATIENT SERVICES | Facility: HOSPITAL | Age: 79
LOS: 1 days | End: 2019-09-11
Payer: MEDICARE

## 2019-09-11 DIAGNOSIS — C44.711 BASAL CELL CARCINOMA OF SKIN OF UNSPECIFIED LOWER LIMB, INCLUDING HIP: Chronic | ICD-10-CM

## 2019-09-11 DIAGNOSIS — Z90.89 ACQUIRED ABSENCE OF OTHER ORGANS: Chronic | ICD-10-CM

## 2019-09-11 DIAGNOSIS — D17.0 BENIGN LIPOMATOUS NEOPLASM OF SKIN AND SUBCUTANEOUS TISSUE OF HEAD, FACE AND NECK: Chronic | ICD-10-CM

## 2019-09-11 DIAGNOSIS — Z98.890 OTHER SPECIFIED POSTPROCEDURAL STATES: Chronic | ICD-10-CM

## 2019-09-11 DIAGNOSIS — Z90.79 ACQUIRED ABSENCE OF OTHER GENITAL ORGAN(S): Chronic | ICD-10-CM

## 2019-09-11 DIAGNOSIS — Z98.41 CATARACT EXTRACTION STATUS, RIGHT EYE: Chronic | ICD-10-CM

## 2019-09-11 DIAGNOSIS — Z00.8 ENCOUNTER FOR OTHER GENERAL EXAMINATION: ICD-10-CM

## 2019-09-11 PROCEDURE — 70553 MRI BRAIN STEM W/O & W/DYE: CPT | Mod: 26

## 2019-09-11 PROCEDURE — A9585: CPT

## 2019-09-11 PROCEDURE — 70553 MRI BRAIN STEM W/O & W/DYE: CPT

## 2019-09-12 ENCOUNTER — APPOINTMENT (OUTPATIENT)
Dept: NEUROLOGY | Facility: CLINIC | Age: 79
End: 2019-09-12
Payer: MEDICARE

## 2019-09-12 VITALS
OXYGEN SATURATION: 97 % | RESPIRATION RATE: 16 BRPM | DIASTOLIC BLOOD PRESSURE: 72 MMHG | HEIGHT: 71 IN | WEIGHT: 197 LBS | BODY MASS INDEX: 27.58 KG/M2 | SYSTOLIC BLOOD PRESSURE: 116 MMHG | HEART RATE: 73 BPM

## 2019-09-12 PROCEDURE — 99215 OFFICE O/P EST HI 40 MIN: CPT

## 2019-09-12 NOTE — DISCUSSION/SUMMARY
[FreeTextEntry1] : Brain tumor - Clinically stable. Imaging reviewed with pt and son, stable. To continue with temozolomide, cycle #9 to begin 9/23, dose 400mg. He is tolerating well. Written instructions provided. We discussed that his left sided weakness and incoordination may be related to cerebral edema and may continue to improve with time. We discussed elevating his LUE at night and wearing compression devices to help with swelling. He is hoping to return to playing baseball. \par \par We reviewed overall treatment plan for 12 cycles of tmz, as well as treatment options if there is recurrence in the future. He is doing remarkably well at this time. \par \par DISPO - All questions answered. Follow-up visit in 1 month.

## 2019-09-12 NOTE — HISTORY OF PRESENT ILLNESS
[FreeTextEntry1] : This is a pleasant 78 year old man with right frontal GBM. 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, MGMT unmethylated \par - completed chemoradiation - 8/31/18 - 10/12/18\par - 11/12/18 - temozolomide cycle #1, dose 320mg (150mg/m2) \par - 12/10/18 - temozolomide cycle #2, dose 400mg \par - 12/10/18 - presented to ED s/p fall and unable to get up; imaging with increased cerebral edema \par - 1/10/19 - temozolomide cycle #3, dose 400mg \par - POD \par - 2/7/19 - s/p re-resection ([facility]); PATH: recurrent GBM, 30-40% necrosis \par - 3/11/19 - temozolomide cycle #4, dose 400mg \par - 3/13/19 - started Avastin infusions q2 weeks \par - 4/11/19 - hospitalized with b/l PE \par - 5/26/19 - temozolomide cycle #5, dose 400mg \par - 6/23/19 - temozolomide cycle #6, dose 400mg \par - 7/22/19 - temozolomide cycle #7, dose 400mg \par - 8/26/19 - temozolomide cycle #8, dose 400mg\par \par He presents today for routine follow-up with new MRI, accompanied by his son. \par \par He is feeling well and staying active. He goes to the gym and uses the stationary bike. \par He continues to have LUE weakness and decreased /coordination and edema of the LLE and LUE. Gait is steady. He feels his legs are not as strong as he would like them to be. \par \par On Lovenox daily for PE. He denies cough, SOB, chest pain. \par Following up with PCP next week and CTA scheduled for October. \par \par No headaches, no n/v, no diplopia. \par \par No seizures or seizure-like symptoms on Keppra 1000mg BID. \par \par Tapered off decadron in August. \par \par Tolerating temozolomide well. \par \par He has R knee arthritis and seeing ortho for injections. \par \par

## 2019-09-12 NOTE — DATA REVIEWED
[de-identified] : I personally reviewed MR imaging dated\par 9/11/19	7/11/18	7/16/19	5/16/19\par \par In reviewing these images, I find INTERVAL STABILITY OF THE RIGHT POSTERIOR FRONTAL HYPERINTENSITY on the T2 weighted images, with signal change likely representing an admixture of treatment effects, cerebral edema, or neoplasm. \par I am concerned about SUBTLE INCREASED ENHANCEMENT ANTERIORLY TO THE RESECTION CAVITY, BUT IT MAY SIMPLY REFLECT COLLAPSE OF THE CAVITY, NOT TUMOR PROGRESSION.\par On the contrast enhanced images, there is RIGHT POSTERIOR FRONTAL enhancement, WHICH IS STABLE OVER TIME.\par Overall I find the images to be stable. \par Selected imaging was provided to the patient, along with a detailed verbal explanation of the issues at hand as outlined above.\par

## 2019-09-12 NOTE — PHYSICAL EXAM
[General Appearance - Alert] : alert [General Appearance - In No Acute Distress] : in no acute distress [Oriented To Time, Place, And Person] : oriented to person, place, and time [Impaired Insight] : insight and judgment were intact [Affect] : the affect was normal [Mood] : the mood was normal [Memory Recent] : recent memory was not impaired [Memory Remote] : remote memory was not impaired [Person] : oriented to person [Place] : oriented to place [Time] : oriented to time [Short Term Intact] : short term memory intact [Remote Intact] : remote memory intact [Registration Intact] : recent registration memory intact [Span Intact] : the attention span was normal [Concentration Intact] : normal concentrating ability [Visual Intact] : visual attention was ~T not ~L decreased [Naming Objects] : no difficulty naming common objects [Repeating Phrases] : no difficulty repeating a phrase [Fluency] : fluency intact [Comprehension] : comprehension intact [Reading] : reading intact [Current Events] : adequate knowledge of current events [Past History] : adequate knowledge of personal past history [Vocabulary] : adequate range of vocabulary [Cranial Nerves Optic (II)] : visual acuity intact bilaterally,  visual fields full to confrontation, pupils equal round and reactive to light [Cranial Nerves Oculomotor (III)] : extraocular motion intact [Cranial Nerves Trigeminal (V)] : facial sensation intact symmetrically [Cranial Nerves Vestibulocochlear (VIII)] : hearing was intact bilaterally [Cranial Nerves Glossopharyngeal (IX)] : tongue and palate midline [Cranial Nerves Accessory (XI - Cranial And Spinal)] : head turning and shoulder shrug symmetric [Motor Tone] : muscle tone was normal in all four extremities [Cranial Nerves Hypoglossal (XII)] : there was no tongue deviation with protrusion [No Muscle Atrophy] : normal bulk in all four extremities [Involuntary Movements] : no involuntary movements were seen [Motor Strength Upper Extremities Left] : there was weakness of the left upper extremity [Sensation Tactile Decrease] : light touch was intact [Dysdiadochokinesia On The Left] : present on the left side [Coordination - Dysmetria Impaired Finger-to-Nose Left Only] : present on the left side [Sclera] : the sclera and conjunctiva were normal [Extraocular Movements] : extraocular movements were intact [Oropharynx] : the oropharynx was normal [Respiration, Rhythm And Depth] : normal respiratory rhythm and effort [Skin Color & Pigmentation] : normal skin color and pigmentation [Motor Strength Lower Extremities Bilaterally] : strength was normal in both lower extremities [Past-pointing] : there was no past-pointing [Tremor] : no tremor present [FreeTextEntry5] : mild left facial droop - unchanged  [FreeTextEntry6] : weak hand  LUE. LUE strength 4/5.  [FreeTextEntry7] : decreased sensation L facial  [FreeTextEntry8] : gait steady [FreeTextEntry1] : edema b/l LE (L>R)

## 2019-09-17 ENCOUNTER — APPOINTMENT (OUTPATIENT)
Dept: ORTHOPEDIC SURGERY | Facility: CLINIC | Age: 79
End: 2019-09-17
Payer: MEDICARE

## 2019-09-17 DIAGNOSIS — M17.11 UNILATERAL PRIMARY OSTEOARTHRITIS, RIGHT KNEE: ICD-10-CM

## 2019-09-17 PROCEDURE — 20610 DRAIN/INJ JOINT/BURSA W/O US: CPT | Mod: RT

## 2019-09-17 PROCEDURE — 73562 X-RAY EXAM OF KNEE 3: CPT | Mod: RT

## 2019-09-17 PROCEDURE — 99213 OFFICE O/P EST LOW 20 MIN: CPT | Mod: 25

## 2019-09-17 NOTE — HISTORY OF PRESENT ILLNESS
[de-identified] : 77 y/o M presents for f.u R knee pain x years. He was here in 2016, had cortisone injection with relief. He goes to the gym, plays baseball. Pain with increased activity. He describes it as aching, diffuse throughout knee, no swelling. Denies numbness/tingling, injury, instability,weakness. requesting repeat injection. \par

## 2019-09-17 NOTE — PHYSICAL EXAM
[de-identified] : Right knee exam\par Skin: Clean, dry, intact\par Inspection: No obvious malalignment, no masses, no swelling, no effusion.\par Tenderness: - MJLT. No LJLT. No tenderness over the medial and lateral patella facets. No ttp medial/lateral epicondyle, patella tendon, tibial tubercle, pes anserinus, or proximal fibula.There is tenderness of the medial proximal tibia in the region of the MCL insertion\par ROM:0-140\par Stability: Stable to varus, valgus, lachman testing. Negative anterior/posterior drawer.\par Additional tests: Negative McMurrays test, Negative patellar grind test. \par Strength: 5/5 Q/H/TA/GS/EHL, no atrophy\par Neuro: In tact to light touch throughout in dp/sp/tib/haylie/saph nerve districutions, DTR's normal\par Pulses: 2+ DP/PT pulses. \par  [de-identified] : \par The following radiographs were ordered and read by me during this patients visit. I reviewed each radiograph in detail with the patient and discussed the findings as highlighted below. \par \par 3 views right knee were obtained today. Mild arthritis atrophic scarring there is no fracture\par \par

## 2019-10-14 LAB
BASOPHILS # BLD AUTO: 0.03 K/UL
BASOPHILS NFR BLD AUTO: 0.6 %
EOSINOPHIL # BLD AUTO: 0.07 K/UL
EOSINOPHIL NFR BLD AUTO: 1.4 %
HCT VFR BLD CALC: 37.6 %
HGB BLD-MCNC: 12.4 G/DL
IMM GRANULOCYTES NFR BLD AUTO: 0.2 %
LYMPHOCYTES # BLD AUTO: 1.29 K/UL
LYMPHOCYTES NFR BLD AUTO: 25.6 %
MAN DIFF?: NORMAL
MCHC RBC-ENTMCNC: 33 GM/DL
MCHC RBC-ENTMCNC: 33.6 PG
MCV RBC AUTO: 101.9 FL
MONOCYTES # BLD AUTO: 0.45 K/UL
MONOCYTES NFR BLD AUTO: 8.9 %
NEUTROPHILS # BLD AUTO: 3.19 K/UL
NEUTROPHILS NFR BLD AUTO: 63.3 %
PLATELET # BLD AUTO: 169 K/UL
RBC # BLD: 3.69 M/UL
RBC # FLD: 13.2 %
WBC # FLD AUTO: 5.04 K/UL

## 2019-10-17 ENCOUNTER — APPOINTMENT (OUTPATIENT)
Dept: ULTRASOUND IMAGING | Facility: CLINIC | Age: 79
End: 2019-10-17
Payer: MEDICARE

## 2019-10-17 ENCOUNTER — APPOINTMENT (OUTPATIENT)
Dept: CT IMAGING | Facility: CLINIC | Age: 79
End: 2019-10-17
Payer: MEDICARE

## 2019-10-17 ENCOUNTER — OUTPATIENT (OUTPATIENT)
Dept: OUTPATIENT SERVICES | Facility: HOSPITAL | Age: 79
LOS: 1 days | End: 2019-10-17
Payer: MEDICARE

## 2019-10-17 DIAGNOSIS — Z00.8 ENCOUNTER FOR OTHER GENERAL EXAMINATION: ICD-10-CM

## 2019-10-17 DIAGNOSIS — Z98.890 OTHER SPECIFIED POSTPROCEDURAL STATES: Chronic | ICD-10-CM

## 2019-10-17 DIAGNOSIS — D17.0 BENIGN LIPOMATOUS NEOPLASM OF SKIN AND SUBCUTANEOUS TISSUE OF HEAD, FACE AND NECK: Chronic | ICD-10-CM

## 2019-10-17 DIAGNOSIS — Z90.89 ACQUIRED ABSENCE OF OTHER ORGANS: Chronic | ICD-10-CM

## 2019-10-17 DIAGNOSIS — Z90.79 ACQUIRED ABSENCE OF OTHER GENITAL ORGAN(S): Chronic | ICD-10-CM

## 2019-10-17 DIAGNOSIS — Z98.41 CATARACT EXTRACTION STATUS, RIGHT EYE: Chronic | ICD-10-CM

## 2019-10-17 DIAGNOSIS — C44.711 BASAL CELL CARCINOMA OF SKIN OF UNSPECIFIED LOWER LIMB, INCLUDING HIP: Chronic | ICD-10-CM

## 2019-10-17 PROCEDURE — 93970 EXTREMITY STUDY: CPT

## 2019-10-17 PROCEDURE — 71275 CT ANGIOGRAPHY CHEST: CPT

## 2019-10-17 PROCEDURE — 82565 ASSAY OF CREATININE: CPT

## 2019-10-17 PROCEDURE — 71275 CT ANGIOGRAPHY CHEST: CPT | Mod: 26

## 2019-10-17 PROCEDURE — 93970 EXTREMITY STUDY: CPT | Mod: 26

## 2019-10-21 ENCOUNTER — APPOINTMENT (OUTPATIENT)
Dept: NEUROLOGY | Facility: CLINIC | Age: 79
End: 2019-10-21
Payer: MEDICARE

## 2019-10-21 VITALS
WEIGHT: 198 LBS | DIASTOLIC BLOOD PRESSURE: 80 MMHG | BODY MASS INDEX: 27.72 KG/M2 | HEIGHT: 71 IN | RESPIRATION RATE: 16 BRPM | SYSTOLIC BLOOD PRESSURE: 122 MMHG | OXYGEN SATURATION: 96 % | HEART RATE: 61 BPM

## 2019-10-21 PROCEDURE — 99214 OFFICE O/P EST MOD 30 MIN: CPT

## 2019-10-21 RX ORDER — DEXAMETHASONE 1 MG/1
1 TABLET ORAL
Qty: 30 | Refills: 2 | Status: DISCONTINUED | COMMUNITY
Start: 2019-02-27 | End: 2019-10-21

## 2019-10-21 RX ORDER — ENOXAPARIN SODIUM 150 MG/ML
120 INJECTION SUBCUTANEOUS
Qty: 30 | Refills: 6 | Status: DISCONTINUED | COMMUNITY
Start: 2019-08-12 | End: 2019-10-21

## 2019-10-21 RX ORDER — ENOXAPARIN SODIUM 150 MG/ML
120 INJECTION SUBCUTANEOUS
Qty: 3 | Refills: 5 | Status: DISCONTINUED | COMMUNITY
Start: 2019-05-20 | End: 2019-10-21

## 2019-10-21 NOTE — DISCUSSION/SUMMARY
[FreeTextEntry1] : PE/DVT - He has been on Lovenox for 6 months. Dopplers and CTA negative for clots. May stop Lovenox. \par \par Brain tumor - Clinically stable and tolerating temozolomide well. To continue with tmz cycle #10 to begin tonight, dose remains 400mg. Counts have been stable. \par \par Arthritis - He may try Tylenol or Advil for pain. \par \par DISPO - All questions answered. Follow-up visit with MRI in 1 month. \par \par

## 2019-10-21 NOTE — PHYSICAL EXAM
[General Appearance - Alert] : alert [General Appearance - In No Acute Distress] : in no acute distress [Oriented To Time, Place, And Person] : oriented to person, place, and time [Impaired Insight] : insight and judgment were intact [Affect] : the affect was normal [Mood] : the mood was normal [Memory Recent] : recent memory was not impaired [Memory Remote] : remote memory was not impaired [Person] : oriented to person [Place] : oriented to place [Time] : oriented to time [Short Term Intact] : short term memory intact [Remote Intact] : remote memory intact [Registration Intact] : recent registration memory intact [Span Intact] : the attention span was normal [Concentration Intact] : normal concentrating ability [Visual Intact] : visual attention was ~T not ~L decreased [Naming Objects] : no difficulty naming common objects [Repeating Phrases] : no difficulty repeating a phrase [Fluency] : fluency intact [Comprehension] : comprehension intact [Reading] : reading intact [Current Events] : adequate knowledge of current events [Past History] : adequate knowledge of personal past history [Vocabulary] : adequate range of vocabulary [Cranial Nerves Optic (II)] : visual acuity intact bilaterally,  visual fields full to confrontation, pupils equal round and reactive to light [Cranial Nerves Oculomotor (III)] : extraocular motion intact [Cranial Nerves Trigeminal (V)] : facial sensation intact symmetrically [Cranial Nerves Vestibulocochlear (VIII)] : hearing was intact bilaterally [Cranial Nerves Glossopharyngeal (IX)] : tongue and palate midline [Cranial Nerves Accessory (XI - Cranial And Spinal)] : head turning and shoulder shrug symmetric [Cranial Nerves Hypoglossal (XII)] : there was no tongue deviation with protrusion [Motor Tone] : muscle tone was normal in all four extremities [Involuntary Movements] : no involuntary movements were seen [No Muscle Atrophy] : normal bulk in all four extremities [Motor Strength Upper Extremities Left] : there was weakness of the left upper extremity [Sensation Tactile Decrease] : light touch was intact [Dysdiadochokinesia On The Left] : present on the left side [Coordination - Dysmetria Impaired Finger-to-Nose Left Only] : present on the left side [Sclera] : the sclera and conjunctiva were normal [Extraocular Movements] : extraocular movements were intact [Respiration, Rhythm And Depth] : normal respiratory rhythm and effort [Skin Color & Pigmentation] : normal skin color and pigmentation [Motor Strength Lower Extremities Bilaterally] : strength was normal in both lower extremities [Tremor] : no tremor present [Past-pointing] : there was no past-pointing [FreeTextEntry5] : mild left facial droop - unchanged  [FreeTextEntry6] : weak hand  LUE. LUE strength 4/5.  [FreeTextEntry8] : gait steady [FreeTextEntry7] : decreased sensation L facial  [FreeTextEntry1] : edema b/l LE (L>R)

## 2019-10-21 NOTE — HISTORY OF PRESENT ILLNESS
[FreeTextEntry1] : This is a pleasant 78 year old man with right frontal GBM. 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, MGMT unmethylated \par - completed chemoradiation - 8/31/18 - 10/12/18\par - 11/12/18 - temozolomide cycle #1, dose 320mg (150mg/m2) \par - 12/10/18 - temozolomide cycle #2, dose 400mg \par - 12/10/18 - presented to ED s/p fall and unable to get up; imaging with increased cerebral edema \par - 1/10/19 - temozolomide cycle #3, dose 400mg \par - POD \par - 2/7/19 - s/p re-resection ([facility]); PATH: recurrent GBM, 30-40% necrosis \par - 3/11/19 - temozolomide cycle #4, dose 400mg \par - 3/13/19 - started Avastin infusions q2 weeks \par - 4/11/19 - hospitalized with b/l PE \par - 5/26/19 - temozolomide cycle #5, dose 400mg \par - 6/23/19 - temozolomide cycle #6, dose 400mg \par - 7/22/19 - temozolomide cycle #7, dose 400mg \par - 8/26/19 - temozolomide cycle #8, dose 400mg\par - 9/23/19 - temozolomide cycle #9, dose 400mg \par \par He presents today for routine follow-up, accompanied by his son. \par \par He has been on Lovenox since 4/2019 for DVT/PE. On 10/17, he had repeat dopplers and CT angio, negative for DVT and PE. \par \par He is feeling well and staying active. He goes to the gym and uses the stationary bike. \par He continues to have LUE weakness and decreased /coordination and edema of the LLE and LUE. Gait is steady. He feels his legs are not as strong as he would like them to be. He recently had a steroid injection for arthritis in R knee with some relief. \par \par No headaches, no n/v, no diplopia. \par \par No seizures or seizure-like symptoms on Keppra 1000mg BID. \par \par Tapered off decadron in August. \par \par Tolerating temozolomide well. \par \par \par \par

## 2019-11-12 ENCOUNTER — LABORATORY RESULT (OUTPATIENT)
Age: 79
End: 2019-11-12

## 2019-11-13 LAB
BASOPHILS # BLD AUTO: 0.01 K/UL
BASOPHILS NFR BLD AUTO: 0.3 %
EOSINOPHIL # BLD AUTO: 0.09 K/UL
EOSINOPHIL NFR BLD AUTO: 2.5 %
HCT VFR BLD CALC: 37 %
HGB BLD-MCNC: 12.4 G/DL
IMM GRANULOCYTES NFR BLD AUTO: 0 %
LYMPHOCYTES # BLD AUTO: 1.19 K/UL
LYMPHOCYTES NFR BLD AUTO: 32.7 %
MAN DIFF?: NORMAL
MCHC RBC-ENTMCNC: 33.2 PG
MCHC RBC-ENTMCNC: 33.5 GM/DL
MCV RBC AUTO: 99.2 FL
MONOCYTES # BLD AUTO: 0.29 K/UL
MONOCYTES NFR BLD AUTO: 8 %
NEUTROPHILS # BLD AUTO: 2.06 K/UL
NEUTROPHILS NFR BLD AUTO: 56.5 %
PLATELET # BLD AUTO: 162 K/UL
RBC # BLD: 3.73 M/UL
RBC # FLD: 12.3 %
WBC # FLD AUTO: 3.64 K/UL

## 2019-11-22 ENCOUNTER — OUTPATIENT (OUTPATIENT)
Dept: OUTPATIENT SERVICES | Facility: HOSPITAL | Age: 79
LOS: 1 days | End: 2019-11-22
Payer: MEDICARE

## 2019-11-22 ENCOUNTER — APPOINTMENT (OUTPATIENT)
Dept: MRI IMAGING | Facility: CLINIC | Age: 79
End: 2019-11-22
Payer: MEDICARE

## 2019-11-22 DIAGNOSIS — Z98.890 OTHER SPECIFIED POSTPROCEDURAL STATES: Chronic | ICD-10-CM

## 2019-11-22 DIAGNOSIS — C44.711 BASAL CELL CARCINOMA OF SKIN OF UNSPECIFIED LOWER LIMB, INCLUDING HIP: Chronic | ICD-10-CM

## 2019-11-22 DIAGNOSIS — Z90.89 ACQUIRED ABSENCE OF OTHER ORGANS: Chronic | ICD-10-CM

## 2019-11-22 DIAGNOSIS — C71.9 MALIGNANT NEOPLASM OF BRAIN, UNSPECIFIED: ICD-10-CM

## 2019-11-22 DIAGNOSIS — Z98.41 CATARACT EXTRACTION STATUS, RIGHT EYE: Chronic | ICD-10-CM

## 2019-11-22 DIAGNOSIS — Z90.79 ACQUIRED ABSENCE OF OTHER GENITAL ORGAN(S): Chronic | ICD-10-CM

## 2019-11-22 DIAGNOSIS — D17.0 BENIGN LIPOMATOUS NEOPLASM OF SKIN AND SUBCUTANEOUS TISSUE OF HEAD, FACE AND NECK: Chronic | ICD-10-CM

## 2019-11-22 DIAGNOSIS — G93.9 DISORDER OF BRAIN, UNSPECIFIED: ICD-10-CM

## 2019-11-22 PROCEDURE — 70553 MRI BRAIN STEM W/O & W/DYE: CPT | Mod: 26

## 2019-11-22 PROCEDURE — 70553 MRI BRAIN STEM W/O & W/DYE: CPT

## 2019-11-22 PROCEDURE — A9585: CPT

## 2019-11-24 ENCOUNTER — INPATIENT (INPATIENT)
Facility: HOSPITAL | Age: 79
LOS: 2 days | Discharge: HOME | DRG: 864 | End: 2019-11-27
Attending: HOSPITALIST | Admitting: HOSPITALIST
Payer: MEDICARE

## 2019-11-24 VITALS
OXYGEN SATURATION: 95 % | TEMPERATURE: 103 F | SYSTOLIC BLOOD PRESSURE: 116 MMHG | RESPIRATION RATE: 20 BRPM | DIASTOLIC BLOOD PRESSURE: 50 MMHG | HEART RATE: 87 BPM

## 2019-11-24 DIAGNOSIS — R53.1 WEAKNESS: ICD-10-CM

## 2019-11-24 DIAGNOSIS — I10 ESSENTIAL (PRIMARY) HYPERTENSION: ICD-10-CM

## 2019-11-24 DIAGNOSIS — Z98.890 OTHER SPECIFIED POSTPROCEDURAL STATES: Chronic | ICD-10-CM

## 2019-11-24 DIAGNOSIS — C71.9 MALIGNANT NEOPLASM OF BRAIN, UNSPECIFIED: ICD-10-CM

## 2019-11-24 DIAGNOSIS — E78.5 HYPERLIPIDEMIA, UNSPECIFIED: ICD-10-CM

## 2019-11-24 DIAGNOSIS — G40.909 EPILEPSY, UNSPECIFIED, NOT INTRACTABLE, WITHOUT STATUS EPILEPTICUS: ICD-10-CM

## 2019-11-24 DIAGNOSIS — C44.711 BASAL CELL CARCINOMA OF SKIN OF UNSPECIFIED LOWER LIMB, INCLUDING HIP: Chronic | ICD-10-CM

## 2019-11-24 DIAGNOSIS — Z90.89 ACQUIRED ABSENCE OF OTHER ORGANS: Chronic | ICD-10-CM

## 2019-11-24 DIAGNOSIS — R50.9 FEVER, UNSPECIFIED: ICD-10-CM

## 2019-11-24 DIAGNOSIS — R26.81 UNSTEADINESS ON FEET: ICD-10-CM

## 2019-11-24 DIAGNOSIS — R09.89 OTHER SPECIFIED SYMPTOMS AND SIGNS INVOLVING THE CIRCULATORY AND RESPIRATORY SYSTEMS: ICD-10-CM

## 2019-11-24 DIAGNOSIS — Z90.79 ACQUIRED ABSENCE OF OTHER GENITAL ORGAN(S): Chronic | ICD-10-CM

## 2019-11-24 DIAGNOSIS — Z29.9 ENCOUNTER FOR PROPHYLACTIC MEASURES, UNSPECIFIED: ICD-10-CM

## 2019-11-24 DIAGNOSIS — D17.0 BENIGN LIPOMATOUS NEOPLASM OF SKIN AND SUBCUTANEOUS TISSUE OF HEAD, FACE AND NECK: Chronic | ICD-10-CM

## 2019-11-24 DIAGNOSIS — Z98.41 CATARACT EXTRACTION STATUS, RIGHT EYE: Chronic | ICD-10-CM

## 2019-11-24 LAB
ALBUMIN SERPL ELPH-MCNC: 4.1 G/DL — SIGNIFICANT CHANGE UP (ref 3.3–5)
ALP SERPL-CCNC: 39 U/L — LOW (ref 40–120)
ALT FLD-CCNC: 19 U/L — SIGNIFICANT CHANGE UP (ref 10–45)
ANION GAP SERPL CALC-SCNC: 15 MMOL/L — SIGNIFICANT CHANGE UP (ref 5–17)
APPEARANCE UR: CLEAR — SIGNIFICANT CHANGE UP
APTT BLD: 26.3 SEC — LOW (ref 27.5–36.3)
AST SERPL-CCNC: 14 U/L — SIGNIFICANT CHANGE UP (ref 10–40)
BASOPHILS # BLD AUTO: 0 K/UL — SIGNIFICANT CHANGE UP (ref 0–0.2)
BASOPHILS NFR BLD AUTO: 0 % — SIGNIFICANT CHANGE UP (ref 0–2)
BILIRUB SERPL-MCNC: 0.8 MG/DL — SIGNIFICANT CHANGE UP (ref 0.2–1.2)
BILIRUB UR-MCNC: NEGATIVE — SIGNIFICANT CHANGE UP
BLD GP AB SCN SERPL QL: NEGATIVE — SIGNIFICANT CHANGE UP
BUN SERPL-MCNC: 25 MG/DL — HIGH (ref 7–23)
CALCIUM SERPL-MCNC: 9.8 MG/DL — SIGNIFICANT CHANGE UP (ref 8.4–10.5)
CHLORIDE SERPL-SCNC: 99 MMOL/L — SIGNIFICANT CHANGE UP (ref 96–108)
CK MB CFR SERPL CALC: 1 NG/ML — SIGNIFICANT CHANGE UP (ref 0–6.7)
CK SERPL-CCNC: 61 U/L — SIGNIFICANT CHANGE UP (ref 30–200)
CO2 SERPL-SCNC: 24 MMOL/L — SIGNIFICANT CHANGE UP (ref 22–31)
COLOR SPEC: YELLOW — SIGNIFICANT CHANGE UP
CREAT SERPL-MCNC: 1.12 MG/DL — SIGNIFICANT CHANGE UP (ref 0.5–1.3)
DIFF PNL FLD: NEGATIVE — SIGNIFICANT CHANGE UP
EOSINOPHIL # BLD AUTO: 0 K/UL — SIGNIFICANT CHANGE UP (ref 0–0.5)
EOSINOPHIL NFR BLD AUTO: 0 % — SIGNIFICANT CHANGE UP (ref 0–6)
GLUCOSE SERPL-MCNC: 120 MG/DL — HIGH (ref 70–99)
GLUCOSE UR QL: NEGATIVE — SIGNIFICANT CHANGE UP
HCT VFR BLD CALC: 32.9 % — LOW (ref 39–50)
HGB BLD-MCNC: 11.7 G/DL — LOW (ref 13–17)
INR BLD: 1.19 RATIO — HIGH (ref 0.88–1.16)
KETONES UR-MCNC: NEGATIVE — SIGNIFICANT CHANGE UP
LACTATE BLDV-MCNC: 1.4 MMOL/L — SIGNIFICANT CHANGE UP (ref 0.7–2)
LACTATE BLDV-MCNC: 2.7 MMOL/L — HIGH (ref 0.7–2)
LEUKOCYTE ESTERASE UR-ACNC: ABNORMAL
LYMPHOCYTES # BLD AUTO: 0.28 K/UL — LOW (ref 1–3.3)
LYMPHOCYTES # BLD AUTO: 2.7 % — LOW (ref 13–44)
MCHC RBC-ENTMCNC: 34.3 PG — HIGH (ref 27–34)
MCHC RBC-ENTMCNC: 35.6 GM/DL — SIGNIFICANT CHANGE UP (ref 32–36)
MCV RBC AUTO: 96.5 FL — SIGNIFICANT CHANGE UP (ref 80–100)
MONOCYTES # BLD AUTO: 0.55 K/UL — SIGNIFICANT CHANGE UP (ref 0–0.9)
MONOCYTES NFR BLD AUTO: 5.3 % — SIGNIFICANT CHANGE UP (ref 2–14)
NEUTROPHILS # BLD AUTO: 9.62 K/UL — HIGH (ref 1.8–7.4)
NEUTROPHILS NFR BLD AUTO: 91.1 % — HIGH (ref 43–77)
NITRITE UR-MCNC: NEGATIVE — SIGNIFICANT CHANGE UP
PH UR: 5.5 — SIGNIFICANT CHANGE UP (ref 5–8)
PLATELET # BLD AUTO: 124 K/UL — LOW (ref 150–400)
POTASSIUM SERPL-MCNC: 3.8 MMOL/L — SIGNIFICANT CHANGE UP (ref 3.5–5.3)
POTASSIUM SERPL-SCNC: 3.8 MMOL/L — SIGNIFICANT CHANGE UP (ref 3.5–5.3)
PROT SERPL-MCNC: 6.8 G/DL — SIGNIFICANT CHANGE UP (ref 6–8.3)
PROT UR-MCNC: ABNORMAL
PROTHROM AB SERPL-ACNC: 13.6 SEC — HIGH (ref 10–12.9)
RAPID RVP RESULT: SIGNIFICANT CHANGE UP
RBC # BLD: 3.41 M/UL — LOW (ref 4.2–5.8)
RBC # FLD: 12.4 % — SIGNIFICANT CHANGE UP (ref 10.3–14.5)
RH IG SCN BLD-IMP: POSITIVE — SIGNIFICANT CHANGE UP
SODIUM SERPL-SCNC: 138 MMOL/L — SIGNIFICANT CHANGE UP (ref 135–145)
SP GR SPEC: 1.03 — HIGH (ref 1.01–1.02)
TROPONIN T, HIGH SENSITIVITY RESULT: 18 NG/L — SIGNIFICANT CHANGE UP (ref 0–51)
UROBILINOGEN FLD QL: NEGATIVE — SIGNIFICANT CHANGE UP
WBC # BLD: 10.46 K/UL — SIGNIFICANT CHANGE UP (ref 3.8–10.5)
WBC # FLD AUTO: 10.46 K/UL — SIGNIFICANT CHANGE UP (ref 3.8–10.5)

## 2019-11-24 PROCEDURE — 72125 CT NECK SPINE W/O DYE: CPT | Mod: 26

## 2019-11-24 PROCEDURE — 93971 EXTREMITY STUDY: CPT | Mod: 26

## 2019-11-24 PROCEDURE — 70496 CT ANGIOGRAPHY HEAD: CPT | Mod: 26

## 2019-11-24 PROCEDURE — 99285 EMERGENCY DEPT VISIT HI MDM: CPT

## 2019-11-24 PROCEDURE — 70498 CT ANGIOGRAPHY NECK: CPT | Mod: 26

## 2019-11-24 PROCEDURE — 71045 X-RAY EXAM CHEST 1 VIEW: CPT | Mod: 26

## 2019-11-24 PROCEDURE — 70450 CT HEAD/BRAIN W/O DYE: CPT | Mod: 26,59

## 2019-11-24 PROCEDURE — 99223 1ST HOSP IP/OBS HIGH 75: CPT | Mod: GC

## 2019-11-24 PROCEDURE — 12345: CPT | Mod: NC

## 2019-11-24 RX ORDER — CEFTRIAXONE 500 MG/1
1000 INJECTION, POWDER, FOR SOLUTION INTRAMUSCULAR; INTRAVENOUS EVERY 24 HOURS
Refills: 0 | Status: DISCONTINUED | OUTPATIENT
Start: 2019-11-25 | End: 2019-11-27

## 2019-11-24 RX ORDER — CEFTRIAXONE 500 MG/1
1000 INJECTION, POWDER, FOR SOLUTION INTRAMUSCULAR; INTRAVENOUS ONCE
Refills: 0 | Status: COMPLETED | OUTPATIENT
Start: 2019-11-24 | End: 2019-11-24

## 2019-11-24 RX ORDER — LEVETIRACETAM 250 MG/1
1000 TABLET, FILM COATED ORAL
Refills: 0 | Status: DISCONTINUED | OUTPATIENT
Start: 2019-11-24 | End: 2019-11-27

## 2019-11-24 RX ORDER — ACETAMINOPHEN 500 MG
975 TABLET ORAL ONCE
Refills: 0 | Status: COMPLETED | OUTPATIENT
Start: 2019-11-24 | End: 2019-11-24

## 2019-11-24 RX ORDER — ACETAMINOPHEN 500 MG
650 TABLET ORAL ONCE
Refills: 0 | Status: COMPLETED | OUTPATIENT
Start: 2019-11-24 | End: 2019-11-24

## 2019-11-24 RX ORDER — HEPARIN SODIUM 5000 [USP'U]/ML
5000 INJECTION INTRAVENOUS; SUBCUTANEOUS EVERY 8 HOURS
Refills: 0 | Status: DISCONTINUED | OUTPATIENT
Start: 2019-11-24 | End: 2019-11-27

## 2019-11-24 RX ORDER — ATORVASTATIN CALCIUM 80 MG/1
40 TABLET, FILM COATED ORAL AT BEDTIME
Refills: 0 | Status: DISCONTINUED | OUTPATIENT
Start: 2019-11-24 | End: 2019-11-27

## 2019-11-24 RX ORDER — SODIUM CHLORIDE 9 MG/ML
1000 INJECTION INTRAMUSCULAR; INTRAVENOUS; SUBCUTANEOUS ONCE
Refills: 0 | Status: COMPLETED | OUTPATIENT
Start: 2019-11-24 | End: 2019-11-24

## 2019-11-24 RX ADMIN — Medication 650 MILLIGRAM(S): at 18:23

## 2019-11-24 RX ADMIN — SODIUM CHLORIDE 1000 MILLILITER(S): 9 INJECTION INTRAMUSCULAR; INTRAVENOUS; SUBCUTANEOUS at 02:38

## 2019-11-24 RX ADMIN — HEPARIN SODIUM 5000 UNIT(S): 5000 INJECTION INTRAVENOUS; SUBCUTANEOUS at 21:43

## 2019-11-24 RX ADMIN — HEPARIN SODIUM 5000 UNIT(S): 5000 INJECTION INTRAVENOUS; SUBCUTANEOUS at 06:07

## 2019-11-24 RX ADMIN — LEVETIRACETAM 1000 MILLIGRAM(S): 250 TABLET, FILM COATED ORAL at 18:00

## 2019-11-24 RX ADMIN — LEVETIRACETAM 1000 MILLIGRAM(S): 250 TABLET, FILM COATED ORAL at 09:31

## 2019-11-24 RX ADMIN — Medication 975 MILLIGRAM(S): at 01:03

## 2019-11-24 RX ADMIN — ATORVASTATIN CALCIUM 40 MILLIGRAM(S): 80 TABLET, FILM COATED ORAL at 21:43

## 2019-11-24 RX ADMIN — CEFTRIAXONE 100 MILLIGRAM(S): 500 INJECTION, POWDER, FOR SOLUTION INTRAMUSCULAR; INTRAVENOUS at 02:37

## 2019-11-24 RX ADMIN — HEPARIN SODIUM 5000 UNIT(S): 5000 INJECTION INTRAVENOUS; SUBCUTANEOUS at 14:05

## 2019-11-24 NOTE — ED PROVIDER NOTE - PHYSICAL EXAMINATION
A&Ox3. Language fluent, comprehensive, interactive, w/ intact repetition. Attentive.   PERRL. EOMI. V1-V3 intact. Mild L. facial droop. tongue and uvula midline. Normal shrug.   Motor: LUE 4/5 (worse distally), LLE 4/5. LUE drift. Mild weakness 5-/5 LUE/LLE. 5-/5 weakness on RUE/RLE.   Sensation: intact to PP throughout  Coordination: No dysmetria on FNF or H2S bilaterally   Gait: Deferred

## 2019-11-24 NOTE — H&P ADULT - NSHPLABSRESULTS_GEN_ALL_CORE
11.7   10.46 )-----------( 124      ( 2019 00:23 )             32.9     2019 00:23    138    |  99     |  25     ----------------------------<  120    3.8     |  24     |  1.12     Ca    9.8        2019 00:23    TPro  6.8    /  Alb  4.1    /  TBili  0.8    /  DBili  x      /  AST  14     /  ALT  19     /  AlkPhos  39     2019 00:23    LIVER FUNCTIONS - ( 2019 00:23 )  Alb: 4.1 g/dL / Pro: 6.8 g/dL / ALK PHOS: 39 U/L / ALT: 19 U/L / AST: 14 U/L / GGT: x           PT/INR - ( 2019 00:23 )   PT: 13.6 sec;   INR: 1.19 ratio         PTT - ( 2019 00:23 )  PTT:26.3 sec  CAPILLARY BLOOD GLUCOSE        CARDIAC MARKERS ( 2019 00:23 )  x     / x     / 61 U/L / x     / 1.0 ng/mL      Urinalysis Basic - ( 2019 01:40 )    Color: Yellow / Appearance: Clear / S.032 / pH: x  Gluc: x / Ketone: Negative  / Bili: Negative / Urobili: Negative   Blood: x / Protein: 30 mg/dL / Nitrite: Negative   Leuk Esterase: Small / RBC: 3 /hpf / WBC 11 /HPF   Sq Epi: x / Non Sq Epi: 3 /hpf / Bacteria: Negative    < from: CT Angio Head w/ IV Cont (19 @ 00:57) >      IMPRESSION:     CTA Neck: Atherosclerosis of the carotid bifurcations resulting in mild  luminal stenosis at the proximal left internal carotid artery. No   arterial dissection within the cervical carotid or vertebral arteries.    CTA Head: No proximal large vessel occlusion.    CT cervical spine: Multilevel cervical spondylosis contributing to   varying degrees of moderate to severe neuroforaminal and spinal canal   stenoses as above.    < end of copied text > 11.7   10.46 )-----------( 124      ( 2019 00:23 )             32.9     2019 00:23    138    |  99     |  25     ----------------------------<  120    3.8     |  24     |  1.12     Ca    9.8        2019 00:23    TPro  6.8    /  Alb  4.1    /  TBili  0.8    /  DBili  x      /  AST  14     /  ALT  19     /  AlkPhos  39     2019 00:23    LIVER FUNCTIONS - ( 2019 00:23 )  Alb: 4.1 g/dL / Pro: 6.8 g/dL / ALK PHOS: 39 U/L / ALT: 19 U/L / AST: 14 U/L / GGT: x           PT/INR - ( 2019 00:23 )   PT: 13.6 sec;   INR: 1.19 ratio         PTT - ( 2019 00:23 )  PTT:26.3 sec  CAPILLARY BLOOD GLUCOSE        CARDIAC MARKERS ( 2019 00:23 )  x     / x     / 61 U/L / x     / 1.0 ng/mL      Urinalysis Basic - ( 2019 01:40 )    Color: Yellow / Appearance: Clear / S.032 / pH: x  Gluc: x / Ketone: Negative  / Bili: Negative / Urobili: Negative   Blood: x / Protein: 30 mg/dL / Nitrite: Negative   Leuk Esterase: Small / RBC: 3 /hpf / WBC 11 /HPF   Sq Epi: x / Non Sq Epi: 3 /hpf / Bacteria: Negative    CXR: Clear lungs     < from: CT Angio Head w/ IV Cont (19 @ 00:57) >      IMPRESSION:     CTA Neck: Atherosclerosis of the carotid bifurcations resulting in mild  luminal stenosis at the proximal left internal carotid artery. No   arterial dissection within the cervical carotid or vertebral arteries.    CTA Head: No proximal large vessel occlusion.    CT cervical spine: Multilevel cervical spondylosis contributing to   varying degrees of moderate to severe neuroforaminal and spinal canal   stenoses as above.    < end of copied text > 11.7   10.46 )-----------( 124      ( 2019 00:23 )             32.9     2019 00:23    138    |  99     |  25     ----------------------------<  120    3.8     |  24     |  1.12     Ca    9.8        2019 00:23    TPro  6.8    /  Alb  4.1    /  TBili  0.8    /  DBili  x      /  AST  14     /  ALT  19     /  AlkPhos  39     2019 00:23    LIVER FUNCTIONS - ( 2019 00:23 )  Alb: 4.1 g/dL / Pro: 6.8 g/dL / ALK PHOS: 39 U/L / ALT: 19 U/L / AST: 14 U/L / GGT: x           PT/INR - ( 2019 00:23 )   PT: 13.6 sec;   INR: 1.19 ratio         PTT - ( 2019 00:23 )  PTT:26.3 sec  CAPILLARY BLOOD GLUCOSE        CARDIAC MARKERS ( 2019 00:23 )  x     / x     / 61 U/L / x     / 1.0 ng/mL      Urinalysis Basic - ( 2019 01:40 )    Color: Yellow / Appearance: Clear / S.032 / pH: x  Gluc: x / Ketone: Negative  / Bili: Negative / Urobili: Negative   Blood: x / Protein: 30 mg/dL / Nitrite: Negative   Leuk Esterase: Small / RBC: 3 /hpf / WBC 11 /HPF   Sq Epi: x / Non Sq Epi: 3 /hpf / Bacteria: Negative    CXR personally reviewed: Clear lungs       From: CT Angio Head w/ IV Cont (19 @ 00:57)  IMPRESSION:     CTA Neck: Atherosclerosis of the carotid bifurcations resulting in mild  luminal stenosis at the proximal left internal carotid artery. No   arterial dissection within the cervical carotid or vertebral arteries.    CTA Head: No proximal large vessel occlusion.    CT cervical spine: Multilevel cervical spondylosis contributing to   varying degrees of moderate to severe neuroforaminal and spinal canal   stenoses as above.    EKG-sinus 1st avb

## 2019-11-24 NOTE — ED PROVIDER NOTE - CARE PLAN
Principal Discharge DX:	Weakness  Secondary Diagnosis:	Fever Principal Discharge DX:	UTI (urinary tract infection)  Secondary Diagnosis:	Fever

## 2019-11-24 NOTE — PROGRESS NOTE ADULT - ASSESSMENT
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 Admitted for weakness and fever.

## 2019-11-24 NOTE — H&P ADULT - PROBLEM SELECTOR PLAN 3
- S/p mechanical fall. No LOC  - CTA head: No acute pathology. CT cervical : No fracture or dislocation    - Get orthostatics  - Fall precautions   - PT consult. - Likely deconditioning vs. progression of malignancy. No focal deficits  - No seizures or seizure-like symptoms  - Neurology following

## 2019-11-24 NOTE — H&P ADULT - PROBLEM SELECTOR PLAN 1
- Likely deconditioning. No focal deficits  - No seizures or seizure-like symptoms  - Neurology following - S/p mechanical fall. No LOC  - CTA head: No acute pathology. CT cervical : No fracture or dislocation    - Get orthostatics  - Fall precautions   - PT consult.

## 2019-11-24 NOTE — H&P ADULT - NSICDXFAMILYHX_GEN_ALL_CORE_FT
FAMILY HISTORY:  Family history of diabetes mellitus, Father   Family history of lung cancer, mother , long standing smoker  Family history of prostate cancer in father,     Sibling  Still living? Yes, Estimated age: Age Unknown  Family history of breast cancer in mother, Age at diagnosis: Age Unknown

## 2019-11-24 NOTE — PROGRESS NOTE ADULT - SUBJECTIVE AND OBJECTIVE BOX
Laz Earl M.D. Pager Number [phone]    Patient is a 78y old  Male who presents with a chief complaint of Fall (2019 04:18)      SUBJECTIVE / OVERNIGHT EVENTS:  Pt seen and examined at bedside. No acute events overnight.  Pt denies cp, palpitations, sob, abd pain, N/V, fever, chills.    ROS:  All other review of systems negative    Allergies    No Known Allergies    Intolerances        MEDICATIONS  (STANDING):  atorvastatin 40 milliGRAM(s) Oral at bedtime  heparin  Injectable 5000 Unit(s) SubCutaneous every 8 hours  levETIRAcetam 1000 milliGRAM(s) Oral two times a day    MEDICATIONS  (PRN):      Vital Signs Last 24 Hrs  T(C): 37.8 (2019 09:30), Max: 39.3 (2019 00:30)  T(F): 100.1 (2019 09:30), Max: 102.7 (2019 00:30)  HR: 88 (2019 08:36) (78 - 94)  BP: 112/64 (2019 08:36) (100/52 - 122/60)  BP(mean): 72 (2019 00:30) (72 - 72)  RR: 18 (2019 08:36) (14 - 20)  SpO2: 95% (2019 08:36) (94% - 98%)  CAPILLARY BLOOD GLUCOSE        I&O's Summary    2019 07:  -  2019 07:00  --------------------------------------------------------  IN: 0 mL / OUT: 200 mL / NET: -200 mL    2019 07:  -  2019 15:20  --------------------------------------------------------  IN: 120 mL / OUT: 150 mL / NET: -30 mL        PHYSICAL EXAM:  GENERAL: NAD, in C-Collar   HEAD:  Atraumatic, Normocephalic  EYES: EOMI, PERRLA, conjunctiva and sclera clear  NECK: Supple, No JVD  CHEST/LUNG: Clear to auscultation bilaterally; No wheeze  HEART: Regular rate and rhythm; No murmurs, rubs, or gallops  ABDOMEN: Soft, Nontender, Nondistended; Bowel sounds present  EXTREMITIES:  2+ Peripheral Pulses, No clubbing, cyanosis. LLE swelling > RLE  NEUROLOGY: AAOx3, non-focal  PSYCH: calm  SKIN: No rashes or lesions    LABS:                        11.7   10.46 )-----------( 124      ( 2019 00:23 )             32.9         138  |  99  |  25<H>  ----------------------------<  120<H>  3.8   |  24  |  1.12    Ca    9.8      2019 00:23    TPro  6.8  /  Alb  4.1  /  TBili  0.8  /  DBili  x   /  AST  14  /  ALT  19  /  AlkPhos  39<L>  24    PT/INR - ( 2019 00:23 )   PT: 13.6 sec;   INR: 1.19 ratio         PTT - ( 2019 00:23 )  PTT:26.3 sec  CARDIAC MARKERS ( 2019 00:23 )  x     / x     / 61 U/L / x     / 1.0 ng/mL      Urinalysis Basic - ( 2019 01:40 )    Color: Yellow / Appearance: Clear / S.032 / pH: x  Gluc: x / Ketone: Negative  / Bili: Negative / Urobili: Negative   Blood: x / Protein: 30 mg/dL / Nitrite: Negative   Leuk Esterase: Small / RBC: 3 /hpf / WBC 11 /HPF   Sq Epi: x / Non Sq Epi: 3 /hpf / Bacteria: Negative        RADIOLOGY & ADDITIONAL TESTS:  Results Reviewed:   Imaging Personally Reviewed:  Electrocardiogram Personally Reviewed:    COORDINATION OF CARE:  Care Discussed with Consultants/Other Providers [Y/N]:  Prior or Outpatient Records Reviewed [Y/N]:

## 2019-11-24 NOTE — H&P ADULT - NSHPREVIEWOFSYSTEMS_GEN_ALL_CORE
CONSTITUTIONAL: +weakness  EYES/ENT: No visual changes;  No  throat pain   NECK: No pain   RESPIRATORY: No cough, wheezing, hemoptysis; No shortness of breath  CARDIOVASCULAR: No chest pain or palpitations  GASTROINTESTINAL: No abdominal or epigastric pain. No nausea, vomiting, or hematemesis; No diarrhea or constipation. No melena   GENITOURINARY: No dysuria or hematuria  NEUROLOGICAL: No numbness or weakness  SKIN: No itching, rashes CONSTITUTIONAL: +weakness  EYES/ENT: No visual changes;  No  throat pain   NECK: No pain   RESPIRATORY: No cough, wheezing, hemoptysis; No shortness of breath  CARDIOVASCULAR: No chest pain or palpitations  GASTROINTESTINAL: No abdominal or epigastric pain. No nausea, vomiting, or hematemesis; No diarrhea or constipation. No melena   GENITOURINARY: No dysuria or hematuria  NEUROLOGICAL: No numbness /tingling  SKIN: No itching, rashes

## 2019-11-24 NOTE — CONSULT NOTE ADULT - ASSESSMENT
78M w/ GBM (s/p resection 8/2018, cheomradiation, temozolomide x10), HTN, who presents with a fall w/ reportedly worsening LUE/LLE weakness, w/ no LOC. Exam w/ L. hemiparesis (appears unchanged from Dr. Magana office notes 10/2019). MRI brain on 11/24 appears stable.     Impression: Fall w/ no LOC or prodromal symptoms in setting of high fever likely mechanical in etiology. Given patients history of significant cervical spondylosis and complaints of bilateral LE weakness w/ worsening L. weakness, would r/o cord compression. Doubt CVA given unchanged exam and negative CTH/CTA. Doubt seizure. Doubt progression of disease.     Plan:   [] r/o cord compression   [] Admit to medicine for infectious workup   [] Draw Keppra level now  [] Keppra 1g BID   [] Infectious workup, DVT/PE workup (off Lovenox)  [] STAT CTH for any acute change in mental status or worsening weakness   [] Will contact Neuro-oncologist Dr. Del Cid in the AM 78M w/ GBM (s/p resection 8/2018, cheomradiation, temozolomide x10), HTN, who presents with a fall w/ reportedly worsening LUE/LLE weakness, w/ no LOC. Exam w/ L. hemiparesis (appears unchanged from Dr. Magana office notes 10/2019). MRI brain on 11/24 appears stable.     Impression: Fall w/ no LOC or prodromal symptoms in setting of high fever likely mechanical in etiology. Doubt CVA given unchanged exam and negative CTH/CTA. Doubt seizure. Doubt progression of disease.     Plan:   [] Admit to medicine for infectious workup   [] Draw Keppra level now  [] Keppra 1g BID   [] Infectious workup, DVT/PE workup (off Lovenox)  [] STAT CTH for any acute change in mental status or worsening weakness   [] Will contact Neuro-oncologist Dr. Del Cid in the AM 78M w/ GBM (s/p resection 8/2018, cheomradiation, temozolomide x10), HTN, who presents with a fall w/ reportedly worsening LUE/LLE weakness, w/ no LOC. Exam w/ L. hemiparesis (appears unchanged from Dr. Magana office notes 10/2019). MRI brain on 11/24 appears stable.     Impression: Fall w/ no LOC or prodromal symptoms in setting of high fever likely mechanical in etiology. Doubt CVA given unchanged exam and negative CTH/CTA. Doubt seizure. Doubt progression of disease. Doubt cord compression given negative neck pain, sensory level, bilateral involvement, saddle anesthesia, urinary incontinence     Plan:   [] Admit to medicine for infectious workup   [] Draw Keppra level now  [] Keppra 1g BID   [] Infectious workup, DVT/PE workup (off Lovenox)  [] STAT CTH for any acute change in mental status or worsening weakness   [] Will contact Neuro-oncologist Dr. Del Cid in the AM 78M w/ GBM (s/p resection 8/2018, cheomradiation, temozolomide x10), HTN, who presents with a fall w/ reportedly worsening LUE/LLE weakness, w/ no LOC. Exam w/ L. hemiparesis (appears unchanged from Dr. Magana office notes 10/2019). MRI brain on 11/24 appears stable.     Impression: Fall w/ no LOC or prodromal symptoms in setting of high fever likely mechanical in etiology. Doubt CVA given unchanged exam and negative CTH/CTA. Doubt seizure. Doubt progression of disease. Doubt cord compression given negative neck pain, sensory level, bilateral involvement, saddle anesthesia, urinary incontinence     Plan:   [] Admit to medicine for infectious workup   [] rEEG   [] Draw Keppra level now  [] Keppra 1g BID   [] Infectious workup, DVT/PE workup (off Lovenox)  [] STAT CTH for any acute change in mental status or worsening weakness   [] Will contact Neuro-oncologist Dr. Del Cid in the AM

## 2019-11-24 NOTE — H&P ADULT - ASSESSMENT
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 Admitted for weakness and fever.

## 2019-11-24 NOTE — ED ADULT NURSE NOTE - OBJECTIVE STATEMENT
78 year old A&Ox3 male BIB EMS for left sided facial droop and weakness since 8pm. PMH brain ca, not undergoing any treatment, DM2, HTN, HLD, prostate ca, Per EMS patient had mechanical trip and fall with his walker, denies LOC,  unknown anticoagulant use, full ROM of upper and lower extremities, -cspine tenderness. Per family after the fall they noticed the left sided facial droop. +weakness in upper  extremities, weakest in left arm. +drift noted to left arm. +left sided facial droop. Patient taken directly to CT, placed in c collar.  CM applied, EKG done.  Denies CP, SOB, n/v/d, fevers, chills, abdominal pain, urinary symptoms, numbness, tingling in upper and lower extremities, HA, blurry vision. VSS updated on plan of care. 78 year old A&Ox3 male BIB EMS for left sided facial droop and weakness since 8pm. PMH brain ca, not undergoing any treatment, DM2, HTN, HLD, prostate ca, Per EMS patient had mechanical trip and fall with his walker, denies LOC,  unknown anticoagulant use, full ROM of upper and lower extremities, -cspine tenderness. Per family after the fall they noticed the left sided facial droop. +weakness in upper  extremities, weakest in left arm. +drift noted to left arm. +left sided facial droop. Patient taken directly to CT, placed in c collar. Initial NIH 5 per Neuro team. See neuro flowsheet for further information. CM applied, EKG done.  Denies CP, SOB, n/v/d, fevers, chills, abdominal pain, urinary symptoms, numbness, tingling in upper and lower extremities, HA, blurry vision. VSS updated on plan of care. 78 year old A&Ox3 male BIB EMS for left sided facial droop and weakness since 8pm. Code stroke called at 0017. PMH brain ca, not undergoing any treatment, DM2, HTN, HLD, prostate ca, Per EMS patient had mechanical trip and fall with his walker, denies LOC,  unknown anticoagulant use, full ROM of upper and lower extremities, -cspine tenderness. Per family after the fall they noticed the left sided facial droop. +weakness in upper  extremities, weakest in left arm. +drift noted to left arm. +left sided facial droop. Patient taken directly to CT, placed in c collar. Initial NIH 5 per Neuro team. See neuro flowsheet for further information. CM applied, EKG done.  Denies CP, SOB, n/v/d, fevers, chills, abdominal pain, urinary symptoms, numbness, tingling in upper and lower extremities, HA, blurry vision. VSS updated on plan of care.

## 2019-11-24 NOTE — H&P ADULT - ATTENDING COMMENTS
Pt seen and examined, here with fever and s/p fall. Fall most likely related to worsening gait 2/2 progression of disease, however with significant fever concerning for infection. ? UTI as patient does have frequency but is also on diuretics. F/U cultures. Will cover with ceftriaxone for now.  Case d/w Resident physician Dr Connors

## 2019-11-24 NOTE — ED PROVIDER NOTE - OBJECTIVE STATEMENT
78M, hx brain Ca (GBM?), HTN HLD, prostate ca in past, basal cell carcinoma left leg in past, presents via EMS due to fall, weakness. Patient reports mechanical fall at home around 1 hr prior to ED arrival, was unable to stand. Was on floor for 1 hr. Denies hitting head, LOC. Denies fevers or chills, nausea or vomiting, headache, dizziness, lightheadedness, blurry vision, chest pain, cough, shortness of breath, abdominal pain, incontinence, diarrhea, dysuria. Denies neck or back pain. Reports chronic weakness to BL lower extremities. On arrival, code stroke called due to concern for left sided weakness (LUE) and left facial droop, in context of floor. Unknown if acute or chronic on arrival. Patient denies voice changes, hoarseness, numbness to extremities, confusion, dizziness, lightheadedness, blurry vision. Denies prior hx CVA or TIA.  No reported blood thinners.

## 2019-11-24 NOTE — PROGRESS NOTE ADULT - PROBLEM SELECTOR PLAN 1
- S/p mechanical fall. No LOC  - CTA head: No acute pathology. CT cervical : No fracture or dislocation    - Follow up with orthostatics  - Fall precautions   - PT consult.

## 2019-11-24 NOTE — H&P ADULT - NSHPPHYSICALEXAM_GEN_ALL_CORE
Vital Signs (24 Hrs):  T(C): 37.5 (11-24-19 @ 02:45), Max: 39.3 (11-24-19 @ 00:30)  HR: 85 (11-24-19 @ 02:45) (80 - 90)  BP: 108/55 (11-24-19 @ 02:45) (100/62 - 116/50)  RR: 16 (11-24-19 @ 02:45) (14 - 20)  SpO2: 96% (11-24-19 @ 02:45) (95% - 97%)  Wt(kg): --    GENERAL: NAD  HEAD:  Atraumatic, Normocephalic  EYES: EOMI, conjunctiva and sclera clear  NECK: Supple, No JVD  CHEST/LUNG: Clear to auscultation bilaterally; No wheeze  HEART: Regular rate and rhythm; No murmurs, rubs, or gallops  ABDOMEN: Soft, Nontender, Nondistended; Bowel sounds present  EXTREMITIES:  2+ Peripheral Pulses, 1+ LE edema, erythema and warmth on left LE  PSYCH: AAOx3  NEUROLOGY: 4/5 muscle strength on LUE, 4+ LLE. Sensation intact. No focal deficits   SKIN: No rashes or lesions Vital Signs (24 Hrs):  T(C): 37.5 (11-24-19 @ 02:45), Max: 39.3 (11-24-19 @ 00:30)  HR: 85 (11-24-19 @ 02:45) (80 - 90)  BP: 108/55 (11-24-19 @ 02:45) (100/62 - 116/50)  RR: 16 (11-24-19 @ 02:45) (14 - 20)  SpO2: 96% (11-24-19 @ 02:45) (95% - 97%)  Wt(kg): --    GENERAL: NAD, on c-collar   HEAD:  Atraumatic, Normocephalic  EYES: EOMI, conjunctiva and sclera clear  NECK: Supple, No JVD  CHEST/LUNG: Clear to auscultation bilaterally; No wheeze  HEART: Regular rate and rhythm; No murmurs, rubs, or gallops  ABDOMEN: Soft, Nontender, Nondistended; Bowel sounds present  EXTREMITIES:  2+ Peripheral Pulses, 1+ LE edema, erythema and warmth on left LE  PSYCH: AAOx3  NEUROLOGY: 4/5 muscle strength on LUE, 4+ LLE. Sensation intact. No focal deficits   SKIN: No rashes or lesions

## 2019-11-24 NOTE — H&P ADULT - NSICDXPASTSURGICALHX_GEN_ALL_CORE_FT
PAST SURGICAL HISTORY:  Basal cell carcinoma (BCC) of lower leg s/p resection    H/O bilateral cataract extraction 3-4 years ago    History of tonsillectomy at age 30    Lipoma of neck s/p resection at age 30    S/P brain surgery 8/2018    S/P colonoscopic polypectomy 3 years benign polyps    S/P prostatectomy 1995    S/P tonsillectomy age 30

## 2019-11-24 NOTE — CONSULT NOTE ADULT - SUBJECTIVE AND OBJECTIVE BOX
HPI:    78M w/ GBM, HTN, who presents with a fall     Patient states he was walking at his home when he "lost my footing" and fell to the ground. States he was unable to get up afterwards. Denies hitting head. Denies LOC. Denies urinary incontinence. Denies any neck/back pain. Denies any convulsions, tongue biting, urinary incontinence. Denies any chest pain, palpitations, SOB, diaphoresis.     Code stroke called at 0018 . NIHSS 5 (L. side weakness that in retrospect seems chronic). tpa not given as patient has known GBM. CTH/CTA negative. Denies changes in speech, vision, hearing, swallowing, voice quality, numbness/tingling, balance/room-spinning sensations. No previous history of CVA/TIA. No history of arrythmia. No AC.     Patient initially presented in 2018 w/ L. side facial weakness and dysphagia. 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 Since then he has undergone ~6 weeks of chemoradiation (2018 – 10/2018), 10 cycles of temozolomide. Previously w/ focal seizures (never generalizing) controlled on Keppra 1g BID. He was tapered off decadron in 2019.     Patient previously on Lovenox for DVT/PE, however was discontinued on 10/17 after having negative dopplers and CTA. He was started on ASA at this time.      Of note, patient had routine MRI brain on  which has yet to be read by neuroradiologist, although looks stable on my read.     (Stroke only)  NIHSS: 5  MRS: 0    A 10-system ROS was performed and is negative except for those items noted above and/or in the HPI.    PAST MEDICAL & SURGICAL HISTORY:  Fall, sequela  Bilateral hearing loss, unspecified hearing loss type   activity: [employer] -    [place]  Type 2 diabetes mellitus without complication, without long-term current use of insulin: stop oral meds  3 weeks ago  Glioblastoma: biopsy 2018  surgery  6 weeks radiation  35 days of oral chemotherapy last 3 weeks  Basal cell carcinoma (BCC) of left lower leg: s/p resection  right lower leg top  left lower leg  Prostate cancer: s/p radical prostatectomy   HLD (hyperlipidemia)  HTN (hypertension)  S/P colonoscopic polypectomy: 3 years benign polyps  S/P tonsillectomy: age 30  H/O bilateral cataract extraction: 3-4 years ago  S/P prostatectomy:   S/P brain surgery: 2018  Lipoma of neck: s/p resection at age 30  Basal cell carcinoma (BCC) of lower leg: s/p resection  History of tonsillectomy: at age 30    FAMILY HISTORY:  Family history of diabetes mellitus: Father   Family history of prostate cancer in father:   Family history of breast cancer in mother (Sibling): Mother and sister  Family history of lung cancer: mother , long standing smoker    SOCIAL HISTORY:   T/E/D: No smoke, drink, drugs     MEDICATIONS (HOME):  Home Medications:  acetaminophen 325 mg oral tablet: 2 tab(s) orally every 6 hours, As needed, Temp greater or equal to 38C (100.4F), Mild Pain (1 - 3) (2019 15:33)  bisacodyl 5 mg oral delayed release tablet: 1 tab(s) orally every 12 hours, As needed, Constipation (2019 15:00)  diphenhydrAMINE 25 mg oral capsule: 1 cap(s) orally every 4 hours, As needed, Rash and/or Itching (2019 15:00)  docusate sodium 100 mg oral capsule: 1 cap(s) orally 2 times a day (2019 15:00)  psyllium 3.4 g/7 g oral powder for reconstitution: 1 packet(s) orally once a day (2019 15:00)  senna oral tablet: 2 tab(s) orally once a day (at bedtime) (2019 15:00)    Exam:   MS: A&Ox3. Language fluent, comprensive, w/ intact repetition. Attentive.   CN: VFF. SANTOS. EOMI. V1-V3 intact. Mild L. facial droop T/u midline. Normal shrug.   Motor: LUE 4/5 (worse distally), LLE 4/5. Mild weakness 5-/5 LUE/LLE. 5-/5 weakness on RUE/RLE.   Reflexes: 3+ on left, 2+ on the right   Sensation: intact to PP throughout  Coordination: No dysmetria on FNF or H2S bilaterally   Gait: Deferred     STUDIES & IMAGING:  CTH negative  CTA negative

## 2019-11-24 NOTE — H&P ADULT - PROBLEM SELECTOR PLAN 2
- Likely 2/2 DVT as left leg with edema, warmth and erythema. Does not meet sepsis criteria. No dysuria.   - S/p 1 L IVF. MAP >65  - Continue IVF  - Follow up blood cultures, urine cultures  - Continue Ceftriaxone for now - Could be infectious vs. central fever from GBM vs.  DVT causing fever as left leg with edema, warmth and erythema. Does not meet sepsis criteria. No dysuria.   - S/p 1 L IVF. MAP >65  - Continue IVF  - Follow up blood cultures, urine cultures  - Continue Ceftriaxone for now - Could be infectious given elevated lactate vs. central fever from GBM vs.  DVT causing fever as left leg with edema, warmth and erythema. Does not meet sepsis criteria. No dysuria.   - S/p 1 L IVF. MAP >65  - Continue IVF  - Follow up blood cultures, urine cultures  - Continue Ceftriaxone for now
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
## 2019-11-24 NOTE — ED PROVIDER NOTE - ATTENDING CONTRIBUTION TO CARE
Attending MD Zimmerman:  I personally have seen and examined this patient.  Resident note reviewed and agree on plan of care and except where noted.  See HPI, PE, and MDM for details.

## 2019-11-24 NOTE — PROGRESS NOTE ADULT - PROBLEM SELECTOR PLAN 2
- Could be infectious given elevated lactate vs. central fever from GBM vs.  DVT causing fever as left leg with edema, warmth and erythema. Does not meet sepsis criteria. No dysuria.   - S/p 1 L IVF. MAP >65  - Continue IVF  - Follow up blood cultures, urine cultures  - Continue Ceftriaxone for now

## 2019-11-24 NOTE — H&P ADULT - PROBLEM SELECTOR PLAN 4
- dx 8/18, s/p surgery, radiation chemotherapy cycle #9 on 9/23/19   - Follow up outpatient   - Seizure precautions

## 2019-11-24 NOTE — ED ADULT NURSE REASSESSMENT NOTE - NS ED NURSE REASSESS COMMENT FT1
Keppra level drawn and sent, RUSSELL Brown notified patient is still complaining of lower cspine tenderness, ED unable to clear collar. Will continue to monitor.

## 2019-11-24 NOTE — ED PROVIDER NOTE - QRS
Hyperbaric Oxygen Therapy Treatment Summary    Patient Name: Harvey Lucero Jr.             : 1960  Date of Visit: 3/7/2019    Indication for Treatment:   Chronic Refractory Osteomyelitis (Unresponsive to Conventional Medical and Surgical Managrment): Laterality: Left  Site: Foot/ankle    Comments:      Treatment Summary:   Continue present Hyperbaric Oxygen Treatment Plan: Treatment 2.4 YADI x 90 minutes with 5 minute airbreak x 2      Response To Treatment:  Physician was present during total Hyperbaric Oxygen Treatment including the ascent and decent portions.  Post-Treatment: The patient tolerated today's HBO Treatment without complication       104

## 2019-11-24 NOTE — CHART NOTE - NSCHARTNOTEFT_GEN_A_CORE
Called by RN  for pt with temperature of 100.6. Pt seen in bed w/o complaints of pain  or discomfort. Pt verbalized minor sore throat, but no erythema noted in back of the throat.   Patient is a 78y old  Male who presents with a chief complaint of Fall (24 Nov 2019 15:20)      Vital Signs Last 24 Hrs  T(C): 38.1 (24 Nov 2019 18:00), Max: 39.3 (24 Nov 2019 00:30)  T(F): 100.6 (24 Nov 2019 18:00), Max: 102.7 (24 Nov 2019 00:30)  HR: 70 (24 Nov 2019 18:00) (70 - 94)  BP: 112/67 (24 Nov 2019 18:00) (100/52 - 122/60)  BP(mean): 72 (24 Nov 2019 00:30) (72 - 72)  RR: 18 (24 Nov 2019 18:00) (14 - 20)  SpO2: 96% (24 Nov 2019 18:00) (94% - 98%)      Labs:                          11.7   10.46 )-----------( 124      ( 24 Nov 2019 00:23 )             32.9     11-24    138  |  99  |  25<H>  ----------------------------<  120<H>  3.8   |  24  |  1.12    Ca    9.8      24 Nov 2019 00:23    TPro  6.8  /  Alb  4.1  /  TBili  0.8  /  DBili  x   /  AST  14  /  ALT  19  /  AlkPhos  39<L>  11-24    CARDIAC MARKERS ( 24 Nov 2019 00:23 )  x     / x     / 61 U/L / x     / 1.0 ng/mL          Radiology:    Physical Exam:  General: WN/WD NAD  Neurology: A&Ox3, nonfocal, ROBERTS x 4  Head:  Normocephalic, atraumatic  Respiratory: CTA B/L  CV: RRR, S1S2, no murmur  Abdominal: Soft, NT, ND no palpable mass  MSK: No edema, + peripheral pulses, FROM all 4 extremity    Assessment & Plan:  HPI:  78M PMH of HTN, HLD, prostate ca s/p prostatectomy, basal cell skin ca (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 Patient reports missing his step at home and fell . Denies head trauma or LOC. No fecal or urinary incontinence. No seizures or seizure-like symptoms. No chest pain, dyspnea, palpitation, diaphoresis. He was previously on  Lovenox for 6 months for DVT/PE that was discontinued when dopplers and CTA on 10/17/129 was negative for DVT/PE. He was subsequently started on aspirin. Code stroke was called for left-sided weakness that patient reports being chronic     ED course:  - /50, HR 87, T 102.7, SpO2 99% on RA  - RVP negative, UA consistent with UTI  - CT cervical spine: No acute cervical fracture. Multilevel cervical spondylosis contributing to varying degrees of moderate to severe neuroforaminal and spinal canal  stenoses  - Given Ceftriaxone and IL IVF   Pt now with fever of 100.6, unclear of etiology.     Fever   >Tylenol   >F/u blood and urine cx  >Will continue with abx   Will continue to monitor.   Teresa PAZ   [id number]

## 2019-11-24 NOTE — H&P ADULT - NSICDXPASTMEDICALHX_GEN_ALL_CORE_FT
PAST MEDICAL HISTORY:  Basal cell carcinoma (BCC) of left lower leg s/p resection  right lower leg top  left lower leg    Bilateral hearing loss, unspecified hearing loss type     Fall, sequela     Glioblastoma biopsy 8/2018  surgery  6 weeks radiation  35 days of oral chemotherapy last 3 weeks    HLD (hyperlipidemia)     HTN (hypertension)      activity [employer] 1962-65    [place]    Prostate cancer s/p radical prostatectomy 1995    Type 2 diabetes mellitus without complication, without long-term current use of insulin stop oral meds  3 weeks ago

## 2019-11-24 NOTE — ED PROVIDER NOTE - PROGRESS NOTE DETAILS
C spine re-assessed, patient endorsing ongoing mild tenderness to palpation to midline lower c spine. Will keep c collar in place at current time - will require further follow up from primary team for assessment  Aaron Ring MD, PGY3 Emergency Medicine

## 2019-11-24 NOTE — CONSULT NOTE ADULT - ATTENDING COMMENTS
68M w/ GBM (s/p resection 8/2018, cheomradiation, temozolomide x10), HTN, who presents with a fall w/ reportedly worsening LUE/LLE weakness, w/ no LOC. Exam w/ L. hemiparesis (appears unchanged from Dr. Magana office notes 10/2019). MRI brain on 11/24 appears stable.     Impression: Fall w/ no LOC or prodromal symptoms in setting of high fever likely mechanical in etiology. Doubt CVA given unchanged exam and negative CTH/CTA. Doubt seizure. Doubt progression of disease. Doubt cord compression given negative neck pain, sensory level, bilateral involvement, saddle anesthesia, urinary incontinence     Plan:   [] Admit to medicine for infectious workup   [] rEEG   [] Draw Keppra level now  [] Keppra 1g BID   [] Infectious workup, DVT/PE workup (off Lovenox)  [] STAT CTH  negative for any acute change in mental status or worsening weakness   []  Neuro-oncologist Dr. Del Cid in the AM

## 2019-11-24 NOTE — ED PROVIDER NOTE - PMH
Basal cell carcinoma (BCC) of left lower leg  s/p resection  right lower leg top  left lower leg  Bilateral hearing loss, unspecified hearing loss type    Fall, sequela    Glioblastoma  biopsy 8/2018  surgery  6 weeks radiation  35 days of oral chemotherapy last 3 weeks  HLD (hyperlipidemia)    HTN (hypertension)     activity  [employer] 1962-65    [place]  Prostate cancer  s/p radical prostatectomy 1995  Type 2 diabetes mellitus without complication, without long-term current use of insulin  stop oral meds  3 weeks ago Basal cell carcinoma (BCC) of left lower leg  s/p resection  right lower leg top  left lower leg  Bilateral hearing loss, unspecified hearing loss type    Fall, sequela    Glioblastoma  biopsy 8/2018  surgery  6 weeks radiation  35 days of oral chemotherapy last 3 weeks  HLD (hyperlipidemia)    HTN (hypertension)     activity  [employer] 1962-65    [place]  Prostate cancer  s/p radical prostatectomy 1995  Type 2 diabetes mellitus without complication, without long-term current use of insulin  stop oral meds  3 weeks ago

## 2019-11-24 NOTE — PROGRESS NOTE ADULT - PROBLEM SELECTOR PLAN 3
- Likely deconditioning vs. progression of malignancy. No focal deficits  - No seizures or seizure-like symptoms  - Neurology recs appreciated

## 2019-11-25 ENCOUNTER — APPOINTMENT (OUTPATIENT)
Dept: NEUROLOGY | Facility: CLINIC | Age: 79
End: 2019-11-25

## 2019-11-25 LAB
ANION GAP SERPL CALC-SCNC: 14 MMOL/L — SIGNIFICANT CHANGE UP (ref 5–17)
BUN SERPL-MCNC: 21 MG/DL — SIGNIFICANT CHANGE UP (ref 7–23)
CALCIUM SERPL-MCNC: 9.2 MG/DL — SIGNIFICANT CHANGE UP (ref 8.4–10.5)
CHLORIDE SERPL-SCNC: 100 MMOL/L — SIGNIFICANT CHANGE UP (ref 96–108)
CO2 SERPL-SCNC: 24 MMOL/L — SIGNIFICANT CHANGE UP (ref 22–31)
CREAT SERPL-MCNC: 1.08 MG/DL — SIGNIFICANT CHANGE UP (ref 0.5–1.3)
CULTURE RESULTS: NO GROWTH — SIGNIFICANT CHANGE UP
GLUCOSE SERPL-MCNC: 96 MG/DL — SIGNIFICANT CHANGE UP (ref 70–99)
HCT VFR BLD CALC: 31.7 % — LOW (ref 39–50)
HGB BLD-MCNC: 10.8 G/DL — LOW (ref 13–17)
LEVETIRACETAM SERPL-MCNC: 11.7 MCG/ML — LOW (ref 12–46)
MCHC RBC-ENTMCNC: 33.9 PG — SIGNIFICANT CHANGE UP (ref 27–34)
MCHC RBC-ENTMCNC: 34.1 GM/DL — SIGNIFICANT CHANGE UP (ref 32–36)
MCV RBC AUTO: 99.4 FL — SIGNIFICANT CHANGE UP (ref 80–100)
PLATELET # BLD AUTO: 135 K/UL — LOW (ref 150–400)
POTASSIUM SERPL-MCNC: 3.7 MMOL/L — SIGNIFICANT CHANGE UP (ref 3.5–5.3)
POTASSIUM SERPL-SCNC: 3.7 MMOL/L — SIGNIFICANT CHANGE UP (ref 3.5–5.3)
RBC # BLD: 3.19 M/UL — LOW (ref 4.2–5.8)
RBC # FLD: 12.3 % — SIGNIFICANT CHANGE UP (ref 10.3–14.5)
SODIUM SERPL-SCNC: 138 MMOL/L — SIGNIFICANT CHANGE UP (ref 135–145)
SPECIMEN SOURCE: SIGNIFICANT CHANGE UP
WBC # BLD: 5.8 K/UL — SIGNIFICANT CHANGE UP (ref 3.8–10.5)
WBC # FLD AUTO: 5.8 K/UL — SIGNIFICANT CHANGE UP (ref 3.8–10.5)

## 2019-11-25 PROCEDURE — 99232 SBSQ HOSP IP/OBS MODERATE 35: CPT

## 2019-11-25 PROCEDURE — 95816 EEG AWAKE AND DROWSY: CPT | Mod: 26

## 2019-11-25 RX ORDER — LANOLIN ALCOHOL/MO/W.PET/CERES
3 CREAM (GRAM) TOPICAL AT BEDTIME
Refills: 0 | Status: COMPLETED | OUTPATIENT
Start: 2019-11-25 | End: 2019-11-26

## 2019-11-25 RX ADMIN — CEFTRIAXONE 100 MILLIGRAM(S): 500 INJECTION, POWDER, FOR SOLUTION INTRAMUSCULAR; INTRAVENOUS at 02:16

## 2019-11-25 RX ADMIN — LEVETIRACETAM 1000 MILLIGRAM(S): 250 TABLET, FILM COATED ORAL at 05:59

## 2019-11-25 RX ADMIN — HEPARIN SODIUM 5000 UNIT(S): 5000 INJECTION INTRAVENOUS; SUBCUTANEOUS at 14:16

## 2019-11-25 RX ADMIN — HEPARIN SODIUM 5000 UNIT(S): 5000 INJECTION INTRAVENOUS; SUBCUTANEOUS at 21:55

## 2019-11-25 RX ADMIN — ATORVASTATIN CALCIUM 40 MILLIGRAM(S): 80 TABLET, FILM COATED ORAL at 21:55

## 2019-11-25 RX ADMIN — HEPARIN SODIUM 5000 UNIT(S): 5000 INJECTION INTRAVENOUS; SUBCUTANEOUS at 05:59

## 2019-11-25 RX ADMIN — LEVETIRACETAM 1000 MILLIGRAM(S): 250 TABLET, FILM COATED ORAL at 18:40

## 2019-11-25 NOTE — PROGRESS NOTE ADULT - SUBJECTIVE AND OBJECTIVE BOX
PROGRESS NOTE:     Patient is a 78y old  Male who presents with a chief complaint of Fall (2019 15:20)    SUBJECTIVE / OVERNIGHT EVENTS:  no acute events overnight, pt is worried that he may be unsteady on his feet. Looking forward to working with physical therapy.     ADDITIONAL REVIEW OF SYSTEMS: no dizziness, light headedness     MEDICATIONS  (STANDING):  atorvastatin 40 milliGRAM(s) Oral at bedtime  cefTRIAXone   IVPB 1000 milliGRAM(s) IV Intermittent every 24 hours  heparin  Injectable 5000 Unit(s) SubCutaneous every 8 hours  levETIRAcetam 1000 milliGRAM(s) Oral two times a day            I&O's Summary    2019 07:01  -  2019 07:00  --------------------------------------------------------  IN: 290 mL / OUT: 1150 mL / NET: -860 mL      PHYSICAL EXAM:  Vital Signs Last 24 Hrs  T(C): 37.6 (2019 06:24), Max: 38.1 (2019 18:00)  T(F): 99.6 (2019 06:24), Max: 100.6 (2019 18:00)  HR: 65 (2019 06:24) (55 - 70)  BP: 122/65 (2019 06:24) (111/69 - 122/65)  BP(mean): --  RR: 18 (2019 06:24) (18 - 18)  SpO2: 91% (2019 06:24) (91% - 97%)    CONSTITUTIONAL: NAD  RESPIRATORY: CTABL   CARDIOVASCULAR: +S1/S2, reg   ABDOMEN: Nontender to palpation, no rebound/guarding noted   MUSCLOSKELETAL: no joint swelling or tenderness to palpation  PSYCH: A+O to person, place, and time; affect appropriate    LABS:                        11.7   10.46 )-----------( 124      ( 2019 00:23 )             32.9     11-24    138  |  99  |  25<H>  ----------------------------<  120<H>  3.8   |  24  |  1.12    Ca    9.8      2019 00:23    TPro  6.8  /  Alb  4.1  /  TBili  0.8  /  DBili  x   /  AST  14  /  ALT  19  /  AlkPhos  39<L>  11-24    PT/INR - ( 2019 00:23 )   PT: 13.6 sec;   INR: 1.19 ratio         PTT - ( 2019 00:23 )  PTT:26.3 sec  CARDIAC MARKERS ( 2019 00:23 )  x     / x     / 61 U/L / x     / 1.0 ng/mL      Urinalysis Basic - ( 2019 01:40 )    Color: Yellow / Appearance: Clear / S.032 / pH: x  Gluc: x / Ketone: Negative  / Bili: Negative / Urobili: Negative   Blood: x / Protein: 30 mg/dL / Nitrite: Negative   Leuk Esterase: Small / RBC: 3 /hpf / WBC 11 /HPF   Sq Epi: x / Non Sq Epi: 3 /hpf / Bacteria: Negative        Culture - Blood (collected 2019 04:47)  Source: .Blood Blood-Peripheral  Preliminary Report (2019 05:00):    No growth to date.    Culture - Blood (collected 2019 04:47)  Source: .Blood Blood-Peripheral  Preliminary Report (2019 05:00):    No growth to date.    Culture - Urine (collected 2019 04:20)  Source: .Urine Clean Catch (Midstream)  Final Report (2019 08:07):    No growth

## 2019-11-25 NOTE — PHYSICAL THERAPY INITIAL EVALUATION ADULT - ADDITIONAL COMMENTS
As per pt, pt lives in a private house w/ spouse and daughter and no steps to enter. PTA pt was independent w/ all mobility w/ straight cane and required assist for some ADLs.

## 2019-11-25 NOTE — PHYSICAL THERAPY INITIAL EVALUATION ADULT - PERTINENT HX OF CURRENT PROBLEM, REHAB EVAL
Pt is a 78M PMH of HTN, HLD, prostate ca s/p prostatectomy, basal cell skin ca (resected from leg), glioblastoma ( dx 8/18, s/p surgery, radiation chemotherapy cycle #9 on 9/23/19 ) and seizure disorder on Keppra presenting s/p fall. Admitted for weakness and fever.  CTA head: No acute pathology. CT cervical : No fracture or dislocation

## 2019-11-25 NOTE — PROGRESS NOTE ADULT - PROBLEM SELECTOR PLAN 2
- Could be infectious given elevated lactate vs. central fever from GBM vs.  DVT causing fever as left leg with edema, warmth and erythema. Does not meet sepsis criteria. No dysuria.   - S/p 1 L IVF. MAP >65  - Continue IVF  - Continue Ceftriaxone for now  - cultures are negative so far

## 2019-11-25 NOTE — PROGRESS NOTE ADULT - PROBLEM SELECTOR PLAN 1
- S/p mechanical fall. No LOC  - CTA head: No acute pathology. CT cervical : No fracture or dislocation    - orthostatics are negative   - Fall precautions   - PT eval is pending

## 2019-11-25 NOTE — PROGRESS NOTE ADULT - PROBLEM SELECTOR PLAN 3
- Likely deconditioning vs. progression of malignancy. No focal deficits  - Neurology recs appreciated

## 2019-11-25 NOTE — PHYSICAL THERAPY INITIAL EVALUATION ADULT - PASSIVE RANGE OF MOTION EXAMINATION, REHAB EVAL
Right UE Passive ROM was WNL (within normal limits)/Left UE Passive ROM was WNL (within normal limits)/Left LE Passive ROM was WNL (within normal limits)/Right LE Passive ROM was WNL (within normal limits)

## 2019-11-26 ENCOUNTER — TRANSCRIPTION ENCOUNTER (OUTPATIENT)
Age: 79
End: 2019-11-26

## 2019-11-26 LAB
-  CANDIDA ALBICANS: SIGNIFICANT CHANGE UP
-  CANDIDA GLABRATA: SIGNIFICANT CHANGE UP
-  CANDIDA KRUSEI: SIGNIFICANT CHANGE UP
-  CANDIDA PARAPSILOSIS: SIGNIFICANT CHANGE UP
-  CANDIDA TROPICALIS: SIGNIFICANT CHANGE UP
-  COAGULASE NEGATIVE STAPHYLOCOCCUS: SIGNIFICANT CHANGE UP
-  K. PNEUMONIAE GROUP: SIGNIFICANT CHANGE UP
-  KPC RESISTANCE GENE: SIGNIFICANT CHANGE UP
-  STREPTOCOCCUS SP. (NOT GRP A, B OR S PNEUMONIAE): SIGNIFICANT CHANGE UP
A BAUMANNII DNA SPEC QL NAA+PROBE: SIGNIFICANT CHANGE UP
E CLOAC COMP DNA BLD POS QL NAA+PROBE: SIGNIFICANT CHANGE UP
E COLI DNA BLD POS QL NAA+NON-PROBE: SIGNIFICANT CHANGE UP
ENTEROCOC DNA BLD POS QL NAA+NON-PROBE: SIGNIFICANT CHANGE UP
ENTEROCOC DNA BLD POS QL NAA+NON-PROBE: SIGNIFICANT CHANGE UP
GP B STREP DNA BLD POS QL NAA+NON-PROBE: SIGNIFICANT CHANGE UP
GRAM STN FLD: SIGNIFICANT CHANGE UP
HAEM INFLU DNA BLD POS QL NAA+NON-PROBE: SIGNIFICANT CHANGE UP
HCT VFR BLD CALC: 34.2 % — LOW (ref 39–50)
HGB BLD-MCNC: 11.8 G/DL — LOW (ref 13–17)
K OXYTOCA DNA BLD POS QL NAA+NON-PROBE: SIGNIFICANT CHANGE UP
L MONOCYTOG DNA BLD POS QL NAA+NON-PROBE: SIGNIFICANT CHANGE UP
MCHC RBC-ENTMCNC: 33.9 PG — SIGNIFICANT CHANGE UP (ref 27–34)
MCHC RBC-ENTMCNC: 34.5 GM/DL — SIGNIFICANT CHANGE UP (ref 32–36)
MCV RBC AUTO: 98.3 FL — SIGNIFICANT CHANGE UP (ref 80–100)
METHOD TYPE: SIGNIFICANT CHANGE UP
MRSA SPEC QL CULT: SIGNIFICANT CHANGE UP
MSSA DNA SPEC QL NAA+PROBE: SIGNIFICANT CHANGE UP
N MEN ISLT CULT: SIGNIFICANT CHANGE UP
P AERUGINOSA DNA BLD POS NAA+NON-PROBE: SIGNIFICANT CHANGE UP
PLATELET # BLD AUTO: 155 K/UL — SIGNIFICANT CHANGE UP (ref 150–400)
RBC # BLD: 3.48 M/UL — LOW (ref 4.2–5.8)
RBC # FLD: 12 % — SIGNIFICANT CHANGE UP (ref 10.3–14.5)
S MARCESCENS DNA BLD POS NAA+NON-PROBE: SIGNIFICANT CHANGE UP
S PNEUM DNA BLD POS QL NAA+NON-PROBE: SIGNIFICANT CHANGE UP
S PYO DNA BLD POS QL NAA+NON-PROBE: SIGNIFICANT CHANGE UP
WBC # BLD: 5.79 K/UL — SIGNIFICANT CHANGE UP (ref 3.8–10.5)
WBC # FLD AUTO: 5.79 K/UL — SIGNIFICANT CHANGE UP (ref 3.8–10.5)

## 2019-11-26 PROCEDURE — 99232 SBSQ HOSP IP/OBS MODERATE 35: CPT

## 2019-11-26 RX ORDER — LOSARTAN POTASSIUM 100 MG/1
25 TABLET, FILM COATED ORAL DAILY
Refills: 0 | Status: DISCONTINUED | OUTPATIENT
Start: 2019-11-26 | End: 2019-11-27

## 2019-11-26 RX ORDER — CHLORHEXIDINE GLUCONATE 213 G/1000ML
1 SOLUTION TOPICAL DAILY
Refills: 0 | Status: DISCONTINUED | OUTPATIENT
Start: 2019-11-26 | End: 2019-11-27

## 2019-11-26 RX ADMIN — CEFTRIAXONE 100 MILLIGRAM(S): 500 INJECTION, POWDER, FOR SOLUTION INTRAMUSCULAR; INTRAVENOUS at 02:06

## 2019-11-26 RX ADMIN — LEVETIRACETAM 1000 MILLIGRAM(S): 250 TABLET, FILM COATED ORAL at 05:07

## 2019-11-26 RX ADMIN — LOSARTAN POTASSIUM 25 MILLIGRAM(S): 100 TABLET, FILM COATED ORAL at 16:13

## 2019-11-26 RX ADMIN — HEPARIN SODIUM 5000 UNIT(S): 5000 INJECTION INTRAVENOUS; SUBCUTANEOUS at 21:06

## 2019-11-26 RX ADMIN — Medication 3 MILLIGRAM(S): at 21:06

## 2019-11-26 RX ADMIN — ATORVASTATIN CALCIUM 40 MILLIGRAM(S): 80 TABLET, FILM COATED ORAL at 21:06

## 2019-11-26 RX ADMIN — CHLORHEXIDINE GLUCONATE 1 APPLICATION(S): 213 SOLUTION TOPICAL at 16:08

## 2019-11-26 RX ADMIN — HEPARIN SODIUM 5000 UNIT(S): 5000 INJECTION INTRAVENOUS; SUBCUTANEOUS at 15:08

## 2019-11-26 RX ADMIN — LEVETIRACETAM 1000 MILLIGRAM(S): 250 TABLET, FILM COATED ORAL at 17:33

## 2019-11-26 RX ADMIN — HEPARIN SODIUM 5000 UNIT(S): 5000 INJECTION INTRAVENOUS; SUBCUTANEOUS at 05:07

## 2019-11-26 NOTE — DISCHARGE NOTE PROVIDER - NSDCMRMEDTOKEN_GEN_ALL_CORE_FT
acetaminophen 325 mg oral tablet: 2 tab(s) orally every 6 hours, As needed, Temp greater or equal to 38C (100.4F), Mild Pain (1 - 3)  bisacodyl 5 mg oral delayed release tablet: 1 tab(s) orally every 12 hours, As needed, Constipation  clindamycin 1% topical solution: 1 application topically 2 times a day  Crestor 20 mg oral tablet: 1 tab(s) orally once a day  diphenhydrAMINE 25 mg oral capsule: 1 cap(s) orally every 4 hours, As needed, Rash and/or Itching  docusate sodium 100 mg oral capsule: 1 cap(s) orally 2 times a day  Keppra 1000 mg oral tablet: 1 tab(s) orally 2 times a day  losartan 100 mg oral tablet: 1 tab(s) orally once a day   metoprolol tartrate 25 mg oral tablet: 1 tab(s) orally 2 times a day   NIFEdipine 90 mg oral tablet, extended release: 1 tab(s) orally once a day   oxyCODONE 15 mg oral tablet, extended release: 1 tab(s) orally every 12 hours MDD:2  Pepcid 20 mg oral tablet: 1 tab(s) orally 2 times a day  psyllium 3.4 g/7 g oral powder for reconstitution: 1 packet(s) orally once a day  senna oral tablet: 2 tab(s) orally once a day (at bedtime)  torsemide 20 mg oral tablet: 1 tab(s) orally once a day  triamcinolone 0.1% topical ointment: 1 application topically every 12 hours bisacodyl 5 mg oral delayed release tablet: 1 tab(s) orally every 12 hours, As needed, Constipation  Crestor 20 mg oral tablet: 1 tab(s) orally once a day  docusate sodium 100 mg oral capsule: 1 cap(s) orally 2 times a day  Keflex 500 mg oral capsule: 1 cap(s) orally 2 times a day   Keppra 1000 mg oral tablet: 1 tab(s) orally 2 times a day  losartan 25 mg oral tablet: 1 tab(s) orally once a day  psyllium 3.4 g/7 g oral powder for reconstitution: 1 packet(s) orally once a day  senna oral tablet: 2 tab(s) orally once a day (at bedtime)

## 2019-11-26 NOTE — DISCHARGE NOTE PROVIDER - NSDCCPCAREPLAN_GEN_ALL_CORE_FT
PRINCIPAL DISCHARGE DIAGNOSIS  Diagnosis: Fever  Assessment and Plan of Treatment: Fever      SECONDARY DISCHARGE DIAGNOSES  Diagnosis: HTN (hypertension)  Assessment and Plan of Treatment: HTN (hypertension)    Diagnosis: Glioblastoma  Assessment and Plan of Treatment: Glioblastoma    Diagnosis: Unsteady gait  Assessment and Plan of Treatment: s/p fall.

## 2019-11-26 NOTE — PROGRESS NOTE ADULT - PROBLEM SELECTOR PLAN 2
- Could be infectious given elevated lactate vs. central fever from GBM vs.  DVT causing fever as left leg with edema, warmth and erythema. Does not meet sepsis criteria.   - S/p 1 L IVF. MAP >65  - Continue IVF  - continue ceftriaxone for UTI, today is day 3. If stable and symptoms of dysuria improved tomorrow, can switch to oral   -UC neg but symptomatic

## 2019-11-26 NOTE — CHART NOTE - NSCHARTNOTEFT_GEN_A_CORE
RN reported the result of blood Cx from 11/24 with gram positive   cocci in anaerobic bottle. Patient is afebrile. Likely contaminant?  Dr Barnes made aware. Currently on Ceftriaxone.   Mary chavez NP  [phone]

## 2019-11-26 NOTE — EEG REPORT - NS EEG TEXT BOX
[facility]   COMPREHENSIVE EPILEPSY CENTER   REPORT OF ROUTINE EEG W/ VIDEO    [facility]: [address], [place] [address], Ph#: [phone]  [facility]: [address], [place] [address], Ph#: [phone]  [facility]: [address] [place] [address]    Patient Name: DOROTHY SEGUNDO  Age and : 78y ([birth year])  MRN #: [id number]  Location: [place] [address]  Referring Physician: Woo Barnes    Study Date: 19    _____________________________________________________________  TECHNICAL INFORMATION    Placement and Labeling of Electrodes:  The EEG was performed utilizing 20 channels referential EEG connections (coronal over temporal over parasagittal montage) using all standard 10-20 electrode placements with EKG.  Recording was at a sampling rate of 256 samples per second per channel.  Time synchronized digital video recording was done simultaneously with EEG recording.  A low light infrared camera was used for low light recording.  Keenan and seizure detection algorithms were utilized.    _____________________________________________________________  HISTORY    Patient is a 78y old  Male who presents with a chief complaint of Fall (2019 12:12)      PERTINENT MEDICATION:  MEDICATIONS  (STANDING):  atorvastatin 40 milliGRAM(s) Oral at bedtime  cefTRIAXone   IVPB 1000 milliGRAM(s) IV Intermittent every 24 hours  chlorhexidine 4% Liquid 1 Application(s) Topical daily  heparin  Injectable 5000 Unit(s) SubCutaneous every 8 hours  levETIRAcetam 1000 milliGRAM(s) Oral two times a day  melatonin 3 milliGRAM(s) Oral at bedtime    _____________________________________________________________  STUDY INTERPRETATION    Findings: The background was continuous, spontaneously variable and reactive. During wakefulness, the posterior dominant rhythm consisted of asymmetric,poorly-modulated 8.5 Hz activity, with amplitude to 30 uV, that attenuated to eye opening.     Background Slowing:  Excess diffuse polymorphic delta slowing.    Focal Slowing:   None were present.    Sleep Background:  Drowsiness was characterized by fragmentation, attenuation, and slowing of the background activity.    Stage II sleep transients were not recorded.    Other Non-Epileptiform Findings:  Breach effect in right centroparietal region (max C4/P4) characterized by higher amplitude, sharply contoured waves and fast activities.    Interictal Epileptiform Activity:   None were present.    Events:  Clinical events: None recorded.  Seizures: None recorded.    Activation Procedures:   Hyperventilation was not performed.    Photic stimulation was not performed.     Artifacts:  Intermittent myogenic and movement artifacts were noted.    ECG:  The heart rate on single channel ECG was predominantly between 60-70 BPM.    _____________________________________________________________  EEG SUMMARY/CLASSIFICATION    Abnormal EEG in an unresponsive patient.  - Mild to moderate generalized slowing.  - Breach effect in right centroparietal region (max C4/P4) characterized by higher amplitude, sharply contoured waves and fast activities.    _____________________________________________________________  EEG IMPRESSION/CLINICAL CORRELATE  ***PRELIM REPORT  Abnormal EEG study.  1. Moderate nonspecific diffuse or multifocal cerebral dysfunction.   2. 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  3. No epileptiform pattern or seizure seen.  _____________________________________________________________  Ester Franco DO  Epilepsy Fellow, [facility] [place]    Dev Pulido MD  Attending Physician, [facility] [place] [facility]   COMPREHENSIVE EPILEPSY CENTER   REPORT OF ROUTINE EEG W/ VIDEO    [facility]: [address], [place] [address], Ph#: [phone]  [facility]: [address], [place] [address], Ph#: [phone]  [facility]: [address] [place] [address]    Patient Name: DOROTHY SEGUNDO  Age and : 78y ([birth year])  MRN #: [id number]  Location: [place] [address]  Referring Physician: Woo Barnes    Study Date: 19    _____________________________________________________________  TECHNICAL INFORMATION    Placement and Labeling of Electrodes:  The EEG was performed utilizing 20 channels referential EEG connections (coronal over temporal over parasagittal montage) using all standard 10-20 electrode placements with EKG.  Recording was at a sampling rate of 256 samples per second per channel.  Time synchronized digital video recording was done simultaneously with EEG recording.  A low light infrared camera was used for low light recording.  Keenan and seizure detection algorithms were utilized.    _____________________________________________________________  HISTORY    Patient is a 78y old  Male who presents with a chief complaint of Fall (2019 12:12)      PERTINENT MEDICATION:  MEDICATIONS  (STANDING):  atorvastatin 40 milliGRAM(s) Oral at bedtime  cefTRIAXone   IVPB 1000 milliGRAM(s) IV Intermittent every 24 hours  chlorhexidine 4% Liquid 1 Application(s) Topical daily  heparin  Injectable 5000 Unit(s) SubCutaneous every 8 hours  levETIRAcetam 1000 milliGRAM(s) Oral two times a day  melatonin 3 milliGRAM(s) Oral at bedtime    _____________________________________________________________  STUDY INTERPRETATION    Findings: The background was continuous, spontaneously variable and reactive. During wakefulness, the posterior dominant rhythm consisted of asymmetric, poorly-modulated 8.5 Hz activity better formed over the left, with amplitude to 30 uV, that attenuated to eye opening.     Background Slowing:  Excess diffuse polymorphic delta slowing.    Focal Slowing:   None were present.    Sleep Background:  Drowsiness was characterized by fragmentation, attenuation, and slowing of the background activity.    Stage II sleep transients were not recorded.    Other Non-Epileptiform Findings:  Breach effect in right centroparietal region (max C4/P4) characterized by higher amplitude, sharply contoured waves and fast activities.    Interictal Epileptiform Activity:   None were present.    Events:  Clinical events: None recorded.  Seizures: None recorded.    Activation Procedures:   Hyperventilation was not performed.    Photic stimulation was not performed.     Artifacts:  Intermittent myogenic and movement artifacts were noted.    ECG:  The heart rate on single channel ECG was predominantly between 60-70 BPM.    _____________________________________________________________  EEG SUMMARY/CLASSIFICATION    Abnormal EEG in an unresponsive patient.  - Mild to moderate generalized slowing.  - Breach effect in right centroparietal region (max C4/P4) characterized by higher amplitude, sharply contoured waves and fast activities.    _____________________________________________________________  EEG IMPRESSION/CLINICAL CORRELATE  ***PRELIM REPORT  Abnormal EEG study.  1. Moderate nonspecific diffuse or multifocal cerebral dysfunction.   2. 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  3. No epileptiform pattern or seizure seen.  _____________________________________________________________  Arif Joan DO  Epilepsy Fellow, [facility]ilepsy [place]    Dev Pulido MD  Attending Physician, [facility]ilepsy [place] [facility]   COMPREHENSIVE EPILEPSY CENTER   REPORT OF ROUTINE EEG W/ VIDEO    [facility]: [address], [place] [address], Ph#: [phone]  [facility]: [address], [place] [address], Ph#: [phone]  [facility]: [address] [place] [address]    Patient Name: DOROTHY SEGUNDO  Age and : 78y ([birth year])  MRN #: [id number]  Location: [place] [address]  Referring Physician: Woo Barnes    Study Date: 19    _____________________________________________________________  TECHNICAL INFORMATION    Placement and Labeling of Electrodes:  The EEG was performed utilizing 20 channels referential EEG connections (coronal over temporal over parasagittal montage) using all standard 10-20 electrode placements with EKG.  Recording was at a sampling rate of 256 samples per second per channel.  Time synchronized digital video recording was done simultaneously with EEG recording.  A low light infrared camera was used for low light recording.  Keenan and seizure detection algorithms were utilized.    _____________________________________________________________  HISTORY    Patient is a 78y old  Male who presents with a chief complaint of Fall (2019 12:12)      PERTINENT MEDICATION:  levETIRAcetam 1000 milliGRAM(s) Oral two times a day    _____________________________________________________________  STUDY INTERPRETATION    Findings: The background was continuous, spontaneously variable and reactive. During wakefulness, the posterior dominant rhythm consisted of asymmetric, poorly-modulated 8.5 Hz activity better formed over the left hemisphere, with amplitude to 30 uV, that attenuated to eye opening.     Background Slowing:  Excess diffuse polymorphic delta slowing.    Focal Slowing:   Intermittent theta/delta slowing in the right central (C4) region.    Sleep Background:  Drowsiness was characterized by fragmentation, attenuation, and slowing of the background activity.    Sleep was characterized by the presence of asymmetric, rudimentary spindles, better formed over the left hemisphere.    Other Non-Epileptiform Findings:  Breach effect in right central region (C4) characterized by higher amplitude, sharply contoured waves and fast activities.    Interictal Epileptiform Activity:   None were present.    Events:  Clinical events: None recorded.  Seizures: None recorded.    Activation Procedures:   Hyperventilation was not performed.    Photic stimulation was not performed.     Artifacts:  Intermittent myogenic and movement artifacts were noted.    ECG:  The heart rate on single channel ECG was predominantly between 60-70 BPM.    _____________________________________________________________  EEG SUMMARY/CLASSIFICATION    Abnormal EEG in an unresponsive patient.  - Intermittent theta/delta slowing in the right central (C4) region.  - Mild generalized slowing.  - Breach effect in right central region (C4) characterized by higher amplitude, sharply contoured waves and fast activities.    _____________________________________________________________  EEG IMPRESSION/CLINICAL CORRELATE    Abnormal EEG study.  1. Functional abnormality in the right central region.   2. Mild nonspecific diffuse or multifocal cerebral dysfunction.   3. Skull defect in the right central region.  4. No epileptiform pattern or seizure seen.    _____________________________________________________________  Ester Franco DO  Epilepsy Fellow, [facility]ilepsy [place]    Dev Pulido MD  Attending Physician, [facility] [place]

## 2019-11-26 NOTE — PROGRESS NOTE ADULT - SUBJECTIVE AND OBJECTIVE BOX
Patient is a 78y old  Male who presents with a chief complaint of Fall (26 Nov 2019 12:35)      SUBJECTIVE / OVERNIGHT EVENTS: no acute events overnight, feeling better today. Dysuria improved, but still has some burning with urination.     MEDICATIONS  (STANDING):  atorvastatin 40 milliGRAM(s) Oral at bedtime  cefTRIAXone   IVPB 1000 milliGRAM(s) IV Intermittent every 24 hours  chlorhexidine 4% Liquid 1 Application(s) Topical daily  heparin  Injectable 5000 Unit(s) SubCutaneous every 8 hours  levETIRAcetam 1000 milliGRAM(s) Oral two times a day  melatonin 3 milliGRAM(s) Oral at bedtime    MEDICATIONS  (PRN):      Vital Signs Last 24 Hrs  T(C): 36.6 (26 Nov 2019 09:56), Max: 37 (26 Nov 2019 00:18)  T(F): 97.8 (26 Nov 2019 09:56), Max: 98.6 (26 Nov 2019 00:18)  HR: 59 (26 Nov 2019 09:56) (59 - 71)  BP: 139/77 (26 Nov 2019 09:56) (133/60 - 144/71)  BP(mean): --  RR: 18 (26 Nov 2019 09:56) (18 - 18)  SpO2: 97% (26 Nov 2019 09:56) (96% - 99%)  CAPILLARY BLOOD GLUCOSE        I&O's Summary    25 Nov 2019 07:01  -  26 Nov 2019 07:00  --------------------------------------------------------  IN: 50 mL / OUT: 400 mL / NET: -350 mL    26 Nov 2019 07:01  -  26 Nov 2019 13:11  --------------------------------------------------------  IN: 0 mL / OUT: 600 mL / NET: -600 mL        PHYSICAL EXAM:  GENERAL: NAD  EYES:  conjunctiva and sclera clear  NECK: Supple, No JVD  CHEST/LUNG: Clear to auscultation bilaterally; No wheeze  HEART: +S1/S2, reg   ABDOMEN: Soft, Nontender, Nondistended; Bowel sounds present  EXTREMITIES: no edema   PSYCH: AAOx3      LABS:                        10.8   5.80  )-----------( 135      ( 25 Nov 2019 15:38 )             31.7     11-25    138  |  100  |  21  ----------------------------<  96  3.7   |  24  |  1.08    Ca    9.2      25 Nov 2019 11:58

## 2019-11-26 NOTE — DISCHARGE NOTE PROVIDER - HOSPITAL COURSE
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 Admitted for weakness and fever. CTA head with no    acute pathology, CT cervical with no fracture or dislocation. Orthostatic are negative. PT recommended home PT. Started IV abx  CTx for fever. Did not meet sepsis criteria. No dysuria    Cultures negative. 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 Admitted for mechanical fall,  weakness and fever.  Blood cultures 1/4 positive. CT head negative for acute pathology and EEg neg for active seizure. Infectious disease consulted and recommended for keflex 5id tp complete 7 days. PT consulted an recommended home with home PT. Patient cleared by Dr Barnes for discharge home.

## 2019-11-26 NOTE — DISCHARGE NOTE PROVIDER - NSDCFUADDAPPT_GEN_ALL_CORE_FT
Follow up with PMD within 5 days discharge.  Hospitalist with call if repeat blood cultures are positive

## 2019-11-26 NOTE — PROGRESS NOTE ADULT - PROBLEM SELECTOR PLAN 4
- dx 8/18, s/p surgery, radiation chemotherapy cycle #9 on 9/23/19   - Seizure precautions (3) adequate

## 2019-11-26 NOTE — PROGRESS NOTE ADULT - PROBLEM SELECTOR PLAN 1
- S/p mechanical fall. No LOC  - CTA head: No acute pathology. CT cervical : No fracture or dislocation    - orthostatics are negative   - Fall precautions   - PT recommending home PT

## 2019-11-27 ENCOUNTER — TRANSCRIPTION ENCOUNTER (OUTPATIENT)
Age: 79
End: 2019-11-27

## 2019-11-27 VITALS
SYSTOLIC BLOOD PRESSURE: 132 MMHG | HEART RATE: 66 BPM | RESPIRATION RATE: 16 BRPM | TEMPERATURE: 98 F | DIASTOLIC BLOOD PRESSURE: 80 MMHG | OXYGEN SATURATION: 98 %

## 2019-11-27 PROCEDURE — 99223 1ST HOSP IP/OBS HIGH 75: CPT | Mod: GC

## 2019-11-27 PROCEDURE — 87798 DETECT AGENT NOS DNA AMP: CPT

## 2019-11-27 PROCEDURE — 80053 COMPREHEN METABOLIC PANEL: CPT

## 2019-11-27 PROCEDURE — 87086 URINE CULTURE/COLONY COUNT: CPT

## 2019-11-27 PROCEDURE — 71045 X-RAY EXAM CHEST 1 VIEW: CPT

## 2019-11-27 PROCEDURE — 70496 CT ANGIOGRAPHY HEAD: CPT

## 2019-11-27 PROCEDURE — 99285 EMERGENCY DEPT VISIT HI MDM: CPT | Mod: 25

## 2019-11-27 PROCEDURE — 86901 BLOOD TYPING SEROLOGIC RH(D): CPT

## 2019-11-27 PROCEDURE — 87150 DNA/RNA AMPLIFIED PROBE: CPT

## 2019-11-27 PROCEDURE — 93005 ELECTROCARDIOGRAM TRACING: CPT

## 2019-11-27 PROCEDURE — 99232 SBSQ HOSP IP/OBS MODERATE 35: CPT

## 2019-11-27 PROCEDURE — 72125 CT NECK SPINE W/O DYE: CPT

## 2019-11-27 PROCEDURE — 93971 EXTREMITY STUDY: CPT

## 2019-11-27 PROCEDURE — 85730 THROMBOPLASTIN TIME PARTIAL: CPT

## 2019-11-27 PROCEDURE — 70498 CT ANGIOGRAPHY NECK: CPT

## 2019-11-27 PROCEDURE — 86850 RBC ANTIBODY SCREEN: CPT

## 2019-11-27 PROCEDURE — 70450 CT HEAD/BRAIN W/O DYE: CPT

## 2019-11-27 PROCEDURE — 87581 M.PNEUMON DNA AMP PROBE: CPT

## 2019-11-27 PROCEDURE — 84484 ASSAY OF TROPONIN QUANT: CPT

## 2019-11-27 PROCEDURE — 80177 DRUG SCRN QUAN LEVETIRACETAM: CPT

## 2019-11-27 PROCEDURE — 97161 PT EVAL LOW COMPLEX 20 MIN: CPT

## 2019-11-27 PROCEDURE — 83605 ASSAY OF LACTIC ACID: CPT

## 2019-11-27 PROCEDURE — 82550 ASSAY OF CK (CPK): CPT

## 2019-11-27 PROCEDURE — 99222 1ST HOSP IP/OBS MODERATE 55: CPT | Mod: GC

## 2019-11-27 PROCEDURE — 85027 COMPLETE CBC AUTOMATED: CPT

## 2019-11-27 PROCEDURE — 80048 BASIC METABOLIC PNL TOTAL CA: CPT

## 2019-11-27 PROCEDURE — 81001 URINALYSIS AUTO W/SCOPE: CPT

## 2019-11-27 PROCEDURE — 96374 THER/PROPH/DIAG INJ IV PUSH: CPT | Mod: XU

## 2019-11-27 PROCEDURE — 87633 RESP VIRUS 12-25 TARGETS: CPT

## 2019-11-27 PROCEDURE — 87040 BLOOD CULTURE FOR BACTERIA: CPT

## 2019-11-27 PROCEDURE — 82553 CREATINE MB FRACTION: CPT

## 2019-11-27 PROCEDURE — 95816 EEG AWAKE AND DROWSY: CPT

## 2019-11-27 PROCEDURE — 86900 BLOOD TYPING SEROLOGIC ABO: CPT

## 2019-11-27 PROCEDURE — 85610 PROTHROMBIN TIME: CPT

## 2019-11-27 PROCEDURE — 87486 CHLMYD PNEUM DNA AMP PROBE: CPT

## 2019-11-27 RX ORDER — CEPHALEXIN 500 MG
1 CAPSULE ORAL
Qty: 8 | Refills: 0
Start: 2019-11-27 | End: 2019-11-30

## 2019-11-27 RX ORDER — LOSARTAN POTASSIUM 100 MG/1
1 TABLET, FILM COATED ORAL
Qty: 30 | Refills: 0
Start: 2019-11-27 | End: 2019-12-26

## 2019-11-27 RX ADMIN — CEFTRIAXONE 100 MILLIGRAM(S): 500 INJECTION, POWDER, FOR SOLUTION INTRAMUSCULAR; INTRAVENOUS at 01:22

## 2019-11-27 RX ADMIN — HEPARIN SODIUM 5000 UNIT(S): 5000 INJECTION INTRAVENOUS; SUBCUTANEOUS at 12:27

## 2019-11-27 RX ADMIN — LEVETIRACETAM 1000 MILLIGRAM(S): 250 TABLET, FILM COATED ORAL at 05:06

## 2019-11-27 RX ADMIN — HEPARIN SODIUM 5000 UNIT(S): 5000 INJECTION INTRAVENOUS; SUBCUTANEOUS at 05:06

## 2019-11-27 RX ADMIN — LEVETIRACETAM 1000 MILLIGRAM(S): 250 TABLET, FILM COATED ORAL at 17:17

## 2019-11-27 RX ADMIN — CHLORHEXIDINE GLUCONATE 1 APPLICATION(S): 213 SOLUTION TOPICAL at 12:26

## 2019-11-27 RX ADMIN — LOSARTAN POTASSIUM 25 MILLIGRAM(S): 100 TABLET, FILM COATED ORAL at 05:06

## 2019-11-27 NOTE — DISCHARGE NOTE NURSING/CASE MANAGEMENT/SOCIAL WORK - PATIENT PORTAL LINK FT
You can access the FollowMyHealth Patient Portal offered by [facility] by registering at the following website: [email or web address]. By joining [facility]’s FollowMyHealth portal, you will also be able to view your health information using other applications (apps) compatible with our system.

## 2019-11-27 NOTE — PROGRESS NOTE ADULT - SUBJECTIVE AND OBJECTIVE BOX
Patient is a 78y old  Male who presents with a chief complaint of Fall (26 Nov 2019 13:11)      SUBJECTIVE / OVERNIGHT EVENTS: no acute events overnight, pt is feeling well and ambulated around the unit today     MEDICATIONS  (STANDING):  atorvastatin 40 milliGRAM(s) Oral at bedtime  cefTRIAXone   IVPB 1000 milliGRAM(s) IV Intermittent every 24 hours  chlorhexidine 4% Liquid 1 Application(s) Topical daily  heparin  Injectable 5000 Unit(s) SubCutaneous every 8 hours  levETIRAcetam 1000 milliGRAM(s) Oral two times a day  losartan 25 milliGRAM(s) Oral daily    MEDICATIONS  (PRN):      Vital Signs Last 24 Hrs  T(C): 36.5 (27 Nov 2019 12:08), Max: 36.6 (26 Nov 2019 23:41)  T(F): 97.7 (27 Nov 2019 12:08), Max: 97.9 (26 Nov 2019 23:41)  HR: 69 (27 Nov 2019 12:08) (61 - 69)  BP: 144/77 (27 Nov 2019 12:08) (129/75 - 149/72)  BP(mean): --  RR: 18 (27 Nov 2019 12:08) (18 - 18)  SpO2: 99% (27 Nov 2019 12:08) (93% - 99%)  CAPILLARY BLOOD GLUCOSE        I&O's Summary    26 Nov 2019 07:01  -  27 Nov 2019 07:00  --------------------------------------------------------  IN: 350 mL / OUT: 1000 mL / NET: -650 mL    27 Nov 2019 07:01  -  27 Nov 2019 12:54  --------------------------------------------------------  IN: 320 mL / OUT: 300 mL / NET: 20 mL    PHYSICAL EXAM:  GENERAL: NAD  EYES:conjunctiva and sclera clear  NECK: Supple, No JVD  CHEST/LUNG: Clear to auscultation bilaterally; No wheeze  HEART: +S1/S2, reg   ABDOMEN: Soft, Nontender, Nondistended; Bowel sounds present  EXTREMITIES:  LLE edema present, chronic   PSYCH: AAOx3      LABS:                        11.8   5.79  )-----------( 155      ( 26 Nov 2019 11:52 )             34.2

## 2019-11-27 NOTE — CONSULT NOTE ADULT - ASSESSMENT
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 Found to be febrile in ED T max 102 on 11/24. no leukocytosis, patient at the time c/o dysuria and started on Ceftriaxone with resolution of symptoms and defervescence. ID consulted as urine cx are negative but blood cx 1/4 growing gram positive cocci not IDed by PCR. Patient is currently afebrile, no leukocytosis and symptoms free. Patient is currently on Temozolomide for GBM, last took pills 1 month ago. No port or hardware. H/o known aortic valve murmur.   Also reports getting tooth pulled out 3 weeks ago, received unknown antibiotic * 5 day. Currently on Ceftriaxone. 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 Found to be febrile in ED T max 102 on 11/24. no leukocytosis, patient at the time c/o dysuria and started on Ceftriaxone with resolution of symptoms and defervescence. ID consulted as urine cx are negative but blood cx 1/4 growing gram positive cocci not IDed by PCR. Patient is currently afebrile, no leukocytosis and symptoms free. Patient is currently on Temozolomide for GBM, last took pills 1 month ago. No port or hardware. H/o known aortic valve murmur.   Also reports getting tooth pulled out 3 weeks ago, received unknown antibiotic * 5 day. Currently on Ceftriaxone.       Fever likely from UTI   h/o Glioblastoma, on Temozolomide    Suggest:  *can switch to Keflex 500 q12 PO to complete a total of 5-7 days  *blood cx 1/4 gram positive cocci in clusters is likely a contaminant  *f/u repeat blood cx (can be followed up post discharge also, suspicion for true infection is low)  *risk of PCP pneumonia is increased with Temozolomide. No pulmonary symptoms currently. 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 Found to be febrile in ED T max 102 on 11/24. no leukocytosis, patient at the time c/o dysuria and started on Ceftriaxone with resolution of symptoms and defervescence. ID consulted as urine cx are negative but blood cx 1/4 growing gram positive cocci not IDed by PCR. Patient is currently afebrile, no leukocytosis and symptoms free. Patient is currently on Temozolomide for GBM, last took pills 1 month ago. No port or hardware. H/o known aortic valve murmur.   Also reports getting tooth pulled out 3 weeks ago, received unknown antibiotic * 5 day. Currently on Ceftriaxone.     Fever likely from UTI   h/o Glioblastoma, on Temozolomide    Suggest:  *can switch to Keflex 500 q12 PO to complete a total of 5-7 days  *blood cx 1/4 gram positive cocci in clusters is likely a contaminant  *f/u repeat blood cx (can be followed up post discharge also, suspicion for true infection is low)  *risk of PCP pneumonia is increased with Temozolomide. No pulmonary symptoms currently. Will discuss PCP ppx with patient's oncologist     d/w NP and hospitalist

## 2019-11-27 NOTE — CHART NOTE - NSCHARTNOTEFT_GEN_A_CORE
NEURO-ONCOLOGY: JONH     CC: FEVER, UROSEPSIS, GLIOBLASTOMA    Patient seen & examined  case discussed with resident Sunday and Monday  discussed with family (son, Davidson)  [place] notes reviewed      Briefly, 77 yo RH man with right posterior frontal GBM, s/p resection, s/p chemoRT, presently on Temozolomide chemotherapy and doing well despite persistent left hemiparesis.    Admitted to [facility] after feeling unwell Friday night, followed by progressive worsening through Saturday, until he collapsed in the evening ("my legs gave out from under me") and could not get up. Taken to [facility], where urinalysis did not show bacteria, but showed WBC and nitrates.    ON EXAM  Dlnh=925.6 around time of admission, afebrile since  AOx3  cranial nerves intact  nonaphasic. at baseline cognitively.  no drift  no proximal myopathy  full (5/5) strength in bialteral iliopsoas muscles.    LABS  5.79\11.8/155     34.2    IMAGING  I personally reviewed MRI and CT imaging performed recently and in the past. No progression of disease. stable findings. No evidence for CSF dissemination of disease. No new ventriculomegaly.    IMPRESSION/PLAN    GLIOBLASTOMA -- Stable on imaging. no need for inpatient therapy  INFECTION -- Although mildly immunocompromised by temozolomide, this creates risk for PCP pneumonia more than UTI. Not sure why he developed UTI. Perhaps stones ?  DISPO -- no objection to discharge home

## 2019-11-27 NOTE — PROGRESS NOTE ADULT - PROBLEM SELECTOR PLAN 1
-pt reported dysuria, and in the setting of fever and being immunocompromised from chemo, treated with ceftriaxone for UTI (day 4 of abx today). UC was negative.  -BC now positive 1 anerobic bottle for gram positive cocci, awaiting speciation. ?contaminant? Repeat blood cultures sent. Pt has been afebrile   -clinically improved  -will get ID consult given

## 2019-11-27 NOTE — CONSULT NOTE ADULT - SUBJECTIVE AND OBJECTIVE BOX
Blanca Patricio - [phone]    Patient is a 78y old  Male who presents with a chief complaint of Fall (2019 12:54)      HPI:  78M PMH of HTN, HLD, prostate ca s/p prostatectomy, basal cell skin ca (resected from leg), glioblastoma ( dx , s/p surgery, radiation chemotherapy cycle #9 on 19 ) and seizure disorder on keppra presenting s/p fall. Patient reports missing his step at home and fell . Denies head trauma or LOC. No fecal or urinary incontinence. No seizures or seizure-like symptoms. No chest pain, dyspnea, palpitation, diaphoresis. He was previously on  Lovenox for 6 months for DVT/PE that was discontinued when dopplers and CTA on 10/17/129 was negative for DVT/PE. He was subsequently started on aspirin. Code stroke was called for left-sided weakness that patient reports being chronic.    Above reviewed. Patient reported he had felt weakness, feverish 2 days prior to presentation. Also complained of dysuria for 1 week prior to presentation. Reason for presentation was slipping and falling.   Reports recent dental procedure 3 weeks ago, had antibiotic for 5 days during the procedure. Reports known aortic valve murmur.   no hardware in body.   Denied cough, SOB, abdominal pain, diarrhea. Dysuria has now resolved.   Patient is currently on Temozolomide therapy, last 1 month ago.   Patient  was diagnosed with GBM in 2018 when he had presented with  L. side facial weakness and dysphagia. 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 Since then he has undergone ~6 weeks of chemoradiation (2018 – 10/2018), 10 cycles of temozolomide.    ED course:  - /50, HR 87, T 102.7, SpO2 99% on RA  - RVP negative, UA consistent with UTI  - CT cervical spine: No acute cervical fracture. Multilevel cervical spondylosis contributing to varying degrees of moderate to severe neuroforaminal and spinal canal  stenoses  - Given Ceftriaxone and IL IVF       . (2019 04:18)    prior hospital charts reviewed [x ]  primary team notes reviewed [x ]  other consultant notes reviewed [x ]    PAST MEDICAL & SURGICAL HISTORY:  Fall, sequela  Bilateral hearing loss, unspecified hearing loss type   activity: [employer] -    [place]  Type 2 diabetes mellitus without complication, without long-term current use of insulin: stop oral meds  3 weeks ago  Glioblastoma: biopsy 2018  surgery  6 weeks radiation  35 days of oral chemotherapy last 3 weeks  Basal cell carcinoma (BCC) of left lower leg: s/p resection  right lower leg top  left lower leg  Prostate cancer: s/p radical prostatectomy   HLD (hyperlipidemia)  HTN (hypertension)  S/P colonoscopic polypectomy: 3 years benign polyps  S/P tonsillectomy: age 30  H/O bilateral cataract extraction: 3-4 years ago  S/P prostatectomy:   S/P brain surgery: 2018  Lipoma of neck: s/p resection at age 30  Basal cell carcinoma (BCC) of lower leg: s/p resection  History of tonsillectomy: at age 30    Allergies:  No Known Allergies    ANTIMICROBIALS:    cefTRIAXone   IVPB 1000 every 24 hours      OTHER MEDS: MEDICATIONS  (STANDING):  atorvastatin 40 at bedtime  heparin  Injectable 5000 every 8 hours  levETIRAcetam 1000 two times a day  losartan 25 daily    SOCIAL HISTORY:     non-smoker, no alcohol use,    FAMILY HISTORY:  Family history of diabetes mellitus: Father   Family history of prostate cancer in father:   Family history of breast cancer in mother (Sibling): Mother and sister  Family history of lung cancer: mother , long standing smoker    REVIEW OF SYSTEMS  [  ] ROS unobtainable because:    [x  ] All other systems negative except as noted below:	    Constitutional:  [x ] fever [ ] chills  [ ] weight loss  [ ] weakness  Skin:  [ ] rash [ ] phlebitis	  Eyes: [ ] icterus [ ] pain  [ ] discharge	  ENMT: [ ] sore throat  [ ] thrush [ ] ulcers [ ] exudates  Respiratory: [ ] dyspnea [ ] hemoptysis [ ] cough [ ] sputum	  Cardiovascular:  [ ] chest pain [ ] palpitations [ ] edema	  Gastrointestinal:  [ ] nausea [ ] vomiting [ ] diarrhea [ ] constipation [ ] pain	  Genitourinary:  [ x] dysuria [ ] frequency [ ] hematuria [ ] discharge [ ] flank pain  [ ] incontinence  Musculoskeletal:  [ ] myalgias [ ] arthralgias [ ] arthritis  [ ] back pain  Neurological:  [ ] headache [ ] seizures  [ ] confusion/altered mental status  Psychiatric:  [ ] anxiety [ ] depression	  Hematology/Lymphatics:  [ ] lymphadenopathy  Endocrine:  [ ] adrenal [ ] thyroid  Allergic/Immunologic:	 [ ] transplant [ ] seasonal    Vital Signs Last 24 Hrs  T(F): 97.7 (11--19 @ 12:08), Max: 102.7 (19 @ 00:30)    Vital Signs Last 24 Hrs  HR: 69 (19 @ 12:08) (61 - 69)  BP: 144/77 (19 @ 12:08) (129/75 - 149/72)  RR: 18 (19 @ 12:08)  SpO2: 99% (19 @ 12:08) (93% - 99%)  Wt(kg): --    PHYSICAL EXAM:  General: non-toxic, AAO*3  HEAD/EYES: anicteric, PERRL  ENT:  supple  Cardiovascular:   S1, S2  Respiratory:  clear bilaterally  GI:  soft, non-tender, normal bowel sounds  :  no CVA tenderness   Musculoskeletal:  no synovitis  Extremities: left leg edema>> right, no calf tendereness  Neurologic:  Left sided weakness as compared to right but able to walk and use left arm for function   Skin:  no rash  Lymph: no lymphadenopathy  Psychiatric:  appropriate affect  Vascular:  no phlebitis                          11.8   5.79  )-----------( 155      ( 2019 11:52 )             34.2       MICROBIOLOGY:    Culture - Blood (collected 2019 04:47)  Source: .Blood Blood-Peripheral  Preliminary Report (2019 05:00):    No growth to date.    Culture - Blood (collected 2019 04:47)  Source: .Blood Blood-Peripheral  Gram Stain (2019 17:53):    Growth in anaerobic bottle: Gram Positive Cocci in Clusters  Preliminary Report (2019 17:53):    Growth in anaerobic bottle: Gram Positive Cocci in Clusters    "Due to technical problems, Proteus sp. will Not be reported as part of    the BCID panel until further notice"    ***Blood Panel PCR results on this specimen are available    approximately 3 hours after the Gram stain result.***    Gram stain, PCR, and/or culture results may not always    correspond due to difference in methodologies.    ************************************************************    This PCR assay was performed using [facility].    The following targets are tested for: Enterococcus,    vancomycin resistant enterococci, Listeria monocytogenes,    coagulase negative staphylococci, S. aureus,    methicillin resistant S. aureus, Streptococcus agalactiae    (Group B), S. pneumoniae, S. pyogenes (Group A),    Acinetobacter baumannii, Enterobacter cloacae, E. coli,    Klebsiella oxytoca, K. pneumoniae, Proteus sp.,    Serratia marcescens, Haemophilus influenzae,    Neisseria meningitidis, Pseudomonas aeruginosa, Candida    albicans, C. glabrata, C krusei, C parapsilosis,    C. tropicalis and the KPC resistance gene.  Organism: Blood Culture PCR (2019 19:09)  Organism: Blood Culture PCR (2019 19:09)      -  Acinetobacter baumanii: Nondet      -  Candida albicans: Nondet      -  Candida glabrata: Nondet      -  Candida krusei: Nondet      -  Candida parapsilosis: Nondet      -  Candida tropicalis: Nondet      -  Coagulase negative Staphylococcus: Nondet      -  Enterobacter cloacae complex: Nondet      -  Enterococcus species: Nondet      -  Escherichia coli: Nondet      -  Haemophilus influenzae: Nondet      -  Klebsiella oxytoca: Nondet      -  Klebsiella pneumoniae: Nondet      -  Listeria monocytogenes: Nondet      -  Methicillin resistant Staphylococcus aureus (MRSA): Nondet      -  Multidrug (KPC pos) resistant organism: Nondet      -  Neisseria meningitidis: Nondet      -  Pseudomonas aeruginosa: Nondet      -  Serratia marcescens: Nondet      -  Staphylococcus aureus: Nondet      -  Streptococcus agalactiae (Group B): Nondet      -  Streptococcus pneumoniae: Nondet      -  Streptococcus pyogenes (Group A): Nondet      -  Streptococcus sp. (Not Grp A, B or S pneumoniae): Nondet      -  Vancomycin resistant Enterococcus sp.: Nondet      Method Type: PCR    Culture - Urine (collected 2019 04:20)  Source: .Urine Clean Catch (Midstream)  Final Report (2019 08:07):    No growth      Rapid RVP Result: NotDetec ( @ 02:42)    RADIOLOGY:  < from: [facility] Duplex Lower Ext Vein Scan, Left (19 @ 09:00) >    IMPRESSION:     No evidence of left lower extremity deep venous thrombosis.    < from: CT Angio Head w/ IV Cont (19 @ 00:57) >  IMPRESSION:     CTA Neck: Atherosclerosis of the carotid bifurcations resulting in mild  luminal stenosis at the proximal left internal carotid artery. No   arterial dissection within the cervical carotid or vertebral arteries.    CTA Head: No proximal large vessel occlusion.    CT cervical spine: Multilevel cervical spondylosis contributing to   varying degrees of moderate to severe neuroforaminal and spinal canal   stenoses as above.        < from: Xray Chest 1 View AP/PA (19 @ 00:41) >  IMPRESSION:     Clear lungs. Blanca Patricio - [phone]    Patient is a 78y old  Male who presents with a chief complaint of Fall (2019 12:54)      HPI:  78M PMH of HTN, HLD, prostate ca s/p prostatectomy, basal cell skin ca (resected from leg), glioblastoma ( dx , s/p surgery, radiation chemotherapy cycle #9 on 19 ) and seizure disorder on keppra presenting s/p fall. Patient reports missing his step at home and fell . Denies head trauma or LOC. No fecal or urinary incontinence. No seizures or seizure-like symptoms. No chest pain, dyspnea, palpitation, diaphoresis. He was previously on  Lovenox for 6 months for DVT/PE that was discontinued when dopplers and CTA on 10/17/129 was negative for DVT/PE. He was subsequently started on aspirin. Code stroke was called for left-sided weakness that patient reports being chronic.    Above reviewed. Patient reported he had felt weakness, feverish 2 days prior to presentation. Also complained of dysuria for 1 week prior to presentation. Reason for presentation was slipping and falling.   Reports recent dental procedure 3 weeks ago, had antibiotic for 5 days during the procedure. Reports known aortic valve murmur.   no hardware in body.   Denied cough, SOB, abdominal pain, diarrhea. Dysuria has now resolved.   Patient is currently on Temozolomide therapy, last 1 month ago.   Patient  was diagnosed with GBM in 2018 when he had presented with  L. side facial weakness and dysphagia. 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 Since then he has undergone ~6 weeks of chemoradiation (2018 – 10/2018), 10 cycles of temozolomide.    prior hospital charts reviewed [x ]  primary team notes reviewed [x ]  other consultant notes reviewed [x ]    PAST MEDICAL & SURGICAL HISTORY:  Fall, sequela  Bilateral hearing loss, unspecified hearing loss type   activity: [employer] -    [place]  Type 2 diabetes mellitus without complication, without long-term current use of insulin: stop oral meds  3 weeks ago  Glioblastoma: biopsy 2018  surgery  6 weeks radiation  35 days of oral chemotherapy last 3 weeks  Basal cell carcinoma (BCC) of left lower leg: s/p resection  right lower leg top  left lower leg  Prostate cancer: s/p radical prostatectomy   HLD (hyperlipidemia)  HTN (hypertension)  S/P colonoscopic polypectomy: 3 years benign polyps  S/P tonsillectomy: age 30  H/O bilateral cataract extraction: 3-4 years ago  S/P prostatectomy:   S/P brain surgery: 2018  Lipoma of neck: s/p resection at age 30  Basal cell carcinoma (BCC) of lower leg: s/p resection  History of tonsillectomy: at age 30    Allergies:  No Known Allergies    ANTIMICROBIALS:    cefTRIAXone   IVPB 1000 every 24 hours      OTHER MEDS: MEDICATIONS  (STANDING):  atorvastatin 40 at bedtime  heparin  Injectable 5000 every 8 hours  levETIRAcetam 1000 two times a day  losartan 25 daily    SOCIAL HISTORY:     non-smoker, no alcohol use,    FAMILY HISTORY:  Family history of diabetes mellitus: Father   Family history of prostate cancer in father:   Family history of breast cancer in mother (Sibling): Mother and sister  Family history of lung cancer: mother , long standing smoker    REVIEW OF SYSTEMS  [  ] ROS unobtainable because:    [x  ] All other systems negative except as noted below:	    Constitutional:  [x ] fever [ ] chills  [ ] weight loss  [ ] weakness  Skin:  [ ] rash [ ] phlebitis	  Eyes: [ ] icterus [ ] pain  [ ] discharge	  ENMT: [ ] sore throat  [ ] thrush [ ] ulcers [ ] exudates  Respiratory: [ ] dyspnea [ ] hemoptysis [ ] cough [ ] sputum	  Cardiovascular:  [ ] chest pain [ ] palpitations [ ] edema	  Gastrointestinal:  [ ] nausea [ ] vomiting [ ] diarrhea [ ] constipation [ ] pain	  Genitourinary:  [ x] dysuria [ ] frequency [ ] hematuria [ ] discharge [ ] flank pain  [ ] incontinence  Musculoskeletal:  [ ] myalgias [ ] arthralgias [ ] arthritis  [ ] back pain  Neurological:  [ ] headache [ ] seizures  [ ] confusion/altered mental status  Psychiatric:  [ ] anxiety [ ] depression	  Hematology/Lymphatics:  [ ] lymphadenopathy  Endocrine:  [ ] adrenal [ ] thyroid  Allergic/Immunologic:	 [ ] transplant [ ] seasonal    Vital Signs Last 24 Hrs  T(F): 97.7 (19 @ 12:08), Max: 102.7 (19 @ 00:30)    Vital Signs Last 24 Hrs  HR: 69 (19 @ 12:08) (61 - 69)  BP: 144/77 (19 @ 12:08) (129/75 - 149/72)  RR: 18 (19 @ 12:08)  SpO2: 99% (19 @ 12:08) (93% - 99%)  Wt(kg): --    PHYSICAL EXAM:  General: non-toxic, AAO*3  HEAD/EYES: anicteric, PERRL  ENT:  supple  Cardiovascular:   S1, S2  Respiratory:  clear bilaterally  GI:  soft, non-tender, normal bowel sounds  :  no CVA tenderness   Musculoskeletal:  no synovitis  Extremities: left leg edema>> right, no calf tendereness  Neurologic:  Left sided weakness as compared to right but able to walk and use left arm for function   Skin:  no rash  Lymph: no lymphadenopathy  Psychiatric:  appropriate affect  Vascular:  no phlebitis                          11.8   5.79  )-----------( 155      ( 2019 11:52 )             34.2       MICROBIOLOGY:    Culture - Blood (collected 2019 04:47)  Source: .Blood Blood-Peripheral  Preliminary Report (2019 05:00):    No growth to date.    Culture - Blood (collected 2019 04:47)  Source: .Blood Blood-Peripheral  Gram Stain (2019 17:53):    Growth in anaerobic bottle: Gram Positive Cocci in Clusters  Preliminary Report (2019 17:53):    Growth in anaerobic bottle: Gram Positive Cocci in Clusters    "Due to technical problems, Proteus sp. will Not be reported as part of    the BCID panel until further notice"    ***Blood Panel PCR results on this specimen are available    approximately 3 hours after the Gram stain result.***    Gram stain, PCR, and/or culture results may not always    correspond due to difference in methodologies.    ************************************************************    This PCR assay was performed using [email or web address].    The following targets are tested for: Enterococcus,    vancomycin resistant enterococci, Listeria monocytogenes,    coagulase negative staphylococci, S. aureus,    methicillin resistant S. aureus, Streptococcus agalactiae    (Group B), S. pneumoniae, S. pyogenes (Group A),    Acinetobacter baumannii, Enterobacter cloacae, E. coli,    Klebsiella oxytoca, K. pneumoniae, Proteus sp.,    Serratia marcescens, Haemophilus influenzae,    Neisseria meningitidis, Pseudomonas aeruginosa, Candida    albicans, C. glabrata, C krusei, C parapsilosis,    C. tropicalis and the KPC resistance gene.  Organism: Blood Culture PCR (2019 19:09)  Organism: Blood Culture PCR (2019 19:09)      -  Acinetobacter baumanii: Nondet      -  Candida albicans: Nondet      -  Candida glabrata: Nondet      -  Candida krusei: Nondet      -  Candida parapsilosis: Nondet      -  Candida tropicalis: Nondet      -  Coagulase negative Staphylococcus: Nondet      -  Enterobacter cloacae complex: Nondet      -  Enterococcus species: Nondet      -  Escherichia coli: Nondet      -  Haemophilus influenzae: Nondet      -  Klebsiella oxytoca: Nondet      -  Klebsiella pneumoniae: Nondet      -  Listeria monocytogenes: Nondet      -  Methicillin resistant Staphylococcus aureus (MRSA): Nondet      -  Multidrug (KPC pos) resistant organism: Nondet      -  Neisseria meningitidis: Nondet      -  Pseudomonas aeruginosa: Nondet      -  Serratia marcescens: Nondet      -  Staphylococcus aureus: Nondet      -  Streptococcus agalactiae (Group B): Nondet      -  Streptococcus pneumoniae: Nondet      -  Streptococcus pyogenes (Group A): Nondet      -  Streptococcus sp. (Not Grp A, B or S pneumoniae): Nondet      -  Vancomycin resistant Enterococcus sp.: Nondet      Method Type: PCR    Culture - Urine (collected 2019 04:20)  Source: .Urine Clean Catch (Midstream)  Final Report (2019 08:07):    No growth      Rapid RVP Result: NotDetec ( @ 02:42)    RADIOLOGY:  < from: [facility] Duplex Lower Ext Vein Scan, Left (19 @ 09:00) >    IMPRESSION:     No evidence of left lower extremity deep venous thrombosis.    < from: CT Angio Head w/ IV Cont (19 @ 00:57) >  IMPRESSION:     CTA Neck: Atherosclerosis of the carotid bifurcations resulting in mild  luminal stenosis at the proximal left internal carotid artery. No   arterial dissection within the cervical carotid or vertebral arteries.    CTA Head: No proximal large vessel occlusion.    CT cervical spine: Multilevel cervical spondylosis contributing to   varying degrees of moderate to severe neuroforaminal and spinal canal   stenoses as above.        < from: Xray Chest 1 View AP/PA (19 @ 00:41) >  IMPRESSION:     Clear lungs. Blanca Patricio - [phone]    Patient is a 78y old  Male who presents with a chief complaint of Fall (2019 12:54)      HPI:  78M PMH of HTN, HLD, prostate ca s/p prostatectomy, basal cell skin ca (resected from leg), glioblastoma ( dx , s/p surgery, radiation chemotherapy cycle #9 on 19 ) and seizure disorder on keppra presenting s/p fall. Patient reports missing his step at home and fell . Denies head trauma or LOC. No fecal or urinary incontinence. No seizures or seizure-like symptoms. No chest pain, dyspnea, palpitation, diaphoresis. He was previously on  Lovenox for 6 months for DVT/PE that was discontinued when dopplers and CTA on 10/17/129 was negative for DVT/PE. He was subsequently started on aspirin. Code stroke was called for left-sided weakness that patient reports being chronic.    Above reviewed. Patient reported he had felt weakness, feverish 2 days prior to presentation. Also complained of dysuria for 1 week prior to presentation. Reason for presentation was slipping and falling.   Reports recent dental procedure 3 weeks ago, had antibiotic for 5 days during the procedure. Reports known aortic valve murmur.   no hardware in body.   Denied cough, SOB, abdominal pain, diarrhea. Dysuria has now resolved.   Patient is currently on Temozolomide therapy, last 1 month ago.   Patient  was diagnosed with GBM in 2018 when he had presented with  L. side facial weakness and dysphagia. 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 Since then he has undergone ~6 weeks of chemoradiation (2018 – 10/2018), 10 cycles of temozolomide.    prior hospital charts reviewed [x ]  primary team notes reviewed [x ]  other consultant notes reviewed [x ]    PAST MEDICAL & SURGICAL HISTORY:  Fall, sequela  Bilateral hearing loss, unspecified hearing loss type   activity: [employer] -    [place]  Type 2 diabetes mellitus without complication, without long-term current use of insulin: stop oral meds  3 weeks ago  Glioblastoma: biopsy 2018  surgery  6 weeks radiation  35 days of oral chemotherapy last 3 weeks  Basal cell carcinoma (BCC) of left lower leg: s/p resection  right lower leg top  left lower leg  Prostate cancer: s/p radical prostatectomy   HLD (hyperlipidemia)  HTN (hypertension)  S/P colonoscopic polypectomy: 3 years benign polyps  S/P tonsillectomy: age 30  H/O bilateral cataract extraction: 3-4 years ago  S/P prostatectomy:   S/P brain surgery: 2018  Lipoma of neck: s/p resection at age 30  Basal cell carcinoma (BCC) of lower leg: s/p resection  History of tonsillectomy: at age 30    Allergies:  No Known Allergies    ANTIMICROBIALS:    cefTRIAXone   IVPB 1000 every 24 hours      OTHER MEDS: MEDICATIONS  (STANDING):  atorvastatin 40 at bedtime  heparin  Injectable 5000 every 8 hours  levETIRAcetam 1000 two times a day  losartan 25 daily    SOCIAL HISTORY:     non-smoker,  consumes wine     FAMILY HISTORY:  Family history of diabetes mellitus: Father   Family history of prostate cancer in father:   Family history of breast cancer in mother (Sibling): Mother and sister  Family history of lung cancer: mother , long standing smoker    REVIEW OF SYSTEMS  [  ] ROS unobtainable because:    [x  ] All other systems negative except as noted below:	    Constitutional:  [x ] fever [ ] chills  [ ] weight loss  [ ] weakness  Skin:  [ ] rash [ ] phlebitis	  Eyes: [ ] icterus [ ] pain  [ ] discharge	  ENMT: [ ] sore throat  [ ] thrush [ ] ulcers [ ] exudates  Respiratory: [ ] dyspnea [ ] hemoptysis [ ] cough [ ] sputum	  Cardiovascular:  [ ] chest pain [ ] palpitations [ ] edema	  Gastrointestinal:  [ ] nausea [ ] vomiting [ ] diarrhea [ ] constipation [ ] pain	  Genitourinary:  [ x] dysuria [ ] frequency [ ] hematuria [ ] discharge [ ] flank pain  [ ] incontinence  Musculoskeletal:  [ ] myalgias [ ] arthralgias [ ] arthritis  [ ] back pain  Neurological:  [ ] headache [ ] seizures  [ ] confusion/altered mental status  Psychiatric:  [ ] anxiety [ ] depression	  Hematology/Lymphatics:  [ ] lymphadenopathy  Endocrine:  [ ] adrenal [ ] thyroid  Allergic/Immunologic:	 [ ] transplant [ ] seasonal    Vital Signs Last 24 Hrs  T(F): 97.7 (19 @ 12:08), Max: 102.7 (19 @ 00:30)    Vital Signs Last 24 Hrs  HR: 69 (19 @ 12:08) (61 - 69)  BP: 144/77 (19 @ 12:08) (129/75 - 149/72)  RR: 18 (19 @ 12:08)  SpO2: 99% (19 @ 12:08) (93% - 99%)  Wt(kg): --    PHYSICAL EXAM:  General: non-toxic, AAO*3  HEAD/EYES: anicteric, PERRL  ENT:  supple  Cardiovascular:   S1, S2  Respiratory:  clear bilaterally  GI:  soft, non-tender, normal bowel sounds  :  no CVA tenderness   Musculoskeletal:  no synovitis  Extremities: left leg edema>> right, no calf tendereness  Neurologic:  Left sided weakness as compared to right but able to walk and use left arm for function   Skin:  no rash  Lymph: no lymphadenopathy  Psychiatric:  appropriate affect  Vascular:  no phlebitis                          11.8   5.79  )-----------( 155      ( 2019 11:52 )             34.2       MICROBIOLOGY:    Culture - Blood (collected 2019 04:47)  Source: .Blood Blood-Peripheral  Preliminary Report (2019 05:00):    No growth to date.    Culture - Blood (collected 2019 04:47)  Source: .Blood Blood-Peripheral  Gram Stain (2019 17:53):    Growth in anaerobic bottle: Gram Positive Cocci in Clusters  Preliminary Report (2019 17:53):    Growth in anaerobic bottle: Gram Positive Cocci in Clusters    "Due to technical problems, Proteus sp. will Not be reported as part of    the BCID panel until further notice"    ***Blood Panel PCR results on this specimen are available    approximately 3 hours after the Gram stain result.***    Gram stain, PCR, and/or culture results may not always    correspond due to difference in methodologies.    ************************************************************    This PCR assay was performed using [employer].    The following targets are tested for: Enterococcus,    vancomycin resistant enterococci, Listeria monocytogenes,    coagulase negative staphylococci, S. aureus,    methicillin resistant S. aureus, Streptococcus agalactiae    (Group B), S. pneumoniae, S. pyogenes (Group A),    Acinetobacter baumannii, Enterobacter cloacae, E. coli,    Klebsiella oxytoca, K. pneumoniae, Proteus sp.,    Serratia marcescens, Haemophilus influenzae,    Neisseria meningitidis, Pseudomonas aeruginosa, Candida    albicans, C. glabrata, C krusei, C parapsilosis,    C. tropicalis and the KPC resistance gene.  Organism: Blood Culture PCR (2019 19:09)  Organism: Blood Culture PCR (2019 19:09)      -  Acinetobacter baumanii: Nondet      -  Candida albicans: Nondet      -  Candida glabrata: Nondet      -  Candida krusei: Nondet      -  Candida parapsilosis: Nondet      -  Candida tropicalis: Nondet      -  Coagulase negative Staphylococcus: Nondet      -  Enterobacter cloacae complex: Nondet      -  Enterococcus species: Nondet      -  Escherichia coli: Nondet      -  Haemophilus influenzae: Nondet      -  Klebsiella oxytoca: Nondet      -  Klebsiella pneumoniae: Nondet      -  Listeria monocytogenes: Nondet      -  Methicillin resistant Staphylococcus aureus (MRSA): Nondet      -  Multidrug (KPC pos) resistant organism: Nondet      -  Neisseria meningitidis: Nondet      -  Pseudomonas aeruginosa: Nondet      -  Serratia marcescens: Nondet      -  Staphylococcus aureus: Nondet      -  Streptococcus agalactiae (Group B): Nondet      -  Streptococcus pneumoniae: Nondet      -  Streptococcus pyogenes (Group A): Nondet      -  Streptococcus sp. (Not Grp A, B or S pneumoniae): Nondet      -  Vancomycin resistant Enterococcus sp.: Nondet      Method Type: PCR    Culture - Urine (collected 2019 04:20)  Source: .Urine Clean Catch (Midstream)  Final Report (2019 08:07):    No growth      Rapid RVP Result: NotDetec ( @ 02:42)    RADIOLOGY:  < from: [facility] Duplex Lower Ext Vein Scan, Left (19 @ 09:00) >    IMPRESSION:     No evidence of left lower extremity deep venous thrombosis.    < from: CT Angio Head w/ IV Cont (19 @ 00:57) >  IMPRESSION:     CTA Neck: Atherosclerosis of the carotid bifurcations resulting in mild  luminal stenosis at the proximal left internal carotid artery. No   arterial dissection within the cervical carotid or vertebral arteries.    CTA Head: No proximal large vessel occlusion.    CT cervical spine: Multilevel cervical spondylosis contributing to   varying degrees of moderate to severe neuroforaminal and spinal canal   stenoses as above.        < from: Xray Chest 1 View AP/PA (19 @ 00:41) >  IMPRESSION:     Clear lungs. Blanca Patricio - [phone]    Patient is a 78y old  Male who presents with a chief complaint of Fall (2019 12:54)    HPI:  78M PMH of HTN, HLD, prostate ca s/p prostatectomy, basal cell skin ca (resected from leg), glioblastoma (dx , s/p surgery, radiation chemotherapy cycle #9 on 19) and seizure disorder on keppra presenting s/p fall. Patient reports missing his step at home and fell . Denies head trauma or LOC. No fecal or urinary incontinence. No seizures or seizure-like symptoms. No chest pain, dyspnea, palpitation, diaphoresis. He was previously on  Lovenox for 6 months for DVT/PE that was discontinued when dopplers and CTA on 10/17/129 was negative for DVT/PE. He was subsequently started on aspirin. Code stroke was called for left-sided weakness that patient reports being chronic.    Above reviewed. Patient reported he had felt weakness, feverish 2 days prior to presentation. Also complained of dysuria for 1 week prior to presentation. Reason for presentation was slipping and falling.   Reports recent dental procedure 3 weeks ago, had antibiotic for 5 days during the procedure. Reports known aortic valve murmur.   no hardware in body.   Denied cough, SOB, abdominal pain, diarrhea. Dysuria has now resolved.   Patient is currently on Temozolomide therapy, last 1 month ago.   Patient  was diagnosed with GBM in 2018 when he had presented with  L. side facial weakness and dysphagia. 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 Since then he has undergone ~6 weeks of chemoradiation (2018 – 10/2018), 10 cycles of temozolomide.    prior hospital charts reviewed [x ]  primary team notes reviewed [x ]  other consultant notes reviewed [x ]    PAST MEDICAL & SURGICAL HISTORY:  Fall, sequela  Bilateral hearing loss, unspecified hearing loss type   activity: [employer] - [[place]  Type 2 diabetes mellitus without complication, without long-term current use of insulin: stop oral meds  3 weeks ago  Glioblastoma: biopsy 2018 and resection; s/p 6 weeks radiation and on Temodar  Basal cell carcinoma (BCC) of left lower leg: s/p resection  Prostate cancer: s/p radical prostatectomy   HLD  HTN  S/P colonoscopic polypectomy: 3 years benign polyps  S/P tonsillectomy: age 30  H/O bilateral cataract extraction: 3-4 years ago  S/P prostatectomy:   S/P brain surgery: 2018  Lipoma of neck: s/p resection at age 30  Basal cell carcinoma (BCC) of lower leg: s/p resection  History of tonsillectomy: at age 30    Allergies:  No Known Allergies    ANTIMICROBIALS:    cefTRIAXone   IVPB 1000 every 24 hours (-)    OTHER MEDS: MEDICATIONS  (STANDING):  atorvastatin 40 at bedtime  heparin  Injectable 5000 every 8 hours  levETIRAcetam 1000 two times a day  losartan 25 daily    SOCIAL HISTORY:     non-smoker,  consumes wine     FAMILY HISTORY:  Family history of diabetes mellitus: Father   Family history of prostate cancer in father:   Family history of breast cancer in mother (Sibling): Mother and sister  Family history of lung cancer: mother , long standing smoker    REVIEW OF SYSTEMS  [  ] ROS unobtainable because:    [x  ] All other systems negative except as noted below:	    Constitutional:  [x ] fever [ ] chills  [ ] weight loss  [ ] weakness  Skin:  [ ] rash [ ] phlebitis	  Eyes: [ ] icterus [ ] pain  [ ] discharge	  ENMT: [ ] sore throat  [ ] thrush [ ] ulcers [ ] exudates  Respiratory: [ ] dyspnea [ ] hemoptysis [ ] cough [ ] sputum	  Cardiovascular:  [ ] chest pain [ ] palpitations [ ] edema	  Gastrointestinal:  [ ] nausea [ ] vomiting [ ] diarrhea [ ] constipation [ ] pain	  Genitourinary:  [ x] dysuria [ ] frequency [ ] hematuria [ ] discharge [ ] flank pain  [ ] incontinence  Musculoskeletal:  [ ] myalgias [ ] arthralgias [ ] arthritis  [ ] back pain  Neurological:  [ ] headache [ ] seizures  [ ] confusion/altered mental status  Psychiatric:  [ ] anxiety [ ] depression	  Hematology/Lymphatics:  [ ] lymphadenopathy  Endocrine:  [ ] adrenal [ ] thyroid  Allergic/Immunologic:	 [ ] transplant [ ] seasonal    Vital Signs Last 24 Hrs  T(F): 97.7 (19 @ 12:08), Max: 102.7 (19 @ 00:30)    Vital Signs Last 24 Hrs  HR: 69 (19 @ 12:08) (61 - 69)  BP: 144/77 (19 @ 12:08) (129/75 - 149/72)  RR: 18 (19 @ 12:08)  SpO2: 99% (19 @ 12:08) (93% - 99%)  Wt(kg): --    PHYSICAL EXAM:  General: non-toxic, AAO*3  HEAD/EYES: anicteric  ENT:  supple  Cardiovascular:   S1, S2  Respiratory:  clear bilaterally  GI:  soft, non-tender, normal bowel sounds  :  no CVA tenderness   Musculoskeletal:  no synovitis  Extremities: left leg edema>> right, no calf tendereness  Neurologic:  Left sided weakness as compared to right but able to walk and use left arm for function   Skin:  no rash  Psychiatric:  appropriate affect  Vascular:  no phlebitis                        11.8   5.79  )-----------( 155      ( 2019 11:52 )             34.2     MICROBIOLOGY:  Culture - Blood (collected 2019 04:47)  Source: .Blood Blood-Peripheral  Preliminary Report (2019 05:00):    No growth to date.    Culture - Blood (collected 2019 04:47)  Source: .Blood Blood-Peripheral  Gram Stain (2019 17:53):    Growth in anaerobic bottle: Gram Positive Cocci in Clusters  Preliminary Report (2019 17:53):    Growth in anaerobic bottle: Gram Positive Cocci in Clusters  (NEGATIVE BIOFIRE)    Culture - Urine (collected 2019 04:20)  Source: .Urine Clean Catch (Midstream)  Final Report (2019 08:07):    No growth    Rapid RVP Result: NotDetec ( @ 02:42)    RADIOLOGY:  VA Duplex Lower Ext Vein Scan, Left (19 @ 09:00) >  IMPRESSION:   No evidence of left lower extremity deep venous thrombosis.    CT Angio Head w/ IV Cont (19 @ 00:57) >  IMPRESSION:   CTA Neck: Atherosclerosis of the carotid bifurcations resulting in mild luminal stenosis at the proximal left internal carotid artery. No arterial dissection within the cervical carotid or vertebral arteries.  CTA Head: No proximal large vessel occlusion.  CT cervical spine: Multilevel cervical spondylosis contributing to varying degrees of moderate to severe neuroforaminal and spinal canal stenoses as above.    Xray Chest 1 View AP/PA (19 @ 00:41) >  IMPRESSION: Clear lungs.

## 2019-11-28 LAB
CULTURE RESULTS: SIGNIFICANT CHANGE UP
ORGANISM # SPEC MICROSCOPIC CNT: SIGNIFICANT CHANGE UP
ORGANISM # SPEC MICROSCOPIC CNT: SIGNIFICANT CHANGE UP
SPECIMEN SOURCE: SIGNIFICANT CHANGE UP

## 2019-11-29 LAB
CULTURE RESULTS: SIGNIFICANT CHANGE UP
SPECIMEN SOURCE: SIGNIFICANT CHANGE UP

## 2019-12-02 LAB
CULTURE RESULTS: SIGNIFICANT CHANGE UP
CULTURE RESULTS: SIGNIFICANT CHANGE UP
SPECIMEN SOURCE: SIGNIFICANT CHANGE UP
SPECIMEN SOURCE: SIGNIFICANT CHANGE UP

## 2019-12-19 ENCOUNTER — APPOINTMENT (OUTPATIENT)
Dept: NEUROLOGY | Facility: CLINIC | Age: 79
End: 2019-12-19
Payer: MEDICARE

## 2019-12-19 VITALS
SYSTOLIC BLOOD PRESSURE: 160 MMHG | BODY MASS INDEX: 27.58 KG/M2 | DIASTOLIC BLOOD PRESSURE: 72 MMHG | WEIGHT: 197 LBS | HEART RATE: 60 BPM | RESPIRATION RATE: 16 BRPM | OXYGEN SATURATION: 98 % | HEIGHT: 71 IN

## 2019-12-19 PROCEDURE — 99215 OFFICE O/P EST HI 40 MIN: CPT

## 2019-12-19 NOTE — HISTORY OF PRESENT ILLNESS
[FreeTextEntry1] : This is a pleasant 79 year old man with right frontal GBM. 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, MGMT unmethylated \par - completed chemoradiation - 8/31/18 - 10/12/18\par - 11/12/18 - temozolomide cycle #1, dose 320mg (150mg/m2) \par - 12/10/18 - temozolomide cycle #2, dose 400mg \par - 12/10/18 - presented to ED s/p fall and unable to get up; imaging with increased cerebral edema \par - 1/10/19 - temozolomide cycle #3, dose 400mg \par - POD \par - 2/7/19 - s/p re-resection ([facility]); PATH: recurrent GBM, 30-40% necrosis \par - 3/11/19 - temozolomide cycle #4, dose 400mg \par - 3/13/19 - started Avastin infusions q2 weeks \par - 4/11/19 - hospitalized with b/l PE \par - 5/26/19 - temozolomide cycle #5, dose 400mg \par - 6/23/19 - temozolomide cycle #6, dose 400mg \par - 7/22/19 - temozolomide cycle #7, dose 400mg \par - 8/26/19 - temozolomide cycle #8, dose 400mg\par - 9/23/19 - temozolomide cycle #9, dose 400mg \par - 10/21/19 - temozolomide cycle #10, dose 400mg \par - 11/2019 - hospitalized with weakness and fall, treated for UTI\par - 12/3/19 - temozolomide cycle #11, dose 400mg \par \par He presents today for routine follow-up, accompanied by his son. \par \par He was hospitalized in November with leg weakness and fall; he was treated for UTI and now recovered. He followed up with PCP yesterday for u/a and culture. \par He had an MRI in November, which was stable. \par \par He is feeling well, with no headaches, no n/v, no diplopia. \par Left side continues to be problematic and his walking is not as good as he would like it to be. He goes to the gym regularly. \par \par No seizures or seizure-like symptoms on Keppra 1000mg BID. \par \par Tolerating temozolomide well, counts are stable. \par \par

## 2019-12-19 NOTE — PHYSICAL EXAM
[General Appearance - Alert] : alert [General Appearance - In No Acute Distress] : in no acute distress [Oriented To Time, Place, And Person] : oriented to person, place, and time [Impaired Insight] : insight and judgment were intact [Affect] : the affect was normal [Memory Recent] : recent memory was not impaired [Mood] : the mood was normal [Memory Remote] : remote memory was not impaired [Person] : oriented to person [Place] : oriented to place [Time] : oriented to time [Short Term Intact] : short term memory intact [Remote Intact] : remote memory intact [Registration Intact] : recent registration memory intact [Span Intact] : the attention span was normal [Concentration Intact] : normal concentrating ability [Visual Intact] : visual attention was ~T not ~L decreased [Naming Objects] : no difficulty naming common objects [Repeating Phrases] : no difficulty repeating a phrase [Fluency] : fluency intact [Comprehension] : comprehension intact [Current Events] : adequate knowledge of current events [Reading] : reading intact [Past History] : adequate knowledge of personal past history [Cranial Nerves Optic (II)] : visual acuity intact bilaterally,  visual fields full to confrontation, pupils equal round and reactive to light [Vocabulary] : adequate range of vocabulary [Cranial Nerves Oculomotor (III)] : extraocular motion intact [Cranial Nerves Trigeminal (V)] : facial sensation intact symmetrically [Cranial Nerves Vestibulocochlear (VIII)] : hearing was intact bilaterally [Cranial Nerves Glossopharyngeal (IX)] : tongue and palate midline [Cranial Nerves Accessory (XI - Cranial And Spinal)] : head turning and shoulder shrug symmetric [Cranial Nerves Hypoglossal (XII)] : there was no tongue deviation with protrusion [Motor Tone] : muscle tone was normal in all four extremities [Involuntary Movements] : no involuntary movements were seen [No Muscle Atrophy] : normal bulk in all four extremities [Motor Strength Upper Extremities Left] : there was weakness of the left upper extremity [Sensation Tactile Decrease] : light touch was intact [Dysdiadochokinesia On The Left] : present on the left side [Coordination - Dysmetria Impaired Finger-to-Nose Left Only] : present on the left side [Sclera] : the sclera and conjunctiva were normal [Extraocular Movements] : extraocular movements were intact [Respiration, Rhythm And Depth] : normal respiratory rhythm and effort [Skin Color & Pigmentation] : normal skin color and pigmentation [Past-pointing] : there was no past-pointing [Motor Strength Lower Extremities Bilaterally] : strength was normal in both lower extremities [Tremor] : no tremor present [FreeTextEntry6] : weak hand  LUE. LUE strength 4/5.  [FreeTextEntry5] : mild left facial droop - unchanged  [FreeTextEntry7] : decreased sensation L facial  [FreeTextEntry8] : gait steady [FreeTextEntry1] : edema b/l LE (L>R)

## 2019-12-19 NOTE — DATA REVIEWED
[de-identified] : I personally reviewed MR imaging dated\par 11/22/19	9/11/19	7/16/19	2/9/19\par \par In reviewing these images, I find INTERVAL STABILITY OF THE MINIMAL ENHANCEMENT SURROUNDING THE RESECITON CAVITY OF THE RIGHT FRONTAL LOBE. \par [facility] imaging shows no acute CVA.\par Overall I find the images to be stable OR EVEN MODESTLY improved. \par THE official report is presently available for my review. It suggests that there is increased nodularity at the site of tumor resection. I see what they are referring to and I find it to be most likely related to collapse of the resection cavity and the two enhancing edges now laying together and looking like an increase in enhancement, but it is really just a compression of these two sites. There is no evidence of active tumor progression.\par Selected imaging was provided to the patient, along with a detailed verbal explanation of the issues at hand as outlined above.\par

## 2019-12-19 NOTE — DISCUSSION/SUMMARY
[FreeTextEntry1] : Brain tumor - Imaging from November reviewed, stable. He is doing well clinically and continues to work on his left side mobility and strength. Will continue with temozolomide cycle #12, dose 400mg, to begin around 1/2/20. Written instructions provided. We reviewed plan to complete 12 cycles, then follow with MRI monitoring if next scan is stable. \par \par Seizures - None, continue Keppra. \par \par Elevated BP - He tells me he recently decreased his dose of Losartan. I asked him to monitor his BP at home over the next week and follow with his PCP if it remains elevated. \par \par DISPO - All questions answered. Follow-up with MRI in 1 month.

## 2019-12-23 ENCOUNTER — RX RENEWAL (OUTPATIENT)
Age: 79
End: 2019-12-23

## 2019-12-26 LAB
ALBUMIN SERPL ELPH-MCNC: 4.2 G/DL
ALP BLD-CCNC: 47 U/L
ALT SERPL-CCNC: 28 U/L
ANION GAP SERPL CALC-SCNC: 11 MMOL/L
AST SERPL-CCNC: 22 U/L
BASOPHILS # BLD AUTO: 0.03 K/UL
BASOPHILS NFR BLD AUTO: 0.7 %
BILIRUB SERPL-MCNC: 0.9 MG/DL
BUN SERPL-MCNC: 19 MG/DL
CALCIUM SERPL-MCNC: 9.3 MG/DL
CHLORIDE SERPL-SCNC: 103 MMOL/L
CO2 SERPL-SCNC: 28 MMOL/L
CREAT SERPL-MCNC: 0.96 MG/DL
EOSINOPHIL # BLD AUTO: 0.09 K/UL
EOSINOPHIL NFR BLD AUTO: 2.1 %
GLUCOSE SERPL-MCNC: 115 MG/DL
HCT VFR BLD CALC: 42.1 %
HGB BLD-MCNC: 12.9 G/DL
IMM GRANULOCYTES NFR BLD AUTO: 0.2 %
LYMPHOCYTES # BLD AUTO: 1.37 K/UL
LYMPHOCYTES NFR BLD AUTO: 32.2 %
MAN DIFF?: NORMAL
MCHC RBC-ENTMCNC: 30.6 GM/DL
MCHC RBC-ENTMCNC: 33.7 PG
MCV RBC AUTO: 109.9 FL
MONOCYTES # BLD AUTO: 0.32 K/UL
MONOCYTES NFR BLD AUTO: 7.5 %
NEUTROPHILS # BLD AUTO: 2.44 K/UL
NEUTROPHILS NFR BLD AUTO: 57.3 %
PLATELET # BLD AUTO: 161 K/UL
POTASSIUM SERPL-SCNC: 4.3 MMOL/L
PROT SERPL-MCNC: 6.6 G/DL
RBC # BLD: 3.83 M/UL
RBC # FLD: 13.2 %
SODIUM SERPL-SCNC: 142 MMOL/L
WBC # FLD AUTO: 4.26 K/UL

## 2020-01-22 LAB
BASOPHILS # BLD AUTO: 0.02 K/UL
BASOPHILS NFR BLD AUTO: 0.5 %
EOSINOPHIL # BLD AUTO: 0.09 K/UL
EOSINOPHIL NFR BLD AUTO: 2.1 %
HCT VFR BLD CALC: 35.9 %
HGB BLD-MCNC: 11.9 G/DL
IMM GRANULOCYTES NFR BLD AUTO: 0.5 %
LYMPHOCYTES # BLD AUTO: 1.1 K/UL
LYMPHOCYTES NFR BLD AUTO: 25.6 %
MAN DIFF?: NORMAL
MCHC RBC-ENTMCNC: 33.1 GM/DL
MCHC RBC-ENTMCNC: 33.3 PG
MCV RBC AUTO: 100.6 FL
MONOCYTES # BLD AUTO: 0.43 K/UL
MONOCYTES NFR BLD AUTO: 10 %
NEUTROPHILS # BLD AUTO: 2.63 K/UL
NEUTROPHILS NFR BLD AUTO: 61.3 %
PLATELET # BLD AUTO: 207 K/UL
RBC # BLD: 3.57 M/UL
RBC # FLD: 13.3 %
WBC # FLD AUTO: 4.29 K/UL

## 2020-01-31 ENCOUNTER — OUTPATIENT (OUTPATIENT)
Dept: OUTPATIENT SERVICES | Facility: HOSPITAL | Age: 80
LOS: 1 days | End: 2020-01-31
Payer: MEDICARE

## 2020-01-31 ENCOUNTER — APPOINTMENT (OUTPATIENT)
Dept: MRI IMAGING | Facility: CLINIC | Age: 80
End: 2020-01-31
Payer: MEDICARE

## 2020-01-31 DIAGNOSIS — Z90.89 ACQUIRED ABSENCE OF OTHER ORGANS: Chronic | ICD-10-CM

## 2020-01-31 DIAGNOSIS — Z90.79 ACQUIRED ABSENCE OF OTHER GENITAL ORGAN(S): Chronic | ICD-10-CM

## 2020-01-31 DIAGNOSIS — Z98.890 OTHER SPECIFIED POSTPROCEDURAL STATES: Chronic | ICD-10-CM

## 2020-01-31 DIAGNOSIS — Z98.41 CATARACT EXTRACTION STATUS, RIGHT EYE: Chronic | ICD-10-CM

## 2020-01-31 DIAGNOSIS — C44.711 BASAL CELL CARCINOMA OF SKIN OF UNSPECIFIED LOWER LIMB, INCLUDING HIP: Chronic | ICD-10-CM

## 2020-01-31 DIAGNOSIS — D17.0 BENIGN LIPOMATOUS NEOPLASM OF SKIN AND SUBCUTANEOUS TISSUE OF HEAD, FACE AND NECK: Chronic | ICD-10-CM

## 2020-01-31 DIAGNOSIS — Z00.8 ENCOUNTER FOR OTHER GENERAL EXAMINATION: ICD-10-CM

## 2020-01-31 PROCEDURE — 70553 MRI BRAIN STEM W/O & W/DYE: CPT | Mod: 26

## 2020-01-31 PROCEDURE — 70553 MRI BRAIN STEM W/O & W/DYE: CPT

## 2020-02-05 ENCOUNTER — APPOINTMENT (OUTPATIENT)
Dept: NEUROLOGY | Facility: CLINIC | Age: 80
End: 2020-02-05
Payer: MEDICARE

## 2020-02-05 VITALS
DIASTOLIC BLOOD PRESSURE: 90 MMHG | RESPIRATION RATE: 16 BRPM | OXYGEN SATURATION: 98 % | HEART RATE: 54 BPM | SYSTOLIC BLOOD PRESSURE: 130 MMHG

## 2020-02-05 DIAGNOSIS — R60.0 LOCALIZED EDEMA: ICD-10-CM

## 2020-02-05 PROCEDURE — 99215 OFFICE O/P EST HI 40 MIN: CPT

## 2020-02-05 RX ORDER — DEXAMETHASONE 4 MG/1
4 TABLET ORAL
Qty: 30 | Refills: 0 | Status: ACTIVE | COMMUNITY
Start: 2020-02-05 | End: 1900-01-01

## 2020-02-05 RX ORDER — TEMOZOLOMIDE 100 MG/1
100 CAPSULE ORAL
Qty: 20 | Refills: 0 | Status: DISCONTINUED | COMMUNITY
Start: 2019-08-12 | End: 2020-02-05

## 2020-02-05 NOTE — DATA REVIEWED
[de-identified] : I personally reviewed neuroimaging dated\par 1/31/2020	11/22/2019	9/11/2019	7/16/2019\par 			\par \par In reviewing these images, I find INTERVAL STABILITY OF THE DEEP RIGHT FROTNAL WHITE MATTER HYPERINTENSITY on the T2 weighted images, with signal change likely representing an admixture of treatment effects, cerebral edema, or neoplasm. \par On the contrast enhanced images, there is RESOLVING enhancement WITHIN THE RESIDUAL TUMOR BED.\par [facility] imaging shows no acute CVA.\par Overall I find the images to be improved. \par Selected imaging was provided to the patient, along with a detailed verbal explanation of the issues at hand as outlined above.\par

## 2020-02-05 NOTE — PHYSICAL EXAM
[General Appearance - Alert] : alert [Oriented To Time, Place, And Person] : oriented to person, place, and time [General Appearance - In No Acute Distress] : in no acute distress [Mood] : the mood was normal [Impaired Insight] : insight and judgment were intact [Affect] : the affect was normal [Memory Recent] : recent memory was not impaired [Memory Remote] : remote memory was not impaired [Person] : oriented to person [Place] : oriented to place [Time] : oriented to time [Short Term Intact] : short term memory intact [Span Intact] : the attention span was normal [Registration Intact] : recent registration memory intact [Remote Intact] : remote memory intact [Repeating Phrases] : no difficulty repeating a phrase [Concentration Intact] : normal concentrating ability [Naming Objects] : no difficulty naming common objects [Visual Intact] : visual attention was ~T not ~L decreased [Comprehension] : comprehension intact [Fluency] : fluency intact [Reading] : reading intact [Past History] : adequate knowledge of personal past history [Current Events] : adequate knowledge of current events [Cranial Nerves Oculomotor (III)] : extraocular motion intact [Vocabulary] : adequate range of vocabulary [Cranial Nerves Optic (II)] : visual acuity intact bilaterally,  visual fields full to confrontation, pupils equal round and reactive to light [Cranial Nerves Trigeminal (V)] : facial sensation intact symmetrically [Cranial Nerves Accessory (XI - Cranial And Spinal)] : head turning and shoulder shrug symmetric [Cranial Nerves Vestibulocochlear (VIII)] : hearing was intact bilaterally [Cranial Nerves Glossopharyngeal (IX)] : tongue and palate midline [Motor Tone] : muscle tone was normal in all four extremities [Cranial Nerves Hypoglossal (XII)] : there was no tongue deviation with protrusion [Involuntary Movements] : no involuntary movements were seen [No Muscle Atrophy] : normal bulk in all four extremities [Sensation Tactile Decrease] : light touch was intact [Coordination - Dysmetria Impaired Finger-to-Nose Left Only] : present on the left side [Dysdiadochokinesia On The Left] : present on the left side [Sclera] : the sclera and conjunctiva were normal [Respiration, Rhythm And Depth] : normal respiratory rhythm and effort [Extraocular Movements] : extraocular movements were intact [Skin Color & Pigmentation] : normal skin color and pigmentation [Motor Strength Lower Extremities Bilaterally] : strength was normal in both lower extremities [Past-pointing] : there was no past-pointing [Tremor] : no tremor present [FreeTextEntry5] : mild left facial droop - unchanged  [FreeTextEntry6] : weak hand  LUE, but improved since last visit. L arm strength 5/5 [FreeTextEntry7] : decreased sensation L facial  [FreeTextEntry8] : gait steady, but shuffling LLE  [FreeTextEntry1] : edema b/l LE (L>R)

## 2020-02-05 NOTE — HISTORY OF PRESENT ILLNESS
[FreeTextEntry1] : This is a pleasant 79 year old man with right frontal GBM. 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, MGMT unmethylated \par - completed chemoradiation - 8/31/18 - 10/12/18\par - 11/12/18 - temozolomide cycle #1, dose 320mg (150mg/m2) \par - 12/10/18 - temozolomide cycle #2, dose 400mg \par - 12/10/18 - presented to ED s/p fall and unable to get up; imaging with increased cerebral edema \par - 1/10/19 - temozolomide cycle #3, dose 400mg \par - POD \par - 2/7/19 - s/p re-resection ([facility]); PATH: recurrent GBM, 30-40% necrosis \par - 3/11/19 - temozolomide cycle #4, dose 400mg \par - 3/13/19 - started Avastin infusions q2 weeks \par - 4/11/19 - hospitalized with b/l PE \par - 5/26/19 - temozolomide cycle #5, dose 400mg \par - 6/23/19 - temozolomide cycle #6, dose 400mg \par - 7/22/19 - temozolomide cycle #7, dose 400mg \par - 8/26/19 - temozolomide cycle #8, dose 400mg\par - 9/23/19 - temozolomide cycle #9, dose 400mg \par - 10/21/19 - temozolomide cycle #10, dose 400mg \par - 11/2019 - hospitalized with weakness and fall, treated for UTI\par - 12/3/19 - temozolomide cycle #11, dose 400mg \par - 1/2/20 - temozolomide cycle #12, dose 400mg \par \par He presents today for routine follow-up with new MRI, accompanied by his son. \par \par He is feeling well with no complaints. \par No headaches, no n/v, no diplopia. \par He is going to the gym regularly and feels his left side is improving. He plans to start playing baseball again in the spring. \par \par No seizures or seizure-like symptoms on Keppra 1000mg BID. \par \par \par \par

## 2020-02-05 NOTE — DISCUSSION/SUMMARY
[FreeTextEntry1] : Brain tumor - Clinically and radiographically stable s/p 12 cycles of TMZ. Would proceed with MRI surveillance off chemotherapy. His left side continues to improve with exercise. As his deficits may be related to cerebral edema, will try decadron 12mg bolus and see if he has improvement in strength/coordination. He will let us know if symptoms improve with steroids. \par \par Seizures - None, continue Keppra. \par \par LLE edema - Appears unchanged over time, but given history of DVT/PE, will check dopplers. \par \par DISPO - Follow-up visit with MRI in 2 months. All questions answered.

## 2020-02-25 ENCOUNTER — FORM ENCOUNTER (OUTPATIENT)
Age: 80
End: 2020-02-25

## 2020-02-26 ENCOUNTER — APPOINTMENT (OUTPATIENT)
Dept: ULTRASOUND IMAGING | Facility: CLINIC | Age: 80
End: 2020-02-26
Payer: MEDICARE

## 2020-02-26 ENCOUNTER — OUTPATIENT (OUTPATIENT)
Dept: OUTPATIENT SERVICES | Facility: HOSPITAL | Age: 80
LOS: 1 days | End: 2020-02-26
Payer: MEDICARE

## 2020-02-26 DIAGNOSIS — Z00.8 ENCOUNTER FOR OTHER GENERAL EXAMINATION: ICD-10-CM

## 2020-02-26 DIAGNOSIS — Z90.89 ACQUIRED ABSENCE OF OTHER ORGANS: Chronic | ICD-10-CM

## 2020-02-26 DIAGNOSIS — Z98.890 OTHER SPECIFIED POSTPROCEDURAL STATES: Chronic | ICD-10-CM

## 2020-02-26 DIAGNOSIS — Z98.41 CATARACT EXTRACTION STATUS, RIGHT EYE: Chronic | ICD-10-CM

## 2020-02-26 DIAGNOSIS — Z90.79 ACQUIRED ABSENCE OF OTHER GENITAL ORGAN(S): Chronic | ICD-10-CM

## 2020-02-26 DIAGNOSIS — D17.0 BENIGN LIPOMATOUS NEOPLASM OF SKIN AND SUBCUTANEOUS TISSUE OF HEAD, FACE AND NECK: Chronic | ICD-10-CM

## 2020-02-26 DIAGNOSIS — C44.711 BASAL CELL CARCINOMA OF SKIN OF UNSPECIFIED LOWER LIMB, INCLUDING HIP: Chronic | ICD-10-CM

## 2020-02-26 PROCEDURE — 93970 EXTREMITY STUDY: CPT | Mod: 26

## 2020-02-26 PROCEDURE — 93970 EXTREMITY STUDY: CPT

## 2020-04-08 RX ORDER — LEVETIRACETAM 1000 MG/1
1000 TABLET, FILM COATED ORAL TWICE DAILY
Qty: 180 | Refills: 1 | Status: ACTIVE | COMMUNITY
Start: 2018-08-24 | End: 1900-01-01

## 2020-04-16 DIAGNOSIS — M54.5 LOW BACK PAIN: ICD-10-CM

## 2020-04-16 RX ORDER — METHYLPREDNISOLONE 4 MG/1
4 TABLET ORAL
Qty: 1 | Refills: 1 | Status: ACTIVE | COMMUNITY
Start: 2020-04-16 | End: 1900-01-01

## 2020-04-20 ENCOUNTER — APPOINTMENT (OUTPATIENT)
Dept: NEUROLOGY | Facility: CLINIC | Age: 80
End: 2020-04-20

## 2020-04-27 ENCOUNTER — OUTPATIENT (OUTPATIENT)
Dept: OUTPATIENT SERVICES | Facility: HOSPITAL | Age: 80
LOS: 1 days | End: 2020-04-27
Payer: MEDICARE

## 2020-04-27 ENCOUNTER — RESULT REVIEW (OUTPATIENT)
Age: 80
End: 2020-04-27

## 2020-04-27 ENCOUNTER — APPOINTMENT (OUTPATIENT)
Dept: MRI IMAGING | Facility: CLINIC | Age: 80
End: 2020-04-27
Payer: MEDICARE

## 2020-04-27 DIAGNOSIS — D17.0 BENIGN LIPOMATOUS NEOPLASM OF SKIN AND SUBCUTANEOUS TISSUE OF HEAD, FACE AND NECK: Chronic | ICD-10-CM

## 2020-04-27 DIAGNOSIS — G93.9 DISORDER OF BRAIN, UNSPECIFIED: ICD-10-CM

## 2020-04-27 DIAGNOSIS — Z90.89 ACQUIRED ABSENCE OF OTHER ORGANS: Chronic | ICD-10-CM

## 2020-04-27 DIAGNOSIS — Z98.890 OTHER SPECIFIED POSTPROCEDURAL STATES: Chronic | ICD-10-CM

## 2020-04-27 DIAGNOSIS — C44.711 BASAL CELL CARCINOMA OF SKIN OF UNSPECIFIED LOWER LIMB, INCLUDING HIP: Chronic | ICD-10-CM

## 2020-04-27 DIAGNOSIS — Z98.41 CATARACT EXTRACTION STATUS, RIGHT EYE: Chronic | ICD-10-CM

## 2020-04-27 DIAGNOSIS — Z90.79 ACQUIRED ABSENCE OF OTHER GENITAL ORGAN(S): Chronic | ICD-10-CM

## 2020-04-27 PROCEDURE — 70553 MRI BRAIN STEM W/O & W/DYE: CPT | Mod: 26

## 2020-04-27 PROCEDURE — 70553 MRI BRAIN STEM W/O & W/DYE: CPT

## 2020-04-27 PROCEDURE — A9585: CPT

## 2020-05-04 ENCOUNTER — APPOINTMENT (OUTPATIENT)
Dept: MRI IMAGING | Facility: CLINIC | Age: 80
End: 2020-05-04

## 2020-05-06 ENCOUNTER — APPOINTMENT (OUTPATIENT)
Dept: NEUROLOGY | Facility: CLINIC | Age: 80
End: 2020-05-06

## 2020-05-11 ENCOUNTER — APPOINTMENT (OUTPATIENT)
Dept: MRI IMAGING | Facility: CLINIC | Age: 80
End: 2020-05-11

## 2020-05-11 ENCOUNTER — APPOINTMENT (OUTPATIENT)
Dept: NEUROSURGERY | Facility: CLINIC | Age: 80
End: 2020-05-11
Payer: MEDICARE

## 2020-05-11 PROCEDURE — 99443: CPT | Mod: 57

## 2020-05-18 ENCOUNTER — APPOINTMENT (OUTPATIENT)
Dept: NEUROLOGY | Facility: CLINIC | Age: 80
End: 2020-05-18

## 2020-05-18 ENCOUNTER — OUTPATIENT (OUTPATIENT)
Dept: OUTPATIENT SERVICES | Facility: HOSPITAL | Age: 80
LOS: 1 days | End: 2020-05-18
Payer: MEDICARE

## 2020-05-18 VITALS
SYSTOLIC BLOOD PRESSURE: 141 MMHG | WEIGHT: 185.19 LBS | HEIGHT: 71 IN | DIASTOLIC BLOOD PRESSURE: 65 MMHG | TEMPERATURE: 99 F | RESPIRATION RATE: 20 BRPM | HEART RATE: 61 BPM

## 2020-05-18 DIAGNOSIS — C44.711 BASAL CELL CARCINOMA OF SKIN OF UNSPECIFIED LOWER LIMB, INCLUDING HIP: Chronic | ICD-10-CM

## 2020-05-18 DIAGNOSIS — G93.9 DISORDER OF BRAIN, UNSPECIFIED: ICD-10-CM

## 2020-05-18 DIAGNOSIS — Z98.41 CATARACT EXTRACTION STATUS, RIGHT EYE: Chronic | ICD-10-CM

## 2020-05-18 DIAGNOSIS — Z98.890 OTHER SPECIFIED POSTPROCEDURAL STATES: Chronic | ICD-10-CM

## 2020-05-18 DIAGNOSIS — Z90.79 ACQUIRED ABSENCE OF OTHER GENITAL ORGAN(S): Chronic | ICD-10-CM

## 2020-05-18 DIAGNOSIS — Z90.89 ACQUIRED ABSENCE OF OTHER ORGANS: Chronic | ICD-10-CM

## 2020-05-18 DIAGNOSIS — I10 ESSENTIAL (PRIMARY) HYPERTENSION: ICD-10-CM

## 2020-05-18 DIAGNOSIS — Z29.9 ENCOUNTER FOR PROPHYLACTIC MEASURES, UNSPECIFIED: ICD-10-CM

## 2020-05-18 DIAGNOSIS — D17.0 BENIGN LIPOMATOUS NEOPLASM OF SKIN AND SUBCUTANEOUS TISSUE OF HEAD, FACE AND NECK: Chronic | ICD-10-CM

## 2020-05-18 DIAGNOSIS — Z01.818 ENCOUNTER FOR OTHER PREPROCEDURAL EXAMINATION: ICD-10-CM

## 2020-05-18 LAB
A1C WITH ESTIMATED AVERAGE GLUCOSE RESULT: 5.3 % — SIGNIFICANT CHANGE UP (ref 4–5.6)
ANION GAP SERPL CALC-SCNC: 13 MMOL/L — SIGNIFICANT CHANGE UP (ref 5–17)
APTT BLD: 28.9 SEC — SIGNIFICANT CHANGE UP (ref 27.5–36.3)
BASOPHILS # BLD AUTO: 0.02 K/UL — SIGNIFICANT CHANGE UP (ref 0–0.2)
BASOPHILS NFR BLD AUTO: 0.4 % — SIGNIFICANT CHANGE UP (ref 0–2)
BLD GP AB SCN SERPL QL: SIGNIFICANT CHANGE UP
BUN SERPL-MCNC: 23 MG/DL — HIGH (ref 8–20)
CALCIUM SERPL-MCNC: 9.6 MG/DL — SIGNIFICANT CHANGE UP (ref 8.6–10.2)
CHLORIDE SERPL-SCNC: 100 MMOL/L — SIGNIFICANT CHANGE UP (ref 98–107)
CO2 SERPL-SCNC: 28 MMOL/L — SIGNIFICANT CHANGE UP (ref 22–29)
CREAT SERPL-MCNC: 0.99 MG/DL — SIGNIFICANT CHANGE UP (ref 0.5–1.3)
EOSINOPHIL # BLD AUTO: 0.05 K/UL — SIGNIFICANT CHANGE UP (ref 0–0.5)
EOSINOPHIL NFR BLD AUTO: 1 % — SIGNIFICANT CHANGE UP (ref 0–6)
ESTIMATED AVERAGE GLUCOSE: 105 MG/DL — SIGNIFICANT CHANGE UP (ref 68–114)
GLUCOSE SERPL-MCNC: 111 MG/DL — HIGH (ref 70–99)
HCT VFR BLD CALC: 40.2 % — SIGNIFICANT CHANGE UP (ref 39–50)
HGB BLD-MCNC: 13.4 G/DL — SIGNIFICANT CHANGE UP (ref 13–17)
IMM GRANULOCYTES NFR BLD AUTO: 0.2 % — SIGNIFICANT CHANGE UP (ref 0–1.5)
INR BLD: 1.04 RATIO — SIGNIFICANT CHANGE UP (ref 0.88–1.16)
LYMPHOCYTES # BLD AUTO: 0.98 K/UL — LOW (ref 1–3.3)
LYMPHOCYTES # BLD AUTO: 19.3 % — SIGNIFICANT CHANGE UP (ref 13–44)
MCHC RBC-ENTMCNC: 32.5 PG — SIGNIFICANT CHANGE UP (ref 27–34)
MCHC RBC-ENTMCNC: 33.3 GM/DL — SIGNIFICANT CHANGE UP (ref 32–36)
MCV RBC AUTO: 97.6 FL — SIGNIFICANT CHANGE UP (ref 80–100)
MONOCYTES # BLD AUTO: 0.3 K/UL — SIGNIFICANT CHANGE UP (ref 0–0.9)
MONOCYTES NFR BLD AUTO: 5.9 % — SIGNIFICANT CHANGE UP (ref 2–14)
MRSA PCR RESULT.: SIGNIFICANT CHANGE UP
NEUTROPHILS # BLD AUTO: 3.71 K/UL — SIGNIFICANT CHANGE UP (ref 1.8–7.4)
NEUTROPHILS NFR BLD AUTO: 73.2 % — SIGNIFICANT CHANGE UP (ref 43–77)
PLATELET # BLD AUTO: 193 K/UL — SIGNIFICANT CHANGE UP (ref 150–400)
POTASSIUM SERPL-MCNC: 4.5 MMOL/L — SIGNIFICANT CHANGE UP (ref 3.5–5.3)
POTASSIUM SERPL-SCNC: 4.5 MMOL/L — SIGNIFICANT CHANGE UP (ref 3.5–5.3)
PROTHROM AB SERPL-ACNC: 11.8 SEC — SIGNIFICANT CHANGE UP (ref 10–12.9)
RBC # BLD: 4.12 M/UL — LOW (ref 4.2–5.8)
RBC # FLD: 12.2 % — SIGNIFICANT CHANGE UP (ref 10.3–14.5)
S AUREUS DNA NOSE QL NAA+PROBE: SIGNIFICANT CHANGE UP
SODIUM SERPL-SCNC: 140 MMOL/L — SIGNIFICANT CHANGE UP (ref 135–145)
WBC # BLD: 5.07 K/UL — SIGNIFICANT CHANGE UP (ref 3.8–10.5)
WBC # FLD AUTO: 5.07 K/UL — SIGNIFICANT CHANGE UP (ref 3.8–10.5)

## 2020-05-18 PROCEDURE — G0463: CPT

## 2020-05-18 PROCEDURE — 71046 X-RAY EXAM CHEST 2 VIEWS: CPT | Mod: 26

## 2020-05-18 PROCEDURE — 71046 X-RAY EXAM CHEST 2 VIEWS: CPT

## 2020-05-18 PROCEDURE — 93005 ELECTROCARDIOGRAM TRACING: CPT

## 2020-05-18 PROCEDURE — 93010 ELECTROCARDIOGRAM REPORT: CPT

## 2020-05-18 RX ORDER — CEFAZOLIN SODIUM 1 G
2000 VIAL (EA) INJECTION ONCE
Refills: 0 | Status: DISCONTINUED | OUTPATIENT
Start: 2020-05-27 | End: 2020-05-28

## 2020-05-18 RX ORDER — METOPROLOL TARTRATE 50 MG
0 TABLET ORAL
Qty: 90 | Refills: 0 | DISCHARGE

## 2020-05-18 RX ORDER — ALPRAZOLAM 0.25 MG
0 TABLET ORAL
Qty: 30 | Refills: 0 | DISCHARGE

## 2020-05-18 NOTE — H&P PST ADULT - ASSESSMENT
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   Now for surgery due to regrowth of tumor.  He is now schedule for stereotactic right craniotomy for brain tumor.     CAPRINI VTE 2.0 SCORE [CLOT updated 2019]    AGE RELATED RISK FACTORS                                                       MOBILITY RELATED FACTORS  [ ] Age 41-60 years                                            (1 Point)                    [ ] Bed rest                                                        (1 Point)  [ ] Age: 61-74 years                                           (2 Points)                  [ ] Plaster cast                                                   (2 Points)  [x ] Age= 75 years                                              (3 Points)                    [ ] Bed bound for more than 72 hours                 (2 Points)    DISEASE RELATED RISK FACTORS                                               GENDER SPECIFIC FACTORS  [x ] Edema in the lower extremities                       (1 Point)              [ ] Pregnancy                                                     (1 Point)  [x ] Varicose veins                                               (1 Point)                     [ ] Post-partum < 6 weeks                                   (1 Point)             [x ] BMI > 25 Kg/m2                                            (1 Point)                     [ ] Hormonal therapy  or oral contraception          (1 Point)                 [ ] Sepsis (in the previous month)                        (1 Point)               [ ] History of pregnancy complications                 (1 point)  [ ] Pneumonia or serious lung disease                                               [ ] Unexplained or recurrent                     (1 Point)           (in the previous month)                               (1 Point)  [ ] Abnormal pulmonary function test                     (1 Point)                 SURGERY RELATED RISK FACTORS  [ ] Acute myocardial infarction                              (1 Point)               [ ]  Section                                             (1 Point)  [ ] Congestive heart failure (in the previous month)  (1 Point)      [ ] Minor surgery                                                  (1 Point)   [ ] Inflammatory bowel disease                             (1 Point)               [ ] Arthroscopic surgery                                        (2 Points)  [ ] Central venous access                                      (2 Points)                [x ] General surgery lasting more than 45 minutes (2 points)  x[ ] Malignancy- Present or previous                   (2 Points)                [ ] Elective arthroplasty                                         (5 points)    [ ] Stroke (in the previous month)                          (5 Points)                                                                                                                                                           HEMATOLOGY RELATED FACTORS                                                 TRAUMA RELATED RISK FACTORS  [ ] Prior episodes of VTE                                     (3 Points)                [ ] Fracture of the hip, pelvis, or leg                       (5 Points)  [ ] Positive family history for VTE                         (3 Points)             [ ] Acute spinal cord injury (in the previous month)  (5 Points)  [ ] Prothrombin 41607 A                                     (3 Points)               [ ] Paralysis  (less than 1 month)                             (5 Points)  [ ] Factor V Leiden                                             (3 Points)                  [ ] Multiple Trauma within 1 month                        (5 Points)  [ ] Lupus anticoagulants                                     (3 Points)                                                           [ ] Anticardiolipin antibodies                               (3 Points)                                                       [ ] High homocysteine in the blood                      (3 Points)                                             [ ] Other congenital or acquired thrombophilia      (3 Points)                                                [ ] Heparin induced thrombocytopenia                  (3 Points)                                     Total Score [    10    ]

## 2020-05-18 NOTE — ASU PATIENT PROFILE, ADULT - PMH
Basal cell carcinoma (BCC) of left lower leg  s/p resection  right lower leg top  left lower leg  Bilateral hearing loss, unspecified hearing loss type    Fall, sequela    Glioblastoma  biopsy 8/2018  surgery  6 weeks radiation  35 days of oral chemotherapy last 3 weeks  HLD (hyperlipidemia)    HTN (hypertension)     activity  [employer] 1962-65    [place]  Prostate cancer  s/p radical prostatectomy 1995  Type 2 diabetes mellitus without complication, without long-term current use of insulin  stop oral meds  3 weeks ago

## 2020-05-18 NOTE — H&P PST ADULT - NSICDXPASTMEDICALHX_GEN_ALL_CORE_FT
PAST MEDICAL HISTORY:  Basal cell carcinoma (BCC) of left lower leg s/p resection  right lower leg top  left lower leg    Bilateral hearing loss, unspecified hearing loss type     Fall, sequela     Glioblastoma biopsy 8/2018  surgery  6 weeks radiation  35 days of oral chemotherapy last 3 weeks    HLD (hyperlipidemia)     HTN (hypertension)      activity [employer] 1962-65    [place]    Prostate cancer s/p radical prostatectomy 1995    Type 2 diabetes mellitus without complication, without long-term current use of insulin stop oral meds  3 weeks ago

## 2020-05-18 NOTE — DISCUSSION/SUMMARY
[FreeTextEntry1] : BRAIN TUMOR -- He has done remarkably well. There is some small evidence of active, recurrent/progressive neoplasm adjacent to the original resection site. I think this could be resected for the greatest benefit:risk ratio in a >[address] Dr Rivers agrees. Dr. Rivers and I discussed why surgery is a good option at this time, not only because it will provide a definitive diagnosis, but also because it will remove the active disease without the adverse effects of re-irradiation or chemotherapy.\par \par I would likely consider restarting Temozolomide chemotherapy after successful surgery. I await the final pathology results, of course. If there is no viable tumor, perhaps further chemotherapy could be avoided.\par \par DVT/PE - This was >6 months ago. 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 We will also set him up to meet with our Palliative care team. I think he is doing well, so this is a good time for him to think about advanced directives and making sure his symptoms are well controlled.

## 2020-05-18 NOTE — H&P PST ADULT - NEGATIVE CARDIOVASCULAR SYMPTOMS
no claudication/no chest pain/no palpitations/no dyspnea on exertion/no orthopnea/no paroxysmal nocturnal dyspnea

## 2020-05-18 NOTE — H&P PST ADULT - HISTORY OF PRESENT ILLNESS
79 year old male with history of GBM brain cancer s/p surgery, chemotherapy and radiation.   Now for surgery due to regrowth of tumor. 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   Now for surgery due to regrowth of tumor.  He is now schedule for stereotactic right craniotomy for brain tumor.

## 2020-05-18 NOTE — HISTORY OF PRESENT ILLNESS
[FreeTextEntry1] : PATIENT WAS SCHEDULED FOR A TELEHEALTH VISIT TODAY, BUT THE APPOINTMENT WAS CANCELLED. I PREVIOUSLY DISCUSSED HIS CASE WITH DR TRIPLETT AND THE PLAN WAS RE-RESECTION. I WILL SPEAK WITH HIM AFTER THAT SURGERY IS COMPLETE.\par \par 79  year old right handed man with a past medical history of glioblastoma of the right frontal lobe presenting for follow-up.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, MGMT unmethylated \par - completed chemoradiation - 8/31/18 - 10/12/18\par - 11/12/18 - temozolomide cycle #1, dose 320mg (150mg/m2) \par - 12/10/18 - temozolomide cycle #2, dose 400mg \par - 12/10/18 - presented to ED s/p fall and unable to get up; imaging with increased cerebral edema \par - 1/10/19 - temozolomide cycle #3, dose 400mg \par - POD \par - 2/7/19 - s/p re-resection ([facility]); PATH: recurrent GBM, 30-40% necrosis \par - 3/11/19 - temozolomide cycle #4, dose 400mg \par - 3/13/19 - started Avastin infusions q2 weeks \par - 4/11/19 - hospitalized with b/l PE \par - 5/26/19 - temozolomide cycle #5, dose 400mg \par - 6/23/19 - temozolomide cycle #6, dose 400mg \par - 7/22/19 - temozolomide cycle #7, dose 400mg \par - 8/26/19 - temozolomide cycle #8, dose 400mg\par - 9/23/19 - temozolomide cycle #9, dose 400mg \par - 10/21/19 - temozolomide cycle #10, dose 400mg \par - 11/2019 - hospitalized with weakness and fall, treated for UTI\par - 11/19 - hospitalization for pulmonary embolism. On lovenox 120mg/0.8 mL daily\par - 12/10/18 had episode of falling and unable to get up, even with family member assistance. May have - - 12/3/19 - temozolomide cycle #11, dose 400mg \par - 1/2/20 - temozolomide cycle #12, dose 400mg \par - 3/26/20 - back pain (old L3 injury in [employer]) COVID NEGATIVE\par \par no headaches\par no nausea \par no vomiting\par no new neurologic deficits. his left side is not as coordinated as it used to be.\par He takes Keppra [id number] for facial twitching (focal motor seizure)\par

## 2020-05-18 NOTE — DATA REVIEWED
[de-identified] : I personally reviewed neuroimaging dated\par 4/27/2020	1/31/2020	11/22/2019	9/11/2019\par \par In reviewing these images, I find ANTERIORLY INCREASED HYPERINTENSITY on the T2 weighted images AT THE MID RIGHT FRONTAL LOBE, with signal change likely representing an admixture of treatment effects, cerebral edema, or neoplasm. \par On the contrast enhanced images, there is A NEW FOCUS OF NODULAR enhancement arising from the original resection cavity and well circumscribed.\par [facility] imaging shows no acute CVA.\par Overall I find the images to be c/w progressive glioma.

## 2020-05-18 NOTE — H&P PST ADULT - NSICDXPROBLEM_GEN_ALL_CORE_FT
PROBLEM DIAGNOSES  Problem: HTN (hypertension)  Assessment and Plan: continue medication as directed  medical clearance     Problem: Brain lesion  Assessment and Plan: stereotactic right craniotomy for brain tumor.   hold ASA as directed by Dr. Rivers    Problem: Need for prophylactic measure  Assessment and Plan: the surgical team will evaluate for appropriate VTE prophylaxis

## 2020-05-20 NOTE — REASON FOR VISIT
[Follow-Up: _____] : a [unfilled] follow-up visit [Verbal consent obtained from patient] : the patient, [unfilled] [Spouse] : spouse [FreeTextEntry1] : May 11, 2020\par \par \par \par Re:	Bethel Day \par :	1940\par \par The following dictation is the summary of a 30-minute phone call I had with the patient and his son.  The patient is a 79-year-old who in 2018 underwent craniotomy for right parietal brain tumor.  Diagnosis was glioblastoma WHO grade 4.  He was seen by Dr. Harman and treated with temozolomide and radiation, and he recurred in 2019.  A 2nd craniotomy was performed, and his postoperative CT scans looked good.  He was treated with 12 cycles of TMZ, and he has done well.  His scans from 2019 and 2020, both look good, and the patient has done well.  He has had some left-sided weakness but, according to his son, has been stable.  He had some low-grade fevers in March.  He said he was tested for COVID and it was negative.  One test was lost, and he is now fine and has no symptoms.  Follow-up MRI on  shows a nodular superficial recurrence with a significant amount of surrounding edema.  He has trace left-sided weakness according to his description.  There have been no other issues.  \par \par I have discussed this with the patient and his son and subsequently discussed the case with Dr. Harman.  There is a clear indication for reoperation here.  I went over all the indications, risks, and possible complications with the son, not limited to accentuating the left-sided weakness.  If we look back, he has had almost 15 months since the 2nd operation, and this is a superficial, well-defined recurrence, and I think that this would be the best approach in this setting.  The son and the patient are aware of the diagnosis, and they are completely aware of our indications and possible risks for this surgery.  In preparation for the surgery, he will obtain an MRI with [facility] protocol, medical clearance, COVID PCR test and antibody test, and he will then be booked for a right craniotomy with a microscope and the [facility] navigational system.  \par \par \par \par Vinny Rivers M.D., F.A.C.S.\par [facility]\par \par

## 2020-05-22 DIAGNOSIS — Z01.818 ENCOUNTER FOR OTHER PREPROCEDURAL EXAMINATION: ICD-10-CM

## 2020-05-24 ENCOUNTER — APPOINTMENT (OUTPATIENT)
Dept: MRI IMAGING | Facility: CLINIC | Age: 80
End: 2020-05-24
Payer: MEDICARE

## 2020-05-24 ENCOUNTER — OUTPATIENT (OUTPATIENT)
Dept: OUTPATIENT SERVICES | Facility: HOSPITAL | Age: 80
LOS: 1 days | End: 2020-05-24
Payer: MEDICARE

## 2020-05-24 ENCOUNTER — RESULT REVIEW (OUTPATIENT)
Age: 80
End: 2020-05-24

## 2020-05-24 DIAGNOSIS — Z90.89 ACQUIRED ABSENCE OF OTHER ORGANS: Chronic | ICD-10-CM

## 2020-05-24 DIAGNOSIS — D17.0 BENIGN LIPOMATOUS NEOPLASM OF SKIN AND SUBCUTANEOUS TISSUE OF HEAD, FACE AND NECK: Chronic | ICD-10-CM

## 2020-05-24 DIAGNOSIS — Z98.41 CATARACT EXTRACTION STATUS, RIGHT EYE: Chronic | ICD-10-CM

## 2020-05-24 DIAGNOSIS — Z98.890 OTHER SPECIFIED POSTPROCEDURAL STATES: Chronic | ICD-10-CM

## 2020-05-24 DIAGNOSIS — G93.9 DISORDER OF BRAIN, UNSPECIFIED: ICD-10-CM

## 2020-05-24 DIAGNOSIS — C44.711 BASAL CELL CARCINOMA OF SKIN OF UNSPECIFIED LOWER LIMB, INCLUDING HIP: Chronic | ICD-10-CM

## 2020-05-24 DIAGNOSIS — Z90.79 ACQUIRED ABSENCE OF OTHER GENITAL ORGAN(S): Chronic | ICD-10-CM

## 2020-05-24 DIAGNOSIS — C71.9 MALIGNANT NEOPLASM OF BRAIN, UNSPECIFIED: ICD-10-CM

## 2020-05-24 PROCEDURE — 70553 MRI BRAIN STEM W/O & W/DYE: CPT

## 2020-05-24 PROCEDURE — A9585: CPT

## 2020-05-24 PROCEDURE — 70553 MRI BRAIN STEM W/O & W/DYE: CPT | Mod: 26

## 2020-05-25 ENCOUNTER — APPOINTMENT (OUTPATIENT)
Dept: DISASTER EMERGENCY | Facility: CLINIC | Age: 80
End: 2020-05-25

## 2020-05-26 ENCOUNTER — TRANSCRIPTION ENCOUNTER (OUTPATIENT)
Age: 80
End: 2020-05-26

## 2020-05-26 LAB — SARS-COV-2 N GENE NPH QL NAA+PROBE: NOT DETECTED

## 2020-05-27 ENCOUNTER — RESULT REVIEW (OUTPATIENT)
Age: 80
End: 2020-05-27

## 2020-05-27 ENCOUNTER — INPATIENT (INPATIENT)
Facility: HOSPITAL | Age: 80
LOS: 7 days | Discharge: HOME | DRG: 25 | End: 2020-06-04
Attending: NEUROLOGICAL SURGERY | Admitting: NEUROLOGICAL SURGERY
Payer: MEDICARE

## 2020-05-27 ENCOUNTER — APPOINTMENT (OUTPATIENT)
Dept: NEUROSURGERY | Facility: HOSPITAL | Age: 80
End: 2020-05-27
Payer: MEDICARE

## 2020-05-27 VITALS
WEIGHT: 188.05 LBS | TEMPERATURE: 98 F | SYSTOLIC BLOOD PRESSURE: 143 MMHG | HEIGHT: 71 IN | RESPIRATION RATE: 15 BRPM | OXYGEN SATURATION: 96 % | DIASTOLIC BLOOD PRESSURE: 63 MMHG

## 2020-05-27 DIAGNOSIS — Z98.890 OTHER SPECIFIED POSTPROCEDURAL STATES: Chronic | ICD-10-CM

## 2020-05-27 DIAGNOSIS — Z90.89 ACQUIRED ABSENCE OF OTHER ORGANS: Chronic | ICD-10-CM

## 2020-05-27 DIAGNOSIS — Z90.79 ACQUIRED ABSENCE OF OTHER GENITAL ORGAN(S): Chronic | ICD-10-CM

## 2020-05-27 DIAGNOSIS — G93.9 DISORDER OF BRAIN, UNSPECIFIED: ICD-10-CM

## 2020-05-27 DIAGNOSIS — D17.0 BENIGN LIPOMATOUS NEOPLASM OF SKIN AND SUBCUTANEOUS TISSUE OF HEAD, FACE AND NECK: Chronic | ICD-10-CM

## 2020-05-27 DIAGNOSIS — C44.711 BASAL CELL CARCINOMA OF SKIN OF UNSPECIFIED LOWER LIMB, INCLUDING HIP: Chronic | ICD-10-CM

## 2020-05-27 DIAGNOSIS — Z98.41 CATARACT EXTRACTION STATUS, RIGHT EYE: Chronic | ICD-10-CM

## 2020-05-27 LAB
ABO RH CONFIRMATION: SIGNIFICANT CHANGE UP
ALBUMIN SERPL ELPH-MCNC: 3.8 G/DL — SIGNIFICANT CHANGE UP (ref 3.3–5.2)
ALP SERPL-CCNC: 47 U/L — SIGNIFICANT CHANGE UP (ref 40–120)
ALT FLD-CCNC: 24 U/L — SIGNIFICANT CHANGE UP
ANION GAP SERPL CALC-SCNC: 13 MMOL/L — SIGNIFICANT CHANGE UP (ref 5–17)
AST SERPL-CCNC: 22 U/L — SIGNIFICANT CHANGE UP
BILIRUB DIRECT SERPL-MCNC: 0.1 MG/DL — SIGNIFICANT CHANGE UP (ref 0–0.3)
BILIRUB INDIRECT FLD-MCNC: 0.3 MG/DL — SIGNIFICANT CHANGE UP (ref 0.2–1)
BILIRUB SERPL-MCNC: 0.4 MG/DL — SIGNIFICANT CHANGE UP (ref 0.4–2)
BUN SERPL-MCNC: 16 MG/DL — SIGNIFICANT CHANGE UP (ref 8–20)
CALCIUM SERPL-MCNC: 8.8 MG/DL — SIGNIFICANT CHANGE UP (ref 8.6–10.2)
CHLORIDE SERPL-SCNC: 104 MMOL/L — SIGNIFICANT CHANGE UP (ref 98–107)
CO2 SERPL-SCNC: 23 MMOL/L — SIGNIFICANT CHANGE UP (ref 22–29)
CREAT SERPL-MCNC: 0.81 MG/DL — SIGNIFICANT CHANGE UP (ref 0.5–1.3)
GLUCOSE BLDC GLUCOMTR-MCNC: 134 MG/DL — HIGH (ref 70–99)
GLUCOSE SERPL-MCNC: 149 MG/DL — HIGH (ref 70–99)
HCT VFR BLD CALC: 30.7 % — LOW (ref 39–50)
HGB BLD-MCNC: 10.5 G/DL — LOW (ref 13–17)
MAGNESIUM SERPL-MCNC: 2 MG/DL — SIGNIFICANT CHANGE UP (ref 1.6–2.6)
MCHC RBC-ENTMCNC: 33 PG — SIGNIFICANT CHANGE UP (ref 27–34)
MCHC RBC-ENTMCNC: 34.2 GM/DL — SIGNIFICANT CHANGE UP (ref 32–36)
MCV RBC AUTO: 96.5 FL — SIGNIFICANT CHANGE UP (ref 80–100)
PHOSPHATE SERPL-MCNC: 2.9 MG/DL — SIGNIFICANT CHANGE UP (ref 2.4–4.7)
PLATELET # BLD AUTO: 130 K/UL — LOW (ref 150–400)
POTASSIUM SERPL-MCNC: 3.7 MMOL/L — SIGNIFICANT CHANGE UP (ref 3.5–5.3)
POTASSIUM SERPL-SCNC: 3.7 MMOL/L — SIGNIFICANT CHANGE UP (ref 3.5–5.3)
PROT SERPL-MCNC: 6.2 G/DL — LOW (ref 6.6–8.7)
RBC # BLD: 3.18 M/UL — LOW (ref 4.2–5.8)
RBC # FLD: 12 % — SIGNIFICANT CHANGE UP (ref 10.3–14.5)
SODIUM SERPL-SCNC: 140 MMOL/L — SIGNIFICANT CHANGE UP (ref 135–145)
WBC # BLD: 4.35 K/UL — SIGNIFICANT CHANGE UP (ref 3.8–10.5)
WBC # FLD AUTO: 4.35 K/UL — SIGNIFICANT CHANGE UP (ref 3.8–10.5)

## 2020-05-27 PROCEDURE — 61781 SCAN PROC CRANIAL INTRA: CPT | Mod: AS

## 2020-05-27 PROCEDURE — 61510 CRNEC TREPH EXC BRN TUM STTL: CPT | Mod: AS

## 2020-05-27 PROCEDURE — 70450 CT HEAD/BRAIN W/O DYE: CPT | Mod: 26

## 2020-05-27 PROCEDURE — 61781 SCAN PROC CRANIAL INTRA: CPT | Mod: 80

## 2020-05-27 PROCEDURE — 61510 CRNEC TREPH EXC BRN TUM STTL: CPT

## 2020-05-27 PROCEDURE — 61781 SCAN PROC CRANIAL INTRA: CPT

## 2020-05-27 PROCEDURE — 93970 EXTREMITY STUDY: CPT | Mod: 26

## 2020-05-27 PROCEDURE — 61510 CRNEC TREPH EXC BRN TUM STTL: CPT | Mod: 80

## 2020-05-27 PROCEDURE — 12345: CPT | Mod: NC

## 2020-05-27 PROCEDURE — 99291 CRITICAL CARE FIRST HOUR: CPT

## 2020-05-27 PROCEDURE — 88307 TISSUE EXAM BY PATHOLOGIST: CPT | Mod: 26

## 2020-05-27 RX ORDER — ATORVASTATIN CALCIUM 80 MG/1
20 TABLET, FILM COATED ORAL AT BEDTIME
Refills: 0 | Status: DISCONTINUED | OUTPATIENT
Start: 2020-05-27 | End: 2020-06-04

## 2020-05-27 RX ORDER — LABETALOL HCL 100 MG
10 TABLET ORAL EVERY 4 HOURS
Refills: 0 | Status: DISCONTINUED | OUTPATIENT
Start: 2020-05-27 | End: 2020-06-04

## 2020-05-27 RX ORDER — SODIUM CHLORIDE 9 MG/ML
1000 INJECTION, SOLUTION INTRAVENOUS
Refills: 0 | Status: DISCONTINUED | OUTPATIENT
Start: 2020-05-27 | End: 2020-05-30

## 2020-05-27 RX ORDER — DEXTROSE 50 % IN WATER 50 %
25 SYRINGE (ML) INTRAVENOUS ONCE
Refills: 0 | Status: DISCONTINUED | OUTPATIENT
Start: 2020-05-27 | End: 2020-05-30

## 2020-05-27 RX ORDER — FUROSEMIDE 40 MG
40 TABLET ORAL DAILY
Refills: 0 | Status: DISCONTINUED | OUTPATIENT
Start: 2020-05-27 | End: 2020-06-04

## 2020-05-27 RX ORDER — OXYCODONE AND ACETAMINOPHEN 5; 325 MG/1; MG/1
1 TABLET ORAL EVERY 4 HOURS
Refills: 0 | Status: DISCONTINUED | OUTPATIENT
Start: 2020-05-27 | End: 2020-05-27

## 2020-05-27 RX ORDER — DEXTROSE 50 % IN WATER 50 %
12.5 SYRINGE (ML) INTRAVENOUS ONCE
Refills: 0 | Status: DISCONTINUED | OUTPATIENT
Start: 2020-05-27 | End: 2020-05-30

## 2020-05-27 RX ORDER — OXYCODONE AND ACETAMINOPHEN 5; 325 MG/1; MG/1
2 TABLET ORAL EVERY 6 HOURS
Refills: 0 | Status: DISCONTINUED | OUTPATIENT
Start: 2020-05-27 | End: 2020-05-28

## 2020-05-27 RX ORDER — DEXAMETHASONE 0.5 MG/5ML
4 ELIXIR ORAL EVERY 6 HOURS
Refills: 0 | Status: DISCONTINUED | OUTPATIENT
Start: 2020-05-27 | End: 2020-05-29

## 2020-05-27 RX ORDER — GLUCAGON INJECTION, SOLUTION 0.5 MG/.1ML
1 INJECTION, SOLUTION SUBCUTANEOUS ONCE
Refills: 0 | Status: DISCONTINUED | OUTPATIENT
Start: 2020-05-27 | End: 2020-05-30

## 2020-05-27 RX ORDER — ACETAMINOPHEN 500 MG
650 TABLET ORAL EVERY 6 HOURS
Refills: 0 | Status: DISCONTINUED | OUTPATIENT
Start: 2020-05-27 | End: 2020-06-04

## 2020-05-27 RX ORDER — INSULIN LISPRO 100/ML
VIAL (ML) SUBCUTANEOUS
Refills: 0 | Status: DISCONTINUED | OUTPATIENT
Start: 2020-05-27 | End: 2020-05-30

## 2020-05-27 RX ORDER — PSYLLIUM SEED (WITH DEXTROSE)
1 POWDER (GRAM) ORAL DAILY
Refills: 0 | Status: DISCONTINUED | OUTPATIENT
Start: 2020-05-28 | End: 2020-06-04

## 2020-05-27 RX ORDER — ACETAMINOPHEN 500 MG
1000 TABLET ORAL ONCE
Refills: 0 | Status: COMPLETED | OUTPATIENT
Start: 2020-05-27 | End: 2020-05-27

## 2020-05-27 RX ORDER — METOPROLOL TARTRATE 50 MG
12.5 TABLET ORAL
Refills: 0 | Status: DISCONTINUED | OUTPATIENT
Start: 2020-05-27 | End: 2020-06-04

## 2020-05-27 RX ORDER — DEXTROSE 50 % IN WATER 50 %
15 SYRINGE (ML) INTRAVENOUS ONCE
Refills: 0 | Status: DISCONTINUED | OUTPATIENT
Start: 2020-05-27 | End: 2020-05-30

## 2020-05-27 RX ORDER — LEVETIRACETAM 250 MG/1
1000 TABLET, FILM COATED ORAL
Refills: 0 | Status: DISCONTINUED | OUTPATIENT
Start: 2020-05-27 | End: 2020-06-04

## 2020-05-27 RX ORDER — PANTOPRAZOLE SODIUM 20 MG/1
40 TABLET, DELAYED RELEASE ORAL DAILY
Refills: 0 | Status: DISCONTINUED | OUTPATIENT
Start: 2020-05-27 | End: 2020-06-01

## 2020-05-27 RX ORDER — SODIUM CHLORIDE 9 MG/ML
3 INJECTION INTRAMUSCULAR; INTRAVENOUS; SUBCUTANEOUS EVERY 8 HOURS
Refills: 0 | Status: DISCONTINUED | OUTPATIENT
Start: 2020-05-27 | End: 2020-05-27

## 2020-05-27 RX ORDER — LIDOCAINE 4 G/100G
1 CREAM TOPICAL DAILY
Refills: 0 | Status: DISCONTINUED | OUTPATIENT
Start: 2020-05-27 | End: 2020-06-04

## 2020-05-27 RX ADMIN — PANTOPRAZOLE SODIUM 40 MILLIGRAM(S): 20 TABLET, DELAYED RELEASE ORAL at 13:02

## 2020-05-27 RX ADMIN — Medication 4 MILLIGRAM(S): at 14:53

## 2020-05-27 RX ADMIN — ATORVASTATIN CALCIUM 20 MILLIGRAM(S): 80 TABLET, FILM COATED ORAL at 21:10

## 2020-05-27 RX ADMIN — Medication 400 MILLIGRAM(S): at 13:02

## 2020-05-27 RX ADMIN — Medication 4 MILLIGRAM(S): at 21:10

## 2020-05-27 RX ADMIN — LIDOCAINE 1 PATCH: 4 CREAM TOPICAL at 14:00

## 2020-05-27 RX ADMIN — Medication 40 MILLIGRAM(S): at 13:11

## 2020-05-27 RX ADMIN — LIDOCAINE 1 PATCH: 4 CREAM TOPICAL at 19:08

## 2020-05-27 RX ADMIN — LEVETIRACETAM 1000 MILLIGRAM(S): 250 TABLET, FILM COATED ORAL at 17:48

## 2020-05-27 NOTE — PROGRESS NOTE ADULT - SUBJECTIVE AND OBJECTIVE BOX
POST-OPERATIVE NOTE    Procedure: Right craniotomy for tumor resection    Diagnosis/Indication: Right brain tumor    Surgeon: Dr. Vinny Rivers MD; Dr. Roger Frausto MD    INTERVAL HPI/ACUTE EVENTS:  79M PMH GBM s/p surgery 8/2018, chemotherapy x 35 weeks, radiation x 6 weeks, b/l hearing loss, HLD, HTN, prostate CA s/p prostatectomy 1995, DM2, basal cell carcinoma LLE s/p resection, now s/p right craniotomy for tumor resection POD#0. Patient seen postoperatively sitting comfortably in bed eating dinner, c/o mild right sided soreness by incision site. Subgaleal drain noted to be slightly pulled out of exit site, unable to hold suction with bulb    VITALS:  T(C): 36.6 (05-27-20 @ 16:00), Max: 36.8 (05-27-20 @ 06:00)  HR: 56 (05-27-20 @ 16:00) (56 - 74)  BP: 125/53 (05-27-20 @ 16:00) (117/45 - 143/63)  RR: 16 (05-27-20 @ 16:00) (14 - 18)  SpO2: 98% (05-27-20 @ 16:00) (96% - 100%)  Wt(kg): --    PHYSICAL EXAM:  GENERAL: NAD, well-groomed, well-developed  HEAD:  S/p R crani. New dressing placed  DRAINS: Subgaleal connected to bulb, minimal drainage  NECK: Supple  EUN COMA SCORE: E- 4 V- 5 M- 6=15  MENTAL STATUS: Awake, alert, oriented to self, [facility], May 2020; Appropriately conversant without aphasia; following commands  CRANIAL NERVES: PERRL. EOMI without nystagmus. Minimal diminished sensation on left. +left facial, tongue midline. Hearing grossly intact. Speech clear  MOTOR: RUE/RLE 5/5. LUE Delt 4/5 Bicep 4/5 Wrist extend 2/5 Tricep 4/5 HG 3/5 LLE 4/5  SENSATION: Extremities grossly intact to light touch    LABS:                        10.5   4.35  )-----------( 130      ( 27 May 2020 12:43 )             30.7     05-27    140  |  104  |  16.0  ----------------------------<  149<H>  3.7   |  23.0  |  0.81    Ca    8.8      27 May 2020 13:54  Phos  2.9     05-27  Mg     2.0     05-27    TPro  6.2<L>  /  Alb  3.8  /  TBili  0.4  /  DBili  0.1  /  AST  22  /  ALT  24  /  AlkPhos  47  05-27    RADIOLOGY/OTHER:  CT Head No Cont (05.27.20 @ 16:38)  IMPRESSION: Status post resection of right frontal lobe mass. Expected postoperative changes. Moderate pneumocephalus. Minimal right to left midline shift. Stable moderate edema.    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  LEFT posterior tibial and peroneal vein not visualizedcatheter edema.    MR Head w/wo IV Cont (05.24.20 @ 13:14)  IMPRESSION: Solid enhancement in the right frontal operculum within the postoperative bed is increased moderately since 4/27/2020. Prior MR perfusion and suggestive of tumor progression. There is increased infiltrating gliosis and edema on the T2 -FLAIR images suggesting further tumor progression.    CAPRINI SCORE [CLOT]:  Patient has an estimated Caprini score of greater than 5.  However, the patient's unique clinical situation will be addressed in an individual manner to determine appropriate anticoagulation treatment, if any.

## 2020-05-27 NOTE — PROGRESS NOTE ADULT - ASSESSMENT
79M PMH GBM s/p surgery 8/2018, chemotherapy x 35 weeks, radiation x 6 weeks, b/l hearing loss, HLD, HTN, prostate CA s/p prostatectomy 1995, DM2, basal cell carcinoma LLE s/p resection, now s/p right craniotomy for tumor resection POD#0.  - Doing well postoperatively, +LHP, LUE subjectively worse compared to preop  - CT head postop with expected postoperative changes    PLAN;  - D/w Dr. Rivers  - Q1 neuro checks  - HOB 30 degrees  - MR brain w/wo contrast in AM 5/28  - Decadron 4 mg Q6 hour; ISS/protonix while on steroids  - Keppra 1 g Q12 hour  - Pain control PRN, avoid oversedation  - PT eval in AM  - SCDs for DVT ppx  - LE doppler findings noted, d/w NSICU attending, no acute intervention at this point in time  - Hold ACT/APT for now  - Supportive care/further medical management per NSICU

## 2020-05-27 NOTE — PROGRESS NOTE ADULT - ASSESSMENT
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 POD 0 stereotactic R crani for tumor resection    NEURO:   SBP<160  q1hr neurochecks  repeat post-op CTH for stability  pain mgt: tylenol and oxy 5 prn  Activity: [x] mobilize as tolerated [] Bedrest [x] PT [x] OT [] PMNR    PULM: NRB  SpO2>92%  incentive spirometry   OOB    CV:  SBP goal <160  cardene gtt prn    RENAL: monitor I/O  replete lytes prn  voiding  Fluids: IVF while NPO    GI:  Diet: advance diet as tolerated  GI prophylaxis [x] not indicated   zofran prn for nausea  Bowel regimen [x] senna [] other:    ENDO:   Goal euglycemia (-180)    HEME/ONC:  VTE prophylaxis: [] SCDs [x] chemoprophylaxis held 24hrs post-OR    ID: afebrile   no leukocytosis  cefazolin post-op    MISC:    Patient was critically ill and at high risk of death or neurologic complications due to re-bleeding, brain swelling/ compression/ herniation, cardiorespiratory failure. 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 POD 0 stereotactic R crani for tumor resection  HTN  HLD    NEURO:   SBP<160  q1hr neurochecks  repeat post-op CTH for stability  pain mgt: tylenol and oxy 5 prn  Activity: [x] mobilize as tolerated [] Bedrest [x] PT [x] OT [] PMNR    PULM: NRB  SpO2>92%  incentive spirometry   OOB    CV:  SBP goal <160  cardene gtt prn    RENAL: monitor I/O  replete lytes prn  voiding  Fluids: IVF while NPO    GI:  Diet: advance diet as tolerated  GI prophylaxis [x] not indicated   zofran prn for nausea  Bowel regimen [x] senna [] other:    ENDO:   Goal euglycemia (-180)    HEME/ONC:  VTE prophylaxis: [] SCDs [x] chemoprophylaxis held 24hrs post-OR    ID: afebrile   no leukocytosis  cefazolin post-op    MISC:    Patient was critically ill and at high risk of death or neurologic complications due to re-bleeding, brain swelling/ compression/ herniation, cardiorespiratory failure. 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 POD 0 stereotactic R crani for tumor resection  HTN  HLD    NEURO:   SBP<160  q1hr neurochecks  repeat post-op CTH for stability  MRI brain tomorrow  decadron 4q6hr  pain mgt: tylenol and oxy 5 prn, lidocaine patch to lower back  Activity: [x] mobilize as tolerated [] Bedrest [x] PT [x] OT [] PMNR    PULM: NRB  SpO2>92%  incentive spirometry   OOB    CV:  SBP goal <160  cardene gtt prn    RENAL: monitor I/O  replete lytes prn  voiding  Fluids: IVF while NPO    GI:  Diet: advance diet as tolerated  GI prophylaxis [x] not indicated   zofran prn for nausea  Bowel regimen [x] senna [] other:    ENDO:   Goal euglycemia (-180)    HEME/ONC:  VTE prophylaxis: [] SCDs [x] chemoprophylaxis held 24hrs post-OR    ID: afebrile   no leukocytosis  cefazolin post-op    MISC:    Patient was critically ill and at high risk of death or neurologic complications due to re-bleeding, brain swelling/ compression/ herniation, cardiorespiratory failure.

## 2020-05-27 NOTE — PROGRESS NOTE ADULT - SUBJECTIVE AND OBJECTIVE BOX
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   Now for surgery due to regrowth of tumor.  He is now schedule for stereotactic right craniotomy for brain tumor. (18 May 2020 10:36)        24hr EVENTS:  POD 0 R craniotomy and tumor resection    ROS: [ ]  Unable to assess due to mental status   All other systems negative    -----------------------------------------------------------------------------------------------------------------------------------------------------------------------------------  ICU Vital Signs Last 24 Hrs  T(C): 36.4 (27 May 2020 11:48), Max: 36.8 (27 May 2020 06:00)  T(F): 97.5 (27 May 2020 11:48), Max: 98.2 (27 May 2020 06:00)  HR: 71 (27 May 2020 11:48) (71 - 71)  BP: 143/63 (27 May 2020 06:00) (143/63 - 143/63)  BP(mean): --  ABP: 159/62 (27 May 2020 11:48) (159/62 - 159/62)  ABP(mean): 97 (27 May 2020 11:48) (97 - 97)  RR: 16 (27 May 2020 11:48) (15 - 16)  SpO2: 96% (27 May 2020 06:00) (96% - 96%)      I&O's Summary      MEDICATIONS  (STANDING):  ceFAZolin   IVPB 2000 milliGRAM(s) IV Intermittent once      IMAGING:   Recent imaging studies were reviewed.      -----------------------------------------------------------------------------------------------------------------------------------------------------------------------------------    PHYSICAL EXAM:  General: Calm, intubated  HEENT: MMM  Neuro:  -Mental status- No acute distress  -CN- PERRL 3mm, EOMI, tongue midline, face symmetric    CV: RRR  Pulm: clear to auscultation  Abd: Soft, nontender, nondistended  Ext: no noted edema in lower ext  Skin: warm, dry Chief complaint:   Patient is a 79y old  Male who presents with a chief complaint of brain mass (18 May 2020 10:36)    HPI:  79 year old male with 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   Now for surgery due to regrowth of tumor.  He is now schedule for stereotactic right craniotomy for brain tumor. (18 May 2020 10:36)        24hr EVENTS:  POD 0 R craniotomy and tumor resection    ROS: [ ]  Unable to assess due to mental status   All other systems negative    -----------------------------------------------------------------------------------------------------------------------------------------------------------------------------------  ICU Vital Signs Last 24 Hrs  T(C): 36.4 (27 May 2020 11:48), Max: 36.8 (27 May 2020 06:00)  T(F): 97.5 (27 May 2020 11:48), Max: 98.2 (27 May 2020 06:00)  HR: 71 (27 May 2020 11:48) (71 - 71)  BP: 143/63 (27 May 2020 06:00) (143/63 - 143/63)  BP(mean): --  ABP: 159/62 (27 May 2020 11:48) (159/62 - 159/62)  ABP(mean): 97 (27 May 2020 11:48) (97 - 97)  RR: 16 (27 May 2020 11:48) (15 - 16)  SpO2: 96% (27 May 2020 06:00) (96% - 96%)      I&O's Summary      MEDICATIONS  (STANDING):  ceFAZolin   IVPB 2000 milliGRAM(s) IV Intermittent once      IMAGING:   Recent imaging studies were reviewed.      -----------------------------------------------------------------------------------------------------------------------------------------------------------------------------------    PHYSICAL EXAM:  General: Calm,   HEENT: MMM  Neuro:  -Mental status- No acute distress  -CN- PERRL 3mm, EOMI, tongue midline, face symmetric  moving all ext    CV: RRR  Pulm: clear to auscultation  Abd: Soft, nontender, nondistended  Ext: no noted edema in lower ext  Skin: warm, dry Chief complaint:   Patient is a 79y old  Male who presents with a chief complaint of brain mass (18 May 2020 10:36)    HPI:  79 year old male with 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   Now for surgery due to regrowth of tumor.  He is now schedule for stereotactic right craniotomy for brain tumor. (18 May 2020 10:36)        24hr EVENTS:  POD 0 R craniotomy and tumor resection    ROS: chronic lower back pain   All other systems negative    -----------------------------------------------------------------------------------------------------------------------------------------------------------------------------------  ICU Vital Signs Last 24 Hrs  T(C): 36.4 (27 May 2020 11:48), Max: 36.8 (27 May 2020 06:00)  T(F): 97.5 (27 May 2020 11:48), Max: 98.2 (27 May 2020 06:00)  HR: 71 (27 May 2020 11:48) (71 - 71)  BP: 143/63 (27 May 2020 06:00) (143/63 - 143/63)  BP(mean): --  ABP: 159/62 (27 May 2020 11:48) (159/62 - 159/62)  ABP(mean): 97 (27 May 2020 11:48) (97 - 97)  RR: 16 (27 May 2020 11:48) (15 - 16)  SpO2: 96% (27 May 2020 06:00) (96% - 96%)      I&O's Summary      MEDICATIONS  (STANDING):  ceFAZolin   IVPB 2000 milliGRAM(s) IV Intermittent once      IMAGING:   Recent imaging studies were reviewed.      -----------------------------------------------------------------------------------------------------------------------------------------------------------------------------------    PHYSICAL EXAM:  General: Calm,   HEENT: MMM  Neuro:  -Mental status- No acute distress  -CN- PERRL 3mm, EOMI, tongue midline, face symmetric  moving all ext    CV: RRR  Pulm: clear to auscultation  Abd: Soft, nontender, nondistended  Ext: no noted edema in lower ext  Skin: warm, dry

## 2020-05-28 ENCOUNTER — TRANSCRIPTION ENCOUNTER (OUTPATIENT)
Age: 80
End: 2020-05-28

## 2020-05-28 LAB
ALBUMIN SERPL ELPH-MCNC: 3.4 G/DL — SIGNIFICANT CHANGE UP (ref 3.3–5.2)
ALP SERPL-CCNC: 43 U/L — SIGNIFICANT CHANGE UP (ref 40–120)
ALT FLD-CCNC: 17 U/L — SIGNIFICANT CHANGE UP
ANION GAP SERPL CALC-SCNC: 13 MMOL/L — SIGNIFICANT CHANGE UP (ref 5–17)
AST SERPL-CCNC: 15 U/L — SIGNIFICANT CHANGE UP
BILIRUB SERPL-MCNC: 0.5 MG/DL — SIGNIFICANT CHANGE UP (ref 0.4–2)
BUN SERPL-MCNC: 14 MG/DL — SIGNIFICANT CHANGE UP (ref 8–20)
CALCIUM SERPL-MCNC: 8 MG/DL — LOW (ref 8.6–10.2)
CHLORIDE SERPL-SCNC: 107 MMOL/L — SIGNIFICANT CHANGE UP (ref 98–107)
CO2 SERPL-SCNC: 22 MMOL/L — SIGNIFICANT CHANGE UP (ref 22–29)
CREAT SERPL-MCNC: 0.74 MG/DL — SIGNIFICANT CHANGE UP (ref 0.5–1.3)
GLUCOSE BLDC GLUCOMTR-MCNC: 144 MG/DL — HIGH (ref 70–99)
GLUCOSE BLDC GLUCOMTR-MCNC: 147 MG/DL — HIGH (ref 70–99)
GLUCOSE BLDC GLUCOMTR-MCNC: 154 MG/DL — HIGH (ref 70–99)
GLUCOSE SERPL-MCNC: 131 MG/DL — HIGH (ref 70–99)
HCT VFR BLD CALC: 33.2 % — LOW (ref 39–50)
HGB BLD-MCNC: 11.5 G/DL — LOW (ref 13–17)
MAGNESIUM SERPL-MCNC: 1.7 MG/DL — SIGNIFICANT CHANGE UP (ref 1.6–2.6)
MCHC RBC-ENTMCNC: 32.7 PG — SIGNIFICANT CHANGE UP (ref 27–34)
MCHC RBC-ENTMCNC: 34.6 GM/DL — SIGNIFICANT CHANGE UP (ref 32–36)
MCV RBC AUTO: 94.3 FL — SIGNIFICANT CHANGE UP (ref 80–100)
PHOSPHATE SERPL-MCNC: 2.9 MG/DL — SIGNIFICANT CHANGE UP (ref 2.4–4.7)
PLATELET # BLD AUTO: 175 K/UL — SIGNIFICANT CHANGE UP (ref 150–400)
POTASSIUM SERPL-MCNC: 3.4 MMOL/L — LOW (ref 3.5–5.3)
POTASSIUM SERPL-SCNC: 3.4 MMOL/L — LOW (ref 3.5–5.3)
PROT SERPL-MCNC: 5.4 G/DL — LOW (ref 6.6–8.7)
RBC # BLD: 3.52 M/UL — LOW (ref 4.2–5.8)
RBC # FLD: 12.1 % — SIGNIFICANT CHANGE UP (ref 10.3–14.5)
SODIUM SERPL-SCNC: 142 MMOL/L — SIGNIFICANT CHANGE UP (ref 135–145)
WBC # BLD: 8.25 K/UL — SIGNIFICANT CHANGE UP (ref 3.8–10.5)
WBC # FLD AUTO: 8.25 K/UL — SIGNIFICANT CHANGE UP (ref 3.8–10.5)

## 2020-05-28 PROCEDURE — 99233 SBSQ HOSP IP/OBS HIGH 50: CPT

## 2020-05-28 PROCEDURE — 70553 MRI BRAIN STEM W/O & W/DYE: CPT | Mod: 26

## 2020-05-28 RX ORDER — MAGNESIUM OXIDE 400 MG ORAL TABLET 241.3 MG
400 TABLET ORAL ONCE
Refills: 0 | Status: COMPLETED | OUTPATIENT
Start: 2020-05-28 | End: 2020-05-28

## 2020-05-28 RX ORDER — ALPRAZOLAM 0.25 MG
0.25 TABLET ORAL EVERY 8 HOURS
Refills: 0 | Status: DISCONTINUED | OUTPATIENT
Start: 2020-05-28 | End: 2020-06-03

## 2020-05-28 RX ORDER — LEVETIRACETAM 250 MG/1
1 TABLET, FILM COATED ORAL
Qty: 0 | Refills: 0 | DISCHARGE
Start: 2020-05-28

## 2020-05-28 RX ORDER — SODIUM CHLORIDE 9 MG/ML
1000 INJECTION INTRAMUSCULAR; INTRAVENOUS; SUBCUTANEOUS
Refills: 0 | Status: DISCONTINUED | OUTPATIENT
Start: 2020-05-28 | End: 2020-05-28

## 2020-05-28 RX ORDER — POTASSIUM CHLORIDE 20 MEQ
40 PACKET (EA) ORAL ONCE
Refills: 0 | Status: COMPLETED | OUTPATIENT
Start: 2020-05-28 | End: 2020-05-28

## 2020-05-28 RX ORDER — ATORVASTATIN CALCIUM 80 MG/1
1 TABLET, FILM COATED ORAL
Qty: 0 | Refills: 0 | DISCHARGE
Start: 2020-05-28

## 2020-05-28 RX ORDER — ENOXAPARIN SODIUM 100 MG/ML
40 INJECTION SUBCUTANEOUS AT BEDTIME
Refills: 0 | Status: DISCONTINUED | OUTPATIENT
Start: 2020-05-28 | End: 2020-06-04

## 2020-05-28 RX ORDER — ALPRAZOLAM 0.25 MG
0.5 TABLET ORAL ONCE
Refills: 0 | Status: DISCONTINUED | OUTPATIENT
Start: 2020-05-28 | End: 2020-05-28

## 2020-05-28 RX ORDER — ASPIRIN/CALCIUM CARB/MAGNESIUM 324 MG
2 TABLET ORAL
Qty: 0 | Refills: 0 | DISCHARGE

## 2020-05-28 RX ADMIN — Medication 0.5 MILLIGRAM(S): at 09:40

## 2020-05-28 RX ADMIN — Medication 40 MILLIEQUIVALENT(S): at 11:07

## 2020-05-28 RX ADMIN — LEVETIRACETAM 1000 MILLIGRAM(S): 250 TABLET, FILM COATED ORAL at 21:18

## 2020-05-28 RX ADMIN — MAGNESIUM OXIDE 400 MG ORAL TABLET 400 MILLIGRAM(S): 241.3 TABLET ORAL at 11:07

## 2020-05-28 RX ADMIN — Medication 4 MILLIGRAM(S): at 21:18

## 2020-05-28 RX ADMIN — Medication 2: at 17:45

## 2020-05-28 RX ADMIN — Medication 12.5 MILLIGRAM(S): at 17:21

## 2020-05-28 RX ADMIN — LIDOCAINE 1 PATCH: 4 CREAM TOPICAL at 01:45

## 2020-05-28 RX ADMIN — OXYCODONE AND ACETAMINOPHEN 2 TABLET(S): 5; 325 TABLET ORAL at 09:23

## 2020-05-28 RX ADMIN — Medication 4 MILLIGRAM(S): at 04:00

## 2020-05-28 RX ADMIN — LIDOCAINE 1 PATCH: 4 CREAM TOPICAL at 19:25

## 2020-05-28 RX ADMIN — Medication 4 MILLIGRAM(S): at 09:22

## 2020-05-28 RX ADMIN — ATORVASTATIN CALCIUM 20 MILLIGRAM(S): 80 TABLET, FILM COATED ORAL at 21:01

## 2020-05-28 RX ADMIN — PANTOPRAZOLE SODIUM 40 MILLIGRAM(S): 20 TABLET, DELAYED RELEASE ORAL at 12:50

## 2020-05-28 RX ADMIN — LIDOCAINE 1 PATCH: 4 CREAM TOPICAL at 12:50

## 2020-05-28 RX ADMIN — LEVETIRACETAM 1000 MILLIGRAM(S): 250 TABLET, FILM COATED ORAL at 07:19

## 2020-05-28 RX ADMIN — Medication 4 MILLIGRAM(S): at 17:21

## 2020-05-28 NOTE — CHART NOTE - NSCHARTNOTEFT_GEN_A_CORE
Subgaleal ELISEO drain removal:     Area cleaned with chloroprep. ELISEO drain slowly removed and closed with 1 staple. There were no complications during the procedure- pt tolerated the procedure well.

## 2020-05-28 NOTE — PHYSICAL THERAPY INITIAL EVALUATION ADULT - MANUAL MUSCLE TESTING RESULTS, REHAB EVAL
bilateral UE's + bilateral LE's grossly assessed via functional assessment of sit to stand transfer, 3/5; right LE > Left LE

## 2020-05-28 NOTE — PHYSICAL THERAPY INITIAL EVALUATION ADULT - GENERAL OBSERVATIONS, REHAB EVAL
Pt received on 4TWR, RUSSELL ambrose'ed pt for PT; pt observed sitting in chair with telemontior with , BP cuff right UE, pleasant, cooperative, A&O and c/o 0/10 pain

## 2020-05-28 NOTE — PHYSICAL THERAPY INITIAL EVALUATION ADULT - LIVES WITH, PROFILE
stated that two children live with him, but also said they live a couple of miles away, possible will be staying with pt upon d/c..?/spouse

## 2020-05-28 NOTE — DISCHARGE NOTE PROVIDER - NSDCHC_MEDRECSTATUS_GEN_ALL_CORE
Admission Reconciliation is Not Complete  Discharge Reconciliation is Not Complete Admission Reconciliation is Not Complete  Discharge Reconciliation is Completed Admission Reconciliation is Completed  Discharge Reconciliation is Not Complete Admission Reconciliation is Completed  Discharge Reconciliation is Completed

## 2020-05-28 NOTE — PHYSICAL THERAPY INITIAL EVALUATION ADULT - TRANSFER SAFETY CONCERNS NOTED: SIT/STAND, REHAB EVAL
decreased weight-shifting ability/decreased balance during turns/intermittently increased and decreased step length due to pt demonstrating decreased motor control and coordination in bilateral LE's/decreased safety awareness/decreased sequencing ability/losing balance

## 2020-05-28 NOTE — PHYSICAL THERAPY INITIAL EVALUATION ADULT - PHYSICAL ASSIST/NONPHYSICAL ASSIST: SIT/STAND, REHAB EVAL
pt required increased physical assistance to help perform transfer/to rise to a full standing position. verbal cues re proper sequence + proper hand placement in prep to perform transfer; pt required manual assistance for proper left UE + Left LE placement in prep for transfer and to help maintain proper positioning for left UE/1 person assist/verbal cues

## 2020-05-28 NOTE — PHYSICAL THERAPY INITIAL EVALUATION ADULT - ADDITIONAL COMMENTS
Pt lives with spouse in a private home with no KEVIN, bed&bath on ground level and 1 KEVIN no handrails to garage where he frequently uses bowflex machine. Pt's PLOF was independent in all ADL's (except required supervision &/or assistance of another for UE + LE dressing) + ambulation with SAC for outdoors only, was driving [employer] and avid gym goer, went 4-5x a week. Pt has RW and SAC DME at home. At this time, tub transfer bench and raised toilet seat are recommended upon d/c.

## 2020-05-28 NOTE — PHYSICAL THERAPY INITIAL EVALUATION ADULT - GAIT DEVIATIONS NOTED, PT EVAL
Left LE foot slap/decreased stride length/decreased step length/decreased iveth/decreased weight-shifting ability

## 2020-05-28 NOTE — DISCHARGE NOTE PROVIDER - HOSPITAL COURSE
Admitted on May 27, 2020 for a scheduled, elective right craniotomy for resection of brain tumor, recurrent GBM. Recovered in the NSICU with no complications. Admitted on May 27, 2020 for a scheduled, elective right craniotomy for resection of brain tumor, recurrent GBM. Recovered in the NSICU with no complications.     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   Now for surgery due to regrowth of tumor.  He is now schedule for stereotactic right craniotomy for brain tumor. Admitted on May 27, 2020 for a scheduled, elective right craniotomy for resection of brain tumor, recurrent GBM. Recovered in the NSICU with no complications.     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   Now for surgery due to regrowth of tumor.  He is now schedule for stereotactic right craniotomy for brain tumor. Pt has been having hiccups decadron decreased Admitted on May 27, 2020 for a scheduled, elective right craniotomy for resection of brain tumor, recurrent GBM. Recovered in the NSICU with no complications.     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   Now for surgery due to regrowth of tumor.  He is now schedule for stereotactic right craniotomy for brain tumor. Pt has been having hiccups over the past few days.  Pt has been on decadron initially the plan was to discharge on Decadron 2 mg q 6 now will decrease to q 12. Protonix will be increased from once a day to q12.  Pt post op has been noted to be unstable with ambulation and therefore, has been recommended for in patient rehab. Admitted on May 27, 2020 for a scheduled, elective right craniotomy for resection of brain tumor, recurrent GBM. Recovered in the NSICU with no complications.     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   Now for surgery due to regrowth of tumor.  He is now schedule for stereotactic right craniotomy for brain tumor. Pt has been having hiccups over the past few days.  Pt has been on decadron initially the plan was to discharge on Decadron 2 mg q 6 now will decrease to q 12. Protonix will be increased from once a day to q12.  Pt post op has been noted to be unstable with ambulation and therefore, has been recommended for in patient rehab.    Pt is at his baseline w LUE weakness                                13.9     8.46  )-----------( 215      ( 03 Jun 2020 05:43 )               40.8     06-03        141  |  102  |  32.0<H>    ----------------------------<  115<H>    4.2   |  29.0  |  0.91        Ca    9.2      03 Jun 2020 05:43    Phos  3.0     06-04    Mg     2.2     06-04        Vital Signs Last 24 Hrs    T(C): 37.1 (04 Jun 2020 09:49), Max: 37.1 (04 Jun 2020 09:49)    T(F): 98.8 (04 Jun 2020 09:49), Max: 98.8 (04 Jun 2020 09:49)    HR: 81 (04 Jun 2020 09:49) (58 - 81)    BP: 140/75 (04 Jun 2020 09:49) (123/75 - 143/91)    BP(mean): --    RR: 20 (04 Jun 2020 09:49) (18 - 20)    SpO2: 100% (04 Jun 2020 09:49) (94% - 100%)        MEDICATIONS  (STANDING):    atorvastatin 20 milliGRAM(s) Oral at bedtime    dexAMETHasone     Tablet 2 milliGRAM(s) Oral every 12 hours    enoxaparin Injectable 40 milliGRAM(s) SubCutaneous at bedtime    furosemide    Tablet 40 milliGRAM(s) Oral daily    levETIRAcetam 1000 milliGRAM(s) Oral two times a day    lidocaine   Patch 1 Patch Transdermal daily    metoprolol succinate ER 25 milliGRAM(s) Oral daily    pantoprazole    Tablet 40 milliGRAM(s) Oral two times a day    polyethylene glycol 3350 17 Gram(s) Oral daily    psyllium Powder 1 Packet(s) Oral daily    senna 2 Tablet(s) Oral at bedtime    tamsulosin 0.4 milliGRAM(s) Oral at bedtime        MEDICATIONS  (PRN):    acetaminophen   Tablet .. 650 milliGRAM(s) Oral every 6 hours PRN Mild Pain (1 - 3), Moderate Pain (4 - 6)    ALPRAZolam 0.25 milliGRAM(s) Oral every 8 hours PRN anxiety    benzocaine 15 mG/menthol 3.6 mG (Sugar-Free) Lozenge 1 Lozenge Oral every 2 hours PRN Sore Throat    labetalol Injectable 10 milliGRAM(s) IV Push every 4 hours PRN Systolic blood pressure >160            Radilogy:    CT HEAD 5/27: S/p resection of right frontal lobe mass. Expected postop changes. Moderate pneumocephalus. Minimal right to left midline shift. Stable moderate edema.    B/L LE DOPPLERS 5/27: Nonocclusive peripheral chronic thrombus mid and distal femoral vein RIGHT lower extremity. LEFT posterior tibial and peroneal vein not visualized catheter edema.     MR BRAIN W/WO 5/28:  1. Status post resection of a right posterior frontal lobe mass. Nodular/thick linear enhancement along the anterior and inferior margins of the resection cavity, suggesting residual tumor. 2. T2/FLAIR hyperintensity surrounding the resection cavity suggesting vasogenic edema, posttreatment change, or nonenhancing tumor.     CTH 5/29: RIGHT parietal craniotomy with underlying surgical cavity containing air, fluid and hemorrhage. Vasogenic edema is noted. Pneumocephalus is unchanged. Mild periventricular white matter ischemia with old lacunar infarction in the LEFT basal ganglia Admitted on May 27, 2020 for a scheduled, elective right craniotomy for resection of brain tumor, recurrent GBM. Recovered in the NSICU with no complications.     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   Now for surgery due to regrowth of tumor.  He is now schedule for stereotactic right craniotomy for brain tumor. Pt has been having hiccups over the past few days.  Pt has been on decadron initially the plan was to discharge on Decadron 2 mg q 6 now will decrease to q 12. Protonix will be increased from once a day to q12.  Pt post op has been noted to be unstable with ambulation and therefore, has been recommended for in patient rehab.    Pt is at his baseline w LUE weakness    pt was on ASA 81mg pre-op/ held for OR, as per Dr Rivers, ok to resume on 6/8/20                            13.9     8.46  )-----------( 215      ( 03 Jun 2020 05:43 )               40.8     06-03        141  |  102  |  32.0<H>    ----------------------------<  115<H>    4.2   |  29.0  |  0.91        Ca    9.2      03 Jun 2020 05:43    Phos  3.0     06-04    Mg     2.2     06-04        Vital Signs Last 24 Hrs    T(C): 37.1 (04 Jun 2020 09:49), Max: 37.1 (04 Jun 2020 09:49)    T(F): 98.8 (04 Jun 2020 09:49), Max: 98.8 (04 Jun 2020 09:49)    HR: 81 (04 Jun 2020 09:49) (58 - 81)    BP: 140/75 (04 Jun 2020 09:49) (123/75 - 143/91)    BP(mean): --    RR: 20 (04 Jun 2020 09:49) (18 - 20)    SpO2: 100% (04 Jun 2020 09:49) (94% - 100%)        MEDICATIONS  (STANDING):    atorvastatin 20 milliGRAM(s) Oral at bedtime    dexAMETHasone     Tablet 2 milliGRAM(s) Oral every 12 hours    enoxaparin Injectable 40 milliGRAM(s) SubCutaneous at bedtime    furosemide    Tablet 40 milliGRAM(s) Oral daily    levETIRAcetam 1000 milliGRAM(s) Oral two times a day    lidocaine   Patch 1 Patch Transdermal daily    metoprolol succinate ER 25 milliGRAM(s) Oral daily    pantoprazole    Tablet 40 milliGRAM(s) Oral two times a day    polyethylene glycol 3350 17 Gram(s) Oral daily    psyllium Powder 1 Packet(s) Oral daily    senna 2 Tablet(s) Oral at bedtime    tamsulosin 0.4 milliGRAM(s) Oral at bedtime        MEDICATIONS  (PRN):    acetaminophen   Tablet .. 650 milliGRAM(s) Oral every 6 hours PRN Mild Pain (1 - 3), Moderate Pain (4 - 6)    ALPRAZolam 0.25 milliGRAM(s) Oral every 8 hours PRN anxiety    benzocaine 15 mG/menthol 3.6 mG (Sugar-Free) Lozenge 1 Lozenge Oral every 2 hours PRN Sore Throat    labetalol Injectable 10 milliGRAM(s) IV Push every 4 hours PRN Systolic blood pressure >160            Radilogy:    CT HEAD 5/27: S/p resection of right frontal lobe mass. Expected postop changes. Moderate pneumocephalus. Minimal right to left midline shift. Stable moderate edema.    B/L LE DOPPLERS 5/27: Nonocclusive peripheral chronic thrombus mid and distal femoral vein RIGHT lower extremity. LEFT posterior tibial and peroneal vein not visualized catheter edema.     MR BRAIN W/WO 5/28:  1. Status post resection of a right posterior frontal lobe mass. Nodular/thick linear enhancement along the anterior and inferior margins of the resection cavity, suggesting residual tumor. 2. T2/FLAIR hyperintensity surrounding the resection cavity suggesting vasogenic edema, posttreatment change, or nonenhancing tumor.     CTH 5/29: RIGHT parietal craniotomy with underlying surgical cavity containing air, fluid and hemorrhage. Vasogenic edema is noted. Pneumocephalus is unchanged. Mild periventricular white matter ischemia with old lacunar infarction in the LEFT basal ganglia

## 2020-05-28 NOTE — PHYSICAL THERAPY INITIAL EVALUATION ADULT - IMPAIRMENTS FOUND, PT EVAL
gross motor/aerobic capacity/endurance/muscle strength/poor safety awareness/arousal, attention, and cognition/posture/gait, locomotion, and balance

## 2020-05-28 NOTE — PHYSICAL THERAPY INITIAL EVALUATION ADULT - ASR EQUIP NEEDS DISCH PT EVAL
rolling walker (5 inch wheels)/bathing equipment/dressing equipment/raised toilet seat/gait belt/tub bench

## 2020-05-28 NOTE — PROGRESS NOTE ADULT - SUBJECTIVE AND OBJECTIVE BOX
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   Now for surgery due to regrowth of tumor. Admitted for elective stereotactic right craniotomy for brain tumor on 28 May 2020    INTERVAL  EVENTS  POD#1 s/p tumor resection. Going for MRI. Drain being removed. Downgraded to q2 neurochecks on our service. Exam stable. No new complaints. Anxious to walk around.     Vital Signs Last 24 Hrs  T(C): 36.7 (28 May 2020 12:00), Max: 36.8 (28 May 2020 08:00)  T(F): 98.1 (28 May 2020 12:00), Max: 98.3 (28 May 2020 08:00)  HR: 66 (28 May 2020 13:00) (44 - 68)  BP: 135/54 (28 May 2020 13:00) (105/52 - 137/48)  BP(mean): 70 (28 May 2020 13:00) (63 - 94)  RR: 16 (28 May 2020 13:00) (12 - 21)  SpO2: 97% (28 May 2020 13:00) (93% - 100%)  PHYSICAL EXAM:  Drain removed  Neuro: Patient is awake, perseverates, concentration impaired, able to name high frequency objects but makes paraphasic errors with pressured speech. Able to follow commands. Poor short term memory. LUE is 3/5 and 2+/5 distally on the left but 5/5 throughout other muscle groups. Denies sensation changes to light touch throughout.       LABS:             11.5   8.25  )-----------( 175                   33.2     142  |  107  |  14.0  ----------------------------<  131<H>  3.4<L>   |  22.0  |  0.74    Ca    8.0<L>        Phos  2.9       Mg     1.7         TPro  5.4<L>  /  Alb  3.4  /  TBili  0.5  /  DBili  x   /  AST  15  /  ALT  17  /  AlkPhos  43  05-28    RADIOLOGY & ADDITIONAL TESTS:  MR Head w/wo IV Cont (05.28.20 @ 10:53)  IMPRESSION:  1.  Status post resection of a right posterior frontal lobe mass. Nodular/thick linear enhancement along the anterior and inferior margins of the resection cavity, suggesting residual tumor.   2.  T2/FLAIR hyperintensity surrounding the resection cavity suggesting vasogenic edema, posttreatment change, or nonenhancing tumor.

## 2020-05-28 NOTE — DISCHARGE NOTE PROVIDER - CARE PROVIDER_API CALL
Vinny Rivers  NEUROSURGERY  [address] [place] [address]  Phone: [phone]  Fax: [phone]  Follow Up Time: Vinny Rivers  NEUROSURGERY  [address] [place] [address]  Phone: [phone]  Fax: [phone]  Follow Up Time:     Francisco Harman  NEUROLOGY  [address] [place] [id number]  Phone: [phone]  Fax: [phone]  Follow Up Time:

## 2020-05-28 NOTE — PROGRESS NOTE ADULT - ASSESSMENT
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   Found to have tumor recurrence and admitted for elective stereotactic right craniotomy for brain tumor on 28 May 2020 with Dr. Rivers.    POD#1 s/p resection, no perioperative complications.  - Drain removed this AM by NSICU.  - Downgraded to Neurosurgery service  - MRI brain reviewed and will discuss with attending  - q2h neurochecks  - no chemical DVT ppx  - OK for OOB to chair - PT consulted  - continue dex 4q6h  - continue keppra 1g q12h  - dopplers reviewed and chronic, no indication for acute intervention    If clinical course continues to improve with no acute events we will likely discharge tomorrow

## 2020-05-28 NOTE — DISCHARGE NOTE PROVIDER - NSDCACTIVITY_GEN_ALL_CORE
No heavy lifting/straining/Bathing allowed/Do not make important decisions/Walking - Indoors allowed/Stairs allowed/Do not drive or operate machinery No heavy lifting/straining/Showering allowed/Walking - Outdoors allowed/Bathing allowed/Do not drive or operate machinery/Driving allowed/Do not make important decisions/Stairs allowed/Walking - Indoors allowed

## 2020-05-28 NOTE — DISCHARGE NOTE PROVIDER - NSDCMRMEDTOKEN_GEN_ALL_CORE_FT
ALPRAZOLAM   TAB 0.5M  orally 2 times a day, As Needed  atorvastatin 20 mg oral tablet: 1 tab(s) orally once a day (at bedtime)  Crestor 20 mg oral tablet: 1 tab(s) orally once a day  docusate sodium 100 mg oral capsule: 1 cap(s) orally 2 times a day  FUROSEMIDE   TAB 40MG:   Keppra 1000 mg oral tablet: 1 tab(s) orally 2 times a day  levETIRAcetam 1000 mg oral tablet: 1 tab(s) orally 2 times a day  METOPROL SUC TAB 25MG ER: 1  orally 2 times a day  psyllium 3.4 g/7 g oral powder for reconstitution: 1 packet(s) orally once a day ALPRAZOLAM   TAB 0.5M  orally 2 times a day, As Needed  atorvastatin 20 mg oral tablet: 1 tab(s) orally once a day (at bedtime)  Crestor 20 mg oral tablet: 1 tab(s) orally once a day  dexamethasone 2 mg oral tablet: 1 tab(s) orally every 12 hours  docusate sodium 100 mg oral capsule: 1 cap(s) orally 2 times a day  FUROSEMIDE   TAB 40MG:   levETIRAcetam 1000 mg oral tablet: 1 tab(s) orally 2 times a day  METOPROL SUC TAB 25MG ER: 1  orally 2 times a day  oxycodone-acetaminophen 5 mg-325 mg oral tablet: 1 tab(s) orally every 4 hours, As needed, Moderate Pain (4 - 6)  oxycodone-acetaminophen 5 mg-325 mg oral tablet: 2 tab(s) orally every 6 hours, As needed, Severe Pain (7 - 10)  polyethylene glycol 3350 oral powder for reconstitution: 17 gram(s) orally once a day  psyllium 3.4 g/7 g oral powder for reconstitution: 1 packet(s) orally once a day  tamsulosin 0.4 mg oral capsule: 1 cap(s) orally once a day (at bedtime) ALPRAZOLAM   TAB 0.5M  orally 2 times a day, As Needed  atorvastatin 20 mg oral tablet: 1 tab(s) orally once a day (at bedtime)  Crestor 20 mg oral tablet: 1 tab(s) orally once a day  dexamethasone 2 mg oral tablet: 1 tab(s) orally every 12 hours  docusate sodium 100 mg oral capsule: 1 cap(s) orally 2 times a day  FUROSEMIDE   TAB 40MG:   levETIRAcetam 1000 mg oral tablet: 1 tab(s) orally 2 times a day  menthol-benzocaine 3.6 mg-15 mg mucous membrane lozenge: 1 lozenge mucous membrane every 4 hours, As Needed  metoprolol succinate 25 mg oral tablet, extended release: 1 tab(s) orally once a day  oxycodone-acetaminophen 5 mg-325 mg oral tablet: 1 tab(s) orally every 4 hours, As needed, Moderate Pain (4 - 6)  oxycodone-acetaminophen 5 mg-325 mg oral tablet: 2 tab(s) orally every 6 hours, As needed, Severe Pain (7 - 10)  polyethylene glycol 3350 oral powder for reconstitution: 17 gram(s) orally once a day  psyllium 3.4 g/7 g oral powder for reconstitution: 1 packet(s) orally once a day  tamsulosin 0.4 mg oral capsule: 1 cap(s) orally once a day (at bedtime) ALPRAZOLAM   TAB 0.5M  orally 2 times a day, As Needed  atorvastatin 20 mg oral tablet: 1 tab(s) orally once a day (at bedtime)  Crestor 20 mg oral tablet: 1 tab(s) orally once a day  dexamethasone 2 mg oral tablet: 1 tab(s) orally every 12 hours  FUROSEMIDE   TAB 40MG:   levETIRAcetam 1000 mg oral tablet: 1 tab(s) orally 2 times a day  menthol-benzocaine 3.6 mg-15 mg mucous membrane lozenge: 1 lozenge mucous membrane every 4 hours, As Needed  metoprolol succinate 25 mg oral tablet, extended release: 1 tab(s) orally once a day  polyethylene glycol 3350 oral powder for reconstitution: 17 gram(s) orally once a day  psyllium 3.4 g/7 g oral powder for reconstitution: 1 packet(s) orally once a day  tamsulosin 0.4 mg oral capsule: 1 cap(s) orally once a day (at bedtime)

## 2020-05-28 NOTE — DISCHARGE NOTE PROVIDER - NSDCFUADDINST_GEN_ALL_CORE_FT
Do not drive an automobile or operate machinery while taking narcotics.     No heavy lifting, bending, twisting for 2 wks.     Keep surgical wound clean, cover when bathing, pat dry when finished, do not rub. Do not drive an automobile or operate machinery while taking narcotics.     No heavy lifting, bending, twisting for 2 wks.     Keep surgical wound clean, cover when bathing, pat dry when finished, do not rub. avoid direst water pressure to incision and use mild shampoo.

## 2020-05-28 NOTE — PHYSICAL THERAPY INITIAL EVALUATION ADULT - BALANCE DISTURBANCE, IDENTIFIED IMPAIRMENT CONTRIBUTE, REHAB EVAL
impaired postural control/decreased ROM/impaired coordination/impaired motor control/abnormal muscle tone/decreased strength

## 2020-05-28 NOTE — PHYSICAL THERAPY INITIAL EVALUATION ADULT - PHYSICAL ASSIST/NONPHYSICAL ASSIST: STAND/SIT, REHAB EVAL
2 person assist/RN Raphael present for 2nd person assist; pt required increased physical assistance to help perform transfer/to safely lower to a sitting position; verbal cues re proper sequence + proper hand placement in prep to perform transfer; pt required manual assistance to help assume and maintain proper positioning of left UE during transfer/verbal cues

## 2020-05-28 NOTE — PHYSICAL THERAPY INITIAL EVALUATION ADULT - PHYSICAL ASSIST/NONPHYSICAL ASSIST: GAIT, REHAB EVAL
2 person assist/verbal cues/RN Raphael present for 2nd person assist; pt required increased physical assistance to help maintain proper upright walking posture during ambulation, assistance re proper use of AD and assistance for safety & falls prevention; pt required manual assistance to help assume and to help maintain left UE positioning and placement on RW during ambulation; pt required manual assistance to help properly maneuver RW during ambulation, especially while negotiating turns ; pt required frequent verbal cues for proper gait sequence, for proper/adequate step/stride length for reinforcement of safety + falls prevention as pt demonstrated increased impulsivity and decreased safety awareness; pt required frequent verbal cues to decreased Left LE foot slap

## 2020-05-28 NOTE — PHYSICAL THERAPY INITIAL EVALUATION ADULT - CRITERIA FOR SKILLED THERAPEUTIC INTERVENTIONS
functional limitations in following categories/rehab potential/predicted duration of therapy intervention/anticipated equipment needs at discharge/impairments found/therapy frequency/risk reduction/prevention/anticipated discharge recommendation

## 2020-05-28 NOTE — PHYSICAL THERAPY INITIAL EVALUATION ADULT - RANGE OF MOTION EXAMINATION, REHAB EVAL
Left LE ROM was WFL (within functional limits)/Right UE ROM was WNL (within normal limits)/Right LE ROM was WNL (within normal limits)

## 2020-05-28 NOTE — PHYSICAL THERAPY INITIAL EVALUATION ADULT - PATIENT/FAMILY/SIGNIFICANT OTHER GOALS STATEMENT, PT EVAL
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 Currently under the care of BMT for treatment of AML. Currently on voriconazole; bactrim, cefepime, vanc courses completed.     9/20 PM: [facility] was called to patients bedside for afib with a rate of 170-180. Patient was asymptomatic on assessment, he was hypotensive with SBP in the 80s. SpO2 in the upper 90s. He was treated with one dose of Lopressor 5mg push which slowed his afib from a rate of 176 to 150s. His SBP improved to the low 100s with this first dose. Patient was then given a second dose of 5mg lopressor and his rate slowed to 130s-140s which appears to be his baseline over the past few days. SBP improved to the 120s. Patient remained stable and without complaint throughout this event.     9/21 PM: Pt continues to stay in RVR throughout the day with -150/min. EP cardiology followed with recommendation for 9/22 AM cardioversion. Pt started on heparin gtt @ 1800. Pt's BP slowly decreased through the course of the day from  mmHg @ 0800 to 80 mmHg @ 1800. Given pt's high risk for decompensation, evolving hypotension, transferred to ICU for closer monitoring.    Hospital/ICU Course:  No notes on file    Past Medical History:   Diagnosis Date    Cancer 2/2016    leukemia, AML    HIV infection     Pancytopenia due to antineoplastic chemotherapy 7/4/2016       Past Surgical History:   Procedure Laterality Date    CYSTOSCOPY          Review of patient's allergies indicates:   Allergen Reactions    Etoposide Rash       Family History     Problem Relation (Age of Onset)    Cancer Father        Social History Main Topics    Smoking status: Never Smoker    Smokeless tobacco: Never Used    Alcohol use No    Drug use: No    Sexual activity: Not on file      Review of Systems   Constitutional: Negative for activity change and appetite change.   HENT: Negative for congestion and facial swelling.    Eyes: Negative for discharge and itching.   Respiratory: Negative for apnea, chest tightness and shortness of breath.    Cardiovascular: Negative for chest pain and palpitations.   Gastrointestinal: Negative for abdominal distention, anal bleeding and blood in stool.   Endocrine: Negative for cold intolerance and heat intolerance.   Genitourinary: Negative for difficulty urinating and dysuria.   Skin: Negative for color change and pallor.   Neurological: Negative for dizziness and headaches.     Objective:     Vital Signs (Most Recent):  Temp: 97.3 °F (36.3 °C) (09/21/17 1434)  Pulse: (!) 140 (09/21/17 1500)  Resp: 14 (09/21/17 1434)  BP: (!) 78/54 (09/21/17 1743)  SpO2: (!) 94 % (09/21/17 1434) Vital Signs (24h Range):  Temp:  [97.3 °F (36.3 °C)-98.2 °F (36.8 °C)] 97.3 °F (36.3 °C)  Pulse:  [105-185] 140  Resp:  [14-17] 14  SpO2:  [87 %-95 %] 94 %  BP: ()/(50-81) 78/54   Weight: 82.5 kg (181 lb 14.1 oz)  Body mass index is 24 kg/m².      Intake/Output Summary (Last 24 hours) at 09/21/17 1819  Last data filed at 09/21/17 1540   Gross per 24 hour   Intake             4450 ml   Output             3775 ml   Net              675 ml       Physical Exam   Constitutional: He is oriented to person, place, and time. He appears well-developed and well-nourished. No distress.   HENT:   Head: Normocephalic.   Mouth/Throat: No oropharyngeal exudate.   Dry mouth and hoarse voice   Eyes: Conjunctivae are normal. Pupils are equal, round, and reactive to  light. No scleral icterus.   Neck: Normal range of motion. Neck supple. Normal range of motion present. No thyromegaly present.   Cardiovascular: Regular rhythm, normal heart sounds and intact distal pulses.    tachycardic   Pulmonary/Chest: Effort normal. No respiratory distress. He has decreased breath sounds. He has wheezes in the right lower field and the left lower field.   Abdominal: Soft. Bowel sounds are normal. He exhibits no distension. There is no tenderness.   Musculoskeletal: Normal range of motion. He exhibits edema. He exhibits no tenderness.   Edematous ext bilaterally to thighs, edematous forearms.   Neurological: He is alert and oriented to person, place, and time. No cranial nerve deficit. Coordination normal.   Skin: Skin is warm and dry. No petechiae and no rash noted. No pallor.   Psychiatric: He has a normal mood and affect. Thought content normal.   Vitals reviewed.      Vents:     Lines/Drains/Airways     Central Venous Catheter Line                 Tunneled Central Line Insertion/Assessment - Double Lumen  07/24/17 1522 left subclavian 59 days              Significant Labs:    CBC/Anemia Profile:    Recent Labs  Lab 09/20/17  0455 09/21/17 0222 09/21/17  1301   WBC 6.57 4.36 2.60*  2.60*   HGB 10.4* 10.4* 10.6*  10.6*   HCT 30.1* 31.1* 30.9*  30.9*   PLT 58* 60* 53*  53*   MCV 93 95 95  95   RDW 18.6* 18.5* 18.6*  18.6*        Chemistries:    Recent Labs  Lab 09/20/17  0455 09/21/17 0222 09/21/17  1301    136 137   K 3.6 3.8 3.7    107 109   CO2 23 22* 22*   BUN 40* 49* 46*   CREATININE 1.0 1.4 1.3   CALCIUM 7.3* 7.7* 7.5*   ALBUMIN 1.5* 1.5* 1.4*   PROT 3.5* 3.4* 3.3*   BILITOT 2.3* 2.5* 2.5*   ALKPHOS 107 115 118   ALT 15 14 16   AST 21 26 28   MG 1.9 2.0 1.7   PHOS 2.8 3.5 2.9         Assessment/Plan:     Cardiac/Vascular   Atrial fibrillation with rapid ventricular response    Patient with history of SVT s/p cardioversion in 08/2017 who has sustained HR in the  130s-140s who acutely became hypotensive found to be in afib with RVR in the 180s  -Given 5mg Lopressor x2 and rate came down to around 140 which is baseline for this patient  -SBP remained low-normotensive in the 110s-120s; SpO2 95%  -patient denies any new symptoms of CP/SOB  -On chart review per cardiology, he is supposed to be takings flecanide 100mg BID which he has not been getting this hospitalization. Recommend restarting this medication vs. Amiodarone, but cardiology consult to address this need is recommended  -Recently weaned off of Lopressor 25mg BID  -No electrolyte abnormalities on most recent labs; no recent fevers (patient has been treated for sepsis this hospitalization, which has since resolved. Abx course completed.)  -Patient is asymptomatic at this time, and as such is medically appropriate to remain under the care of BMT on hospital floor         Atrial fibrillation with RVR    RVR x 12+ hours 9/21, restarted home dose flecainide without conversion to NSR. Pt has been on multiple doses of IV and PO lopressor as well with no conversion  - Transferred to CMICU 9/21 PM for closer observation given 9/21 slowly evolving hypotension, continued RVR, fatigue, high risk for deterioration / decompensation  - Plan for close monitoring + cardioversion in case of decompensation        ID   * Sepsis    New cultures 9/21  New labs 9/21 PM - f/u, start empiric abics if pt appears to still be septic    - discontinued vancomycin on 9/15/17  - discontinued cefepime 9/17/17   - See GVHD        HIV disease         - no acute issues. Continue triumeq  - now on IV steroids with history of fungal infection, ID consulted. Histoplasma antigen done and continue current ppx. ID has signed off             Immunology/Multi System   Ohble-jjmxfk-kben disease    9/21 BMT note:    - we are concerned about acute cuplx-pfwmar-zhzo disease given that he received donor lymphocytes in August 2017.   - s/p flex sig 9/15/17 showing  erythematous mucosa in the recto-sigmoid colon (biopsied)  - methylprednisolone 80mg IV daily started 9/14/17  - CMV ordered, pending  - solumedrol increased to 80mg iv bid on 9/17, diarrhea improved 9/20 and steroids decreased, will increase back to 80 mg bid today due to stool/mixed output of 3.4 L  - budesonide 9 mg 9/17           Hematology   Chemotherapy-induced thrombocytopenia    53K 9/21  Continue to monitor  On heparin gtt 9/21 for RVR        Oncology   Anemia due to chemotherapy    Hb 10.6, cont to monitor, transfuse if needed.        S/P allogeneic bone marrow transplant    From BMT note, dates updated   -- +373 days s/p Flu/Bu/ATG MUD allogeneic transplant for high risk AML after his second IDAC (6/20/16 - 6/24/16) after a prolonged hospitalization (2/5/16 - 3/21/16) for induction with 7&3 and reinduction with MEC to remission and IDAC (4/4/16 - 4/9/16)  -- Chimerics from marrow done 6/21 show CD3 of 90% donor and 10% recipient, but CD34 of 5% donor and 95% recipient   -- Chimers from 7/10 shows 70% recipient and 30% donor - NOT SORTED  -- Relpased 6/2017 and reinduced with FLAG BETZY  -- DLI 8/14/17, day +39             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 Blast of 62%, 19 had a complex karyotype including 5q deletion, 7q deletion, 17p deletion, and one metaphase represented a near tetraploid subclone; FLT-3 negative     -- Had previous reaction to MEC (etoposide caused full body rash).   -- Re-induced with FLAG-BETZY   -- Day 14 bone marrow biopsy done 7/10 - hypocellular with no evidence of residual disease  -- Post DLI on 8/14/17  -- currently receiving Vidaza for 2 cycles given high risk disease per Dr. Atkinson note             GI   CINV (chemotherapy-induced nausea and vomiting)    Zofran PRN           Critical  Care Daily Checklist:    A: Awake: RASS Goal/Actual Goal: RASS Goal: 0-->alert and calm  Actual: Bernard Agitation Sedation Scale (RASS): Alert and calm   B: Spontaneous Breathing Trial Performed?     C: SAT & SBT Coordinated?  N/A                      D: Delirium: CAM-ICU     E: Early Mobility Performed? Yes   F: Feeding Goal: Goals: pt to consume nutrients >/= 85% EEN/EPN   Status: Nutrition Goal Status: goal not met   Current Diet Order   Procedures    Diet full liquid    Diet NPO      AS: Analgesia/Sedation Dilaudid / lorazepam   T: Thromboembolic Prophylaxis Heparin gtt   H: HOB > 300 Yes   U: Stress Ulcer Prophylaxis (if needed) Famotidine   G: Glucose Control    B: Bowel Function Stool Occurrence: 1   I: Indwelling Catheter (Lines & Mendez) Necessity Tunneled L subclavian double lumen placed 7/24/2017   D: De-escalation of Antimicrobials/Pharmacotherapies Bactrim, valganciclovir, vori ppx; may add empiric abics if labs / clinical picture appears septic    Plan for the day/ETD Transfer to CMICU. Cardiovert if decompensates    Code Status:  Family/Goals of Care: Full Code         Janell Nina MD   PGY-1 Internal Medicine  [phone]    Critical Care Medicine  [facility]   "to go home on saturday"

## 2020-05-28 NOTE — PHYSICAL THERAPY INITIAL EVALUATION ADULT - IMPAIRED TRANSFERS: SIT/STAND, REHAB EVAL
impaired balance/decreased ROM/decreased strength/cognition/impaired coordination/impaired motor control/impaired postural control

## 2020-05-28 NOTE — DISCHARGE NOTE PROVIDER - CARE PROVIDERS DIRECT ADDRESSES
,[email or web address]

## 2020-05-28 NOTE — DISCHARGE NOTE PROVIDER - NSDCFUADDAPPT_GEN_ALL_CORE_FT
pls call to schedule apt w Nsx and oncology before 4/18 (3 weeks post-op) or once discharged from acute rehab

## 2020-05-28 NOTE — PROGRESS NOTE ADULT - ASSESSMENT
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 POD 0 stereotactic R crani for tumor resection  HTN  HLD    NEURO:   SBP<160  q2hr neurochecks  MRI brain today  decadron 4q6hr  pain mgt: tylenol and oxy 5 prn, lidocaine patch to lower back  Activity: [x] mobilize as tolerated [] Bedrest [x] PT [x] OT [] PMNR    PULM: NRB  SpO2>92%  incentive spirometry   OOB    CV:  SBP goal <160  cardene gtt prn    RENAL: monitor I/O  replete lytes prn  voiding  Fluids: IVF while NPO    GI:  Diet: advance diet as tolerated  GI prophylaxis [x] not indicated   zofran prn for nausea  Bowel regimen [x] senna [] other:    ENDO:   Goal euglycemia (-180)    HEME/ONC:  VTE prophylaxis: [] SCDs [x] chemoprophylaxis held 24hrs post-OR    ID: afebrile   no leukocytosis  cefazolin post-op    MISC:    Patient was critically ill and at high risk of death or neurologic complications due to re-bleeding, brain swelling/ compression/ herniation, cardiorespiratory failure. 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 POD 0 stereotactic R crani for tumor resection  HTN  HLD    NEURO:   SBP<160  q2hr neurochecks  MRI brain today  decadron 4q6hr  pain mgt: tylenol and oxy 5 prn, lidocaine patch to lower back  Activity: [x] mobilize as tolerated [] Bedrest [x] PT [x] OT [] PMNR    PULM: room air  SpO2>92%  incentive spirometry   OOB    CV:  SBP goal <160  home lasix and metoprolol    RENAL: monitor I/O  replete lytes prn  voiding  Fluids: IVF while NPO    GI:  Diet: advance diet as tolerated  GI prophylaxis [x] PPI with steroids  zofran prn for nausea  Bowel regimen [x] senna [] other:    ENDO:   Goal euglycemia (-180)    HEME/ONC:  VTE prophylaxis: [x] SCDs [x] chemoprophylaxis held 24hrs post-OR  LE doppler: no DVTs but incomplete study due to calf edema    ID: afebrile   no leukocytosis  cefazolin post-op    MISC:    Patient was critically ill and at high risk of death or neurologic complications due to re-bleeding, brain swelling/ compression/ herniation, cardiorespiratory failure.

## 2020-05-29 ENCOUNTER — APPOINTMENT (OUTPATIENT)
Dept: NEUROLOGY | Facility: CLINIC | Age: 80
End: 2020-05-29

## 2020-05-29 LAB
ANION GAP SERPL CALC-SCNC: 14 MMOL/L — SIGNIFICANT CHANGE UP (ref 5–17)
BUN SERPL-MCNC: 24 MG/DL — HIGH (ref 8–20)
CALCIUM SERPL-MCNC: 9 MG/DL — SIGNIFICANT CHANGE UP (ref 8.6–10.2)
CHLORIDE SERPL-SCNC: 105 MMOL/L — SIGNIFICANT CHANGE UP (ref 98–107)
CO2 SERPL-SCNC: 24 MMOL/L — SIGNIFICANT CHANGE UP (ref 22–29)
CREAT SERPL-MCNC: 0.84 MG/DL — SIGNIFICANT CHANGE UP (ref 0.5–1.3)
GLUCOSE BLDC GLUCOMTR-MCNC: 121 MG/DL — HIGH (ref 70–99)
GLUCOSE BLDC GLUCOMTR-MCNC: 140 MG/DL — HIGH (ref 70–99)
GLUCOSE BLDC GLUCOMTR-MCNC: 158 MG/DL — HIGH (ref 70–99)
GLUCOSE BLDC GLUCOMTR-MCNC: 93 MG/DL — SIGNIFICANT CHANGE UP (ref 70–99)
GLUCOSE SERPL-MCNC: 144 MG/DL — HIGH (ref 70–99)
HCT VFR BLD CALC: 35.9 % — LOW (ref 39–50)
HGB BLD-MCNC: 12.4 G/DL — LOW (ref 13–17)
MAGNESIUM SERPL-MCNC: 2.3 MG/DL — SIGNIFICANT CHANGE UP (ref 1.6–2.6)
MCHC RBC-ENTMCNC: 33.1 PG — SIGNIFICANT CHANGE UP (ref 27–34)
MCHC RBC-ENTMCNC: 34.5 GM/DL — SIGNIFICANT CHANGE UP (ref 32–36)
MCV RBC AUTO: 95.7 FL — SIGNIFICANT CHANGE UP (ref 80–100)
PHOSPHATE SERPL-MCNC: 3.1 MG/DL — SIGNIFICANT CHANGE UP (ref 2.4–4.7)
PLATELET # BLD AUTO: 182 K/UL — SIGNIFICANT CHANGE UP (ref 150–400)
POTASSIUM SERPL-MCNC: 4.4 MMOL/L — SIGNIFICANT CHANGE UP (ref 3.5–5.3)
POTASSIUM SERPL-SCNC: 4.4 MMOL/L — SIGNIFICANT CHANGE UP (ref 3.5–5.3)
RBC # BLD: 3.75 M/UL — LOW (ref 4.2–5.8)
RBC # FLD: 12.3 % — SIGNIFICANT CHANGE UP (ref 10.3–14.5)
SARS-COV-2 RNA SPEC QL NAA+PROBE: SIGNIFICANT CHANGE UP
SODIUM SERPL-SCNC: 143 MMOL/L — SIGNIFICANT CHANGE UP (ref 135–145)
WBC # BLD: 10.39 K/UL — SIGNIFICANT CHANGE UP (ref 3.8–10.5)
WBC # FLD AUTO: 10.39 K/UL — SIGNIFICANT CHANGE UP (ref 3.8–10.5)

## 2020-05-29 PROCEDURE — 70450 CT HEAD/BRAIN W/O DYE: CPT | Mod: 26

## 2020-05-29 RX ORDER — DEXAMETHASONE 0.5 MG/5ML
2 ELIXIR ORAL EVERY 6 HOURS
Refills: 0 | Status: DISCONTINUED | OUTPATIENT
Start: 2020-05-29 | End: 2020-06-01

## 2020-05-29 RX ORDER — HYDRALAZINE HCL 50 MG
5 TABLET ORAL ONCE
Refills: 0 | Status: COMPLETED | OUTPATIENT
Start: 2020-05-29 | End: 2020-05-29

## 2020-05-29 RX ADMIN — Medication 2 MILLIGRAM(S): at 18:07

## 2020-05-29 RX ADMIN — LIDOCAINE 1 PATCH: 4 CREAM TOPICAL at 18:07

## 2020-05-29 RX ADMIN — Medication 12.5 MILLIGRAM(S): at 05:22

## 2020-05-29 RX ADMIN — Medication 2 MILLIGRAM(S): at 23:57

## 2020-05-29 RX ADMIN — Medication 4 MILLIGRAM(S): at 09:32

## 2020-05-29 RX ADMIN — Medication 12.5 MILLIGRAM(S): at 18:07

## 2020-05-29 RX ADMIN — PANTOPRAZOLE SODIUM 40 MILLIGRAM(S): 20 TABLET, DELAYED RELEASE ORAL at 12:57

## 2020-05-29 RX ADMIN — Medication 5 MILLIGRAM(S): at 22:46

## 2020-05-29 RX ADMIN — Medication 4 MILLIGRAM(S): at 05:22

## 2020-05-29 RX ADMIN — LIDOCAINE 1 PATCH: 4 CREAM TOPICAL at 12:57

## 2020-05-29 RX ADMIN — ENOXAPARIN SODIUM 40 MILLIGRAM(S): 100 INJECTION SUBCUTANEOUS at 22:34

## 2020-05-29 RX ADMIN — LEVETIRACETAM 1000 MILLIGRAM(S): 250 TABLET, FILM COATED ORAL at 18:05

## 2020-05-29 RX ADMIN — Medication 2: at 12:51

## 2020-05-29 RX ADMIN — Medication 1 PACKET(S): at 12:58

## 2020-05-29 RX ADMIN — ATORVASTATIN CALCIUM 20 MILLIGRAM(S): 80 TABLET, FILM COATED ORAL at 22:34

## 2020-05-29 RX ADMIN — LIDOCAINE 1 PATCH: 4 CREAM TOPICAL at 23:57

## 2020-05-29 RX ADMIN — LEVETIRACETAM 1000 MILLIGRAM(S): 250 TABLET, FILM COATED ORAL at 05:22

## 2020-05-29 RX ADMIN — Medication 40 MILLIGRAM(S): at 05:22

## 2020-05-29 RX ADMIN — LIDOCAINE 1 PATCH: 4 CREAM TOPICAL at 01:06

## 2020-05-29 NOTE — DIETITIAN INITIAL EVALUATION ADULT. - MALNUTRITION
limited NFPE: mild muscle loss of temples, moderate muscle loss of clavicles and shoulders, mild fat loss of orbitals and buccal pads moderate, chronic

## 2020-05-29 NOTE — DIETITIAN INITIAL EVALUATION ADULT. - OTHER INFO
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 Admitted for elective stereotactic right craniotomy for brain tumor. Pt reports fair appetite/po intake PTA and currently. Observed  breakfast tray at bedside, ~50% consumed per plate waste. Pt confirms weight loss but uncertain of amount/time frame. Per EMR review, pts weight in 4/2019 was 199 lbs, current admission weight is 175.7 lbs which is indicative of an ~24 lb wt loss x 13 months. Pt denies chewing/swallowing difficulty.

## 2020-05-29 NOTE — OCCUPATIONAL THERAPY INITIAL EVALUATION ADULT - RANGE OF MOTION EXAMINATION
Right UE AROM all joints WFL. Left UE AROM shoulder and elbow WFL. Patient with no AROM noted for Left wrist extension. Pt with minimal gross grasp and finger flexion/extension.

## 2020-05-29 NOTE — OCCUPATIONAL THERAPY INITIAL EVALUATION ADULT - LIVES WITH, PROFILE
Pt questionable historian. States lives with his wife, son and daughter in a private house but than states his son lives close by. Pt reports family is available to assist as needed upon discharge. Pt reports No KEVIN through the front, 1 KEVIN +hand rail through the back and no steps inside to bedroom/bathroom./children/spouse

## 2020-05-29 NOTE — DIETITIAN INITIAL EVALUATION ADULT. - CONTINUE CURRENT NUTRITION CARE PLAN
yes/-- continue liberalized diet to maximize po intake + Ensure Enlive TID to optimize po intake and provide an additional 350 kcal, 20g protein per serving (blood sugars currently WNL; HgA1c 5.3%).

## 2020-05-29 NOTE — DIETITIAN INITIAL EVALUATION ADULT. - ADD RECOMMEND
Encourage small, frequent meals and to make protein a priority. Monitor diet tolerance and provide assistance as needed. Obtain daily weights as feasible to monitor trends.

## 2020-05-29 NOTE — DIETITIAN INITIAL EVALUATION ADULT. - PERTINENT LABORATORY DATA
05-29 Na143 mmol/L Glu 144 mg/dL<H> K+ 4.4 mmol/L Cr  0.84 mg/dL BUN 24.0 mg/dL<H> Phos 3.1 mg/dL Alb n/a   PAB n/a HgA1c 5.3%

## 2020-05-29 NOTE — OCCUPATIONAL THERAPY INITIAL EVALUATION ADULT - LEVEL OF INDEPENDENCE: GROOMING, OT EVAL
seated; pt may need assistance with setup secondary to Left UE weakness and limited AROM of wrist/digits/minimum assist (75% patients effort)

## 2020-05-29 NOTE — DIETITIAN INITIAL EVALUATION ADULT. - PERTINENT MEDS FT
MEDICATIONS  (STANDING):  atorvastatin 20 milliGRAM(s) Oral at bedtime  dexAMETHasone  Injectable 4 milliGRAM(s) IV Push every 6 hours  dextrose 5%. 1000 milliLiter(s) (50 mL/Hr) IV Continuous <Continuous>  dextrose 50% Injectable 12.5 Gram(s) IV Push once  dextrose 50% Injectable 25 Gram(s) IV Push once  dextrose 50% Injectable 25 Gram(s) IV Push once  enoxaparin Injectable 40 milliGRAM(s) SubCutaneous at bedtime  furosemide    Tablet 40 milliGRAM(s) Oral daily  insulin lispro (HumaLOG) corrective regimen sliding scale   SubCutaneous three times a day before meals  levETIRAcetam 1000 milliGRAM(s) Oral two times a day  lidocaine   Patch 1 Patch Transdermal daily  metoprolol tartrate 12.5 milliGRAM(s) Oral two times a day  pantoprazole  Injectable 40 milliGRAM(s) IV Push daily  psyllium Powder 1 Packet(s) Oral daily    MEDICATIONS  (PRN):  acetaminophen   Tablet .. 650 milliGRAM(s) Oral every 6 hours PRN Mild Pain (1 - 3), Moderate Pain (4 - 6)  ALPRAZolam 0.25 milliGRAM(s) Oral every 8 hours PRN anxiety  dextrose 40% Gel 15 Gram(s) Oral once PRN Blood Glucose LESS THAN 70 milliGRAM(s)/deciliter  glucagon  Injectable 1 milliGRAM(s) IntraMuscular once PRN Glucose LESS THAN 70 milligrams/deciliter  labetalol Injectable 10 milliGRAM(s) IV Push every 4 hours PRN Systolic blood pressure >160  oxycodone    5 mG/acetaminophen 325 mG 1 Tablet(s) Oral every 4 hours PRN Moderate Pain (4 - 6)  oxycodone    5 mG/acetaminophen 325 mG 2 Tablet(s) Oral every 6 hours PRN Severe Pain (7 - 10)

## 2020-05-29 NOTE — OCCUPATIONAL THERAPY INITIAL EVALUATION ADULT - NS ASR FOLLOW COMMAND OT EVAL
75% of the time/able to follow single-step instructions/Patient demonstrates decreased cognition including decreased attention, sequencing, processing speed and problem solving abilities requiring repetition of question or command to reattend.

## 2020-05-29 NOTE — OCCUPATIONAL THERAPY INITIAL EVALUATION ADULT - SPECIAL TRAINING, OT EVAL
Patient ambulated with mod A x 1, hand held assist, +verbal cues short distances around bed area due to decreased balance and strength. Patient with noted Right LE dysmetria and difficulty with motor planning/motor control. Patient with decreased safety awareness and poor insight into limitations/deficits increased pts risk for falls.

## 2020-05-29 NOTE — OCCUPATIONAL THERAPY INITIAL EVALUATION ADULT - IMPAIRMENTS CONTRIBUTING IMPAIRED BED MOBILITY, REHAB EVAL
decreased strength/impaired coordination/abnormal muscle tone/decreased safety awareness/cognition/impaired balance

## 2020-05-29 NOTE — OCCUPATIONAL THERAPY INITIAL EVALUATION ADULT - MANUAL MUSCLE TESTING RESULTS, REHAB EVAL
Right UE grossly assessed with AROM against gravity 3/5. Left shoulder and elbow grossly assessed with AROM against gravity 3/5, gross grasp 1+/5

## 2020-05-29 NOTE — OCCUPATIONAL THERAPY INITIAL EVALUATION ADULT - PERSONAL SAFETY AND JUDGMENT, REHAB EVAL
Patient with poor insight into functional deficits and limitations./impaired/at risk behaviors demonstrated

## 2020-05-29 NOTE — OCCUPATIONAL THERAPY INITIAL EVALUATION ADULT - ADDITIONAL COMMENTS
Patient is questionable historian. Patient has a tub with curtains and grab bar. Patient owns a cane, RW. Patient is right handed and drives. Clarify all information with social work including PLOF, DME owned/used, set up of home and level of support available

## 2020-05-29 NOTE — PROGRESS NOTE ADULT - SUBJECTIVE AND OBJECTIVE BOX
INTERVAL HPI/OVERNIGHT EVENTS:  admitted on 5/27  Post day #2   Right cranio for recurrent GBM resection  Pt is awake, alert, resp, oriented x 3   Following commands eager to be discharged home.  Oob now in chair on second round.     MEDICATIONS  (STANDING):  atorvastatin 20 milliGRAM(s) Oral at bedtime  dexAMETHasone  Injectable 4 milliGRAM(s) IV Push every 6 hours  dextrose 5%. 1000 milliLiter(s) (50 mL/Hr) IV Continuous <Continuous>  dextrose 50% Injectable 12.5 Gram(s) IV Push once  dextrose 50% Injectable 25 Gram(s) IV Push once  dextrose 50% Injectable 25 Gram(s) IV Push once  enoxaparin Injectable 40 milliGRAM(s) SubCutaneous at bedtime  furosemide    Tablet 40 milliGRAM(s) Oral daily  insulin lispro (HumaLOG) corrective regimen sliding scale   SubCutaneous three times a day before meals  levETIRAcetam 1000 milliGRAM(s) Oral two times a day  lidocaine   Patch 1 Patch Transdermal daily  metoprolol tartrate 12.5 milliGRAM(s) Oral two times a day  pantoprazole  Injectable 40 milliGRAM(s) IV Push daily  psyllium Powder 1 Packet(s) Oral daily    MEDICATIONS  (PRN):  acetaminophen   Tablet .. 650 milliGRAM(s) Oral every 6 hours PRN Mild Pain (1 - 3), Moderate Pain (4 - 6)  ALPRAZolam 0.25 milliGRAM(s) Oral every 8 hours PRN anxiety  dextrose 40% Gel 15 Gram(s) Oral once PRN Blood Glucose LESS THAN 70 milliGRAM(s)/deciliter  glucagon  Injectable 1 milliGRAM(s) IntraMuscular once PRN Glucose LESS THAN 70 milligrams/deciliter  labetalol Injectable 10 milliGRAM(s) IV Push every 4 hours PRN Systolic blood pressure >160  oxycodone    5 mG/acetaminophen 325 mG 1 Tablet(s) Oral every 4 hours PRN Moderate Pain (4 - 6)  oxycodone    5 mG/acetaminophen 325 mG 2 Tablet(s) Oral every 6 hours PRN Severe Pain (7 - 10)      Allergies  No Known Allergies  Intolerances      Vital Signs Last 24 Hrs  T(C): 36.4 (29 May 2020 09:48), Max: 36.9 (28 May 2020 17:35)  T(F): 97.5 (29 May 2020 09:48), Max: 98.4 (28 May 2020 17:35)  HR: 76 (29 May 2020 12:00) (54 - 94)  BP: 139/59 (29 May 2020 12:00) (121/69 - 152/78)  BP(mean): 86 (29 May 2020 00:15) (86 - 86)  RR: 20 (29 May 2020 12:00) (15 - 20)  SpO2: 98% (29 May 2020 12:00) (94% - 99%) BMI (kg/m2): 24.5 (05-27-20 @ 11:48)      PHYSICAL EXAM  GENERAL: NAD, well-groomed, well-developed  HEAD:  Atraumatic, positive dressing clean and dry intact.   EYES: EOMI, PERRLA, conjunctiva and sclera clear  ENMT: No tonsillar erythema, exudates, or enlargement; Moist mucous membranes, Good dentition, No lesions  NECK: Supple,   NERVOUS SYSTEM:  Alert & Oriented X3, Good concentration; Motor Strength 5/5 B/L upper and lower extremities; DTRs 2+ intact and symmetric  CHEST/LUNG: Clear Bs bilaterally;   HEART: Regular rate and rhythm; No murmurs, rubs, or gallops  ABDOMEN: Soft, Nontender, Nondistended; Bowel sounds present  EXTREMITIES:  2+ Peripheral Pulses, No clubbing, cyanosis, or edema  LYMPH: No lymphadenopathy noted  SKIN: No rashes or lesions      LABS:                          12.4   10.39 )-----------( 182      ( 29 May 2020 06:11 )             35.9     05-29    143  |  105  |  24.0<H>  ----------------------------<  144<H>  4.4   |  24.0  |  0.84    Ca    9.0      29 May 2020 06:11  Phos  3.1     05-29  Mg     2.3     05-29    TPro  5.4<L>  /  Alb  3.4  /  TBili  0.5  /  DBili  x   /  AST  15  /  ALT  17  /  AlkPhos  43  05-28        I&O's Detail    28 May 2020 07:01  -  29 May 2020 07:00  --------------------------------------------------------  IN:    Oral Fluid: 375 mL  Total IN: 375 mL    OUT:    Intermittent Catheterization - Urethral: 1300 mL    Voided: 505 mL  Total OUT: 1805 mL    Total NET: -1430 mL        RADIOLOGY & ADDITIONAL TESTS:  < from: CT Head No Cont (05.27.20 @ 16:38) >   EXAM:  CT BRAIN                        PROCEDURE DATE:  05/27/2020    IMPRESSION: Status post resection of right frontal lobe mass. Expected postoperative changes. Moderate pneumocephalus. Minimal right to left midline shift. Stable moderate edema.  < end of copied text >    < from: MR Head w/wo IV Cont (05.28.20 @ 10:53) >   EXAM:  MR BRAIN WAW IC                        < from: MR Head w/wo IV Cont (05.28.20 @ 10:53) >  IMPRESSION:  1.  Status post resection of a right posterior frontal lobe mass. Nodular/thick linear enhancement along the anterior and inferior margins of the resection cavity, suggesting residual tumor.   2.  T2/FLAIR hyperintensity surrounding the resection cavity suggesting vasogenic edema, posttreatment change, or nonenhancing tumor.   < end of copied text >

## 2020-05-29 NOTE — CHART NOTE - NSCHARTNOTEFT_GEN_A_CORE
Event Note:     Called at bedside by nurse- patient was getting off of the bed, his foot slipped and he landed on his side. Patient denies hitting his head. Neuro exam at baseline. GCS 15, oriented x 3. RUE 5/5. LUE 4/5, RLE 5/5. LLE 5/5. Incision C/D/I. Urgent CTH ordered.

## 2020-05-29 NOTE — OCCUPATIONAL THERAPY INITIAL EVALUATION ADULT - PHYSICAL ASSIST/NONPHYSICAL ASSIST: SUPINE/SIT, REHAB EVAL
verbal cues/Cues for sequencing of movement and for safety. Reminders to attend to Left UE during transfers/1 person assist

## 2020-05-29 NOTE — DIETITIAN INITIAL EVALUATION ADULT. - ETIOLOGY
related to inability to meet increased nutrient needs in setting of GBM brain cancer s/p surgery + chemo/radiation, now s/p right craniotomy for regrowth of tumor

## 2020-05-29 NOTE — OCCUPATIONAL THERAPY INITIAL EVALUATION ADULT - TRANSFER SAFETY CONCERNS NOTED: SIT/STAND, REHAB EVAL
decreased safety awareness/decreased sequencing ability/decreased weight-shifting ability/losing balance

## 2020-05-29 NOTE — OCCUPATIONAL THERAPY INITIAL EVALUATION ADULT - PLANNED THERAPY INTERVENTIONS, OT EVAL
bed mobility training/motor coordination training/transfer training/ADL retraining/balance training/neuromuscular re-education/strengthening

## 2020-05-29 NOTE — CONSULT NOTE ADULT - SUBJECTIVE AND OBJECTIVE BOX
78M [facility] of HTN, HLD, prostate ca s/p prostatectomy, basal cell skin ca (resected from leg), glioblastoma (dx 8/18, s/p surgery, radiation chemotherapy cycle #9 on 9/23/19) and seizure disorder on keppra to [facility] on 5/27 for scheduled stereotactic right craniotomy for brain tumor.  Procedure was performed on 5/27 by Dr. Rivers. No intraoperative or postoperative complications. Placed on decadron and keppra for seizure ppx.  Drain removed. Repeat MRI concerning for residual tumor accompanied by vasogenic edema.     PM&R consulted for rehab needs and disposition. Patient states he has been to a rehab facility before and had a bad experience and is resistant to go to acute rehab.       Imaging showed (reviewed):  < from: MR Head w/wo IV Cont (05.28.20 @ 10:53) >  IMPRESSION:  1.  Status post resection of a right posterior frontal lobe mass. Nodular/thick linear enhancement along the anterior and inferior margins of the resection cavity, suggesting residual tumor.   2.  T2/FLAIR hyperintensity surrounding the resection cavity suggesting vasogenic edema, posttreatment change, or nonenhancing tumor.       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  LEFT posterior tibial and peroneal vein not visualizedcatheter edema.      ------------------------------------------------------------------   REVIEW OF SYSTEMS  Constitutional - No fever, No weight loss, No fatigue  HEENT - +R posterior frontal craniotomy with dressing, no visual deficits  Respiratory - No cough, No wheezing, No shortness of breath  Cardiovascular - No chest pain, No palpitations  Gastrointestinal - No abdominal pain, No nausea  Genitourinary - No dysuria  Neurological - +headaches, No memory loss, +loss of strength, No numbness, No tremors  Musculoskeletal - No joint pain, No joint swelling, No muscle pain  Psychiatric - +frustrated    VITALS  T(C): 36.4 (05-29-20 @ 09:48), Max: 36.9 (05-28-20 @ 17:35)  HR: 61 (05-29-20 @ 09:48) (54 - 94)  BP: 152/78 (05-29-20 @ 09:48) (113/89 - 152/78)  RR: 18 (05-29-20 @ 09:48) (15 - 18)  SpO2: 99% (05-29-20 @ 09:48) (94% - 99%)  Wt(kg): --    PAST MEDICAL & SURGICAL HISTORY  Fall, sequela  Bilateral hearing loss, unspecified hearing loss type   activity  Type 2 diabetes mellitus without complication, without long-term current use of insulin  Glioblastoma  Basal cell carcinoma (BCC) of left lower leg  Prostate cancer  HLD (hyperlipidemia)  HTN (hypertension)  S/P colonoscopic polypectomy  S/P tonsillectomy  H/O bilateral cataract extraction  S/P prostatectomy  S/P brain surgery  Lipoma of neck  Basal cell carcinoma (BCC) of lower leg  History of tonsillectomy  No significant past surgical history      ----------------------------------------------------------------------------------------  SOCIAL HISTORY  Smoking - Denied  EtOH - Denied   Drugs - Denied    FUNCTIONAL HISTORY  Patient independent PTA. Mod I outside with using straight cane.  Lives in private home with wife. Two Children live nearby.   1 KEVIN. Bed and bath on ground level   Driving prior and was active.   Served in [employer] in 60s    CURRENT FUNCTIONAL STATUS  5/29    Bed Mobility  Bed Mobility Training Rehab Potential: good, to achieve stated therapy goals  Bed Mobility Training Symptoms Noted During/After Treatment: left side inattention  Bed Mobility Training Rolling/Turning: minimum assist (75% patient effort);  1 person assist;  nonverbal cues (demo/gestures);  verbal cues  Bed Mobility Training Scooting: minimum assist (75% patient effort);  1 person assist;  nonverbal cues (demo/gestures);  verbal cues  Bed Mobility Training Supine-to-Sit: minimum assist (75% patient effort);  1 person assist;  nonverbal cues (demo/gestures);  verbal cues;  left side inattention   Bed Mobility Training Limitations: decreased ability to use arms for pushing/pulling;  decreased strength;  impaired balance;  cognitive, decreased safety awareness;  decreased sensation    Sit-Stand Transfer Training  Sit-to-Stand Transfer Training Rehab Potential: good, to achieve stated therapy goals  Sit-to-Stand Transfer Training Symptoms Noted During/After Treatment: none  Transfer Training Sit-to-Stand Transfer: moderate assist (50% patient effort);  1 person assist;  nonverbal cues (demo/gestures);  verbal cues;  full weight-bearing   hand held assist   Transfer Training Stand-to-Sit Transfer: moderate assist (50% patient effort);  1 person assist;  nonverbal cues (demo/gestures);  verbal cues;  full weight-bearing   hand held assist   Sit-to-Stand Transfer Training Transfer Safety Analysis: decreased sequencing ability;  decreased weight-shifting ability;  in sagittal plane ;  decreased strength;  impaired balance;  cognitive, decreased safety awareness;  impaired motor control;  impaired sensory feedback;  in the left UE     Gait Training  Gait Training Rehab Potential: good, to achieve stated therapy goals  Gait Training Symptoms Noted During/After Treatment: none  Gait Training: moderate assist (50% patient effort);  1 person assist;  nonverbal cues (demo/gestures);  verbal cues;  full weight-bearing   hand held assist ;  20 feet  Gait Analysis: step to gait pattern, (+) dysmetria, decreased weight shifiting ability to the left LE in sagittal plane, narrow base of support ;  impaired coordination;  impaired balance;  decreased strength        FAMILY HISTORY   Family history of diabetes mellitus  Family history of prostate cancer in father  Family history of breast cancer in mother (Sibling)  Family history of lung cancer  No pertinent family history in first degree relatives      ----------------------------------------------------------------------------------------  RECENT LABS/IMAGING  CBC Full  -  ( 29 May 2020 06:11 )  WBC Count : 10.39 K/uL  RBC Count : 3.75 M/uL  Hemoglobin : 12.4 g/dL  Hematocrit : 35.9 %  Platelet Count - Automated : 182 K/uL  Mean Cell Volume : 95.7 fl  Mean Cell Hemoglobin : 33.1 pg  Mean Cell Hemoglobin Concentration : 34.5 gm/dL  Auto Neutrophil # : x  Auto Lymphocyte # : x  Auto Monocyte # : x  Auto Eosinophil # : x  Auto Basophil # : x  Auto Neutrophil % : x  Auto Lymphocyte % : x  Auto Monocyte % : x  Auto Eosinophil % : x  Auto Basophil % : x    05-29    143  |  105  |  24.0<H>  ----------------------------<  144<H>  4.4   |  24.0  |  0.84    Ca    9.0      29 May 2020 06:11  Phos  3.1     05-29  Mg     2.3     05-29    TPro  5.4<L>  /  Alb  3.4  /  TBili  0.5  /  DBili  x   /  AST  15  /  ALT  17  /  AlkPhos  43  05-28        ALLERGIES  No Known Allergies      MEDICATIONS   acetaminophen   Tablet .. 650 milliGRAM(s) Oral every 6 hours PRN  ALPRAZolam 0.25 milliGRAM(s) Oral every 8 hours PRN  atorvastatin 20 milliGRAM(s) Oral at bedtime  dexAMETHasone  Injectable 4 milliGRAM(s) IV Push every 6 hours  dextrose 40% Gel 15 Gram(s) Oral once PRN  dextrose 5%. 1000 milliLiter(s) IV Continuous <Continuous>  dextrose 50% Injectable 12.5 Gram(s) IV Push once  dextrose 50% Injectable 25 Gram(s) IV Push once  dextrose 50% Injectable 25 Gram(s) IV Push once  enoxaparin Injectable 40 milliGRAM(s) SubCutaneous at bedtime  furosemide    Tablet 40 milliGRAM(s) Oral daily  glucagon  Injectable 1 milliGRAM(s) IntraMuscular once PRN  insulin lispro (HumaLOG) corrective regimen sliding scale   SubCutaneous three times a day before meals  labetalol Injectable 10 milliGRAM(s) IV Push every 4 hours PRN  levETIRAcetam 1000 milliGRAM(s) Oral two times a day  lidocaine   Patch 1 Patch Transdermal daily  metoprolol tartrate 12.5 milliGRAM(s) Oral two times a day  oxycodone    5 mG/acetaminophen 325 mG 1 Tablet(s) Oral every 4 hours PRN  oxycodone    5 mG/acetaminophen 325 mG 2 Tablet(s) Oral every 6 hours PRN  pantoprazole  Injectable 40 milliGRAM(s) IV Push daily  psyllium Powder 1 Packet(s) Oral daily      ----------------------------------------------------------------------------------------  PHYSICAL EXAM    Constitutional - NAD, Comfortable  HEENT - R posterior frontal craniotomy, EOMI  Neck - Supple, No limited ROM  Chest - Breathing comfortably, No wheezing  Cardiovascular - RRR  Abdomen - Soft   Extremities - +LLE swelling and calf tenderness  Neurologic Exam -                    Cognitive - Awake, Alert, AAO to self, place, date, year, situation     Communication - Fluent, No dysarthria     Cranial Nerves - No facial asymmetry, facial sensation intact, EOMI, tongue midline, Impaired L shoulder shrug     Motor - L hemiparesis arm                    LEFT    UE - ShAB 4/5, EF 3/5, EE 3/5, WE 1/5,  1/5                    RIGHT UE - ShAB 5/5, EF 5/5, EE 5/5, WE 5/5,  5/5                    LEFT    LE - HF 4/5, KE 5/5, DF 5/5, PF 5/5 -- tight heel cord on left                     RIGHT LE - HF 4/5, KE 5/5, DF 5/5, PF 5/5        Sensory - Intact to LT     Reflexes - DTR Intact, No primitive reflexive     Coordination - heel to shin in tact, +dysmetria with LUE     OculoVestibular - No saccades, No nystagmus     Balance - standing balance impaired  Psychiatric - frustrated  ----------------------------------------------------------------------------------------    ASSESSMENT/PLAN    79yMale with functional deficits secondary to recurrent GBM s/p craniotomy     R posterior frontal GBM - s/p craniotomy on 5/27. Decadron, Keppra (seizure ppx). Repeat MRI with evidence of residual tumor and edema. Defer to primary team.   LLE edema - Doppler 5/27 poor visualization due to edema but no overt DVT.   HLD - Lipitor  HTN - Lasix, IV labetolol PRN  Anxiety - Xanax PRN  DM2 - SSI   GI - Metamucil, Protonix   -TOV, bladder scan q6h    Precautions: Seizure, fall  Diet: Regular. No reported dysphagia   Pain: Tylenol PRN, Percocet PRN, Lidoderm patch  Skin: q2hr turns, skin injury prevention  DVT PPX: Lovenox ppx    Rehab - Will continue to follow for ongoing rehab needs and recommendations.   Recommend ACUTE inpatient rehabilitation for the functional deficits consisting of 3 hours of therapy/day & 24 hour RN/daily PMR physician for comorbid medical management. Patient will be able to tolerate 3 hours a day.    Patient resistant to acute rehab and limited insight to functional deficits. Explained that he would benefit from more intensive rehab and he believes he can do the same amount of intensity at home with home PT. He was agreeable to speak with wife and . Would still recommend AR due to safety concerns.

## 2020-05-29 NOTE — PROGRESS NOTE ADULT - ASSESSMENT
Pt 79ym well known to the neurosurgery service has had previous resection with Dr. Rivers.  Now post op s/p cranio for right recurrent GBM    Plan  1. Pt will be observe and increase activity.  2. keppra 1000 mg q12  3. Decadron 4 mg q6 decrease to 3 mg q 6  4. protonix  5. physical therapy following.  6. pain control Pt 79ym well known to the neurosurgery service has had previous resection with Dr. Rivers.  Now post op s/p cranio for right recurrent GBM    Plan  1. Pt will be observe and increase activity.  2. keppra 1000 mg q12  3. Decadron 4 mg q6 decrease to 2 mg q 6 as per Dr Rivers and will be maintained on 2 mg q 6  4. protonix  5. physical therapy following.  6. pain control  7. Pt will make follow up appt post op with neuro onc Dr. Harman

## 2020-05-29 NOTE — OCCUPATIONAL THERAPY INITIAL EVALUATION ADULT - PHYSICAL ASSIST/NONPHYSICAL ASSIST: EATING, OT EVAL
patient may need assistance with manipulating/opening containers secondary to left UE weakness/limited AROM/set-up required

## 2020-05-30 DIAGNOSIS — C71.9 MALIGNANT NEOPLASM OF BRAIN, UNSPECIFIED: ICD-10-CM

## 2020-05-30 DIAGNOSIS — N99.89 OTHER POSTPROCEDURAL COMPLICATIONS AND DISORDERS OF GENITOURINARY SYSTEM: ICD-10-CM

## 2020-05-30 DIAGNOSIS — Z98.890 OTHER SPECIFIED POSTPROCEDURAL STATES: ICD-10-CM

## 2020-05-30 LAB
GLUCOSE BLDC GLUCOMTR-MCNC: 106 MG/DL — HIGH (ref 70–99)
GLUCOSE BLDC GLUCOMTR-MCNC: 128 MG/DL — HIGH (ref 70–99)
GLUCOSE BLDC GLUCOMTR-MCNC: 98 MG/DL — SIGNIFICANT CHANGE UP (ref 70–99)
GLUCOSE BLDC GLUCOMTR-MCNC: 99 MG/DL — SIGNIFICANT CHANGE UP (ref 70–99)
MAGNESIUM SERPL-MCNC: 2.2 MG/DL — SIGNIFICANT CHANGE UP (ref 1.6–2.6)
PHOSPHATE SERPL-MCNC: 3.2 MG/DL — SIGNIFICANT CHANGE UP (ref 2.4–4.7)

## 2020-05-30 PROCEDURE — 99233 SBSQ HOSP IP/OBS HIGH 50: CPT

## 2020-05-30 PROCEDURE — 99024 POSTOP FOLLOW-UP VISIT: CPT

## 2020-05-30 RX ORDER — POLYETHYLENE GLYCOL 3350 17 G/17G
17 POWDER, FOR SOLUTION ORAL DAILY
Refills: 0 | Status: DISCONTINUED | OUTPATIENT
Start: 2020-05-30 | End: 2020-06-04

## 2020-05-30 RX ORDER — GLYCERIN ADULT
1 SUPPOSITORY, RECTAL RECTAL ONCE
Refills: 0 | Status: COMPLETED | OUTPATIENT
Start: 2020-05-30 | End: 2020-05-30

## 2020-05-30 RX ORDER — TAMSULOSIN HYDROCHLORIDE 0.4 MG/1
0.4 CAPSULE ORAL AT BEDTIME
Refills: 0 | Status: DISCONTINUED | OUTPATIENT
Start: 2020-05-30 | End: 2020-06-04

## 2020-05-30 RX ORDER — SENNA PLUS 8.6 MG/1
2 TABLET ORAL AT BEDTIME
Refills: 0 | Status: DISCONTINUED | OUTPATIENT
Start: 2020-05-30 | End: 2020-06-04

## 2020-05-30 RX ADMIN — POLYETHYLENE GLYCOL 3350 17 GRAM(S): 17 POWDER, FOR SOLUTION ORAL at 14:14

## 2020-05-30 RX ADMIN — SENNA PLUS 2 TABLET(S): 8.6 TABLET ORAL at 21:58

## 2020-05-30 RX ADMIN — LIDOCAINE 1 PATCH: 4 CREAM TOPICAL at 17:05

## 2020-05-30 RX ADMIN — SENNA PLUS 2 TABLET(S): 8.6 TABLET ORAL at 08:33

## 2020-05-30 RX ADMIN — Medication 1 PACKET(S): at 12:29

## 2020-05-30 RX ADMIN — TAMSULOSIN HYDROCHLORIDE 0.4 MILLIGRAM(S): 0.4 CAPSULE ORAL at 19:19

## 2020-05-30 RX ADMIN — Medication 2 MILLIGRAM(S): at 23:43

## 2020-05-30 RX ADMIN — PANTOPRAZOLE SODIUM 40 MILLIGRAM(S): 20 TABLET, DELAYED RELEASE ORAL at 12:29

## 2020-05-30 RX ADMIN — Medication 2 MILLIGRAM(S): at 05:37

## 2020-05-30 RX ADMIN — ENOXAPARIN SODIUM 40 MILLIGRAM(S): 100 INJECTION SUBCUTANEOUS at 22:05

## 2020-05-30 RX ADMIN — Medication 2 MILLIGRAM(S): at 17:21

## 2020-05-30 RX ADMIN — Medication 1 ENEMA: at 22:00

## 2020-05-30 RX ADMIN — Medication 40 MILLIGRAM(S): at 05:37

## 2020-05-30 RX ADMIN — LIDOCAINE 1 PATCH: 4 CREAM TOPICAL at 12:25

## 2020-05-30 RX ADMIN — LEVETIRACETAM 1000 MILLIGRAM(S): 250 TABLET, FILM COATED ORAL at 05:37

## 2020-05-30 RX ADMIN — Medication 12.5 MILLIGRAM(S): at 17:20

## 2020-05-30 RX ADMIN — Medication 1 SUPPOSITORY(S): at 17:11

## 2020-05-30 RX ADMIN — ATORVASTATIN CALCIUM 20 MILLIGRAM(S): 80 TABLET, FILM COATED ORAL at 22:05

## 2020-05-30 RX ADMIN — LEVETIRACETAM 1000 MILLIGRAM(S): 250 TABLET, FILM COATED ORAL at 17:19

## 2020-05-30 RX ADMIN — Medication 2 MILLIGRAM(S): at 12:25

## 2020-05-30 NOTE — PROGRESS NOTE ADULT - SUBJECTIVE AND OBJECTIVE BOX
NEUROSURGERY PROGRESS NOTE:    Pt s/p Right cranio for recurrent GBM resection w Dr Rivers  POD #3 and doing well, SG drain removed 5/28/20  Pt is awake, alert, resp, oriented x 3   Following commands eager to be discharged home, PT and PMnR recomm AR  pt seen and states is at baseline, denied any new or worsening sensorimotor changes, LUE post-op weakness w minimal improvement   -headache  - Nausea / - Vomiting  denies numbness/ tingling  denies visual changes  denies C/T/LS  Spine pain  + void, 100-150cc x 2 today, void trial and bladder scan at 3PM residual 483cc  - BM since pre-op   + OOB w assist   + diet  + venodynes b/l when in bed  RLE         MEDICATIONS  (STANDING):  atorvastatin 20 milliGRAM(s) Oral at bedtime  dexAMETHasone     Tablet 2 milliGRAM(s) Oral every 6 hours  dextrose 5%. 1000 milliLiter(s) (50 mL/Hr) IV Continuous <Continuous>  dextrose 50% Injectable 12.5 Gram(s) IV Push once  dextrose 50% Injectable 25 Gram(s) IV Push once  dextrose 50% Injectable 25 Gram(s) IV Push once  enoxaparin Injectable 40 milliGRAM(s) SubCutaneous at bedtime  furosemide    Tablet 40 milliGRAM(s) Oral daily  glycerin Suppository - Adult 1 Suppository(s) Rectal once  insulin lispro (HumaLOG) corrective regimen sliding scale   SubCutaneous three times a day before meals  levETIRAcetam 1000 milliGRAM(s) Oral two times a day  lidocaine   Patch 1 Patch Transdermal daily  metoprolol tartrate 12.5 milliGRAM(s) Oral two times a day  pantoprazole  Injectable 40 milliGRAM(s) IV Push daily  polyethylene glycol 3350 17 Gram(s) Oral daily  psyllium Powder 1 Packet(s) Oral daily  senna 2 Tablet(s) Oral at bedtime  tamsulosin 0.4 milliGRAM(s) Oral at bedtime    MEDICATIONS  (PRN):  acetaminophen   Tablet .. 650 milliGRAM(s) Oral every 6 hours PRN Mild Pain (1 - 3), Moderate Pain (4 - 6)  ALPRAZolam 0.25 milliGRAM(s) Oral every 8 hours PRN anxiety  dextrose 40% Gel 15 Gram(s) Oral once PRN Blood Glucose LESS THAN 70 milliGRAM(s)/deciliter  glucagon  Injectable 1 milliGRAM(s) IntraMuscular once PRN Glucose LESS THAN 70 milligrams/deciliter  labetalol Injectable 10 milliGRAM(s) IV Push every 4 hours PRN Systolic blood pressure >160  oxycodone    5 mG/acetaminophen 325 mG 1 Tablet(s) Oral every 4 hours PRN Moderate Pain (4 - 6)  oxycodone    5 mG/acetaminophen 325 mG 2 Tablet(s) Oral every 6 hours PRN Severe Pain (7 - 10)    Allergies    No Known Allergies    Intolerances      Vital Signs Last 24 Hrs  T(C): 36.7 (30 May 2020 15:22), Max: 36.7 (29 May 2020 20:56)  T(F): 98 (30 May 2020 15:22), Max: 98 (29 May 2020 20:56)  HR: 55 (30 May 2020 15:22) (48 - 77)  BP: 138/77 (30 May 2020 15:22) (113/55 - 172/85)  BP(mean): 96 (30 May 2020 13:00) (75 - 114)  RR: 19 (30 May 2020 15:22) (16 - 20)  SpO2: 97% (30 May 2020 15:22) (95% - 100%)        PHYSICAL EXAM:  GENERAL: NAD, well-groomed, well-developed, anxious and persists on going home and not AR   HEAD:  Atraumatic, dressing clean and dry intact to right crani site, no active oozing/erythema or redness appreciated. drain exit site secured w staple and incision w nylon suture, both minimally tender on palpation    EYES: EOMI, PERRL, conjunctiva and sclera clear  NECK: Supple, nontender on palpation posteriorly   NERVOUS SYSTEM:  Alert & Oriented X3, Good concentration; Motor Strength 5/5 B/L upper and lower extremities; DTRs 2+ intact and symmetric, FS, TML  ABDOMEN: Soft, Nontender, Nondistended; tympanic throughout 4 quadrants  EXTREMITIES:  2+ Peripheral Pulses, No clubbing, cyanosis, + mild RLE (chronic DVT)>LLE edema  SKIN: No rashes or lesions, LLE BCC resected in the past/ well healed       LABS:                        12.4   10.39 )-----------( 182      ( 29 May 2020 06:11 )             35.9     05-29    143  |  105  |  24.0<H>  ----------------------------<  144<H>  4.4   |  24.0  |  0.84    Ca    9.0      29 May 2020 06:11  Phos  3.2     05-30  Mg     2.2     05-30        RADIOLOGY & ADDITIONAL TESTS:    < from: CT Head No Cont (05.29.20 @ 20:20) >  EXAM:  CT BRAIN                          PROCEDURE DATE:  05/29/2020    INTERPRETATION:    CT head without IV contrast      CLINICAL INFORMATION:  Postop day 2 for tumor resection  TECHNIQUE: Contiguous axial 5 mm sections were obtained through the head. Sagittal and coronal 2-D reformatted images were also obtained.   This scan was performed using automatic exposure control (radiation dose reduction software) to obtain a diagnostic image quality scan with patient dose as low as reasonably achievable.     FINDINGS:   CT dated 05/27/2020 is available for review.    The brain again demonstrates a RIGHT parietal craniotomy with underlying surgical cavity containing air, fluid and hemorrhage. Vasogenic edema is noted. Pneumocephalus is unchanged. Mild periventricular white matter ischemia with old lacunar infarction in the LEFT basal ganglia.    No acute cerebral cortical infarct is seen.    The ventricles, sulci and basal cisterns appear otherwise unremarkable.  The orbitsare unremarkable.  The paranasal sinuses are clear.  The nasal cavity appears intact.  The nasopharynx is symmetric.  The central skull base, petrous temporal bones remain intact.    IMPRESSION:   RIGHT parietal craniotomy with underlying surgical cavity containing air, fluid and hemorrhage. Vasogenic edema is noted. Pneumocephalus is unchanged. Mild periventricular white matter ischemia with old lacunar infarction in the LEFT basal ganglia.     TYRONE BHATIA M.D., ATTENDING RADIOLOGIST  This document has been electronically signed. May 29 2020  8:25PM  < end of copied text >          Time Spent with patient/ education: > 40 mins arranging care, PT, dressing change and post-op discussion /plan of care NEUROSURGERY PROGRESS NOTE:    Pt s/p Right cranio for recurrent GBM resection w Dr Rivers  POD #3 and doing well, SG drain removed 5/28/20  Pt is awake, alert, resp, oriented x 3   Following commands eager to be discharged home, PT and PMnR recomm AR  pt seen and states is at baseline, denied any new or worsening sensorimotor changes, LUE post-op weakness w minimal improvement   -headache  - Nausea / - Vomiting  denies numbness/ tingling  denies visual changes  denies C/T/LS  Spine pain  + void, 100-150cc x 2 today, void trial and bladder scan at 3PM residual 483cc  - BM since pre-op   + OOB w assist   + diet  + venodynes b/l when in bed        MEDICATIONS  (STANDING):  atorvastatin 20 milliGRAM(s) Oral at bedtime  dexAMETHasone     Tablet 2 milliGRAM(s) Oral every 6 hours  dextrose 5%. 1000 milliLiter(s) (50 mL/Hr) IV Continuous <Continuous>  dextrose 50% Injectable 12.5 Gram(s) IV Push once  dextrose 50% Injectable 25 Gram(s) IV Push once  dextrose 50% Injectable 25 Gram(s) IV Push once  enoxaparin Injectable 40 milliGRAM(s) SubCutaneous at bedtime  furosemide    Tablet 40 milliGRAM(s) Oral daily  glycerin Suppository - Adult 1 Suppository(s) Rectal once  insulin lispro (HumaLOG) corrective regimen sliding scale   SubCutaneous three times a day before meals  levETIRAcetam 1000 milliGRAM(s) Oral two times a day  lidocaine   Patch 1 Patch Transdermal daily  metoprolol tartrate 12.5 milliGRAM(s) Oral two times a day  pantoprazole  Injectable 40 milliGRAM(s) IV Push daily  polyethylene glycol 3350 17 Gram(s) Oral daily  psyllium Powder 1 Packet(s) Oral daily  senna 2 Tablet(s) Oral at bedtime  tamsulosin 0.4 milliGRAM(s) Oral at bedtime    MEDICATIONS  (PRN):  acetaminophen   Tablet .. 650 milliGRAM(s) Oral every 6 hours PRN Mild Pain (1 - 3), Moderate Pain (4 - 6)  ALPRAZolam 0.25 milliGRAM(s) Oral every 8 hours PRN anxiety  dextrose 40% Gel 15 Gram(s) Oral once PRN Blood Glucose LESS THAN 70 milliGRAM(s)/deciliter  glucagon  Injectable 1 milliGRAM(s) IntraMuscular once PRN Glucose LESS THAN 70 milligrams/deciliter  labetalol Injectable 10 milliGRAM(s) IV Push every 4 hours PRN Systolic blood pressure >160  oxycodone    5 mG/acetaminophen 325 mG 1 Tablet(s) Oral every 4 hours PRN Moderate Pain (4 - 6)  oxycodone    5 mG/acetaminophen 325 mG 2 Tablet(s) Oral every 6 hours PRN Severe Pain (7 - 10)    Allergies    No Known Allergies    Intolerances      Vital Signs Last 24 Hrs  T(C): 36.7 (30 May 2020 15:22), Max: 36.7 (29 May 2020 20:56)  T(F): 98 (30 May 2020 15:22), Max: 98 (29 May 2020 20:56)  HR: 55 (30 May 2020 15:22) (48 - 77)  BP: 138/77 (30 May 2020 15:22) (113/55 - 172/85)  BP(mean): 96 (30 May 2020 13:00) (75 - 114)  RR: 19 (30 May 2020 15:22) (16 - 20)  SpO2: 97% (30 May 2020 15:22) (95% - 100%)        PHYSICAL EXAM:  GENERAL: NAD, well-groomed, well-developed, anxious and persists on going home and not AR   HEAD:  Atraumatic, dressing clean and dry intact to right crani site, no active oozing/erythema or redness appreciated. drain exit site secured w staple and incision w nylon suture, both minimally tender on palpation    EYES: EOMI, PERRL, conjunctiva and sclera clear  NECK: Supple, nontender on palpation posteriorly   NERVOUS SYSTEM:  Alert & Oriented X3, Good concentration; Motor Strength 3/5 LUE and 5/5 right upper and b/l lower extremities; FS, TML  ABDOMEN: Soft, Nontender, Nondistended; tympanic throughout 4 quadrants  EXTREMITIES:  2+ Peripheral Pulses, No clubbing, cyanosis, + mild RLE (chronic DVT)>LLE edema  SKIN: No rashes or lesions, LLE BCC resected in the past/ well healed       LABS:                        12.4   10.39 )-----------( 182      ( 29 May 2020 06:11 )             35.9     05-29    143  |  105  |  24.0<H>  ----------------------------<  144<H>  4.4   |  24.0  |  0.84    Ca    9.0      29 May 2020 06:11  Phos  3.2     05-30  Mg     2.2     05-30        RADIOLOGY & ADDITIONAL TESTS:    < from: CT Head No Cont (05.29.20 @ 20:20) >  EXAM:  CT BRAIN                          PROCEDURE DATE:  05/29/2020    INTERPRETATION:    CT head without IV contrast      CLINICAL INFORMATION:  Postop day 2 for tumor resection  TECHNIQUE: Contiguous axial 5 mm sections were obtained through the head. Sagittal and coronal 2-D reformatted images were also obtained.   This scan was performed using automatic exposure control (radiation dose reduction software) to obtain a diagnostic image quality scan with patient dose as low as reasonably achievable.     FINDINGS:   CT dated 05/27/2020 is available for review.    The brain again demonstrates a RIGHT parietal craniotomy with underlying surgical cavity containing air, fluid and hemorrhage. Vasogenic edema is noted. Pneumocephalus is unchanged. Mild periventricular white matter ischemia with old lacunar infarction in the LEFT basal ganglia.    No acute cerebral cortical infarct is seen.    The ventricles, sulci and basal cisterns appear otherwise unremarkable.  The orbitsare unremarkable.  The paranasal sinuses are clear.  The nasal cavity appears intact.  The nasopharynx is symmetric.  The central skull base, petrous temporal bones remain intact.    IMPRESSION:   RIGHT parietal craniotomy with underlying surgical cavity containing air, fluid and hemorrhage. Vasogenic edema is noted. Pneumocephalus is unchanged. Mild periventricular white matter ischemia with old lacunar infarction in the LEFT basal ganglia.     TYRONE BHATIA M.D., ATTENDING RADIOLOGIST  This document has been electronically signed. May 29 2020  8:25PM  < end of copied text >          Time Spent with patient/ education: > 40 mins arranging care, PT, dressing change and post-op discussion /plan of care

## 2020-05-30 NOTE — PROGRESS NOTE ADULT - ASSESSMENT
Pt s/p Right cranio for recurrent GBM resection w Dr Rivers  POD #3 and doing well, SG drain removed 5/28/20  Following commands eager to be discharged home, PT and PMnR recomm AR,  SUPRIYA post-op weakness w minimal improvement     pt seen w Dr Harp and images reviewed   no immediate NSx intervention indicated at this time  neuro-checks q4 HR and d/c cardiac/ monitoring   decadron taper to 2mg q12hr   bowel regiment   void trials ongoing - RN following and updating   renal SVC verbally recommends flomax and Luna cath reinsertion of no improvement today and d/c to rehab w cath/ OP f/u   renal consulted and would not see pt   HOB > 30 degrees  hold all AC/AP   PT/OT following - called and asked to see pt today/ RN aware   goal SBP < 150mmHg  spoke w CM - plan to d/c to AR on Monday if pt agrees

## 2020-05-31 LAB
MAGNESIUM SERPL-MCNC: 2.2 MG/DL — SIGNIFICANT CHANGE UP (ref 1.6–2.6)
PHOSPHATE SERPL-MCNC: 3.5 MG/DL — SIGNIFICANT CHANGE UP (ref 2.4–4.7)

## 2020-05-31 RX ADMIN — Medication 2 MILLIGRAM(S): at 23:16

## 2020-05-31 RX ADMIN — LEVETIRACETAM 1000 MILLIGRAM(S): 250 TABLET, FILM COATED ORAL at 17:35

## 2020-05-31 RX ADMIN — ENOXAPARIN SODIUM 40 MILLIGRAM(S): 100 INJECTION SUBCUTANEOUS at 23:16

## 2020-05-31 RX ADMIN — LIDOCAINE 1 PATCH: 4 CREAM TOPICAL at 00:47

## 2020-05-31 RX ADMIN — LIDOCAINE 1 PATCH: 4 CREAM TOPICAL at 18:39

## 2020-05-31 RX ADMIN — Medication 12.5 MILLIGRAM(S): at 05:38

## 2020-05-31 RX ADMIN — LIDOCAINE 1 PATCH: 4 CREAM TOPICAL at 23:16

## 2020-05-31 RX ADMIN — Medication 2 MILLIGRAM(S): at 05:38

## 2020-05-31 RX ADMIN — Medication 2 MILLIGRAM(S): at 17:35

## 2020-05-31 RX ADMIN — TAMSULOSIN HYDROCHLORIDE 0.4 MILLIGRAM(S): 0.4 CAPSULE ORAL at 23:16

## 2020-05-31 RX ADMIN — POLYETHYLENE GLYCOL 3350 17 GRAM(S): 17 POWDER, FOR SOLUTION ORAL at 11:40

## 2020-05-31 RX ADMIN — LIDOCAINE 1 PATCH: 4 CREAM TOPICAL at 11:39

## 2020-05-31 RX ADMIN — Medication 40 MILLIGRAM(S): at 05:38

## 2020-05-31 RX ADMIN — LEVETIRACETAM 1000 MILLIGRAM(S): 250 TABLET, FILM COATED ORAL at 05:38

## 2020-05-31 RX ADMIN — PANTOPRAZOLE SODIUM 40 MILLIGRAM(S): 20 TABLET, DELAYED RELEASE ORAL at 11:38

## 2020-05-31 RX ADMIN — Medication 1 PACKET(S): at 11:40

## 2020-05-31 RX ADMIN — Medication 2 MILLIGRAM(S): at 11:38

## 2020-05-31 RX ADMIN — Medication 12.5 MILLIGRAM(S): at 17:38

## 2020-05-31 RX ADMIN — SENNA PLUS 2 TABLET(S): 8.6 TABLET ORAL at 23:16

## 2020-05-31 RX ADMIN — ATORVASTATIN CALCIUM 20 MILLIGRAM(S): 80 TABLET, FILM COATED ORAL at 23:16

## 2020-05-31 NOTE — PROGRESS NOTE ADULT - ATTENDING COMMENTS
I spent 40 minutes of critical care time examining patient, reviewing vitals, labs, medications, imaging and discussing with the team goals of care to prevent life-threatening in this patient who is at high risk for deterioration or death due to: post-op craniotomy for glioma rxn
I spent 40 minutes of critical care time examining patient, reviewing vitals, labs, medications, imaging and discussing with the team goals of care to prevent life-threatening in this patient who is at high risk for deterioration or death due to: post-op craniotomy for glioma rxn
NSGY Attg:    see above    patient seen and examined    post-op exam stable    agree with plan as above  patient wishes to be discharged to acute rehab
NSGY Attg:    see above    patient seen and examined    agree with exam as above  alert  appropriately conversant  ROBERTS to command  stable distal LUE weakness    CT head negative for ICH    agree with plan as above  dispo pending progress

## 2020-05-31 NOTE — PROGRESS NOTE ADULT - ASSESSMENT
Pt s/p Right cranio for recurrent GBM resection w Dr Rivers  POD #4 and doing well, SG drain removed 5/28/20  agreeable to AR discharge - awaiting arrangements   LUE post-op weakness w minimal improvement/ at baseline     pt seen w Dr Harp and images reviewed   no immediate NSx intervention indicated at this time  cont w neuro-checks q4 HR  decadron to cont at 2mg q6hr as per Dr Rievrs   cont w bowel regiment as ordered   voiding independently - post-void residual bladder scan pending, d/w RN to call back   cont w HOB > 30 degrees  hold all AC/AP   PT/OT: AR   PMnR: AR

## 2020-05-31 NOTE — PROGRESS NOTE ADULT - SUBJECTIVE AND OBJECTIVE BOX
NEUROSURGERY PROGRESS NOTE:    Pt s/p Right cranio for recurrent GBM resection w Dr Rivers  POD #4 and doing well, SG drain removed 5/28/20  Pt is awake, alert, resp, oriented x 3   Following commands, agreeing to AR dispo     pt seen and states is at baseline, denied any new or worsening sensorimotor changes, LUE post-op weakness w minimal improvement since/ at baseline   -headache  - Nausea / - Vomiting  + hiccups   denies numbness/ tingling  denies visual changes  denies C/T/LS  Spine pain  + void  + BM  + OOB w assist/ PT/OT  + diet  + venodynes when in bed      MEDICATIONS  (STANDING):  atorvastatin 20 milliGRAM(s) Oral at bedtime  dexAMETHasone     Tablet 2 milliGRAM(s) Oral every 6 hours  enoxaparin Injectable 40 milliGRAM(s) SubCutaneous at bedtime  furosemide    Tablet 40 milliGRAM(s) Oral daily  levETIRAcetam 1000 milliGRAM(s) Oral two times a day  lidocaine   Patch 1 Patch Transdermal daily  metoprolol tartrate 12.5 milliGRAM(s) Oral two times a day  pantoprazole  Injectable 40 milliGRAM(s) IV Push daily  polyethylene glycol 3350 17 Gram(s) Oral daily  psyllium Powder 1 Packet(s) Oral daily  senna 2 Tablet(s) Oral at bedtime  tamsulosin 0.4 milliGRAM(s) Oral at bedtime    MEDICATIONS  (PRN):  acetaminophen   Tablet .. 650 milliGRAM(s) Oral every 6 hours PRN Mild Pain (1 - 3), Moderate Pain (4 - 6)  ALPRAZolam 0.25 milliGRAM(s) Oral every 8 hours PRN anxiety  labetalol Injectable 10 milliGRAM(s) IV Push every 4 hours PRN Systolic blood pressure >160  oxycodone    5 mG/acetaminophen 325 mG 1 Tablet(s) Oral every 4 hours PRN Moderate Pain (4 - 6)  oxycodone    5 mG/acetaminophen 325 mG 2 Tablet(s) Oral every 6 hours PRN Severe Pain (7 - 10)    Allergies    No Known Allergies    Intolerances      Vital Signs Last 24 Hrs  T(C): 36.4 (31 May 2020 04:28), Max: 36.9 (30 May 2020 20:30)  T(F): 97.5 (31 May 2020 04:28), Max: 98.4 (30 May 2020 20:30)  HR: 67 (31 May 2020 12:00) (54 - 67)  BP: 131/83 (31 May 2020 12:00) (128/68 - 160/82)  BP(mean): 108 (31 May 2020 08:52) (108 - 108)  RR: 17 (31 May 2020 12:00) (15 - 19)  SpO2: 98% (31 May 2020 12:00) (94% - 99%)        PHYSICAL EXAM:  GENERAL: NAD, well-groomed, well-developed, comfortable, agreeing to AR   HEAD:  Atraumatic, dressing clean and dry intact to right crani site, no active oozing/erythema or redness appreciated. drain exit site secured w staple and incision w nylon suture, both minimally tender on palpation    EYES: EOMI, PERRL, conjunctiva and sclera clear  NECK: Supple, nontender on palpation posteriorly   NERVOUS SYSTEM:  Alert & Oriented X3, Good concentration; Motor Strength 3/5 LUE and 5/5 right upper and b/l lower extremities; FS, TML  ABDOMEN: Soft, Nontender, Nondistended; tympanic throughout 4 quadrants  EXTREMITIES:  2+ Peripheral Pulses, No clubbing, cyanosis, + mild RLE (chronic DVT)>LLE edema  SKIN: No rashes or lesions, LLE BCC resected in the past/ well healed         LABS:      Phos  3.5     05-31  Mg     2.2     05-31          RADIOLOGY & ADDITIONAL TESTS:  no new NSx images available for review       Time Spent with patient/ education: > 25 mins

## 2020-06-01 LAB
MAGNESIUM SERPL-MCNC: 2.2 MG/DL — SIGNIFICANT CHANGE UP (ref 1.6–2.6)
PHOSPHATE SERPL-MCNC: 3.6 MG/DL — SIGNIFICANT CHANGE UP (ref 2.4–4.7)

## 2020-06-01 PROCEDURE — 99233 SBSQ HOSP IP/OBS HIGH 50: CPT

## 2020-06-01 RX ORDER — POLYETHYLENE GLYCOL 3350 17 G/17G
17 POWDER, FOR SOLUTION ORAL
Qty: 0 | Refills: 0 | DISCHARGE
Start: 2020-06-01

## 2020-06-01 RX ORDER — TAMSULOSIN HYDROCHLORIDE 0.4 MG/1
1 CAPSULE ORAL
Qty: 0 | Refills: 0 | DISCHARGE
Start: 2020-06-01

## 2020-06-01 RX ORDER — PANTOPRAZOLE SODIUM 20 MG/1
40 TABLET, DELAYED RELEASE ORAL
Refills: 0 | Status: DISCONTINUED | OUTPATIENT
Start: 2020-06-01 | End: 2020-06-04

## 2020-06-01 RX ORDER — LEVETIRACETAM 250 MG/1
1 TABLET, FILM COATED ORAL
Qty: 0 | Refills: 0 | DISCHARGE

## 2020-06-01 RX ORDER — PANTOPRAZOLE SODIUM 20 MG/1
1 TABLET, DELAYED RELEASE ORAL
Qty: 40 | Refills: 0
Start: 2020-06-01

## 2020-06-01 RX ORDER — DEXAMETHASONE 0.5 MG/5ML
2 ELIXIR ORAL EVERY 12 HOURS
Refills: 0 | Status: DISCONTINUED | OUTPATIENT
Start: 2020-06-01 | End: 2020-06-04

## 2020-06-01 RX ORDER — DEXAMETHASONE 0.5 MG/5ML
1 ELIXIR ORAL
Qty: 0 | Refills: 0 | DISCHARGE
Start: 2020-06-01

## 2020-06-01 RX ADMIN — SENNA PLUS 2 TABLET(S): 8.6 TABLET ORAL at 21:06

## 2020-06-01 RX ADMIN — LIDOCAINE 1 PATCH: 4 CREAM TOPICAL at 21:04

## 2020-06-01 RX ADMIN — LIDOCAINE 1 PATCH: 4 CREAM TOPICAL at 12:35

## 2020-06-01 RX ADMIN — LEVETIRACETAM 1000 MILLIGRAM(S): 250 TABLET, FILM COATED ORAL at 16:39

## 2020-06-01 RX ADMIN — Medication 40 MILLIGRAM(S): at 06:08

## 2020-06-01 RX ADMIN — Medication 0.25 MILLIGRAM(S): at 23:54

## 2020-06-01 RX ADMIN — Medication 12.5 MILLIGRAM(S): at 06:08

## 2020-06-01 RX ADMIN — Medication 1 PACKET(S): at 12:35

## 2020-06-01 RX ADMIN — Medication 12.5 MILLIGRAM(S): at 16:41

## 2020-06-01 RX ADMIN — LEVETIRACETAM 1000 MILLIGRAM(S): 250 TABLET, FILM COATED ORAL at 06:08

## 2020-06-01 RX ADMIN — TAMSULOSIN HYDROCHLORIDE 0.4 MILLIGRAM(S): 0.4 CAPSULE ORAL at 21:06

## 2020-06-01 RX ADMIN — LIDOCAINE 1 PATCH: 4 CREAM TOPICAL at 23:55

## 2020-06-01 RX ADMIN — Medication 2 MILLIGRAM(S): at 16:39

## 2020-06-01 RX ADMIN — ENOXAPARIN SODIUM 40 MILLIGRAM(S): 100 INJECTION SUBCUTANEOUS at 21:06

## 2020-06-01 RX ADMIN — Medication 2 MILLIGRAM(S): at 06:08

## 2020-06-01 RX ADMIN — PANTOPRAZOLE SODIUM 40 MILLIGRAM(S): 20 TABLET, DELAYED RELEASE ORAL at 16:39

## 2020-06-01 RX ADMIN — ATORVASTATIN CALCIUM 20 MILLIGRAM(S): 80 TABLET, FILM COATED ORAL at 21:06

## 2020-06-01 NOTE — CDI QUERY NOTE - NSCDIOTHERTXTBX2_GEN_ALL_CORE_FT
If you agree with nutrition services please sign nutrition chart note    Nutrition diagnosis yes...     Nutrition Diagnostic Terminology #1 Malnutrition...     Malnutrition moderate, chronic.     Etiology related to inability to meet increased nutrient needs in setting of GBM brain cancer s/p surgery + chemo/radiation, now s/p right craniotomy for regrowth of tumor.     Signs/Symptoms as evidenced by meeting <75% nutrient needs >1 mo, mild/mod muscle/fat loss, 12.1% wt loss x 13 mo.     Goal/Expected Outcome Pt will meet >75% of estimated protein-energy needs via tolerated route.    Upon Nutritional Assessment by the Registered Dietitian your patient was determined to meet criteria / has evidence of the following diagnosis/diagnoses:          [ ]  Mild Protein Calorie Malnutrition        [ x]  Moderate Protein Calorie Malnutrition        [ ] Severe Protein Calorie Malnutrition        [ ] Unspecified Protein Calorie Malnutrition        [ ] Underweight / BMI <19        [ ] Morbid Obesity / BMI > 40    Pt presents at high nutrition risk secondary to malnutrition (moderate, chronic) related to inability to meet increased nutrient needs in setting of GBM brain cancer s/p surgery + chemo/radiation, now s/p right craniotomy for regrowth of tumor as evidenced by meeting <75% nutrient needs >1 month, mild muscle loss of temples, moderate muscle loss of clavicles and shoulders, mild fat loss of orbitals and buccal pads, 12.1% wt loss x 13 months

## 2020-06-01 NOTE — PROGRESS NOTE ADULT - ASSESSMENT
79ym s/p cranio for excision of brain lesion.  Pt followed by both neuro onc Dr. Del Cid and Dr. Rivers  hiccups are noted.   Plan  1. Pt will have the decadron decreased from 2 mg q 6 to 2mg q 12  2. Pt's protonix increased from qd to q 12  3. Pt being recommended for rehab awaiting transfer.

## 2020-06-01 NOTE — CDI QUERY NOTE - NSCDIOTHERTXTBX_GEN_ALL_CORE_HH
PREOPERATIVE DIAGNOSIS:  Right frontal glioma.    POSTOPERATIVE DIAGNOSIS:  Recurrent right frontal highgrade glioma.    MR head- < from: MR Head w/wo IV Cont (05.28.20 @ 10:53) >    IMPRESSION:  1.  Status post resection of a right posterior frontal lobe mass. Nodular/thick linear enhancement along the anterior and inferior margins of the resection cavity, suggesting residual tumor.   2.  T2/FLAIR hyperintensity surrounding the resection cavity suggesting vasogenic edema, posttreatment change, or nonenhancing tumor    Treatment with Decadron    Please provide a diagnosis associated with above findings if clinically significant    1) Recurrent right frontal highgrade glioma. with vasogenic edema  2) Other  3) Not clinically significant

## 2020-06-01 NOTE — PROGRESS NOTE ADULT - SUBJECTIVE AND OBJECTIVE BOX
Patient very angry about not having been OOB.   States the therapy services isnt doing enough.   Attempted to discuss the team approach with nursing.   Was contented by plan for AR.  Also complained of his roommate making too much noise and wanted to be moved.     REVIEW OF SYSTEMS  Constitutional - No fever,  +fatigue  HEENT - No vertigo, No neck pain  Neurological - No headaches, No memory loss, No loss of strength, No numbness, No tremors  Skin - No rashes, No lesions   Musculoskeletal - No joint pain, No joint swelling, No muscle pain  Psychiatric - +depression, +anxiety    FUNCTIONAL PROGRESS  5/31  Bed Mobility  Bed Mobility Training Sit-to-Supine: pt left sitting OOB in chair  Bed Mobility Training Supine-to-Sit: pt recevied sitting at edge of bed    Sit-Stand Transfer Training  Transfer Training Sit-to-Stand Transfer: minimum assist (75% patient effort);  1 person assist;  full weight-bearing   quad cane  Transfer Training Stand-to-Sit Transfer: minimum assist (75% patient effort);  1 person assist;  full weight-bearing   quad cane  Sit-to-Stand Transfer Training Transfer Safety Analysis: decreased balance;  decreased proprioception;  decreased strength;  impaired balance;  impaired coordination;  impaired motor control    Gait Training  Gait Training: moderate assist (50% patient effort);  1 person assist;  full weight-bearing   quad cane;  25 feet  Gait Analysis: decreased iveth;  decreased step length;  decreased stride length;  decreased velocity of limb motion;  decreased strength;  impaired balance;  impaired coordination;  impaired motor control;  cognitive, decreased safety awareness    5/29  Bathing Training:     · Level of Parker	maximum assist (25% patients effort); to sponge bathe	  · Physical Assist/Nonphysical Assist	1 person assist; verbal cues	    Upper Body Dressing Training:     · Level of Parker	moderate assist (50% patients effort); seated to don/doff gown	  · Physical Assist/Nonphysical Assist	verbal cues; 1 person assist; Patient educated in [place]go dressing techniques	    Lower Body Dressing Training:     · Level of Parker	maximum assist (25% patients effort); seated EOB to don/doff socks	  · Physical Assist/Nonphysical Assist	1 person assist; verbal cues	    Toilet Hygiene Training:     · Level of Parker	maximum assist (25% patients effort); for christi care, thoroughness and clothing management	  · Physical Assist/Nonphysical Assist	1 person assist; verbal cues	    Grooming Training:     · Level of Parker	minimum assist (75% patients effort); seated; pt may need assistance with setup secondary to Left UE weakness and limited AROM of wrist/digits	  · Physical Assist/Nonphysical Assist	1 person assist; verbal cues	    Eating/Self-Feeding Training:     · Level of Parker	independent	  · Physical Assist/Nonphysical Assist	set-up required; patient may need assistance with manipulating/opening containers secondary to left UE weakness/limited AROM	      VITALS  T(C): 36.4 (06-01-20 @ 05:38), Max: 36.7 (05-31-20 @ 15:32)  HR: 65 (06-01-20 @ 05:38) (57 - 67)  BP: 115/71 (06-01-20 @ 05:38) (115/71 - 150/81)  RR: 19 (06-01-20 @ 05:38) (17 - 19)  SpO2: 97% (06-01-20 @ 05:38) (96% - 98%)  Wt(kg): --    MEDICATIONS   acetaminophen   Tablet .. 650 milliGRAM(s) every 6 hours PRN  ALPRAZolam 0.25 milliGRAM(s) every 8 hours PRN  atorvastatin 20 milliGRAM(s) at bedtime  dexAMETHasone     Tablet 2 milliGRAM(s) every 12 hours  enoxaparin Injectable 40 milliGRAM(s) at bedtime  furosemide    Tablet 40 milliGRAM(s) daily  labetalol Injectable 10 milliGRAM(s) every 4 hours PRN  levETIRAcetam 1000 milliGRAM(s) two times a day  lidocaine   Patch 1 Patch daily  metoprolol tartrate 12.5 milliGRAM(s) two times a day  oxycodone    5 mG/acetaminophen 325 mG 1 Tablet(s) every 4 hours PRN  oxycodone    5 mG/acetaminophen 325 mG 2 Tablet(s) every 6 hours PRN  pantoprazole    Tablet 40 milliGRAM(s) two times a day  polyethylene glycol 3350 17 Gram(s) daily  psyllium Powder 1 Packet(s) daily  senna 2 Tablet(s) at bedtime  tamsulosin 0.4 milliGRAM(s) at bedtime      RECENT LABS - Reviewed      Phos  3.6     06-01  Mg     2.2     06-01        ----------------------------------------------------------------------------------------  PHYSICAL EXAM  Constitutional - NAD, Comfortable  HEENT - Craniotomy - +Dressing  Extremities - Left LE swelling and calf tenderness  Neurologic Exam -                    Motor                      LEFT    UE - ShAB 4/5, EF 3/5, EE 3/5, WE 1/5,  1/5                    LEFT    LE - HF 4/5, KE 5/5, DF 5/5, PF 5/5 -- tight heel cord on left        Sensory - Intact to LT     Coordination - FTN impaired - Left  Psychiatric - Irritable  ----------------------------------------------------------------------------------------    ASSESSMENT/PLAN  79yMale with functional deficits secondary to recurrent GBM s/p craniotomy   Right GBM s/p craniotomy 5/27 - Decadron, Keppra   LLE edema - Doppler 5/27 poor visualization due to edema but no overt DVT.   CAD - Lipitor  HTN - Lasix, DC Labetalol IV  Anxiety - Xanax PRN  DM2 - SSI   GI - Metamucil, Protonix   - Flomax  Pain - Tylenol, Percocet, Lidoderm   DVT PPX - Lovenox   Rehab - Recommend ACUTE inpatient rehabilitation for the functional deficits consisting of 3 hours of therapy/day & 24 hour RN/daily PMR physician for comorbid medical management. Will continue to follow for ongoing rehab needs and recommendations. Patient will be able to tolerate 3 hours a day.    Continue bedside therapy as well as OOB throughout the day with mobilization throughout the day with staff to maintain cardiopulmonary function and prevention of secondary complications related to debility.

## 2020-06-01 NOTE — CHART NOTE - NSCHARTNOTEFT_GEN_A_CORE
Source: Patient [ ]  Family [ ]   other [ x]    Current Diet: Diet, Clear Liquid (05-27-20 @ 12:18)  Diet, Regular (05-27-20 @ 14:03)    PO intake: Pt consuming 100% of meals per documentation    Current Weight:   (6/1) 191.5#  (5/31) 189.8#  (5/30) 188#  (5/27) 175.7#  -Obtain daily wts, continue to monitor. L arm 3+ edema noted.    Pertinent Medications: MEDICATIONS  (STANDING):  atorvastatin 20 milliGRAM(s) Oral at bedtime  dexAMETHasone     Tablet 2 milliGRAM(s) Oral every 12 hours  enoxaparin Injectable 40 milliGRAM(s) SubCutaneous at bedtime  furosemide    Tablet 40 milliGRAM(s) Oral daily  levETIRAcetam 1000 milliGRAM(s) Oral two times a day  lidocaine   Patch 1 Patch Transdermal daily  metoprolol tartrate 12.5 milliGRAM(s) Oral two times a day  pantoprazole    Tablet 40 milliGRAM(s) Oral two times a day  polyethylene glycol 3350 17 Gram(s) Oral daily  psyllium Powder 1 Packet(s) Oral daily  senna 2 Tablet(s) Oral at bedtime  tamsulosin 0.4 milliGRAM(s) Oral at bedtime    MEDICATIONS  (PRN):  acetaminophen   Tablet .. 650 milliGRAM(s) Oral every 6 hours PRN Mild Pain (1 - 3), Moderate Pain (4 - 6)  ALPRAZolam 0.25 milliGRAM(s) Oral every 8 hours PRN anxiety  labetalol Injectable 10 milliGRAM(s) IV Push every 4 hours PRN Systolic blood pressure >160  oxycodone    5 mG/acetaminophen 325 mG 1 Tablet(s) Oral every 4 hours PRN Moderate Pain (4 - 6)  oxycodone    5 mG/acetaminophen 325 mG 2 Tablet(s) Oral every 6 hours PRN Severe Pain (7 - 10)    Pertinent Labs: 06-01 Nan/a   Glu n/a   K+ n/a   Cr  n/a   BUN n/a   Phos 3.6 mg/dL Alb n/a   PAB n/a       Skin: Surgical incision     Nutrition focused physical exam previously conducted - found signs of malnutrition [ ]absent [ x]present    Subcutaneous fat loss: [x ] Orbital fat pads region, [x ]Buccal fat region, [ ]Triceps region,  [ ]Ribs region    Muscle wasting: [x ]Temples region, [x ]Clavicle region, [ x]Shoulder region, [ ]Scapula region, [ ]Interosseous region,  [ ]thigh region, [ ]Calf region    Estimated Needs:   [ x] no change since previous assessment  [ ] recalculated:     Current Nutrition Diagnosis: Pt remains at high nutrition risk and meets criteria for moderate, chronic malnutrition related to inability to meet increased nutrient needs in setting of GBM brain cancer s/p surgery + chemo/radiation, now s/p right craniotomy for regrowth of tumor as evidenced by meeting <75% nutrient needs >1 mo, mild/mod muscle/fat loss, 12.1% wt loss x 13 mo. Pt with nursing during assessment, multiple attempts made. Pt continues with good po intake. Last documented BM 5/31. RD to remain available.     Recommendations:   1) Continue diet as ordered  2) Encourage po intake and HBV protein  3) Monitor wts and labs    Monitoring and Evaluation:   [x ] PO intake [x ] Tolerance to diet prescription [X] Weights  [X] Follow up per protocol [X] Labs:

## 2020-06-01 NOTE — PROGRESS NOTE ADULT - SUBJECTIVE AND OBJECTIVE BOX
INTERVAL HPI/OVERNIGHT EVENTS:  admitted on 5/27  Post op cranio for brain tumor excision.  POD # 5  Pt denies headache. Pt is having frequent hiccups attributed to the steriods.  Pt states it is not uncommon that he gets hiccups.     MEDICATIONS  (STANDING):  atorvastatin 20 milliGRAM(s) Oral at bedtime  dexAMETHasone     Tablet 2 milliGRAM(s) Oral every 12 hours  enoxaparin Injectable 40 milliGRAM(s) SubCutaneous at bedtime  furosemide    Tablet 40 milliGRAM(s) Oral daily  levETIRAcetam 1000 milliGRAM(s) Oral two times a day  lidocaine   Patch 1 Patch Transdermal daily  metoprolol tartrate 12.5 milliGRAM(s) Oral two times a day  pantoprazole    Tablet 40 milliGRAM(s) Oral two times a day  polyethylene glycol 3350 17 Gram(s) Oral daily  psyllium Powder 1 Packet(s) Oral daily  senna 2 Tablet(s) Oral at bedtime  tamsulosin 0.4 milliGRAM(s) Oral at bedtime    MEDICATIONS  (PRN):  acetaminophen   Tablet .. 650 milliGRAM(s) Oral every 6 hours PRN Mild Pain (1 - 3), Moderate Pain (4 - 6)  ALPRAZolam 0.25 milliGRAM(s) Oral every 8 hours PRN anxiety  labetalol Injectable 10 milliGRAM(s) IV Push every 4 hours PRN Systolic blood pressure >160  oxycodone    5 mG/acetaminophen 325 mG 1 Tablet(s) Oral every 4 hours PRN Moderate Pain (4 - 6)  oxycodone    5 mG/acetaminophen 325 mG 2 Tablet(s) Oral every 6 hours PRN Severe Pain (7 - 10)      Allergies  No Known Allergies  Intolerances      Vital Signs Last 24 Hrs  T(C): 36.8 (01 Jun 2020 13:31), Max: 36.8 (01 Jun 2020 13:31)  T(F): 98.3 (01 Jun 2020 13:31), Max: 98.3 (01 Jun 2020 13:31)  HR: 76 (01 Jun 2020 13:31) (57 - 76)  BP: 134/83 (01 Jun 2020 13:31) (115/71 - 160/76)  BP(mean): 100 (01 Jun 2020 13:31) (86 - 100)  RR: 16 (01 Jun 2020 13:31) (16 - 19)  SpO2: 94% (01 Jun 2020 13:31) (94% - 98%) BMI (kg/m2): 24.5 (05-27-20 @ 11:48)      PHYSICAL EXAM  GENERAL: NAD, well-groomed, well-developed  HEAD:  Atraumatic, Normocephalic  EYES: EOMI, PERRLA, conjunctiva and sclera clear  ENMT: No tonsillar erythema, exudates, or enlargement; Moist mucous membranes, Good dentition, No lesions  NECK: Supple, No JVD, Normal thyroid  NERVOUS SYSTEM:  Alert & Oriented X3,  Motor Strength 5/5 B/L upper and lower extremities; DTRs 2+ intact and symmetric  CHEST/LUNG: Clear to percussion bilaterally; No rales, rhonchi, wheezing, or rubs  HEART: Regular rate and rhythm; No murmurs, rubs, or gallops  ABDOMEN: Soft, Nontender, Nondistended; Bowel sounds present  EXTREMITIES:  2+ Peripheral Pulses, No clubbing, cyanosis, or edema  Wound dressing clean and dry    LABS:  Phos  3.6     06-01  Mg     2.2     06-01      I&O's Detail    31 May 2020 07:01  -  01 Jun 2020 07:00  --------------------------------------------------------  IN:    Oral Fluid: 1020 mL  Total IN: 1020 mL    OUT:    Intermittent Catheterization - Urethral: 250 mL    Voided: 1145 mL  Total OUT: 1395 mL    Total NET: -375 mL      01 Jun 2020 07:01  -  01 Jun 2020 17:45  --------------------------------------------------------  IN:    Oral Fluid: 480 mL  Total IN: 480 mL    OUT:    Voided: 400 mL  Total OUT: 400 mL    Total NET: 80 mL      RADIOLOGY & ADDITIONAL TESTS:  < from: CT Head No Cont (05.29.20 @ 20:20) >   EXAM:  CT BRAIN                        PROCEDURE DATE:  05/29/2020    IMPRESSION:   RIGHT parietal craniotomy with underlying surgical cavity containing air, fluid and hemorrhage. Vasogenic edema is noted. Pneumocephalus is unchanged. Mild periventricular white matter ischemia with old lacunar infarction in the LEFT basal ganglia.   < end of copied text >  < from: MR Head w/wo IV Cont (05.28.20 @ 10:53) >   EXAM:  MR BRAIN WAW IC                        PROCEDURE DATE:  05/28/2020    IMPRESSION:  1.  Status post resection of a right posterior frontal lobe mass. Nodular/thick linear enhancement along the anterior and inferior margins of the resection cavity, suggesting residual tumor.   2.  T2/FLAIR hyperintensity surrounding the resection cavity suggesting vasogenic edema, posttreatment change, or nonenhancing tumor.   < end of copied text >

## 2020-06-02 LAB
MAGNESIUM SERPL-MCNC: 2.1 MG/DL — SIGNIFICANT CHANGE UP (ref 1.6–2.6)
PHOSPHATE SERPL-MCNC: 3.3 MG/DL — SIGNIFICANT CHANGE UP (ref 2.4–4.7)

## 2020-06-02 PROCEDURE — 73030 X-RAY EXAM OF SHOULDER: CPT | Mod: 26,LT

## 2020-06-02 PROCEDURE — 12345: CPT | Mod: NC

## 2020-06-02 PROCEDURE — 99233 SBSQ HOSP IP/OBS HIGH 50: CPT

## 2020-06-02 RX ADMIN — LEVETIRACETAM 1000 MILLIGRAM(S): 250 TABLET, FILM COATED ORAL at 05:18

## 2020-06-02 RX ADMIN — Medication 0.25 MILLIGRAM(S): at 21:10

## 2020-06-02 RX ADMIN — Medication 40 MILLIGRAM(S): at 05:18

## 2020-06-02 RX ADMIN — PANTOPRAZOLE SODIUM 40 MILLIGRAM(S): 20 TABLET, DELAYED RELEASE ORAL at 17:50

## 2020-06-02 RX ADMIN — PANTOPRAZOLE SODIUM 40 MILLIGRAM(S): 20 TABLET, DELAYED RELEASE ORAL at 05:18

## 2020-06-02 RX ADMIN — POLYETHYLENE GLYCOL 3350 17 GRAM(S): 17 POWDER, FOR SOLUTION ORAL at 11:36

## 2020-06-02 RX ADMIN — Medication 2 MILLIGRAM(S): at 05:18

## 2020-06-02 RX ADMIN — ATORVASTATIN CALCIUM 20 MILLIGRAM(S): 80 TABLET, FILM COATED ORAL at 21:10

## 2020-06-02 RX ADMIN — Medication 12.5 MILLIGRAM(S): at 05:18

## 2020-06-02 RX ADMIN — LEVETIRACETAM 1000 MILLIGRAM(S): 250 TABLET, FILM COATED ORAL at 17:49

## 2020-06-02 RX ADMIN — LIDOCAINE 1 PATCH: 4 CREAM TOPICAL at 11:35

## 2020-06-02 RX ADMIN — ENOXAPARIN SODIUM 40 MILLIGRAM(S): 100 INJECTION SUBCUTANEOUS at 21:10

## 2020-06-02 RX ADMIN — LIDOCAINE 1 PATCH: 4 CREAM TOPICAL at 19:50

## 2020-06-02 RX ADMIN — Medication 12.5 MILLIGRAM(S): at 17:50

## 2020-06-02 RX ADMIN — Medication 2 MILLIGRAM(S): at 17:49

## 2020-06-02 RX ADMIN — Medication 1 PACKET(S): at 11:36

## 2020-06-02 RX ADMIN — TAMSULOSIN HYDROCHLORIDE 0.4 MILLIGRAM(S): 0.4 CAPSULE ORAL at 21:11

## 2020-06-02 NOTE — PROGRESS NOTE ADULT - ASSESSMENT
79M PMH GBM s/p surgery 8/2018, chemotherapy x 35 weeks, radiation x 6 weeks, b/l hearing loss, HLD, HTN, prostate CA s/p prostatectomy 1995, DM2, basal cell carcinoma LLE s/p resection, now s/p right craniotomy for tumor resection POD#6.  - Exam stable  - Left shoulder xray obtained 2/2 acute on chronic pain worsening during hospital stay. Degenerative findings    PLAN:  - D/w Dr. Rivers  - Q4 neuro checks while in house  - On decadron 2 mg Q12 hour; protonix while on steroids  - On keppra 1 g Q12  - PT/OT while in house  - On lovenox for DVT ppx  - Stable for discharge to acute rehab when bed available

## 2020-06-02 NOTE — PROGRESS NOTE ADULT - SUBJECTIVE AND OBJECTIVE BOX
Patient continues to be irritable.  Now planned for XR of left shoulder due to pain.     REVIEW OF SYSTEMS  Constitutional - No fever,  +fatigue  Neurological - +loss of strength   Skin - No rashes, +lesions   Musculoskeletal - +joint pain   Psychiatric - No depression, +anxiety    FUNCTIONAL PROGRESS  6/1  Bed Mobility  Bed Mobility Training Rolling/Turning: minimum assist (75% patient effort);  1 person assist;  verbal cues;  bed rails;  head of bed elevated  Bed Mobility Training Scooting: minimum assist (75% patient effort);  1 person assist;  verbal cues;  to scoot toward EOB;  bed rails  Bed Mobility Training Sit-to-Supine: Left pt sitting in bedside chair  Bed Mobility Training Supine-to-Sit: minimum assist (75% patient effort);  1 person assist;  verbal cues;  bed rails;  head of bed elevated;  Verbal cues to attend to left UE ; pt with inattention performing transfer without guiding Left UE, ends up sitting on it requiring external cueing to attend  Bed Mobility Training Limitations: decreased ability to use arms for pushing/pulling;  abnormal muscle tone;  decreased ROM;  decreased strength;  impaired balance;  impaired coordination;  cognitive, decreased safety awareness;  Pt with decreased insight to funcitonal limitations and deficits    Sit-Stand Transfer Training  Transfer Training Sit-to-Stand Transfer: minimum assist (75% patient effort);  1 person assist;  verbal cues;  full weight-bearing   quad cane  Transfer Training Stand-to-Sit Transfer: minimum assist (75% patient effort);  1 person assist;  verbal cues;  full weight-bearing   quad cane;  Additional verbal cues required for sequencing of movement and for safety for feeling for surface behind legs and reaching back for arm rest before sitting. Additional cues to attend to left UE.   Sit-to-Stand Transfer Training Transfer Safety Analysis: decreased balance;  decreased weight-shifting ability;  decreased sequencing ability;  decreased proprioception;  abnormal muscle tone;  decreased ROM;  decreased strength;  impaired balance;  impaired coordination;  cognitive, decreased safety awareness;  quad cane    Toilet Transfer Training  Transfer Training Toilet Transfer: minimum assist (75% patient effort);  1 person assist;  verbal cues;  Additional verbal cues required for sequencing of movement and for safety for feeling for surface behind legs and reaching back for arm rest before sitting. Additional cues to attend to left UE. Additional assist for clothing management;  full weight-bearing   grab bars;  quad cane;  3:1 commode placed over toilet  Toilet Transfer Training Transfer Safety Analysis: decreased balance;  decreased cognition;  decreased proprioception;  decreased sequencing ability;  decreased weight-shifting ability;  abnormal muscle tone;  decreased ROM;  decreased strength;  impaired balance;  impaired coordination;  cognitive, decreased safety awareness;  grab bars;  quad cane;  3:1 commode    Functional Endurance  Functional Endurance Detail: Patient ambulated with quad cane and mod A x 1, +verbal cues short distances around bed area and to/from bathroom due to decreased balance and strength. Additional cues/assistance needed for navigating tight spaces including turns. Patient educated in energy conservation techniques and visual scanning techniques secondary to decreased safety awareness.     Lower Body Dressing Training  Lower Body Dressing Training Assistance: moderate assist (50% patient effort);  1 person assist;  verbal cues;  seated in bedside chair to don/doff socks. Patient able to doff socks using Right UE. Pt requires additional assist to don bilateral sock secondary to Left UE impairments. ;  abnormal muscle tone;  decreased ROM;  decreased strength;  impaired balance;  impaired coordination;  cognitive, decreased safety awareness    Upper Body Dressing Training  Upper Body Dressing Training Assistance: minimum assist (75% patient effort);  moderate assist (50% patient effort);  1 person assist;  verbal cues;  seated EOB to don/doff gown. Patient educated in dressing techniques. Pt able to untie gown using Right UE to reach around his neck to manipulate string. Additional assit needed for Left UE ;  abnormal muscle tone;  decreased strength;  impaired balance;  impaired coordination;  cognitive, decreased safety awareness      5/31  Bed Mobility  Bed Mobility Training Sit-to-Supine: pt left sitting OOB in chair  Bed Mobility Training Supine-to-Sit: pt recevied sitting at edge of bed    Sit-Stand Transfer Training  Transfer Training Sit-to-Stand Transfer: minimum assist (75% patient effort);  1 person assist;  full weight-bearing   quad cane  Transfer Training Stand-to-Sit Transfer: minimum assist (75% patient effort);  1 person assist;  full weight-bearing   quad cane  Sit-to-Stand Transfer Training Transfer Safety Analysis: decreased balance;  decreased proprioception;  decreased strength;  impaired balance;  impaired coordination;  impaired motor control    Gait Training  Gait Training: moderate assist (50% patient effort);  1 person assist;  full weight-bearing   quad cane;  25 feet  Gait Analysis: decreased iveth;  decreased step length;  decreased stride length;  decreased velocity of limb motion;  decreased strength;  impaired balance;  impaired coordination;  impaired motor control;  cognitive, decreased safety awareness      VITALS  T(C): 36.8 (06-02-20 @ 05:16), Max: 36.8 (06-01-20 @ 13:31)  HR: 60 (06-02-20 @ 05:16) (60 - 76)  BP: 118/73 (06-02-20 @ 05:16) (118/73 - 160/76)  RR: 17 (06-02-20 @ 05:16) (16 - 19)  SpO2: 98% (06-02-20 @ 05:16) (94% - 98%)  Wt(kg): --    MEDICATIONS   acetaminophen   Tablet .. 650 milliGRAM(s) every 6 hours PRN  ALPRAZolam 0.25 milliGRAM(s) every 8 hours PRN  atorvastatin 20 milliGRAM(s) at bedtime  dexAMETHasone     Tablet 2 milliGRAM(s) every 12 hours  enoxaparin Injectable 40 milliGRAM(s) at bedtime  furosemide    Tablet 40 milliGRAM(s) daily  labetalol Injectable 10 milliGRAM(s) every 4 hours PRN  levETIRAcetam 1000 milliGRAM(s) two times a day  lidocaine   Patch 1 Patch daily  metoprolol tartrate 12.5 milliGRAM(s) two times a day  oxycodone    5 mG/acetaminophen 325 mG 1 Tablet(s) every 4 hours PRN  oxycodone    5 mG/acetaminophen 325 mG 2 Tablet(s) every 6 hours PRN  pantoprazole    Tablet 40 milliGRAM(s) two times a day  polyethylene glycol 3350 17 Gram(s) daily  psyllium Powder 1 Packet(s) daily  senna 2 Tablet(s) at bedtime  tamsulosin 0.4 milliGRAM(s) at bedtime      RECENT LABS - Reviewed      Phos  3.3     06-02  Mg     2.1     06-02                ----------------------------------------------------------------------------------------  PHYSICAL EXAM  Constitutional - NAD, Comfortable  HEENT - Craniotomy - +Dressing  Extremities - Left LE swelling and calf tenderness  Neurologic Exam -                    Motor                      LEFT    UE - ShAB 4/5, EF 3/5, EE 3/5, WE 1/5,  1/5                    LEFT    LE - HF 4/5, KE 5/5, DF 5/5, PF 5/5     Sensory - Intact to LT     Coordination - FTN impaired - Left  Psychiatric - Irritable  ----------------------------------------------------------------------------------------    ASSESSMENT/PLAN  79yMale with functional deficits secondary to recurrent GBM s/p craniotomy   Right GBM s/p craniotomy 5/27 - Decadron, Keppra   LLE edema - Doppler 5/27 poor visualization due to edema but no overt DVT.   CAD - Lipitor  HTN - Lasix, Change Labetalol IV to PO - rehab planning   Anxiety - Xanax PRN  DM2 - SSI   GI - Metamucil, Protonix   - Flomax  Pain - Tylenol, Percocet, Lidoderm   Left shoulder pain - XR ordered  DVT PPX - Lovenox   Rehab - Medically being optimized. Continue to recommend ACUTE inpatient rehabilitation for the functional deficits consisting of 3 hours of therapy/day & 24 hour RN/daily PMR physician for comorbid medical management. Will continue to follow for ongoing rehab needs and recommendations. Patient will be able to tolerate 3 hours a day.    Continue bedside therapy as well as OOB throughout the day with mobilization throughout the day with staff to maintain cardiopulmonary function and prevention of secondary complications related to debility.

## 2020-06-02 NOTE — PROGRESS NOTE ADULT - SUBJECTIVE AND OBJECTIVE BOX
INTERVAL HPI/OVERNIGHT EVENTS:  79M PMH GBM s/p surgery 8/2018, chemotherapy x 35 weeks, radiation x 6 weeks, b/l hearing loss, HLD, HTN, prostate CA s/p prostatectomy 1995, DM2, basal cell carcinoma LLE s/p resection, now s/p right craniotomy for tumor resection POD#6. Patient seen earlier this AM, states his left shoulder still has some pain, chronic in nature but has had some increased pain while in house. No acute events overnight    Vital Signs Last 24 Hrs  T(C): 36.6 (02 Jun 2020 08:14), Max: 36.8 (02 Jun 2020 00:09)  T(F): 97.8 (02 Jun 2020 08:14), Max: 98.3 (02 Jun 2020 00:09)  HR: 60 (02 Jun 2020 12:06) (58 - 62)  BP: 134/70 (02 Jun 2020 12:06) (118/73 - 158/89)  BP(mean): 112 (01 Jun 2020 16:45) (112 - 112)  RR: 20 (02 Jun 2020 12:06) (17 - 20)  SpO2: 98% (02 Jun 2020 12:06) (95% - 98%)    PHYSICAL EXAM:  GENERAL: NAD, well-groomed, well-developed  HEAD: S/p right craniotomy. Incision healing well  EUN COMA SCORE: E- 4 V- 5 M- 6=15  MENTAL STATUS: Awake, alert, oriented to self, [facility], June 2020; Appropriately conversant without aphasia; following commands  CRANIAL NERVES: PERRL. EOMI without nystagmus. Minimal diminished sensation on left. +left facial, tongue midline. Hearing grossly intact. Speech clear  MOTOR: RUE/RLE 5/5. LUE Delt 4/5 Bicep 4/5 Wrist extend 2/5 Tricep 4/5 HG 3/5 LLE 4+/5  SENSATION: Extremities grossly intact to light touch    LABS:    Phos  3.3     06-02  Mg     2.1     06-02 06-01 @ 07:01  -  06-02 @ 07:00  --------------------------------------------------------  IN: 720 mL / OUT: 850 mL / NET: -130 mL    RADIOLOGY & ADDITIONAL TESTS:  Xray Shoulder 2 Views, Left (06.02.20 @ 12:25)  INTERPRETATION:  Left shoulder. Patient has local pain. 3 views.  There is mild spurring around the inferior glenohumeral joint and slight degeneration around the greater tuberosity.  No bone destruction or fracture is evident.  IMPRESSION: Degeneration as above.    CT Head No Cont (05.29.20 @ 20:20)  IMPRESSION:   RIGHT parietal craniotomy with underlying surgical cavity containing air, fluid and hemorrhage. Vasogenic edema is noted. Pneumocephalus is unchanged. Mild periventricular white matter ischemia with old lacunar infarction in the LEFT basal ganglia.     MR Head w/wo IV Cont (05.28.20 @ 10:53)  IMPRESSION:  1.  Status post resection of a right posterior frontal lobe mass. Nodular/thick linear enhancement along the anterior and inferior margins of the resection cavity, suggesting residual tumor.   2.  T2/FLAIR hyperintensity surrounding the resection cavity suggesting vasogenic edema, posttreatment change, or nonenhancing tumor.     CT Head No Cont (05.27.20 @ 16:38)  IMPRESSION: Status post resection of right frontal lobe mass. Expected postoperative changes. Moderate pneumocephalus. Minimal right to left midline shift. Stable moderate edema.    CAPRINI SCORE [CLOT]:  Patient has an estimated Caprini score of greater than 5.  However, the patient's unique clinical situation will be addressed in an individual manner to determine appropriate anticoagulation treatment, if any.

## 2020-06-03 LAB
ANION GAP SERPL CALC-SCNC: 10 MMOL/L — SIGNIFICANT CHANGE UP (ref 5–17)
BUN SERPL-MCNC: 32 MG/DL — HIGH (ref 8–20)
CALCIUM SERPL-MCNC: 9.2 MG/DL — SIGNIFICANT CHANGE UP (ref 8.6–10.2)
CHLORIDE SERPL-SCNC: 102 MMOL/L — SIGNIFICANT CHANGE UP (ref 98–107)
CO2 SERPL-SCNC: 29 MMOL/L — SIGNIFICANT CHANGE UP (ref 22–29)
CREAT SERPL-MCNC: 0.91 MG/DL — SIGNIFICANT CHANGE UP (ref 0.5–1.3)
GLUCOSE SERPL-MCNC: 115 MG/DL — HIGH (ref 70–99)
HCT VFR BLD CALC: 40.8 % — SIGNIFICANT CHANGE UP (ref 39–50)
HGB BLD-MCNC: 13.9 G/DL — SIGNIFICANT CHANGE UP (ref 13–17)
MAGNESIUM SERPL-MCNC: 2.3 MG/DL — SIGNIFICANT CHANGE UP (ref 1.6–2.6)
MCHC RBC-ENTMCNC: 32.8 PG — SIGNIFICANT CHANGE UP (ref 27–34)
MCHC RBC-ENTMCNC: 34.1 GM/DL — SIGNIFICANT CHANGE UP (ref 32–36)
MCV RBC AUTO: 96.2 FL — SIGNIFICANT CHANGE UP (ref 80–100)
PHOSPHATE SERPL-MCNC: 3.4 MG/DL — SIGNIFICANT CHANGE UP (ref 2.4–4.7)
PLATELET # BLD AUTO: 215 K/UL — SIGNIFICANT CHANGE UP (ref 150–400)
POTASSIUM SERPL-MCNC: 4.2 MMOL/L — SIGNIFICANT CHANGE UP (ref 3.5–5.3)
POTASSIUM SERPL-SCNC: 4.2 MMOL/L — SIGNIFICANT CHANGE UP (ref 3.5–5.3)
RBC # BLD: 4.24 M/UL — SIGNIFICANT CHANGE UP (ref 4.2–5.8)
RBC # FLD: 12.6 % — SIGNIFICANT CHANGE UP (ref 10.3–14.5)
SODIUM SERPL-SCNC: 141 MMOL/L — SIGNIFICANT CHANGE UP (ref 135–145)
SURGICAL PATHOLOGY STUDY: SIGNIFICANT CHANGE UP
WBC # BLD: 8.46 K/UL — SIGNIFICANT CHANGE UP (ref 3.8–10.5)
WBC # FLD AUTO: 8.46 K/UL — SIGNIFICANT CHANGE UP (ref 3.8–10.5)

## 2020-06-03 PROCEDURE — 99232 SBSQ HOSP IP/OBS MODERATE 35: CPT

## 2020-06-03 RX ORDER — BENZOCAINE AND MENTHOL 5; 1 G/100ML; G/100ML
1 LIQUID ORAL
Refills: 0 | Status: DISCONTINUED | OUTPATIENT
Start: 2020-06-03 | End: 2020-06-04

## 2020-06-03 RX ADMIN — ENOXAPARIN SODIUM 40 MILLIGRAM(S): 100 INJECTION SUBCUTANEOUS at 22:34

## 2020-06-03 RX ADMIN — POLYETHYLENE GLYCOL 3350 17 GRAM(S): 17 POWDER, FOR SOLUTION ORAL at 11:45

## 2020-06-03 RX ADMIN — LEVETIRACETAM 1000 MILLIGRAM(S): 250 TABLET, FILM COATED ORAL at 05:20

## 2020-06-03 RX ADMIN — Medication 0.25 MILLIGRAM(S): at 22:34

## 2020-06-03 RX ADMIN — ATORVASTATIN CALCIUM 20 MILLIGRAM(S): 80 TABLET, FILM COATED ORAL at 22:34

## 2020-06-03 RX ADMIN — SENNA PLUS 2 TABLET(S): 8.6 TABLET ORAL at 22:34

## 2020-06-03 RX ADMIN — BENZOCAINE AND MENTHOL 1 LOZENGE: 5; 1 LIQUID ORAL at 06:14

## 2020-06-03 RX ADMIN — TAMSULOSIN HYDROCHLORIDE 0.4 MILLIGRAM(S): 0.4 CAPSULE ORAL at 22:34

## 2020-06-03 RX ADMIN — Medication 2 MILLIGRAM(S): at 05:49

## 2020-06-03 RX ADMIN — PANTOPRAZOLE SODIUM 40 MILLIGRAM(S): 20 TABLET, DELAYED RELEASE ORAL at 17:07

## 2020-06-03 RX ADMIN — Medication 12.5 MILLIGRAM(S): at 05:20

## 2020-06-03 RX ADMIN — Medication 2 MILLIGRAM(S): at 17:07

## 2020-06-03 RX ADMIN — PANTOPRAZOLE SODIUM 40 MILLIGRAM(S): 20 TABLET, DELAYED RELEASE ORAL at 05:21

## 2020-06-03 RX ADMIN — LEVETIRACETAM 1000 MILLIGRAM(S): 250 TABLET, FILM COATED ORAL at 17:07

## 2020-06-03 RX ADMIN — Medication 40 MILLIGRAM(S): at 05:20

## 2020-06-03 RX ADMIN — Medication 12.5 MILLIGRAM(S): at 17:07

## 2020-06-03 RX ADMIN — Medication 1 PACKET(S): at 11:45

## 2020-06-03 RX ADMIN — LIDOCAINE 1 PATCH: 4 CREAM TOPICAL at 00:08

## 2020-06-03 NOTE — PROGRESS NOTE ADULT - SUBJECTIVE AND OBJECTIVE BOX
Patient OOB this AM.  Feels well.   Sleeping better as well.  Mood is positive and looking to go to rehab.     REVIEW OF SYSTEMS  Constitutional - No fever,  No fatigue  HEENT - No vertigo, No neck pain  Neurological - No headaches, No memory loss, +loss of strength   Musculoskeletal - No joint pain, No joint swelling, No muscle pain  Psychiatric - No depression, No anxiety    FUNCTIONAL PROGRESS  6/2  Bed Mobility  Bed Mobility Training Sit-to-Supine: Pt left as received, OOB in chair, chair alarm on  Bed Mobility Training Supine-to-Sit: Pt left as received, OOB in chair, chair alarm on     Sit-Stand Transfer Training  Transfer Training Sit-to-Stand Transfer: minimum assist (75% patient effort);  1 person assist;  verbal cues;  rolling walker;  nonverbal cues (demo/gestures)  Transfer Training Stand-to-Sit Transfer: minimum assist (75% patient effort);  1 person assist;  nonverbal cues (demo/gestures);  verbal cues;  rolling walker  Sit-to-Stand Transfer Training Transfer Safety Analysis: decreased proprioception;  decreased balance;  impaired balance;  decreased strength;  abnormal muscle tone;  impaired postural control;  rolling walker    Gait Training  Gait Training: minimum assist (75% patient effort);  1 person assist;  verbal cues;  rolling walker;  40ft;  Stabilizing pt's Left UE on RW to promote weightberaing through extremity  Gait Analysis: 3-point gait   decreased iveth;  decreased step length;  decreased stride length;  decreased strength;  impaired balance;  abnormal muscle tone;  impaired postural control;  decreased proprioception, Left Knee buckle x 1;  impaired motor control;  impaired coordination;  cognitive, decreased safety awareness;  40ft;  rolling walker      VITALS  T(C): 36.7 (06-03-20 @ 09:32), Max: 36.7 (06-02-20 @ 16:17)  HR: 77 (06-03-20 @ 09:32) (61 - 80)  BP: 117/77 (06-03-20 @ 09:32) (117/77 - 131/78)  RR: 18 (06-03-20 @ 09:32) (18 - 20)  SpO2: 98% (06-03-20 @ 09:32) (96% - 99%)  Wt(kg): --    MEDICATIONS   acetaminophen   Tablet .. 650 milliGRAM(s) every 6 hours PRN  ALPRAZolam 0.25 milliGRAM(s) every 8 hours PRN  atorvastatin 20 milliGRAM(s) at bedtime  benzocaine 15 mG/menthol 3.6 mG (Sugar-Free) Lozenge 1 Lozenge every 2 hours PRN  dexAMETHasone     Tablet 2 milliGRAM(s) every 12 hours  enoxaparin Injectable 40 milliGRAM(s) at bedtime  furosemide    Tablet 40 milliGRAM(s) daily  labetalol Injectable 10 milliGRAM(s) every 4 hours PRN  levETIRAcetam 1000 milliGRAM(s) two times a day  lidocaine   Patch 1 Patch daily  metoprolol tartrate 12.5 milliGRAM(s) two times a day  oxycodone    5 mG/acetaminophen 325 mG 1 Tablet(s) every 4 hours PRN  oxycodone    5 mG/acetaminophen 325 mG 2 Tablet(s) every 6 hours PRN  pantoprazole    Tablet 40 milliGRAM(s) two times a day  polyethylene glycol 3350 17 Gram(s) daily  psyllium Powder 1 Packet(s) daily  senna 2 Tablet(s) at bedtime  tamsulosin 0.4 milliGRAM(s) at bedtime      RECENT LABS - Reviewed                        13.9   8.46  )-----------( 215      ( 03 Jun 2020 05:43 )             40.8     06-03    141  |  102  |  32.0<H>  ----------------------------<  115<H>  4.2   |  29.0  |  0.91    Ca    9.2      03 Jun 2020 05:43  Phos  3.4     06-03  Mg     2.3     06-03      Left shoulder XR - ind rev. DJD        ----------------------------------------------------------------------------------------  PHYSICAL EXAM  Constitutional - NAD, Comfortable  HEENT - Craniotomy - +Dressing  Extremities - Left LE swelling and calf tenderness  Neurologic Exam -                    Motor                      LEFT    UE - ShAB 1/5, EF 1/5, EE 2/5, WE 0/5,  0/5                    LEFT    LE - HF 4/5, KE 5/5, DF 5/5, PF 5/5     Sensory - Intact to LT     Coordination - FTN impaired - Left  Psychiatric - Irritable  ----------------------------------------------------------------------------------------    ASSESSMENT/PLAN  79yMale with functional deficits secondary to recurrent GBM s/p craniotomy   Right GBM s/p craniotomy 5/27 - Decadron, Keppra   CAD - Lipitor  HTN - Lasix, Change Labetalol IV to PO - rehab planning   Anxiety - Xanax PRN  DM2 - SSI    - Flomax  Pain - Tylenol, Percocet, Lidoderm   Left shoulder pain - XR ordered --> DJD  DVT PPX - Lovenox   Rehab - Continue to recommend ACUTE inpatient rehabilitation for the functional deficits consisting of 3 hours of therapy/day & 24 hour RN/daily PMR physician for comorbid medical management. Will continue to follow for ongoing rehab needs and recommendations. Patient will be able to tolerate 3 hours a day.    Continue bedside therapy as well as OOB throughout the day with mobilization throughout the day with staff to maintain cardiopulmonary function and prevention of secondary complications related to debility.

## 2020-06-03 NOTE — PROGRESS NOTE ADULT - ASSESSMENT
79ym s/p cranio for brain tumor  Plan  1. Decadron 2 mg q12  2. Keppra continue  3. Care Coordinator spoken to [facility] is the rehab of choice for the patient,  At this time we are awaiting bed.  4. Request was placed to physical therapy to have the patient reevaluated - Pt is considered acute, A request for an increase in PT provided during the day   was placed to PT management on 6/3

## 2020-06-03 NOTE — PROGRESS NOTE ADULT - SUBJECTIVE AND OBJECTIVE BOX
INTERVAL HPI/OVERNIGHT EVENTS:  Post op   MEDICATIONS  (STANDING):  atorvastatin 20 milliGRAM(s) Oral at bedtime  dexAMETHasone     Tablet 2 milliGRAM(s) Oral every 12 hours  enoxaparin Injectable 40 milliGRAM(s) SubCutaneous at bedtime  furosemide    Tablet 40 milliGRAM(s) Oral daily  levETIRAcetam 1000 milliGRAM(s) Oral two times a day  lidocaine   Patch 1 Patch Transdermal daily  metoprolol tartrate 12.5 milliGRAM(s) Oral two times a day  pantoprazole    Tablet 40 milliGRAM(s) Oral two times a day  polyethylene glycol 3350 17 Gram(s) Oral daily  psyllium Powder 1 Packet(s) Oral daily  senna 2 Tablet(s) Oral at bedtime  tamsulosin 0.4 milliGRAM(s) Oral at bedtime    MEDICATIONS  (PRN):  acetaminophen   Tablet .. 650 milliGRAM(s) Oral every 6 hours PRN Mild Pain (1 - 3), Moderate Pain (4 - 6)  ALPRAZolam 0.25 milliGRAM(s) Oral every 8 hours PRN anxiety  benzocaine 15 mG/menthol 3.6 mG (Sugar-Free) Lozenge 1 Lozenge Oral every 2 hours PRN Sore Throat  labetalol Injectable 10 milliGRAM(s) IV Push every 4 hours PRN Systolic blood pressure >160  oxycodone    5 mG/acetaminophen 325 mG 1 Tablet(s) Oral every 4 hours PRN Moderate Pain (4 - 6)  oxycodone    5 mG/acetaminophen 325 mG 2 Tablet(s) Oral every 6 hours PRN Severe Pain (7 - 10)      Allergies    No Known Allergies    Intolerances          Vital Signs Last 24 Hrs  T(C): 36.4 (03 Jun 2020 16:08), Max: 36.7 (03 Jun 2020 09:32)  T(F): 97.6 (03 Jun 2020 16:08), Max: 98.1 (03 Jun 2020 09:32)  HR: 67 (03 Jun 2020 16:08) (61 - 77)  BP: 143/91 (03 Jun 2020 16:08) (117/77 - 143/91)  BP(mean): --  RR: 18 (03 Jun 2020 16:08) (18 - 20)  SpO2: 99% (03 Jun 2020 16:08) (96% - 99%) BMI (kg/m2): 24.5 (05-27-20 @ 11:48)      PHYSICAL EXAM  GENERAL: NAD, well-groomed, well-developed  HEAD:  Atraumatic, Normocephalic  EYES: EOMI, PERRLA, conjunctiva and sclera clear  ENMT: No tonsillar erythema, exudates, or enlargement; Moist mucous membranes, Good dentition, No lesions  NECK: Supple, No JVD, Normal thyroid  NERVOUS SYSTEM:  Alert & Oriented X3, Good concentration; Motor Strength 5/5 B/L upper and lower extremities; DTRs 2+ intact and symmetric  CHEST/LUNG: Clear to percussion bilaterally; No rales, rhonchi, wheezing, or rubs  HEART: Regular rate and rhythm; No murmurs, rubs, or gallops  ABDOMEN: Soft, Nontender, Nondistended; Bowel sounds present  EXTREMITIES:  2+ Peripheral Pulses, No clubbing, cyanosis, or edema  LYMPH: No lymphadenopathy noted  SKIN: No rashes or lesions      LABS:                          13.9   8.46  )-----------( 215      ( 03 Jun 2020 05:43 )             40.8     06-03    141  |  102  |  32.0<H>  ----------------------------<  115<H>  4.2   |  29.0  |  0.91    Ca    9.2      03 Jun 2020 05:43  Phos  3.4     06-03  Mg     2.3     06-03          I&O's Detail    02 Jun 2020 07:01  -  03 Jun 2020 07:00  --------------------------------------------------------  IN:    Oral Fluid: 250 mL  Total IN: 250 mL    OUT:    Voided: 350 mL  Total OUT: 350 mL    Total NET: -100 mL      03 Jun 2020 07:01  -  03 Jun 2020 16:47  --------------------------------------------------------  IN:    Oral Fluid: 660 mL  Total IN: 660 mL    OUT:  Total OUT: 0 mL    Total NET: 660 mL        RADIOLOGY & ADDITIONAL TESTS: INTERVAL HPI/OVERNIGHT EVENTS:  Post op day #7 s/p cranio for brain tumor  Pt during hospitalization fell on Friday 5/29  Pt is expressing his eagerness to go to rehab. He denies headaches, Hiccups seem slight improved.    MEDICATIONS  (STANDING):  atorvastatin 20 milliGRAM(s) Oral at bedtime  dexAMETHasone     Tablet 2 milliGRAM(s) Oral every 12 hours  enoxaparin Injectable 40 milliGRAM(s) SubCutaneous at bedtime  furosemide    Tablet 40 milliGRAM(s) Oral daily  levETIRAcetam 1000 milliGRAM(s) Oral two times a day  lidocaine   Patch 1 Patch Transdermal daily  metoprolol tartrate 12.5 milliGRAM(s) Oral two times a day  pantoprazole    Tablet 40 milliGRAM(s) Oral two times a day  polyethylene glycol 3350 17 Gram(s) Oral daily  psyllium Powder 1 Packet(s) Oral daily  senna 2 Tablet(s) Oral at bedtime  tamsulosin 0.4 milliGRAM(s) Oral at bedtime    MEDICATIONS  (PRN):  acetaminophen   Tablet .. 650 milliGRAM(s) Oral every 6 hours PRN Mild Pain (1 - 3), Moderate Pain (4 - 6)  ALPRAZolam 0.25 milliGRAM(s) Oral every 8 hours PRN anxiety  benzocaine 15 mG/menthol 3.6 mG (Sugar-Free) Lozenge 1 Lozenge Oral every 2 hours PRN Sore Throat  labetalol Injectable 10 milliGRAM(s) IV Push every 4 hours PRN Systolic blood pressure >160  oxycodone    5 mG/acetaminophen 325 mG 1 Tablet(s) Oral every 4 hours PRN Moderate Pain (4 - 6)  oxycodone    5 mG/acetaminophen 325 mG 2 Tablet(s) Oral every 6 hours PRN Severe Pain (7 - 10)      Allergies  No Known Allergies  Intolerances      Vital Signs Last 24 Hrs  T(C): 36.4 (03 Jun 2020 16:08), Max: 36.7 (03 Jun 2020 09:32)  T(F): 97.6 (03 Jun 2020 16:08), Max: 98.1 (03 Jun 2020 09:32)  HR: 67 (03 Jun 2020 16:08) (61 - 77)  BP: 143/91 (03 Jun 2020 16:08) (117/77 - 143/91)  BP(mean): --  RR: 18 (03 Jun 2020 16:08) (18 - 20)  SpO2: 99% (03 Jun 2020 16:08) (96% - 99%) BMI (kg/m2): 24.5 (05-27-20 @ 11:48)      PHYSICAL EXAM    GENERAL: NAD, well-groomed, well-developed  HEAD:  Atraumatic, Normocephalic,   wound -clean dry intact, staples.  EYES: EOMI, PERRLA, conjunctiva and sclera clear  ENMT: No tonsillar erythema, exudates, or enlargement; Moist mucous membranes, Good dentition, left facial   NECK: Supple,   NERVOUS SYSTEM:  Alert & Oriented X3, Good concentration; Motor Strength 5/5 right upper and lower extremities; left UE 4/5, left LE 4/5 DTRs 2+ intact and symmetric  EXTREMITIES:  2+ Peripheral Pulses, No edema  LYMPH: No lymphadenopathy noted  SKIN: No rashes or lesions      LABS:                          13.9   8.46  )-----------( 215      ( 03 Jun 2020 05:43 )             40.8     06-03    141  |  102  |  32.0<H>  ----------------------------<  115<H>  4.2   |  29.0  |  0.91    Ca    9.2      03 Jun 2020 05:43  Phos  3.4     06-03  Mg     2.3     06-03          I&O's Detail    02 Jun 2020 07:01  -  03 Jun 2020 07:00  --------------------------------------------------------  IN:    Oral Fluid: 250 mL  Total IN: 250 mL    OUT:    Voided: 350 mL  Total OUT: 350 mL    Total NET: -100 mL      03 Jun 2020 07:01  -  03 Jun 2020 16:47  --------------------------------------------------------  IN:    Oral Fluid: 660 mL  Total IN: 660 mL    OUT:  Total OUT: 0 mL    Total NET: 660 mL        RADIOLOGY & ADDITIONAL TESTS:  < from: CT Head No Cont (05.29.20 @ 20:20) >   EXAM:  CT BRAIN                        PROCEDURE DATE:  05/29/2020    IMPRESSION:   RIGHT parietal craniotomy with underlying surgical cavity containing air, fluid and hemorrhage. Vasogenic edema is noted. Pneumocephalus is unchanged. Mild periventricular white matter ischemia with old lacunar infarction in the LEFT basal ganglia.   < end of copied text >

## 2020-06-04 ENCOUNTER — TRANSCRIPTION ENCOUNTER (OUTPATIENT)
Age: 80
End: 2020-06-04

## 2020-06-04 ENCOUNTER — INPATIENT (INPATIENT)
Facility: HOSPITAL | Age: 80
LOS: 12 days | Discharge: HOME HEALTH | DRG: 949 | End: 2020-06-17
Attending: STUDENT IN AN ORGANIZED HEALTH CARE EDUCATION/TRAINING PROGRAM | Admitting: STUDENT IN AN ORGANIZED HEALTH CARE EDUCATION/TRAINING PROGRAM
Payer: MEDICARE

## 2020-06-04 VITALS
DIASTOLIC BLOOD PRESSURE: 75 MMHG | RESPIRATION RATE: 20 BRPM | HEART RATE: 81 BPM | SYSTOLIC BLOOD PRESSURE: 140 MMHG | TEMPERATURE: 99 F | OXYGEN SATURATION: 100 %

## 2020-06-04 VITALS
SYSTOLIC BLOOD PRESSURE: 174 MMHG | OXYGEN SATURATION: 99 % | HEART RATE: 65 BPM | TEMPERATURE: 98 F | DIASTOLIC BLOOD PRESSURE: 84 MMHG | RESPIRATION RATE: 14 BRPM

## 2020-06-04 DIAGNOSIS — Z98.890 OTHER SPECIFIED POSTPROCEDURAL STATES: Chronic | ICD-10-CM

## 2020-06-04 DIAGNOSIS — D32.0 BENIGN NEOPLASM OF CEREBRAL MENINGES: ICD-10-CM

## 2020-06-04 DIAGNOSIS — Z90.89 ACQUIRED ABSENCE OF OTHER ORGANS: Chronic | ICD-10-CM

## 2020-06-04 DIAGNOSIS — D17.0 BENIGN LIPOMATOUS NEOPLASM OF SKIN AND SUBCUTANEOUS TISSUE OF HEAD, FACE AND NECK: Chronic | ICD-10-CM

## 2020-06-04 DIAGNOSIS — Z90.79 ACQUIRED ABSENCE OF OTHER GENITAL ORGAN(S): Chronic | ICD-10-CM

## 2020-06-04 DIAGNOSIS — C44.711 BASAL CELL CARCINOMA OF SKIN OF UNSPECIFIED LOWER LIMB, INCLUDING HIP: Chronic | ICD-10-CM

## 2020-06-04 DIAGNOSIS — D49.6 NEOPLASM OF UNSPECIFIED BEHAVIOR OF BRAIN: ICD-10-CM

## 2020-06-04 DIAGNOSIS — Z98.41 CATARACT EXTRACTION STATUS, RIGHT EYE: Chronic | ICD-10-CM

## 2020-06-04 LAB
MAGNESIUM SERPL-MCNC: 2.2 MG/DL — SIGNIFICANT CHANGE UP (ref 1.6–2.6)
PHOSPHATE SERPL-MCNC: 3 MG/DL — SIGNIFICANT CHANGE UP (ref 2.4–4.7)

## 2020-06-04 PROCEDURE — 88307 TISSUE EXAM BY PATHOLOGIST: CPT

## 2020-06-04 PROCEDURE — 73030 X-RAY EXAM OF SHOULDER: CPT

## 2020-06-04 PROCEDURE — 86923 COMPATIBILITY TEST ELECTRIC: CPT

## 2020-06-04 PROCEDURE — 97112 NEUROMUSCULAR REEDUCATION: CPT

## 2020-06-04 PROCEDURE — 70553 MRI BRAIN STEM W/O & W/DYE: CPT

## 2020-06-04 PROCEDURE — 97535 SELF CARE MNGMENT TRAINING: CPT

## 2020-06-04 PROCEDURE — 80048 BASIC METABOLIC PNL TOTAL CA: CPT

## 2020-06-04 PROCEDURE — 97167 OT EVAL HIGH COMPLEX 60 MIN: CPT

## 2020-06-04 PROCEDURE — 36415 COLL VENOUS BLD VENIPUNCTURE: CPT

## 2020-06-04 PROCEDURE — 80076 HEPATIC FUNCTION PANEL: CPT

## 2020-06-04 PROCEDURE — 83735 ASSAY OF MAGNESIUM: CPT

## 2020-06-04 PROCEDURE — C1763: CPT

## 2020-06-04 PROCEDURE — 99232 SBSQ HOSP IP/OBS MODERATE 35: CPT

## 2020-06-04 PROCEDURE — 85027 COMPLETE CBC AUTOMATED: CPT

## 2020-06-04 PROCEDURE — 99024 POSTOP FOLLOW-UP VISIT: CPT

## 2020-06-04 PROCEDURE — C1889: CPT

## 2020-06-04 PROCEDURE — 97110 THERAPEUTIC EXERCISES: CPT

## 2020-06-04 PROCEDURE — 82962 GLUCOSE BLOOD TEST: CPT

## 2020-06-04 PROCEDURE — 99238 HOSP IP/OBS DSCHRG MGMT 30/<: CPT

## 2020-06-04 PROCEDURE — C1713: CPT

## 2020-06-04 PROCEDURE — 84100 ASSAY OF PHOSPHORUS: CPT

## 2020-06-04 PROCEDURE — 97163 PT EVAL HIGH COMPLEX 45 MIN: CPT

## 2020-06-04 PROCEDURE — 97530 THERAPEUTIC ACTIVITIES: CPT

## 2020-06-04 PROCEDURE — 93970 EXTREMITY STUDY: CPT

## 2020-06-04 PROCEDURE — 97116 GAIT TRAINING THERAPY: CPT

## 2020-06-04 PROCEDURE — 80053 COMPREHEN METABOLIC PANEL: CPT

## 2020-06-04 PROCEDURE — 87635 SARS-COV-2 COVID-19 AMP PRB: CPT

## 2020-06-04 PROCEDURE — 70450 CT HEAD/BRAIN W/O DYE: CPT

## 2020-06-04 RX ORDER — METOPROLOL TARTRATE 50 MG
1 TABLET ORAL
Qty: 0 | Refills: 0 | DISCHARGE
Start: 2020-06-04

## 2020-06-04 RX ORDER — TAMSULOSIN HYDROCHLORIDE 0.4 MG/1
0.4 CAPSULE ORAL AT BEDTIME
Refills: 0 | Status: DISCONTINUED | OUTPATIENT
Start: 2020-06-04 | End: 2020-06-17

## 2020-06-04 RX ORDER — SENNA PLUS 8.6 MG/1
2 TABLET ORAL AT BEDTIME
Refills: 0 | Status: DISCONTINUED | OUTPATIENT
Start: 2020-06-04 | End: 2020-06-17

## 2020-06-04 RX ORDER — DEXAMETHASONE 0.5 MG/5ML
2 ELIXIR ORAL EVERY 12 HOURS
Refills: 0 | Status: DISCONTINUED | OUTPATIENT
Start: 2020-06-04 | End: 2020-06-17

## 2020-06-04 RX ORDER — ASPIRIN/CALCIUM CARB/MAGNESIUM 324 MG
81 TABLET ORAL DAILY
Refills: 0 | Status: DISCONTINUED | OUTPATIENT
Start: 2020-06-08 | End: 2020-06-17

## 2020-06-04 RX ORDER — POLYETHYLENE GLYCOL 3350 17 G/17G
17 POWDER, FOR SOLUTION ORAL DAILY
Refills: 0 | Status: DISCONTINUED | OUTPATIENT
Start: 2020-06-04 | End: 2020-06-09

## 2020-06-04 RX ORDER — LEVETIRACETAM 250 MG/1
1000 TABLET, FILM COATED ORAL
Refills: 0 | Status: DISCONTINUED | OUTPATIENT
Start: 2020-06-04 | End: 2020-06-17

## 2020-06-04 RX ORDER — BENZOCAINE AND MENTHOL 5; 1 G/100ML; G/100ML
1 LIQUID ORAL
Qty: 0 | Refills: 0 | DISCHARGE
Start: 2020-06-04

## 2020-06-04 RX ORDER — PANTOPRAZOLE SODIUM 20 MG/1
40 TABLET, DELAYED RELEASE ORAL
Refills: 0 | Status: DISCONTINUED | OUTPATIENT
Start: 2020-06-04 | End: 2020-06-17

## 2020-06-04 RX ORDER — BENZOCAINE AND MENTHOL 5; 1 G/100ML; G/100ML
1 LIQUID ORAL THREE TIMES A DAY
Refills: 0 | Status: DISCONTINUED | OUTPATIENT
Start: 2020-06-04 | End: 2020-06-17

## 2020-06-04 RX ORDER — ATORVASTATIN CALCIUM 80 MG/1
20 TABLET, FILM COATED ORAL AT BEDTIME
Refills: 0 | Status: DISCONTINUED | OUTPATIENT
Start: 2020-06-04 | End: 2020-06-17

## 2020-06-04 RX ORDER — METOPROLOL TARTRATE 50 MG
25 TABLET ORAL DAILY
Refills: 0 | Status: DISCONTINUED | OUTPATIENT
Start: 2020-06-04 | End: 2020-06-04

## 2020-06-04 RX ORDER — ACETAMINOPHEN 500 MG
650 TABLET ORAL EVERY 6 HOURS
Refills: 0 | Status: DISCONTINUED | OUTPATIENT
Start: 2020-06-04 | End: 2020-06-17

## 2020-06-04 RX ORDER — ALPRAZOLAM 0.25 MG
0.25 TABLET ORAL EVERY 8 HOURS
Refills: 0 | Status: DISCONTINUED | OUTPATIENT
Start: 2020-06-04 | End: 2020-06-11

## 2020-06-04 RX ORDER — METOPROLOL TARTRATE 50 MG
1 TABLET ORAL
Qty: 0 | Refills: 0 | DISCHARGE

## 2020-06-04 RX ORDER — METOPROLOL TARTRATE 50 MG
25 TABLET ORAL DAILY
Refills: 0 | Status: DISCONTINUED | OUTPATIENT
Start: 2020-06-05 | End: 2020-06-17

## 2020-06-04 RX ORDER — PSYLLIUM SEED (WITH DEXTROSE)
1 POWDER (GRAM) ORAL DAILY
Refills: 0 | Status: DISCONTINUED | OUTPATIENT
Start: 2020-06-04 | End: 2020-06-09

## 2020-06-04 RX ORDER — FUROSEMIDE 40 MG
40 TABLET ORAL DAILY
Refills: 0 | Status: DISCONTINUED | OUTPATIENT
Start: 2020-06-05 | End: 2020-06-17

## 2020-06-04 RX ORDER — ENOXAPARIN SODIUM 100 MG/ML
40 INJECTION SUBCUTANEOUS AT BEDTIME
Refills: 0 | Status: DISCONTINUED | OUTPATIENT
Start: 2020-06-04 | End: 2020-06-17

## 2020-06-04 RX ORDER — LIDOCAINE 4 G/100G
1 CREAM TOPICAL DAILY
Refills: 0 | Status: DISCONTINUED | OUTPATIENT
Start: 2020-06-04 | End: 2020-06-09

## 2020-06-04 RX ADMIN — Medication 12.5 MILLIGRAM(S): at 05:47

## 2020-06-04 RX ADMIN — LEVETIRACETAM 1000 MILLIGRAM(S): 250 TABLET, FILM COATED ORAL at 19:12

## 2020-06-04 RX ADMIN — PANTOPRAZOLE SODIUM 40 MILLIGRAM(S): 20 TABLET, DELAYED RELEASE ORAL at 19:13

## 2020-06-04 RX ADMIN — Medication 2 MILLIGRAM(S): at 05:47

## 2020-06-04 RX ADMIN — Medication 25 MILLIGRAM(S): at 14:27

## 2020-06-04 RX ADMIN — SENNA PLUS 2 TABLET(S): 8.6 TABLET ORAL at 21:42

## 2020-06-04 RX ADMIN — LEVETIRACETAM 1000 MILLIGRAM(S): 250 TABLET, FILM COATED ORAL at 05:47

## 2020-06-04 RX ADMIN — BENZOCAINE AND MENTHOL 1 LOZENGE: 5; 1 LIQUID ORAL at 21:43

## 2020-06-04 RX ADMIN — PANTOPRAZOLE SODIUM 40 MILLIGRAM(S): 20 TABLET, DELAYED RELEASE ORAL at 05:47

## 2020-06-04 RX ADMIN — Medication 40 MILLIGRAM(S): at 05:47

## 2020-06-04 RX ADMIN — Medication 2 MILLIGRAM(S): at 19:12

## 2020-06-04 RX ADMIN — ENOXAPARIN SODIUM 40 MILLIGRAM(S): 100 INJECTION SUBCUTANEOUS at 21:42

## 2020-06-04 RX ADMIN — TAMSULOSIN HYDROCHLORIDE 0.4 MILLIGRAM(S): 0.4 CAPSULE ORAL at 21:42

## 2020-06-04 RX ADMIN — ATORVASTATIN CALCIUM 20 MILLIGRAM(S): 80 TABLET, FILM COATED ORAL at 21:42

## 2020-06-04 RX ADMIN — LIDOCAINE 1 PATCH: 4 CREAM TOPICAL at 12:35

## 2020-06-04 NOTE — H&P ADULT - NSHPSOCIALHISTORY_GEN_ALL_CORE
SOCIAL HISTORY  - Smoking: denied  - Alcohol: denied  - Drug Use: denied    FUNCTIONAL HISTORY  Patient independent PTA. Mod I outside with using straight cane.  Lives in private home with wife. Two Children live nearby.   1 KEVIN. Bed and bath on ground level   Driving prior and was active.       CURRENT FUNCTIONAL STATUS  6/4  Bed Mobility  Bed Mobility Training Sit-to-Supine: pt. maintained OOB    Sit-Stand Transfer Training  Transfer Training Sit-to-Stand Transfer: minimum assist (75% patient effort);  1 person assist;  full weight-bearing   WBQC  Transfer Training Stand-to-Sit Transfer: minimum assist (75% patient effort);  1 person assist;  WBQC  Sit-to-Stand Transfer Training Transfer Safety Analysis: decreased balance;  decreased flexibility;  decreased strength;  impaired balance;  pain    Gait Training  Gait Training: minimum assist (75% patient effort);  1 person assist;  full weight-bearing   WBQC;  50 feet  Gait Analysis: increased time in double stance;  decreased step length;  decreased stride length;  decreased ROM;  decreased flexibility;  abnormal muscle tone;  impaired balance    Therapeutic Exercise  Therapeutic Exercise Detail: Patient educated on bilateral lower extremity therex to perform after rest period (ankle pumps, LAQ, and seated marching 3x10). Patient provided return demonstration. SOCIAL HISTORY  - Smoking: quit age 37  - Alcohol: 2 glasses of wine daily as per pt.  - Drug Use: denied    FUNCTIONAL HISTORY  Patient reports independent PTA and driving. As per SW discussion with wife, Pt. independent in ADLs but had some difficulty ambulating.  Has a cane and RW but did not use them.   Mod I outside with using straight cane.  Lives in private home with wife and son in [place].    1 KEVIN. Bed and bath on ground level     Retired [employer]       CURRENT FUNCTIONAL STATUS  6/4  Bed Mobility  Bed Mobility Training Sit-to-Supine: pt. maintained OOB    Sit-Stand Transfer Training  Transfer Training Sit-to-Stand Transfer: minimum assist (75% patient effort);  1 person assist;  full weight-bearing   WBQC  Transfer Training Stand-to-Sit Transfer: minimum assist (75% patient effort);  1 person assist;  WBQC  Sit-to-Stand Transfer Training Transfer Safety Analysis: decreased balance;  decreased flexibility;  decreased strength;  impaired balance;  pain    Gait Training  Gait Training: minimum assist (75% patient effort);  1 person assist;  full weight-bearing   WBQC;  50 feet  Gait Analysis: increased time in double stance;  decreased step length;  decreased stride length;  decreased ROM;  decreased flexibility;  abnormal muscle tone;  impaired balance    Therapeutic Exercise  Therapeutic Exercise Detail: Patient educated on bilateral lower extremity therex to perform after rest period (ankle pumps, LAQ, and seated marching 3x10). Patient provided return demonstration.

## 2020-06-04 NOTE — PROGRESS NOTE ADULT - SUBJECTIVE AND OBJECTIVE BOX
Patient is doing well.   Working with PT and performed well.  Needs sling for left shoulder subluxation.     REVIEW OF SYSTEMS  Constitutional - No fever,  No fatigue  Neurological - +loss of strength    FUNCTIONAL PROGRESS  6/3  Bed Mobility  Bed Mobility Training Sit-to-Supine: supervsion;  supervision;  verbal cues  Bed Mobility Training Supine-to-Sit: minimum assist (75% patient effort);  verbal cues;  1 person assist  Bed Mobility Training Limitations: decreased strength;  decreased ability to use arms for pushing/pulling;  abnormal muscle tone;  impaired balance    Sit-Stand Transfer Training  Transfer Training Sit-to-Stand Transfer: minimum assist (75% patient effort);  1 person assist;  verbal cues;  quad cane  Transfer Training Stand-to-Sit Transfer: minimum assist (75% patient effort);  1 person assist;  verbal cues;  quad cane  Sit-to-Stand Transfer Training Transfer Safety Analysis: decreased weight-shifting ability;  decreased strength;  impaired balance;  abnormal muscle tone;  impaired coordination;  quad cane    Gait Training  Gait Training Symptoms Noted During/After Treatment: fatigue  Gait Training: minimum assist (75% patient effort);  1 person assist;  verbal cues;  quad cane;  50 feet  Gait Analysis: 3-point gait   decreased iveth;  decreased step length;  decreased stride length;  decreased hip/knee flexion;  decreased weight-shifting ability;  decreased strength;  impaired balance;  impaired coordination;  impaired motor control;  cognitive, decreased safety awareness;  50 feet;  quad cane  Type of Rest Type of Rest: standing  Duration of Rest Duration of Rest: 1 minute       VITALS  T(C): 37.1 (06-04-20 @ 09:49), Max: 37.1 (06-04-20 @ 09:49)  HR: 81 (06-04-20 @ 09:49) (58 - 81)  BP: 140/75 (06-04-20 @ 09:49) (123/75 - 143/91)  RR: 20 (06-04-20 @ 09:49) (18 - 20)  SpO2: 100% (06-04-20 @ 09:49) (94% - 100%)  Wt(kg): --    MEDICATIONS   acetaminophen   Tablet .. 650 milliGRAM(s) every 6 hours PRN  ALPRAZolam 0.25 milliGRAM(s) every 8 hours PRN  atorvastatin 20 milliGRAM(s) at bedtime  benzocaine 15 mG/menthol 3.6 mG (Sugar-Free) Lozenge 1 Lozenge every 2 hours PRN  dexAMETHasone     Tablet 2 milliGRAM(s) every 12 hours  enoxaparin Injectable 40 milliGRAM(s) at bedtime  furosemide    Tablet 40 milliGRAM(s) daily  labetalol Injectable 10 milliGRAM(s) every 4 hours PRN  levETIRAcetam 1000 milliGRAM(s) two times a day  lidocaine   Patch 1 Patch daily  metoprolol succinate ER 25 milliGRAM(s) daily  pantoprazole    Tablet 40 milliGRAM(s) two times a day  polyethylene glycol 3350 17 Gram(s) daily  psyllium Powder 1 Packet(s) daily  senna 2 Tablet(s) at bedtime  tamsulosin 0.4 milliGRAM(s) at bedtime      RECENT LABS - Reviewed                        13.9   8.46  )-----------( 215      ( 03 Jun 2020 05:43 )             40.8     06-03    141  |  102  |  32.0<H>  ----------------------------<  115<H>  4.2   |  29.0  |  0.91    Ca    9.2      03 Jun 2020 05:43  Phos  3.0     06-04  Mg     2.2     06-04              -------------------------------------------------------------------  PHYSICAL EXAM  Constitutional - NAD, Comfortable  HEENT - Craniotomy - +Dressing  Extremities - Left LE swelling and calf tenderness  Neurologic Exam -                    Motor                      LEFT    UE - ShAB 1/5, EF 1/5, EE 2/5, WE 0/5,  0/5                    LEFT    LE - HF 4/5, KE 5/5, DF 5/5, PF 5/5     Sensory - Intact to LT     Coordination - FTN impaired - Left  Psychiatric - Irritable  ----------------------------------------------------------------------------------------    ASSESSMENT/PLAN  79yMale with functional deficits secondary to recurrent GBM s/p craniotomy   Right GBM s/p craniotomy 5/27 - Decadron, Keppra   CAD - Lipitor  HTN - Lasix, Change Labetalol IV to PO - rehab planning   Anxiety - Xanax PRN  DM2 - SSI   Left UE weakness - SLING OOB   - Flomax  Pain - Tylenol, Percocet, Lidoderm   Left shoulder pain - XR ordered --> DJD  DVT PPX - Lovenox   Rehab - Continue to recommend ACUTE inpatient rehabilitation for the functional deficits consisting of 3 hours of therapy/day & 24 hour RN/daily PMR physician for comorbid medical management. Will continue to follow for ongoing rehab needs and recommendations. Patient will be able to tolerate 3 hours a day.    Continue bedside therapy as well as OOB throughout the day with mobilization throughout the day with staff to maintain cardiopulmonary function and prevention of secondary complications related to debility.

## 2020-06-04 NOTE — H&P ADULT - NSHPPHYSICALEXAM_GEN_ALL_CORE
PHYSICAL EXAMINATION   VItals: T(C): 36.4 (06-04-20 @ 19:38), Max: 37.1 (06-04-20 @ 09:49)  HR: 75 (06-04-20 @ 19:38) (58 - 81)  BP: 140/68 (06-04-20 @ 19:38) (123/75 - 174/84)  RR: 14 (06-04-20 @ 19:38) (14 - 20)  SpO2: 95% (06-04-20 @ 19:38) (94% - 100%)    General: NAD,  Comfortable,       hiccups                           HEENT: right crani site insiion healing well, c/d/i with sutures, single staple from drain, EOM I, Normal Conjunctivae, mild right periorbital swelling  Cardio: RRR                            Pulm: No Respiratory Distress              Abdomen: ND/NT, Soft, BS+                                                Ext: mild b/l LE swelling L>R, Pulses 2+ throughout, No calf tenderness      Neurological Examination    Cognitive: AAO x 3                                                                                               Memory: Recall 3 objects immediate and 3 min later      Mood/Affect: mild flat affect  Communication:  Fluent,  slight dysarthria  CN II - XII  left facial droop                                                               Sensory: Intact to light touch    Motor    LEFT    UE: SF [3/5], EF [4/5], EE [4/5], WE [1/5],  [1/5]  RIGHT UE: SF [5/5], EF [5/5], EE [5/5], WE [5/5],  [wnl]  LEFT    LE:  HF [5/5], KE [5/5], DF [5/5], EHL [5/5],  PF [5/5]  RIGHT LE:  HF [5/5], KE [5/5], DF [5/5], EHL [5/5],  PF [5/5] PHYSICAL EXAMINATION   VItals: T(C): 36.4 (06-04-20 @ 19:38), Max: 37.1 (06-04-20 @ 09:49)  HR: 75 (06-04-20 @ 19:38) (58 - 81)  BP: 140/68 (06-04-20 @ 19:38) (123/75 - 174/84)  RR: 14 (06-04-20 @ 19:38) (14 - 20)  SpO2: 95% (06-04-20 @ 19:38) (94% - 100%)    General: NAD,  Comfortable,       hiccups                           HEENT: right crani site incision healing well, c/d/i with sutures, single staple from drain, EOM I, Normal Conjunctivae, mild right periorbital swelling  Cardio: RRR                            Pulm: No Respiratory Distress              Abdomen: ND/NT, Soft, BS+                                                Ext: mild b/l LE swelling L>R, Pulses 2+ throughout, No calf tenderness      Neurological Examination    Cognitive: AAO x 3                                                                                               Memory: Recall 3 objects immediate and 3 min later      Mood/Affect: appropriate  Communication:  Fluent,  slight dysarthria  CN II - XII  left facial droop, left hearing loss, EOMI, Visual fields intact, tongue midline, Impaired left shoulder shrug                                                              Sensory: Intact to light touch bilat.  Proprioception impaired in Left UE    Motor    LEFT    UE: SF [2/5], EF [2/5], EE [2/5], WE [1/5],  [1/5]  RIGHT UE: SF [5/5], EF [5/5], EE [5/5], WE [5/5],  [wnl]  LEFT    LE:  HF [5/5], KE [5/5], DF [5/5], EHL [5/5],  PF [5/5]  RIGHT LE:  HF [5/5], KE [5/5], DF [5/5], EHL [5/5],  PF [5/5]    Coordination impaired on left UE, otherwise intact

## 2020-06-04 NOTE — PROGRESS NOTE ADULT - ASSESSMENT
Post op day #8 s/p cranio for recurrent GBM resection w Dr Rivers on 5/27/20  Pt during hospitalization fell on Friday 5/29 - f/u CTH w stable findings   Pt is expressing his eagerness to go to [facility], spoke w Dr Kelly - awaiting for male bed, [facility]   pt seen by NSx team: He denies headaches, sensorimotor at baseline,   Hiccups seem slight improved. 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)  - 5% necrosis  Patient has previous history Glioblastoma [id number] ([id number])  and 2019 (10-S-)    minimal residual tumor at right posterior frontal lobe w vasogenic edema related to GMB/ tumor and post-op changes   Treatment with Decadron  Recurrent right frontal high-grade glioma with vasogenic edema  all meds adjusted and via PO route, ready for rehab   caloric malnutrition as per nutrition eval - will follow recommendations  1) Continue diet as ordered  2) Encourage po intake and HBV protein  3) Monitor wts and labs  ok to shower/ wash hair w assist - order placed   cont to monitor urinary output/ BS Post op day #8 s/p cranio for recurrent GBM resection w Dr Rivers on 5/27/20  Pt during hospitalization fell on Friday 5/29 - f/u CTH w stable findings   Pt is expressing his eagerness to go to [facility], spoke w Dr Kelly - awaiting for male bed, awaiting call [facility]   pt seen by NSx team: He denies headaches, sensorimotor at baseline,   Hiccups seem slight improved. 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)  - 5% necrosis  Patient has previous history Glioblastoma [id number] ([id number])  and 2019 (10-S-)    minimal residual tumor at right posterior frontal lobe w vasogenic edema related to GMB/ tumor and post-op changes   Treatment with Decadron  Recurrent right frontal high-grade glioma with vasogenic edema  all meds adjusted and via PO route, ready for rehab   moderate protein calorie malnutrition as per nutrition eval - will follow recommendations  1) Continue diet as ordered  2) Encourage po intake and HBV protein  3) Monitor wts and labs  ok to shower/ wash hair w assist - order placed   cont to monitor urinary output/ BS

## 2020-06-04 NOTE — PROGRESS NOTE ADULT - REASON FOR ADMISSION
Brain tumor recurrence
cranio for brain tumor
same day for surg
recurrent GBM resection

## 2020-06-04 NOTE — H&P ADULT - NSHPREVIEWOFSYSTEMS_GEN_ALL_CORE
REVIEW OF SYSTEMS  Constitutional: No fever, No Chills, No fatigue, denies HA  HEENT: No eye pain, No visual disturbances, No difficulty hearing  Pulm: No cough,  No shortness of breath  Cardio: No chest pain, No palpitations  GI:  No abdominal pain, No nausea, No vomiting, No diarrhea, No constipation, last bowel M today  : No dysuria, No frequency, No hematuria  Neuro: No headaches, No memory loss, , No numbness, No tremors, LUE weakness  Endo: No temperature intolerance  MSK: No joint pain, No joint swelling, No muscle pain, No Neck or back pain  Psych:  No depression, No anxiety

## 2020-06-04 NOTE — DISCHARGE NOTE NURSING/CASE MANAGEMENT/SOCIAL WORK - PATIENT PORTAL LINK FT
You can access the FollowMyHealth Patient Portal offered by [facility] by registering at the following website: [email or web address]. By joining [email or web address]’s FollowMyHealth portal, you will also be able to view your health information using other applications (apps) compatible with our system.

## 2020-06-04 NOTE — H&P ADULT - ASSESSMENT
Pt is a 78 y/o M with PMHx of HTN, HLD, prostate cancer s/p prostatectomy, basal cell skin cancer, glioblastoma (s/p resection, radiation, chemotherapy cycle 9 on 9/23/19) and seizures on keppra who presented to [facility] on 5/27 for scheduled stereotactic right craniotomy for brain tumor.    #GBM  - s/p resection on 5/27/20  - continue decadron 2mg q12hrs  - protonix for steroid ppx bid  - Keppra 1000mg bid  - Pain control  - PT/OT/SLP  - will follow up with outpatient neurosurgery and oncology    #HTN  - continue metoprolol ER 25mg   - continue Lasix 40mg  - hospitalist follow up    #HLD  - continue Lipitor    #BPH  - continue Flomax    #Anxiety  - Xanax 0.25mg PRN    #Pain  - Tylenol PRN, Lidocaine patch    #GI  - Senna, Miralax, Metamucil    #Diet  - Regular with thins    #DVT ppx  - Lovenox    MEDICAL PROGNOSIS: GOOD            REHAB POTENTIAL: GOOD             ESTIMATED DISPOSITION: HOME WITH HOME CARE            ELOS: 10-14 Days   EXPECTED THERAPY:     P.T. 1hr/day       O.T. 1hr/day      S.L.P. 1hr/day      EXP FREQUENCY: 5 days per 7 day period     PRESCREEN COMPARISON:   I have reviewed the prescreen information and I have found no relevant changes between the preadmission screening and my post admission evaluation     RATIONALE FOR INPATIENT ADMISSION - Patient demonstrates the following: (check all that apply)  [X] Medically appropriate for rehabilitation admission  [X] Has attainable rehab goals with an appropriate initial discharge plan  [X] Has rehabilitation potential (expected to make a significant improvement within a reasonable period of time)   [X] Requires close medical management by a rehab physician, rehab nursing care, Hospitalist and comprehensive interdisciplinary team (including PT, OT, & or SLP, Prosthetics and Orthotics) Pt is a 80 y/o M with PMHx of HTN, HLD, prostate cancer s/p prostatectomy, basal cell skin cancer, glioblastoma (s/p resection, radiation, chemotherapy cycle 9 on 9/23/19) and seizures on keppra who presented to [facility] on 5/27 for scheduled stereotactic right craniotomy for brain tumor.    #GBM  - s/p resection on 5/27/20  - continue decadron 2mg q12hrs  - protonix for steroid ppx bid  - Keppra 1000mg bid  - Pain control  - PT/OT/SLP  - will follow up with outpatient neurosurgery and oncology  - OK to restart home Aspirin 81mg on 6/8/20.    #HTN  - continue metoprolol ER 25mg   - continue Lasix 40mg  - hospitalist follow up    #HLD  - continue Lipitor    #BPH  - continue Flomax    #Anxiety  - Xanax 0.25mg PRN    #Pain  - Tylenol PRN, Lidocaine patch    #GI  - Senna, Miralax, Metamucil    #Diet  - Regular with thins    #DVT ppx  - Lovenox    MEDICAL PROGNOSIS: GOOD            REHAB POTENTIAL: GOOD             ESTIMATED DISPOSITION: HOME WITH HOME CARE            ELOS: 10-14 Days   EXPECTED THERAPY:     P.T. 1hr/day       O.T. 1hr/day      S.L.P. 1hr/day      EXP FREQUENCY: 5 days per 7 day period     PRESCREEN COMPARISON:   I have reviewed the prescreen information and I have found no relevant changes between the preadmission screening and my post admission evaluation     RATIONALE FOR INPATIENT ADMISSION - Patient demonstrates the following: (check all that apply)  [X] Medically appropriate for rehabilitation admission  [X] Has attainable rehab goals with an appropriate initial discharge plan  [X] Has rehabilitation potential (expected to make a significant improvement within a reasonable period of time)   [X] Requires close medical management by a rehab physician, rehab nursing care, Hospitalist and comprehensive interdisciplinary team (including PT, OT, & or SLP, Prosthetics and Orthotics) Pt is a 80 y/o M with PMHx of HTN, HLD, prostate cancer s/p prostatectomy, basal cell skin cancer, glioblastoma (s/p resection, radiation, chemotherapy cycle 9 on 9/23/19) and seizures admitted with recurrent right frontal GBM s/p resection 5/27/20.     #GBM  - s/p resection on 5/27/20  - continue decadron 2mg q12hrs  - protonix for steroid ppx bid  - Keppra 1000mg bid  - Pain control  - PT/OT/SLP  - will follow up with outpatient neurosurgery and oncology  - OK to restart home Aspirin 81mg on 6/8/20.    #HTN  - continue metoprolol ER 25mg   - continue Lasix 40mg  - hospitalist follow up    #HLD  - continue Lipitor    #BPH  - continue Flomax    #Anxiety  - Xanax 0.25mg PRN    #Pain  - Tylenol PRN, Lidocaine patch    #GI  - Senna, Miralax, Metamucil    #Diet  - Regular with thins    #DVT ppx  - Lovenox    MEDICAL PROGNOSIS: GOOD            REHAB POTENTIAL: GOOD             ESTIMATED DISPOSITION: HOME WITH HOME CARE            ELOS: 10-14 Days   EXPECTED THERAPY:     P.T. 1hr/day       O.T. 1hr/day      S.L.P. 1hr/day      EXP FREQUENCY: 5 days per 7 day period     PRESCREEN COMPARISON:   I have reviewed the prescreen information and I have found no relevant changes between the preadmission screening and my post admission evaluation     RATIONALE FOR INPATIENT ADMISSION - Patient demonstrates the following: (check all that apply)  [X] Medically appropriate for rehabilitation admission  [X] Has attainable rehab goals with an appropriate initial discharge plan  [X] Has rehabilitation potential (expected to make a significant improvement within a reasonable period of time)   [X] Requires close medical management by a rehab physician, rehab nursing care, Hospitalist and comprehensive interdisciplinary team (including PT, OT, & or SLP, Prosthetics and Orthotics)

## 2020-06-04 NOTE — H&P ADULT - HISTORY OF PRESENT ILLNESS
Pt is a 80 y/o M with PMHx of HTN, HLD, prostate cancer s/p prostatectomy, basal cell skin cancer, glioblastoma (s/p resection, radiation, chemotherapy cycle 9 on 9/23/19) and seizures on keppra who presented to [facility] on 5/27 for scheduled stereotactic right craniotomy for brain tumor. Procedure was performed on 5/27 by Dr. Rivers without any intraoperative complications. Post-op, pt was unstable with ambulation. Post-op imaging was notable for vasogenic edema on MRI brain and CTH. Pt was evaluated by PT and PM&R and was recommended for acute inpatient rehabilitation when medically stable. Pt was deemed ready for discharge on 6/4/20 and was transferred to [facility] for acute inpatient rehabilitation. Pt is a 78 y/o M with PMHx of HTN, HLD, prostate cancer s/p prostatectomy, basal cell skin cancer, glioblastoma (s/p resection, radiation, chemotherapy cycle 9 on 9/23/19) and seizures on keppra who presented to [facility] on 5/27 for scheduled stereotactic right craniotomy for brain tumor. Procedure was performed on 5/27 by Dr. Rivers without any intraoperative complications. Post-op, pt was unstable with ambulation. Post-op imaging was notable for vasogenic edema on MRI brain and CTH. Pt has been on a decadron taper. Pt has chronic left upper extremity pain and weakness, and he was at his baseline level. Pt was evaluated by PT and PM&R and was recommended for acute inpatient rehabilitation when medically stable. Pt was deemed ready for discharge on 6/4/20 and was transferred to [facility] for acute inpatient rehabilitation. Pt is a 80 y/o M with PMHx of HTN, HLD, prostate cancer s/p prostatectomy, basal cell skin cancer, glioblastoma (s/p resection, radiation, chemotherapy cycle 9 on 9/23/19) and seizures on keppra who presented to [facility] on 5/27 for scheduled stereotactic right craniotomy for brain tumor. Procedure was performed on 5/27 by Dr. Rivers without any intraoperative complications. Post-op, pt was unstable with ambulation. Post-op imaging was notable for vasogenic edema on MRI brain and CTH. Pt has been on a decadron taper. Pt has chronic left upper extremity pain and weakness, and he was at his baseline level. Pt was evaluated by PT and PM&R and was recommended for acute inpatient rehabilitation when medically stable. Pt was deemed ready for discharge on 6/4/20 and was transferred to [facility] for acute inpatient rehabilitation. Patient report mild increased weakness on the LUE and no other changes since most recent surgery. Pt is a 80 y/o M with PMHx of HTN, HLD, prostate cancer s/p prostatectomy, basal cell skin cancer, glioblastoma (Dx in Aug. 2018-- 8/3/18 - s/p resection ([facility]) 8/3/18;  s/p chemoradiation treatment ([place]) followed by s/p temozolomide x 3 cycles 10/12/18;  s/p re-resection ([facility]if) right frontal lobe 2/7/19;  radiation, chemotherapy cycle 9 on 9/23/19) and seizures on keppra who presented to [facility] on 5/27 for scheduled stereotactic right craniotomy for brain tumor.   Procedure was performed on 5/27 by Dr. Rivers without any intraoperative complications. Post-op, pt was unstable with ambulation. Post-op imaging was notable for vasogenic edema on MRI brain and CTH. Pt has been on a decadron taper. Pt has chronic left upper extremity pain and weakness, and he was at his baseline level.   Pt was evaluated by PT and PM&R and was recommended for acute inpatient rehabilitation when medically stable. Pt was deemed ready for discharge on 6/4/20 and was transferred to [facility] for acute inpatient rehabilitation. Patient report mild increased weakness on the LUE and no other changes since most recent surgery.

## 2020-06-04 NOTE — PROGRESS NOTE ADULT - THIS PATIENT HAS THE FOLLOWING CONDITION(S)/DIAGNOSES ON THIS ADMISSION:
Cerebral Edema
None
Brain Compression / Herniation
Cerebral Edema
None
None
Cerebral Edema
Cerebral Edema
s/p GMB resection/Cerebral Edema/Brain Compression / Herniation

## 2020-06-04 NOTE — H&P ADULT - NSHPPOAPULMEMBOLUS_GEN_A_CORE
How Severe Are Your Spot(S)?: mild What Is The Reason For Today's Visit?: Full Body Skin Examination What Is The Reason For Today's Visit? (Being Monitored For X): concerning skin lesions on an annual basis no

## 2020-06-04 NOTE — PROGRESS NOTE ADULT - SUBJECTIVE AND OBJECTIVE BOX
NEUROSURGERY PROGRESS NOTE:    Post op day #8 s/p cranio for recurrent GBM resection w Dr Rivers on 5/27/20  Pt during hospitalization fell on Friday 5/29 - f/u CTH w stable findings   Pt is expressing his eagerness to go to [facility], spoke w Dr Kelly - awaiting for male bed, awaiting call [facility]   pt seen by NSx team: He denies headaches, sensorimotor at baseline,   Hiccups seem slight improved. participating w PT/OT   voiding w residual urine on bladder scan and PRN straight catheterization       MEDICATIONS  (STANDING):  atorvastatin 20 milliGRAM(s) Oral at bedtime  dexAMETHasone     Tablet 2 milliGRAM(s) Oral every 12 hours  enoxaparin Injectable 40 milliGRAM(s) SubCutaneous at bedtime  furosemide    Tablet 40 milliGRAM(s) Oral daily  levETIRAcetam 1000 milliGRAM(s) Oral two times a day  lidocaine   Patch 1 Patch Transdermal daily  metoprolol succinate ER 25 milliGRAM(s) Oral daily  pantoprazole    Tablet 40 milliGRAM(s) Oral two times a day  polyethylene glycol 3350 17 Gram(s) Oral daily  psyllium Powder 1 Packet(s) Oral daily  senna 2 Tablet(s) Oral at bedtime  tamsulosin 0.4 milliGRAM(s) Oral at bedtime    MEDICATIONS  (PRN):  acetaminophen   Tablet .. 650 milliGRAM(s) Oral every 6 hours PRN Mild Pain (1 - 3), Moderate Pain (4 - 6)  ALPRAZolam 0.25 milliGRAM(s) Oral every 8 hours PRN anxiety  benzocaine 15 mG/menthol 3.6 mG (Sugar-Free) Lozenge 1 Lozenge Oral every 2 hours PRN Sore Throat  labetalol Injectable 10 milliGRAM(s) IV Push every 4 hours PRN Systolic blood pressure >160    Allergies    No Known Allergies    Intolerances      Vital Signs Last 24 Hrs  T(C): 36.6 (04 Jun 2020 05:40), Max: 36.6 (03 Jun 2020 22:29)  T(F): 97.8 (04 Jun 2020 05:40), Max: 97.8 (03 Jun 2020 22:29)  HR: 58 (04 Jun 2020 05:40) (58 - 67)  BP: 142/78 (04 Jun 2020 05:40) (123/75 - 143/91)  BP(mean): --  RR: 18 (04 Jun 2020 05:40) (18 - 18)  SpO2: 94% (04 Jun 2020 05:40) (94% - 99%)        PHYSICAL EXAM:  GENERAL: NAD, well-groomed, well-developed  HEAD:  Atraumatic, Normocephalic,   wound -clean dry intact, w sutures and one staple over the drain exit site. no erythema or oozing appreciated   EYES: EOMI, PERRLA, conjunctiva and sclera clear,   NECK: Supple,   NERVOUS SYSTEM:  Alert & Oriented X3, Good concentration; +left facial, Motor Strength 5/5 right upper and lower extremities; stable left UE weakness -4/5/ 2/5 , left LE +4/5  EXTREMITIES:  2+ Peripheral Pulses, No edema  LYMPH: No lymphadenopathy noted  SKIN: No rashes or lesions        LABS:                        13.9   8.46  )-----------( 215      ( 03 Jun 2020 05:43 )             40.8     06-03    141  |  102  |  32.0<H>  ----------------------------<  115<H>  4.2   |  29.0  |  0.91    Ca    9.2      03 Jun 2020 05:43  Phos  3.0     06-04  Mg     2.2     06-04          RADIOLOGY & ADDITIONAL TESTS:  no new NSx images available for review   < from: CT Head No Cont (05.29.20 @ 20:20) >  IMPRESSION:   RIGHT parietal craniotomy with underlying surgical cavity containing air, fluid and hemorrhage. Vasogenic edema is noted. Pneumocephalus is unchanged. Mild periventricular white matter ischemia with old lacunar infarction in the LEFT basal ganglia.       < from: MR Head w/wo IV Cont (05.28.20 @ 10:53) >  IMPRESSION:  1.  Status post resection of a right posterior frontal lobe mass. Nodular/thick linear enhancement along the anterior and inferior margins of the resection cavity, suggesting residual tumor.   2.  T2/FLAIR hyperintensity surrounding the resection cavity suggesting vasogenic edema, posttreatment change, or nonenhancing tumor.       Time Spent with patient/ education: > 25 mins arranging care/disposition w acute rehab

## 2020-06-04 NOTE — H&P ADULT - NSICDXPASTMEDICALHX_GEN_ALL_CORE_FT
PAST MEDICAL HISTORY:  Basal cell carcinoma (BCC) of left lower leg s/p resection  right lower leg top  left lower leg    Bilateral hearing loss, unspecified hearing loss type     Fall, sequela     Glioblastoma biopsy 8/2018  surgery  6 weeks radiation  35 days of oral chemotherapy last 3 weeks    HLD (hyperlipidemia)     HTN (hypertension)      activity [employer]-65    [place]    Prostate cancer s/p radical prostatectomy 1995    Type 2 diabetes mellitus without complication, without long-term current use of insulin stop oral meds  3 weeks ago PAST MEDICAL HISTORY:  Basal cell carcinoma (BCC) of left lower leg s/p resection  right lower leg top  left lower leg    Bilateral hearing loss, unspecified hearing loss type     Fall, sequela     Glioblastoma biopsy 8/2018  surgery  6 weeks radiation  35 days of oral chemotherapy last 3 weeks    HLD (hyperlipidemia)     HTN (hypertension)      activity [employer] 1962-65    [place]    Prostate cancer s/p radical prostatectomy 1995    Type 2 diabetes mellitus without complication, without long-term current use of insulin stop oral meds  3 weeks ago

## 2020-06-04 NOTE — H&P ADULT - NSHPLABSRESULTS_GEN_ALL_CORE
.  LABS:                         13.9   8.46  )-----------( 215      ( 03 Jun 2020 05:43 )             40.8     06-03    141  |  102  |  32.0<H>  ----------------------------<  115<H>  4.2   |  29.0  |  0.91    Ca    9.2      03 Jun 2020 05:43  Phos  3.0     06-04  Mg     2.2     06-04                    RADIOLOGY, EKG & ADDITIONAL TESTS: Reviewed.

## 2020-06-04 NOTE — H&P ADULT - NSHPOUTPATIENTPROVIDERS_GEN_ALL_CORE
Vinny Rivers  NEUROSURGERY  [address] [place] [address]  Phone: [phone]  Fax: [phone]  Follow Up Time:     Francisco Harman  NEUROLOGY  [address] [place] [id number]  Phone: [phone]

## 2020-06-04 NOTE — H&P ADULT - ATTENDING COMMENTS
Pt. seen with resident 6/5/20 AM.  Agree with documentation above as per resident on call with amendments made as appropriate. Patient medically stable. Appropriate for acute rehabilitation    No major issues overnight.  Pt. w/o specific complaints.  Anxious about his prognosis    Vital Signs Last 24 Hrs  T(C): 36.3 (05 Jun 2020 08:20), Max: 36.5 (04 Jun 2020 17:56)  T(F): 97.4 (05 Jun 2020 08:20), Max: 97.7 (04 Jun 2020 17:56)  HR: 67 (05 Jun 2020 08:20) (65 - 75)  BP: 149/85 (05 Jun 2020 08:20) (140/68 - 174/84)  BP(mean): --  RR: 15 (05 Jun 2020 08:20) (14 - 15)  SpO2: 97% (05 Jun 2020 08:20) (95% - 99%)    Physical exam as amended above                          11.8   6.94  )-----------( 198      ( 05 Jun 2020 05:35 )             35.2   06-05    141  |  106  |  27<H>  ----------------------------<  105<H>  3.8   |  26  |  1.04    Ca    8.7      05 Jun 2020 05:35  Phos  3.0     06-04  Mg     2.2     06-04    80 y/o M with PMHx of HTN, HLD, prostate cancer s/p prostatectomy, basal cell skin cancer, glioblastoma (s/p resection, radiation, chemotherapy cycle 9 on 9/23/19) and seizures admitted with recurrent right frontal GBM s/p resection 5/27/20.     HTN-- BP elevated, hospitalist added norvasc    Hiccups-- will add magnesium, rec. by hospitalist.     Cont. current medications.

## 2020-06-05 LAB
ANION GAP SERPL CALC-SCNC: 9 MMOL/L — SIGNIFICANT CHANGE UP (ref 5–17)
BUN SERPL-MCNC: 27 MG/DL — HIGH (ref 7–23)
CALCIUM SERPL-MCNC: 8.7 MG/DL — SIGNIFICANT CHANGE UP (ref 8.4–10.5)
CHLORIDE SERPL-SCNC: 106 MMOL/L — SIGNIFICANT CHANGE UP (ref 96–108)
CO2 SERPL-SCNC: 26 MMOL/L — SIGNIFICANT CHANGE UP (ref 22–31)
CREAT SERPL-MCNC: 1.04 MG/DL — SIGNIFICANT CHANGE UP (ref 0.5–1.3)
GLUCOSE SERPL-MCNC: 105 MG/DL — HIGH (ref 70–99)
HCT VFR BLD CALC: 35.2 % — LOW (ref 39–50)
HGB BLD-MCNC: 11.8 G/DL — LOW (ref 13–17)
MCHC RBC-ENTMCNC: 32.1 PG — SIGNIFICANT CHANGE UP (ref 27–34)
MCHC RBC-ENTMCNC: 33.5 GM/DL — SIGNIFICANT CHANGE UP (ref 32–36)
MCV RBC AUTO: 95.7 FL — SIGNIFICANT CHANGE UP (ref 80–100)
NRBC # BLD: 0 /100 WBCS — SIGNIFICANT CHANGE UP (ref 0–0)
PLATELET # BLD AUTO: 198 K/UL — SIGNIFICANT CHANGE UP (ref 150–400)
POTASSIUM SERPL-MCNC: 3.8 MMOL/L — SIGNIFICANT CHANGE UP (ref 3.5–5.3)
POTASSIUM SERPL-SCNC: 3.8 MMOL/L — SIGNIFICANT CHANGE UP (ref 3.5–5.3)
RBC # BLD: 3.68 M/UL — LOW (ref 4.2–5.8)
RBC # FLD: 12.6 % — SIGNIFICANT CHANGE UP (ref 10.3–14.5)
SARS-COV-2 RNA SPEC QL NAA+PROBE: SIGNIFICANT CHANGE UP
SODIUM SERPL-SCNC: 141 MMOL/L — SIGNIFICANT CHANGE UP (ref 135–145)
WBC # BLD: 6.94 K/UL — SIGNIFICANT CHANGE UP (ref 3.8–10.5)
WBC # FLD AUTO: 6.94 K/UL — SIGNIFICANT CHANGE UP (ref 3.8–10.5)

## 2020-06-05 PROCEDURE — 99223 1ST HOSP IP/OBS HIGH 75: CPT

## 2020-06-05 RX ORDER — AMLODIPINE BESYLATE 2.5 MG/1
5 TABLET ORAL DAILY
Refills: 0 | Status: DISCONTINUED | OUTPATIENT
Start: 2020-06-05 | End: 2020-06-17

## 2020-06-05 RX ORDER — MAGNESIUM OXIDE 400 MG ORAL TABLET 241.3 MG
400 TABLET ORAL DAILY
Refills: 0 | Status: DISCONTINUED | OUTPATIENT
Start: 2020-06-05 | End: 2020-06-17

## 2020-06-05 RX ADMIN — LEVETIRACETAM 1000 MILLIGRAM(S): 250 TABLET, FILM COATED ORAL at 05:24

## 2020-06-05 RX ADMIN — Medication 2 MILLIGRAM(S): at 05:24

## 2020-06-05 RX ADMIN — MAGNESIUM OXIDE 400 MG ORAL TABLET 400 MILLIGRAM(S): 241.3 TABLET ORAL at 17:58

## 2020-06-05 RX ADMIN — Medication 2 MILLIGRAM(S): at 17:58

## 2020-06-05 RX ADMIN — PANTOPRAZOLE SODIUM 40 MILLIGRAM(S): 20 TABLET, DELAYED RELEASE ORAL at 17:58

## 2020-06-05 RX ADMIN — PANTOPRAZOLE SODIUM 40 MILLIGRAM(S): 20 TABLET, DELAYED RELEASE ORAL at 05:24

## 2020-06-05 RX ADMIN — LEVETIRACETAM 1000 MILLIGRAM(S): 250 TABLET, FILM COATED ORAL at 17:57

## 2020-06-05 RX ADMIN — Medication 40 MILLIGRAM(S): at 05:24

## 2020-06-05 RX ADMIN — Medication 25 MILLIGRAM(S): at 05:24

## 2020-06-05 RX ADMIN — BENZOCAINE AND MENTHOL 1 LOZENGE: 5; 1 LIQUID ORAL at 17:57

## 2020-06-05 NOTE — CHART NOTE - NSCHARTNOTEFT_GEN_A_CORE
Upon Nutritional Assessment by the Registered Dietitian your patient was determined to meet criteria / has evidence of the following diagnosis/diagnoses:            [X] Severe Protein Calorie Malnutrition         Findings as based on:  [X] Comprehensive nutrition assessment   [X] Nutrition Focused Physical Exam: moderate/severe muscle wasting and fat loss as documented in initial assessment   [X] Other: 21% weight loss within < 1 year       Nutrition Plan/Recommendations:    1. Continue regular diet + Ensure Enlive 8oz TID  2. Obtain and honor food preferences daily   3. Weekly weights       PROVIDER Section:     By signing this assessment you are acknowledging and agree with the diagnosis/diagnoses assigned by the Registered Dietitian    Comments:

## 2020-06-05 NOTE — CONSULT NOTE ADULT - ASSESSMENT
Pt is a 80yo M admitted to acute rehab s/p GBM resection 5/27/20    *GBM s/p resection   Cont acute rehab   PT/OT/SLP   Cont decadron   Protonix ppx   restart on ASA 81 mg   seizure and fall precausion     *HTN  bp not controlled  cont Toprol 25 and lasix 40mg qd   Start Norvasc 5 6/5     *sore throat secondary to intubation during surgery   Cepacol lozenge as needed     *hiccups   Magnesium or Baclofen as per primary     *Cough   Benzonatate as needed for cough     *Anxiety   Xanax prn     *DVT ppx   Lovenox

## 2020-06-05 NOTE — CONSULT NOTE ADULT - SUBJECTIVE AND OBJECTIVE BOX
HPI  Pt is a 78 y/o M with PMHx of HTN, HLD, prostate cancer s/p prostatectomy, basal cell skin cancer, glioblastoma (s/p resection, radiation, chemotherapy cycle 9 on 9/23/19) and seizures on keppra who presented to [facility] on 5/27 for scheduled stereotactic right craniotomy for brain tumor. Procedure was performed on 5/27 by Dr. Rivers without any intraoperative complications. Post-op, pt was unstable with ambulation. Post-op imaging was notable for vasogenic edema on MRI brain and CTH. Pt has been on a decadron taper. Pt has chronic left upper extremity pain and weakness, and he was at his baseline level. Pt was evaluated by PT and PM&R and was recommended for acute inpatient rehabilitation when medically stable. Pt was deemed ready for discharge on 6/4/20 and was transferred to [facility] for acute inpatient rehabilitation. Patient report mild increased weakness on the LUE and no other changes since most recent surgery.     Pt was seen and examined at bedside. Pt states he is stil having sore throat after intubation. Hiccup associated with steroid usage. Pt is complaints of mild dry cough. BP elevation noted     Allergies    No Known Allergies    Intolerances        REVIEW OF SYSTEMS:    CONSTITUTIONAL: No fevers or chills  EYES/ENT: No visual changes;  No vertigo or throat pain   NECK: No pain or stiffness  RESPIRATORY:  cough, No wheezing, hemoptysis; No shortness of breath  CARDIOVASCULAR: No chest pain or palpitations  GASTROINTESTINAL: No abdominal or epigastric pain. No nausea, vomiting, or hematemesis; No diarrhea or constipation. No melena or hematochezia.  GENITOURINARY: No dysuria, frequency or hematuria  NEUROLOGICAL: No numbness or weakness  SKIN: No itching, burning, rashes, or lesions   All other review of systems is negative unless indicated above    Vital Signs Last 24 Hrs  T(C): 36.3 (05 Jun 2020 08:20), Max: 36.5 (04 Jun 2020 17:56)  T(F): 97.4 (05 Jun 2020 08:20), Max: 97.7 (04 Jun 2020 17:56)  HR: 67 (05 Jun 2020 08:20) (65 - 75)  BP: 149/85 (05 Jun 2020 08:20) (140/68 - 174/84)  BP(mean): --  RR: 15 (05 Jun 2020 08:20) (14 - 15)  SpO2: 97% (05 Jun 2020 08:20) (95% - 99%)    I&O's Summary      CAPILLARY BLOOD GLUCOSE          PHYSICAL EXAM:    Constitutional: NAD,   HEENT: PERR, EOMI,   Neck: Soft and supple,   Respiratory: Breath sounds are clear bilaterally,    Cardiovascular: S1 and S2,    Gastrointestinal: Bowel Sounds present, soft, nontender, nondistended, no guarding, no rebound  Extremities: No peripheral edema  Vascular: 2+ peripheral pulses  Neurological: A/O x 3,   Musculoskeletal: 3/5 strength left upper    Skin: No rashes    MEDICATIONS:  MEDICATIONS  (STANDING):  amLODIPine   Tablet 5 milliGRAM(s) Oral daily  atorvastatin 20 milliGRAM(s) Oral at bedtime  dexAMETHasone     Tablet 2 milliGRAM(s) Oral every 12 hours  enoxaparin Injectable 40 milliGRAM(s) SubCutaneous at bedtime  furosemide    Tablet 40 milliGRAM(s) Oral daily  levETIRAcetam 1000 milliGRAM(s) Oral two times a day  lidocaine   Patch 1 Patch Transdermal daily  metoprolol succinate ER 25 milliGRAM(s) Oral daily  pantoprazole    Tablet 40 milliGRAM(s) Oral two times a day  polyethylene glycol 3350 17 Gram(s) Oral daily  psyllium Powder 1 Packet(s) Oral daily  senna 2 Tablet(s) Oral at bedtime  tamsulosin 0.4 milliGRAM(s) Oral at bedtime      LABS: All Labs Reviewed:                        11.8   6.94  )-----------( 198      ( 05 Jun 2020 05:35 )             35.2     06-05    141  |  106  |  27<H>  ----------------------------<  105<H>  3.8   |  26  |  1.04    Ca    8.7      05 Jun 2020 05:35  Phos  3.0     06-04  Mg     2.2     06-04            Blood Culture:     RADIOLOGY/EKG:    DVT PPX:    ASSESSMENT AND PLAN:

## 2020-06-05 NOTE — DIETITIAN INITIAL EVALUATION ADULT. - ETIOLOGY
related to inability to meet sufficient calorie/protein needs in setting of increased nutritional needs with glioblastoma s/p resection, radiation, chemo

## 2020-06-05 NOTE — DIETITIAN INITIAL EVALUATION ADULT. - OTHER INFO
Pt is a 78 y/o M with PMHx of HTN, HLD, prostate cancer s/p prostatectomy, basal cell skin cancer, glioblastoma (s/p resection, radiation, chemotherapy cycle 9 on 9/23/19) and seizures on keppra admitted to acute rehab s/p GBM resection 5/27/20.   Pt tolerating diet with report of good appetite- noted consumed about 75% of breakfast this morning. Loves Ensure Enlive supplements. Pt states that he has lost weight over the past year likely due to his decrease in physical activity. 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 His level of activity decreased significantly with glioblastoma (09/2019). Reports UBW was around 217lbs compared with current weight (6/4) 172.4lbs, indicates 21% weight loss within < 1 year. Pt denies GI distress- last BM 6/4. No pressure ulcer or edema noted.

## 2020-06-05 NOTE — DIETITIAN INITIAL EVALUATION ADULT. - PHYSICAL APPEARANCE
NFPE: moderate/severe muscle wasting (temporal, clavicle, interosseous) and subcutaneous fat loss (orbital, buccal, triceps)/underweight/other (specify) Height: 5'11", Weight: (6/4) 172.4lbs, BMI: 24.0  IBW: 172lbs +/- 10%

## 2020-06-06 PROCEDURE — 99232 SBSQ HOSP IP/OBS MODERATE 35: CPT | Mod: GC

## 2020-06-06 RX ADMIN — ENOXAPARIN SODIUM 40 MILLIGRAM(S): 100 INJECTION SUBCUTANEOUS at 20:38

## 2020-06-06 RX ADMIN — LEVETIRACETAM 1000 MILLIGRAM(S): 250 TABLET, FILM COATED ORAL at 17:09

## 2020-06-06 RX ADMIN — Medication 2 MILLIGRAM(S): at 05:10

## 2020-06-06 RX ADMIN — LEVETIRACETAM 1000 MILLIGRAM(S): 250 TABLET, FILM COATED ORAL at 05:10

## 2020-06-06 RX ADMIN — PANTOPRAZOLE SODIUM 40 MILLIGRAM(S): 20 TABLET, DELAYED RELEASE ORAL at 17:09

## 2020-06-06 RX ADMIN — Medication 40 MILLIGRAM(S): at 05:10

## 2020-06-06 RX ADMIN — BENZOCAINE AND MENTHOL 1 LOZENGE: 5; 1 LIQUID ORAL at 17:10

## 2020-06-06 RX ADMIN — Medication 0.25 MILLIGRAM(S): at 20:38

## 2020-06-06 RX ADMIN — AMLODIPINE BESYLATE 5 MILLIGRAM(S): 2.5 TABLET ORAL at 05:09

## 2020-06-06 RX ADMIN — ATORVASTATIN CALCIUM 20 MILLIGRAM(S): 80 TABLET, FILM COATED ORAL at 20:38

## 2020-06-06 RX ADMIN — MAGNESIUM OXIDE 400 MG ORAL TABLET 400 MILLIGRAM(S): 241.3 TABLET ORAL at 12:50

## 2020-06-06 RX ADMIN — PANTOPRAZOLE SODIUM 40 MILLIGRAM(S): 20 TABLET, DELAYED RELEASE ORAL at 05:10

## 2020-06-06 RX ADMIN — Medication 25 MILLIGRAM(S): at 05:10

## 2020-06-06 RX ADMIN — TAMSULOSIN HYDROCHLORIDE 0.4 MILLIGRAM(S): 0.4 CAPSULE ORAL at 20:38

## 2020-06-06 RX ADMIN — Medication 2 MILLIGRAM(S): at 17:09

## 2020-06-06 NOTE — PROGRESS NOTE ADULT - ASSESSMENT
Mr Day is a pleasant 79 year-old male that is admitted to [facility] for acute rehabilitation following a GBM resection     Problem  1.	GBM s/p resection  2.	Hypertension  3.	Sore Throat  4.	Hiccups  5.	Cough  6.	Anxiety  7.	Normocytic Anemia    Plan  1.	PT/OT/SLP per primary. ASA 81mg qd. c/w decadron and protonix ppx. appropriate precautions in place (seizure/fall)  2.	BP elevated with goal of 120/80. c/w toprol 25mg qd, lasix 40mg qd and norvasc 5mg qd. Will c/w this regimen for now as patient not overtly above target  3.	cepacol lozenge as needed  4.	magnesium per primary  5.	Benzonatate prn   6.	xanax prn  7.	repeat cbc tomorrow    Diet: per primary  Pain control: per primary  DVT ppx: Lovenox   Case discussed with primary team  If a clinical question should arise regarding this patient, please do not hesitate to contact me at [phone] until 7pm on 6/6/2020. If after please refer to on-call hospitalist

## 2020-06-06 NOTE — PROGRESS NOTE ADULT - SUBJECTIVE AND OBJECTIVE BOX
Patient seen and examined at bedside. No overnight events per nursing or chart review. Patient notes a mild sore throat and displeasure that his TV is not working. His 10 point ROS is otherwise unremarkable.     ALLERGIES:  No Known Allergies    MEDICATIONS  (STANDING):  amLODIPine   Tablet 5 milliGRAM(s) Oral daily  atorvastatin 20 milliGRAM(s) Oral at bedtime  dexAMETHasone     Tablet 2 milliGRAM(s) Oral every 12 hours  enoxaparin Injectable 40 milliGRAM(s) SubCutaneous at bedtime  furosemide    Tablet 40 milliGRAM(s) Oral daily  levETIRAcetam 1000 milliGRAM(s) Oral two times a day  lidocaine   Patch 1 Patch Transdermal daily  magnesium oxide 400 milliGRAM(s) Oral daily  metoprolol succinate ER 25 milliGRAM(s) Oral daily  pantoprazole    Tablet 40 milliGRAM(s) Oral two times a day  polyethylene glycol 3350 17 Gram(s) Oral daily  psyllium Powder 1 Packet(s) Oral daily  senna 2 Tablet(s) Oral at bedtime  tamsulosin 0.4 milliGRAM(s) Oral at bedtime    MEDICATIONS  (PRN):  acetaminophen   Tablet .. 650 milliGRAM(s) Oral every 6 hours PRN Temp greater or equal to 38C (100.4F), Mild Pain (1 - 3)  ALPRAZolam 0.25 milliGRAM(s) Oral every 8 hours PRN Anxiety  benzocaine 15 mG/menthol 3.6 mG (Sugar-Free) Lozenge 1 Lozenge Oral three times a day PRN Sore Throat    Vital Signs Last 24 Hrs  T(F): 97.9 (06 Jun 2020 08:12), Max: 97.9 (06 Jun 2020 08:12)  HR: 64 (06 Jun 2020 08:12) (60 - 73)  BP: 139/80 (06 Jun 2020 08:12) (137/85 - 145/80)  RR: 14 (06 Jun 2020 08:12) (14 - 15)  SpO2: 99% (06 Jun 2020 08:12) (95% - 99%)  I&O's Summary    BMI (kg/m2): 24.1 (06-04-20 @ 18:47)  PHYSICAL EXAM:  Gen: nad, resting in bed  Neuro: aaox3, left arm weakness noted   Heent: eomi b/l, no jvd, no oral exudates, incision noted  Pulm: cta b/l, no w/r/r  CV: +s1s2, no m/r/g  Ab: soft, nt/nd, normoactive bs x 4  Extrem: no edema, pulses intact and equal      LABS:                        11.8   6.94  )-----------( 198      ( 05 Jun 2020 05:35 )             35.2       06-05    141  |  106  |  27  ----------------------------<  105  3.8   |  26  |  1.04    Ca    8.7      05 Jun 2020 05:35  Phos  3.0     06-04  Mg     2.2     06-04       eGFR if Non : 69 mL/min/1.73M2 (06-05-20 @ 05:35)  eGFR if African American: 80 mL/min/1.73M2 (06-05-20 @ 05:35)

## 2020-06-07 LAB
ANION GAP SERPL CALC-SCNC: 11 MMOL/L — SIGNIFICANT CHANGE UP (ref 5–17)
ANION GAP SERPL CALC-SCNC: 9 MMOL/L — SIGNIFICANT CHANGE UP (ref 5–17)
BUN SERPL-MCNC: 37 MG/DL — HIGH (ref 7–23)
BUN SERPL-MCNC: 39 MG/DL — HIGH (ref 7–23)
CALCIUM SERPL-MCNC: 8.7 MG/DL — SIGNIFICANT CHANGE UP (ref 8.4–10.5)
CALCIUM SERPL-MCNC: 8.9 MG/DL — SIGNIFICANT CHANGE UP (ref 8.4–10.5)
CHLORIDE SERPL-SCNC: 102 MMOL/L — SIGNIFICANT CHANGE UP (ref 96–108)
CHLORIDE SERPL-SCNC: 103 MMOL/L — SIGNIFICANT CHANGE UP (ref 96–108)
CO2 SERPL-SCNC: 28 MMOL/L — SIGNIFICANT CHANGE UP (ref 22–31)
CO2 SERPL-SCNC: 29 MMOL/L — SIGNIFICANT CHANGE UP (ref 22–31)
CREAT SERPL-MCNC: 1.13 MG/DL — SIGNIFICANT CHANGE UP (ref 0.5–1.3)
CREAT SERPL-MCNC: 1.16 MG/DL — SIGNIFICANT CHANGE UP (ref 0.5–1.3)
GLUCOSE SERPL-MCNC: 106 MG/DL — HIGH (ref 70–99)
GLUCOSE SERPL-MCNC: 163 MG/DL — HIGH (ref 70–99)
HCT VFR BLD CALC: 34.1 % — LOW (ref 39–50)
HGB BLD-MCNC: 11.5 G/DL — LOW (ref 13–17)
MCHC RBC-ENTMCNC: 32.3 PG — SIGNIFICANT CHANGE UP (ref 27–34)
MCHC RBC-ENTMCNC: 33.7 GM/DL — SIGNIFICANT CHANGE UP (ref 32–36)
MCV RBC AUTO: 95.8 FL — SIGNIFICANT CHANGE UP (ref 80–100)
NRBC # BLD: 0 /100 WBCS — SIGNIFICANT CHANGE UP (ref 0–0)
PLATELET # BLD AUTO: 194 K/UL — SIGNIFICANT CHANGE UP (ref 150–400)
POTASSIUM SERPL-MCNC: 3.1 MMOL/L — LOW (ref 3.5–5.3)
POTASSIUM SERPL-MCNC: 4.1 MMOL/L — SIGNIFICANT CHANGE UP (ref 3.5–5.3)
POTASSIUM SERPL-SCNC: 3.1 MMOL/L — LOW (ref 3.5–5.3)
POTASSIUM SERPL-SCNC: 4.1 MMOL/L — SIGNIFICANT CHANGE UP (ref 3.5–5.3)
RBC # BLD: 3.56 M/UL — LOW (ref 4.2–5.8)
RBC # FLD: 12.2 % — SIGNIFICANT CHANGE UP (ref 10.3–14.5)
SODIUM SERPL-SCNC: 140 MMOL/L — SIGNIFICANT CHANGE UP (ref 135–145)
SODIUM SERPL-SCNC: 142 MMOL/L — SIGNIFICANT CHANGE UP (ref 135–145)
WBC # BLD: 6.71 K/UL — SIGNIFICANT CHANGE UP (ref 3.8–10.5)
WBC # FLD AUTO: 6.71 K/UL — SIGNIFICANT CHANGE UP (ref 3.8–10.5)

## 2020-06-07 PROCEDURE — 99232 SBSQ HOSP IP/OBS MODERATE 35: CPT | Mod: GC

## 2020-06-07 PROCEDURE — 99232 SBSQ HOSP IP/OBS MODERATE 35: CPT

## 2020-06-07 RX ORDER — POTASSIUM CHLORIDE 20 MEQ
40 PACKET (EA) ORAL ONCE
Refills: 0 | Status: COMPLETED | OUTPATIENT
Start: 2020-06-07 | End: 2020-06-07

## 2020-06-07 RX ADMIN — Medication 2 MILLIGRAM(S): at 18:08

## 2020-06-07 RX ADMIN — ATORVASTATIN CALCIUM 20 MILLIGRAM(S): 80 TABLET, FILM COATED ORAL at 21:07

## 2020-06-07 RX ADMIN — Medication 40 MILLIEQUIVALENT(S): at 12:16

## 2020-06-07 RX ADMIN — Medication 40 MILLIGRAM(S): at 05:51

## 2020-06-07 RX ADMIN — AMLODIPINE BESYLATE 5 MILLIGRAM(S): 2.5 TABLET ORAL at 05:51

## 2020-06-07 RX ADMIN — Medication 25 MILLIGRAM(S): at 05:51

## 2020-06-07 RX ADMIN — MAGNESIUM OXIDE 400 MG ORAL TABLET 400 MILLIGRAM(S): 241.3 TABLET ORAL at 12:15

## 2020-06-07 RX ADMIN — PANTOPRAZOLE SODIUM 40 MILLIGRAM(S): 20 TABLET, DELAYED RELEASE ORAL at 05:51

## 2020-06-07 RX ADMIN — Medication 0.25 MILLIGRAM(S): at 21:07

## 2020-06-07 RX ADMIN — TAMSULOSIN HYDROCHLORIDE 0.4 MILLIGRAM(S): 0.4 CAPSULE ORAL at 21:07

## 2020-06-07 RX ADMIN — ENOXAPARIN SODIUM 40 MILLIGRAM(S): 100 INJECTION SUBCUTANEOUS at 21:07

## 2020-06-07 RX ADMIN — LEVETIRACETAM 1000 MILLIGRAM(S): 250 TABLET, FILM COATED ORAL at 05:51

## 2020-06-07 RX ADMIN — Medication 2 MILLIGRAM(S): at 05:51

## 2020-06-07 RX ADMIN — PANTOPRAZOLE SODIUM 40 MILLIGRAM(S): 20 TABLET, DELAYED RELEASE ORAL at 18:08

## 2020-06-07 RX ADMIN — LEVETIRACETAM 1000 MILLIGRAM(S): 250 TABLET, FILM COATED ORAL at 18:08

## 2020-06-07 NOTE — PROGRESS NOTE ADULT - SUBJECTIVE AND OBJECTIVE BOX
Subjective:  Nursing reports pt. Impulsive at times--forgets to use call bell.  Pt. tearful and frustrated with deficits and loss of independence.  Denies pain. Sleeping at night.       REVIEW OF SYSTEMS  Pertinent in last 24 h: Neurological deficits    VITALS  T(C): 36.4 (06-07-20 @ 09:21), Max: 36.4 (06-06-20 @ 20:28)  HR: 61 (06-07-20 @ 09:21) (61 - 68)  BP: 133/84 (06-07-20 @ 09:21) (133/84 - 149/85)  RR: 16 (06-07-20 @ 09:21) (14 - 16)  SpO2: 95% (06-07-20 @ 09:21) (95% - 98%)  Wt(kg): --    Physical Exam:  -General: NAD,  Comfortable,                             	HEENT: right crani site incision healing well, c/d/i with sutures, single staple from drain, EOM I,   Normal Conjunctivae, mild right periorbital swelling  	Cardio: RRR                            	Pulm: CTA              	Abdomen: ND/NT, Soft, BS+                                                	Ext: mild b/l LE swelling L>R, Pulses 2+ throughout, No calf tenderness      	Neurological Examination    	Cognitive: AAO x 3                                                                                               	Memory: Recall 3 objects immediate and 3 min later      	Mood/Affect: appropriate  	Communication:  Fluent,  slight dysarthria  	CN II - XII  left facial droop, left hearing loss, EOMI, Visual fields intact, tongue midline, Impaired left shoulder shrug                                                              	Sensory: Intact to light touch bilat.  	Proprioception impaired in Left UE    	Motor    	LEFT    UE: SF [2/5], EF [2/5], EE [2/5], WE [1/5],  [1/5]  	RIGHT UE: SF [5/5], EF [5/5], EE [5/5], WE [5/5],  [wnl]  	LEFT    LE:  HF [5/5], KE [5/5], DF [5/5], EHL [5/5],  PF [5/5]  	RIGHT LE:  HF [5/5], KE [5/5], DF [5/5], EHL [5/5],  PF [5/5]    	Coordination impaired on left UE, otherwise intact    RECENT LABS/IMAGING                        11.5   6.71  )-----------( 194      ( 07 Jun 2020 06:28 )             34.1     06-07    140  |  102  |  39<H>  ----------------------------<  106<H>  3.1<L>   |  29  |  1.13    Ca    8.7      07 Jun 2020 06:28              MEDICATIONS   acetaminophen   Tablet .. 650 milliGRAM(s) every 6 hours PRN  ALPRAZolam 0.25 milliGRAM(s) every 8 hours PRN  amLODIPine   Tablet 5 milliGRAM(s) daily  atorvastatin 20 milliGRAM(s) at bedtime  benzocaine 15 mG/menthol 3.6 mG (Sugar-Free) Lozenge 1 Lozenge three times a day PRN  dexAMETHasone     Tablet 2 milliGRAM(s) every 12 hours  enoxaparin Injectable 40 milliGRAM(s) at bedtime  furosemide    Tablet 40 milliGRAM(s) daily  levETIRAcetam 1000 milliGRAM(s) two times a day  lidocaine   Patch 1 Patch daily  magnesium oxide 400 milliGRAM(s) daily  metoprolol succinate ER 25 milliGRAM(s) daily  pantoprazole    Tablet 40 milliGRAM(s) two times a day  polyethylene glycol 3350 17 Gram(s) daily  psyllium Powder 1 Packet(s) daily  senna 2 Tablet(s) at bedtime  tamsulosin 0.4 milliGRAM(s) at bedtime      --------------------------------------------------------------------

## 2020-06-07 NOTE — PROGRESS NOTE ADULT - ASSESSMENT
Mr Day is a pleasant 79 year-old male that is admitted to [facility] for acute rehabilitation following a GBM resection     Problem  1.	GBM s/p resection  2.	Hypertension  3.	Sore Throat  4.	Hiccups  5.	Cough  6.	Anxiety  7.	Normocytic Anemia  8.	Hypokalemia    Plan  1.	PT/OT/SLP per primary. ASA 81mg qd. c/w decadron and protonix ppx. appropriate precautions in place (seizure/fall)  2.	BP elevated with goal of 120/80. c/w toprol 25mg qd, lasix 40mg qd and norvasc 5mg qd. Will c/w this regimen for now as patient not overtly above target  3.	cepacol lozenge as needed  4.	magnesium per primary  5.	Benzonatate prn   6.	xanax prn  7.	appears stable, please obtain guiac to r/o occult bleeding  8.	repleted with repeat BMP at 1500. likely due to potassium wasting in setting of loop diuretic usage.     Diet: per primary  Pain control: per primary  DVT ppx: Lovenox   Case discussed with primary team  If a clinical question should arise regarding this patient, please do not hesitate to contact me at [phone] until 7pm on 6/7/2020. If after please refer to on-call hospitalist

## 2020-06-07 NOTE — PROGRESS NOTE ADULT - ASSESSMENT
Pt is a 78 y/o M with PMHx of HTN, HLD, prostate cancer s/p prostatectomy, basal cell skin cancer, glioblastoma (s/p resection, radiation, chemotherapy cycle 9 on 9/23/19) and seizures admitted with recurrent right frontal GBM s/p resection 5/27/20.     #GBM  - s/p resection on 5/27/20  - continue decadron 2mg q12hrs  - protonix for steroid ppx bid  - Keppra 1000mg bid  - Pain control  - PT/OT/SLP  - will follow up with outpatient neurosurgery and oncology  - OK to restart home Aspirin 81mg on 6/8/20.    Depressed Mood  --Neuropsych consult for counselling  --will consider antidepressant  --Recreation therapy    Impulsivity  --enhanced supervision  --pt. educated on fall risk and importance of using call bell     #HTN  - continue metoprolol ER 25mg   - continue Lasix 40mg  - hospitalist follow up    #HLD  - continue Lipitor    #BPH  - continue Flomax    #Anxiety  - Xanax 0.25mg PRN    #Pain  - Tylenol PRN, Lidocaine patch    #GI  - Senna, Miralax, Metamucil    #Diet  - Regular with thins    #DVT ppx  - Lovenox

## 2020-06-07 NOTE — PROGRESS NOTE ADULT - SUBJECTIVE AND OBJECTIVE BOX
Patient seen and examined at bedside. No overnight events per nursing or chart review. Patient denies major complaints as his 10 point ROS is negative. He does express concern in regards to next step to treat his GBM. I stated that tomorrow would be the best day to contact his oncologist and I stated that this would be done by myself. He is in agreement with this plan.     ALLERGIES:  No Known Allergies    MEDICATIONS  (STANDING):  amLODIPine   Tablet 5 milliGRAM(s) Oral daily  atorvastatin 20 milliGRAM(s) Oral at bedtime  dexAMETHasone     Tablet 2 milliGRAM(s) Oral every 12 hours  enoxaparin Injectable 40 milliGRAM(s) SubCutaneous at bedtime  furosemide    Tablet 40 milliGRAM(s) Oral daily  levETIRAcetam 1000 milliGRAM(s) Oral two times a day  lidocaine   Patch 1 Patch Transdermal daily  magnesium oxide 400 milliGRAM(s) Oral daily  metoprolol succinate ER 25 milliGRAM(s) Oral daily  pantoprazole    Tablet 40 milliGRAM(s) Oral two times a day  polyethylene glycol 3350 17 Gram(s) Oral daily  potassium chloride   Powder 40 milliEquivalent(s) Oral once  psyllium Powder 1 Packet(s) Oral daily  senna 2 Tablet(s) Oral at bedtime  tamsulosin 0.4 milliGRAM(s) Oral at bedtime    MEDICATIONS  (PRN):  acetaminophen   Tablet .. 650 milliGRAM(s) Oral every 6 hours PRN Temp greater or equal to 38C (100.4F), Mild Pain (1 - 3)  ALPRAZolam 0.25 milliGRAM(s) Oral every 8 hours PRN Anxiety  benzocaine 15 mG/menthol 3.6 mG (Sugar-Free) Lozenge 1 Lozenge Oral three times a day PRN Sore Throat    Vital Signs Last 24 Hrs  T(F): 97.5 (07 Jun 2020 09:21), Max: 97.5 (06 Jun 2020 20:28)  HR: 61 (07 Jun 2020 09:21) (61 - 68)  BP: 133/84 (07 Jun 2020 09:21) (133/84 - 149/85)  RR: 16 (07 Jun 2020 09:21) (14 - 16)  SpO2: 95% (07 Jun 2020 09:21) (95% - 98%)  I&O's Summary    BMI (kg/m2): 24.1 (06-04-20 @ 18:47)  PHYSICAL EXAM:  Gen: nad, resting in bed  Neuro: aaox3, unchanged  Heent: eomi b/l, no jvd, no oral exudates, incision site clean and intact.   Pulm: cta b/l, no w/r/r  CV: +s1s2, no m/r/g  Ab: soft, nt/nd, normoactive bs x 4  Extrem: no edema, pulses intact and equal      LABS:                        11.5   6.71  )-----------( 194      ( 07 Jun 2020 06:28 )             34.1       06-07    140  |  102  |  39  ----------------------------<  106  3.1   |  29  |  1.13    Ca    8.7      07 Jun 2020 06:28       eGFR if Non African American: 62 mL/min/1.73M2 (06-07-20 @ 06:28)  eGFR if : 72 mL/min/1.73M2 (06-07-20 @ 06:28)

## 2020-06-08 LAB
ANION GAP SERPL CALC-SCNC: 6 MMOL/L — SIGNIFICANT CHANGE UP (ref 5–17)
BUN SERPL-MCNC: 30 MG/DL — HIGH (ref 7–23)
CALCIUM SERPL-MCNC: 8.9 MG/DL — SIGNIFICANT CHANGE UP (ref 8.4–10.5)
CHLORIDE SERPL-SCNC: 104 MMOL/L — SIGNIFICANT CHANGE UP (ref 96–108)
CO2 SERPL-SCNC: 29 MMOL/L — SIGNIFICANT CHANGE UP (ref 22–31)
CREAT SERPL-MCNC: 1.03 MG/DL — SIGNIFICANT CHANGE UP (ref 0.5–1.3)
GLUCOSE SERPL-MCNC: 137 MG/DL — HIGH (ref 70–99)
HCT VFR BLD CALC: 35.1 % — LOW (ref 39–50)
HGB BLD-MCNC: 11.8 G/DL — LOW (ref 13–17)
MCHC RBC-ENTMCNC: 31.7 PG — SIGNIFICANT CHANGE UP (ref 27–34)
MCHC RBC-ENTMCNC: 33.6 GM/DL — SIGNIFICANT CHANGE UP (ref 32–36)
MCV RBC AUTO: 94.4 FL — SIGNIFICANT CHANGE UP (ref 80–100)
NRBC # BLD: 0 /100 WBCS — SIGNIFICANT CHANGE UP (ref 0–0)
PLATELET # BLD AUTO: 188 K/UL — SIGNIFICANT CHANGE UP (ref 150–400)
POTASSIUM SERPL-MCNC: 3.9 MMOL/L — SIGNIFICANT CHANGE UP (ref 3.5–5.3)
POTASSIUM SERPL-SCNC: 3.9 MMOL/L — SIGNIFICANT CHANGE UP (ref 3.5–5.3)
RBC # BLD: 3.72 M/UL — LOW (ref 4.2–5.8)
RBC # FLD: 12.3 % — SIGNIFICANT CHANGE UP (ref 10.3–14.5)
SODIUM SERPL-SCNC: 139 MMOL/L — SIGNIFICANT CHANGE UP (ref 135–145)
WBC # BLD: 8.12 K/UL — SIGNIFICANT CHANGE UP (ref 3.8–10.5)
WBC # FLD AUTO: 8.12 K/UL — SIGNIFICANT CHANGE UP (ref 3.8–10.5)

## 2020-06-08 PROCEDURE — 99232 SBSQ HOSP IP/OBS MODERATE 35: CPT | Mod: GC

## 2020-06-08 PROCEDURE — 96116 NUBHVL XM PHYS/QHP 1ST HR: CPT

## 2020-06-08 RX ADMIN — PANTOPRAZOLE SODIUM 40 MILLIGRAM(S): 20 TABLET, DELAYED RELEASE ORAL at 06:10

## 2020-06-08 RX ADMIN — TAMSULOSIN HYDROCHLORIDE 0.4 MILLIGRAM(S): 0.4 CAPSULE ORAL at 21:28

## 2020-06-08 RX ADMIN — PANTOPRAZOLE SODIUM 40 MILLIGRAM(S): 20 TABLET, DELAYED RELEASE ORAL at 17:30

## 2020-06-08 RX ADMIN — Medication 2 MILLIGRAM(S): at 17:30

## 2020-06-08 RX ADMIN — Medication 81 MILLIGRAM(S): at 12:08

## 2020-06-08 RX ADMIN — AMLODIPINE BESYLATE 5 MILLIGRAM(S): 2.5 TABLET ORAL at 06:11

## 2020-06-08 RX ADMIN — ENOXAPARIN SODIUM 40 MILLIGRAM(S): 100 INJECTION SUBCUTANEOUS at 21:28

## 2020-06-08 RX ADMIN — ATORVASTATIN CALCIUM 20 MILLIGRAM(S): 80 TABLET, FILM COATED ORAL at 21:28

## 2020-06-08 RX ADMIN — LEVETIRACETAM 1000 MILLIGRAM(S): 250 TABLET, FILM COATED ORAL at 17:30

## 2020-06-08 RX ADMIN — MAGNESIUM OXIDE 400 MG ORAL TABLET 400 MILLIGRAM(S): 241.3 TABLET ORAL at 12:08

## 2020-06-08 RX ADMIN — LEVETIRACETAM 1000 MILLIGRAM(S): 250 TABLET, FILM COATED ORAL at 06:10

## 2020-06-08 RX ADMIN — Medication 25 MILLIGRAM(S): at 06:42

## 2020-06-08 RX ADMIN — Medication 40 MILLIGRAM(S): at 06:11

## 2020-06-08 RX ADMIN — Medication 0.25 MILLIGRAM(S): at 21:28

## 2020-06-08 RX ADMIN — Medication 2 MILLIGRAM(S): at 06:10

## 2020-06-08 NOTE — PROGRESS NOTE ADULT - ASSESSMENT
Mr Day is a pleasant 79 year-old male that is admitted to [facility] for acute rehabilitation following a GBM resection     Problem  1.	GBM s/p resection  2.	Hypertension  3.	Sore Throat  4.	Hiccups  5.	Cough  6.	Anxiety  7.	Normocytic Anemia  8.	Hypokalemia    Plan  1.	PT/OT/SLP per primary. ASA 81mg qd. c/w decadron and protonix ppx. appropriate precautions in place (seizure/fall). Please coordiante with neuronc/neurosurgery in regards to best timing for further steps in treatment.   2.	BP elevated with goal of 120/80. c/w toprol 25mg qd, lasix 40mg qd and norvasc 5mg qd. Will c/w this regimen for now as patient not overtly above target  3.	cepacol lozenge as needed, likely secondary to intubation.   4.	appears resolved  5.	Benzonatate prn   6.	xanax prn  7.	appears stable, guiac to r/o occult bleeding is pending  8.	appears within normal range, as he is on a loop diuretic please obtain bmp q48-72 hours    Diet: per primary  Pain control: per primary  DVT ppx: Lovenox   Case discussed with primary team  If a clinical question should arise regarding this patient, please do not hesitate to contact me at [phone] until 7pm on 6/8/2020. If after please refer to on-call hospitalist

## 2020-06-08 NOTE — CONSULT NOTE ADULT - SUBJECTIVE AND OBJECTIVE BOX
Pt is a 78 y/o M with PMHx of HTN, HLD, prostate cancer s/p prostatectomy, basal cell skin cancer, glioblastoma (Dx in Aug. 2018-- 8/3/18 - s/p resection 8/3/18;  s/p chemoradiation treatment followed by s/p temozolomide x 3 cycles 10/12/18;  s/p re-resection right frontal lobe 2/7/19;  radiation, chemotherapy cycle 9 on 9/23/19) and seizures on Keppra who presented to [facility] on 5/27 for scheduled stereotactic right craniotomy for brain tumor.  Procedure was performed on 5/27 without any intraoperative complications. Post-op, pt was unstable with ambulation. Post-op imaging was notable for vasogenic edema on MRI brain and CTH. Pt has been on a decadron taper. Pt has chronic left upper extremity pain and weakness, and he was at his baseline level. Pt was evaluated by PT and PM&R and was recommended for acute inpatient rehabilitation when medically stable. Pt was deemed ready for discharge on 6/4/20 and was transferred to [facility] for acute inpatient rehabilitation. Pt report mild increased weakness on the LUE and no other changes since most recent surgery.   PMHx:   . Current meds: Please see list in Pt’s chart. Social Hx:   Findings: Pt was seen for an initial assessment of his/her cognitive and emotional functioning. MoCA was administered; Pt’s results (/30) were in the range. His/Her scores in mood measures suggested levels of anxiety and depression (GAD7 = /21; PHQ9 = /27). Pt was alert,  Ox3, and cooperative.  Attn/Conc: Simple auditory attention - . Sustained auditory attention - . Concentration - . Working memory for calculations – ( /5 serial 7s).	 Language: Pt’s speech was of  . Naming - . Sentence repetition - . Phonemic fluency - .	Memory: Encoding of 5 words was (/5); delayed verbal recall – (/5, improving to /5 with cueing). LTM was for US presidents (/4, improving to /4 with cueing). Visual memory –. Visuospatial: Visuomotor integration – for copy of a 3D figure, was noted. Executive Functions: Set-shifting - . Organizational skills - . Abstract reasoning - . Initiation - . Self-awareness - . Verbal problem solving – .   Emotional functioning: Affect - 	. Mood - ; Pt reported experiencing . On mood measures s/he additionally reported .  Thought processes were.  No abnormal thought contents were observed.  Pt denied any AH/VH. Pt also denied SI/HI/I/P. Insight - WFL. Judgment - fair. Pt is a 80 y/o right-handed male with PMHx of HTN, HLD, prostate cancer s/p prostatectomy, basal cell skin cancer, glioblastoma (Dx in Aug. 2018-- 8/3/18 - s/p resection 8/3/18;  s/p chemoradiation treatment followed by s/p temozolomide x 3 cycles 10/12/18;  s/p re-resection right frontal lobe 2/7/19;  radiation, chemotherapy cycle 9 on 9/23/19) and seizures on Keppra who presented to [facility] on 5/27 for scheduled stereotactic right craniotomy for brain tumor.  Procedure was performed on 5/27 without any intraoperative complications. Post-op, pt was unstable with ambulation. Post-op imaging was notable for vasogenic edema on MRI brain and CTH. Pt has been on a decadron taper. Pt has chronic left upper extremity pain and weakness, and he was at his baseline level. Pt was evaluated by PT and PM&R and was recommended for acute inpatient rehabilitation when medically stable. Pt was deemed ready for discharge on 6/4/20 and was transferred to [facility] for acute inpatient rehabilitation. Pt report mild increased weakness on the LUE and no other changes since most recent surgery. PMHx: As noted above. Current meds: Please see list in Pt’s chart. Social Hx: Pt is  and has two adult children. He completed some college and is a retired operation/ in the bank industry. He lacks hx of mental illness. He drinks wine daily and has remote hx of smoking. Pt is Mu-ism. He enjoys playing softball and backgaCookistoon and going to the gym.  Findings: Pt was seen for an initial assessment of his cognitive and emotional functioning. MMSE was administered; Pt’s results (23/30) were in the Mildly Impaired range. His score in a geriatric mood measure suggested minimal levels of depression (GDS = 1/15). Pt was alert, closely Ox3 (disoriented to exact date), and cooperative. Attn/Conc: Simple auditory attention - intact. Concentration - intact. Working memory: moderately impaired for calculations and spelling backward (2/5 serial 7s; 2/5 words correctly placed ). Language: Pt’s speech was of normal volume, pitch and pace. Naming - intact. Sentence repetition - intact. Phonemic fluency - intact. Auditory comprehension - intact. Reading - intact. Writing - intact. Memory: Encoding of 3 words was intact (3/3); short-delay recall - moderately impaired (1/3, improving to 3/3 with cueing; long-delay verbal recall – intact (3/3). LTM was intact for US presidents (4/4). Visual memory – mildly impaired. Visuospatial: Visuomotor integration – impaired for copy of a 2D figure, simplification was noted. Executive Functions: Motor planning - closely intact, perseveration was noted. Organizational skills - mildly impaired. Initiation - intact.  Verbal problem solving – mildly impaired. Emotional functioning: Affect - mildly constricted, responsive to humor. Mood - euthymic ("good"); Pt reported experiencing anxiety ("apprehension"), poor sleep in the past few days. On a mood measure he additionally reported experiencing low self-esteem.  Thought processes were goal-directed. No abnormal thought contents were observed.  Pt denied any AH/VH. Pt also denied SI/HI/I/P. Insight - WFL. Judgment - closely WFL.

## 2020-06-08 NOTE — PROGRESS NOTE ADULT - SUBJECTIVE AND OBJECTIVE BOX
HPI:  Pt is a 80 y/o M with PMHx of HTN, HLD, prostate cancer s/p prostatectomy, basal cell skin cancer, glioblastoma (Dx in Aug. 2018-- 8/3/18 - s/p resection (Chalif) 8/3/18;  s/p chemoradiation treatment (Goenka) followed by s/p temozolomide x 3 cycles 10/12/18;  s/p re-resection (Chalif) right frontal lobe 2/7/19;  radiation, chemotherapy cycle 9 on 9/23/19) and seizures on keppra who presented to [facility] on 5/27 for scheduled stereotactic right craniotomy for brain tumor.   Procedure was performed on 5/27 by Dr. Rivers without any intraoperative complications. Post-op, pt was unstable with ambulation. Post-op imaging was notable for vasogenic edema on MRI brain and CTH. Pt has been on a decadron taper. Pt has chronic left upper extremity pain and weakness, and he was at his baseline level.   Pt was evaluated by PT and PM&R and was recommended for acute inpatient rehabilitation when medically stable. Pt was deemed ready for discharge on 6/4/20 and was transferred to [facility] for acute inpatient rehabilitation. Patient report mild increased weakness on the LUE and no other changes since most recent surgery.     SUBJECTIVE / INTERVAL HPI: Patient seen and examined with PT. Pt is making progress with his strength and his ambulation. He is having questions about his follow up care for his GBM. He is sleeping well and tolerating therapy, he has no complaints.     REVIEW OF SYSTEMS:    CONSTITUTIONAL: No fevers or chills  EYES/ENT: No visual changes;  No vertigo or throat pain   NECK: No pain or stiffness  RESPIRATORY: No cough, wheezing, or shortness of breath  CARDIOVASCULAR: No chest pain or palpitations  GASTROINTESTINAL: No abdominal or epigastric pain. No nausea or vomiting. No diarrhea or constipation.   GENITOURINARY: No dysuria, frequency or hematuria  NEUROLOGICAL: No numbness, + weakness, +mild dysarthria  SKIN: No itching, rashes      VITAL SIGNS:  Vital Signs Last 24 Hrs  T(C): 36.6 (08 Jun 2020 09:07), Max: 36.7 (07 Jun 2020 21:07)  T(F): 97.9 (08 Jun 2020 09:07), Max: 98.1 (07 Jun 2020 21:07)  HR: 60 (08 Jun 2020 09:07) (60 - 93)  BP: 120/76 (08 Jun 2020 09:07) (120/76 - 146/76)  BP(mean): --  RR: 14 (08 Jun 2020 09:07) (14 - 16)  SpO2: 97% (08 Jun 2020 09:07) (96% - 97%)    PHYSICAL EXAM:    General: NAD, working with physical therapist.  HEENT: right sided craniotomy scar is intact with sutures, clean and dry  Neck: supple  Cardiovascular: +S1/S2, RRR  Respiratory: CTA B/L; no W/R/R, no crackles  Gastrointestinal: soft, NT/ND; normoactive bowel sounds  Extremities: warm, well perfused; no edema, clubbing or cyanosis  Neurological: AAOx3; left sided upper extremity weakness is slightly improved (SF 3/5)    MEDICATIONS:  MEDICATIONS  (STANDING):  amLODIPine   Tablet 5 milliGRAM(s) Oral daily  aspirin enteric coated 81 milliGRAM(s) Oral daily  atorvastatin 20 milliGRAM(s) Oral at bedtime  dexAMETHasone     Tablet 2 milliGRAM(s) Oral every 12 hours  enoxaparin Injectable 40 milliGRAM(s) SubCutaneous at bedtime  furosemide    Tablet 40 milliGRAM(s) Oral daily  levETIRAcetam 1000 milliGRAM(s) Oral two times a day  lidocaine   Patch 1 Patch Transdermal daily  magnesium oxide 400 milliGRAM(s) Oral daily  metoprolol succinate ER 25 milliGRAM(s) Oral daily  pantoprazole    Tablet 40 milliGRAM(s) Oral two times a day  polyethylene glycol 3350 17 Gram(s) Oral daily  psyllium Powder 1 Packet(s) Oral daily  senna 2 Tablet(s) Oral at bedtime  tamsulosin 0.4 milliGRAM(s) Oral at bedtime    MEDICATIONS  (PRN):  acetaminophen   Tablet .. 650 milliGRAM(s) Oral every 6 hours PRN Temp greater or equal to 38C (100.4F), Mild Pain (1 - 3)  ALPRAZolam 0.25 milliGRAM(s) Oral every 8 hours PRN Anxiety  benzocaine 15 mG/menthol 3.6 mG (Sugar-Free) Lozenge 1 Lozenge Oral three times a day PRN Sore Throat      ALLERGIES:  Allergies    No Known Allergies    Intolerances        LABS:                        11.8   8.12  )-----------( 188      ( 08 Jun 2020 06:25 )             35.1     06-08    139  |  104  |  30<H>  ----------------------------<  137<H>  3.9   |  29  |  1.03    Ca    8.9      08 Jun 2020 06:25          CAPILLARY BLOOD GLUCOSE          RADIOLOGY & ADDITIONAL TESTS: Reviewed.

## 2020-06-08 NOTE — CONSULT NOTE ADULT - ASSESSMENT
Assessment:    FIM scores: Social Interaction ; Problem Solving ; Memory .  Plan: Individual supportive psychotherapy to monitor cognition, affect/mood, and behavior. Cognitive remediation during speech therapy sessions. Participation in recreation/art therapy in order to have pleasure and mastery experiences and regain/reestablish a sense of routine. 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 Pt exhibited difficulties in working memory (for both serial calculations and backward spelling), short-term memory for verbal information (with benefit from repetition and cueing), visuospatial skills, and aspects of executive functions (organizational skills, motor planning, problem solving). Pt's affect is mildly constricted and he reports a few adjustment symptoms including anxiety, low self-esteem, and sleep loss in response to his current medical problems. FIM scores: Social Interaction 5; Problem Solving 5; Memory 5.   Plan: Pt appears to have relatively good coping; however, neuropsychologist remains available. Cognitive remediation during speech therapy sessions is recommended. Pt may benefit from antidepressant medication to address motor recovery and assist with anxiety. Participation in recreation/art therapy in order to have pleasure and mastery experiences and regain/reestablish a sense of routine.

## 2020-06-08 NOTE — PROGRESS NOTE ADULT - SUBJECTIVE AND OBJECTIVE BOX
Patient seen and examined at bedside. No overnight events per nursing or chart review. Patient has no major complaints as his 10 point ROS is negative. He is concerned about following up with his neurologist/neurosurgery/onc in regards to next steps in treatment.     ALLERGIES:  No Known Allergies    MEDICATIONS  (STANDING):  amLODIPine   Tablet 5 milliGRAM(s) Oral daily  aspirin enteric coated 81 milliGRAM(s) Oral daily  atorvastatin 20 milliGRAM(s) Oral at bedtime  dexAMETHasone     Tablet 2 milliGRAM(s) Oral every 12 hours  enoxaparin Injectable 40 milliGRAM(s) SubCutaneous at bedtime  furosemide    Tablet 40 milliGRAM(s) Oral daily  levETIRAcetam 1000 milliGRAM(s) Oral two times a day  lidocaine   Patch 1 Patch Transdermal daily  magnesium oxide 400 milliGRAM(s) Oral daily  metoprolol succinate ER 25 milliGRAM(s) Oral daily  pantoprazole    Tablet 40 milliGRAM(s) Oral two times a day  polyethylene glycol 3350 17 Gram(s) Oral daily  psyllium Powder 1 Packet(s) Oral daily  senna 2 Tablet(s) Oral at bedtime  tamsulosin 0.4 milliGRAM(s) Oral at bedtime    MEDICATIONS  (PRN):  acetaminophen   Tablet .. 650 milliGRAM(s) Oral every 6 hours PRN Temp greater or equal to 38C (100.4F), Mild Pain (1 - 3)  ALPRAZolam 0.25 milliGRAM(s) Oral every 8 hours PRN Anxiety  benzocaine 15 mG/menthol 3.6 mG (Sugar-Free) Lozenge 1 Lozenge Oral three times a day PRN Sore Throat    Vital Signs Last 24 Hrs  T(F): 97.9 (08 Jun 2020 09:07), Max: 98.1 (07 Jun 2020 21:07)  HR: 60 (08 Jun 2020 09:07) (60 - 93)  BP: 120/76 (08 Jun 2020 09:07) (120/76 - 146/76)  RR: 14 (08 Jun 2020 09:07) (14 - 16)  SpO2: 97% (08 Jun 2020 09:07) (96% - 97%)  I&O's Summary    BMI (kg/m2): 24.1 (06-04-20 @ 18:47)  PHYSICAL EXAM:  Gen: nad, resting in bed  Neuro: aaox3, unchanged.   Heent: eomi b/l, no jvd, no oral exudates  Pulm: cta b/l, no w/r/r  CV: +s1s2, no m/r/g  Ab: soft, nt/nd, normoactive bs x 4  Extrem: no edema, pulses intact and equal      LABS:                        11.8   8.12  )-----------( 188      ( 08 Jun 2020 06:25 )             35.1       06-08    139  |  104  |  30  ----------------------------<  137  3.9   |  29  |  1.03    Ca    8.9      08 Jun 2020 06:25       eGFR if Non African American: 70 mL/min/1.73M2 (06-08-20 @ 06:25)  eGFR if African American: 81 mL/min/1.73M2 (06-08-20 @ 06:25)

## 2020-06-08 NOTE — PROGRESS NOTE ADULT - ASSESSMENT
Pt is a 78 y/o M with PMHx of HTN, HLD, prostate cancer s/p prostatectomy, basal cell skin cancer, glioblastoma (s/p resection, radiation, chemotherapy cycle 9 on 9/23/19) and seizures admitted with recurrent right frontal GBM s/p resection 5/27/20.     #GBM  - s/p resection on 5/27/20  - continue decadron 2mg q12hrs  - protonix for steroid ppx bid  - Keppra 1000mg bid  - Pain control  - PT/OT/SLP  - will follow up with outpatient neurosurgery and oncology  - restarted home Aspirin 81mg today    #Depressed Mood  --Neuropsych consult for counselling  --will consider antidepressant  --Recreation therapy    #Impulsivity  - enhanced supervision  - pt educated on fall risk and importance of using call bell     #HTN  - continue metoprolol ER 25mg   - continue Lasix 40mg  - hospitalist follow up    #HLD  - continue Lipitor    #BPH  - continue Flomax    #Anxiety  - Xanax 0.25mg PRN    #Pain  - Tylenol PRN, Lidocaine patch    #GI  - Senna, Miralax, Metamucil    #Diet  - Regular with thins    #DVT ppx  - Lovenox

## 2020-06-09 PROCEDURE — 99232 SBSQ HOSP IP/OBS MODERATE 35: CPT | Mod: GC

## 2020-06-09 PROCEDURE — 99232 SBSQ HOSP IP/OBS MODERATE 35: CPT

## 2020-06-09 RX ORDER — POLYETHYLENE GLYCOL 3350 17 G/17G
17 POWDER, FOR SOLUTION ORAL DAILY
Refills: 0 | Status: DISCONTINUED | OUTPATIENT
Start: 2020-06-09 | End: 2020-06-13

## 2020-06-09 RX ADMIN — Medication 2 MILLIGRAM(S): at 17:31

## 2020-06-09 RX ADMIN — ATORVASTATIN CALCIUM 20 MILLIGRAM(S): 80 TABLET, FILM COATED ORAL at 21:38

## 2020-06-09 RX ADMIN — AMLODIPINE BESYLATE 5 MILLIGRAM(S): 2.5 TABLET ORAL at 05:50

## 2020-06-09 RX ADMIN — Medication 25 MILLIGRAM(S): at 05:49

## 2020-06-09 RX ADMIN — TAMSULOSIN HYDROCHLORIDE 0.4 MILLIGRAM(S): 0.4 CAPSULE ORAL at 21:38

## 2020-06-09 RX ADMIN — ENOXAPARIN SODIUM 40 MILLIGRAM(S): 100 INJECTION SUBCUTANEOUS at 21:38

## 2020-06-09 RX ADMIN — Medication 40 MILLIGRAM(S): at 05:50

## 2020-06-09 RX ADMIN — LEVETIRACETAM 1000 MILLIGRAM(S): 250 TABLET, FILM COATED ORAL at 17:31

## 2020-06-09 RX ADMIN — MAGNESIUM OXIDE 400 MG ORAL TABLET 400 MILLIGRAM(S): 241.3 TABLET ORAL at 11:29

## 2020-06-09 RX ADMIN — SENNA PLUS 2 TABLET(S): 8.6 TABLET ORAL at 21:38

## 2020-06-09 RX ADMIN — Medication 0.25 MILLIGRAM(S): at 21:38

## 2020-06-09 RX ADMIN — PANTOPRAZOLE SODIUM 40 MILLIGRAM(S): 20 TABLET, DELAYED RELEASE ORAL at 17:31

## 2020-06-09 RX ADMIN — PANTOPRAZOLE SODIUM 40 MILLIGRAM(S): 20 TABLET, DELAYED RELEASE ORAL at 05:50

## 2020-06-09 RX ADMIN — Medication 2 MILLIGRAM(S): at 05:49

## 2020-06-09 RX ADMIN — Medication 81 MILLIGRAM(S): at 11:29

## 2020-06-09 RX ADMIN — LEVETIRACETAM 1000 MILLIGRAM(S): 250 TABLET, FILM COATED ORAL at 05:49

## 2020-06-09 NOTE — PROGRESS NOTE ADULT - SUBJECTIVE AND OBJECTIVE BOX
HPI:  Pt is a 78 y/o M with PMHx of HTN, HLD, prostate cancer s/p prostatectomy, basal cell skin cancer, glioblastoma (Dx in Aug. 2018-- 8/3/18 - s/p resection (Chalif) 8/3/18;  s/p chemoradiation treatment (Goenka) followed by s/p temozolomide x 3 cycles 10/12/18;  s/p re-resection (Chalif) right frontal lobe 2/7/19;  radiation, chemotherapy cycle 9 on 9/23/19) and seizures on keppra who presented to [facility] on 5/27 for scheduled stereotactic right craniotomy for brain tumor.   Procedure was performed on 5/27 by Dr. Rivers without any intraoperative complications. Post-op, pt was unstable with ambulation. Post-op imaging was notable for vasogenic edema on MRI brain and CTH. Pt has been on a decadron taper. Pt has chronic left upper extremity pain and weakness, and he was at his baseline level.   Pt was evaluated by PT and PM&R and was recommended for acute inpatient rehabilitation when medically stable. Pt was deemed ready for discharge on 6/4/20 and was transferred to [facility] for acute inpatient rehabilitation. Patient report mild increased weakness on the LUE and no other changes since most recent surgery. HPI:  Pt is a 80 y/o M with PMHx of HTN, HLD, prostate cancer s/p prostatectomy, basal cell skin cancer, glioblastoma (Dx in Aug. 2018-- 8/3/18 - s/p resection (Chalif) 8/3/18;  s/p chemoradiation treatment (Goenka) followed by s/p temozolomide x 3 cycles 10/12/18;  s/p re-resection (Chalif) right frontal lobe 2/7/19;  radiation, chemotherapy cycle 9 on 9/23/19) and seizures on keppra who presented to [facility] on 5/27 for scheduled stereotactic right craniotomy for brain tumor.   Procedure was performed on 5/27 by Dr. Rivers without any intraoperative complications. Post-op, pt was unstable with ambulation. Post-op imaging was notable for vasogenic edema on MRI brain and CTH. Pt has been on a decadron taper. Pt has chronic left upper extremity pain and weakness, and he was at his baseline level.   Pt was evaluated by PT and PM&R and was recommended for acute inpatient rehabilitation when medically stable. Pt was deemed ready for discharge on 6/4/20 and was transferred to [facility] for acute inpatient rehabilitation. Patient report mild increased weakness on the LUE and no other changes since most recent surgery.     SUBJECTIVE / INTERVAL HPI: Patient seen and examined with PT. Pt is doing very well and making gains with therapy. He has questions about his follow up care with neuro-oncology and he was told that once he is discharged from here, he will have an appointment with them to coordinate future care. He is eating well and going to the bathroom.     REVIEW OF SYSTEMS:    CONSTITUTIONAL: No fevers or chills  EYES/ENT: No visual changes;  No vertigo or throat pain   NECK: No pain or stiffness  RESPIRATORY: No cough, wheezing, or shortness of breath  CARDIOVASCULAR: No chest pain or palpitations  GASTROINTESTINAL: No abdominal or epigastric pain. No nausea or vomiting. No diarrhea or constipation.   GENITOURINARY: No dysuria, frequency or hematuria  NEUROLOGICAL: No numbness, + weakness, +dysarthria  SKIN: No itching, rashes      VITAL SIGNS:  Vital Signs Last 24 Hrs  T(C): 36.4 (09 Jun 2020 07:50), Max: 36.5 (08 Jun 2020 21:00)  T(F): 97.5 (09 Jun 2020 07:50), Max: 97.7 (08 Jun 2020 21:00)  HR: 71 (09 Jun 2020 07:50) (65 - 73)  BP: 143/85 (09 Jun 2020 07:50) (129/76 - 143/85)  BP(mean): --  RR: 15 (09 Jun 2020 07:50) (14 - 15)  SpO2: 100% (09 Jun 2020 07:50) (97% - 100%)    PHYSICAL EXAM:    General: NAD, sitting in wheelchair, working with PT  HEENT: right craniotomy scar with sutures is clean, dry, intact  Neck: supple  Cardiovascular: +S1/S2, RRR  Respiratory: CTA B/L; no W/R/R, no crackles  Gastrointestinal: soft, NT/ND; +BSx4  Extremities: warm, well perfused; no edema, clubbing or cyanosis  Neurological: AAOx3; motor and sensory stable, left sided upper extremity weakness (SF 4/5, EF 4/5, EE 4/5,  3/5)      MEDICATIONS:  MEDICATIONS  (STANDING):  amLODIPine   Tablet 5 milliGRAM(s) Oral daily  aspirin enteric coated 81 milliGRAM(s) Oral daily  atorvastatin 20 milliGRAM(s) Oral at bedtime  dexAMETHasone     Tablet 2 milliGRAM(s) Oral every 12 hours  enoxaparin Injectable 40 milliGRAM(s) SubCutaneous at bedtime  furosemide    Tablet 40 milliGRAM(s) Oral daily  levETIRAcetam 1000 milliGRAM(s) Oral two times a day  magnesium oxide 400 milliGRAM(s) Oral daily  metoprolol succinate ER 25 milliGRAM(s) Oral daily  pantoprazole    Tablet 40 milliGRAM(s) Oral two times a day  senna 2 Tablet(s) Oral at bedtime  tamsulosin 0.4 milliGRAM(s) Oral at bedtime    MEDICATIONS  (PRN):  acetaminophen   Tablet .. 650 milliGRAM(s) Oral every 6 hours PRN Temp greater or equal to 38C (100.4F), Mild Pain (1 - 3)  ALPRAZolam 0.25 milliGRAM(s) Oral every 8 hours PRN Anxiety  benzocaine 15 mG/menthol 3.6 mG (Sugar-Free) Lozenge 1 Lozenge Oral three times a day PRN Sore Throat  polyethylene glycol 3350 17 Gram(s) Oral daily PRN Constipation      ALLERGIES:  Allergies    No Known Allergies    Intolerances        LABS:                        11.8   8.12  )-----------( 188      ( 08 Jun 2020 06:25 )             35.1     06-08    139  |  104  |  30<H>  ----------------------------<  137<H>  3.9   |  29  |  1.03    Ca    8.9      08 Jun 2020 06:25          CAPILLARY BLOOD GLUCOSE          RADIOLOGY & ADDITIONAL TESTS: Reviewed.

## 2020-06-09 NOTE — PROGRESS NOTE ADULT - ASSESSMENT
Pt is a 80 y/o M with PMHx of HTN, HLD, prostate cancer s/p prostatectomy, basal cell skin cancer, glioblastoma (s/p resection, radiation, chemotherapy cycle 9 on 9/23/19) and seizures admitted with recurrent right frontal GBM s/p resection 5/27/20.     #GBM  - s/p resection on 5/27/20  - continue decadron 2mg q12hrs  - protonix for steroid ppx bid  - Keppra 1000mg bid  - Pain control  - PT/OT/SLP  - will follow up with outpatient neurosurgery and oncology  - restarted home Aspirin 81mg    #Depressed Mood  - Neuropsych consult for counselling  - will consider antidepressant  - Recreation therapy    #Impulsivity  - enhanced supervision  - pt educated on fall risk and importance of using call bell     #HTN  - continue metoprolol ER 25mg   - continue Lasix 40mg  - hospitalist follow up    #HLD  - continue Lipitor    #BPH  - continue Flomax    #Anxiety  - Xanax 0.25mg PRN    #Pain  - Tylenol PRN, Lidocaine patch    #GI  - Senna    #Diet  - Regular with thins    #DVT ppx  - Lovenox    #Discharge Planning  - Pt discussed in team meeting on 6/9/20. In OT, pt is supervision for eating, grooming, and upper body dressing, and min A for lower body dressing and toilet and shower transfers. In PT he is CG for transfers and is ambulating 80-85' with a quad cane with min A. He is walking up and down 12 steps with a right hand rail with min A. In SLP he has higher level cog deficits, impulsiveness, and reduced insight. Tentative discharge date set for 6/17 with goals of mod I to supervision.

## 2020-06-09 NOTE — H&P ADULT - PROBLEM SELECTOR PROBLEM 8
Arrives by EMS for tarry diarrhea stools for two days. She saw her doctor today and was told to come to the ED. She has hx of lung CA, she says she last had chemo over a month ago. Need for prophylactic measure

## 2020-06-09 NOTE — PROGRESS NOTE ADULT - SUBJECTIVE AND OBJECTIVE BOX
Patient seen and examined at bedside. No overnight events per nursing or chart review. Discussed with patient that PMR team should be the point person to talk with his neurologist and oncologist so that an effective treatment plan and timing of its implementation can be coordinated. I informed him that I discussed with the team yesterday his concerns and that they will likely have an answer for him today when they see him.     ALLERGIES:  No Known Allergies    MEDICATIONS  (STANDING):  amLODIPine   Tablet 5 milliGRAM(s) Oral daily  aspirin enteric coated 81 milliGRAM(s) Oral daily  atorvastatin 20 milliGRAM(s) Oral at bedtime  dexAMETHasone     Tablet 2 milliGRAM(s) Oral every 12 hours  enoxaparin Injectable 40 milliGRAM(s) SubCutaneous at bedtime  furosemide    Tablet 40 milliGRAM(s) Oral daily  levETIRAcetam 1000 milliGRAM(s) Oral two times a day  lidocaine   Patch 1 Patch Transdermal daily  magnesium oxide 400 milliGRAM(s) Oral daily  metoprolol succinate ER 25 milliGRAM(s) Oral daily  pantoprazole    Tablet 40 milliGRAM(s) Oral two times a day  polyethylene glycol 3350 17 Gram(s) Oral daily  psyllium Powder 1 Packet(s) Oral daily  senna 2 Tablet(s) Oral at bedtime  tamsulosin 0.4 milliGRAM(s) Oral at bedtime    MEDICATIONS  (PRN):  acetaminophen   Tablet .. 650 milliGRAM(s) Oral every 6 hours PRN Temp greater or equal to 38C (100.4F), Mild Pain (1 - 3)  ALPRAZolam 0.25 milliGRAM(s) Oral every 8 hours PRN Anxiety  benzocaine 15 mG/menthol 3.6 mG (Sugar-Free) Lozenge 1 Lozenge Oral three times a day PRN Sore Throat    Vital Signs Last 24 Hrs  T(F): 97.5 (09 Jun 2020 07:50), Max: 97.7 (08 Jun 2020 21:00)  HR: 71 (09 Jun 2020 07:50) (65 - 73)  BP: 143/85 (09 Jun 2020 07:50) (129/76 - 143/85)  RR: 15 (09 Jun 2020 07:50) (14 - 15)  SpO2: 100% (09 Jun 2020 07:50) (97% - 100%)  I&O's Summary    BMI (kg/m2): 24.1 (06-04-20 @ 18:47)  PHYSICAL EXAM:  Gen: nad, resting in bed  Neuro: aaox3, right upper extrem strength appears to be improving but right wrist is 0/5.  Heent: eomi b/l, no jvd, no oral exudates  Pulm: cta b/l, no w/r/r  CV: +s1s2, no m/r/g  Ab: soft, nt/nd, normoactive bs x 4  Extrem: no edema, pulses intact and equal      LABS:                        11.8   8.12  )-----------( 188      ( 08 Jun 2020 06:25 )             35.1       06-08    139  |  104  |  30  ----------------------------<  137  3.9   |  29  |  1.03    Ca    8.9      08 Jun 2020 06:25       eGFR if Non African American: 70 mL/min/1.73M2 (06-08-20 @ 06:25)  eGFR if African American: 81 mL/min/1.73M2 (06-08-20 @ 06:25)

## 2020-06-10 LAB — OB PNL STL: NEGATIVE — SIGNIFICANT CHANGE UP

## 2020-06-10 PROCEDURE — 99232 SBSQ HOSP IP/OBS MODERATE 35: CPT | Mod: GC

## 2020-06-10 PROCEDURE — 99233 SBSQ HOSP IP/OBS HIGH 50: CPT

## 2020-06-10 RX ORDER — ESCITALOPRAM OXALATE 10 MG/1
5 TABLET, FILM COATED ORAL
Refills: 0 | Status: DISCONTINUED | OUTPATIENT
Start: 2020-06-10 | End: 2020-06-15

## 2020-06-10 RX ADMIN — PANTOPRAZOLE SODIUM 40 MILLIGRAM(S): 20 TABLET, DELAYED RELEASE ORAL at 06:05

## 2020-06-10 RX ADMIN — Medication 2 MILLIGRAM(S): at 06:05

## 2020-06-10 RX ADMIN — Medication 25 MILLIGRAM(S): at 06:05

## 2020-06-10 RX ADMIN — ESCITALOPRAM OXALATE 5 MILLIGRAM(S): 10 TABLET, FILM COATED ORAL at 21:18

## 2020-06-10 RX ADMIN — ENOXAPARIN SODIUM 40 MILLIGRAM(S): 100 INJECTION SUBCUTANEOUS at 21:19

## 2020-06-10 RX ADMIN — LEVETIRACETAM 1000 MILLIGRAM(S): 250 TABLET, FILM COATED ORAL at 06:05

## 2020-06-10 RX ADMIN — Medication 81 MILLIGRAM(S): at 12:14

## 2020-06-10 RX ADMIN — TAMSULOSIN HYDROCHLORIDE 0.4 MILLIGRAM(S): 0.4 CAPSULE ORAL at 21:19

## 2020-06-10 RX ADMIN — Medication 0.25 MILLIGRAM(S): at 21:19

## 2020-06-10 RX ADMIN — Medication 40 MILLIGRAM(S): at 06:05

## 2020-06-10 RX ADMIN — AMLODIPINE BESYLATE 5 MILLIGRAM(S): 2.5 TABLET ORAL at 06:05

## 2020-06-10 RX ADMIN — SENNA PLUS 2 TABLET(S): 8.6 TABLET ORAL at 21:18

## 2020-06-10 RX ADMIN — LEVETIRACETAM 1000 MILLIGRAM(S): 250 TABLET, FILM COATED ORAL at 17:25

## 2020-06-10 RX ADMIN — ATORVASTATIN CALCIUM 20 MILLIGRAM(S): 80 TABLET, FILM COATED ORAL at 21:18

## 2020-06-10 RX ADMIN — MAGNESIUM OXIDE 400 MG ORAL TABLET 400 MILLIGRAM(S): 241.3 TABLET ORAL at 12:14

## 2020-06-10 RX ADMIN — PANTOPRAZOLE SODIUM 40 MILLIGRAM(S): 20 TABLET, DELAYED RELEASE ORAL at 17:26

## 2020-06-10 RX ADMIN — Medication 2 MILLIGRAM(S): at 17:26

## 2020-06-10 NOTE — PROGRESS NOTE ADULT - SUBJECTIVE AND OBJECTIVE BOX
Patient seen and examined at bedside. No overnight events per nursing or chart review. Patient notes some constipation. His 10 point ROS is otherwise negative.     ALLERGIES:  No Known Allergies    MEDICATIONS  (STANDING):  amLODIPine   Tablet 5 milliGRAM(s) Oral daily  aspirin enteric coated 81 milliGRAM(s) Oral daily  atorvastatin 20 milliGRAM(s) Oral at bedtime  dexAMETHasone     Tablet 2 milliGRAM(s) Oral every 12 hours  enoxaparin Injectable 40 milliGRAM(s) SubCutaneous at bedtime  furosemide    Tablet 40 milliGRAM(s) Oral daily  levETIRAcetam 1000 milliGRAM(s) Oral two times a day  magnesium oxide 400 milliGRAM(s) Oral daily  metoprolol succinate ER 25 milliGRAM(s) Oral daily  pantoprazole    Tablet 40 milliGRAM(s) Oral two times a day  senna 2 Tablet(s) Oral at bedtime  tamsulosin 0.4 milliGRAM(s) Oral at bedtime    MEDICATIONS  (PRN):  acetaminophen   Tablet .. 650 milliGRAM(s) Oral every 6 hours PRN Temp greater or equal to 38C (100.4F), Mild Pain (1 - 3)  ALPRAZolam 0.25 milliGRAM(s) Oral every 8 hours PRN Anxiety  benzocaine 15 mG/menthol 3.6 mG (Sugar-Free) Lozenge 1 Lozenge Oral three times a day PRN Sore Throat  polyethylene glycol 3350 17 Gram(s) Oral daily PRN Constipation    Vital Signs Last 24 Hrs  T(F): 98.1 (10 Michael 2020 07:49), Max: 98.1 (10 Michael 2020 07:49)  HR: 63 (10 Michael 2020 07:49) (63 - 73)  BP: 158/72 (10 Michael 2020 07:49) (134/68 - 158/72)  RR: 14 (10 Michael 2020 07:49) (14 - 14)  SpO2: 95% (10 Michael 2020 07:49) (95% - 97%)  I&O's Summary      PHYSICAL EXAM:  Gen: nad, resting in bed  Neuro: aaox3, unchanged.   Heent: eomi b/l, no jvd, no oral exudates  Pulm: cta b/l, no w/r/r  CV: +s1s2, no m/r/g  Ab: soft, nt/nd, normoactive bs x 4  Extrem: no edema, pulses intact and equal      LABS:                        11.8   8.12  )-----------( 188      ( 08 Jun 2020 06:25 )             35.1       06-08    139  |  104  |  30  ----------------------------<  137  3.9   |  29  |  1.03    Ca    8.9      08 Jun 2020 06:25       eGFR if Non African American: 70 mL/min/1.73M2 (06-08-20 @ 06:25)  eGFR if African American: 81 mL/min/1.73M2 (06-08-20 @ 06:25)

## 2020-06-10 NOTE — PROGRESS NOTE ADULT - SUBJECTIVE AND OBJECTIVE BOX
HPI:  Pt is a 78 y/o M with PMHx of HTN, HLD, prostate cancer s/p prostatectomy, basal cell skin cancer, glioblastoma (Dx in Aug. 2018-- 8/3/18 - s/p resection ([facility]) 8/3/18;  s/p chemoradiation treatment (Goenka) followed by s/p temozolomide x 3 cycles 10/12/18;  s/p re-resection (Chalif) right frontal lobe 2/7/19;  radiation, chemotherapy cycle 9 on 9/23/19) and seizures on keppra who presented to [facility] on 5/27 for scheduled stereotactic right craniotomy for brain tumor.   Procedure was performed on 5/27 by Dr. Rivers without any intraoperative complications. Post-op, pt was unstable with ambulation. Post-op imaging was notable for vasogenic edema on MRI brain and CTH. Pt has been on a decadron taper. Pt has chronic left upper extremity pain and weakness, and he was at his baseline level.   Pt was evaluated by PT and PM&R and was recommended for acute inpatient rehabilitation when medically stable. Pt was deemed ready for discharge on 6/4/20 and was transferred to [facility] for acute inpatient rehabilitation. Patient report mild increased weakness on the LUE and no other changes since most recent surgery. (04 Jun 2020 14:05)      PAST MEDICAL & SURGICAL HISTORY:  Fall, sequela  Bilateral hearing loss, unspecified hearing loss type   activity: [employer] 1962-65    [place]  Type 2 diabetes mellitus without complication, without long-term current use of insulin: stop oral meds  3 weeks ago  Glioblastoma: biopsy 8/2018  surgery  6 weeks radiation  35 days of oral chemotherapy last 3 weeks  Basal cell carcinoma (BCC) of left lower leg: s/p resection  right lower leg top  left lower leg  Prostate cancer: s/p radical prostatectomy 1995  HLD (hyperlipidemia)  HTN (hypertension)  S/P colonoscopic polypectomy: 3 years benign polyps  S/P tonsillectomy: age 30  H/O bilateral cataract extraction: 3-4 years ago  S/P prostatectomy: 1995  S/P brain surgery: 8/2018  Lipoma of neck: s/p resection at age 30  Basal cell carcinoma (BCC) of lower leg: s/p resection  History of tonsillectomy: at age 30      Subjective:  No new complaints, making progress in PT.      REVIEW OF SYMPTOMS  CONSTITUTIONAL: No fevers or chills  EYES/ENT: No visual changes;  No vertigo or throat pain   NECK: No pain or stiffness  RESPIRATORY: No cough, wheezing, or shortness of breath  CARDIOVASCULAR: No chest pain or palpitations  GASTROINTESTINAL: No abdominal or epigastric pain. No nausea or vomiting. No diarrhea or constipation.   GENITOURINARY: No dysuria, frequency or hematuria  NEUROLOGICAL: No numbness, + weakness, +dysarthria  SKIN: No itching, rashes      VITALS  Vital Signs Last 24 Hrs  T(C): 36.7 (10 Michael 2020 07:49), Max: 36.7 (10 Michael 2020 07:49)  T(F): 98.1 (10 Michael 2020 07:49), Max: 98.1 (10 Michael 2020 07:49)  HR: 63 (10 Michael 2020 07:49) (63 - 73)  BP: 158/72 (10 Michael 2020 07:49) (134/68 - 158/72)  BP(mean): --  RR: 14 (10 Michael 2020 07:49) (14 - 14)  SpO2: 95% (10 Michael 2020 07:49) (95% - 97%)      PHYSICAL EXAM  General: NAD,   HEENT: right craniotomy scar with sutures is clean, dry, intact  Neck: supple  Cardiovascular: +S1/S2, RRR  Respiratory: CTA B/L; no W/R/R, no crackles  Gastrointestinal: soft, NT/ND; +BSx4  Extremities: warm, well perfused; no edema, clubbing or cyanosis  Neurological: AAOx3; motor and sensory stable, left sided upper extremity weakness (SF 4/5, EF 4/5, EE 4/5,  3/5)    RECENT LABS                  RADIOLOGY/OTHER RESULTS      MEDICATIONS  (STANDING):  amLODIPine   Tablet 5 milliGRAM(s) Oral daily  aspirin enteric coated 81 milliGRAM(s) Oral daily  atorvastatin 20 milliGRAM(s) Oral at bedtime  dexAMETHasone     Tablet 2 milliGRAM(s) Oral every 12 hours  enoxaparin Injectable 40 milliGRAM(s) SubCutaneous at bedtime  furosemide    Tablet 40 milliGRAM(s) Oral daily  levETIRAcetam 1000 milliGRAM(s) Oral two times a day  magnesium oxide 400 milliGRAM(s) Oral daily  metoprolol succinate ER 25 milliGRAM(s) Oral daily  pantoprazole    Tablet 40 milliGRAM(s) Oral two times a day  senna 2 Tablet(s) Oral at bedtime  tamsulosin 0.4 milliGRAM(s) Oral at bedtime    MEDICATIONS  (PRN):  acetaminophen   Tablet .. 650 milliGRAM(s) Oral every 6 hours PRN Temp greater or equal to 38C (100.4F), Mild Pain (1 - 3)  ALPRAZolam 0.25 milliGRAM(s) Oral every 8 hours PRN Anxiety  benzocaine 15 mG/menthol 3.6 mG (Sugar-Free) Lozenge 1 Lozenge Oral three times a day PRN Sore Throat  polyethylene glycol 3350 17 Gram(s) Oral daily PRN Constipation

## 2020-06-10 NOTE — PROGRESS NOTE ADULT - ASSESSMENT
Pt is a 80 y/o M with PMHx of HTN, HLD, prostate cancer s/p prostatectomy, basal cell skin cancer, glioblastoma (s/p resection, radiation, chemotherapy cycle 9 on 9/23/19) and seizures admitted with recurrent right frontal GBM s/p resection 5/27/20.     #GBM  - s/p resection on 5/27/20  - continue decadron 2mg q12hrs  - protonix for steroid ppx bid  - Keppra 1000mg bid  - Pain control  - PT/OT/SLP  - will follow up with outpatient neurosurgery and oncology  - restarted home Aspirin 81mg    #Depressed Mood  - Neuropsych consult reviewed and appreciated--   - Will trial lexapro-- start 5mg -- may help with motor recovery as well.   - Recreation therapy    #Impulsivity  - enhanced supervision  - pt educated on fall risk and importance of using call bell     #HTN  - continue metoprolol ER 25mg   - continue Lasix 40mg  - hospitalist follow up    #HLD  - continue Lipitor    #BPH  - continue Flomax    #Anxiety  - Xanax 0.25mg PRN    #Pain  - Tylenol PRN, Lidocaine patch    #GI  - Senna    #Diet  - Regular with thins    #DVT ppx  - Lovenox    #Discharge Planning  - Pt discussed in team meeting on 6/9/20. In OT, pt is supervision for eating, grooming, and upper body dressing, and min A for lower body dressing and toilet and shower transfers. In PT he is CG for transfers and is ambulating 80-85' with a quad cane with min A. He is walking up and down 12 steps with a right hand rail with min A. In SLP he has higher level cog deficits, impulsiveness, and reduced insight. Tentative discharge date set for 6/17 with goals of mod I to supervision.    Spoke with pt's son Francisco, and provided update on pt's status, progress in therapy, rehab plan of care, ELOS and discharge expectations.  Understands pt. will need supervision upon discharge and pt's wife and son are home to provide.  All questions answered.

## 2020-06-10 NOTE — PROGRESS NOTE ADULT - ASSESSMENT
Mr Day is a pleasant 79 year-old male that is admitted to [facility] for acute rehabilitation following a GBM resection     Problem  1.	GBM s/p resection  2.	Hypertension  3.	Sore Throat  4.	Hiccups  5.	Cough  6.	Anxiety  7.	Normocytic Anemia  8.	Hypokalemia    Plan  1.	PT/OT/SLP per primary. ASA 81mg qd. c/w decadron and protonix ppx. appropriate precautions in place (seizure/fall). Coorindation with neuronc/neurosurgery in regards to best timing for further steps in treatment is imperative. Will talk with PMR team.   2.	BP elevated with goal of 120/80. c/w toprol 25mg qd, lasix 40mg qd and norvasc 5mg qd. Will c/w this regimen for now as patient not overtly above target  3.	cepacol lozenge as needed, likely secondary to intubation. appear to be improving.   4.	appears resolved  5.	Benzonatate prn   6.	xanax prn  7.	appears stable, guiac pending  8.	appears within normal range, as he is on a loop diuretic please obtain bmp q48-72 hours - ordered for tomorrow    Diet: per primary  Pain control: per primary  DVT ppx: Lovenox   Case discussed with primary team  If a clinical question should arise regarding this patient, please do not hesitate to contact me at [phone] until 7pm on 6/10/2020. If after please refer to on-call hospitalist

## 2020-06-11 LAB
ANION GAP SERPL CALC-SCNC: 9 MMOL/L — SIGNIFICANT CHANGE UP (ref 5–17)
BUN SERPL-MCNC: 32 MG/DL — HIGH (ref 7–23)
CALCIUM SERPL-MCNC: 8.8 MG/DL — SIGNIFICANT CHANGE UP (ref 8.4–10.5)
CHLORIDE SERPL-SCNC: 104 MMOL/L — SIGNIFICANT CHANGE UP (ref 96–108)
CO2 SERPL-SCNC: 27 MMOL/L — SIGNIFICANT CHANGE UP (ref 22–31)
CREAT SERPL-MCNC: 1.18 MG/DL — SIGNIFICANT CHANGE UP (ref 0.5–1.3)
GLUCOSE SERPL-MCNC: 116 MG/DL — HIGH (ref 70–99)
HCT VFR BLD CALC: 34.7 % — LOW (ref 39–50)
HGB BLD-MCNC: 11.7 G/DL — LOW (ref 13–17)
MCHC RBC-ENTMCNC: 32 PG — SIGNIFICANT CHANGE UP (ref 27–34)
MCHC RBC-ENTMCNC: 33.7 GM/DL — SIGNIFICANT CHANGE UP (ref 32–36)
MCV RBC AUTO: 94.8 FL — SIGNIFICANT CHANGE UP (ref 80–100)
NRBC # BLD: 0 /100 WBCS — SIGNIFICANT CHANGE UP (ref 0–0)
PLATELET # BLD AUTO: 200 K/UL — SIGNIFICANT CHANGE UP (ref 150–400)
POTASSIUM SERPL-MCNC: 3.9 MMOL/L — SIGNIFICANT CHANGE UP (ref 3.5–5.3)
POTASSIUM SERPL-SCNC: 3.9 MMOL/L — SIGNIFICANT CHANGE UP (ref 3.5–5.3)
RBC # BLD: 3.66 M/UL — LOW (ref 4.2–5.8)
RBC # FLD: 12.3 % — SIGNIFICANT CHANGE UP (ref 10.3–14.5)
SODIUM SERPL-SCNC: 140 MMOL/L — SIGNIFICANT CHANGE UP (ref 135–145)
WBC # BLD: 7.5 K/UL — SIGNIFICANT CHANGE UP (ref 3.8–10.5)
WBC # FLD AUTO: 7.5 K/UL — SIGNIFICANT CHANGE UP (ref 3.8–10.5)

## 2020-06-11 PROCEDURE — 99232 SBSQ HOSP IP/OBS MODERATE 35: CPT

## 2020-06-11 PROCEDURE — 99232 SBSQ HOSP IP/OBS MODERATE 35: CPT | Mod: GC

## 2020-06-11 RX ORDER — ALPRAZOLAM 0.25 MG
0.25 TABLET ORAL EVERY 8 HOURS
Refills: 0 | Status: DISCONTINUED | OUTPATIENT
Start: 2020-06-12 | End: 2020-06-17

## 2020-06-11 RX ADMIN — Medication 40 MILLIGRAM(S): at 06:06

## 2020-06-11 RX ADMIN — LEVETIRACETAM 1000 MILLIGRAM(S): 250 TABLET, FILM COATED ORAL at 17:36

## 2020-06-11 RX ADMIN — AMLODIPINE BESYLATE 5 MILLIGRAM(S): 2.5 TABLET ORAL at 06:06

## 2020-06-11 RX ADMIN — PANTOPRAZOLE SODIUM 40 MILLIGRAM(S): 20 TABLET, DELAYED RELEASE ORAL at 17:36

## 2020-06-11 RX ADMIN — Medication 0.25 MILLIGRAM(S): at 21:18

## 2020-06-11 RX ADMIN — Medication 2 MILLIGRAM(S): at 17:36

## 2020-06-11 RX ADMIN — Medication 81 MILLIGRAM(S): at 11:44

## 2020-06-11 RX ADMIN — LEVETIRACETAM 1000 MILLIGRAM(S): 250 TABLET, FILM COATED ORAL at 06:06

## 2020-06-11 RX ADMIN — ENOXAPARIN SODIUM 40 MILLIGRAM(S): 100 INJECTION SUBCUTANEOUS at 21:18

## 2020-06-11 RX ADMIN — ESCITALOPRAM OXALATE 5 MILLIGRAM(S): 10 TABLET, FILM COATED ORAL at 17:36

## 2020-06-11 RX ADMIN — TAMSULOSIN HYDROCHLORIDE 0.4 MILLIGRAM(S): 0.4 CAPSULE ORAL at 21:18

## 2020-06-11 RX ADMIN — MAGNESIUM OXIDE 400 MG ORAL TABLET 400 MILLIGRAM(S): 241.3 TABLET ORAL at 11:45

## 2020-06-11 RX ADMIN — Medication 2 MILLIGRAM(S): at 06:06

## 2020-06-11 RX ADMIN — PANTOPRAZOLE SODIUM 40 MILLIGRAM(S): 20 TABLET, DELAYED RELEASE ORAL at 06:06

## 2020-06-11 RX ADMIN — ATORVASTATIN CALCIUM 20 MILLIGRAM(S): 80 TABLET, FILM COATED ORAL at 21:18

## 2020-06-11 NOTE — PROGRESS NOTE ADULT - SUBJECTIVE AND OBJECTIVE BOX
HPI:  Pt is a 80 y/o M with PMHx of HTN, HLD, prostate cancer s/p prostatectomy, basal cell skin cancer, glioblastoma (Dx in Aug. 2018-- 8/3/18 - s/p resection ([facility]) 8/3/18;  s/p chemoradiation treatment (Goenka) followed by s/p temozolomide x 3 cycles 10/12/18;  s/p re-resection (Chalif) right frontal lobe 2/7/19;  radiation, chemotherapy cycle 9 on 9/23/19) and seizures on keppra who presented to [facility] on 5/27 for scheduled stereotactic right craniotomy for brain tumor.   Procedure was performed on 5/27 by Dr. Rivers without any intraoperative complications. Post-op, pt was unstable with ambulation. Post-op imaging was notable for vasogenic edema on MRI brain and CTH. Pt has been on a decadron taper. Pt has chronic left upper extremity pain and weakness, and he was at his baseline level.   Pt was evaluated by PT and PM&R and was recommended for acute inpatient rehabilitation when medically stable. Pt was deemed ready for discharge on 6/4/20 and was transferred to [facility] for acute inpatient rehabilitation. Patient report mild increased weakness on the LUE and no other changes since most recent surgery. (04 Jun 2020 14:05) HPI:  Pt is a 80 y/o M with PMHx of HTN, HLD, prostate cancer s/p prostatectomy, basal cell skin cancer, glioblastoma (Dx in Aug. 2018-- 8/3/18 - s/p resection ([facility]if) 8/3/18;  s/p chemoradiation treatment (Goenka) followed by s/p temozolomide x 3 cycles 10/12/18;  s/p re-resection (Chalif) right frontal lobe 2/7/19;  radiation, chemotherapy cycle 9 on 9/23/19) and seizures on keppra who presented to [facility] on 5/27 for scheduled stereotactic right craniotomy for brain tumor.   Procedure was performed on 5/27 by Dr. Rivers without any intraoperative complications. Post-op, pt was unstable with ambulation. Post-op imaging was notable for vasogenic edema on MRI brain and CTH. Pt has been on a decadron taper. Pt has chronic left upper extremity pain and weakness, and he was at his baseline level.   Pt was evaluated by PT and PM&R and was recommended for acute inpatient rehabilitation when medically stable. Pt was deemed ready for discharge on 6/4/20 and was transferred to [facility] for acute inpatient rehabilitation. Patient report mild increased weakness on the LUE and no other changes since most recent surgery. (04 Jun 2020 14:05)    SUBJECTIVE / INTERVAL HPI: Patient seen and examined in PT gym. Pt is doing well and feels as though he is making progress with his walking and his movement. He does not have any pain. His sutures can be removed today. He does have mildly decreased insight into what he will be able and unable to do once he is discharged, but he is continuing to receive instruction.     REVIEW OF SYSTEMS:    CONSTITUTIONAL: No fevers or chills  EYES/ENT: No visual changes;  No vertigo or throat pain   NECK: No pain or stiffness  RESPIRATORY: No cough, wheezing, or shortness of breath  CARDIOVASCULAR: No chest pain or palpitations  GASTROINTESTINAL: No abdominal or epigastric pain. No nausea or vomiting. No diarrhea or constipation.   GENITOURINARY: No dysuria, frequency or hematuria  NEUROLOGICAL: No numbness, +weakness and dysarthria improving  SKIN: No itching, rashes      VITAL SIGNS:  Vital Signs Last 24 Hrs  T(C): 36.6 (10 Michael 2020 20:03), Max: 36.6 (10 Michael 2020 20:03)  T(F): 97.9 (10 Michael 2020 20:03), Max: 97.9 (10 Michael 2020 20:03)  HR: 58 (11 Jun 2020 06:10) (58 - 73)  BP: 136/80 (11 Jun 2020 06:10) (131/70 - 136/80)  BP(mean): --  RR: 14 (10 Michael 2020 20:03) (14 - 14)  SpO2: 97% (10 Michael 2020 20:03) (97% - 97%)    PHYSICAL EXAM:    General: NAD, sitting up in wheelchair in PT gym  HEENT: craniotomy incision with sutures is c/d/i  Neck: supple  Cardiovascular: +S1/S2, RRR  Respiratory: CTA B/L; no crackles or wheezes  Gastrointestinal: soft, NT/ND; normoactive bowel sounds  Extremities: warm, well perfused; no edema, clubbing or cyanosis  Neurological: AAOx3; motor and sensory exams are stable, left sided upper extremity weakness is improving    MEDICATIONS:  MEDICATIONS  (STANDING):  amLODIPine   Tablet 5 milliGRAM(s) Oral daily  aspirin enteric coated 81 milliGRAM(s) Oral daily  atorvastatin 20 milliGRAM(s) Oral at bedtime  dexAMETHasone     Tablet 2 milliGRAM(s) Oral every 12 hours  enoxaparin Injectable 40 milliGRAM(s) SubCutaneous at bedtime  escitalopram 5 milliGRAM(s) Oral <User Schedule>  furosemide    Tablet 40 milliGRAM(s) Oral daily  levETIRAcetam 1000 milliGRAM(s) Oral two times a day  magnesium oxide 400 milliGRAM(s) Oral daily  metoprolol succinate ER 25 milliGRAM(s) Oral daily  pantoprazole    Tablet 40 milliGRAM(s) Oral two times a day  senna 2 Tablet(s) Oral at bedtime  tamsulosin 0.4 milliGRAM(s) Oral at bedtime    MEDICATIONS  (PRN):  acetaminophen   Tablet .. 650 milliGRAM(s) Oral every 6 hours PRN Temp greater or equal to 38C (100.4F), Mild Pain (1 - 3)  ALPRAZolam 0.25 milliGRAM(s) Oral every 8 hours PRN Anxiety  benzocaine 15 mG/menthol 3.6 mG (Sugar-Free) Lozenge 1 Lozenge Oral three times a day PRN Sore Throat  polyethylene glycol 3350 17 Gram(s) Oral daily PRN Constipation      ALLERGIES:  Allergies    No Known Allergies    Intolerances        LABS:                        11.7   7.50  )-----------( 200      ( 11 Jun 2020 05:26 )             34.7     06-11    140  |  104  |  32<H>  ----------------------------<  116<H>  3.9   |  27  |  1.18    Ca    8.8      11 Jun 2020 05:26          CAPILLARY BLOOD GLUCOSE          RADIOLOGY & ADDITIONAL TESTS: Reviewed.

## 2020-06-11 NOTE — PROGRESS NOTE ADULT - SUBJECTIVE AND OBJECTIVE BOX
Patient seen and examined at bedside. No overnight events per nursing or chart review. Patient is currently without major complaints as his 10 point ROS is unremarkable     ALLERGIES:  No Known Allergies    MEDICATIONS  (STANDING):  amLODIPine   Tablet 5 milliGRAM(s) Oral daily  aspirin enteric coated 81 milliGRAM(s) Oral daily  atorvastatin 20 milliGRAM(s) Oral at bedtime  dexAMETHasone     Tablet 2 milliGRAM(s) Oral every 12 hours  enoxaparin Injectable 40 milliGRAM(s) SubCutaneous at bedtime  escitalopram 5 milliGRAM(s) Oral <User Schedule>  furosemide    Tablet 40 milliGRAM(s) Oral daily  levETIRAcetam 1000 milliGRAM(s) Oral two times a day  magnesium oxide 400 milliGRAM(s) Oral daily  metoprolol succinate ER 25 milliGRAM(s) Oral daily  pantoprazole    Tablet 40 milliGRAM(s) Oral two times a day  senna 2 Tablet(s) Oral at bedtime  tamsulosin 0.4 milliGRAM(s) Oral at bedtime    MEDICATIONS  (PRN):  acetaminophen   Tablet .. 650 milliGRAM(s) Oral every 6 hours PRN Temp greater or equal to 38C (100.4F), Mild Pain (1 - 3)  ALPRAZolam 0.25 milliGRAM(s) Oral every 8 hours PRN Anxiety  benzocaine 15 mG/menthol 3.6 mG (Sugar-Free) Lozenge 1 Lozenge Oral three times a day PRN Sore Throat  polyethylene glycol 3350 17 Gram(s) Oral daily PRN Constipation    Vital Signs Last 24 Hrs  T(F): 97.9 (10 Michael 2020 20:03), Max: 97.9 (10 Michael 2020 20:03)  HR: 58 (11 Jun 2020 06:10) (58 - 73)  BP: 136/80 (11 Jun 2020 06:10) (131/70 - 136/80)  RR: 14 (10 Michael 2020 20:03) (14 - 14)  SpO2: 97% (10 Michael 2020 20:03) (97% - 97%)  I&O's Summary      PHYSICAL EXAM:  Gen: nad, resting in bed  Neuro: aaox3, minimal change in left hand weakness.   Heent: eomi b/l, no jvd, no oral exudates, post-surgical scalp changes noted.   Pulm: cta b/l, no w/r/r  CV: +s1s2, no m/r/g  Ab: soft, nt/nd, normoactive bs x 4  Extrem: no edema, pulses intact and equal      LABS:                        11.7   7.50  )-----------( 200      ( 11 Jun 2020 05:26 )             34.7       06-11    140  |  104  |  32  ----------------------------<  116  3.9   |  27  |  1.18    Ca    8.8      11 Jun 2020 05:26       eGFR if Non African American: 59 mL/min/1.73M2 (06-11-20 @ 05:26)  eGFR if : 68 mL/min/1.73M2 (06-11-20 @ 05:26)

## 2020-06-11 NOTE — PROGRESS NOTE ADULT - ASSESSMENT
Mr Day is a pleasant 79 year-old male that is admitted to [facility] for acute rehabilitation following a GBM resection     Problem  1.	GBM s/p resection  2.	Hypertension  3.	Sore Throat  4.	Hiccups  5.	Cough  6.	Anxiety  7.	Normocytic Anemia  8.	Hypokalemia/Electrolyte abnormalities    Plan  1.	PT/OT/SLP per primary. ASA 81mg qd. c/w decadron and protonix ppx. appropriate precautions in place (seizure/fall). Coorindation with neuronc/neurosurgery in regards to best timing for further steps in treatment undertaken by primary team  2.	BP elevated with goal of 120/80. c/w toprol 25mg qd, lasix 40mg qd and norvasc 5mg qd. Will c/w this regimen for now as patient not overtly above target  3.	cepacol lozenge as needed, likely secondary to intubation. appear to be improving.   4.	patient no longer complaining of this, thus appears resolved  5.	Benzonatate prn   6.	xanax prn  7.	appears stable, guiac negative.   8.	appears within normal range, as he is on a loop diuretic please obtain bmp q48-72 hours - within normal range    Diet: per primary  Pain control: per primary  DVT ppx: Lovenox   Case discussed with primary team  If a clinical question should arise regarding this patient, please do not hesitate to contact me at [phone] until 7pm on 6/11/2020. If after please refer to on-call hospitalist

## 2020-06-11 NOTE — CHART NOTE - NSCHARTNOTEFT_GEN_A_CORE
NUTRITION FOLLOW UP    SOURCE: Patient [X)   Family [ ]    Medical Record (X)    DIET: Regular  SUPPLEMENTS: Ensure Enlive 8oz TID (350kcals, 20g protein per serving)    Pt seen this am- 100% of bfast consumed. % most meals per nursing documentation. States that he is thoroughly enjoying meals. Noted build up of Ensure supplements at bedside; will reduce to QD. Last BM 6/10.         CURRENT WEIGHT:   (6/04) 172.4lbs   (6/10) 180.7lbs  +1 edema left hand, left foot    PERTINENT MEDS:   Pertinent Medications: MEDICATIONS  (STANDING):  amLODIPine   Tablet 5 milliGRAM(s) Oral daily  aspirin enteric coated 81 milliGRAM(s) Oral daily  atorvastatin 20 milliGRAM(s) Oral at bedtime  dexAMETHasone     Tablet 2 milliGRAM(s) Oral every 12 hours  enoxaparin Injectable 40 milliGRAM(s) SubCutaneous at bedtime  escitalopram 5 milliGRAM(s) Oral <User Schedule>  furosemide    Tablet 40 milliGRAM(s) Oral daily  levETIRAcetam 1000 milliGRAM(s) Oral two times a day  magnesium oxide 400 milliGRAM(s) Oral daily  metoprolol succinate ER 25 milliGRAM(s) Oral daily  pantoprazole    Tablet 40 milliGRAM(s) Oral two times a day  senna 2 Tablet(s) Oral at bedtime  tamsulosin 0.4 milliGRAM(s) Oral at bedtime    MEDICATIONS  (PRN):  acetaminophen   Tablet .. 650 milliGRAM(s) Oral every 6 hours PRN Temp greater or equal to 38C (100.4F), Mild Pain (1 - 3)  ALPRAZolam 0.25 milliGRAM(s) Oral every 8 hours PRN Anxiety  benzocaine 15 mG/menthol 3.6 mG (Sugar-Free) Lozenge 1 Lozenge Oral three times a day PRN Sore Throat  polyethylene glycol 3350 17 Gram(s) Oral daily PRN Constipation      PERTINENT LABS:  06-11 Na140 mmol/L Glu 116 mg/dL<H> K+ 3.9 mmol/L Cr  1.18 mg/dL BUN 32 mg/dL<H>      SKIN:  no pressure ulcers  EDEMA: +1 left hand, left foot   LAST BM: 6/10    ESTIMATED NEEDS:   [X] no change since previous assessment  [ ] recalculated:     PREVIOUS NUTRITION DIAGNOSIS:    1. Severe malnutrition (improving): pt consuming % of meals. Will reduce Ensure to QD.     NUTRITION DIAGNOSIS is :  (X)  Ongoing      NEW NUTRITION DIAGNOSIS: N/A    NUTRITION RECOMMENDATIONS:   1. Regular diet + Ensure Enlive 8oz QD  2. Weekly weights    MONITORING AND EVALUATION:   1. Tolerance to diet prescription   2. PO intake  3. Weights  4. Labs  5. Follow Up per protocol     RD to remain available   Gairma Leblanc RDN   Pager #[phone]

## 2020-06-11 NOTE — PROGRESS NOTE ADULT - ASSESSMENT
Pt is a 78 y/o M with PMHx of HTN, HLD, prostate cancer s/p prostatectomy, basal cell skin cancer, glioblastoma (s/p resection, radiation, chemotherapy cycle 9 on 9/23/19) and seizures admitted with recurrent right frontal GBM s/p resection 5/27/20.     #GBM  - s/p resection on 5/27/20  - continue decadron 2mg q12hrs  - protonix for steroid ppx bid  - Keppra 1000mg bid  - Pain control  - PT/OT/SLP  - will follow up with outpatient neurosurgery and oncology  - restarted home Aspirin 81mg  - sutures to be removed    #Kidney function  - mild increase in Cr and BUN  - encourage hydration  - continue to monitor    #Depressed Mood  - Neuropsych consult reviewed and appreciated--   - Lexapro 5mg -- may help with motor recovery as well.   - Recreation therapy    #Impulsivity  - enhanced supervision  - pt educated on fall risk and importance of using call bell     #HTN  - continue metoprolol ER 25mg   - continue Lasix 40mg  - hospitalist follow up    #HLD  - continue Lipitor    #BPH  - continue Flomax    #Anxiety  - Xanax 0.25mg PRN    #Pain  - Tylenol PRN, Lidocaine patch    #GI  - Senna    #Diet  - Regular with thins    #DVT ppx  - Lovenox    #Discharge Planning  - Pt discussed in team meeting on 6/9/20. In OT, pt is supervision for eating, grooming, and upper body dressing, and min A for lower body dressing and toilet and shower transfers. In PT he is CG for transfers and is ambulating 80-85' with a quad cane with min A. He is walking up and down 12 steps with a right hand rail with min A. In SLP he has higher level cog deficits, impulsiveness, and reduced insight. Tentative discharge date set for 6/17 with goals of mod I to supervision.    Spoke with pt's son Francisco, and provided update on pt's status, progress in therapy, rehab plan of care, ELOS and discharge expectations.  Understands pt. will need supervision upon discharge and pt's wife and son are home to provide.  All questions answered. Pt is a 80 y/o M with PMHx of HTN, HLD, prostate cancer s/p prostatectomy, basal cell skin cancer, glioblastoma (s/p resection, radiation, chemotherapy cycle 9 on 9/23/19) and seizures admitted with recurrent right frontal GBM s/p resection 5/27/20.     #GBM  - s/p resection on 5/27/20  - continue decadron 2mg q12hrs  - protonix for steroid ppx bid  - Keppra 1000mg bid  - Pain control  - PT/OT/SLP  - will follow up with outpatient neurosurgery and oncology  - restarted home Aspirin 81mg  - sutures to be removed    #Kidney function  - mild increase in Cr and BUN  - encourage hydration  - continue to monitor    #Depressed Mood  - Neuropsych consult reviewed and appreciated--   - Lexapro 5mg -- may help with motor recovery as well.   - Recreation therapy    #Impulsivity  - enhanced supervision  - pt educated on fall risk and importance of using call bell     #HTN  - continue metoprolol ER 25mg   - continue Lasix 40mg  - hospitalist follow up    #HLD  - continue Lipitor    #BPH  - continue Flomax    #Anxiety  - Xanax 0.25mg PRN    #Pain  - Tylenol PRN, Lidocaine patch    #GI  - Senna    #Diet  - Regular with thins    #DVT ppx  - Lovenox    #Discharge Planning  - Pt discussed in team meeting on 6/9/20. In OT, pt is supervision for eating, grooming, and upper body dressing, and min A for lower body dressing and toilet and shower transfers. In PT he is CG for transfers and is ambulating 80-85' with a quad cane with min A. He is walking up and down 12 steps with a right hand rail with min A. In SLP he has higher level cog deficits, impulsiveness, and reduced insight. Tentative discharge date set for 6/17 with goals of mod I to supervision.    Spoke with pt's son Francisco on  6/10 and provided update on pt's status, progress in therapy, rehab plan of care, ELOS and discharge expectations.  Understands pt. will need supervision upon discharge and pt's wife and son are home to provide.  All questions answered.

## 2020-06-12 LAB — SARS-COV-2 RNA SPEC QL NAA+PROBE: SIGNIFICANT CHANGE UP

## 2020-06-12 PROCEDURE — 99232 SBSQ HOSP IP/OBS MODERATE 35: CPT

## 2020-06-12 RX ADMIN — Medication 0.25 MILLIGRAM(S): at 21:50

## 2020-06-12 RX ADMIN — PANTOPRAZOLE SODIUM 40 MILLIGRAM(S): 20 TABLET, DELAYED RELEASE ORAL at 18:06

## 2020-06-12 RX ADMIN — ENOXAPARIN SODIUM 40 MILLIGRAM(S): 100 INJECTION SUBCUTANEOUS at 21:35

## 2020-06-12 RX ADMIN — Medication 2 MILLIGRAM(S): at 18:06

## 2020-06-12 RX ADMIN — LEVETIRACETAM 1000 MILLIGRAM(S): 250 TABLET, FILM COATED ORAL at 06:23

## 2020-06-12 RX ADMIN — MAGNESIUM OXIDE 400 MG ORAL TABLET 400 MILLIGRAM(S): 241.3 TABLET ORAL at 11:40

## 2020-06-12 RX ADMIN — Medication 81 MILLIGRAM(S): at 11:57

## 2020-06-12 RX ADMIN — SENNA PLUS 2 TABLET(S): 8.6 TABLET ORAL at 21:35

## 2020-06-12 RX ADMIN — ATORVASTATIN CALCIUM 20 MILLIGRAM(S): 80 TABLET, FILM COATED ORAL at 21:35

## 2020-06-12 RX ADMIN — AMLODIPINE BESYLATE 5 MILLIGRAM(S): 2.5 TABLET ORAL at 06:23

## 2020-06-12 RX ADMIN — TAMSULOSIN HYDROCHLORIDE 0.4 MILLIGRAM(S): 0.4 CAPSULE ORAL at 21:35

## 2020-06-12 RX ADMIN — Medication 25 MILLIGRAM(S): at 06:23

## 2020-06-12 RX ADMIN — Medication 40 MILLIGRAM(S): at 06:23

## 2020-06-12 RX ADMIN — ESCITALOPRAM OXALATE 5 MILLIGRAM(S): 10 TABLET, FILM COATED ORAL at 18:06

## 2020-06-12 RX ADMIN — LEVETIRACETAM 1000 MILLIGRAM(S): 250 TABLET, FILM COATED ORAL at 18:06

## 2020-06-12 RX ADMIN — Medication 2 MILLIGRAM(S): at 06:23

## 2020-06-12 RX ADMIN — PANTOPRAZOLE SODIUM 40 MILLIGRAM(S): 20 TABLET, DELAYED RELEASE ORAL at 06:24

## 2020-06-12 NOTE — PROGRESS NOTE ADULT - ASSESSMENT
Pt is a 78 y/o M with PMHx of HTN, HLD, prostate cancer s/p prostatectomy, basal cell skin cancer, glioblastoma (s/p resection, radiation, chemotherapy cycle 9 on 9/23/19) and seizures admitted with recurrent right frontal GBM s/p resection 5/27/20.     #GBM  - s/p resection on 5/27/20  - continue decadron 2mg q12hrs  - protonix for steroid ppx bid  - Keppra 1000mg bid  - Pain control  - PT/OT/SLP  - will follow up with outpatient neurosurgery and oncology  - restarted home Aspirin 81mg      #Kidney function  - mild increase in Cr and BUN  - encourage hydration  - continue to monitor    #Depressed Mood  - Neuropsych consult reviewed and appreciated--   - Lexapro 5mg -- may help with motor recovery as well.   - Recreation therapy    #Impulsivity  - enhanced supervision  - pt educated on fall risk and importance of using call bell     #HTN  - continue metoprolol ER 25mg   - continue Lasix 40mg  - hospitalist follow up    #HLD  - continue Lipitor    #BPH  - continue Flomax    #Anxiety  - Xanax 0.25mg PRN    #Pain  - Tylenol PRN, Lidocaine patch    #GI  - Senna    #Diet  - Regular with thins    #DVT ppx  - Lovenox    #Discharge Planning  - Pt discussed in team meeting on 6/9/20. In OT, pt is supervision for eating, grooming, and upper body dressing, and min A for lower body dressing and toilet and shower transfers. In PT he is CG for transfers and is ambulating 80-85' with a quad cane with min A. He is walking up and down 12 steps with a right hand rail with min A. In SLP he has higher level cog deficits, impulsiveness, and reduced insight. Tentative discharge date set for 6/17 with goals of mod I to supervision.

## 2020-06-12 NOTE — PROGRESS NOTE ADULT - SUBJECTIVE AND OBJECTIVE BOX
HPI:  Pt is a 80 y/o M with PMHx of HTN, HLD, prostate cancer s/p prostatectomy, basal cell skin cancer, glioblastoma (Dx in Aug. 2018-- 8/3/18 - s/p resection ([facility]) 8/3/18;  s/p chemoradiation treatment ([facility]) followed by s/p temozolomide x 3 cycles 10/12/18;  s/p re-resection ([facility]) right frontal lobe 2/7/19;  radiation, chemotherapy cycle 9 on 9/23/19) and seizures on keppra who presented to [facility] on 5/27 for scheduled stereotactic right craniotomy for brain tumor.   Procedure was performed on 5/27 by Dr. Rivers without any intraoperative complications. Post-op, pt was unstable with ambulation. Post-op imaging was notable for vasogenic edema on MRI brain and CTH. Pt has been on a decadron taper. Pt has chronic left upper extremity pain and weakness, and he was at his baseline level.   Pt was evaluated by PT and PM&R and was recommended for acute inpatient rehabilitation when medically stable. Pt was deemed ready for discharge on 6/4/20 and was transferred to [facility] for acute inpatient rehabilitation. Patient report mild increased weakness on the LUE and no other changes since most recent surgery. (04 Jun 2020 14:05)      PAST MEDICAL & SURGICAL HISTORY:  Fall, sequela  Bilateral hearing loss, unspecified hearing loss type   activity: [employer] 1962-65    [place]  Type 2 diabetes mellitus without complication, without long-term current use of insulin: stop oral meds  3 weeks ago  Glioblastoma: biopsy 8/2018  surgery  6 weeks radiation  35 days of oral chemotherapy last 3 weeks  Basal cell carcinoma (BCC) of left lower leg: s/p resection  right lower leg top  left lower leg  Prostate cancer: s/p radical prostatectomy 1995  HLD (hyperlipidemia)  HTN (hypertension)  S/P colonoscopic polypectomy: 3 years benign polyps  S/P tonsillectomy: age 30  H/O bilateral cataract extraction: 3-4 years ago  S/P prostatectomy: 1995  S/P brain surgery: 8/2018  Lipoma of neck: s/p resection at age 30  Basal cell carcinoma (BCC) of lower leg: s/p resection  History of tonsillectomy: at age 30      Subjective:  No new complaints    REVIEW OF SYMPTOMS  CONSTITUTIONAL: No fevers or chills  EYES/ENT: No visual changes;  No vertigo or throat pain   NECK: No pain or stiffness  RESPIRATORY: No cough, wheezing, or shortness of breath  CARDIOVASCULAR: No chest pain or palpitations  GASTROINTESTINAL: No abdominal or epigastric pain. No nausea or vomiting. No diarrhea or constipation.   GENITOURINARY: No dysuria, frequency or hematuria  NEUROLOGICAL: No numbness, +weakness and dysarthria improving  SKIN: No itching, rashes      VITALS  Vital Signs Last 24 Hrs  T(C): 36.8 (11 Jun 2020 20:07), Max: 36.8 (11 Jun 2020 20:07)  T(F): 98.2 (11 Jun 2020 20:07), Max: 98.2 (11 Jun 2020 20:07)  HR: 66 (12 Jun 2020 06:23) (62 - 70)  BP: 123/69 (12 Jun 2020 06:23) (119/66 - 133/69)  BP(mean): --  RR: 14 (11 Jun 2020 20:07) (14 - 14)  SpO2: 94% (11 Jun 2020 20:07) (94% - 98%)      PHYSICAL EXAM  General: NAD, sitting up in wheelchair in PT gym  HEENT: craniotomy incision healing well  Neck: supple  Cardiovascular: +S1/S2, RRR  Respiratory: CTA B/L; no crackles or wheezes  Gastrointestinal: soft, NT/ND; normoactive bowel sounds  Extremities: warm, well perfused; no edema, clubbing or cyanosis  Neurological: AAOx3; motor and sensory exams are stable, left sided upper extremity weakness is improving  Left UE shoulder/elbow/fingers 4/5, wrist 1-2/5      RECENT LABS                        11.7   7.50  )-----------( 200      ( 11 Jun 2020 05:26 )             34.7     06-11    140  |  104  |  32<H>  ----------------------------<  116<H>  3.9   |  27  |  1.18    Ca    8.8      11 Jun 2020 05:26              RADIOLOGY/OTHER RESULTS      MEDICATIONS  (STANDING):  amLODIPine   Tablet 5 milliGRAM(s) Oral daily  aspirin enteric coated 81 milliGRAM(s) Oral daily  atorvastatin 20 milliGRAM(s) Oral at bedtime  dexAMETHasone     Tablet 2 milliGRAM(s) Oral every 12 hours  enoxaparin Injectable 40 milliGRAM(s) SubCutaneous at bedtime  escitalopram 5 milliGRAM(s) Oral <User Schedule>  furosemide    Tablet 40 milliGRAM(s) Oral daily  levETIRAcetam 1000 milliGRAM(s) Oral two times a day  magnesium oxide 400 milliGRAM(s) Oral daily  metoprolol succinate ER 25 milliGRAM(s) Oral daily  pantoprazole    Tablet 40 milliGRAM(s) Oral two times a day  senna 2 Tablet(s) Oral at bedtime  tamsulosin 0.4 milliGRAM(s) Oral at bedtime    MEDICATIONS  (PRN):  acetaminophen   Tablet .. 650 milliGRAM(s) Oral every 6 hours PRN Temp greater or equal to 38C (100.4F), Mild Pain (1 - 3)  ALPRAZolam 0.25 milliGRAM(s) Oral every 8 hours PRN Anxiety  benzocaine 15 mG/menthol 3.6 mG (Sugar-Free) Lozenge 1 Lozenge Oral three times a day PRN Sore Throat  polyethylene glycol 3350 17 Gram(s) Oral daily PRN Constipation

## 2020-06-13 PROCEDURE — 99232 SBSQ HOSP IP/OBS MODERATE 35: CPT

## 2020-06-13 PROCEDURE — 99232 SBSQ HOSP IP/OBS MODERATE 35: CPT | Mod: GC

## 2020-06-13 RX ORDER — POLYETHYLENE GLYCOL 3350 17 G/17G
17 POWDER, FOR SOLUTION ORAL
Refills: 0 | Status: DISCONTINUED | OUTPATIENT
Start: 2020-06-13 | End: 2020-06-17

## 2020-06-13 RX ADMIN — Medication 40 MILLIGRAM(S): at 05:29

## 2020-06-13 RX ADMIN — ESCITALOPRAM OXALATE 5 MILLIGRAM(S): 10 TABLET, FILM COATED ORAL at 17:46

## 2020-06-13 RX ADMIN — AMLODIPINE BESYLATE 5 MILLIGRAM(S): 2.5 TABLET ORAL at 05:28

## 2020-06-13 RX ADMIN — LEVETIRACETAM 1000 MILLIGRAM(S): 250 TABLET, FILM COATED ORAL at 17:46

## 2020-06-13 RX ADMIN — Medication 0.25 MILLIGRAM(S): at 21:44

## 2020-06-13 RX ADMIN — Medication 81 MILLIGRAM(S): at 13:10

## 2020-06-13 RX ADMIN — ENOXAPARIN SODIUM 40 MILLIGRAM(S): 100 INJECTION SUBCUTANEOUS at 21:39

## 2020-06-13 RX ADMIN — SENNA PLUS 2 TABLET(S): 8.6 TABLET ORAL at 21:39

## 2020-06-13 RX ADMIN — POLYETHYLENE GLYCOL 3350 17 GRAM(S): 17 POWDER, FOR SOLUTION ORAL at 13:10

## 2020-06-13 RX ADMIN — PANTOPRAZOLE SODIUM 40 MILLIGRAM(S): 20 TABLET, DELAYED RELEASE ORAL at 05:28

## 2020-06-13 RX ADMIN — ATORVASTATIN CALCIUM 20 MILLIGRAM(S): 80 TABLET, FILM COATED ORAL at 21:39

## 2020-06-13 RX ADMIN — PANTOPRAZOLE SODIUM 40 MILLIGRAM(S): 20 TABLET, DELAYED RELEASE ORAL at 17:46

## 2020-06-13 RX ADMIN — TAMSULOSIN HYDROCHLORIDE 0.4 MILLIGRAM(S): 0.4 CAPSULE ORAL at 21:39

## 2020-06-13 RX ADMIN — MAGNESIUM OXIDE 400 MG ORAL TABLET 400 MILLIGRAM(S): 241.3 TABLET ORAL at 13:10

## 2020-06-13 RX ADMIN — LEVETIRACETAM 1000 MILLIGRAM(S): 250 TABLET, FILM COATED ORAL at 05:28

## 2020-06-13 RX ADMIN — Medication 2 MILLIGRAM(S): at 05:28

## 2020-06-13 RX ADMIN — Medication 2 MILLIGRAM(S): at 17:46

## 2020-06-13 NOTE — PROGRESS NOTE ADULT - SUBJECTIVE AND OBJECTIVE BOX
Cc: Gait dysfunction    HPI: Patient with no new medical issues today.  Pain controlled, no chest pain, no N/V, no Fevers/Chills. No other new ROS  Has been tolerating rehabilitation program.    acetaminophen   Tablet .. 650 milliGRAM(s) Oral every 6 hours PRN  ALPRAZolam 0.25 milliGRAM(s) Oral every 8 hours PRN  amLODIPine   Tablet 5 milliGRAM(s) Oral daily  aspirin enteric coated 81 milliGRAM(s) Oral daily  atorvastatin 20 milliGRAM(s) Oral at bedtime  benzocaine 15 mG/menthol 3.6 mG (Sugar-Free) Lozenge 1 Lozenge Oral three times a day PRN  dexAMETHasone     Tablet 2 milliGRAM(s) Oral every 12 hours  enoxaparin Injectable 40 milliGRAM(s) SubCutaneous at bedtime  escitalopram 5 milliGRAM(s) Oral <User Schedule>  furosemide    Tablet 40 milliGRAM(s) Oral daily  levETIRAcetam 1000 milliGRAM(s) Oral two times a day  magnesium oxide 400 milliGRAM(s) Oral daily  metoprolol succinate ER 25 milliGRAM(s) Oral daily  pantoprazole    Tablet 40 milliGRAM(s) Oral two times a day  polyethylene glycol 3350 17 Gram(s) Oral daily PRN  senna 2 Tablet(s) Oral at bedtime  tamsulosin 0.4 milliGRAM(s) Oral at bedtime    T(C): 36.5 (06-12-20 @ 20:02), Max: 36.7 (06-12-20 @ 18:52)  HR: 54 (06-13-20 @ 05:26) (54 - 68)  BP: 121/58 (06-13-20 @ 05:26) (121/58 - 157/78)  RR: 15 (06-12-20 @ 20:02) (14 - 15)  SpO2: 95% (06-12-20 @ 20:02) (95% - 98%)    In NAD  HEENT- EOMI  Heart- Chad, S1S2  Lungs- CTA bl.  Abd- + BS, NT  Ext- No calf pain  Neuro- Exam unchanged    Imp: Patient with diagnosis of brain mass admitted for comprehensive acute rehabilitation.    Plan:  - Continue therapies  - DVT prophylaxis- Lovenox  - Skin- Turn q2h, check skin daily  - Continue current medications; patient medically stable.   - Patient is stable to continue current rehabilitation program.

## 2020-06-13 NOTE — PROGRESS NOTE ADULT - ASSESSMENT
Mr Day is a pleasant 79 year-old male that is admitted to [facility] for acute rehabilitation following a GBM resection     Problem  1.	GBM s/p resection  2.	Hypertension  3.	Sore Throat  4.	Hiccups  5.	Cough  6.	Anxiety  7.	Normocytic Anemia  8.	Hypokalemia/Electrolyte abnormalities    Plan  1.	PT/OT/SLP per primary. ASA 81mg qd. c/w decadron and protonix ppx. appropriate precautions in place (seizure/fall). Coorindation with neuronc/neurosurgery in regards to best timing for further steps in treatment undertaken by primary team  2.	BP roughly at goal of 120/80. c/w toprol 25mg qd, lasix 40mg qd and norvasc 5mg qd.   3.	cepacol lozenge as needed, likely secondary to intubation. appears resolved.    4.	patient no longer complaining of this, thus appears resolved  5.	Benzonatate prn   6.	xanax prn  7.	appears stable, guiac negative.   8.	appears within normal range, as he is on a loop diuretic please obtain bmp q48-72 hours. obtain bmp today    Diet: per primary  Pain control: per primary  DVT ppx: Lovenox   Case discussed with primary team  If a clinical question should arise regarding this patient, please do not hesitate to contact me at [phone] until 7pm on 6/13/2020. If after 7 refer to on-call hospitalist

## 2020-06-13 NOTE — PROGRESS NOTE ADULT - SUBJECTIVE AND OBJECTIVE BOX
Patient seen and examined at bedside. No overnight events per nursing or chart review. Patient is currently without major complaints as his 10 point ROS is negative.     ALLERGIES:  No Known Allergies    MEDICATIONS  (STANDING):  amLODIPine   Tablet 5 milliGRAM(s) Oral daily  aspirin enteric coated 81 milliGRAM(s) Oral daily  atorvastatin 20 milliGRAM(s) Oral at bedtime  dexAMETHasone     Tablet 2 milliGRAM(s) Oral every 12 hours  enoxaparin Injectable 40 milliGRAM(s) SubCutaneous at bedtime  escitalopram 5 milliGRAM(s) Oral <User Schedule>  furosemide    Tablet 40 milliGRAM(s) Oral daily  levETIRAcetam 1000 milliGRAM(s) Oral two times a day  magnesium oxide 400 milliGRAM(s) Oral daily  metoprolol succinate ER 25 milliGRAM(s) Oral daily  pantoprazole    Tablet 40 milliGRAM(s) Oral two times a day  senna 2 Tablet(s) Oral at bedtime  tamsulosin 0.4 milliGRAM(s) Oral at bedtime    MEDICATIONS  (PRN):  acetaminophen   Tablet .. 650 milliGRAM(s) Oral every 6 hours PRN Temp greater or equal to 38C (100.4F), Mild Pain (1 - 3)  ALPRAZolam 0.25 milliGRAM(s) Oral every 8 hours PRN Anxiety  benzocaine 15 mG/menthol 3.6 mG (Sugar-Free) Lozenge 1 Lozenge Oral three times a day PRN Sore Throat  polyethylene glycol 3350 17 Gram(s) Oral daily PRN Constipation    Vital Signs Last 24 Hrs  T(F): 97.7 (12 Jun 2020 20:02), Max: 98 (12 Jun 2020 18:52)  HR: 54 (13 Jun 2020 05:26) (54 - 68)  BP: 121/58 (13 Jun 2020 05:26) (121/58 - 157/78)  RR: 15 (12 Jun 2020 20:02) (14 - 15)  SpO2: 95% (12 Jun 2020 20:02) (95% - 98%)  I&O's Summary      PHYSICAL EXAM:  Gen: nad, resting in bed  Neuro: aaox3, improvement on LUE strength at biceps and should but wrist minimally improved.   Heent: eomi b/l, no jvd, no oral exudates  Pulm: cta b/l, no w/r/r  CV: +s1s2, no m/r/g  Ab: soft, nt/nd, normoactive bs x 4  Extrem: no edema, pulses intact and equal      LABS:                        11.7   7.50  )-----------( 200      ( 11 Jun 2020 05:26 )             34.7       06-11    140  |  104  |  32  ----------------------------<  116  3.9   |  27  |  1.18    Ca    8.8      11 Jun 2020 05:26       eGFR if Non African American: 59 mL/min/1.73M2 (06-11-20 @ 05:26)  eGFR if : 68 mL/min/1.73M2 (06-11-20 @ 05:26)

## 2020-06-14 PROCEDURE — 99232 SBSQ HOSP IP/OBS MODERATE 35: CPT | Mod: GC

## 2020-06-14 PROCEDURE — 99232 SBSQ HOSP IP/OBS MODERATE 35: CPT

## 2020-06-14 RX ADMIN — AMLODIPINE BESYLATE 5 MILLIGRAM(S): 2.5 TABLET ORAL at 06:14

## 2020-06-14 RX ADMIN — Medication 0.25 MILLIGRAM(S): at 21:20

## 2020-06-14 RX ADMIN — SENNA PLUS 2 TABLET(S): 8.6 TABLET ORAL at 21:16

## 2020-06-14 RX ADMIN — Medication 2 MILLIGRAM(S): at 17:16

## 2020-06-14 RX ADMIN — Medication 81 MILLIGRAM(S): at 11:24

## 2020-06-14 RX ADMIN — POLYETHYLENE GLYCOL 3350 17 GRAM(S): 17 POWDER, FOR SOLUTION ORAL at 11:24

## 2020-06-14 RX ADMIN — ATORVASTATIN CALCIUM 20 MILLIGRAM(S): 80 TABLET, FILM COATED ORAL at 21:17

## 2020-06-14 RX ADMIN — MAGNESIUM OXIDE 400 MG ORAL TABLET 400 MILLIGRAM(S): 241.3 TABLET ORAL at 11:24

## 2020-06-14 RX ADMIN — PANTOPRAZOLE SODIUM 40 MILLIGRAM(S): 20 TABLET, DELAYED RELEASE ORAL at 17:16

## 2020-06-14 RX ADMIN — TAMSULOSIN HYDROCHLORIDE 0.4 MILLIGRAM(S): 0.4 CAPSULE ORAL at 21:17

## 2020-06-14 RX ADMIN — PANTOPRAZOLE SODIUM 40 MILLIGRAM(S): 20 TABLET, DELAYED RELEASE ORAL at 06:14

## 2020-06-14 RX ADMIN — Medication 2 MILLIGRAM(S): at 06:13

## 2020-06-14 RX ADMIN — ENOXAPARIN SODIUM 40 MILLIGRAM(S): 100 INJECTION SUBCUTANEOUS at 21:15

## 2020-06-14 RX ADMIN — LEVETIRACETAM 1000 MILLIGRAM(S): 250 TABLET, FILM COATED ORAL at 17:16

## 2020-06-14 RX ADMIN — ESCITALOPRAM OXALATE 5 MILLIGRAM(S): 10 TABLET, FILM COATED ORAL at 17:15

## 2020-06-14 RX ADMIN — Medication 40 MILLIGRAM(S): at 06:13

## 2020-06-14 RX ADMIN — LEVETIRACETAM 1000 MILLIGRAM(S): 250 TABLET, FILM COATED ORAL at 06:13

## 2020-06-14 NOTE — PROGRESS NOTE ADULT - SUBJECTIVE AND OBJECTIVE BOX
Cc: Gait dysfunction    HPI: Patient with no new medical issues today.  Appreciate Hospitalist follow up.   Pain controlled, no chest pain, no N/V, no Fevers/Chills. No other new ROS  Has been tolerating rehabilitation program.    MEDICATIONS  (STANDING):  amLODIPine   Tablet 5 milliGRAM(s) Oral daily  aspirin enteric coated 81 milliGRAM(s) Oral daily  atorvastatin 20 milliGRAM(s) Oral at bedtime  dexAMETHasone     Tablet 2 milliGRAM(s) Oral every 12 hours  enoxaparin Injectable 40 milliGRAM(s) SubCutaneous at bedtime  escitalopram 5 milliGRAM(s) Oral <User Schedule>  furosemide    Tablet 40 milliGRAM(s) Oral daily  levETIRAcetam 1000 milliGRAM(s) Oral two times a day  magnesium oxide 400 milliGRAM(s) Oral daily  metoprolol succinate ER 25 milliGRAM(s) Oral daily  pantoprazole    Tablet 40 milliGRAM(s) Oral two times a day  polyethylene glycol 3350 17 Gram(s) Oral <User Schedule>  senna 2 Tablet(s) Oral at bedtime  tamsulosin 0.4 milliGRAM(s) Oral at bedtime    MEDICATIONS  (PRN):  acetaminophen   Tablet .. 650 milliGRAM(s) Oral every 6 hours PRN Temp greater or equal to 38C (100.4F), Mild Pain (1 - 3)  ALPRAZolam 0.25 milliGRAM(s) Oral every 8 hours PRN Anxiety  benzocaine 15 mG/menthol 3.6 mG (Sugar-Free) Lozenge 1 Lozenge Oral three times a day PRN Sore Throat    Vital Signs Last 24 Hrs  T(C): 36.4 (13 Jun 2020 19:41), Max: 36.4 (13 Jun 2020 19:41)  T(F): 97.6 (13 Jun 2020 19:41), Max: 97.6 (13 Jun 2020 19:41)  HR: 58 (13 Jun 2020 22:00) (58 - 61)  BP: 127/71 (13 Jun 2020 22:00) (127/71 - 142/72)  BP(mean): --  RR: 16 (13 Jun 2020 19:41) (16 - 16)  SpO2: 98% (13 Jun 2020 19:41) (98% - 98%)    In NAD  HEENT- EOMI  Heart- Chad, S1S2  Lungs- CTA bl.  Abd- + BS, NT  Ext- No calf pain  Neuro- Exam unchanged    Imp: Patient with diagnosis of brain mass admitted for comprehensive acute rehabilitation.    Plan:  - Continue therapies  - DVT prophylaxis- Lovenox  - Skin- Turn q2h, check skin daily  - Continue current medications; patient medically stable.   - Patient is stable to continue current rehabilitation program.

## 2020-06-14 NOTE — PROGRESS NOTE ADULT - SUBJECTIVE AND OBJECTIVE BOX
Patient seen and examined at bedside. No overnight events per nursing or chart review. Patient is without major complaints as his 10 point ROS is unremarkable.     ALLERGIES:  No Known Allergies    MEDICATIONS  (STANDING):  amLODIPine   Tablet 5 milliGRAM(s) Oral daily  aspirin enteric coated 81 milliGRAM(s) Oral daily  atorvastatin 20 milliGRAM(s) Oral at bedtime  dexAMETHasone     Tablet 2 milliGRAM(s) Oral every 12 hours  enoxaparin Injectable 40 milliGRAM(s) SubCutaneous at bedtime  escitalopram 5 milliGRAM(s) Oral <User Schedule>  furosemide    Tablet 40 milliGRAM(s) Oral daily  levETIRAcetam 1000 milliGRAM(s) Oral two times a day  magnesium oxide 400 milliGRAM(s) Oral daily  metoprolol succinate ER 25 milliGRAM(s) Oral daily  pantoprazole    Tablet 40 milliGRAM(s) Oral two times a day  polyethylene glycol 3350 17 Gram(s) Oral <User Schedule>  senna 2 Tablet(s) Oral at bedtime  tamsulosin 0.4 milliGRAM(s) Oral at bedtime    MEDICATIONS  (PRN):  acetaminophen   Tablet .. 650 milliGRAM(s) Oral every 6 hours PRN Temp greater or equal to 38C (100.4F), Mild Pain (1 - 3)  ALPRAZolam 0.25 milliGRAM(s) Oral every 8 hours PRN Anxiety  benzocaine 15 mG/menthol 3.6 mG (Sugar-Free) Lozenge 1 Lozenge Oral three times a day PRN Sore Throat    Vital Signs Last 24 Hrs  T(F): 97.6 (13 Jun 2020 19:41), Max: 97.6 (13 Jun 2020 19:41)  HR: 58 (13 Jun 2020 22:00) (58 - 61)  BP: 127/71 (13 Jun 2020 22:00) (127/71 - 142/72)  RR: 16 (13 Jun 2020 19:41) (16 - 16)  SpO2: 98% (13 Jun 2020 19:41) (98% - 98%)  I&O's Summary      PHYSICAL EXAM:  Gen: nad, resting in bed  Neuro: aaox3, unchanged from previous.  Heent: eomi b/l, no jvd, no oral exudates  Pulm: cta b/l, no w/r/r  CV: +s1s2, no m/r/g  Ab: soft, nt/nd, normoactive bs x 4  Extrem: no edema, pulses intact and equal

## 2020-06-14 NOTE — PROGRESS NOTE ADULT - ASSESSMENT
Mr Day is a pleasant 79 year-old male that is admitted to [facility] for acute rehabilitation following a GBM resection     Problem  1.	GBM s/p resection  2.	Hypertension  3.	Sore Throat  4.	Hiccups  5.	Cough  6.	Anxiety  7.	Normocytic Anemia  8.	Hypokalemia/Electrolyte abnormalities    Plan  1.	PT/OT/SLP per primary. ASA 81mg qd. c/w decadron and protonix ppx. appropriate precautions in place (seizure/fall). Coorindation with neuronc/neurosurgery in regards to best timing for further steps in treatment undertaken by primary team  2.	BP roughly at goal of 120/80. c/w toprol 25mg qd, lasix 40mg qd and norvasc 5mg qd.   3.	cepacol lozenge as needed, likely secondary to intubation. appears resolved.    4.	resolved  5.	Benzonatate prn   6.	xanax prn  7.	stable, guiac negative.   8.	appears within normal range, as he is on a loop diuretic please obtain bmp q48-72 hours.    Diet: per primary  Pain control: per primary  DVT ppx: Lovenox   Case discussed with primary team  If a clinical question should arise regarding this patient, please do not hesitate to contact me at [phone] until 7pm on 6/14/2020. If after 7 refer to on-call hospitalist

## 2020-06-15 LAB
ANION GAP SERPL CALC-SCNC: 7 MMOL/L — SIGNIFICANT CHANGE UP (ref 5–17)
BUN SERPL-MCNC: 35 MG/DL — HIGH (ref 7–23)
CALCIUM SERPL-MCNC: 8.8 MG/DL — SIGNIFICANT CHANGE UP (ref 8.4–10.5)
CHLORIDE SERPL-SCNC: 102 MMOL/L — SIGNIFICANT CHANGE UP (ref 96–108)
CO2 SERPL-SCNC: 31 MMOL/L — SIGNIFICANT CHANGE UP (ref 22–31)
CREAT SERPL-MCNC: 1.08 MG/DL — SIGNIFICANT CHANGE UP (ref 0.5–1.3)
GLUCOSE SERPL-MCNC: 102 MG/DL — HIGH (ref 70–99)
HCT VFR BLD CALC: 37.9 % — LOW (ref 39–50)
HGB BLD-MCNC: 12.8 G/DL — LOW (ref 13–17)
MCHC RBC-ENTMCNC: 32.2 PG — SIGNIFICANT CHANGE UP (ref 27–34)
MCHC RBC-ENTMCNC: 33.8 GM/DL — SIGNIFICANT CHANGE UP (ref 32–36)
MCV RBC AUTO: 95.5 FL — SIGNIFICANT CHANGE UP (ref 80–100)
NRBC # BLD: 0 /100 WBCS — SIGNIFICANT CHANGE UP (ref 0–0)
PLATELET # BLD AUTO: 191 K/UL — SIGNIFICANT CHANGE UP (ref 150–400)
POTASSIUM SERPL-MCNC: 3.8 MMOL/L — SIGNIFICANT CHANGE UP (ref 3.5–5.3)
POTASSIUM SERPL-SCNC: 3.8 MMOL/L — SIGNIFICANT CHANGE UP (ref 3.5–5.3)
RBC # BLD: 3.97 M/UL — LOW (ref 4.2–5.8)
RBC # FLD: 12.5 % — SIGNIFICANT CHANGE UP (ref 10.3–14.5)
SODIUM SERPL-SCNC: 140 MMOL/L — SIGNIFICANT CHANGE UP (ref 135–145)
WBC # BLD: 6.6 K/UL — SIGNIFICANT CHANGE UP (ref 3.8–10.5)
WBC # FLD AUTO: 6.6 K/UL — SIGNIFICANT CHANGE UP (ref 3.8–10.5)

## 2020-06-15 PROCEDURE — 99232 SBSQ HOSP IP/OBS MODERATE 35: CPT

## 2020-06-15 RX ORDER — ESCITALOPRAM OXALATE 10 MG/1
10 TABLET, FILM COATED ORAL
Refills: 0 | Status: DISCONTINUED | OUTPATIENT
Start: 2020-06-15 | End: 2020-06-16

## 2020-06-15 RX ADMIN — LEVETIRACETAM 1000 MILLIGRAM(S): 250 TABLET, FILM COATED ORAL at 05:52

## 2020-06-15 RX ADMIN — TAMSULOSIN HYDROCHLORIDE 0.4 MILLIGRAM(S): 0.4 CAPSULE ORAL at 21:26

## 2020-06-15 RX ADMIN — PANTOPRAZOLE SODIUM 40 MILLIGRAM(S): 20 TABLET, DELAYED RELEASE ORAL at 18:01

## 2020-06-15 RX ADMIN — ESCITALOPRAM OXALATE 10 MILLIGRAM(S): 10 TABLET, FILM COATED ORAL at 18:01

## 2020-06-15 RX ADMIN — Medication 0.25 MILLIGRAM(S): at 21:28

## 2020-06-15 RX ADMIN — POLYETHYLENE GLYCOL 3350 17 GRAM(S): 17 POWDER, FOR SOLUTION ORAL at 11:14

## 2020-06-15 RX ADMIN — Medication 2 MILLIGRAM(S): at 05:52

## 2020-06-15 RX ADMIN — SENNA PLUS 2 TABLET(S): 8.6 TABLET ORAL at 21:26

## 2020-06-15 RX ADMIN — ATORVASTATIN CALCIUM 20 MILLIGRAM(S): 80 TABLET, FILM COATED ORAL at 21:26

## 2020-06-15 RX ADMIN — MAGNESIUM OXIDE 400 MG ORAL TABLET 400 MILLIGRAM(S): 241.3 TABLET ORAL at 11:14

## 2020-06-15 RX ADMIN — AMLODIPINE BESYLATE 5 MILLIGRAM(S): 2.5 TABLET ORAL at 05:52

## 2020-06-15 RX ADMIN — Medication 40 MILLIGRAM(S): at 05:52

## 2020-06-15 RX ADMIN — PANTOPRAZOLE SODIUM 40 MILLIGRAM(S): 20 TABLET, DELAYED RELEASE ORAL at 05:52

## 2020-06-15 RX ADMIN — ENOXAPARIN SODIUM 40 MILLIGRAM(S): 100 INJECTION SUBCUTANEOUS at 21:26

## 2020-06-15 RX ADMIN — LEVETIRACETAM 1000 MILLIGRAM(S): 250 TABLET, FILM COATED ORAL at 18:01

## 2020-06-15 RX ADMIN — Medication 81 MILLIGRAM(S): at 11:15

## 2020-06-15 RX ADMIN — Medication 2 MILLIGRAM(S): at 18:01

## 2020-06-15 NOTE — PROGRESS NOTE ADULT - ASSESSMENT
Pt is a 80 y/o M with PMHx of HTN, HLD, prostate cancer s/p prostatectomy, basal cell skin cancer, glioblastoma (s/p resection, radiation, chemotherapy cycle 9 on 9/23/19) and seizures admitted with recurrent right frontal GBM s/p resection 5/27/20.     #GBM  - s/p resection on 5/27/20  - continue decadron 2mg q12hrs  - protonix for steroid ppx bid  - Keppra 1000mg bid  - Pain control  - PT/OT/SLP  - will follow up with outpatient neurosurgery and oncology  - restarted home Aspirin 81mg      #Kidney function  - encourage hydration  - continue to monitor    #Depressed Mood  - Neuropsych consult reviewed and appreciated--   - Lexapro 5mg -- may help with motor recovery as well.   - Recreation therapy    #Impulsivity  - enhanced supervision  - pt educated on fall risk and importance of using call bell     #HTN  - continue metoprolol ER 25mg   - continue Lasix 40mg  - hospitalist follow up    #HLD  - continue Lipitor    #BPH  - continue Flomax    #Anxiety  - Xanax 0.25mg PRN    #Pain  - Tylenol PRN, Lidocaine patch    #GI  - Senna    #Diet  - Regular with thins    #DVT ppx  - Lovenox    #Discharge Planning  - Pt discussed in team meeting on 6/9/20. In OT, pt is supervision for eating, grooming, and upper body dressing, and min A for lower body dressing and toilet and shower transfers. In PT he is CG for transfers and is ambulating 80-85' with a quad cane with min A. He is walking up and down 12 steps with a right hand rail with min A. In SLP he has higher level cog deficits, impulsiveness, and reduced insight. Tentative discharge date set for 6/17 with goals of mod I to supervision. Pt is a 78 y/o M with PMHx of HTN, HLD, prostate cancer s/p prostatectomy, basal cell skin cancer, glioblastoma (s/p resection, radiation, chemotherapy cycle 9 on 9/23/19) and seizures admitted with recurrent right frontal GBM s/p resection 5/27/20.     #GBM  - s/p resection on 5/27/20  - continue decadron 2mg q12hrs  - protonix for steroid ppx bid  - Keppra 1000mg bid  - Pain control  - PT/OT/SLP  - will follow up with outpatient neurosurgery and oncology  - restarted home Aspirin 81mg      #Kidney function  - encourage hydration  - continue to monitor    #Depressed Mood  - Neuropsych consult reviewed and appreciated--   -increase Lexapro to 10mg -- may help with motor recovery as well.   - Recreation therapy    #Impulsivity  - enhanced supervision  - pt educated on fall risk and importance of using call bell     #HTN  - continue metoprolol ER 25mg   - continue Lasix 40mg  - hospitalist follow up    #HLD  - continue Lipitor    #BPH  - continue Flomax    #Anxiety  - Xanax 0.25mg PRN    #Pain  - Tylenol PRN, Lidocaine patch    #GI  - Senna    #Diet  - Regular with thins    #DVT ppx  - Lovenox    #Discharge Planning  - Pt discussed in team meeting on 6/9/20. In OT, pt is supervision for eating, grooming, and upper body dressing, and min A for lower body dressing and toilet and shower transfers. In PT he is CG for transfers and is ambulating 80-85' with a quad cane with min A. He is walking up and down 12 steps with a right hand rail with min A. In SLP he has higher level cog deficits, impulsiveness, and reduced insight. Tentative discharge date set for 6/17 with goals of mod I to supervision. Family coming in for training 6/17 prior to discharge

## 2020-06-16 ENCOUNTER — TRANSCRIPTION ENCOUNTER (OUTPATIENT)
Age: 80
End: 2020-06-16

## 2020-06-16 PROCEDURE — 99232 SBSQ HOSP IP/OBS MODERATE 35: CPT

## 2020-06-16 RX ORDER — ROSUVASTATIN CALCIUM 5 MG/1
1 TABLET ORAL
Qty: 30 | Refills: 0
Start: 2020-06-16 | End: 2020-07-15

## 2020-06-16 RX ORDER — METOPROLOL TARTRATE 50 MG
1 TABLET ORAL
Qty: 30 | Refills: 0
Start: 2020-06-16 | End: 2020-07-15

## 2020-06-16 RX ORDER — FUROSEMIDE 40 MG
1 TABLET ORAL
Qty: 30 | Refills: 0
Start: 2020-06-16 | End: 2020-07-15

## 2020-06-16 RX ORDER — LEVETIRACETAM 250 MG/1
1 TABLET, FILM COATED ORAL
Qty: 60 | Refills: 0
Start: 2020-06-16 | End: 2020-07-15

## 2020-06-16 RX ORDER — AMLODIPINE BESYLATE 2.5 MG/1
1 TABLET ORAL
Qty: 30 | Refills: 0
Start: 2020-06-16 | End: 2020-07-15

## 2020-06-16 RX ORDER — ALPRAZOLAM 0.25 MG
1 TABLET ORAL
Qty: 0 | Refills: 0 | DISCHARGE

## 2020-06-16 RX ORDER — DEXAMETHASONE 0.5 MG/5ML
1 ELIXIR ORAL
Qty: 60 | Refills: 0
Start: 2020-06-16 | End: 2020-07-15

## 2020-06-16 RX ORDER — ROSUVASTATIN CALCIUM 5 MG/1
1 TABLET ORAL
Qty: 0 | Refills: 0 | DISCHARGE

## 2020-06-16 RX ORDER — PANTOPRAZOLE SODIUM 20 MG/1
1 TABLET, DELAYED RELEASE ORAL
Qty: 60 | Refills: 0
Start: 2020-06-16 | End: 2020-07-15

## 2020-06-16 RX ORDER — FUROSEMIDE 40 MG
0 TABLET ORAL
Qty: 90 | Refills: 0 | DISCHARGE

## 2020-06-16 RX ORDER — TAMSULOSIN HYDROCHLORIDE 0.4 MG/1
1 CAPSULE ORAL
Qty: 30 | Refills: 0
Start: 2020-06-16 | End: 2020-07-15

## 2020-06-16 RX ORDER — ASPIRIN/CALCIUM CARB/MAGNESIUM 324 MG
1 TABLET ORAL
Qty: 30 | Refills: 0
Start: 2020-06-16 | End: 2020-07-15

## 2020-06-16 RX ADMIN — Medication 0.25 MILLIGRAM(S): at 21:22

## 2020-06-16 RX ADMIN — Medication 81 MILLIGRAM(S): at 11:03

## 2020-06-16 RX ADMIN — ATORVASTATIN CALCIUM 20 MILLIGRAM(S): 80 TABLET, FILM COATED ORAL at 21:22

## 2020-06-16 RX ADMIN — ENOXAPARIN SODIUM 40 MILLIGRAM(S): 100 INJECTION SUBCUTANEOUS at 21:21

## 2020-06-16 RX ADMIN — Medication 2 MILLIGRAM(S): at 05:53

## 2020-06-16 RX ADMIN — MAGNESIUM OXIDE 400 MG ORAL TABLET 400 MILLIGRAM(S): 241.3 TABLET ORAL at 11:03

## 2020-06-16 RX ADMIN — TAMSULOSIN HYDROCHLORIDE 0.4 MILLIGRAM(S): 0.4 CAPSULE ORAL at 21:21

## 2020-06-16 RX ADMIN — Medication 2 MILLIGRAM(S): at 17:43

## 2020-06-16 RX ADMIN — LEVETIRACETAM 1000 MILLIGRAM(S): 250 TABLET, FILM COATED ORAL at 17:43

## 2020-06-16 RX ADMIN — AMLODIPINE BESYLATE 5 MILLIGRAM(S): 2.5 TABLET ORAL at 05:53

## 2020-06-16 RX ADMIN — PANTOPRAZOLE SODIUM 40 MILLIGRAM(S): 20 TABLET, DELAYED RELEASE ORAL at 17:43

## 2020-06-16 RX ADMIN — PANTOPRAZOLE SODIUM 40 MILLIGRAM(S): 20 TABLET, DELAYED RELEASE ORAL at 05:53

## 2020-06-16 RX ADMIN — Medication 40 MILLIGRAM(S): at 05:53

## 2020-06-16 RX ADMIN — LEVETIRACETAM 1000 MILLIGRAM(S): 250 TABLET, FILM COATED ORAL at 05:53

## 2020-06-16 NOTE — DISCHARGE NOTE PROVIDER - CARE PROVIDER_API CALL
Vinny Rivers  NEUROSURGERY  [address] [place] [address]  Phone: [phone]  Fax: [phone]  Follow Up Time:     Francisco Harman  NEUROLOGY  [address] [place] [id number]  Phone: [phone]  Fax: [phone]  Follow Up Time:     Kelly Earl  BRAIN INJURY MEDICINE  [address] [place] [address]  Phone: [phone]  Fax: [phone]  Follow Up Time:

## 2020-06-16 NOTE — DISCHARGE NOTE PROVIDER - NSDCCPCAREPLAN_GEN_ALL_CORE_FT
PRINCIPAL DISCHARGE DIAGNOSIS  Diagnosis: Glioblastoma  Assessment and Plan of Treatment: You presented the hospital for resection of your Glioblastoma. You then came to [facility] for rehabilitation. You will continue the steroid medication and you will need to follow up with your outpatient neuro-oncologist to go over the next steps in your treatment and follow up. You should also follow up with the neurosurgeon. You will continue to receive home PT and OT to help your strength recovery. You will need supervision at home as you are still having difficulties with your balance and are a fall risk.      SECONDARY DISCHARGE DIAGNOSES  Diagnosis: HTN (hypertension)  Assessment and Plan of Treatment: You should continue the blood pressure medications that were started in the hospital. You should follow up with your primary care physician.

## 2020-06-16 NOTE — DISCHARGE NOTE PROVIDER - NSDCMRMEDTOKEN_GEN_ALL_CORE_FT
amLODIPine 5 mg oral tablet: 1 tab(s) orally once a day  aspirin 81 mg oral delayed release tablet: 1 tab(s) orally once a day  Crestor 20 mg oral tablet: 1 tab(s) orally once a day x 30 days   dexamethasone 2 mg oral tablet: 1 tab(s) orally every 12 hours  furosemide 40 mg oral tablet: 1 tab(s) orally once a day  levETIRAcetam 1000 mg oral tablet: 1 tab(s) orally 2 times a day  metoprolol succinate 25 mg oral tablet, extended release: 1 tab(s) orally once a day  pantoprazole 40 mg oral delayed release tablet: 1 tab(s) orally 2 times a day  tamsulosin 0.4 mg oral capsule: 1 cap(s) orally once a day (at bedtime)

## 2020-06-16 NOTE — PROGRESS NOTE ADULT - SUBJECTIVE AND OBJECTIVE BOX
HPI:  Pt is a 78 y/o M with PMHx of HTN, HLD, prostate cancer s/p prostatectomy, basal cell skin cancer, glioblastoma (Dx in Aug. 2018-- 8/3/18 - s/p resection ([facility]) 8/3/18;  s/p chemoradiation treatment (Go[facility]) followed by s/p temozolomide x 3 cycles 10/12/18;  s/p re-resection ([facility]) right frontal lobe 2/7/19;  radiation, chemotherapy cycle 9 on 9/23/19) and seizures on keppra who presented to [facility] on 5/27 for scheduled stereotactic right craniotomy for brain tumor.   Procedure was performed on 5/27 by Dr. Rivers without any intraoperative complications. Post-op, pt was unstable with ambulation. Post-op imaging was notable for vasogenic edema on MRI brain and CTH. Pt has been on a decadron taper. Pt has chronic left upper extremity pain and weakness, and he was at his baseline level.   Pt was evaluated by PT and PM&R and was recommended for acute inpatient rehabilitation when medically stable. Pt was deemed ready for discharge on 6/4/20 and was transferred to [facility] for acute inpatient rehabilitation. Patient report mild increased weakness on the LUE and no other changes since most recent surgery. HPI:  Pt is a 80 y/o M with PMHx of HTN, HLD, prostate cancer s/p prostatectomy, basal cell skin cancer, glioblastoma (Dx in Aug. 2018-- 8/3/18 - s/p resection ([facility]) 8/3/18;  s/p chemoradiation treatment (Goen[facility]) followed by s/p temozolomide x 3 cycles 10/12/18;  s/p re-resection ([facility]alif) right frontal lobe 2/7/19;  radiation, chemotherapy cycle 9 on 9/23/19) and seizures on keppra who presented to [facility] on 5/27 for scheduled stereotactic right craniotomy for brain tumor.   Procedure was performed on 5/27 by Dr. Rivers without any intraoperative complications. Post-op, pt was unstable with ambulation. Post-op imaging was notable for vasogenic edema on MRI brain and CTH. Pt has been on a decadron taper. Pt has chronic left upper extremity pain and weakness, and he was at his baseline level.   Pt was evaluated by PT and PM&R and was recommended for acute inpatient rehabilitation when medically stable. Pt was deemed ready for discharge on 6/4/20 and was transferred to [facility] for acute inpatient rehabilitation. Patient report mild increased weakness on the LUE and no other changes since most recent surgery.    SUBJECTIVE / INTERVAL HPI: Patient seen and examined at bedside. Pt is doing well this morning, but he did complain of mild lightheadedness during therapy. He had questions about the Lexapro that was increased yesterday evening and did not feel comfortable taking it. Otherwise, he is ready for discharge for tomorrow.     REVIEW OF SYSTEMS:    CONSTITUTIONAL: No fevers or chills  EYES/ENT: No visual changes;  No vertigo or throat pain   NECK: No pain or stiffness  RESPIRATORY: No cough, wheezing, or shortness of breath  CARDIOVASCULAR: No chest pain or palpitations  GASTROINTESTINAL: No abdominal or epigastric pain. No nausea or vomiting. No diarrhea or constipation.   GENITOURINARY: No dysuria, frequency or hematuria  NEUROLOGICAL: No numbness, + weakness, +mild lightheadedness, +coordination difficulties  SKIN: No itching, rashes      VITAL SIGNS:  Vital Signs Last 24 Hrs  T(C): 36.5 (16 Jun 2020 07:52), Max: 36.5 (16 Jun 2020 07:52)  T(F): 97.7 (16 Jun 2020 07:52), Max: 97.7 (16 Jun 2020 07:52)  HR: 63 (16 Jun 2020 07:52) (57 - 63)  BP: 133/68 (16 Jun 2020 07:52) (129/73 - 153/78)  BP(mean): --  RR: 15 (16 Jun 2020 07:52) (15 - 15)  SpO2: 96% (16 Jun 2020 07:52) (96% - 96%)    PHYSICAL EXAM:    General: NAD, sitting up comfortably in wheelchair  HEENT: craniotomy intact and clean, PERRL, anicteric sclera; MMM  Neck: supple  Cardiovascular: +S1/S2, RRR  Respiratory: CTA B/L; no wheezes, no crackles  Gastrointestinal: soft, NT/ND; normoactive bowel sounds  Extremities: warm, well perfused; no edema, clubbing or cyanosis  Neurological: AAOx3; motor exam with LUE weakness, LLE 4/5, RLE 5/5, RUE 5/5    MEDICATIONS:  MEDICATIONS  (STANDING):  amLODIPine   Tablet 5 milliGRAM(s) Oral daily  aspirin enteric coated 81 milliGRAM(s) Oral daily  atorvastatin 20 milliGRAM(s) Oral at bedtime  dexAMETHasone     Tablet 2 milliGRAM(s) Oral every 12 hours  enoxaparin Injectable 40 milliGRAM(s) SubCutaneous at bedtime  furosemide    Tablet 40 milliGRAM(s) Oral daily  levETIRAcetam 1000 milliGRAM(s) Oral two times a day  magnesium oxide 400 milliGRAM(s) Oral daily  metoprolol succinate ER 25 milliGRAM(s) Oral daily  pantoprazole    Tablet 40 milliGRAM(s) Oral two times a day  polyethylene glycol 3350 17 Gram(s) Oral <User Schedule>  senna 2 Tablet(s) Oral at bedtime  tamsulosin 0.4 milliGRAM(s) Oral at bedtime    MEDICATIONS  (PRN):  acetaminophen   Tablet .. 650 milliGRAM(s) Oral every 6 hours PRN Temp greater or equal to 38C (100.4F), Mild Pain (1 - 3)  ALPRAZolam 0.25 milliGRAM(s) Oral every 8 hours PRN Anxiety  benzocaine 15 mG/menthol 3.6 mG (Sugar-Free) Lozenge 1 Lozenge Oral three times a day PRN Sore Throat      ALLERGIES:  Allergies    No Known Allergies    Intolerances        LABS:                        12.8   6.60  )-----------( 191      ( 15 Michael 2020 07:28 )             37.9     06-15    140  |  102  |  35<H>  ----------------------------<  102<H>  3.8   |  31  |  1.08    Ca    8.8      15 Michael 2020 07:28          CAPILLARY BLOOD GLUCOSE          RADIOLOGY & ADDITIONAL TESTS: Reviewed.

## 2020-06-16 NOTE — PROGRESS NOTE ADULT - ATTENDING COMMENTS
Pt. seen with resident.  Agree with documentation above as per resident with amendments made as appropriate. Patient medically stable. Making progress towards rehab goals.
Pt. seen with resident.  Agree with documentation above as per resident with amendments made as appropriate. Patient medically stable. Making progress towards rehab goals.    Increase lexapro for depression and anxiety to 10mg.   d/c planning 6/17
Pt. seen with resident.  Agree with documentation above as per resident with amendments made as appropriate. Patient medically stable. Making progress towards rehab goals.     Increase PO hydration
Pt. seen with resident.  Agree with documentation above as per resident with amendments made as appropriate. Patient medically stable. Making progress towards rehab goals.     c/o lightheadness this AM-- may be side effect of lexapro.  Pt. has anxiety regarding taking it-- stopped med.

## 2020-06-16 NOTE — DISCHARGE NOTE PROVIDER - CARE PROVIDERS DIRECT ADDRESSES
[email or web address]

## 2020-06-16 NOTE — DISCHARGE NOTE PROVIDER - HOSPITAL COURSE
Pt is a 80 y/o M with PMHx of HTN, HLD, prostate cancer s/p prostatectomy, basal cell skin cancer, glioblastoma (Dx in Aug. 2018-- 8/3/18 - s/p resection ([facility]) 8/3/18;  s/p chemoradiation treatment ([facility]) followed by s/p temozolomide x 3 cycles 10/12/18;  s/p re-resection ([facility]) right frontal lobe 2/7/19;  radiation, chemotherapy cycle 9 on 9/23/19) and seizures on keppra who presented to [facility] on 5/27 for scheduled stereotactic right craniotomy for brain tumor.     Procedure was performed on 5/27 by Dr. Rivers without any intraoperative complications. Post-op, pt was unstable with ambulation. Post-op imaging was notable for vasogenic edema on MRI brain and CTH. Pt has been on a decadron taper. Pt has chronic left upper extremity pain and weakness, and he was at his baseline level.     Pt was evaluated by PT and PM&R and was recommended for acute inpatient rehabilitation when medically stable. Pt was deemed ready for discharge on 6/4/20 and was transferred to [facility] for acute inpatient rehabilitation. Patient report mild increased weakness on the LUE and no other changes since most recent surgery.        While at [facility], Pt did very well with therapy and made gains to his strength, balance, and endurance. In OT, he was at a supervision level with eating, grooming, and dressing, and contract guard for transfers. In PT, he was able to ambulate 150' with a single axis cane with contact guard/min assist and walked up 12 steps with a right hand rail with min assist. In SLP, he was seen to have higher level cognitive deficits with mild impulsivity and reduced insight. He will need to have supervision at home. Pt had his sutures removed during his stay, and he will need to follow up with neuro-oncology and neurosurgery for the next steps in his treatment for his glioblastoma. Pt will continue on his medications started while in the hospital, and will also follow up with his primary care physician. He will have home care PT and OT and transition to outpatient.

## 2020-06-16 NOTE — PROGRESS NOTE ADULT - ASSESSMENT
Pt is a 78 y/o M with PMHx of HTN, HLD, prostate cancer s/p prostatectomy, basal cell skin cancer, glioblastoma (s/p resection, radiation, chemotherapy cycle 9 on 9/23/19) and seizures admitted with recurrent right frontal GBM s/p resection 5/27/20.     #GBM  - s/p resection on 5/27/20  - continue decadron 2mg q12hrs  - protonix for steroid ppx bid  - Keppra 1000mg bid  - Pain control  - PT/OT/SLP  - will follow up with outpatient neurosurgery and oncology  - restarted home Aspirin 81mg    #Kidney function  - encourage hydration  - continue to monitor    #Depressed Mood  - Neuropsych consult reviewed and appreciated--   -increased Lexapro to 10mg -- may help with motor recovery as well. - Pt felt lightheaded this morning and attributed to the new higher dose of Lexapro. Pt does not feel comfortable taking this medication and so it will be discontinued.  - Recreation therapy    #Impulsivity  - enhanced supervision  - pt educated on fall risk and importance of using call bell     #HTN  - continue metoprolol ER 25mg   - continue Lasix 40mg  - hospitalist follow up    #HLD  - continue Lipitor    #BPH  - continue Flomax    #Anxiety  - Xanax 0.25mg PRN    #Pain  - Tylenol PRN, Lidocaine patch    #GI  - Senna    #Diet  - Regular with thins    #DVT ppx  - Lovenox    #Discharge Planning  - Pt discussed in team meeting on 6/16/20. In OT, pt is supervision for eating, grooming, upper body dressing, and lower body dressing and contact guard with toilet and shower transfers. In PT he is CG for transfers and is [id number]' with a single axis cane with min A. He is walking up and down 12 steps with a right hand rail with CG/min A. In SLP he has higher level cog deficits, impulsiveness, and reduced insight. Tentative discharge date set for 6/17 with goals of mod I to supervision. Family coming in for training 6/17 prior to discharge. He will be discharged with homecare PT/OT Pt is a 80 y/o M with PMHx of HTN, HLD, prostate cancer s/p prostatectomy, basal cell skin cancer, glioblastoma (s/p resection, radiation, chemotherapy cycle 9 on 9/23/19) and seizures admitted with recurrent right frontal GBM s/p resection 5/27/20.     #GBM  - s/p resection on 5/27/20  - continue decadron 2mg q12hrs  - protonix for steroid ppx bid  - Keppra 1000mg bid  - Pain control  - PT/OT/SLP  - will follow up with outpatient neurosurgery and oncology  - restarted home Aspirin 81mg    #Kidney function  - encourage hydration  - continue to monitor    #Depressed Mood  - Neuropsych consult reviewed and appreciated--   - Lexapro 10mg -- may help with motor recovery as well. - Pt felt lightheaded this morning and attributed to the new higher dose of Lexapro. Pt does not feel comfortable taking this medication and so it will be discontinued.  - Recreation therapy    #Impulsivity  - enhanced supervision  - pt educated on fall risk and importance of using call bell     #HTN  - continue metoprolol ER 25mg   - continue Lasix 40mg  - hospitalist follow up    #HLD  - continue Lipitor    #BPH  - continue Flomax    #Anxiety  - Xanax 0.25mg PRN    #Pain  - Tylenol PRN, Lidocaine patch    #GI  - Senna    #Diet  - Regular with thins    #DVT ppx  - Lovenox    #Discharge Planning  - Pt discussed in team meeting on 6/16/20. In OT, pt is supervision for eating, grooming, upper body dressing, and lower body dressing and contact guard with toilet and shower transfers. In PT he is CG for transfers and is [id number]' with a single axis cane with min A. He is walking up and down 12 steps with a right hand rail with CG/min A. In SLP he has higher level cog deficits, impulsiveness, and reduced insight. Tentative discharge date set for 6/17 with goals of mod I to supervision. Family coming in for training 6/17 prior to discharge. He will be discharged with homecare PT/OT

## 2020-06-17 ENCOUNTER — TRANSCRIPTION ENCOUNTER (OUTPATIENT)
Age: 80
End: 2020-06-17

## 2020-06-17 VITALS
DIASTOLIC BLOOD PRESSURE: 62 MMHG | SYSTOLIC BLOOD PRESSURE: 112 MMHG | RESPIRATION RATE: 15 BRPM | HEART RATE: 63 BPM | OXYGEN SATURATION: 97 %

## 2020-06-17 PROCEDURE — 92522 EVALUATE SPEECH PRODUCTION: CPT

## 2020-06-17 PROCEDURE — 85027 COMPLETE CBC AUTOMATED: CPT

## 2020-06-17 PROCEDURE — 99232 SBSQ HOSP IP/OBS MODERATE 35: CPT

## 2020-06-17 PROCEDURE — 80048 BASIC METABOLIC PNL TOTAL CA: CPT

## 2020-06-17 PROCEDURE — 82272 OCCULT BLD FECES 1-3 TESTS: CPT

## 2020-06-17 PROCEDURE — 92610 EVALUATE SWALLOWING FUNCTION: CPT

## 2020-06-17 PROCEDURE — 97163 PT EVAL HIGH COMPLEX 45 MIN: CPT

## 2020-06-17 PROCEDURE — 97116 GAIT TRAINING THERAPY: CPT

## 2020-06-17 PROCEDURE — 99238 HOSP IP/OBS DSCHRG MGMT 30/<: CPT

## 2020-06-17 PROCEDURE — 97535 SELF CARE MNGMENT TRAINING: CPT

## 2020-06-17 PROCEDURE — 97530 THERAPEUTIC ACTIVITIES: CPT

## 2020-06-17 PROCEDURE — 97110 THERAPEUTIC EXERCISES: CPT

## 2020-06-17 PROCEDURE — 92507 TX SP LANG VOICE COMM INDIV: CPT

## 2020-06-17 PROCEDURE — 97112 NEUROMUSCULAR REEDUCATION: CPT

## 2020-06-17 PROCEDURE — U0003: CPT

## 2020-06-17 RX ADMIN — Medication 25 MILLIGRAM(S): at 06:34

## 2020-06-17 RX ADMIN — Medication 2 MILLIGRAM(S): at 06:34

## 2020-06-17 RX ADMIN — AMLODIPINE BESYLATE 5 MILLIGRAM(S): 2.5 TABLET ORAL at 06:34

## 2020-06-17 RX ADMIN — MAGNESIUM OXIDE 400 MG ORAL TABLET 400 MILLIGRAM(S): 241.3 TABLET ORAL at 12:10

## 2020-06-17 RX ADMIN — PANTOPRAZOLE SODIUM 40 MILLIGRAM(S): 20 TABLET, DELAYED RELEASE ORAL at 06:34

## 2020-06-17 RX ADMIN — LEVETIRACETAM 1000 MILLIGRAM(S): 250 TABLET, FILM COATED ORAL at 06:34

## 2020-06-17 RX ADMIN — Medication 40 MILLIGRAM(S): at 06:34

## 2020-06-17 RX ADMIN — Medication 81 MILLIGRAM(S): at 12:09

## 2020-06-17 NOTE — DISCHARGE NOTE NURSING/CASE MANAGEMENT/SOCIAL WORK - NSDCCRNAME_GEN_ALL_CORE_FT
[employer]   ([facility] the [facility]- call [phone] and ask for ambulette)

## 2020-06-17 NOTE — PROGRESS NOTE ADULT - REASON FOR ADMISSION
GBM resection

## 2020-06-17 NOTE — DISCHARGE NOTE NURSING/CASE MANAGEMENT/SOCIAL WORK - PATIENT PORTAL LINK FT
You can access the FollowMyHealth Patient Portal offered by [facility] by registering at the following website: [email or web address]. By joining [employer]s FollowMyHealth portal, you will also be able to view your health information using other applications (apps) compatible with our system.

## 2020-06-17 NOTE — PROGRESS NOTE ADULT - ASSESSMENT
Pt is a 78 y/o M with PMHx of HTN, HLD, prostate cancer s/p prostatectomy, basal cell skin cancer, glioblastoma (s/p resection, radiation, chemotherapy cycle 9 on 9/23/19) and seizures admitted with recurrent right frontal GBM s/p resection 5/27/20.     GBM s/p resection on 5/27  - continue decadron 2mg q12hrs  - Keppra 1000mg bid  - PT/OT/SLP with home care  - outpatient neurosurgery and oncology f/u    Depression - stable  lexapro d/c'd; pt w reported increased dizziness/imbalance senation    HTN - well controlled  BPH   HLD  toprol xl 25mg   lasix 40mg  lipitor   flomax

## 2020-06-17 NOTE — PROGRESS NOTE ADULT - ASSESSMENT
Pt is a 80 y/o M with PMHx of HTN, HLD, prostate cancer s/p prostatectomy, basal cell skin cancer, glioblastoma (s/p resection, radiation, chemotherapy cycle 9 on 9/23/19) and seizures admitted with recurrent right frontal GBM s/p resection 5/27/20.     Patient medically stable for discharge to home today.  Discharge medications and instructions reviewed with patient and will review with pt's family.  All questions answered.     #GBM  - s/p resection on 5/27/20  - continue decadron 2mg q12hrs  - protonix for steroid ppx bid  - Keppra 1000mg bid  - PT/OT/SLP with home care  - will follow up with outpatient neurosurgery and oncology  -cont home Aspirin 81mg      #Depressed Mood  - Neuropsych consult reviewed and appreciated--   -stopped  Lexapro due to side effect  - Recreation therapy    #Impulsivity  - enhanced supervision      #HTN  - continue metoprolol ER 25mg   - continue Lasix 40mg      #HLD  - continue Lipitor    #BPH  - continue Flomax    #Anxiety  - Xanax 0.25mg PRN    #Pain  - Tylenol PRN,       #Diet  - Regular with thins        #Discharge Planning  - Pt discussed in team meeting on 6/16/20. In OT, pt is supervision for eating, grooming, upper body dressing, and lower body dressing and contact guard with toilet and shower transfers. In PT he is CG for transfers and is [id number]' with a single axis cane with min A. He is walking up and down 12 steps with a right hand rail with CG/min A. In SLP he has higher level cog deficits, impulsiveness, and reduced insight. Tentative discharge date set for 6/17 with goals of mod I to supervision. Family coming in for training 6/17 prior to discharge. He will be discharged with homecare PT/OT

## 2020-06-17 NOTE — PROGRESS NOTE ADULT - SUBJECTIVE AND OBJECTIVE BOX
HPI:  Pt is a 78 y/o M with PMHx of HTN, HLD, prostate cancer s/p prostatectomy, basal cell skin cancer, glioblastoma (Dx in Aug. 2018-- 8/3/18 - s/p resection ([facility]al[facility]) 8/3/18;  s/p chemoradiation treatment ([place]) followed by s/p temozolomide x 3 cycles 10/12/18;  s/p re-resection ([facility]) right frontal lobe 2/7/19;  radiation, chemotherapy cycle 9 on 9/23/19) and seizures on keppra who presented to [facility] on 5/27 for scheduled stereotactic right craniotomy for brain tumor.   Procedure was performed on 5/27 by Dr. Rivers without any intraoperative complications. Post-op, pt was unstable with ambulation. Post-op imaging was notable for vasogenic edema on MRI brain and CTH. Pt has been on a decadron taper. Pt has chronic left upper extremity pain and weakness, and he was at his baseline level.   Pt was evaluated by PT and PM&R and was recommended for acute inpatient rehabilitation when medically stable. Pt was deemed ready for discharge on 6/4/20 and was transferred to [facility] for acute inpatient rehabilitation. Patient report mild increased weakness on the LUE and no other changes since most recent surgery. (04 Jun 2020 14:05)      PAST MEDICAL & SURGICAL HISTORY:  Fall, sequela  Bilateral hearing loss, unspecified hearing loss type   activity: [employer] 1962-65    [place]  Type 2 diabetes mellitus without complication, without long-term current use of insulin: stop oral meds  3 weeks ago  Glioblastoma: biopsy 8/2018  surgery  6 weeks radiation  35 days of oral chemotherapy last 3 weeks  Basal cell carcinoma (BCC) of left lower leg: s/p resection  right lower leg top  left lower leg  Prostate cancer: s/p radical prostatectomy 1995  HLD (hyperlipidemia)  HTN (hypertension)  S/P colonoscopic polypectomy: 3 years benign polyps  S/P tonsillectomy: age 30  H/O bilateral cataract extraction: 3-4 years ago  S/P prostatectomy: 1995  S/P brain surgery: 8/2018  Lipoma of neck: s/p resection at age 30  Basal cell carcinoma (BCC) of lower leg: s/p resection  History of tonsillectomy: at age 30      Subjective:  No new complaints,  family will be coming in for training today and pt. discharge after,  no lightheadedness    REVIEW OF SYMPTOMS  CONSTITUTIONAL: No fevers or chills  EYES/ENT: No visual changes;  No vertigo or throat pain   NECK: No pain or stiffness  RESPIRATORY: No cough, wheezing, or shortness of breath  CARDIOVASCULAR: No chest pain or palpitations  GASTROINTESTINAL: No abdominal or epigastric pain. No nausea or vomiting. No diarrhea or constipation.   GENITOURINARY: No dysuria, frequency or hematuria  NEUROLOGICAL: No numbness, + weakness, +, +coordination difficulties  SKIN: No itching, rashes      VITALS  Vital Signs Last 24 Hrs  T(C): 36.4 (16 Jun 2020 20:28), Max: 36.4 (16 Jun 2020 20:28)  T(F): 97.6 (16 Jun 2020 20:28), Max: 97.6 (16 Jun 2020 20:28)  HR: 63 (17 Jun 2020 08:25) (60 - 63)  BP: 112/62 (17 Jun 2020 08:25) (112/62 - 149/76)  BP(mean): --  RR: 15 (17 Jun 2020 08:25) (15 - 15)  SpO2: 97% (17 Jun 2020 08:25) (95% - 97%)      PHYSICAL EXAM  General: NAD, sitting up comfortably in wheelchair  HEENT: craniotomy intact and clean, PERRL, anicteric sclera; MMM  Neck: supple  Cardiovascular: +S1/S2, RRR  Respiratory: CTA B/L; no wheezes, no crackles  Gastrointestinal: soft, NT/ND; normoactive bowel sounds  Extremities: warm, well perfused; no edema, clubbing or cyanosis  Neurological: AAOx3; motor exam with LUE weakness 3/5, LLE 4/5, RLE 5/5, RUE 5/5        RECENT LABS                  RADIOLOGY/OTHER RESULTS      MEDICATIONS  (STANDING):  amLODIPine   Tablet 5 milliGRAM(s) Oral daily  aspirin enteric coated 81 milliGRAM(s) Oral daily  atorvastatin 20 milliGRAM(s) Oral at bedtime  dexAMETHasone     Tablet 2 milliGRAM(s) Oral every 12 hours  enoxaparin Injectable 40 milliGRAM(s) SubCutaneous at bedtime  furosemide    Tablet 40 milliGRAM(s) Oral daily  levETIRAcetam 1000 milliGRAM(s) Oral two times a day  magnesium oxide 400 milliGRAM(s) Oral daily  metoprolol succinate ER 25 milliGRAM(s) Oral daily  pantoprazole    Tablet 40 milliGRAM(s) Oral two times a day  polyethylene glycol 3350 17 Gram(s) Oral <User Schedule>  senna 2 Tablet(s) Oral at bedtime  tamsulosin 0.4 milliGRAM(s) Oral at bedtime    MEDICATIONS  (PRN):  acetaminophen   Tablet .. 650 milliGRAM(s) Oral every 6 hours PRN Temp greater or equal to 38C (100.4F), Mild Pain (1 - 3)  ALPRAZolam 0.25 milliGRAM(s) Oral every 8 hours PRN Anxiety  benzocaine 15 mG/menthol 3.6 mG (Sugar-Free) Lozenge 1 Lozenge Oral three times a day PRN Sore Throat

## 2020-06-17 NOTE — PROGRESS NOTE ADULT - SUBJECTIVE AND OBJECTIVE BOX
Patient is a 79y old  Male who presents with a chief complaint of GBM resection (17 Jun 2020 10:34)    Patient seen and examined at bedside. Has had some constipation, but receiving miralax and senna. Pt inquiring about lexapro dose change. Noted rx is d/c'd. No acute issues otherwise. Anticipating dc later today tentatively.    ALLERGIES:  No Known Allergies    MEDICATIONS  (STANDING):  amLODIPine   Tablet 5 milliGRAM(s) Oral daily  aspirin enteric coated 81 milliGRAM(s) Oral daily  atorvastatin 20 milliGRAM(s) Oral at bedtime  dexAMETHasone     Tablet 2 milliGRAM(s) Oral every 12 hours  enoxaparin Injectable 40 milliGRAM(s) SubCutaneous at bedtime  furosemide    Tablet 40 milliGRAM(s) Oral daily  levETIRAcetam 1000 milliGRAM(s) Oral two times a day  magnesium oxide 400 milliGRAM(s) Oral daily  metoprolol succinate ER 25 milliGRAM(s) Oral daily  pantoprazole    Tablet 40 milliGRAM(s) Oral two times a day  polyethylene glycol 3350 17 Gram(s) Oral <User Schedule>  senna 2 Tablet(s) Oral at bedtime  tamsulosin 0.4 milliGRAM(s) Oral at bedtime    MEDICATIONS  (PRN):  acetaminophen   Tablet .. 650 milliGRAM(s) Oral every 6 hours PRN Temp greater or equal to 38C (100.4F), Mild Pain (1 - 3)  ALPRAZolam 0.25 milliGRAM(s) Oral every 8 hours PRN Anxiety  benzocaine 15 mG/menthol 3.6 mG (Sugar-Free) Lozenge 1 Lozenge Oral three times a day PRN Sore Throat    Vital Signs Last 24 Hrs  T(F): 97.6 (16 Jun 2020 20:28), Max: 97.6 (16 Jun 2020 20:28)  HR: 63 (17 Jun 2020 08:25) (60 - 63)  BP: 112/62 (17 Jun 2020 08:25) (112/62 - 149/76)  RR: 15 (17 Jun 2020 08:25) (15 - 15)  SpO2: 97% (17 Jun 2020 08:25) (95% - 97%)  I&O's Summary    16 Jun 2020 07:01  -  17 Jun 2020 07:00  --------------------------------------------------------  IN: 300 mL / OUT: 0 mL / NET: 300 mL        PHYSICAL EXAM:  General: NAD, A/O x 3  ENT: MMM  Neck: Supple, No JVD  Lungs: Clear to auscultation bilaterally  Cardio: RRR, S1/S2, No murmurs  Abdomen: Soft, Nontender, Nondistended; Bowel sounds present  Extremities: No calf tenderness, No pitting edema  Neuro: L sided hemiplegia 4/5 strength, normal RLE and RUE 5/5    LABS:                        12.8   6.60  )-----------( 191      ( 15 Michael 2020 07:28 )             37.9       06-15    140  |  102  |  35  ----------------------------<  102  3.8   |  31  |  1.08    Ca    8.8      15 Michael 2020 07:28       eGFR if Non African American: 66 mL/min/1.73M2 (06-15-20 @ 07:28)  eGFR if : 76 mL/min/1.73M2 (06-15-20 @ 07:28)     RADIOLOGY & ADDITIONAL TESTS:    Care Discussed with Consultants/Other Providers: yes

## 2020-06-29 ENCOUNTER — APPOINTMENT (OUTPATIENT)
Dept: NEUROLOGY | Facility: CLINIC | Age: 80
End: 2020-06-29
Payer: MEDICARE

## 2020-06-29 PROCEDURE — 99215 OFFICE O/P EST HI 40 MIN: CPT | Mod: 95

## 2020-06-29 RX ORDER — ONDANSETRON 4 MG/1
4 TABLET ORAL
Qty: 30 | Refills: 0 | Status: ACTIVE | COMMUNITY
Start: 2018-08-16 | End: 1900-01-01

## 2020-06-29 NOTE — DISCUSSION/SUMMARY
[FreeTextEntry1] : Brain tumor - We reviewed his pathology from re-resection showing viable tumor. I would like to continue treatment, and discussed options including (1) Avastin - which he declines given past PE, (2) CCNU, or (3) temozolomide re-challenge. Will plan to resume temozolomide and repeat MRI after 2 cycles to evaluate efficacy. I ordered the tmz and he will call to review instructions once he receives it. \par He asks about his left side, which may continue to improve with PT, but will not go back to normal. \par \par Cerebral edema - Advised to decrease decadron to 2mg daily in AM. We will talk next week to discuss further taper. \par \par DISPO - All questions answered. Follow-up visit in 1 month, MRI in 2 months.

## 2020-06-29 NOTE — HISTORY OF PRESENT ILLNESS
[Medical Office: ([facility])___] : at the medical office located in  [Home] : at home, [unfilled] , at the time of the visit. [Family Member] : family member [Verbal consent obtained from patient] : the patient, [unfilled] [FreeTextEntry1] : 79 year old right handed man with a past medical history of glioblastoma of the right frontal lobe presenting for follow-up.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, MGMT unmethylated \par - completed chemoradiation - 8/31/18 - 10/12/18\par - 11/12/18 - temozolomide cycle #1, dose 320mg (150mg/m2) \par - 12/10/18 - temozolomide cycle #2, dose 400mg \par - 12/10/18 - presented to ED s/p fall and unable to get up; imaging with increased cerebral edema \par - 1/10/19 - temozolomide cycle #3, dose 400mg \par - POD \par - 2/7/19 - s/p re-resection ([facility]); PATH: recurrent GBM, 30-40% necrosis \par - 3/11/19 - temozolomide cycle #4, dose 400mg \par - 3/13/19 - started Avastin infusions q2 weeks \par - 4/11/19 - hospitalized with b/l PE \par - 5/26/19 - temozolomide cycle #5, dose 400mg \par - 6/23/19 - temozolomide cycle #6, dose 400mg \par - 7/22/19 - temozolomide cycle #7, dose 400mg \par - 8/26/19 - temozolomide cycle #8, dose 400mg\par - 9/23/19 - temozolomide cycle #9, dose 400mg \par - 10/21/19 - temozolomide cycle #10, dose 400mg \par - 11/2019 - hospitalized with weakness and fall, treated for UTI\par - 11/19 - hospitalization for pulmonary embolism. On lovenox 120mg/0.8 mL daily\par - 12/10/18 had episode of falling and unable to get up, even with family member assistance. May have - - - 12/3/19 - temozolomide cycle #11, dose 400mg \par - 1/2/20 - temozolomide cycle #12, dose 400mg \par - 3/26/20 - back pain (old L3 injury in [employer]) COVID NEGATIVE\par - 5/27/20 - s/p resection of small focus of recurrent disease; PATH: recurrent GBM, 5% necrosis\par \par He presents today for follow-up after re-resection in May. \par He has no headaches, no n/v. Only complaint is left sided weakness and decreased coordination, worse after resection. This has improved with rehab and home PT, but he is still not back to where he would like to be. \par He remains on Keppra and decadron 2mg BID. \par \par  [FreeTextEntry4] : miller Arvizu

## 2020-07-09 RX ORDER — DEXAMETHASONE 4 MG/1
4 TABLET ORAL
Qty: 30 | Refills: 1 | Status: ACTIVE | COMMUNITY
Start: 2020-07-09 | End: 1900-01-01

## 2020-07-27 ENCOUNTER — APPOINTMENT (OUTPATIENT)
Dept: NEUROLOGY | Facility: CLINIC | Age: 80
End: 2020-07-27
Payer: MEDICARE

## 2020-07-27 PROCEDURE — 99214 OFFICE O/P EST MOD 30 MIN: CPT | Mod: 95

## 2020-07-27 RX ORDER — TEMOZOLOMIDE 100 MG/1
100 CAPSULE ORAL
Qty: 20 | Refills: 0 | Status: ACTIVE | COMMUNITY
Start: 2018-12-03 | End: 1900-01-01

## 2020-07-27 RX ORDER — DEXAMETHASONE 2 MG/1
2 TABLET ORAL
Qty: 60 | Refills: 1 | Status: ACTIVE | COMMUNITY
Start: 2020-07-27 | End: 1900-01-01

## 2020-07-27 NOTE — REASON FOR VISIT
[Follow-Up: _____] : a [unfilled] follow-up visit [Home] : at home, [unfilled] , at the time of the visit. [Medical Office: ([facility])___] : at the medical office located in  [Family Member] : family member [Verbal consent obtained from patient] : the patient, [unfilled] [FreeTextEntry4] : miller Arvizu [FreeTextEntry1] : brain tumor

## 2020-07-27 NOTE — DISCUSSION/SUMMARY
[FreeTextEntry1] : Brain tumor - Will continue with temozolomide re-challenge, cycle #14 to begin 8/3/20. He is tolerating the medication well. We reviewed how to prevent and manage constipation. We requested bi-weekly homedraws for CBC monitoring, will begin this week. Unfortunately, his left sided weakness may not improve any further, although we encouraged him to continue PT. 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 He is currently on decadron 3mg daily, and will call next week for further taper. \par \par DISPO - All questions answered. Follow-up visit with MRI in 1 month.

## 2020-07-27 NOTE — HISTORY OF PRESENT ILLNESS
[FreeTextEntry1] : This is a pleasant 79 year old man with right frontal GBM. 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, MGMT unmethylated \par - completed chemoradiation - 8/31/18 - 10/12/18\par - 11/12/18 - temozolomide cycle #1, dose 320mg (150mg/m2) \par - 12/10/18 - temozolomide cycle #2, dose 400mg \par - 12/10/18 - presented to ED s/p fall and unable to get up; imaging with increased cerebral edema \par - 1/10/19 - temozolomide cycle #3, dose 400mg \par - POD \par - 2/7/19 - s/p re-resection ([facility]); PATH: recurrent GBM, 30-40% necrosis \par - 3/11/19 - temozolomide cycle #4, dose 400mg \par - 3/13/19 - started Avastin infusions q2 weeks \par - 4/11/19 - hospitalized with b/l PE \par - 5/26/19 - temozolomide cycle #5, dose 400mg \par - 6/23/19 - temozolomide cycle #6, dose 400mg \par - 7/22/19 - temozolomide cycle #7, dose 400mg \par - 8/26/19 - temozolomide cycle #8, dose 400mg\par - 9/23/19 - temozolomide cycle #9, dose 400mg \par - 10/21/19 - temozolomide cycle #10, dose 400mg \par - 11/2019 - hospitalized with weakness and fall, treated for UTI\par - 12/3/19 - temozolomide cycle #11, dose 400mg \par - 1/2/20 - temozolomide cycle #12, dose 400mg \par - 3/26/20 - back pain (old L3 injury in [employer]) COVID NEGATIVE\par - 5/27/20 - s/p resection of small focus of recurrent disease; PATH: recurrent GBM, 5% necrosis\par - 7/6/20 - TMZ rechallenge - cycle #13, dose 400mg \par \par He presents today for follow-up with his son Nolberto. \par He resumed temozolomide with no issues earlier this month. \par \par He had increased weakness, fatigue, difficulty with ambulation, and decreased appetite with steroid taper. Now on gradual taper, decadron 3mg daily since last week (7/22). \par \par He has no headaches, no n/v. Only complaint is left sided weakness and decreased coordination, worse after resection. This has improved with rehab and home PT, but he is still not back to where he would like to be. \par He remains on Keppra with no seizure-like symptoms. \par

## 2020-07-28 ENCOUNTER — LABORATORY RESULT (OUTPATIENT)
Age: 80
End: 2020-07-28

## 2020-08-04 ENCOUNTER — INPATIENT (INPATIENT)
Facility: HOSPITAL | Age: 80
LOS: 2 days | Discharge: NURSING, REHAB OR LONG-TERM CARE | DRG: 54 | End: 2020-08-07
Attending: STUDENT IN AN ORGANIZED HEALTH CARE EDUCATION/TRAINING PROGRAM | Admitting: STUDENT IN AN ORGANIZED HEALTH CARE EDUCATION/TRAINING PROGRAM
Payer: MEDICARE

## 2020-08-04 VITALS
OXYGEN SATURATION: 95 % | HEIGHT: 71 IN | SYSTOLIC BLOOD PRESSURE: 130 MMHG | DIASTOLIC BLOOD PRESSURE: 74 MMHG | RESPIRATION RATE: 16 BRPM | TEMPERATURE: 99 F | WEIGHT: 149.91 LBS | HEART RATE: 78 BPM

## 2020-08-04 DIAGNOSIS — Z98.890 OTHER SPECIFIED POSTPROCEDURAL STATES: Chronic | ICD-10-CM

## 2020-08-04 DIAGNOSIS — C44.711 BASAL CELL CARCINOMA OF SKIN OF UNSPECIFIED LOWER LIMB, INCLUDING HIP: Chronic | ICD-10-CM

## 2020-08-04 DIAGNOSIS — D17.0 BENIGN LIPOMATOUS NEOPLASM OF SKIN AND SUBCUTANEOUS TISSUE OF HEAD, FACE AND NECK: Chronic | ICD-10-CM

## 2020-08-04 DIAGNOSIS — C71.9 MALIGNANT NEOPLASM OF BRAIN, UNSPECIFIED: ICD-10-CM

## 2020-08-04 DIAGNOSIS — Z90.79 ACQUIRED ABSENCE OF OTHER GENITAL ORGAN(S): Chronic | ICD-10-CM

## 2020-08-04 DIAGNOSIS — Z98.41 CATARACT EXTRACTION STATUS, RIGHT EYE: Chronic | ICD-10-CM

## 2020-08-04 DIAGNOSIS — Z90.89 ACQUIRED ABSENCE OF OTHER ORGANS: Chronic | ICD-10-CM

## 2020-08-04 LAB
ALBUMIN SERPL ELPH-MCNC: 3.7 G/DL — SIGNIFICANT CHANGE UP (ref 3.3–5)
ALP SERPL-CCNC: 49 U/L — SIGNIFICANT CHANGE UP (ref 40–120)
ALT FLD-CCNC: 39 U/L — SIGNIFICANT CHANGE UP (ref 10–45)
ANION GAP SERPL CALC-SCNC: 12 MMOL/L — SIGNIFICANT CHANGE UP (ref 5–17)
AST SERPL-CCNC: 21 U/L — SIGNIFICANT CHANGE UP (ref 10–40)
BASE EXCESS BLDV CALC-SCNC: 3.6 MMOL/L — HIGH (ref -2–2)
BASOPHILS # BLD AUTO: 0.01 K/UL — SIGNIFICANT CHANGE UP (ref 0–0.2)
BASOPHILS NFR BLD AUTO: 0.1 % — SIGNIFICANT CHANGE UP (ref 0–2)
BILIRUB SERPL-MCNC: 0.4 MG/DL — SIGNIFICANT CHANGE UP (ref 0.2–1.2)
BUN SERPL-MCNC: 28 MG/DL — HIGH (ref 7–23)
CA-I SERPL-SCNC: 1.2 MMOL/L — SIGNIFICANT CHANGE UP (ref 1.12–1.3)
CALCIUM SERPL-MCNC: 9.2 MG/DL — SIGNIFICANT CHANGE UP (ref 8.4–10.5)
CHLORIDE BLDV-SCNC: 110 MMOL/L — HIGH (ref 96–108)
CHLORIDE SERPL-SCNC: 106 MMOL/L — SIGNIFICANT CHANGE UP (ref 96–108)
CO2 BLDV-SCNC: 30 MMOL/L — SIGNIFICANT CHANGE UP (ref 22–30)
CO2 SERPL-SCNC: 26 MMOL/L — SIGNIFICANT CHANGE UP (ref 22–31)
CREAT SERPL-MCNC: 0.9 MG/DL — SIGNIFICANT CHANGE UP (ref 0.5–1.3)
EOSINOPHIL # BLD AUTO: 0.01 K/UL — SIGNIFICANT CHANGE UP (ref 0–0.5)
EOSINOPHIL NFR BLD AUTO: 0.1 % — SIGNIFICANT CHANGE UP (ref 0–6)
GAS PNL BLDV: 140 MMOL/L — SIGNIFICANT CHANGE UP (ref 135–145)
GAS PNL BLDV: SIGNIFICANT CHANGE UP
GLUCOSE BLDV-MCNC: 130 MG/DL — HIGH (ref 70–99)
GLUCOSE SERPL-MCNC: 137 MG/DL — HIGH (ref 70–99)
HCO3 BLDV-SCNC: 29 MMOL/L — SIGNIFICANT CHANGE UP (ref 21–29)
HCT VFR BLD CALC: 36.2 % — LOW (ref 39–50)
HCT VFR BLDA CALC: 39 % — SIGNIFICANT CHANGE UP (ref 39–50)
HGB BLD CALC-MCNC: 12.8 G/DL — LOW (ref 13–17)
HGB BLD-MCNC: 12.2 G/DL — LOW (ref 13–17)
IMM GRANULOCYTES NFR BLD AUTO: 0.7 % — SIGNIFICANT CHANGE UP (ref 0–1.5)
LACTATE BLDV-MCNC: 1.7 MMOL/L — SIGNIFICANT CHANGE UP (ref 0.7–2)
LYMPHOCYTES # BLD AUTO: 0.47 K/UL — LOW (ref 1–3.3)
LYMPHOCYTES # BLD AUTO: 5.2 % — LOW (ref 13–44)
MAGNESIUM SERPL-MCNC: 2.2 MG/DL — SIGNIFICANT CHANGE UP (ref 1.6–2.6)
MCHC RBC-ENTMCNC: 32.4 PG — SIGNIFICANT CHANGE UP (ref 27–34)
MCHC RBC-ENTMCNC: 33.7 GM/DL — SIGNIFICANT CHANGE UP (ref 32–36)
MCV RBC AUTO: 96 FL — SIGNIFICANT CHANGE UP (ref 80–100)
MONOCYTES # BLD AUTO: 0.28 K/UL — SIGNIFICANT CHANGE UP (ref 0–0.9)
MONOCYTES NFR BLD AUTO: 3.1 % — SIGNIFICANT CHANGE UP (ref 2–14)
NEUTROPHILS # BLD AUTO: 8.16 K/UL — HIGH (ref 1.8–7.4)
NEUTROPHILS NFR BLD AUTO: 90.8 % — HIGH (ref 43–77)
NRBC # BLD: 0 /100 WBCS — SIGNIFICANT CHANGE UP (ref 0–0)
OTHER CELLS CSF MANUAL: 10 ML/DL — LOW (ref 18–22)
PCO2 BLDV: 49 MMHG — SIGNIFICANT CHANGE UP (ref 35–50)
PH BLDV: 7.39 — SIGNIFICANT CHANGE UP (ref 7.35–7.45)
PHOSPHATE SERPL-MCNC: 3.1 MG/DL — SIGNIFICANT CHANGE UP (ref 2.5–4.5)
PLATELET # BLD AUTO: 152 K/UL — SIGNIFICANT CHANGE UP (ref 150–400)
PO2 BLDV: 33 MMHG — SIGNIFICANT CHANGE UP (ref 25–45)
POTASSIUM BLDV-SCNC: 3.6 MMOL/L — SIGNIFICANT CHANGE UP (ref 3.5–5.3)
POTASSIUM SERPL-MCNC: 3.9 MMOL/L — SIGNIFICANT CHANGE UP (ref 3.5–5.3)
POTASSIUM SERPL-SCNC: 3.9 MMOL/L — SIGNIFICANT CHANGE UP (ref 3.5–5.3)
PROT SERPL-MCNC: 6 G/DL — SIGNIFICANT CHANGE UP (ref 6–8.3)
RBC # BLD: 3.77 M/UL — LOW (ref 4.2–5.8)
RBC # FLD: 12.7 % — SIGNIFICANT CHANGE UP (ref 10.3–14.5)
SAO2 % BLDV: 57 % — LOW (ref 67–88)
SARS-COV-2 RNA SPEC QL NAA+PROBE: SIGNIFICANT CHANGE UP
SODIUM SERPL-SCNC: 144 MMOL/L — SIGNIFICANT CHANGE UP (ref 135–145)
WBC # BLD: 8.99 K/UL — SIGNIFICANT CHANGE UP (ref 3.8–10.5)
WBC # FLD AUTO: 8.99 K/UL — SIGNIFICANT CHANGE UP (ref 3.8–10.5)

## 2020-08-04 PROCEDURE — 99223 1ST HOSP IP/OBS HIGH 75: CPT

## 2020-08-04 PROCEDURE — 71045 X-RAY EXAM CHEST 1 VIEW: CPT | Mod: 26

## 2020-08-04 PROCEDURE — 99285 EMERGENCY DEPT VISIT HI MDM: CPT

## 2020-08-04 PROCEDURE — 70450 CT HEAD/BRAIN W/O DYE: CPT | Mod: 26

## 2020-08-04 PROCEDURE — 93971 EXTREMITY STUDY: CPT | Mod: 26

## 2020-08-04 RX ORDER — SODIUM CHLORIDE 9 MG/ML
1000 INJECTION INTRAMUSCULAR; INTRAVENOUS; SUBCUTANEOUS
Refills: 0 | Status: DISCONTINUED | OUTPATIENT
Start: 2020-08-04 | End: 2020-08-04

## 2020-08-04 RX ORDER — AMLODIPINE BESYLATE 2.5 MG/1
5 TABLET ORAL DAILY
Refills: 0 | Status: DISCONTINUED | OUTPATIENT
Start: 2020-08-04 | End: 2020-08-07

## 2020-08-04 RX ORDER — TAMSULOSIN HYDROCHLORIDE 0.4 MG/1
0.4 CAPSULE ORAL AT BEDTIME
Refills: 0 | Status: DISCONTINUED | OUTPATIENT
Start: 2020-08-04 | End: 2020-08-07

## 2020-08-04 RX ORDER — PANTOPRAZOLE SODIUM 20 MG/1
40 TABLET, DELAYED RELEASE ORAL DAILY
Refills: 0 | Status: DISCONTINUED | OUTPATIENT
Start: 2020-08-05 | End: 2020-08-05

## 2020-08-04 RX ORDER — DEXAMETHASONE 0.5 MG/5ML
2 ELIXIR ORAL DAILY
Refills: 0 | Status: DISCONTINUED | OUTPATIENT
Start: 2020-08-04 | End: 2020-08-04

## 2020-08-04 RX ORDER — ASPIRIN/CALCIUM CARB/MAGNESIUM 324 MG
300 TABLET ORAL DAILY
Refills: 0 | Status: DISCONTINUED | OUTPATIENT
Start: 2020-08-05 | End: 2020-08-05

## 2020-08-04 RX ORDER — LABETALOL HCL 100 MG
5 TABLET ORAL EVERY 6 HOURS
Refills: 0 | Status: DISCONTINUED | OUTPATIENT
Start: 2020-08-04 | End: 2020-08-05

## 2020-08-04 RX ORDER — PANTOPRAZOLE SODIUM 20 MG/1
40 TABLET, DELAYED RELEASE ORAL
Refills: 0 | Status: DISCONTINUED | OUTPATIENT
Start: 2020-08-04 | End: 2020-08-04

## 2020-08-04 RX ORDER — FUROSEMIDE 40 MG
40 TABLET ORAL DAILY
Refills: 0 | Status: DISCONTINUED | OUTPATIENT
Start: 2020-08-04 | End: 2020-08-04

## 2020-08-04 RX ORDER — FUROSEMIDE 40 MG
20 TABLET ORAL DAILY
Refills: 0 | Status: DISCONTINUED | OUTPATIENT
Start: 2020-08-05 | End: 2020-08-05

## 2020-08-04 RX ORDER — ASPIRIN/CALCIUM CARB/MAGNESIUM 324 MG
81 TABLET ORAL DAILY
Refills: 0 | Status: DISCONTINUED | OUTPATIENT
Start: 2020-08-04 | End: 2020-08-04

## 2020-08-04 RX ORDER — DEXAMETHASONE 0.5 MG/5ML
4 ELIXIR ORAL EVERY 6 HOURS
Refills: 0 | Status: DISCONTINUED | OUTPATIENT
Start: 2020-08-04 | End: 2020-08-04

## 2020-08-04 RX ORDER — ATORVASTATIN CALCIUM 80 MG/1
20 TABLET, FILM COATED ORAL AT BEDTIME
Refills: 0 | Status: DISCONTINUED | OUTPATIENT
Start: 2020-08-04 | End: 2020-08-07

## 2020-08-04 RX ORDER — LEVETIRACETAM 250 MG/1
1000 TABLET, FILM COATED ORAL
Refills: 0 | Status: DISCONTINUED | OUTPATIENT
Start: 2020-08-04 | End: 2020-08-07

## 2020-08-04 RX ORDER — ENOXAPARIN SODIUM 100 MG/ML
40 INJECTION SUBCUTANEOUS DAILY
Refills: 0 | Status: DISCONTINUED | OUTPATIENT
Start: 2020-08-04 | End: 2020-08-07

## 2020-08-04 RX ORDER — DEXAMETHASONE 0.5 MG/5ML
4 ELIXIR ORAL EVERY 6 HOURS
Refills: 0 | Status: DISCONTINUED | OUTPATIENT
Start: 2020-08-04 | End: 2020-08-05

## 2020-08-04 RX ORDER — METOPROLOL TARTRATE 50 MG
25 TABLET ORAL DAILY
Refills: 0 | Status: DISCONTINUED | OUTPATIENT
Start: 2020-08-04 | End: 2020-08-07

## 2020-08-04 RX ADMIN — LEVETIRACETAM 1000 MILLIGRAM(S): 250 TABLET, FILM COATED ORAL at 18:02

## 2020-08-04 RX ADMIN — Medication 4 MILLIGRAM(S): at 18:07

## 2020-08-04 RX ADMIN — ENOXAPARIN SODIUM 40 MILLIGRAM(S): 100 INJECTION SUBCUTANEOUS at 18:01

## 2020-08-04 NOTE — H&P ADULT - ATTENDING COMMENTS
I agree with the resident's history, physical examination and assessment with the following additions:    The patient was started on IV decadron 4mg q6h. After discussion with Dr. Harman, will load the patient with Decadron 24mg IV and then continue IV decadron 8mg q8h as the CT of the head was consistent with worsening vasogenic edema. The patient's examination revealed 2/5 strength in the hands, 1/5 in L shoulder abduction and adduction, biceps and triceps. Left leg with 2/5 weakness diffusely, and 3/5 left foot plantar flexion. Left facial droop also present. The patient is unaware of any improvements in his strength thus far.     The patient notes that he has been having left hip pain and left shoulder pain. The left hip pain started about 1.5 months ago. In light of his chronic steroid use, will obtain Xrays of the hip to rule out signs of avascular necrosis. Patient also notes constipation, will start bowel regimen.

## 2020-08-04 NOTE — H&P ADULT - NSHPPHYSICALEXAM_GEN_ALL_CORE
Exam:   Gen: Appears to be attending to the right side more than the left   MS: Oriented x3. Fluent. Follows crossed commands. Extinction noted on DSS.   CN: Left Homonymous Hemianopia. Very subtle R. gaze deviation though able to overcome volitionally.  V1-V3 intact.  Mild L. facial droop. T/u midline.   Motor: LUE 2/5, LLE 1/5. Full strength on the right.   Reflexes: 3+ L. bi/tri/brachioradialis. 2+ R. bi/tri/brachioradialis. 1+ bilateral patellas and ankles. L. babinski present. R. babinski absent.   Coordination: No dysmetria on FNF or ataxia on HTS bilaterally   Gait: Deferred

## 2020-08-04 NOTE — ED PROVIDER NOTE - OBJECTIVE STATEMENT
80 y/o M PMHx of HTN, HLD, prostate CA s/p prostatectomy, basal cell skin cancer, glioblastoma (s/p resection 5/27/20, radiation, chemotherapy, ONC- Dr. Harman), seizures on Keppra, chronic LUE pain and weakness, admitted to [facility] for acute inpatient rehab 6/4 - 6/17/20 with ability to perform ADLs with at home supervision, presents today c/o weakness and difficulty ambulating at home x weeks. Pt reports taking decadron 4mg and home dose of Keppra today, pt notes hiccups since taking his steroids. Pt states his LLE has been feeling more weak for weeks, pt notes falling "a couple times" with likely head trauma, pt has notable short term memory deficit and is unable to recall events of the past several days-weeks with accuracy. Collateral info obtained form patient's son Nolberto Day who notes pt has had progressively worsening L sided weakness x 2-3 weeks, unable to ambulate at home, spoke with patient's NeuroOnc who tried increasing steroid dose to Decadron 8mg on 7/30 reportedly with no improvement in pt sxs and pt was referred to the ED today for further eval. In ED pt denies CP, SOB, abd pain, n/v/d, melena, hematochezia, f/c, urinary complaints, HA, change in vision, difficulty speaking or swallowing, back pain, bladder/bowel retention or incontinence, any pain.

## 2020-08-04 NOTE — ED PROVIDER NOTE - PHYSICAL EXAMINATION
GEN: Pt in NAD, A&O x3.  PSYCH: Affect appropriate.  EYES: Sclera white w/o injection. No periocular ecchymosis. PERRL. EOMI.  ENT: Head NCAT. No Walker's sign. No auricular or nasal DC. MMM. Neck supple FROM.  RESP: +Hiccups. No evidence of respiratory distress. SpO2 92-94% on RA >96% on 2L NC. CTA b/l, no wheezes, rales, or rhonchi.   CARDIAC: RRR, clear distinct S1, S2, no appreciable murmurs.  ABD: Abdomen soft, non-tender. No CVAT b/l.  VASC: 2+ radial and dorsalis pedis pulses b/l. 2+ LLE edema, trace RLE edema, no calf tenderness b/l.  MSK: No joint erythema or obvious deformity. No obvious spinal deformity, no midline spinal ttp, no step-offs. No bony tenderness to palpation. Full passive ROM of upper and lower extremities without pain.  NEURO: Speech coherent, mild dysarthria. Short term memory deficit (able to remember events of this morning but not of the past several days). CN2-12 grossly intact. Decreased sensation LLE, normal and equal sensation b/l UE. Strength: RUE: 5/5. LUE: 2/5 strength shoulder extension, 1/5 strength , 0/5 strength remainder of LUE. RLE 5/5. LLE: 2-3/5 strength hip flexion, knee flexion and extension, 0/5 strength of ankle dorsi and plantar flexion. Pronator drift negative. Gait not possible, normal gross cerebellar function of RUE and RLE, cerebellar testing of LUE and LLE not possible.  SKIN: No rashes on the trunk.

## 2020-08-04 NOTE — ED PROVIDER NOTE - PMH
Basal cell carcinoma (BCC) of left lower leg  s/p resection  right lower leg top  left lower leg  Bilateral hearing loss, unspecified hearing loss type    Fall, sequela    Glioblastoma  biopsy 8/2018  surgery  6 weeks radiation  35 days of oral chemotherapy last 3 weeks  HLD (hyperlipidemia)    HTN (hypertension)     activity  [employer] 1962-65    [place]  Prostate cancer  s/p radical prostatectomy 1995  Type 2 diabetes mellitus without complication, without long-term current use of insulin  stop oral meds  3 weeks ago

## 2020-08-04 NOTE — ED ADULT NURSE NOTE - NSIMPLEMENTINTERV_GEN_ALL_ED
Implemented All Fall with Harm Risk Interventions:  [place] to call system. Call bell, personal items and telephone within reach. Instruct patient to call for assistance. Room bathroom lighting operational. Non-slip footwear when patient is off stretcher. Physically safe environment: no spills, clutter or unnecessary equipment. Stretcher in lowest position, wheels locked, appropriate side rails in place. Provide visual cue, wrist band, yellow gown, etc. Monitor gait and stability. Monitor for mental status changes and reorient to person, place, and time. Review medications for side effects contributing to fall risk. Reinforce activity limits and safety measures with patient and family. Provide visual clues: red socks.

## 2020-08-04 NOTE — ED PROVIDER NOTE - CLINICAL SUMMARY MEDICAL DECISION MAKING FREE TEXT BOX
Attending MD Roblero: 78 y/o M PMHx of HTN, HLD, prostate CA s/p prostatectomy, basal cell skin cancer, glioblastoma (s/p resection 5/27/20, radiation, chemotherapy, ONC- Dr. Harman), seizures on Keppra, chronic LUE pain and weakness presents with weakness and falling.  Unable to perform ADLs On exam the patient is awake and alert, marked LLE edema.  NEURO: Speech coherent, mild dysarthria. Short term memory deficit (able to remember events of this morning but not of the past several days). CN2-12 grossly intact. Decreased sensation LLE, normal and equal sensation b/l UE. Strength: RUE: 5/5. LUE: 2/5 strength shoulder extension, 1/5 strength , 0/5 strength remainder of LUE. RLE 5/5. LLE: 2-3/5 strength hip flexion, knee flexion and extension, 0/5 strength of ankle dorsi and plantar flexion. Pronator drift negative. Gait not possible, normal gross cerebellar function of RUE and RLE, cerebellar testing of LUE and LLE not possible.  Plan:  labs, EKG, head CT, LLE duplex.  Admit

## 2020-08-04 NOTE — ED PROVIDER NOTE - ATTENDING CONTRIBUTION TO CARE
Attending MD Roblero:   I personally have seen and examined this patient.  Physician assistant note reviewed and agree on plan of care and except where noted.

## 2020-08-04 NOTE — H&P ADULT - ASSESSMENT
79 RHM w/ PMH of HTN, HLD, prostate cancer s/p prostectomy, basal cell skin cancer, GBM (dx in 2018, s/p resection 8/3/18, s/p 2nd resection 2/7/19, s/p 3rd resection 5/2020 s/p multiple does of Temodar presents w/ worsening L. hemiparesis. CTH demonstrates increased vasogenic edema compared to previous scan concerning for progression of disease.     Impression: Worsening L. hemiparesis secondary to R. hemispheric dysfunction --  Etiology uncertain, likely secondary to increased vasogenic edema in setting of steroid taper vs. progression of GBM.     Plan:   [] Will continue current dose of prednisone PO 2mg QD for now (will discuss w/ Dr. Harman about increasing dose)   [] MR brain w/w/o contrast  [] Continue Keppra 1g BID  [] PT/OT    Case to be discussed w/ Dr. Hagan 79 RHM w/ PMH of HTN, HLD, prostate cancer s/p prostectomy, basal cell skin cancer, GBM (dx in 2018, s/p resection 8/3/18, s/p 2nd resection 2/7/19, s/p 3rd resection 5/2020 s/p multiple does of Temodar presents w/ worsening L. hemiparesis. CTH demonstrates increased vasogenic edema compared to previous scan concerning for progression of disease.     Impression: Worsening L. hemiparesis secondary to R. hemispheric dysfunction --  Etiology uncertain, likely secondary to increased vasogenic edema in setting of steroid taper vs. progression of GBM.     Plan:   [] 4mg IV q6h  (will discuss w/ Dr. Harman about increasing dose)   [] MR brain w/w/o contrast  [] Continue Keppra 1g BID  [] PT/OT    Case to be discussed w/ Dr. Hagan

## 2020-08-04 NOTE — H&P ADULT - NSICDXPASTMEDICALHX_GEN_ALL_CORE_FT
PAST MEDICAL HISTORY:  Basal cell carcinoma (BCC) of left lower leg s/p resection  right lower leg top  left lower leg    Bilateral hearing loss, unspecified hearing loss type     Fall, sequela     Glioblastoma biopsy 8/2018  surgery  6 weeks radiation  35 days of oral chemotherapy last 3 weeks    HLD (hyperlipidemia)     HTN (hypertension)      activity [employer] 1960[address]65    [place]    Prostate cancer s/p radical prostatectomy 1995    Type 2 diabetes mellitus without complication, without long-term current use of insulin stop oral meds  3 weeks ago

## 2020-08-04 NOTE — ED ADULT NURSE NOTE - OBJECTIVE STATEMENT
78 y/o male coming in via EMS from home complaining of weakness. AOx4, recently wheelchair bound, PMH HTN, prostate CA, glioblastoma with last surgery in May., 78 y/o male coming in via EMS from home complaining of weakness. AOx4, recently wheelchair bound, PMH HTN, prostate CA, glioblastoma with last surgery in May. As per patient, he was able to walk and get around well after his last surgery in May. Approx. 3 weeks ago, patient's steroid dose was lowered. Since then pt. has been experiencing progressively worsening weakness and is now wheelchair bound. Left lower leg larger than right leg. Left side weaker than the right side. Pt. following all commands appropriately, reports he took his Keppra today. Moving all extremities, left leg has some effort against gravity. Pt. reporting baseline left shoulder pain. Denies chest pain, SOB, fever, chills, N/V/D. Denies any other complaints. VSS. Will continue to reassess.

## 2020-08-04 NOTE — H&P ADULT - HISTORY OF PRESENT ILLNESS
79 RHM w/ PMH of HTN, HLD, prostate cancer s/p prostectomy, basal cell skin cancer, GBM (dx in 2018, s/p resection 8/3/18, s/p 2nd resection 2/7/19, s/p 3rd resection 5/2020 s/p multiple does of Temodar presents w/ worsening of his L. sided weakness.     Dr. Harman last seen the patient on 7/27/20 where he was noted to have worsening strength, lethargy and decreased appetitie He has been tapering his steroids for over a month. Recently tapered from 4mg QD --> 3mg QD ---> 2mg QD. Patient presents today complaining of worsening of his left sided weakness. No other complaints. Denies changes in speech, vision, hearing, swallowing, voice quality, numbness/tingling, balance/room-spinning sensations.     CTH 8.4.2020: Worsening vasogenic edema involving the R. temporal frontal parietal region again identified increased when compared to previous CT in 5/2020.     From Dr. 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, MGMT unmethylated   - completed chemoradiation - 8/31/18 - 10/12/18  - 11/12/18 - temozolomide cycle #1, dose 320mg (150mg/m2)   - 12/10/18 - temozolomide cycle #2, dose 400mg   - 12/10/18 - presented to ED s/p fall and unable to get up; imaging with increased cerebral edema   - 1/10/19 - temozolomide cycle #3, dose 400mg   - POD   - 2/7/19 - s/p re-resection ([facility]); PATH: recurrent GBM, 30-40% necrosis   - 3/11/19 - temozolomide cycle #4, dose 400mg   - 3/13/19 - started Avastin infusions q2 weeks   - 4/11/19 - hospitalized with b/l PE   - 5/26/19 - temozolomide cycle #5, dose 400mg   - 6/23/19 - temozolomide cycle #6, dose 400mg   - 7/22/19 - temozolomide cycle #7, dose 400mg   - 8/26/19 - temozolomide cycle #8, dose 400mg  - 9/23/19 - temozolomide cycle #9, dose 400mg   - 10/21/19 - temozolomide cycle #10, dose 400mg   - 11/2019 - hospitalized with weakness and fall, treated for UTI  - 12/3/19 - temozolomide cycle #11, dose 400mg   - 1/2/20 - temozolomide cycle #12, dose 400mg   - 3/26/20 - back pain (old L3 injury in [employer]) COVID NEGATIVE  - 5/27/20 - s/p resection of small focus of recurrent disease; PATH: recurrent GBM, 5% necrosis  - 7/6/20 - TMZ rechallenge - cycle #13, dose 400mg

## 2020-08-05 LAB
ALBUMIN SERPL ELPH-MCNC: 3.6 G/DL — SIGNIFICANT CHANGE UP (ref 3.3–5)
ALP SERPL-CCNC: 50 U/L — SIGNIFICANT CHANGE UP (ref 40–120)
ALT FLD-CCNC: 39 U/L — SIGNIFICANT CHANGE UP (ref 10–45)
ANION GAP SERPL CALC-SCNC: 12 MMOL/L — SIGNIFICANT CHANGE UP (ref 5–17)
AST SERPL-CCNC: 21 U/L — SIGNIFICANT CHANGE UP (ref 10–40)
BILIRUB SERPL-MCNC: 0.6 MG/DL — SIGNIFICANT CHANGE UP (ref 0.2–1.2)
BUN SERPL-MCNC: 23 MG/DL — SIGNIFICANT CHANGE UP (ref 7–23)
CALCIUM SERPL-MCNC: 9.1 MG/DL — SIGNIFICANT CHANGE UP (ref 8.4–10.5)
CHLORIDE SERPL-SCNC: 105 MMOL/L — SIGNIFICANT CHANGE UP (ref 96–108)
CO2 SERPL-SCNC: 24 MMOL/L — SIGNIFICANT CHANGE UP (ref 22–31)
CREAT SERPL-MCNC: 0.71 MG/DL — SIGNIFICANT CHANGE UP (ref 0.5–1.3)
GLUCOSE SERPL-MCNC: 148 MG/DL — HIGH (ref 70–99)
POTASSIUM SERPL-MCNC: 3.9 MMOL/L — SIGNIFICANT CHANGE UP (ref 3.5–5.3)
POTASSIUM SERPL-SCNC: 3.9 MMOL/L — SIGNIFICANT CHANGE UP (ref 3.5–5.3)
PROT SERPL-MCNC: 5.8 G/DL — LOW (ref 6–8.3)
SARS-COV-2 IGG SERPL QL IA: NEGATIVE — SIGNIFICANT CHANGE UP
SARS-COV-2 IGM SERPL IA-ACNC: 0.01 INDEX — SIGNIFICANT CHANGE UP
SODIUM SERPL-SCNC: 141 MMOL/L — SIGNIFICANT CHANGE UP (ref 135–145)
T3 SERPL-MCNC: 60 NG/DL — LOW (ref 80–200)
T4 AB SER-ACNC: 5 UG/DL — SIGNIFICANT CHANGE UP (ref 4.6–12)
TSH SERPL-MCNC: 0.35 UIU/ML — SIGNIFICANT CHANGE UP (ref 0.27–4.2)
VIT B12 SERPL-MCNC: 348 PG/ML — SIGNIFICANT CHANGE UP (ref 232–1245)

## 2020-08-05 PROCEDURE — 70553 MRI BRAIN STEM W/O & W/DYE: CPT | Mod: 26

## 2020-08-05 PROCEDURE — 99222 1ST HOSP IP/OBS MODERATE 55: CPT

## 2020-08-05 PROCEDURE — 99223 1ST HOSP IP/OBS HIGH 75: CPT

## 2020-08-05 RX ORDER — SENNA PLUS 8.6 MG/1
1 TABLET ORAL AT BEDTIME
Refills: 0 | Status: DISCONTINUED | OUTPATIENT
Start: 2020-08-05 | End: 2020-08-07

## 2020-08-05 RX ORDER — DEXAMETHASONE 0.5 MG/5ML
8 ELIXIR ORAL EVERY 8 HOURS
Refills: 0 | Status: DISCONTINUED | OUTPATIENT
Start: 2020-08-05 | End: 2020-08-07

## 2020-08-05 RX ORDER — ALPRAZOLAM 0.25 MG
0.5 TABLET ORAL ONCE
Refills: 0 | Status: DISCONTINUED | OUTPATIENT
Start: 2020-08-05 | End: 2020-08-05

## 2020-08-05 RX ORDER — ASPIRIN/CALCIUM CARB/MAGNESIUM 324 MG
81 TABLET ORAL DAILY
Refills: 0 | Status: DISCONTINUED | OUTPATIENT
Start: 2020-08-05 | End: 2020-08-07

## 2020-08-05 RX ORDER — DEXAMETHASONE 0.5 MG/5ML
24 ELIXIR ORAL ONCE
Refills: 0 | Status: COMPLETED | OUTPATIENT
Start: 2020-08-05 | End: 2020-08-06

## 2020-08-05 RX ORDER — FUROSEMIDE 40 MG
40 TABLET ORAL DAILY
Refills: 0 | Status: DISCONTINUED | OUTPATIENT
Start: 2020-08-05 | End: 2020-08-07

## 2020-08-05 RX ORDER — PANTOPRAZOLE SODIUM 20 MG/1
40 TABLET, DELAYED RELEASE ORAL
Refills: 0 | Status: DISCONTINUED | OUTPATIENT
Start: 2020-08-05 | End: 2020-08-07

## 2020-08-05 RX ORDER — ALPRAZOLAM 0.25 MG
0.25 TABLET ORAL EVERY 8 HOURS
Refills: 0 | Status: DISCONTINUED | OUTPATIENT
Start: 2020-08-05 | End: 2020-08-07

## 2020-08-05 RX ADMIN — PANTOPRAZOLE SODIUM 40 MILLIGRAM(S): 20 TABLET, DELAYED RELEASE ORAL at 05:26

## 2020-08-05 RX ADMIN — Medication 4 MILLIGRAM(S): at 05:26

## 2020-08-05 RX ADMIN — Medication 25 MILLIGRAM(S): at 05:26

## 2020-08-05 RX ADMIN — Medication 4 MILLIGRAM(S): at 00:41

## 2020-08-05 RX ADMIN — Medication 101.6 MILLIGRAM(S): at 21:06

## 2020-08-05 RX ADMIN — Medication 40 MILLIGRAM(S): at 05:25

## 2020-08-05 RX ADMIN — TAMSULOSIN HYDROCHLORIDE 0.4 MILLIGRAM(S): 0.4 CAPSULE ORAL at 21:06

## 2020-08-05 RX ADMIN — ATORVASTATIN CALCIUM 20 MILLIGRAM(S): 80 TABLET, FILM COATED ORAL at 21:06

## 2020-08-05 RX ADMIN — ENOXAPARIN SODIUM 40 MILLIGRAM(S): 100 INJECTION SUBCUTANEOUS at 11:51

## 2020-08-05 RX ADMIN — Medication 101.6 MILLIGRAM(S): at 11:50

## 2020-08-05 RX ADMIN — AMLODIPINE BESYLATE 5 MILLIGRAM(S): 2.5 TABLET ORAL at 05:26

## 2020-08-05 RX ADMIN — LEVETIRACETAM 1000 MILLIGRAM(S): 250 TABLET, FILM COATED ORAL at 05:26

## 2020-08-05 RX ADMIN — Medication 0.25 MILLIGRAM(S): at 00:41

## 2020-08-05 RX ADMIN — Medication 0.5 MILLIGRAM(S): at 21:28

## 2020-08-05 RX ADMIN — LEVETIRACETAM 1000 MILLIGRAM(S): 250 TABLET, FILM COATED ORAL at 18:00

## 2020-08-05 RX ADMIN — Medication 81 MILLIGRAM(S): at 11:50

## 2020-08-05 NOTE — SWALLOW BEDSIDE ASSESSMENT ADULT - SWALLOW EVAL: DIAGNOSIS
Pt is a 80 y/o male w/ PMH of GBM (dx in 2018, s/p multiple resections (most recently 5/2020) and multiple does of Temodar, now with worsening left-sided weakness. Pt presents with signs suggestive of an oropharyngeal dysphagia, characterized by suspected premature spillover of bolus over the base of tongue, audible swallows, and overt signs of laryngeal penetration/aspiration (cough post-swallow) with thin liquids.

## 2020-08-05 NOTE — OCCUPATIONAL THERAPY INITIAL EVALUATION ADULT - LUE MMT, REHAB EVAL
grossly assessed due to/L shoulder 2-/5, L elbow 0/5, pronation/supination 0/5, wrist 0/5, digits 3-/5

## 2020-08-05 NOTE — PHYSICAL THERAPY INITIAL EVALUATION ADULT - STRENGTHENING, PT EVAL
GOAL: Pt will increase L UE/LE strength by at least 1/2 grade to improve ease with functional mobility within 3-4 wks.

## 2020-08-05 NOTE — PHYSICAL THERAPY INITIAL EVALUATION ADULT - LIVES WITH, PROFILE
spouse/Per pt report, lives in 2 story private home with spouse, adult son and daughter no steps to enter, first floor bedroom/tub shower setup . has 3 grab bars and shower chair/children spouse/children/Per pt report, lives in 2 story private home with spouse, adult son and daughter no steps to enter, first floor bedroom/tub shower setup . has 3 grab bars and shower chair. Prior to admit, pt reports ambulating independently with straight cane house hold distances ~1 week before admitted to hospital. Pt reports his son and spouse would assist with ADLs.

## 2020-08-05 NOTE — SWALLOW BEDSIDE ASSESSMENT ADULT - ASPIRATION PRECAUTIONS
Monitor for s/s aspiration/laryngeal penetration. If noted:  D/C p.o. intake, provide non-oral nutrition/hydration/meds, and contact this service @ [phone]

## 2020-08-05 NOTE — OCCUPATIONAL THERAPY INITIAL EVALUATION ADULT - GENERAL OBSERVATIONS, REHAB EVAL
Pt received supine at bedside, A+Ox3-4 (unable to provide date/day of week, provided mos and yr), +IVL

## 2020-08-05 NOTE — CHART NOTE - NSCHARTNOTEFT_GEN_A_CORE
NEURO-ONCOLOGY: [facility] [phone]    CC: MALIGNANT BRAIN TUMOR & LEFT HEMIPLEGIA    PATIENT SEEN AND EXAMINED  SUNRISE NOTES REVIEWED  LABS REVIEWED  SPOKE WITH INPATIENT NEUROLOGY TEAM AT [facility].    This is a pleasant 79 year old man with right frontal GBM.     Clinical course:   - presented with left sided facial weakness and dysphagia   - 8/3/18 - GTR ([facility]) - PATH: GBM, IDH-1 negative, MGMT unmethylated   - completed chemoradiation - 8/31/18 - 10/12/18  - 11/12/18 - temozolomide cycle #1, dose 320mg (150mg/m2)   - 12/10/18 - temozolomide cycle #2, dose 400mg   - 12/10/18 - presented to ED s/p fall and unable to get up; imaging with increased cerebral edema   - 1/10/19 - temozolomide cycle #3, dose 400mg   - POD   - 2/7/19 - s/p re-resection ([facility]); PATH: recurrent GBM, 30-40% necrosis   - 3/11/19 - temozolomide cycle #4, dose 400mg   - 3/13/19 - started Avastin infusions q2 weeks   - 4/11/19 - hospitalized with b/l PE, treated with lovenox x 6 months, then stopped.  - 5/26/19 - temozolomide cycle #5, dose 400mg   - 6/23/19 - temozolomide cycle #6, dose 400mg   - 7/22/19 - temozolomide cycle #7, dose 400mg   - 8/26/19 - temozolomide cycle #8, dose 400mg  - 9/23/19 - temozolomide cycle #9, dose 400mg   - 10/21/19 - temozolomide cycle #10, dose 400mg   - 11/2019 - hospitalized with weakness and fall, treated for UTI  - 12/3/19 - temozolomide cycle #11, dose 400mg   - 1/2/20 - temozolomide cycle #12, dose 400mg   - 3/26/20 - back pain (old L3 injury in [employer]) COVID NEGATIVE  - 5/27/20 - s/p re-resection (3rd craniotomy) of small focus of recurrent disease; PATH: recurrent GBM, 5% necrosis  - 7/6/20 - TMZ rechallenge - cycle #13, dose 400mg   - 8/4/20 - presented to ED with complaints of severe debility due to hemiplegia.    He had increased weakness, fatigue, difficulty with ambulation, and decreased appetite with steroid taper. Attempted outpatient steroid bolus, follwed by a much slower taper, decadron 3mg daily since (7/22).     He has no headaches, no n/v. Only complaint is left sided weakness and decreased coordination, worse after resection. This has improved with rehab and home PT, but he is still not back to where he would like to be.   He remains on Keppra with no seizure-like symptoms.     EXAMINATION  AOx3. SPEECH IS FLUENT, NONDYSARTHRIC  NO FIELD CUT  NO CRANIAL NERVE DEFICITS  DENSE LEFT ARM AND LEG HEMIPLEGIA    IMAGING  CT - NONCONTRAST -- Demonstrates extensive right frontal cerebral edema, perhaps with an underlying mass vs post-operative changes    IMPRESSION/PLAN  CEREBRAL EDEMA -- Give 24mg decadron once as bolus, then 8mg q8. May improve from steroid reduction in cerebral edema over next few days.    GLIOBLASTOMA -- Challenging situation. He has already exceeded median overall survival. Recommend MRI with contrast to delineate etiology of hemiplegia, which is likely related to neoplasm, but may be due to brain abscess (3 surgeries), treatment-related radiation necrosis, or cerebrovascular accident. If tumor, then treatment could resume as outpatient with Avastin, despite prior pulmonary emboli on this drug. Would likely restart Lovenox prophylactically should Avastin be restarted.    HEMIPLEGIA -- Would benefit from physical therapy to educate transfers from wheelchair to commode, etc. May benefit from [place] rehab.    ADVANCED DIRECTIVES -- I discussed DNR with him and the low likelihood of survival should he suffer pulmonary arrest requiring a ventilator. I advised him to consider what he would want done, regarding heroic measures, because our default would be to do everything, even though he may not want that. He would need to tell us what he wants ahead of time so there are no surprises. Please ask Palliative care to evaluate patient for further coordination of care after discharge. He may wish to be full code, he did not wish to make a decision at this time.    DISPO -- Dense hemiplegia requires explanation prior to discharge.    Total time spent was 60 minutes, of which the majority in counselling and coordination of care

## 2020-08-05 NOTE — SWALLOW BEDSIDE ASSESSMENT ADULT - ASR SWALLOW ASPIRATION MONITOR
change of breathing pattern/fever/throat clearing/gurgly voice/pneumonia/cough/upper respiratory infection

## 2020-08-05 NOTE — SWALLOW BEDSIDE ASSESSMENT ADULT - SLP PRECAUTIONS/LIMITATIONS: VISION
within functional limits/Normal vision: sees adequately in most situations; can see medication labels, newsprint

## 2020-08-05 NOTE — SWALLOW BEDSIDE ASSESSMENT ADULT - SLP PERTINENT HISTORY OF CURRENT PROBLEM
79 RHM w/ PMH of HTN, HLD, prostate cancer s/p prostectomy, basal cell skin cancer, GBM (dx in 2018, s/p resection 8/3/18, s/p 2nd resection 2/7/19, s/p 3rd resection 5/2020 s/p multiple does of Temodar presents w/ worsening of his L. sided weakness. Dr. Harman last seen the patient on 7/27/20 where he was noted to have worsening strength, lethargy and decreased appetitie He has been tapering his steroids for over a month. Recently tapered from 4mg QD --> 3mg QD ---> 2mg QD. Patient presents today complaining of worsening of his left sided weakness. No other complaints. Denies changes in speech, vision, hearing, swallowing, voice quality, numbness/tingling, balance/room-spinning sensations. CTH 8.4.2020: Worsening vasogenic edema involving the R. temporal frontal parietal region again identified increased when compared to previous CT in 5/2020.

## 2020-08-05 NOTE — OCCUPATIONAL THERAPY INITIAL EVALUATION ADULT - TRANSFER TRAINING, PT EVAL
Goal: Pt will perform sit to stand, bed <> chair, toilet, tub and shower transfers independently with appropriate AD within 4 weeks

## 2020-08-05 NOTE — PHYSICAL THERAPY INITIAL EVALUATION ADULT - DIAGNOSIS, PT EVAL
Pt presents with decreased ROM, strength, balance, endurance, coordination all impacting ability to perform ADLs and functional mobility

## 2020-08-05 NOTE — PHYSICAL THERAPY INITIAL EVALUATION ADULT - IMPAIRED TRANSFERS: SIT/STAND, REHAB EVAL
decreased flexibility/impaired balance/decreased ROM/decreased sensation/impaired postural control/decreased strength/impaired coordination/abnormal muscle tone

## 2020-08-05 NOTE — PHYSICAL THERAPY INITIAL EVALUATION ADULT - ADDITIONAL COMMENTS
Head CT 8/4/20 IMPRESSION: Evolving postop changes. Increased vasogenic edema identified as described above.  Chest X-Ray 8/4/20 IMPRESSION: Clear lungs.  COVID-19 PCR 8/4/20: NOT DETECTED  Patient pending Routine MRI to r/o worsening GBM

## 2020-08-05 NOTE — SWALLOW BEDSIDE ASSESSMENT ADULT - COMMENTS
Impression: Worsening L. hemiparesis secondary to R. hemispheric dysfunction --  Etiology uncertain, likely secondary to increased vasogenic edema in setting of steroid taper vs. progression of GBM.   SWALLOW HX: Pt known to this service, seen for MBS on 8/7/18: Pt presented with an oropharyngeal dysphagia marked by reduced labial seal resulting in lateral loss, reduced bolus formation and transfer, reduced bolus propulsion with thick puree and solid consistencies resulting in severe residue in the valleculae, silent laryngeal penetration with nectar thickened fluid and honey thickened fluid without retrieval, and silent aspiration of nectar thickened fluid. A left head rotation improves airway protection with honey thickened fluid. Baseline throat clear noted upon encountered which continued throughout exam without observable cause on fluoroscopy. Recommendations -> Dysphagia 2 with honey thickened fluid ***ALL PO FLUID IN LEFT HEAD ROTATION***. Reducing bolus size to small, single sips did not eliminate s/s of laryngeal penetration/aspiration. Impression: Worsening L. hemiparesis secondary to R. hemispheric dysfunction --  Etiology uncertain, likely secondary to increased vasogenic edema in setting of steroid taper vs. progression of GBM.   SWALLOW HX: Pt known to this service, seen for MBS on 8/7/18: Pt presented with an oropharyngeal dysphagia marked by reduced labial seal resulting in lateral loss, reduced bolus formation and transfer, reduced bolus propulsion with thick puree and solid consistencies resulting in severe residue in the valleculae, silent laryngeal penetration with nectar thickened fluid and honey thickened fluid without retrieval, and silent aspiration of nectar thickened fluid. A left head rotation improves airway protection with honey thickened fluid. Baseline throat clear noted upon encountered which continued throughout exam without observable cause on fluoroscopy. Dysphagia 2 with honey thickened fluid was recommended, with all PO fluid in a left head rotation.

## 2020-08-05 NOTE — CONSULT NOTE ADULT - SUBJECTIVE AND OBJECTIVE BOX
HPI:   Pt is a 78 y/o M with PMHx of HTN, HLD, prostate cancer s/p prostatectomy, basal cell skin cancer, glioblastoma (s/p resection, radiation, chemotherapy cycle 9 on 19) and seizures admitted with recurrent right frontal GBM s/p resection 20.  Upon admission, he was unable to urinate, he was straight catherized for 1200, and 1000.  He has a history of prostatectomy done in , with a Dr. Schneider.  He did not require any further treatment, and has not gotten PSAs over the last few years.  He does admit to having difficulty urinating at home, with hesitancy, and + nocturia. He is unable to ambulate well to the restroom due to LE weakness which has worsened.  He states he takes Flomax at home but has not received it since he was admitted.  Denies dysuria, hematuria.  He does have a history of constipation, last BM 2 days ago. He states he is starting chemo this week and needs to also take a medicine that would cause constipation.     PAST MEDICAL & SURGICAL HISTORY:  Fall, sequela  Bilateral hearing loss, unspecified hearing loss type   activity: [employer] -    [place]  Type 2 diabetes mellitus without complication, without long-term current use of insulin: stop oral meds  3 weeks ago  Glioblastoma: biopsy 2018  surgery  6 weeks radiation  35 days of oral chemotherapy last 3 weeks  Basal cell carcinoma (BCC) of left lower leg: s/p resection  right lower leg top  left lower leg  Prostate cancer: s/p radical prostatectomy   HLD (hyperlipidemia)  HTN (hypertension)  S/P colonoscopic polypectomy: 3 years benign polyps  S/P tonsillectomy: age 30  H/O bilateral cataract extraction: 3-4 years ago  S/P prostatectomy:   S/P brain surgery: 2018  Lipoma of neck: s/p resection at age 30  Basal cell carcinoma (BCC) of lower leg: s/p resection  History of tonsillectomy: at age 30    MEDICATIONS  (STANDING):  ALPRAZolam 0.5 milliGRAM(s) Oral once  amLODIPine   Tablet 5 milliGRAM(s) Oral daily  aspirin enteric coated 81 milliGRAM(s) Oral daily  atorvastatin 20 milliGRAM(s) Oral at bedtime  dexAMETHasone  IVPB 24 milliGRAM(s) IV Intermittent once  dexAMETHasone  IVPB 8 milliGRAM(s) IV Intermittent every 8 hours  enoxaparin Injectable 40 milliGRAM(s) SubCutaneous daily  furosemide    Tablet 40 milliGRAM(s) Oral daily  levETIRAcetam 1000 milliGRAM(s) Oral two times a day  metoprolol succinate ER 25 milliGRAM(s) Oral daily  pantoprazole    Tablet 40 milliGRAM(s) Oral before breakfast  tamsulosin 0.4 milliGRAM(s) Oral at bedtime    MEDICATIONS  (PRN):  ALPRAZolam 0.25 milliGRAM(s) Oral every 8 hours PRN anxiety    FAMILY HISTORY:  Family history of diabetes mellitus: Father   Family history of prostate cancer in father:   Family history of breast cancer in mother (Sibling): Mother and sister  Family history of lung cancer: mother , long standing smoker    Allergies    No Known Allergies    Intolerances      SOCIAL HISTORY:   Tobacco hx: unknown     REVIEW OF SYSTEMS: Pertinent positives and negatives as stated in HPI, otherwise negative    Vital signs  T(C): 36.8 (20 @ 08:42), Max: 37 (20 @ 11:55)  HR: 65 (20 @ 08:42)  BP: 157/85 (20 @ 08:42)  SpO2: 95% (20 @ 08:42)  Wt(kg): --    Physical Exam  Gen: NAD  Pulm: No respiratory distress, no subcostal retractions  CV: RRR, no JVD  Abd: Soft, NT, ND  : Circumcised, no lesions.  No discharge or blood at urethral meatus.  Testes descended bilaterally.  Testes and epididymis nontender bilaterally.  Cremasteric reflex present bilaterally. Straight cath draining clear yellow urine.    MSK: No edema present    LABS:           @ 05:46    WBC --    / Hct --    / SCr 0.71      @ 12:44    WBC 8.99  / Hct 36.2  / SCr 0.90         141  |  105  |  23  ----------------------------<  148<H>  3.9   |  24  |  0.71    Ca    9.1      05 Aug 2020 05:46  Phos  3.1       Mg     2.2         TPro  5.8<L>  /  Alb  3.6  /  TBili  0.6  /  DBili  x   /  AST  21  /  ALT  39  /  AlkPhos  50  08-05          Urine Cx:   Blood Cx:    RADIOLOGY:

## 2020-08-05 NOTE — OCCUPATIONAL THERAPY INITIAL EVALUATION ADULT - PERTINENT HX OF CURRENT PROBLEM, REHAB EVAL
80yo RHM PMH of HTN, HLD, prostate cancer s/p prostectomy, basal cell skin cancer, GBM (dx in 2018, s/p resection 8/3/18, s/p 2nd resection 2/7/19, s/p 3rd resection 5/2020 s/p multiple does of Temodar presents w/worsening of his L sided weakness. CTH 8.4.2020: Worsening vasogenic edema involving the R. temporal frontal parietal region again identified increased when compared to previous CT in 5/2020.

## 2020-08-05 NOTE — CONSULT NOTE ADULT - ATTENDING COMMENTS
Pt seen/examined.  Case discussed with housestaff/PA team.  Agree with above note history, physical and assessment/plan.  CIC vs indwelling mejia if pt not able to void  Would cont good bowel regimen  F/u as outpt Pt seen/examined.  Case discussed with housestaff/PA team.  Agree with above note history, physical and assessment/plan.  Suspect neurogenic etiology for retention, along w immobility/constipation  CIC vs indwelling mejia if pt not able to void  +/- flomax, unlikely to benefit given h/o prior RP  Would cont good bowel regimen  F/u as outpt

## 2020-08-05 NOTE — CONSULT NOTE ADULT - ASSESSMENT
79 M w/ PMHx of HTN, HLD, prostate cancer s/p proctectomy basal cell skin cancer, GBM (dx in 2018, s/p resection 8/3/18, s/p 2nd resection 2/7/19, s/p 3rd resection 5/2020 s/p multiple does of Temodar presents w/ worsening of his L. sided weakness. Pt sees Dr. Harman on 7/27/20 where he was noted to have worsening strength, lethargy and decreased appetite. He has been tapering his steroids for over a month. Recently tapered from 4mg QD --> 3mg QD ---> 2mg QD. Patient presents today complaining of worsening of his left sided weakness.     Left sided weakness  GBM (dx in 2018, s/p resection 8/3/18, s/p 2nd resection 2/7/19, s/p 3rd resection 5/2020)  urinary retention   hx of prostate Ca s/p Proctectomy  HTN/Hyperlipidemia    Plan:  CT Head with worsening vasogenic edema involving the R. temporal frontal parietal region again identified increased when compared to previous CT in 5/2020   recently discharged from [facility] and home for 1 week per pt. difficulty ambulating and some falls.   started Decadron 24 mg IV x 1, then decadron 8 mg q8h  c/w Keppra 1000 mg BID, c/w PPI for GI ppx on steroids  neurosurgical eval appreciated. monitor for clinical improvement. neurochecks  Luna removed. TOV in progress.   MRI brain and xray hips pending   c/w BP meds: amlodipine 5 mg, Toprolol 25 mg XL, Lasix 40 mg qdaily  c/w Flomax for urinary retention,   Speech eval: Dysphagia 2 with honey thickened fluid   Physical therapy: Acute rehab ([facility]  DVT ppx    [facility] of PCP Dr. William Angeles at [facility]    - Dr. FERNY Lynn ([facility])  - [phone]

## 2020-08-05 NOTE — OCCUPATIONAL THERAPY INITIAL EVALUATION ADULT - IMPAIRED TRANSFERS: SIT/STAND, REHAB EVAL
abnormal muscle tone/decreased sensation/impaired postural control/impaired motor control/decreased strength/impaired balance

## 2020-08-05 NOTE — CONSULT NOTE ADULT - ASSESSMENT
Urinary retention is a 80 y/o M with PMHx of HTN, HLD, prostate cancer s/p prostatectomy, basal cell skin cancer, glioblastoma (s/p resection, radiation, chemotherapy cycle 9 on 9/23/19) and seizures admitted with recurrent right frontal GBM s/p resection 5/27/20.     -Flomax 0.4mg qHS   -Bowel regimen, senna, colace, miralax  -Hydration  -Keep Luna until patient has gotten at least 2 days of Flomax  -May TOV after 2 days of Flomax if ambulating and having soft BMs.  -If fails TOV, replace Luna, home with Luna, TOV as outpatient  -Follow up with Dr. Javier  in clinic [phone]

## 2020-08-05 NOTE — PHYSICAL THERAPY INITIAL EVALUATION ADULT - MANUAL MUSCLE TESTING RESULTS, REHAB EVAL
R UE/LE: at least 3+/5 throughout, L UE: 0/5 throughout except L shoulder IR/horizontal ADDuction 2/5, L knee ext: 3-/5, L hip flexion: 2+/5, L ankle: 1+/5/grossly assessed due to

## 2020-08-05 NOTE — PHYSICAL THERAPY INITIAL EVALUATION ADULT - IMPAIRMENTS CONTRIBUTING TO GAIT DEVIATIONS, PT EVAL
decreased strength/impaired postural control/impaired balance/decreased ROM/decreased sensation/decreased flexibility/abnormal muscle tone

## 2020-08-05 NOTE — SWALLOW BEDSIDE ASSESSMENT ADULT - PHARYNGEAL PHASE
Audible swallows Suspected premature spillover of bolus over the BOT; audible swallows; cough post-swallow Within functional limits

## 2020-08-05 NOTE — OCCUPATIONAL THERAPY INITIAL EVALUATION ADULT - DIAGNOSIS, OT EVAL
Pt currently presents with decreased endurance, balance, command follow, postural control and strength limiting independence with ADLs and functional mobility.

## 2020-08-05 NOTE — OCCUPATIONAL THERAPY INITIAL EVALUATION ADULT - ADL RETRAINING, OT EVAL
Goal: Pt will perform UB/LB dressing with CGA using compensatory dressing strategies within 4 weeks.

## 2020-08-05 NOTE — SWALLOW BEDSIDE ASSESSMENT ADULT - SLP GENERAL OBSERVATIONS
Pt encountered in bed. Awake, alert, on room air. Pt A&Ox4, follows all commands. Pt denies difficulties swallowing, when asked if he coughs while eating/drinking pt states "nothing worth mentioning"

## 2020-08-05 NOTE — PHYSICAL THERAPY INITIAL EVALUATION ADULT - GAIT PATTERN USED, PT EVAL
via side stepping, able to side step with R LE, required therapist assist to slide L LE along bedside

## 2020-08-05 NOTE — OCCUPATIONAL THERAPY INITIAL EVALUATION ADULT - IMPAIRMENTS CONTRIBUTING IMPAIRED BED MOBILITY, REHAB EVAL
decreased strength/impaired motor control/impaired postural control/decreased sensation/decreased ROM/impaired coordination

## 2020-08-05 NOTE — CONSULT NOTE ADULT - SUBJECTIVE AND OBJECTIVE BOX
Patient is a 79y old  Male who presents with a chief complaint of Worsening L. hemiparesis 2/2 GBM (05 Aug 2020 10:19)      HPI:  79 RHM w/ PMH of HTN, HLD, prostate cancer s/p prostectomy, basal cell skin cancer, GBM (dx in 2018, s/p resection 8/3/18, s/p 2nd resection 19, s/p 3rd resection 2020 s/p multiple does of Temodar presents w/ worsening of his L. sided weakness.     Dr. Harman last seen the patient on 20 where he was noted to have worsening strength, lethargy and decreased appetitie He has been tapering his steroids for over a month. Recently tapered from 4mg QD --> 3mg QD ---> 2mg QD. Patient presents today complaining of worsening of his left sided weakness. No other complaints. Denies changes in speech, vision, hearing, swallowing, voice quality, numbness/tingling, balance/room-spinning sensations.     CTH 8.: Worsening vasogenic edema involving the R. temporal frontal parietal region again identified increased when compared to previous CT in 2020.     Pt seen and examined at bedside.   Feeling okay. Report left sided weakness  no cp, no sob, no n/v/d.   mejia removed. TOV in progress.  denied HA/dizziness. denied visual deficit.     From Dr. 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, MGMT unmethylated   - completed chemoradiation - 18 - 10/12/18  - 18 - temozolomide cycle #1, dose 320mg (150mg/m2)   - 12/10/18 - temozolomide cycle #2, dose 400mg   - 12/10/18 - presented to ED s/p fall and unable to get up; imaging with increased cerebral edema   - 1/10/19 - temozolomide cycle #3, dose 400mg   - POD   - 19 - s/p re-resection ([facility]); PATH: recurrent GBM, 30-40% necrosis   - 3/11/19 - temozolomide cycle #4, dose 400mg   - 3/13/19 - started Avastin infusions q2 weeks   - 19 - hospitalized with b/l PE   - 19 - temozolomide cycle #5, dose 400mg   - 19 - temozolomide cycle #6, dose 400mg   - 19 - temozolomide cycle #7, dose 400mg   - 19 - temozolomide cycle #8, dose 400mg  - 19 - temozolomide cycle #9, dose 400mg   - 10/21/19 - temozolomide cycle #10, dose 400mg   - 2019 - hospitalized with weakness and fall, treated for UTI  - 12/3/19 - temozolomide cycle #11, dose 400mg   - 20 - temozolomide cycle #12, dose 400mg   - 3/26/20 - back pain (old L3 injury in [employer]) COVID NEGATIVE  - 20 - s/p resection of small focus of recurrent disease; PATH: recurrent GBM, 5% necrosis  - 20 - TMZ rechallenge - cycle #13, dose 400mg (04 Aug 2020 16:32)      PAST MEDICAL & SURGICAL HISTORY:  Fall, sequela  Bilateral hearing loss, unspecified hearing loss type   activity: [employer] -    [place]  Type 2 diabetes mellitus without complication, without long-term current use of insulin: stop oral meds  3 weeks ago  Glioblastoma: biopsy 2018  surgery  6 weeks radiation  35 days of oral chemotherapy last 3 weeks  Basal cell carcinoma (BCC) of left lower leg: s/p resection  right lower leg top  left lower leg  Prostate cancer: s/p radical prostatectomy   HLD (hyperlipidemia)  HTN (hypertension)  S/P colonoscopic polypectomy: 3 years benign polyps  S/P tonsillectomy: age 30  H/O bilateral cataract extraction: 3-4 years ago  S/P prostatectomy:   S/P brain surgery: 2018  Lipoma of neck: s/p resection at age 30  Basal cell carcinoma (BCC) of lower leg: s/p resection  History of tonsillectomy: at age 30      FAMILY HISTORY:  Family history of diabetes mellitus: Father   Family history of prostate cancer in father:   Family history of breast cancer in mother (Sibling): Mother and sister  Family history of lung cancer: mother , long standing smoker      SOCIAL HISTORY: Denied smoking, EtOH history. Denied illicit drug use.     Allergies: No Known Allergies    Intolerances          REVIEW OF SYSEMS:  General: + weakness, no fever/chills, no weight loss/gain  Skin/Breast: no rash, no jaundice  Ophthalmologic: no vision changes, no dry eyes   Respiratory and Thorax: no cough, no wheezing, no hemoptysis, no dyspnea  Cardiovascular: no chest pain, no shortness of breath, no orthopnea  Gastrointestinal: no n/v/d, no abdominal pain, no dysphagia   Genitourinary: no dysuria, no frequency, no nocturia, no hematuria, +difficulty urinating   Musculoskeletal: no trauma, no sprain/strain, no myalgias, no arthralgias, no fracture  Neurological: no HA, no dizziness, + weakness left side  Psychiatric: no depression, no SI/HI  Hematology/Lymphatics: no easy bruising  Endocrine: no heat or cold intolerance. no weight gain or loss  Allergic/Immunologic: no allergy or recent reaction       Vital Signs Last 24 Hrs  T(C): 36.6 (05 Aug 2020 16:06), Max: 36.9 (04 Aug 2020 17:30)  T(F): 97.9 (05 Aug 2020 16:06), Max: 98.5 (04 Aug 2020 17:30)  HR: 61 (05 Aug 2020 16:06) (55 - 84)  BP: 151/83 (05 Aug 2020 16:06) (135/64 - 157/85)  BP(mean): --  RR: 18 (05 Aug 2020 16:06) (18 - 18)  SpO2: 95% (05 Aug 2020 16:06) (95% - 98%)  I&O's Summary    05 Aug 2020 07:01  -  05 Aug 2020 16:18  --------------------------------------------------------  IN: 0 mL / OUT: 1200 mL / NET: -1200 mL        PHYSICAL EXAM:  GENERAL: NAD, Comfortable  HEAD:  Atraumatic, Normocephalic  EYES: EOMI, PERRLA, conjunctiva and sclera clear  NECK: Supple, No JVD  CHEST/LUNG: Clear to auscultation bilaterally; No wheeze  HEART: Regular rate and rhythm; No murmurs, rubs, or gallops  ABDOMEN: Soft, Nontender, Nondistended; Bowel sounds present  Neuro: AAOx3, left sided hemiparesis. left UE 1/5, LE 3/5. right UE/LE 5/5  EXTREMITIES:  2+ Peripheral Pulses, No clubbing, cyanosis, or edema  SKIN: No rashes or lesions        LABS:                        12.2   8.99  )-----------( 152      ( 04 Aug 2020 12:44 )             36.2     08-05    141  |  105  |  23  ----------------------------<  148<H>  3.9   |  24  |  0.71    Ca    9.1      05 Aug 2020 05:46  Phos  3.1     08-04  Mg     2.2     08-04    TPro  5.8<L>  /  Alb  3.6  /  TBili  0.6  /  DBili  x   /  AST  21  /  ALT  39  /  AlkPhos  50  08-05      CAPILLARY BLOOD GLUCOSE                RADIOLOGY & ADDITIONAL TESTS:    Imaging Personally Reviewed:  [x] YES  [ ] NO    Consultant(s) Notes Reviewed:  [x] YES  [ ] NO      MEDICATIONS  (STANDING):  ALPRAZolam 0.5 milliGRAM(s) Oral once  amLODIPine   Tablet 5 milliGRAM(s) Oral daily  aspirin enteric coated 81 milliGRAM(s) Oral daily  atorvastatin 20 milliGRAM(s) Oral at bedtime  dexAMETHasone  IVPB 24 milliGRAM(s) IV Intermittent once  dexAMETHasone  IVPB 8 milliGRAM(s) IV Intermittent every 8 hours  enoxaparin Injectable 40 milliGRAM(s) SubCutaneous daily  furosemide    Tablet 40 milliGRAM(s) Oral daily  levETIRAcetam 1000 milliGRAM(s) Oral two times a day  metoprolol succinate ER 25 milliGRAM(s) Oral daily  pantoprazole    Tablet 40 milliGRAM(s) Oral before breakfast  senna 1 Tablet(s) Oral at bedtime  tamsulosin 0.4 milliGRAM(s) Oral at bedtime    MEDICATIONS  (PRN):  ALPRAZolam 0.25 milliGRAM(s) Oral every 8 hours PRN anxiety      Care Discussed with Consultants/Other Providers [x] YES  [ ] NO    HEALTH ISSUES - PROBLEM Dx:

## 2020-08-05 NOTE — PHYSICAL THERAPY INITIAL EVALUATION ADULT - PERTINENT HX OF CURRENT PROBLEM, REHAB EVAL
78 yo RHM PMH of HTN, HLD, prostate cancer s/p prostectomy, basal cell skin cancer, GBM (dx in 2018, s/p resection 8/3/18, s/p 2nd resection 2/7/19, s/p 3rd resection 5/2020 s/p multiple does of Temodar presents w/worsening of his L sided weakness. CTH 8.4.2020: Worsening vasogenic edema involving the R. temporal frontal parietal region again identified increased when compared to previous CT in 5/2020.

## 2020-08-05 NOTE — PHYSICAL THERAPY INITIAL EVALUATION ADULT - GENERAL OBSERVATIONS, REHAB EVAL
Pt received semi-supine in NAD, +IVL, +b/l venodynes, +edema noted L distal LE, VSS, agreeable to participate in therapy at this time

## 2020-08-05 NOTE — OCCUPATIONAL THERAPY INITIAL EVALUATION ADULT - LLE MMT, REHAB EVAL
L ankle dorsiflexion 1+/5 (cannot reach neutral), L knee ext 3-/5, L hip ext 2+/5/grossly assessed due to

## 2020-08-06 ENCOUNTER — TRANSCRIPTION ENCOUNTER (OUTPATIENT)
Age: 80
End: 2020-08-06

## 2020-08-06 LAB
APPEARANCE UR: CLEAR — SIGNIFICANT CHANGE UP
BILIRUB UR-MCNC: NEGATIVE — SIGNIFICANT CHANGE UP
COLOR SPEC: ABNORMAL
DIFF PNL FLD: ABNORMAL
GLUCOSE BLDC GLUCOMTR-MCNC: 150 MG/DL — HIGH (ref 70–99)
GLUCOSE BLDC GLUCOMTR-MCNC: 240 MG/DL — HIGH (ref 70–99)
GLUCOSE BLDC GLUCOMTR-MCNC: 267 MG/DL — HIGH (ref 70–99)
GLUCOSE BLDC GLUCOMTR-MCNC: 300 MG/DL — HIGH (ref 70–99)
GLUCOSE UR QL: NEGATIVE — SIGNIFICANT CHANGE UP
KETONES UR-MCNC: NEGATIVE — SIGNIFICANT CHANGE UP
LEUKOCYTE ESTERASE UR-ACNC: NEGATIVE — SIGNIFICANT CHANGE UP
NITRITE UR-MCNC: NEGATIVE — SIGNIFICANT CHANGE UP
PH UR: 6 — SIGNIFICANT CHANGE UP (ref 5–8)
PROT UR-MCNC: ABNORMAL
SP GR SPEC: 1.02 — SIGNIFICANT CHANGE UP (ref 1.01–1.02)
UROBILINOGEN FLD QL: SIGNIFICANT CHANGE UP

## 2020-08-06 PROCEDURE — 99223 1ST HOSP IP/OBS HIGH 75: CPT | Mod: GC

## 2020-08-06 PROCEDURE — 73521 X-RAY EXAM HIPS BI 2 VIEWS: CPT | Mod: 26

## 2020-08-06 PROCEDURE — 99233 SBSQ HOSP IP/OBS HIGH 50: CPT

## 2020-08-06 PROCEDURE — 99232 SBSQ HOSP IP/OBS MODERATE 35: CPT

## 2020-08-06 PROCEDURE — 72197 MRI PELVIS W/O & W/DYE: CPT | Mod: 26

## 2020-08-06 RX ORDER — DEXTROSE 50 % IN WATER 50 %
12.5 SYRINGE (ML) INTRAVENOUS ONCE
Refills: 0 | Status: DISCONTINUED | OUTPATIENT
Start: 2020-08-06 | End: 2020-08-07

## 2020-08-06 RX ORDER — SODIUM CHLORIDE 9 MG/ML
1000 INJECTION INTRAMUSCULAR; INTRAVENOUS; SUBCUTANEOUS
Refills: 0 | Status: DISCONTINUED | OUTPATIENT
Start: 2020-08-06 | End: 2020-08-07

## 2020-08-06 RX ORDER — DEXTROSE 50 % IN WATER 50 %
25 SYRINGE (ML) INTRAVENOUS ONCE
Refills: 0 | Status: DISCONTINUED | OUTPATIENT
Start: 2020-08-06 | End: 2020-08-07

## 2020-08-06 RX ORDER — ALPRAZOLAM 0.25 MG
0.25 TABLET ORAL ONCE
Refills: 0 | Status: DISCONTINUED | OUTPATIENT
Start: 2020-08-06 | End: 2020-08-06

## 2020-08-06 RX ORDER — DEXTROSE 50 % IN WATER 50 %
15 SYRINGE (ML) INTRAVENOUS ONCE
Refills: 0 | Status: DISCONTINUED | OUTPATIENT
Start: 2020-08-06 | End: 2020-08-07

## 2020-08-06 RX ORDER — INSULIN LISPRO 100/ML
VIAL (ML) SUBCUTANEOUS AT BEDTIME
Refills: 0 | Status: DISCONTINUED | OUTPATIENT
Start: 2020-08-06 | End: 2020-08-07

## 2020-08-06 RX ORDER — GLUCAGON INJECTION, SOLUTION 0.5 MG/.1ML
1 INJECTION, SOLUTION SUBCUTANEOUS ONCE
Refills: 0 | Status: DISCONTINUED | OUTPATIENT
Start: 2020-08-06 | End: 2020-08-07

## 2020-08-06 RX ORDER — INSULIN LISPRO 100/ML
VIAL (ML) SUBCUTANEOUS
Refills: 0 | Status: DISCONTINUED | OUTPATIENT
Start: 2020-08-06 | End: 2020-08-07

## 2020-08-06 RX ORDER — SODIUM CHLORIDE 9 MG/ML
1000 INJECTION, SOLUTION INTRAVENOUS
Refills: 0 | Status: DISCONTINUED | OUTPATIENT
Start: 2020-08-06 | End: 2020-08-07

## 2020-08-06 RX ORDER — POLYETHYLENE GLYCOL 3350 17 G/17G
17 POWDER, FOR SOLUTION ORAL DAILY
Refills: 0 | Status: DISCONTINUED | OUTPATIENT
Start: 2020-08-06 | End: 2020-08-07

## 2020-08-06 RX ADMIN — Medication 112 MILLIGRAM(S): at 10:25

## 2020-08-06 RX ADMIN — SENNA PLUS 1 TABLET(S): 8.6 TABLET ORAL at 22:08

## 2020-08-06 RX ADMIN — ENOXAPARIN SODIUM 40 MILLIGRAM(S): 100 INJECTION SUBCUTANEOUS at 12:15

## 2020-08-06 RX ADMIN — Medication 25 MILLIGRAM(S): at 05:29

## 2020-08-06 RX ADMIN — Medication 101.6 MILLIGRAM(S): at 05:29

## 2020-08-06 RX ADMIN — Medication 40 MILLIGRAM(S): at 05:29

## 2020-08-06 RX ADMIN — POLYETHYLENE GLYCOL 3350 17 GRAM(S): 17 POWDER, FOR SOLUTION ORAL at 12:14

## 2020-08-06 RX ADMIN — Medication 81 MILLIGRAM(S): at 12:15

## 2020-08-06 RX ADMIN — ATORVASTATIN CALCIUM 20 MILLIGRAM(S): 80 TABLET, FILM COATED ORAL at 22:08

## 2020-08-06 RX ADMIN — LEVETIRACETAM 1000 MILLIGRAM(S): 250 TABLET, FILM COATED ORAL at 17:33

## 2020-08-06 RX ADMIN — SODIUM CHLORIDE 75 MILLILITER(S): 9 INJECTION INTRAMUSCULAR; INTRAVENOUS; SUBCUTANEOUS at 10:25

## 2020-08-06 RX ADMIN — Medication 0.25 MILLIGRAM(S): at 20:08

## 2020-08-06 RX ADMIN — Medication 4: at 17:33

## 2020-08-06 RX ADMIN — Medication 0.25 MILLIGRAM(S): at 22:08

## 2020-08-06 RX ADMIN — LEVETIRACETAM 1000 MILLIGRAM(S): 250 TABLET, FILM COATED ORAL at 05:29

## 2020-08-06 RX ADMIN — AMLODIPINE BESYLATE 5 MILLIGRAM(S): 2.5 TABLET ORAL at 05:28

## 2020-08-06 RX ADMIN — Medication 6: at 12:14

## 2020-08-06 RX ADMIN — Medication 1: at 22:08

## 2020-08-06 RX ADMIN — PANTOPRAZOLE SODIUM 40 MILLIGRAM(S): 20 TABLET, DELAYED RELEASE ORAL at 05:30

## 2020-08-06 RX ADMIN — Medication 101.6 MILLIGRAM(S): at 22:08

## 2020-08-06 RX ADMIN — TAMSULOSIN HYDROCHLORIDE 0.4 MILLIGRAM(S): 0.4 CAPSULE ORAL at 22:08

## 2020-08-06 NOTE — PROGRESS NOTE ADULT - ASSESSMENT
79 M w/ PMHx of HTN, HLD, prostate cancer s/p proctectomy basal cell skin cancer, GBM (dx in 2018, s/p resection 8/3/18, s/p 2nd resection 2/7/19, s/p 3rd resection 5/2020 s/p multiple does of Temodar presents w/ worsening of his L. sided weakness. Pt sees Dr. Harman on 7/27/20 where he was noted to have worsening strength, lethargy and decreased appetite. He has been tapering his steroids for over a month. Recently tapered from 4mg QD --> 3mg QD ---> 2mg QD. Patient presents today complaining of worsening of his left sided weakness.     Left sided weakness  GBM (dx in 2018, s/p resection 8/3/18, s/p 2nd resection 2/7/19, s/p 3rd resection 5/2020)  urinary retention   hx of prostate Ca s/p Proctectomy  HTN/Hyperlipidemia    Plan:  CT Head with worsening vasogenic edema involving the R. temporal frontal parietal region again identified increased when compared to previous CT in 5/2020   recently discharged from [facility] and home for 1 week per pt. difficulty ambulating and some falls.   started Decadron 24 mg IV x 1, then decadron 8 mg q8h  c/w Keppra 1000 mg BID, c/w PPI for GI ppx on steroids  neurosurgical eval appreciated. monitor for clinical improvement. neurochecks  Luna removed. TOV in progress.   MRI brain: significant progression of nodular enhancement suggesting hypercellularity. neuro/onc eval   xray hips nonspecific sclerosis in the left femoral head  c/w BP meds: amlodipine 5 mg, Toprolol 25 mg XL, Lasix 40 mg qdaily  c/w Flomax for urinary retention,   Speech eval: Dysphagia 2 with honey thickened fluid   Physical therapy: Acute rehab ([facility])  DVT ppx    Pt of PCP Dr. William Angeles at [facility]    - Dr. FERNY Lynn ([facility])  - [phone]

## 2020-08-06 NOTE — CHART NOTE - NSCHARTNOTEFT_GEN_A_CORE
79 RHM w/ PMH of HTN, HLD, prostate cancer s/p prostectomy, basal cell skin cancer, GBM (dx in 2018, s/p resection 8/3/18, s/p 2nd resection 2/7/19, s/p 3rd resection 5/2020 s/p multiple does of Temodar presents w/ worsening of his L. sided weakness.    SWALLOW HX: Pt known to this service, seen for MBS on 8/7/18: Pt presented with an oropharyngeal dysphagia marked by reduced labial seal resulting in lateral loss, reduced bolus formation and transfer, reduced bolus propulsion with thick puree and solid consistencies resulting in severe residue in the valleculae, silent laryngeal penetration with nectar thickened fluid and honey thickened fluid without retrieval, and silent aspiration of nectar thickened fluid. A left head rotation improves airway protection with honey thickened fluid. Baseline throat clear noted upon encountered which continued throughout exam without observable cause on fluoroscopy. Dysphagia 2 with honey thickened fluid was recommended, with all PO fluid in a left head rotation.     Pt reports following a regular texture diet since that time, with no complaints of difficulty, and no reported PNA.     Pt seen for initial bedside swallow evaluation on 8/5/2020. Pt presented with signs suggestive of an oropharyngeal dysphagia, characterized by suspected premature spillover of bolus over the base of tongue, audible swallows, and overt signs of laryngeal penetration/aspiration (cough post-swallow) with thin liquids. Recommendations-> Dysphagia 3 with nectar thick liquids.     Pt seen for follow-up assessment on this date. Per RUSSELL Esparza, pt complaining about nectar thick liquids. Pt provided with continuing education re: findings and recommendations of previous evaluation, and risks associated with aspiration. Pt stated understanding, however also stated he "doesn't need the thick liquid" and he "doesn't want to continue with something he doesn't need". Pt trialed with thin liquids, and continues to demonstrate intermittent overt s/s of laryngeal penetration/aspiration (wet vocal quality, throat clear). No overt s/s of laryngeal penetration/aspiration noted with nectar thick liquids. Case discussed with MD Hawthorne. Recommendations at this time are to continue dysphagia 3 diet with nectar thick liquids.     Neeta Lundberg, [facility]-SLP pgr #[phone] 79 RHM w/ PMH of HTN, HLD, prostate cancer s/p prostectomy, basal cell skin cancer, GBM (dx in 2018, s/p resection 8/3/18, s/p 2nd resection 2/7/19, s/p 3rd resection 5/2020 s/p multiple does of Temodar presents w/ worsening of his L. sided weakness.    SWALLOW HX: Pt known to this service, seen for MBS on 8/7/18: Pt presented with an oropharyngeal dysphagia marked by reduced labial seal resulting in lateral loss, reduced bolus formation and transfer, reduced bolus propulsion with thick puree and solid consistencies resulting in severe residue in the valleculae, silent laryngeal penetration with nectar thickened fluid and honey thickened fluid without retrieval, and silent aspiration of nectar thickened fluid. A left head rotation improves airway protection with honey thickened fluid. Baseline throat clear noted upon encountered which continued throughout exam without observable cause on fluoroscopy. Dysphagia 2 with honey thickened fluid was recommended, with all PO fluid in a left head rotation.     Pt reports following a regular texture diet since that time, with no complaints of difficulty, and no reported PNA.     Pt seen for initial bedside swallow evaluation on 8/5/2020. Pt presented with signs suggestive of an oropharyngeal dysphagia, characterized by suspected premature spillover of bolus over the base of tongue, audible swallows, and overt signs of laryngeal penetration/aspiration (cough post-swallow) with thin liquids. Recommendations-> Dysphagia 3 with nectar thick liquids.     Pt seen for follow-up assessment on this date. Per RUSSELL Esparza, pt complaining about nectar thick liquids. Pt provided with continuing education re: findings and recommendations of previous evaluation, and risks associated with aspiration. Pt stated understanding, however also stated he "doesn't need the thick liquid" and he "doesn't want to continue with something he doesn't need". Pt trialed with thin liquids, and continues to demonstrate intermittent overt s/s of laryngeal penetration/aspiration (wet vocal quality, throat clear). No overt s/s of laryngeal penetration/aspiration noted with nectar thick liquids. Case discussed with MD Hawthorne. Recommendations at this time are to continue dysphagia 3 diet with nectar thick liquids, and skilled ST services at next level of care for dysphagia tx.     Neeta Lundberg, [facility]-SLP pgr #[phone]

## 2020-08-06 NOTE — CONSULT NOTE ADULT - ATTENDING COMMENTS
Patient seen and examined and cased discussed with resident. Agree with recommendations.   79 year old man with functional deficits due to GBM  participating with therapy, PT/OT  [place] [facility] [place], [place]ulation  recommend acute inpatient rehabilitation

## 2020-08-06 NOTE — DISCHARGE NOTE PROVIDER - NSDCMRMEDTOKEN_GEN_ALL_CORE_FT
amLODIPine 5 mg oral tablet: 1 tab(s) orally once a day  aspirin 81 mg oral delayed release tablet: 1 tab(s) orally once a day  Crestor 20 mg oral tablet: 1 tab(s) orally once a day x 30 days   dexamethasone 2 mg oral tablet: 1 tab(s) orally every 12 hours  furosemide 40 mg oral tablet: 1 tab(s) orally once a day  levETIRAcetam 1000 mg oral tablet: 1 tab(s) orally 2 times a day  metoprolol succinate 25 mg oral tablet, extended release: 1 tab(s) orally once a day  pantoprazole 40 mg oral delayed release tablet: 1 tab(s) orally 2 times a day  tamsulosin 0.4 mg oral capsule: 1 cap(s) orally once a day (at bedtime) amLODIPine 5 mg oral tablet: 1 tab(s) orally once a day  aspirin 81 mg oral delayed release tablet: 1 tab(s) orally once a day  atorvastatin 20 mg oral tablet: 1 tab(s) orally once a day (at bedtime)  dexamethasone: 8 milligram(s) orally every 8 hours  furosemide 40 mg oral tablet: 1 tab(s) orally once a day  levETIRAcetam 1000 mg oral tablet: 1 tab(s) orally 2 times a day  melatonin 3 mg oral tablet: 1 tab(s) orally once a day (at bedtime)  metoprolol succinate 25 mg oral tablet, extended release: 1 tab(s) orally once a day  pantoprazole 40 mg oral delayed release tablet: 1 tab(s) orally 2 times a day  polyethylene glycol 3350 oral powder for reconstitution: 17 gram(s) orally once a day  senna oral tablet: 1 tab(s) orally once a day (at bedtime)  tamsulosin 0.4 mg oral capsule: 1 cap(s) orally once a day (at bedtime)

## 2020-08-06 NOTE — DISCHARGE NOTE PROVIDER - HOSPITAL COURSE
79 RHM w/ PMH of HTN, HLD, prostate cancer s/p prostectomy, basal cell skin cancer, GBM (dx in 2018, s/p resection 8/3/18, s/p 2nd resection 2/7/19, s/p 3rd resection 5/2020 s/p multiple does of Temodar presents w/ worsening of his L. sided weakness.         Dr. Harman last seen the patient on 7/27/20 where he was noted to have worsening strength, lethargy and decreased appetitie He has been tapering his steroids for over a month. Recently tapered from 4mg QD --> 3mg QD ---> 2mg QD. Patient presents today complaining of worsening of his left sided weakness. No other complaints. Denies changes in speech, vision, hearing, swallowing, voice quality, numbness/tingling, balance/room-spinning sensations. CTH 8.4.2020: Worsening vasogenic edema involving the R. temporal frontal parietal region again identified increased when compared to previous CT in 5/2020.         Patient admitted to neurology floor, started on decadron 4mg q6h later increased to 8mg q8h. 79 RHM w/ PMH of HTN, HLD, prostate cancer s/p prostectomy, basal cell skin cancer, GBM (dx in 2018, s/p resection 8/3/18, s/p 2nd resection 2/7/19, s/p 3rd resection 5/2020 s/p multiple does of Temodar presents w/ worsening of his L. sided weakness.         Dr. Harman last seen the patient on 7/27/20 where he was noted to have worsening strength, lethargy and decreased appetitie He has been tapering his steroids for over a month. Recently tapered from 4mg QD --> 3mg QD ---> 2mg QD. Patient presents today complaining of worsening of his left sided weakness. No other complaints. Denies changes in speech, vision, hearing, swallowing, voice quality, numbness/tingling, balance/room-spinning sensations. CTH 8.4.2020: Worsening vasogenic edema involving the R. temporal frontal parietal region again identified increased when compared to previous CT in 5/2020.         Patient admitted to neurology floor, started on decadron 4mg q6h later increased to 8mg q8h. MR head w/ and w/o contrast with progression of disease, neuro-onc on board, patient to follow up outpatient for initiation of avastin therapy. Patient also with urinary retention, urology consulted, suspect urinary retention is neurologic in etiology, on clean intermittent catheterization; further followup to continue at rehab. Patient stable for discharge and followup at acute rehab and outpatient. 79 RHM w/ PMH of HTN, HLD, prostate cancer s/p prostectomy, basal cell skin cancer, GBM (dx in 2018, s/p resection 8/3/18, s/p 2nd resection 2/7/19, s/p 3rd resection 5/2020 s/p multiple does of Temodar presents w/ worsening of his L. sided weakness.         Dr. Harman last seen the patient on 7/27/20 where he was noted to have worsening strength, lethargy and decreased appetitie He has been tapering his steroids for over a month. Recently tapered from 4mg QD --> 3mg QD ---> 2mg QD. Patient presents today complaining of worsening of his left sided weakness. No other complaints. Denies changes in speech, vision, hearing, swallowing, voice quality, numbness/tingling, balance/room-spinning sensations. CTH 8.4.2020: Worsening vasogenic edema involving the R. temporal frontal parietal region again identified increased when compared to previous CT in 5/2020.         Patient admitted to neurology floor, started on decadron 4mg q6h later increased to 8mg q8h, after 24mg IV load. MR head w/ and w/o contrast with progression of disease, neuro-onc on board, patient to follow up outpatient for initiation of avastin therapy. Patient also with urinary retention, urology consulted, suspect urinary retention is neurologic in etiology, on clean intermittent catheterization; further followup to continue at rehab. Patient complained of left hip pain during admission, MRI L hip was negative for avascular necrosis but demonstrated some muscle edema, possible myositis. Patient stable for discharge and followup at acute rehab and outpatient. He will continue 8mg PO q8h of dexamethasone until follow up with Dr. Harman.

## 2020-08-06 NOTE — DISCHARGE NOTE PROVIDER - CARE PROVIDER_API CALL
Francisco Harman  NEUROLOGY  [address] [place] [address]  Phone: [phone]  Fax: [phone]  Follow Up Time:

## 2020-08-06 NOTE — PROGRESS NOTE ADULT - SUBJECTIVE AND OBJECTIVE BOX
Neurology Follow up note    Name  DOROTHY cMfadden History - Subjective:    Review of Systems:  As above    MEDICATIONS  (STANDING):  amLODIPine   Tablet 5 milliGRAM(s) Oral daily  aspirin enteric coated 81 milliGRAM(s) Oral daily  atorvastatin 20 milliGRAM(s) Oral at bedtime  dexAMETHasone  IVPB 24 milliGRAM(s) IV Intermittent once  dexAMETHasone  IVPB 8 milliGRAM(s) IV Intermittent every 8 hours  enoxaparin Injectable 40 milliGRAM(s) SubCutaneous daily  furosemide    Tablet 40 milliGRAM(s) Oral daily  levETIRAcetam 1000 milliGRAM(s) Oral two times a day  metoprolol succinate ER 25 milliGRAM(s) Oral daily  pantoprazole    Tablet 40 milliGRAM(s) Oral before breakfast  senna 1 Tablet(s) Oral at bedtime  tamsulosin 0.4 milliGRAM(s) Oral at bedtime    MEDICATIONS  (PRN):  ALPRAZolam 0.25 milliGRAM(s) Oral every 8 hours PRN anxiety      Allergies    No Known Allergies    Intolerances        Objective:   Vital Signs Last 24 Hrs  T(C): 36.2 (06 Aug 2020 04:36), Max: 36.9 (05 Aug 2020 20:05)  T(F): 97.2 (06 Aug 2020 04:36), Max: 98.4 (05 Aug 2020 20:05)  HR: 60 (06 Aug 2020 04:36) (58 - 74)  BP: 114/75 (06 Aug 2020 04:36) (114/75 - 157/85)  BP(mean): --  RR: 18 (06 Aug 2020 04:36) (18 - 18)  SpO2: 97% (06 Aug 2020 04:36) (94% - 97%)    Neurological Exam(pending):  Mental Status: Orientated to self, date and place.  Attention intact.  No dysarthria, aphasia or neglect.  Knowledge intact.  Registration intact.  Short and long term memory grossly intact.      Cranial Nerves: CN I - not tested.  PERRL, EOMI, VF intact, no nystagmus or diplopia. Fundi not visualized bilaterally.  CN V1-3 intact to light touch.  No facial asymmetry. Tongue, uvula and palate midline.  Sternocleidomastoid and Trapezius intact bilaterally.    Motor:   Tone: normal.                  Strength: intact throughout  Pronator drift: none                 Dysmetria: None to finger-nose-finger or heel-shin-heel  No truncal ataxia.    Tremor: No resting, postural or action tremor.  No myoclonus.    Sensation: intact to light touch    Deep Tendon Reflexes: 1+ bilateral biceps, triceps, brachioradialis, knee and ankle  Babinski equivocal    Gait: normal and stable.      Other:    08-05    141  |  105  |  23  ----------------------------<  148<H>  3.9   |  24  |  0.71    Ca    9.1      05 Aug 2020 05:46  Phos  3.1     08-04  Mg     2.2     08-04    TPro  5.8<L>  /  Alb  3.6  /  TBili  0.6  /  DBili  x   /  AST  21  /  ALT  39  /  AlkPhos  50  08-05 08-05    141  |  105  |  23  ----------------------------<  148<H>  3.9   |  24  |  0.71    Ca    9.1      05 Aug 2020 05:46  Phos  3.1     08-04  Mg     2.2     08-04    TPro  5.8<L>  /  Alb  3.6  /  TBili  0.6  /  DBili  x   /  AST  21  /  ALT  39  /  AlkPhos  50  08-05    LIVER FUNCTIONS - ( 05 Aug 2020 05:46 )  Alb: 3.6 g/dL / Pro: 5.8 g/dL / ALK PHOS: 50 U/L / ALT: 39 U/L / AST: 21 U/L / GGT: x             Radiology    EKG:  tele:  TTE:  EEG: Neurology Follow up note    Name  DOROTHY Mcfadden History - Subjective: MR done, pending    Review of Systems:  As above    MEDICATIONS  (STANDING):  amLODIPine   Tablet 5 milliGRAM(s) Oral daily  aspirin enteric coated 81 milliGRAM(s) Oral daily  atorvastatin 20 milliGRAM(s) Oral at bedtime  dexAMETHasone  IVPB 24 milliGRAM(s) IV Intermittent once  dexAMETHasone  IVPB 8 milliGRAM(s) IV Intermittent every 8 hours  enoxaparin Injectable 40 milliGRAM(s) SubCutaneous daily  furosemide    Tablet 40 milliGRAM(s) Oral daily  levETIRAcetam 1000 milliGRAM(s) Oral two times a day  metoprolol succinate ER 25 milliGRAM(s) Oral daily  pantoprazole    Tablet 40 milliGRAM(s) Oral before breakfast  senna 1 Tablet(s) Oral at bedtime  tamsulosin 0.4 milliGRAM(s) Oral at bedtime    MEDICATIONS  (PRN):  ALPRAZolam 0.25 milliGRAM(s) Oral every 8 hours PRN anxiety      Allergies    No Known Allergies    Intolerances        Objective:   Vital Signs Last 24 Hrs  T(C): 36.2 (06 Aug 2020 04:36), Max: 36.9 (05 Aug 2020 20:05)  T(F): 97.2 (06 Aug 2020 04:36), Max: 98.4 (05 Aug 2020 20:05)  HR: 60 (06 Aug 2020 04:36) (58 - 74)  BP: 114/75 (06 Aug 2020 04:36) (114/75 - 157/85)  BP(mean): --  RR: 18 (06 Aug 2020 04:36) (18 - 18)  SpO2: 97% (06 Aug 2020 04:36) (94% - 97%)    PE:  MS: Awake, alert. Normal affect. Follows all commands, answering questions    Language: Speech is clear, fluent with good comprehension     CNs: eyes moving spontaneously in all directions. well developed masseter muscles b/l. No facial asymmetry b/l. Symmetric palate elevation in midline. Gag reflex deferred. Tongue midline, normal movements, no atrophy.    Motor: Normal muscle bulk & tone. No noticeable tremor or seizure. No pronator drift.  Moving RUE/RLE, strength intact  LUE able to squeeze but not lifting antigravity  LLE able to lift off of bed with slight hip flexor weakness    Other:    08-05    141  |  105  |  23  ----------------------------<  148<H>  3.9   |  24  |  0.71    Ca    9.1      05 Aug 2020 05:46  Phos  3.1     08-04  Mg     2.2     08-04    TPro  5.8<L>  /  Alb  3.6  /  TBili  0.6  /  DBili  x   /  AST  21  /  ALT  39  /  AlkPhos  50  08-05 08-05    141  |  105  |  23  ----------------------------<  148<H>  3.9   |  24  |  0.71    Ca    9.1      05 Aug 2020 05:46  Phos  3.1     08-04  Mg     2.2     08-04    TPro  5.8<L>  /  Alb  3.6  /  TBili  0.6  /  DBili  x   /  AST  21  /  ALT  39  /  AlkPhos  50  08-05    LIVER FUNCTIONS - ( 05 Aug 2020 05:46 )  Alb: 3.6 g/dL / Pro: 5.8 g/dL / ALK PHOS: 50 U/L / ALT: 39 U/L / AST: 21 U/L / GGT: x             Radiology    EKG:  tele:  TTE:  EEG:

## 2020-08-06 NOTE — DISCHARGE NOTE PROVIDER - NSDCCPCAREPLAN_GEN_ALL_CORE_FT
PRINCIPAL DISCHARGE DIAGNOSIS  Diagnosis: Glioblastoma multiforme  Assessment and Plan of Treatment: Continue taking medications as reconciled  Follow up with neurology outpatient for continuation of workup        SECONDARY DISCHARGE DIAGNOSES  Diagnosis: Weakness  Assessment and Plan of Treatment: PRINCIPAL DISCHARGE DIAGNOSIS  Diagnosis: Glioblastoma multiforme  Assessment and Plan of Treatment: Continue taking medications as reconciled  Follow up with neurology outpatient for continuation of workup        SECONDARY DISCHARGE DIAGNOSES  Diagnosis: Myositis  Assessment and Plan of Treatment: Possible myositis found around area of L hip, would recommend further follow up with primary care provider outpatient    Diagnosis: Weakness  Assessment and Plan of Treatment:

## 2020-08-06 NOTE — DISCHARGE NOTE PROVIDER - NSDCFUSCHEDAPPT_GEN_ALL_CORE_FT
DOROTHY SEGUNDO ; 09/09/2020 ; NPP Rad  Opd DOROTHY Merlos ; 09/10/2020 ; NPP Neurology [address] [facility] DOROTHY SEGUNDO ; 09/09/2020 ; NPP Rad  Opd DOROTHY Merlos ; 09/10/2020 ; NPP Neurology [address] [facility] DOROTHY SEGUNDO ; 09/09/2020 ; NPP Rad  Opd DOROTHY Merlos ; 09/10/2020 ; NPP Neurology [address] [facility]

## 2020-08-06 NOTE — PROGRESS NOTE ADULT - ASSESSMENT
Impression: Worsening L. hemiparesis secondary to R. hemispheric dysfunction --  Etiology uncertain, likely secondary to increased vasogenic edema in setting progression of GBM.     Plan:  MR head w/ and w/o contrast done, results pending  Decadron 8mg q8h  Continue Keppra 1g BID  PT/OT  Neuro-onc on board, possible initiation of lovenox and addition of avastin

## 2020-08-06 NOTE — PROGRESS NOTE ADULT - SUBJECTIVE AND OBJECTIVE BOX
Patient is a 79y old  Male who presents with a chief complaint of Worsening L. hemiparesis 2/2 GBM (06 Aug 2020 09:12)      SUBJECTIVE / OVERNIGHT EVENTS:  feels okay  worry about brain lesion  no cp, no sob, no n/v/d. no abdominal pain.  no headache, no dizziness.       Vital Signs Last 24 Hrs  T(C): 36 (06 Aug 2020 15:48), Max: 36.9 (05 Aug 2020 20:05)  T(F): 96.8 (06 Aug 2020 15:48), Max: 98.4 (05 Aug 2020 20:05)  HR: 71 (06 Aug 2020 15:48) (60 - 71)  BP: 133/72 (06 Aug 2020 15:48) (114/75 - 138/70)  BP(mean): --  RR: 20 (06 Aug 2020 15:48) (18 - 20)  SpO2: 97% (06 Aug 2020 15:48) (94% - 97%)  I&O's Summary    05 Aug 2020 07:01  -  06 Aug 2020 07:00  --------------------------------------------------------  IN: 690 mL / OUT: 2400 mL / NET: -1710 mL    06 Aug 2020 07:01  -  06 Aug 2020 16:34  --------------------------------------------------------  IN: 0 mL / OUT: 1400 mL / NET: -1400 mL        PHYSICAL EXAM:  GENERAL: NAD, Comfortable  HEAD:  Atraumatic, Normocephalic  EYES: EOMI, PERRLA, conjunctiva and sclera clear  NECK: Supple, No JVD  CHEST/LUNG: Clear to auscultation bilaterally; No wheeze  HEART: Regular rate and rhythm; No murmurs, rubs, or gallops  ABDOMEN: Soft, Nontender, Nondistended; Bowel sounds present  Neuro: AAOx3, left sided hemiparesis. left UE 1/5, LE 3/5. right UE/LE 5/5  EXTREMITIES:  2+ Peripheral Pulses, No clubbing, cyanosis, or edema  SKIN: No rashes or lesions      LABS:        141  |  105  |  23  ----------------------------<  148<H>  3.9   |  24  |  0.71    Ca    9.1      05 Aug 2020 05:46    TPro  5.8<L>  /  Alb  3.6  /  TBili  0.6  /  DBili  x   /  AST  21  /  ALT  39  /  AlkPhos  50        CAPILLARY BLOOD GLUCOSE      POCT Blood Glucose.: 300 mg/dL (06 Aug 2020 12:09)  POCT Blood Glucose.: 150 mg/dL (06 Aug 2020 08:39)        Urinalysis Basic - ( 06 Aug 2020 10:30 )    Color: Brown / Appearance: Clear / S.025 / pH: x  Gluc: x / Ketone: Negative  / Bili: Negative / Urobili: <2 mg/dL   Blood: x / Protein: 100 mg/dL / Nitrite: Negative   Leuk Esterase: Negative / RBC: >720 /HPF / WBC 4 /HPF   Sq Epi: x / Non Sq Epi: 0 /HPF / Bacteria: Negative        RADIOLOGY & ADDITIONAL TESTS:    Imaging Personally Reviewed:  [x] YES  [ ] NO    Consultant(s) Notes Reviewed:  [x] YES  [ ] NO      MEDICATIONS  (STANDING):  amLODIPine   Tablet 5 milliGRAM(s) Oral daily  aspirin enteric coated 81 milliGRAM(s) Oral daily  atorvastatin 20 milliGRAM(s) Oral at bedtime  dexAMETHasone  IVPB 8 milliGRAM(s) IV Intermittent every 8 hours  dextrose 5%. 1000 milliLiter(s) (50 mL/Hr) IV Continuous <Continuous>  dextrose 50% Injectable 12.5 Gram(s) IV Push once  dextrose 50% Injectable 25 Gram(s) IV Push once  dextrose 50% Injectable 25 Gram(s) IV Push once  enoxaparin Injectable 40 milliGRAM(s) SubCutaneous daily  furosemide    Tablet 40 milliGRAM(s) Oral daily  insulin lispro (HumaLOG) corrective regimen sliding scale   SubCutaneous three times a day before meals  levETIRAcetam 1000 milliGRAM(s) Oral two times a day  metoprolol succinate ER 25 milliGRAM(s) Oral daily  pantoprazole    Tablet 40 milliGRAM(s) Oral before breakfast  polyethylene glycol 3350 17 Gram(s) Oral daily  senna 1 Tablet(s) Oral at bedtime  sodium chloride 0.9%. 1000 milliLiter(s) (75 mL/Hr) IV Continuous <Continuous>  tamsulosin 0.4 milliGRAM(s) Oral at bedtime    MEDICATIONS  (PRN):  ALPRAZolam 0.25 milliGRAM(s) Oral every 8 hours PRN anxiety  dextrose 40% Gel 15 Gram(s) Oral once PRN Blood Glucose LESS THAN 70 milliGRAM(s)/deciliter  glucagon  Injectable 1 milliGRAM(s) IntraMuscular once PRN Glucose LESS THAN 70 milligrams/deciliter      Care Discussed with Consultants/Other Providers [x] YES  [ ] NO    HEALTH ISSUES - PROBLEM Dx:

## 2020-08-06 NOTE — CHART NOTE - NSCHARTNOTEFT_GEN_A_CORE
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  MET WITH INPATIENT NEUROLOGY TEAM AT PATIENT'S BEDSIDE  SPOKE WITH PATIENT REGARDING OPTIONS    patient has no new complaints.    MRI - 8/5/2020 - demonstrates moderate progression of enhancing disease around the original resection cavity, surrounded by marked cerebral edema.    EXAMINATION - AOx3. FOLLOWS ALL COMMANDS. dense left hemiplegia. No cranial nerve deficits.    IMPRESSION/PLAN  GLIOBLASTOMA -- Rapidly progressive since his recent surgery. May get some improvement as cerebral edema improves. Avastin could also lead to functional improvement. Given prior history of pulmonary embolism while receiving avastin, likely to start Lovenox 1.5mg/kg/day during such therapy.    LEFT HEMIPLEGIA -- Disabling and he now requires both specialty care and significant teaching on how to manage ADLs with such deficits. It will significantly complicate twice monthly trips to [facility] for Avastin. Greatly appreciate Physiatry input.    CEREBRAL EDEMA -- Would continue on decadron 8mg q8 through weekend for potential improvement.    DARK URINE -- likely to tolerate advancing diet, may benefit from IV fluids. Await urinalysis results to guide additional interventions.    ADVANCED DIRECTIVES -- Please have palliative Care meet with patient. I spoke with him regarding advanced directives on 8/5/2020.    DISPO -- I think a 2 week course of rehab to facilitate QOL issues appropriate.    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  MET WITH INPATIENT NEUROLOGY TEAM AT PATIENT'S BEDSIDE  SPOKE WITH PATIENT REGARDING OPTIONS    patient has no new complaints.    MRI - 8/5/2020 - demonstrates moderate progression of enhancing disease around the original resection cavity, surrounded by marked cerebral edema.    EXAMINATION - AOx3. FOLLOWS ALL COMMANDS. dense left hemiplegia. No cranial nerve deficits.    IMPRESSION/PLAN  GLIOBLASTOMA -- Rapidly progressive since his recent surgery. May get some improvement as cerebral edema improves. Avastin could also lead to functional improvement. Given prior history of pulmonary embolism while receiving avastin, likely to start Lovenox 1.5mg/kg/day during such therapy.    LEFT HEMIPLEGIA -- Disabling and he now requires both specialty care and significant teaching on how to manage ADLs with such deficits. It will significantly complicate twice monthly trips to [facility] for Avastin. Greatly appreciate Physiatry input.    CEREBRAL EDEMA -- Would continue on decadron 8mg q8 through weekend for potential improvement.    DARK URINE -- likely to tolerate advancing diet, may benefit from IV fluids. Await urinalysis results to guide additional interventions.    ADVANCED DIRECTIVES -- Please have palliative Care meet with patient. I spoke with him regarding advanced directives on 8/5/2020.    DISPO -- I think a 2 week course of rehab to facilitate QOL issues appropriate.    total time spent was 40 minutes, half in counselling and coordination of care. I spoke for 16 minutes with patient's wife and son, Nolberto, to provide an update. They would like to visit patient, will review COVID rules.

## 2020-08-06 NOTE — PROGRESS NOTE ADULT - ATTENDING COMMENTS
I agree with the resident's history, physical examination and assessment as outlined above with the following additions:    The patient's physical examination was significant for improved left foot dorsiflexion and knee flexion. Weakness still present with hip flexors and knee flexors on the left. No change in weakness of the LUE. Dr. Harman was present for rounds this morning. MRI brain significant for worsening cerebral edema, will continue high dose dexamethasone. Will discharge on 8mg PO q8h once stable. Patient will follow up with Dr. Harman for outpatient start of Avastin as an outpatient. Patient with dark urine this morning, UA with blood and protein, will consult Urology as patient is also retaining urine. Continue PT/OT, likely [facility] on discharge. Xray of the L hip shows questionable nonspecific sclerosis of femur head, and MRI is recommended if suspicious for avascular necrosis, will obtain MRI of the L hip.

## 2020-08-06 NOTE — CONSULT NOTE ADULT - SUBJECTIVE AND OBJECTIVE BOX
79 RHM w/ PMH of HTN, HLD, prostate cancer s/p prostectomy, basal cell skin cancer, GBM (dx in 2018, s/p resection 8/3/18, s/p 2nd resection 2/7/19, s/p 3rd resection 5/2020, s/p multiple does of Temodar) admitted to [facility] on 8/4/20 w/ worsening of his L. sided weakness.     Dr. Harman (neuro-onc) last saw the patient on 7/27/20, where he was noted to have worsening strength, lethargy and decreased appetite.  Pt had been tapering his steroids for over a month.  Recently tapered from 4mg QD --> 3mg QD ---> 2mg QD.  CTH 8/4/2020 revealed worsening vasogenic edema involving the R. temporal frontal parietal region which had increased when compared to previous CT in 5/2020.     Patient admitted to neurology floor, started on decadron 4mg q6h.  Steroid dose subsequently increased to 8mg q8h.     Pt was retaining urine and required Luna.  He was seen by uro who started flomax and bowel regimen, and TOV after 2d of Luna.    SLP performed swallow eval and recommended dysphagia 2 with honey thickened fluid was recommended, with all PO fluid in a left head rotation.    Pt was also seen by Dr. Harman (neuro onc) while admitted, who recommended MRI to help elucidate cause of worsening weakness (likely related to neoplasm, but possibly also due to abscess, radiation necrosis, or CVA).  If MRI reveals tumor, pt will likely resume Avastin as outpatient (despite prior PE while on this drug) with lovenox ppx. At the time of this note, MR head is completed but not yet read.    Dr. Harman has had [facility] conversations with the patient given the low likelihood of survival should he suffer cardiac or pulmonary arrest.  Dr. Harman recommends palliative evaluation.  At the time of this note, patient is full code.       Of note, patient was admitted to  rehab after his most recent resection in May 2020.  He was at [facility] from 6/4/20  to 6/17/20.  At the time of discharge from [facility] re[facility], patient was supervision for eating, grooming, and dressing, and contact guard for transfers.  He was able to ambulate 150' with SAC with CG/min assist and to navigate 12 steps with u/l HR with min assist.  SLP at [facility] noted higher level cognitive deficits with mild impulsivity and reduced insight, necessitating supervision while at home.      COVID PCR negative 8/4.      Patient seen and examined at bedside.     ---------------------------------------  For reference:     From Dr. 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, MGMT unmethylated   - completed chemoradiation - 8/31/18 - 10/12/18  - 11/12/18 - temozolomide cycle #1, dose 320mg (150mg/m2)   - 12/10/18 - temozolomide cycle #2, dose 400mg   - 12/10/18 - presented to ED s/p fall and unable to get up; imaging with increased cerebral edema   - 1/10/19 - temozolomide cycle #3, dose 400mg   - POD   - 2/7/19 - s/p re-resection ([facility]if); PATH: recurrent GBM, 30-40% necrosis   - 3/11/19 - temozolomide cycle #4, dose 400mg   - 3/13/19 - started Avastin infusions q2 weeks   - 4/11/19 - hospitalized with b/l PE   - 5/26/19 - temozolomide cycle #5, dose 400mg   - 6/23/19 - temozolomide cycle #6, dose 400mg   - 7/22/19 - temozolomide cycle #7, dose 400mg   - 8/26/19 - temozolomide cycle #8, dose 400mg  - 9/23/19 - temozolomide cycle #9, dose 400mg   - 10/21/19 - temozolomide cycle #10, dose 400mg   - 11/2019 - hospitalized with weakness and fall, treated for UTI  - 12/3/19 - temozolomide cycle #11, dose 400mg   - 1/2/20 - temozolomide cycle #12, dose 400mg   - 3/26/20 - back pain (old L3 injury in [employer]) COVID NEGATIVE  - 5/27/20 - s/p resection of small focus of recurrent disease; PATH: recurrent GBM, 5% necrosis  - 7/6/20 - TMZ rechallenge - cycle #13, dose 400mg (04 Aug 2020 16:32)    ------------------------------------      REVIEW OF SYSTEMS  Constitutional - No fever, No fatigue  HEENT - No visual disturbances, No difficulty hearing, No tinnitus, No vertigo  Respiratory - No cough, No wheezing, No shortness of breath  Cardiovascular - No chest pain, No palpitations  Gastrointestinal - No abdominal pain, No nausea, No vomiting, No diarrhea, No constipation  Genitourinary - No dysuria, No frequency, No hematuria, No incontinence  Neurological - No headaches, No memory loss, No loss of strength, No numbness  Skin -  No lesions   Musculoskeletal - No joint pain, No joint swelling, No muscle pain  Psychiatric - No depression, No anxiety    PAST MEDICAL & SURGICAL HISTORY  Fall, sequela  Bilateral hearing loss, unspecified hearing loss type   activity  Type 2 diabetes mellitus without complication, without long-term current use of insulin  Glioblastoma  Basal cell carcinoma (BCC) of left lower leg  Prostate cancer  HLD (hyperlipidemia)  HTN (hypertension)  S/P colonoscopic polypectomy  S/P tonsillectomy  H/O bilateral cataract extraction  S/P prostatectomy  S/P brain surgery  Lipoma of neck  Basal cell carcinoma (BCC) of lower leg  History of tonsillectomy  No significant past surgical history      SOCIAL HISTORY  Smoking - Denied  EtOH - Denied   Drugs - Denied    FUNCTIONAL HISTORY  children; spouse; Per pt report, lives in 2 story private home with spouse, adult son and daughter. no steps to enter, first floor bedroom/tub shower setup . has 3 grab bars and shower chair. Prior to admit, pt reports ambulating independently with straight cane house hold distances ~1 week before admitted to hospital. Pt reports his son and spouse would assist with ADLs.    CURRENT FUNCTIONAL STATUS as of 8/5  Bed mobility - mod assist x1 person  Transfers - mod assist x2 people  Gait - ~5 steps with mod assist of 2 people    FAMILY HISTORY   Family history of diabetes mellitus  Family history of prostate cancer in father  Family history of breast cancer in mother (Sibling)  Family history of lung cancer  No pertinent family history in first degree relatives      ALLERGIES  No Known Allergies      MEDICATIONS   ALPRAZolam 0.25 milliGRAM(s) Oral every 8 hours PRN  amLODIPine   Tablet 5 milliGRAM(s) Oral daily  aspirin enteric coated 81 milliGRAM(s) Oral daily  atorvastatin 20 milliGRAM(s) Oral at bedtime  dexAMETHasone  IVPB 24 milliGRAM(s) IV Intermittent once  dexAMETHasone  IVPB 8 milliGRAM(s) IV Intermittent every 8 hours  dextrose 40% Gel 15 Gram(s) Oral once PRN  dextrose 5%. 1000 milliLiter(s) IV Continuous <Continuous>  dextrose 50% Injectable 12.5 Gram(s) IV Push once  dextrose 50% Injectable 25 Gram(s) IV Push once  dextrose 50% Injectable 25 Gram(s) IV Push once  enoxaparin Injectable 40 milliGRAM(s) SubCutaneous daily  furosemide    Tablet 40 milliGRAM(s) Oral daily  glucagon  Injectable 1 milliGRAM(s) IntraMuscular once PRN  insulin lispro (HumaLOG) corrective regimen sliding scale   SubCutaneous three times a day before meals  levETIRAcetam 1000 milliGRAM(s) Oral two times a day  metoprolol succinate ER 25 milliGRAM(s) Oral daily  pantoprazole    Tablet 40 milliGRAM(s) Oral before breakfast  polyethylene glycol 3350 17 Gram(s) Oral daily  senna 1 Tablet(s) Oral at bedtime  sodium chloride 0.9%. 1000 milliLiter(s) IV Continuous <Continuous>  tamsulosin 0.4 milliGRAM(s) Oral at bedtime          VITALS  T(C): 36.4 (08-06-20 @ 08:03), Max: 36.9 (08-05-20 @ 20:05)  HR: 63 (08-06-20 @ 08:03) (58 - 74)  BP: 136/68 (08-06-20 @ 08:03) (114/75 - 152/76)  RR: 18 (08-06-20 @ 08:03) (18 - 18)  SpO2: 95% (08-06-20 @ 08:03) (94% - 97%)  Wt(kg): --    RECENT LABS/IMAGING  CBC Full  -  ( 04 Aug 2020 12:44 )  WBC Count : 8.99 K/uL  RBC Count : 3.77 M/uL  Hemoglobin : 12.2 g/dL  Hematocrit : 36.2 %  Platelet Count - Automated : 152 K/uL  Mean Cell Volume : 96.0 fl  Mean Cell Hemoglobin : 32.4 pg  Mean Cell Hemoglobin Concentration : 33.7 gm/dL  Auto Neutrophil # : 8.16 K/uL  Auto Lymphocyte # : 0.47 K/uL  Auto Monocyte # : 0.28 K/uL  Auto Eosinophil # : 0.01 K/uL  Auto Basophil # : 0.01 K/uL  Auto Neutrophil % : 90.8 %  Auto Lymphocyte % : 5.2 %  Auto Monocyte % : 3.1 %  Auto Eosinophil % : 0.1 %  Auto Basophil % : 0.1 %    08-05    141  |  105  |  23  ----------------------------<  148<H>  3.9   |  24  |  0.71    Ca    9.1      05 Aug 2020 05:46  Phos  3.1     08-04  Mg     2.2     08-04    TPro  5.8<L>  /  Alb  3.6  /  TBili  0.6  /  DBili  x   /  AST  21  /  ALT  39  /  AlkPhos  50  08-05      ----------------------------------------------------------------------------------------  PHYSICAL EXAM  Constitutional - NAD, Comfortable  HEENT - NCAT, EOMI  Neck - Supple, No limited ROM  Chest - Breathing comfortably, equal chest rise  Cardiovascular - well perfused  Abdomen - Soft   Extremities - No C/C/E, No calf tenderness   Neurologic Exam -                    Cognitive - Awake, Alert, AAO to self, place, date, year, situation     Communication - Fluent, No dysarthria     Cranial Nerves - CN 2-12 intact     Motor -                     LEFT    UE - ShAB 5/5, EF 5/5, EE 5/5, WE 5/5,  5/5                    RIGHT UE - ShAB 5/5, EF 5/5, EE 5/5, WE 5/5,  5/5                    LEFT    LE - HF 5/5, KE 5/5, DF 5/5, PF 5/5                    RIGHT LE - HF 5/5, KE 5/5, DF 5/5, PF 5/5        Sensory - Intact to LT     Reflexes - DTR Intact, No primitive reflexive     Coordination - FTN intact     OculoVestibular - No saccades, No nystagmus, VOR         Balance - WNL Static  Psychiatric - Mood stable, Affect WNL  ----------------------------------------------------------------------------------------  ASSESSMENT/PLAN  79y Male with functional deficits due to GBM  GBM - Continue steroids, keppra.  f/u MRI results. Further management per neuro onc.  SSI while on steroids.  Anxiety - continue xanax  HTN - continue amlodipine, metoprolol, lasix  Urinary retention - TOV when appropriate. Continue flomax, bowel regimen, mobility as tolerated  Pain - tylenol  PT/OT - daily therapies for ROM, increased mobility, and decreased debility related to hospitalization.  Balance, gait training, ADLs, transfers, and bracing/assistive devices as needed.    DVT PPX -   Rehab - PENDING EVALUATION   Recommend ACUTE inpatient rehabilitation for the functional deficits consisting of 3 hours of therapy/day & 24 hour RN/daily PMR physician for comorbid medical management. Will continue to follow for ongoing rehab needs and recommendations. 79 RHM w/ PMH of HTN, HLD, prostate cancer s/p prostatectomy, basal cell skin cancer, GBM (dx in 2018, s/p resection 8/3/18, s/p 2nd resection 2/7/19, s/p 3rd resection 5/2020, s/p multiple does of Temodar), admitted to [facility] on 8/4/20 w/ worsening left side weakness.     Dr. Harman (neuro-onc) last saw the patient on 7/27/20, at which point he was noted to have worsening strength, lethargy and decreased appetite.  Pt had been tapering his steroids for over a month.  Recently tapered from 4mg QD --> 3mg QD ---> 2mg QD.  Patient was referred to [facility] by neuro-onc for repeat imaging.  CTH 8/4/2020 revealed worsening vasogenic edema involving the R. temporal frontal parietal region which had increased when compared to previous CT in 5/2020.     Patient admitted to neurology floor, started on decadron 4mg q6h.  Steroid dose subsequently increased to 8mg q8h.     Pt was retaining urine and required Luna.  He was seen by uro who started flomax and bowel regimen, and TOV after 2d of Luna.    SLP performed swallow eval and recommended dysphagia 2 with honey thickened fluid was recommended, with all PO fluid in a left head rotation.    Pt was also seen by Dr. Harman (neuro onc) while admitted, who recommended MRI to help elucidate cause of worsening weakness.  MRI 8/5/20 demonstrated moderate progression of enhancing disease around the original resection cavity, surrounded by marked cerebral edema.  These results were discussed with Dr. Rivers (patient's neurosurgeon) who will review imaging himself.  Given these results, neuro-onc recommended resumption of avastin with lovenox (give pt's hx of PE while on avastin in the past) and continuation of decadron taper for edema.      XR pelvis on 8/6 (ordered for hip pain) did not show evidence of fracture or dislocation.    Per chart review, Dr. Harman has had C conversations with the patient given the low likelihood of survival should he suffer cardiac or pulmonary arrest.  Dr. Harman recommended palliative evaluation.  At the time of this note, patient is full code.       Of note, patient was admitted to  rehab after his most recent resection in May 2020.  He was at [facility] from 6/4/20  to 6/17/20.  At the time of discharge from  rehab, patient was supervision for eating, grooming, and dressing, and contact guard for transfers.  He was able to ambulate 150' with SAC with CG/min assist and to navigate 12 steps with u/l HR with min assist.  SLP at [facility] noted higher level cognitive deficits with mild impulsivity and reduced insight, necessitating supervision while at home.      COVID PCR negative 8/4.      Patient seen and examined at bedside.  He reports feeling physically well, but he is concerned about how much support he will require from his family given the marked progression of his weakness.      ---------------------------------------  For reference:     From Dr. 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, MGMT unmethylated   - completed chemoradiation - 8/31/18 - 10/12/18  - 11/12/18 - temozolomide cycle #1, dose 320mg (150mg/m2)   - 12/10/18 - temozolomide cycle #2, dose 400mg   - 12/10/18 - presented to ED s/p fall and unable to get up; imaging with increased cerebral edema   - 1/10/19 - temozolomide cycle #3, dose 400mg   - POD   - 2/7/19 - s/p re-resection ([facility]); PATH: recurrent GBM, 30-40% necrosis   - 3/11/19 - temozolomide cycle #4, dose 400mg   - 3/13/19 - started Avastin infusions q2 weeks   - 4/11/19 - hospitalized with b/l PE   - 5/26/19 - temozolomide cycle #5, dose 400mg   - 6/23/19 - temozolomide cycle #6, dose 400mg   - 7/22/19 - temozolomide cycle #7, dose 400mg   - 8/26/19 - temozolomide cycle #8, dose 400mg  - 9/23/19 - temozolomide cycle #9, dose 400mg   - 10/21/19 - temozolomide cycle #10, dose 400mg   - 11/2019 - hospitalized with weakness and fall, treated for UTI  - 12/3/19 - temozolomide cycle #11, dose 400mg   - 1/2/20 - temozolomide cycle #12, dose 400mg   - 3/26/20 - back pain (old L3 injury in [employer]) COVID NEGATIVE  - 5/27/20 - s/p resection of small focus of recurrent disease; PATH: recurrent GBM, 5% necrosis  - 7/6/20 - TMZ rechallenge - cycle #13, dose 400mg (04 Aug 2020 16:32)    ------------------------------------      REVIEW OF SYSTEMS  Constitutional - No fever, No fatigue  HEENT - No visual disturbances, No difficulty hearing, No tinnitus, No vertigo  Respiratory - No cough, No wheezing, No shortness of breath  Cardiovascular - No chest pain, No palpitations  Gastrointestinal - No abdominal pain, No nausea, No vomiting, No diarrhea, +constipation  Genitourinary - +retention  Neurological - No headaches, +memory loss, +loss of strength, No numbness  Skin -  No lesions   Musculoskeletal - No joint pain, No joint swelling, No muscle pain  Psychiatric - +depression, +anxiety    PAST MEDICAL & SURGICAL HISTORY  Fall, sequela  Bilateral hearing loss, unspecified hearing loss type   activity  Type 2 diabetes mellitus without complication, without long-term current use of insulin  Glioblastoma  Basal cell carcinoma (BCC) of left lower leg  Prostate cancer  HLD (hyperlipidemia)  HTN (hypertension)  S/P colonoscopic polypectomy  S/P tonsillectomy  H/O bilateral cataract extraction  S/P prostatectomy  S/P brain surgery  Lipoma of neck  Basal cell carcinoma (BCC) of lower leg  History of tonsillectomy  No significant past surgical history      SOCIAL HISTORY  Smoking - Denied  EtOH - Denied   Drugs - Denied    FUNCTIONAL HISTORY  Per pt report, lives in 2 story private home with spouse, adult son and daughter. no steps to enter, first floor bedroom/tub shower setup . has 3 grab bars and shower chair. Prior to current admission, pt reports ambulating independently with straight cane household distances. Pt reports his son and spouse would assist with ADLs.    CURRENT FUNCTIONAL STATUS as of 8/5  Bed mobility - mod assist x1 person  Transfers - mod assist x2 people  Gait - ~5 steps with mod assist of 2 people    FAMILY HISTORY   Family history of diabetes mellitus  Family history of prostate cancer in father  Family history of breast cancer in mother (Sibling)  Family history of lung cancer  No pertinent family history in first degree relatives      ALLERGIES  No Known Allergies      MEDICATIONS   ALPRAZolam 0.25 milliGRAM(s) Oral every 8 hours PRN  amLODIPine   Tablet 5 milliGRAM(s) Oral daily  aspirin enteric coated 81 milliGRAM(s) Oral daily  atorvastatin 20 milliGRAM(s) Oral at bedtime  dexAMETHasone  IVPB 24 milliGRAM(s) IV Intermittent once  dexAMETHasone  IVPB 8 milliGRAM(s) IV Intermittent every 8 hours  dextrose 40% Gel 15 Gram(s) Oral once PRN  dextrose 5%. 1000 milliLiter(s) IV Continuous <Continuous>  dextrose 50% Injectable 12.5 Gram(s) IV Push once  dextrose 50% Injectable 25 Gram(s) IV Push once  dextrose 50% Injectable 25 Gram(s) IV Push once  enoxaparin Injectable 40 milliGRAM(s) SubCutaneous daily  furosemide    Tablet 40 milliGRAM(s) Oral daily  glucagon  Injectable 1 milliGRAM(s) IntraMuscular once PRN  insulin lispro (HumaLOG) corrective regimen sliding scale   SubCutaneous three times a day before meals  levETIRAcetam 1000 milliGRAM(s) Oral two times a day  metoprolol succinate ER 25 milliGRAM(s) Oral daily  pantoprazole    Tablet 40 milliGRAM(s) Oral before breakfast  polyethylene glycol 3350 17 Gram(s) Oral daily  senna 1 Tablet(s) Oral at bedtime  sodium chloride 0.9%. 1000 milliLiter(s) IV Continuous <Continuous>  tamsulosin 0.4 milliGRAM(s) Oral at bedtime          VITALS  T(C): 36.4 (08-06-20 @ 08:03), Max: 36.9 (08-05-20 @ 20:05)  HR: 63 (08-06-20 @ 08:03) (58 - 74)  BP: 136/68 (08-06-20 @ 08:03) (114/75 - 152/76)  RR: 18 (08-06-20 @ 08:03) (18 - 18)  SpO2: 95% (08-06-20 @ 08:03) (94% - 97%)  Wt(kg): --    RECENT LABS/IMAGING  CBC Full  -  ( 04 Aug 2020 12:44 )  WBC Count : 8.99 K/uL  RBC Count : 3.77 M/uL  Hemoglobin : 12.2 g/dL  Hematocrit : 36.2 %  Platelet Count - Automated : 152 K/uL  Mean Cell Volume : 96.0 fl  Mean Cell Hemoglobin : 32.4 pg  Mean Cell Hemoglobin Concentration : 33.7 gm/dL  Auto Neutrophil # : 8.16 K/uL  Auto Lymphocyte # : 0.47 K/uL  Auto Monocyte # : 0.28 K/uL  Auto Eosinophil # : 0.01 K/uL  Auto Basophil # : 0.01 K/uL  Auto Neutrophil % : 90.8 %  Auto Lymphocyte % : 5.2 %  Auto Monocyte % : 3.1 %  Auto Eosinophil % : 0.1 %  Auto Basophil % : 0.1 %    08-05    141  |  105  |  23  ----------------------------<  148<H>  3.9   |  24  |  0.71    Ca    9.1      05 Aug 2020 05:46  Phos  3.1     08-04  Mg     2.2     08-04    TPro  5.8<L>  /  Alb  3.6  /  TBili  0.6  /  DBili  x   /  AST  21  /  ALT  39  /  AlkPhos  50  08-05      ----------------------------------------------------------------------------------------  PHYSICAL EXAM  Constitutional - NAD, Comfortable  HEENT - NCAT, EOMI  Neck - Supple, No limited ROM  Chest - Breathing comfortably, equal chest rise  Cardiovascular - well perfused  Abdomen - Soft   Extremities - No C/C.  LLE with 2+ pitting edema to level of knee. No calf tenderness   Neurologic Exam -                    Cognitive - Awake, Alert, AAO to self, place, date, year, situation     Communication - Fluent, No dysarthria     Cranial Nerves - CN 2-12 intact     Motor -                     LEFT    UE - ShAB 2/5, EF 1/5, EE 2/5, WE 1/5,  1/5                    RIGHT UE - ShAB 5/5, EF 5/5, EE 5/5, WE 5/5,  5/5                    LEFT    LE - HF 2/5, KE 2/5, DF 0/5, PF3/5                    RIGHT LE - HF 5/5, KE 5/5, DF 5/5, PF 5/5        Sensory - hypersensitivity to LUE/LLE     Reflexes - DTR Intact, No primitive reflexive  Psychiatric - Mood stable, occasionally tearful during exam when discussing future plans.  ----------------------------------------------------------------------------------------  ASSESSMENT/PLAN  79y Male with functional deficits due to GBM  GBM - Continue steroids, keppra.  Avastin/lovenox per neuro-onc. SSI while on steroids. Given disease progression and prognosis, would encourage palliative consult/formal GOC conversation so that patient and his family have the opportunity to start considering his priorities and goals going forward.   Anxiety - continue xanax  HTN - continue amlodipine, metoprolol, lasix  LLE swelling - doppler negative. likely 2/2 hemiparesis. Elevate extremity. Can consider compression stocking if patient tolerates.   Urinary retention - TOV/SC PRN. Continue flomax, bowel regimen, mobility as tolerated  Pain - tylenol  PT/OT - daily therapies for ROM, increased mobility, and decreased debility related to hospitalization.  Balance, gait training, ADLs, transfers, and bracing/assistive devices as needed.    DVT PPX - lovenox  Rehab - When medically stable and GBM plan has been decided upon, recommend ACUTE inpatient rehabilitation for the functional deficits consisting of 3 hours of therapy/day & 24 hour RN/daily PMR physician for comorbid medical management. Will continue to follow for ongoing rehab needs and recommendations.

## 2020-08-07 ENCOUNTER — TRANSCRIPTION ENCOUNTER (OUTPATIENT)
Age: 80
End: 2020-08-07

## 2020-08-07 ENCOUNTER — INPATIENT (INPATIENT)
Facility: HOSPITAL | Age: 80
LOS: 27 days | Discharge: ANOTHER HOSPITAL | DRG: 949 | End: 2020-09-04
Attending: STUDENT IN AN ORGANIZED HEALTH CARE EDUCATION/TRAINING PROGRAM | Admitting: STUDENT IN AN ORGANIZED HEALTH CARE EDUCATION/TRAINING PROGRAM
Payer: MEDICARE

## 2020-08-07 VITALS
RESPIRATION RATE: 18 BRPM | TEMPERATURE: 98 F | HEART RATE: 64 BPM | DIASTOLIC BLOOD PRESSURE: 64 MMHG | SYSTOLIC BLOOD PRESSURE: 126 MMHG | OXYGEN SATURATION: 98 %

## 2020-08-07 VITALS
RESPIRATION RATE: 16 BRPM | HEART RATE: 61 BPM | WEIGHT: 189.82 LBS | SYSTOLIC BLOOD PRESSURE: 160 MMHG | DIASTOLIC BLOOD PRESSURE: 60 MMHG | OXYGEN SATURATION: 96 % | TEMPERATURE: 98 F

## 2020-08-07 DIAGNOSIS — C71.9 MALIGNANT NEOPLASM OF BRAIN, UNSPECIFIED: ICD-10-CM

## 2020-08-07 DIAGNOSIS — Z98.41 CATARACT EXTRACTION STATUS, RIGHT EYE: Chronic | ICD-10-CM

## 2020-08-07 DIAGNOSIS — Z90.89 ACQUIRED ABSENCE OF OTHER ORGANS: Chronic | ICD-10-CM

## 2020-08-07 DIAGNOSIS — C44.711 BASAL CELL CARCINOMA OF SKIN OF UNSPECIFIED LOWER LIMB, INCLUDING HIP: Chronic | ICD-10-CM

## 2020-08-07 DIAGNOSIS — D17.0 BENIGN LIPOMATOUS NEOPLASM OF SKIN AND SUBCUTANEOUS TISSUE OF HEAD, FACE AND NECK: Chronic | ICD-10-CM

## 2020-08-07 DIAGNOSIS — Z90.79 ACQUIRED ABSENCE OF OTHER GENITAL ORGAN(S): Chronic | ICD-10-CM

## 2020-08-07 DIAGNOSIS — Z98.890 OTHER SPECIFIED POSTPROCEDURAL STATES: Chronic | ICD-10-CM

## 2020-08-07 LAB
A1C WITH ESTIMATED AVERAGE GLUCOSE RESULT: 5.6 % — SIGNIFICANT CHANGE UP (ref 4–5.6)
ESTIMATED AVERAGE GLUCOSE: 114 MG/DL — SIGNIFICANT CHANGE UP (ref 68–114)
GLUCOSE BLDC GLUCOMTR-MCNC: 139 MG/DL — HIGH (ref 70–99)
GLUCOSE BLDC GLUCOMTR-MCNC: 150 MG/DL — HIGH (ref 70–99)
GLUCOSE BLDC GLUCOMTR-MCNC: 153 MG/DL — HIGH (ref 70–99)
GLUCOSE BLDC GLUCOMTR-MCNC: 207 MG/DL — HIGH (ref 70–99)

## 2020-08-07 PROCEDURE — 81001 URINALYSIS AUTO W/SCOPE: CPT

## 2020-08-07 PROCEDURE — 84132 ASSAY OF SERUM POTASSIUM: CPT

## 2020-08-07 PROCEDURE — 97166 OT EVAL MOD COMPLEX 45 MIN: CPT

## 2020-08-07 PROCEDURE — 82607 VITAMIN B-12: CPT

## 2020-08-07 PROCEDURE — 84480 ASSAY TRIIODOTHYRONINE (T3): CPT

## 2020-08-07 PROCEDURE — U0003: CPT

## 2020-08-07 PROCEDURE — 70450 CT HEAD/BRAIN W/O DYE: CPT

## 2020-08-07 PROCEDURE — 84100 ASSAY OF PHOSPHORUS: CPT

## 2020-08-07 PROCEDURE — 82803 BLOOD GASES ANY COMBINATION: CPT

## 2020-08-07 PROCEDURE — 82330 ASSAY OF CALCIUM: CPT

## 2020-08-07 PROCEDURE — 85027 COMPLETE CBC AUTOMATED: CPT

## 2020-08-07 PROCEDURE — 99285 EMERGENCY DEPT VISIT HI MDM: CPT | Mod: 25

## 2020-08-07 PROCEDURE — 93971 EXTREMITY STUDY: CPT

## 2020-08-07 PROCEDURE — 82962 GLUCOSE BLOOD TEST: CPT

## 2020-08-07 PROCEDURE — 99239 HOSP IP/OBS DSCHRG MGMT >30: CPT

## 2020-08-07 PROCEDURE — 70553 MRI BRAIN STEM W/O & W/DYE: CPT

## 2020-08-07 PROCEDURE — 97162 PT EVAL MOD COMPLEX 30 MIN: CPT

## 2020-08-07 PROCEDURE — 73521 X-RAY EXAM HIPS BI 2 VIEWS: CPT

## 2020-08-07 PROCEDURE — 86769 SARS-COV-2 COVID-19 ANTIBODY: CPT

## 2020-08-07 PROCEDURE — 72197 MRI PELVIS W/O & W/DYE: CPT

## 2020-08-07 PROCEDURE — A9585: CPT

## 2020-08-07 PROCEDURE — 83605 ASSAY OF LACTIC ACID: CPT

## 2020-08-07 PROCEDURE — 84443 ASSAY THYROID STIM HORMONE: CPT

## 2020-08-07 PROCEDURE — 82435 ASSAY OF BLOOD CHLORIDE: CPT

## 2020-08-07 PROCEDURE — 84436 ASSAY OF TOTAL THYROXINE: CPT

## 2020-08-07 PROCEDURE — 92610 EVALUATE SWALLOWING FUNCTION: CPT

## 2020-08-07 PROCEDURE — 92526 ORAL FUNCTION THERAPY: CPT

## 2020-08-07 PROCEDURE — 82947 ASSAY GLUCOSE BLOOD QUANT: CPT

## 2020-08-07 PROCEDURE — 84295 ASSAY OF SERUM SODIUM: CPT

## 2020-08-07 PROCEDURE — 85014 HEMATOCRIT: CPT

## 2020-08-07 PROCEDURE — 71045 X-RAY EXAM CHEST 1 VIEW: CPT

## 2020-08-07 PROCEDURE — 80053 COMPREHEN METABOLIC PANEL: CPT

## 2020-08-07 PROCEDURE — 83036 HEMOGLOBIN GLYCOSYLATED A1C: CPT

## 2020-08-07 PROCEDURE — 83735 ASSAY OF MAGNESIUM: CPT

## 2020-08-07 PROCEDURE — 99233 SBSQ HOSP IP/OBS HIGH 50: CPT

## 2020-08-07 RX ORDER — ACETAMINOPHEN 500 MG
650 TABLET ORAL EVERY 6 HOURS
Refills: 0 | Status: DISCONTINUED | OUTPATIENT
Start: 2020-08-07 | End: 2020-09-04

## 2020-08-07 RX ORDER — DEXTROSE 50 % IN WATER 50 %
25 SYRINGE (ML) INTRAVENOUS ONCE
Refills: 0 | Status: DISCONTINUED | OUTPATIENT
Start: 2020-08-07 | End: 2020-09-03

## 2020-08-07 RX ORDER — DEXAMETHASONE 0.5 MG/5ML
8 ELIXIR ORAL EVERY 8 HOURS
Refills: 0 | Status: DISCONTINUED | OUTPATIENT
Start: 2020-08-07 | End: 2020-08-10

## 2020-08-07 RX ORDER — ATORVASTATIN CALCIUM 80 MG/1
20 TABLET, FILM COATED ORAL AT BEDTIME
Refills: 0 | Status: DISCONTINUED | OUTPATIENT
Start: 2020-08-07 | End: 2020-09-04

## 2020-08-07 RX ORDER — LANOLIN ALCOHOL/MO/W.PET/CERES
3 CREAM (GRAM) TOPICAL AT BEDTIME
Refills: 0 | Status: DISCONTINUED | OUTPATIENT
Start: 2020-08-07 | End: 2020-09-04

## 2020-08-07 RX ORDER — LANOLIN ALCOHOL/MO/W.PET/CERES
1 CREAM (GRAM) TOPICAL
Qty: 0 | Refills: 0 | DISCHARGE
Start: 2020-08-07

## 2020-08-07 RX ORDER — ASPIRIN/CALCIUM CARB/MAGNESIUM 324 MG
81 TABLET ORAL DAILY
Refills: 0 | Status: DISCONTINUED | OUTPATIENT
Start: 2020-08-07 | End: 2020-09-04

## 2020-08-07 RX ORDER — ENOXAPARIN SODIUM 100 MG/ML
40 INJECTION SUBCUTANEOUS DAILY
Refills: 0 | Status: DISCONTINUED | OUTPATIENT
Start: 2020-08-07 | End: 2020-09-04

## 2020-08-07 RX ORDER — FUROSEMIDE 40 MG
1 TABLET ORAL
Qty: 0 | Refills: 0 | DISCHARGE
Start: 2020-08-07

## 2020-08-07 RX ORDER — PANTOPRAZOLE SODIUM 20 MG/1
40 TABLET, DELAYED RELEASE ORAL
Refills: 0 | Status: DISCONTINUED | OUTPATIENT
Start: 2020-08-07 | End: 2020-09-04

## 2020-08-07 RX ORDER — AMLODIPINE BESYLATE 2.5 MG/1
1 TABLET ORAL
Qty: 0 | Refills: 0 | DISCHARGE
Start: 2020-08-07

## 2020-08-07 RX ORDER — DEXAMETHASONE 0.5 MG/5ML
8 ELIXIR ORAL
Qty: 0 | Refills: 0 | DISCHARGE
Start: 2020-08-07

## 2020-08-07 RX ORDER — ASPIRIN/CALCIUM CARB/MAGNESIUM 324 MG
1 TABLET ORAL
Qty: 0 | Refills: 0 | DISCHARGE
Start: 2020-08-07

## 2020-08-07 RX ORDER — SODIUM CHLORIDE 9 MG/ML
1000 INJECTION, SOLUTION INTRAVENOUS
Refills: 0 | Status: DISCONTINUED | OUTPATIENT
Start: 2020-08-07 | End: 2020-09-03

## 2020-08-07 RX ORDER — AMLODIPINE BESYLATE 2.5 MG/1
5 TABLET ORAL DAILY
Refills: 0 | Status: DISCONTINUED | OUTPATIENT
Start: 2020-08-07 | End: 2020-08-14

## 2020-08-07 RX ORDER — POLYETHYLENE GLYCOL 3350 17 G/17G
17 POWDER, FOR SOLUTION ORAL DAILY
Refills: 0 | Status: DISCONTINUED | OUTPATIENT
Start: 2020-08-07 | End: 2020-08-09

## 2020-08-07 RX ORDER — LANOLIN ALCOHOL/MO/W.PET/CERES
3 CREAM (GRAM) TOPICAL AT BEDTIME
Refills: 0 | Status: DISCONTINUED | OUTPATIENT
Start: 2020-08-07 | End: 2020-08-07

## 2020-08-07 RX ORDER — DEXTROSE 50 % IN WATER 50 %
12.5 SYRINGE (ML) INTRAVENOUS ONCE
Refills: 0 | Status: DISCONTINUED | OUTPATIENT
Start: 2020-08-07 | End: 2020-09-03

## 2020-08-07 RX ORDER — BENZOCAINE AND MENTHOL 5; 1 G/100ML; G/100ML
1 LIQUID ORAL
Refills: 0 | Status: DISCONTINUED | OUTPATIENT
Start: 2020-08-07 | End: 2020-09-04

## 2020-08-07 RX ORDER — INSULIN LISPRO 100/ML
VIAL (ML) SUBCUTANEOUS AT BEDTIME
Refills: 0 | Status: DISCONTINUED | OUTPATIENT
Start: 2020-08-07 | End: 2020-09-03

## 2020-08-07 RX ORDER — INSULIN LISPRO 100/ML
VIAL (ML) SUBCUTANEOUS
Refills: 0 | Status: DISCONTINUED | OUTPATIENT
Start: 2020-08-07 | End: 2020-08-20

## 2020-08-07 RX ORDER — ATORVASTATIN CALCIUM 80 MG/1
1 TABLET, FILM COATED ORAL
Qty: 0 | Refills: 0 | DISCHARGE
Start: 2020-08-07

## 2020-08-07 RX ORDER — SENNA PLUS 8.6 MG/1
1 TABLET ORAL
Qty: 0 | Refills: 0 | DISCHARGE
Start: 2020-08-07

## 2020-08-07 RX ORDER — GLUCAGON INJECTION, SOLUTION 0.5 MG/.1ML
1 INJECTION, SOLUTION SUBCUTANEOUS ONCE
Refills: 0 | Status: DISCONTINUED | OUTPATIENT
Start: 2020-08-07 | End: 2020-09-04

## 2020-08-07 RX ORDER — FUROSEMIDE 40 MG
40 TABLET ORAL DAILY
Refills: 0 | Status: DISCONTINUED | OUTPATIENT
Start: 2020-08-07 | End: 2020-09-04

## 2020-08-07 RX ORDER — METOPROLOL TARTRATE 50 MG
1 TABLET ORAL
Qty: 0 | Refills: 0 | DISCHARGE
Start: 2020-08-07

## 2020-08-07 RX ORDER — DOCUSATE SODIUM 100 MG
100 CAPSULE ORAL DAILY
Refills: 0 | Status: DISCONTINUED | OUTPATIENT
Start: 2020-08-07 | End: 2020-09-04

## 2020-08-07 RX ORDER — POLYETHYLENE GLYCOL 3350 17 G/17G
17 POWDER, FOR SOLUTION ORAL
Qty: 0 | Refills: 0 | DISCHARGE
Start: 2020-08-07

## 2020-08-07 RX ORDER — SENNA PLUS 8.6 MG/1
2 TABLET ORAL AT BEDTIME
Refills: 0 | Status: DISCONTINUED | OUTPATIENT
Start: 2020-08-07 | End: 2020-08-09

## 2020-08-07 RX ORDER — METOPROLOL TARTRATE 50 MG
25 TABLET ORAL DAILY
Refills: 0 | Status: DISCONTINUED | OUTPATIENT
Start: 2020-08-07 | End: 2020-09-04

## 2020-08-07 RX ORDER — DEXTROSE 50 % IN WATER 50 %
15 SYRINGE (ML) INTRAVENOUS ONCE
Refills: 0 | Status: DISCONTINUED | OUTPATIENT
Start: 2020-08-07 | End: 2020-09-03

## 2020-08-07 RX ORDER — ALPRAZOLAM 0.25 MG
0.25 TABLET ORAL EVERY 8 HOURS
Refills: 0 | Status: DISCONTINUED | OUTPATIENT
Start: 2020-08-07 | End: 2020-08-13

## 2020-08-07 RX ORDER — LEVETIRACETAM 250 MG/1
1 TABLET, FILM COATED ORAL
Qty: 0 | Refills: 0 | DISCHARGE
Start: 2020-08-07

## 2020-08-07 RX ORDER — TAMSULOSIN HYDROCHLORIDE 0.4 MG/1
0.4 CAPSULE ORAL AT BEDTIME
Refills: 0 | Status: DISCONTINUED | OUTPATIENT
Start: 2020-08-07 | End: 2020-08-09

## 2020-08-07 RX ORDER — LEVETIRACETAM 250 MG/1
1000 TABLET, FILM COATED ORAL
Refills: 0 | Status: DISCONTINUED | OUTPATIENT
Start: 2020-08-07 | End: 2020-09-04

## 2020-08-07 RX ADMIN — Medication 8 MILLIGRAM(S): at 22:09

## 2020-08-07 RX ADMIN — Medication 101.6 MILLIGRAM(S): at 05:57

## 2020-08-07 RX ADMIN — Medication 81 MILLIGRAM(S): at 11:26

## 2020-08-07 RX ADMIN — Medication 4: at 13:04

## 2020-08-07 RX ADMIN — LEVETIRACETAM 1000 MILLIGRAM(S): 250 TABLET, FILM COATED ORAL at 18:54

## 2020-08-07 RX ADMIN — LEVETIRACETAM 1000 MILLIGRAM(S): 250 TABLET, FILM COATED ORAL at 05:56

## 2020-08-07 RX ADMIN — PANTOPRAZOLE SODIUM 40 MILLIGRAM(S): 20 TABLET, DELAYED RELEASE ORAL at 05:57

## 2020-08-07 RX ADMIN — TAMSULOSIN HYDROCHLORIDE 0.4 MILLIGRAM(S): 0.4 CAPSULE ORAL at 22:10

## 2020-08-07 RX ADMIN — Medication 40 MILLIGRAM(S): at 05:57

## 2020-08-07 RX ADMIN — Medication 101.6 MILLIGRAM(S): at 13:07

## 2020-08-07 RX ADMIN — BENZOCAINE AND MENTHOL 1 LOZENGE: 5; 1 LIQUID ORAL at 22:32

## 2020-08-07 RX ADMIN — ENOXAPARIN SODIUM 40 MILLIGRAM(S): 100 INJECTION SUBCUTANEOUS at 11:26

## 2020-08-07 RX ADMIN — ATORVASTATIN CALCIUM 20 MILLIGRAM(S): 80 TABLET, FILM COATED ORAL at 22:09

## 2020-08-07 RX ADMIN — Medication 3 MILLIGRAM(S): at 22:09

## 2020-08-07 RX ADMIN — Medication 25 MILLIGRAM(S): at 05:56

## 2020-08-07 RX ADMIN — AMLODIPINE BESYLATE 5 MILLIGRAM(S): 2.5 TABLET ORAL at 05:57

## 2020-08-07 NOTE — H&P ADULT - HISTORY OF PRESENT ILLNESS
79 RHM w/ PMH of HTN, HLD, prostate cancer s/p prostectomy, basal cell skin cancer, GBM (dx in 2018, s/p resection 8/3/18, s/p 2nd resection 2/7/19, s/p 3rd resection 5/2020 s/p multiple does of Temodar admitted to [facility] on 8/4/20 w/ worsening of his L. sided weakness. No other complaints. Denied changes in speech, vision, hearing, swallowing, voice quality, numbness/tingling,  balance/room-spinning sensations.    He was seen in Dr. Harman's office on 7/27/20 where he was noted to have worsening strength, lethargy and decreased appetitie. He had been tapering his steroids for over a month. Recently tapered from 4mg QD --> 3mg QD ---> 2mg QD.    CT Head on admission 8.4.2020, showed- Worsening vasogenic edema involving the R. temporal frontal parietal region again identified increased when compared to previous CT in 5/2020.     Patient admitted to neurology floor, started on decadron 4mg q6h later increased to 8mg q8h. MR head w/ and w/o contrast with progression of disease, neuro-onc on board, patient to follow up outpatient for initiation of avastin therapy.   Patient also with urinary retention, urology consulted, suspect urinary retention is neurologic in etiology, on clean intermittent catheterization; further followup to continue at rehab.   Patient stable for discharge to acute rehab 8/7/20.     Of note--patient was admitted to  rehab after his most recent resection in May 2020.  He was at [facility] from 6/4/20  to 6/17/20.  At the time of discharge from  rehab, patient was supervision for eating, grooming, and dressing, and contact guard for transfers.  He was able to ambulate 150' with SAC with CG/min assist and to navigate 12 steps with u/l HR with min assist.  SLP at [facility] noted higher level cognitive deficits with mild impulsivity and reduced insight, necessitating supervision while at home.

## 2020-08-07 NOTE — PROGRESS NOTE ADULT - REASON FOR ADMISSION
Worsening L. hemiparesis 2/2 GBM

## 2020-08-07 NOTE — PROGRESS NOTE ADULT - ASSESSMENT
Impression: Worsening L. hemiparesis secondary to R. hemispheric dysfunction --  Etiology uncertain, likely secondary to increased vasogenic edema in setting progression of GBM.     Plan:  Urology onboard for urinary retention  Decadron 8mg q8h  Continue Keppra 1g BID  PT/OT  Neuro-onc on board, possible initiation of lovenox and addition of avastin

## 2020-08-07 NOTE — PROGRESS NOTE ADULT - SUBJECTIVE AND OBJECTIVE BOX
patient easily upset, concerned about regrowth of tumor. Some improvement in left side weakness    REVIEW OF SYSTEMS  Constitutional - No fever,  No fatigue  HEENT - No vertigo, No neck pain  Neurological - No headaches, No memory loss, + loss of strength, No numbness, No tremors  Skin - No rashes, No lesions   Musculoskeletal - +LLE edema   Psychiatric - + depression, No anxiety    FUNCTIONAL PROGRESS   SLP    Pt seen for follow-up assessment on this date. Per RUSSELL Esparza, pt complaining about nectar thick liquids. Pt provided with continuing education re: findings and recommendations of previous evaluation, and risks associated with aspiration. Pt stated understanding, however also stated he "doesn't need the thick liquid" and he "doesn't want to continue with something he doesn't need". Pt trialed with thin liquids, and continues to demonstrate intermittent overt s/s of laryngeal penetration/aspiration (wet vocal quality, throat clear). No overt s/s of laryngeal penetration/aspiration noted with nectar thick liquids. Case discussed with MD Hawthorne. Recommendations at this time are to continue dysphagia 3 diet with nectar thick liquids, and skilled ST services at next level of care for dysphagia tx.         VITALS  T(C): 36.8 (20 @ 08:43), Max: 36.8 (20 @ 08:43)  HR: 60 (20 @ 08:43) (60 - 74)  BP: 132/61 (20 @ 08:43) (124/76 - 148/84)  RR: 17 (20 @ 08:43) (16 - 20)  SpO2: 96% (20 @ 08:43) (94% - 100%)  Wt(kg): --    MEDICATIONS   ALPRAZolam 0.25 milliGRAM(s) every 8 hours PRN  amLODIPine   Tablet 5 milliGRAM(s) daily  aspirin enteric coated 81 milliGRAM(s) daily  atorvastatin 20 milliGRAM(s) at bedtime  dexAMETHasone  IVPB 8 milliGRAM(s) every 8 hours  dextrose 40% Gel 15 Gram(s) once PRN  dextrose 5%. 1000 milliLiter(s) <Continuous>  dextrose 50% Injectable 12.5 Gram(s) once  dextrose 50% Injectable 25 Gram(s) once  dextrose 50% Injectable 25 Gram(s) once  enoxaparin Injectable 40 milliGRAM(s) daily  furosemide    Tablet 40 milliGRAM(s) daily  glucagon  Injectable 1 milliGRAM(s) once PRN  insulin lispro (HumaLOG) corrective regimen sliding scale   at bedtime  insulin lispro (HumaLOG) corrective regimen sliding scale   three times a day before meals  levETIRAcetam 1000 milliGRAM(s) two times a day  melatonin 3 milliGRAM(s) at bedtime  metoprolol succinate ER 25 milliGRAM(s) daily  pantoprazole    Tablet 40 milliGRAM(s) before breakfast  polyethylene glycol 3350 17 Gram(s) daily  senna 1 Tablet(s) at bedtime  sodium chloride 0.9%. 1000 milliLiter(s) <Continuous>  tamsulosin 0.4 milliGRAM(s) at bedtime      RECENT LABS - Reviewed            Urinalysis Basic - ( 06 Aug 2020 10:30 )    Color: Brown / Appearance: Clear / S.025 / pH: x  Gluc: x / Ketone: Negative  / Bili: Negative / Urobili: <2 mg/dL   Blood: x / Protein: 100 mg/dL / Nitrite: Negative   Leuk Esterase: Negative / RBC: >720 /HPF / WBC 4 /HPF   Sq Epi: x / Non Sq Epi: 0 /HPF / Bacteria: Negative    ---------------------------------------------------------------------------------------  PHYSICAL EXAM  Constitutional - NAD, Comfortable, in bed   HEENT - NCAT, EOMI  Neck - Supple, No limited ROM  Chest - Breathing comfortably, equal chest rise  Cardiovascular - well perfused  Abdomen - Soft   Extremities - No C/C.  LLE with 2+ pitting edema to level of knee. No calf tenderness   Neurologic Exam -                    Cognitive - Awake, Alert, AAO to self, place, date, year, situation     Communication - Fluent, No dysarthria     Cranial Nerves - CN 2-12 intact     Motor -                     LEFT    UE - ShAB 2/5, EF 3/5, EE 3/5, WE 1/5,  1/5                    RIGHT UE - ShAB 5/5, EF 5/5, EE 5/5, WE 5/5,  5/5                    LEFT    LE - HF 2/5, KE 2/5, DF 0/5, PF3/5                    RIGHT LE - HF 5/5, KE 5/5, DF 5/5, PF 5/5        Sensory - hypersensitivity to LUE/LLE     Reflexes - DTR Intact, No primitive reflexive  Psychiatric - Mood stable, occasionally tearful during exam when discussing future plans.  ----------------------------------------------------------------------------------------  ASSESSMENT/PLAN  79y Male with functional deficits due to GBM  GBM - Continue steroids, keppra.  Avastin/lovenox per neuro-onc. SSI while on steroids. Given disease progression and prognosis, would encourage palliative consult/formal GOC conversation so that patient and his family have the opportunity to start considering his priorities and goals going forward.   Anxiety - continue xanax  HTN - continue amlodipine, metoprolol, lasix  LLE swelling - doppler negative. likely 2/2 hemiparesis. Elevate extremity. Can consider compression stocking if patient tolerates.   Urinary retention - TOV/SC PRN. Continue flomax, bowel regimen, mobility as tolerated  Pain - tylenol  PT/OT/SL---------------------------------------------------------------------------------------  PHYSICAL EXAM  Constitutional - NAD, Comfortable  HEENT - NCAT, EOMI  Neck - Supple, No limited ROM  Chest - Breathing comfortably, equal chest rise  Cardiovascular - well perfused  Abdomen - Soft   Extremities - No C/C.  LLE with 2+ pitting edema to level of knee. No calf tenderness   Neurologic Exam -                    Cognitive - Awake, Alert, AAO to self, place, date, year, situation     Communication - Fluent, No dysarthria     Cranial Nerves - CN 2-12 intact     Motor -                     LEFT    UE - ShAB 2/5, EF 1/5, EE 2/5, WE 1/5,  1/5                    RIGHT UE - ShAB 5/5, EF 5/5, EE 5/5, WE 5/5,  5/5                    LEFT    LE - HF 2/5, KE 2/5, DF 0/5, PF3/5                    RIGHT LE - HF 5/5, KE 5/5, DF 5/5, PF 5/5        Sensory - hypersensitivity to LUE/LLE     Reflexes - DTR Intact, No primitive reflexive  Psychiatric - Mood stable, occasionally tearful during exam when discussing future plans.  ----------------------------------------------------------------------------------------  ASSESSMENT/PLAN  79y Male with functional deficits due to GBM  GBM - Continue steroids, keppra.  Avastin/lovenox per neuro-onc. SSI while on steroids. Given disease progression and prognosis, would encourage palliative consult/formal GOC conversation so that patient and his family have the opportunity to start considering his priorities and goals going forward.   Anxiety - continue xanax  HTN - continue amlodipine, metoprolol, lasix  LLE swelling - doppler negative. likely 2/2 hemiparesis. Elevate extremity. Can consider compression stocking if patient tolerates.   Urinary retention - TOV/SC PRN. Continue flomax, bowel regimen, mobility as tolerated  Pain - tylenol  PT/OT/SLP continue daily therapies for ROM, increased mobility, and decreased debility related to hospitalization.  Balance, gait training, ADLs, transfers, and bracing/assistive devices as needed, dysphagia.    DVT PPX - lovenox  Rehab - When medically stable and GBM plan has been decided upon, recommend ACUTE inpatient rehabilitation for the functional deficits consisting of 3 hours of therapy/day & 24 hour RN/daily PMR physician for comorbid medical management. Will continue to follow for ongoing rehab needs and recommendations.  Neuro-onc note to continue decadron through weekend, Avastin after rehab

## 2020-08-07 NOTE — H&P ADULT - NSHPPHYSICALEXAM_GEN_ALL_CORE
Constitutional - NAD, Comfortable, fatigued  HEENT - NCAT, EOMI, no oropharyngeal erythema or thrush  Neck - Supple, No limited ROM  Chest - good chest expansion, good respiratory effort, CTAB   Cardio - warm and well perfused, RRR, no murmur  Abdomen -  Soft, NTND  Extremities - No peripheral edema, No calf tenderness   Neurologic Exam:                    Cognitive -             Orientation: Awake, Alert, AAO to self, place, date, year, situation            Attention:  Days of week, recall 3 objects without cuing            Memory: Recent/ remote memory intact            Thought: process, content appropriate     Speech - Fluent, Comprehensible, No dysarthria, No aphasia      Cranial Nerves - No facial asymmetry, Tongue midline, EOMI, Shoulder shrug intact     Motor -                      LEFT    UE - ShAB 5/5, EF 5/5, EE 5/5, WE 5/5,  WNL                    RIGHT UE - ShAB 5/5, EF 5/5, EE 5/5, WE 5/5,  WNL                    LEFT    LE - HF 5/5, KE 5/5, DF 5/5, PF 5/5                    RIGHT LE - HF 5/5, KE 5/5, DF 5/5, PF 5/5        Sensory - Intact to LT bilateral     Reflexes - DTR _ / 4 , neg Weber's b/l, neg Babinski's b/l     Coordination - FTN / HTS intact     OculoVestibular -  No nystagmus  Psychiatric - Mood stable, Affect WNL  Skin on admission: Constitutional - NAD, Comfortable, fatigued  HEENT - NCAT, EOMI, no oropharyngeal erythema or thrush  Neck - Supple, No limited ROM  Chest - good chest expansion, good respiratory effort, CTAB   Cardio - warm and well perfused, RRR, no murmur  Abdomen -  Soft, NTND  Extremities - No peripheral edema, No calf tenderness   Neurologic Exam:                    Cognitive -             Orientation: Awake, Alert, AAO to self, place, date, year, situation            Attention:  Days of week, recall 3 objects without cuing            Memory: Recent/ remote memory intact            Thought: process, content appropriate     Speech - Fluent, Comprehensible, No dysarthria, No aphasia      Cranial Nerves - left visual field deficit, left facial weakness, Tongue midline, EOMI, Shoulder shrug intact     Motor -                      LEFT    UE - ShAB 2/5, EF 1/5, EE 2/5, WE 0/5,  0/5                    RIGHT UE - ShAB 5/5, EF 5/5, EE 5/5, WE 5/5,  WNL                    LEFT    LE - HF 3/5, KE 4/5, KF 3/5, DF 0/5, PF 3/5                    RIGHT LE - HF 5/5, KE 5/5, DF 5/5, PF 5/5        Sensory - Impaired-- extinguishes on left     Coordination - impaired on left     OculoVestibular -  No nystagmus  Psychiatric - Mood stable, Affect WNL  Skin on admission: intact Constitutional - NAD, Comfortable, fatigued  HEENT - NCAT, EOMI, no oropharyngeal erythema or thrush  Neck - Supple, No limited ROM  Chest - good chest expansion, good respiratory effort, CTAB   Cardio - warm and well perfused, RRR, no murmur  Abdomen -  Soft, NTND  Extremities - No peripheral edema, No calf tenderness   Neurologic Exam:                    Cognitive -             Orientation: Awake, Alert, AAO to self, place, date, year, situation            Attention:  Days of week, recall 3 objects without cuing            Memory: Recent/ remote memory intact            Thought: process, content appropriate     Speech - Fluent, Comprehensible, No dysarthria, No aphasia      Cranial Nerves - left visual field deficit, left facial weakness, Tongue midline, EOMI, Shoulder shrug intact     Motor -                      LEFT    UE - ShAB 2/5, EF 1/5, EE 2/5, WE 0/5,  0/5                    RIGHT UE - ShAB 5/5, EF 5/5, EE 5/5, WE 5/5,  WNL                    LEFT    LE - HF 3/5, KE 4/5, KF 3/5, DF 0/5, PF 3/5                    RIGHT LE - HF 5/5, KE 5/5, DF 5/5, PF 5/5        Sensory - Impaired-- extinguishes on left     Coordination - impaired on left     OculoVestibular -  No nystagmus  Psychiatric - Mood stable, Affect WNL  Skin on admission: non-blanchable erythema left buttock

## 2020-08-07 NOTE — PROGRESS NOTE ADULT - SUBJECTIVE AND OBJECTIVE BOX
Neurology Follow up note    Name  DOROTHY Mcfadden History - Subjective: Pending placement at rehab    Review of Systems:  As above    MEDICATIONS  (STANDING):  amLODIPine   Tablet 5 milliGRAM(s) Oral daily  aspirin enteric coated 81 milliGRAM(s) Oral daily  atorvastatin 20 milliGRAM(s) Oral at bedtime  dexAMETHasone  IVPB 8 milliGRAM(s) IV Intermittent every 8 hours  dextrose 5%. 1000 milliLiter(s) (50 mL/Hr) IV Continuous <Continuous>  dextrose 50% Injectable 12.5 Gram(s) IV Push once  dextrose 50% Injectable 25 Gram(s) IV Push once  dextrose 50% Injectable 25 Gram(s) IV Push once  enoxaparin Injectable 40 milliGRAM(s) SubCutaneous daily  furosemide    Tablet 40 milliGRAM(s) Oral daily  insulin lispro (HumaLOG) corrective regimen sliding scale   SubCutaneous at bedtime  insulin lispro (HumaLOG) corrective regimen sliding scale   SubCutaneous three times a day before meals  levETIRAcetam 1000 milliGRAM(s) Oral two times a day  metoprolol succinate ER 25 milliGRAM(s) Oral daily  pantoprazole    Tablet 40 milliGRAM(s) Oral before breakfast  polyethylene glycol 3350 17 Gram(s) Oral daily  senna 1 Tablet(s) Oral at bedtime  sodium chloride 0.9%. 1000 milliLiter(s) (75 mL/Hr) IV Continuous <Continuous>  tamsulosin 0.4 milliGRAM(s) Oral at bedtime    MEDICATIONS  (PRN):  ALPRAZolam 0.25 milliGRAM(s) Oral every 8 hours PRN anxiety  dextrose 40% Gel 15 Gram(s) Oral once PRN Blood Glucose LESS THAN 70 milliGRAM(s)/deciliter  glucagon  Injectable 1 milliGRAM(s) IntraMuscular once PRN Glucose LESS THAN 70 milligrams/deciliter      Vital Signs (24 Hrs):  T(C): 36.7 (08-07-20 @ 04:46), Max: 36.7 (08-06-20 @ 12:02)  HR: 74 (08-07-20 @ 04:46) (68 - 74)  BP: 148/84 (08-07-20 @ 04:46) (124/76 - 148/84)  RR: 16 (08-07-20 @ 04:46) (16 - 20)  SpO2: 100% (08-07-20 @ 04:46) (94% - 100%)  Wt(kg): --  Daily     Daily     I&O's Summary    06 Aug 2020 07:01  -  07 Aug 2020 07:00  --------------------------------------------------------  IN: 1235 mL / OUT: 2100 mL / NET: -865 mL      PE:  MS: Awake, alert. Normal affect. Follows all commands, answering questions    Language: Speech is clear, fluent with good comprehension     CNs: eyes moving spontaneously in all directions. well developed masseter muscles b/l. No facial asymmetry b/l. Symmetric palate elevation in midline. Gag reflex deferred. Tongue midline, normal movements, no atrophy.    Motor: Normal muscle bulk & tone. No noticeable tremor or seizure.   Moving RUE/RLE, strength intact  LUE able to squeeze but not lifting antigravity  LLE able to lift off of bed with slight hip flexor weakness.     Other:    08-05    141  |  105  |  23  ----------------------------<  148<H>  3.9   |  24  |  0.71    Ca    9.1      05 Aug 2020 05:46  Phos  3.1     08-04  Mg     2.2     08-04    TPro  5.8<L>  /  Alb  3.6  /  TBili  0.6  /  DBili  x   /  AST  21  /  ALT  39  /  AlkPhos  50  08-05    08-05    141  |  105  |  23  ----------------------------<  148<H>  3.9   |  24  |  0.71    Ca    9.1      05 Aug 2020 05:46  Phos  3.1     08-04  Mg     2.2     08-04    TPro  5.8<L>  /  Alb  3.6  /  TBili  0.6  /  DBili  x   /  AST  21  /  ALT  39  /  AlkPhos  50  08-05    LIVER FUNCTIONS - ( 05 Aug 2020 05:46 )  Alb: 3.6 g/dL / Pro: 5.8 g/dL / ALK PHOS: 50 U/L / ALT: 39 U/L / AST: 21 U/L / GGT: x             Radiology    EKG:  tele:  TTE:  EEG:

## 2020-08-07 NOTE — PROGRESS NOTE ADULT - ASSESSMENT
79 M w/ PMHx of HTN, HLD, prostate cancer s/p proctectomy basal cell skin cancer, GBM (dx in 2018, s/p resection 8/3/18, s/p 2nd resection 2/7/19, s/p 3rd resection 5/2020 s/p multiple does of Temodar presents w/ worsening of his L. sided weakness. Pt sees Dr. Harman on 7/27/20 where he was noted to have worsening strength, lethargy and decreased appetite. He has been tapering his steroids for over a month. Recently tapered from 4mg QD --> 3mg QD ---> 2mg QD. Patient presents today complaining of worsening of his left sided weakness.     Left sided weakness  GBM (dx in 2018, s/p resection 8/3/18, s/p 2nd resection 2/7/19, s/p 3rd resection 5/2020)  urinary retention   hx of prostate Ca s/p Proctectomy  HTN/Hyperlipidemia    Plan:  CT Head with worsening vasogenic edema involving the R. temporal frontal parietal region again identified increased when compared to previous CT in 5/2020   recently discharged from [facility] and home for 1 week per pt. difficulty ambulating and some falls.   started Decadron 24 mg IV x 1, then decadron 8 mg q8h, taper regimen per the primary team  c/w Keppra 1000 mg BID, c/w PPI for GI ppx on steroids  neurosurgical eval appreciated. monitor for clinical improvement. neurochecks  Luna removed. TOV in progress.   MRI brain: significant progression of nodular enhancement suggesting hypercellularity. neuro/onc eval   xray hips nonspecific sclerosis in the left femoral head  c/w BP meds: amlodipine 5 mg, Toprolol 25 mg XL, Lasix 40 mg qdaily  c/w Flomax for urinary retention,   Speech eval: Dysphagia 2 with honey thickened fluid   NeuroOnc considering Avastin infusions  Physical therapy: Acute rehab [facility]  DVT ppx    Pt of PCP Dr. William Angeles at [facility]    - Dr. FERNY Lynn ([facility])  - [phone]

## 2020-08-07 NOTE — H&P ADULT - NSHPLABSRESULTS_GEN_ALL_CORE
PROCEDURE DATE:  08/05/2020            INTERPRETATION:  PROCEDURE: MRI brain with and without contrast    INDICATION: Status post resection of a right frontal GBM and chemoradiation, IDH-1 negative,MGMT unmethylated. Increased weakness, fatigue, difficulty with ambulation, and decreased appetite, rule out worsening disease.    TECHNIQUE: Multiplanar, multisequence MR imaging of the brain was performed.  7.5 cc of Gadavist was administered intravenously without reported complication. 0.0 cc was discarded.    COMPARISON: CT head 7/31/2018; MRI brain 7/31/2018; CT head 8/4/2020; MRI brain 5/28/2020.    FINDINGS:  There are postoperative changes related to prior right frontoparietal craniotomy for resection of a right posterior frontal mass. There is resolved pneumocephalus since the prior MRI. There is a trace heterogeneous extradural fluid collection subjacent to the craniotomy which has decreased in size and measures 2 mm. Dural thickening and hemosiderin staining is present subjacent to the craniotomy flap.    There is slight interval decrease in size in a right posterior frontal resection cavity. There is linear susceptibility along the margins of the resection cavity consistent with blood product.    There is significant progression of nodular enhancement which is now circumferential along the margins of the resection cavity and extends medially to the lateral wall of the body of the right lateral ventricle. There is restricted diffusion within the superior medial enhancing component suggestive of hypercellularity.    There is moderate interval increase in extent of nonenhancing FLAIR hyperintensity surrounding the resection cavity which involves the right frontal, parietal, occipital and temporal lobes.      There is no evidence of acute infarction. There are mild patchy areas of FLAIR hyperintensity in the periventricular and subcortical white matter which are nonspecific but likely related to chronic microangiopathic changes. No midline shift or other significant mass effect is noted. There is normal flow voids within the major cranial vessels suggestive of their patency. There has been prior bilateral cataract surgery. The paranasal sinuses are predominantly clear. The mastoid air cells are predominantly clear.    IMPRESSION: There are postoperative changes related to prior right frontal parietal craniotomy. There is significant progression of nodular enhancement along the margins of the right frontal resection cavity which extend to the lateral wall of the right lateral ventricle as described. Restricted diffusion is demonstrated within the medial component of the nodular enhancement which may suggest hypercellularity. There is moderate interval increase in extent of surrounding nonenhancing FLAIR hyperintensity.

## 2020-08-07 NOTE — H&P ADULT - ASSESSMENT
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       COMORBIDITES/ACTIVE MEDICAL ISSUES     GBM with vaso genic edema  -Cont. decadron 8 mg q8h, --d/w Dr. Del Cid-- taper to 8mg BID on 8/10  --Cont. Keppra 1000mg BID for Seizure ppx,  -- c/w PPI for GI ppx on steroids      Rehab: Gait Instability, ADL impairments and Functional impairments: start Comprehensive Rehab Program of PT/OT & speech 3 hrs/day x 5days/week    - Pain: Tylenol PRN    HTN--   --cont. Metoprolol XL 25mg and norvasc 5mg  --Lasix 40mg    Urinary Retention  --mejia removed-- TOV-- CIC PRN  --Flomax  --monitor PVRs    Diabetes type 2  --FS monitoring and ISS    Anxiety--  Xanax PRN      GI/Bowel: , Senna, PRN    - Diet: Dysphagia 3 with nectar liquids      Precautions / PROPHYLAXIS:   - Falls, Cardiac,  Seizure   - Lungs: Aspiration,   - Pressure injury/Skin: Turn Q2hrs while in bed, OOB to Chair, PT/OT    - DVT: Lovenox,     MEDICAL PROGNOSIS: GOOD            REHAB POTENTIAL: GOOD             ESTIMATED DISPOSITION: HOME WITH HOME CARE            ELOS: 14-21 Days   EXPECTED THERAPY:     P.T. 1hr/day       O.T. 1hr/day      S.L.P. 1hr/day     P&O Unnecessary     EXP FREQUENCY: 5 days per 7 day period     PRESCREEN COMPARISION:   I have reviewed the prescreen information and I have found no relevant changes between the preadmission screening and my post admission evaluation     RATIONALE FOR INPATIENT ADMISSION - Patient demonstrates the following: (check all that apply)  [X] Medically appropriate for rehabilitation admission  [X] Has attainable rehab goals with an appropriate initial discharge plan  [X] Has rehabilitation potential (expected to make a significant improvement within a reasonable period of time)   [X] Requires close medical management by a rehab physician, rehab nursing care, Hospitalist and comprehensive interdisciplinary team (including PT, OT, & or SLP, Prosthetics and Orthotics) 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       COMORBIDITES/ACTIVE MEDICAL ISSUES     GBM with vaso genic edema  -Cont. decadron 8 mg q8h, --d/w Dr. Del Cid-- taper to 8mg BID on 8/10  --Cont. Keppra 1000mg BID for Seizure ppx,  -- c/w PPI for GI ppx on steroids      Rehab: Gait Instability, ADL impairments and Functional impairments: start Comprehensive Rehab Program of PT/OT & speech 3 hrs/day x 5days/week    - Pain: Tylenol PRN    HTN--   --cont. Metoprolol XL 25mg and norvasc 5mg  --Lasix 40mg    Urinary Retention  --mejia removed-- TOV-- CIC PRN  --Flomax  --monitor PVRs    Diabetes type 2  --FS monitoring and ISS    Anxiety--  Xanax PRN      GI/Bowel: , Senna, PRN    - Diet: Dysphagia 3 with nectar liquids    Stage I pressure ulcer-- offloading, turn q.2h, barrier cream      Precautions / PROPHYLAXIS:   - Falls, Cardiac,  Seizure   - Lungs: Aspiration,   - Pressure injury/Skin:               Turn Q2hrs while in bed, OOB to Chair, PT/OT    - DVT: Lovenox,     MEDICAL PROGNOSIS: GOOD            REHAB POTENTIAL: GOOD             ESTIMATED DISPOSITION: HOME WITH HOME CARE            ELOS: 14-21 Days   EXPECTED THERAPY:     P.T. 1hr/day       O.T. 1hr/day      S.L.P. 1hr/day     P&O Unnecessary     EXP FREQUENCY: 5 days per 7 day period     PRESCREEN COMPARISION:   I have reviewed the prescreen information and I have found no relevant changes between the preadmission screening and my post admission evaluation     RATIONALE FOR INPATIENT ADMISSION - Patient demonstrates the following: (check all that apply)  [X] Medically appropriate for rehabilitation admission  [X] Has attainable rehab goals with an appropriate initial discharge plan  [X] Has rehabilitation potential (expected to make a significant improvement within a reasonable period of time)   [X] Requires close medical management by a rehab physician, rehab nursing care, Hospitalist and comprehensive interdisciplinary team (including PT, OT, & or SLP, Prosthetics and Orthotics)

## 2020-08-07 NOTE — H&P ADULT - NSHPSOCIALHISTORY_GEN_ALL_CORE
SOCIAL HISTORY  Smoking - Denied  EtOH - Denied   Drugs - Denied    FUNCTIONAL HISTORY  ·  lives in 2 story private home in [place] with spouse, adult son and daughter no steps to enter, first floor bedroom/tub shower setup . has 3 grab bars and shower chair. Prior to admit, pt reports ambulating independently with straight cane house hold distances ~1 week before admitted to hospital. Pt reports his son and spouse would assist with ADLs.    [employer]     CURRENT FUNCTIONAL STATUS  - Bed Mobility: mod A   - Transfers: mod A   - Gait: 5ft mod A   - ADLs: not assessed SOCIAL HISTORY  Smoking - Denied  EtOH - Denied   Drugs - Denied    FUNCTIONAL HISTORY  ·  lives in 2 story private home in [place] with spouse, adult son and daughter no steps to enter, first floor bedroom/tub shower setup . has 3 grab bars and shower chair. Prior to admit, pt reports ambulating independently with straight cane house hold distances ~1 week before admitted to hospital. Pt reports his son and spouse would assist with ADLs.    Retired [employer]     CURRENT FUNCTIONAL STATUS  - Bed Mobility: mod A   - Transfers: mod A   - Gait: 5ft mod A   - ADLs: not assessed  Swallow evaluation-- dysphagia 3 with nectar liquids--  intermittent overt s/s of laryngeal penetration/aspiration (wet vocal quality, throat clear). No overt s/s of laryngeal penetration/aspiration noted with nectar thick liquids.

## 2020-08-07 NOTE — H&P ADULT - NSICDXPASTMEDICALHX_GEN_ALL_CORE_FT
PAST MEDICAL HISTORY:  Basal cell carcinoma (BCC) of left lower leg s/p resection  right lower leg top  left lower leg    Bilateral hearing loss, unspecified hearing loss type     Fall, sequela     Glioblastoma biopsy 8/2018  surgery  6 weeks radiation  35 days of oral chemotherapy last 3 weeks    HLD (hyperlipidemia)     HTN (hypertension)      activity [employer]-65    [place]    Prostate cancer s/p radical prostatectomy 1995    Type 2 diabetes mellitus without complication, without long-term current use of insulin stop oral meds  3 weeks ago

## 2020-08-07 NOTE — PROGRESS NOTE ADULT - SUBJECTIVE AND OBJECTIVE BOX
Patient is a 79y old  Male who presents with a chief complaint of Worsening L. hemiparesis 2/2 GBM (07 Aug 2020 11:20)      SUBJECTIVE / OVERNIGHT EVENTS:  feels okay  ate lunch  denied pain  no cp, no sob, no n/v/d. no abdominal pain.  no headache, no dizziness.   "I am going to rehab. They told me I have 3 months prognosis"      Vital Signs Last 24 Hrs  T(C): 36.7 (07 Aug 2020 13:28), Max: 36.8 (07 Aug 2020 08:43)  T(F): 98 (07 Aug 2020 13:28), Max: 98.2 (07 Aug 2020 08:43)  HR: 64 (07 Aug 2020 13:28) (60 - 74)  BP: 126/64 (07 Aug 2020 13:28) (126/64 - 148/84)  BP(mean): --  RR: 18 (07 Aug 2020 13:28) (16 - 20)  SpO2: 98% (07 Aug 2020 13:28) (95% - 100%)  I&O's Summary    06 Aug 2020 07:01  -  07 Aug 2020 07:00  --------------------------------------------------------  IN: 1235 mL / OUT: 2100 mL / NET: -865 mL    07 Aug 2020 07:01  -  07 Aug 2020 13:56  --------------------------------------------------------  IN: 0 mL / OUT: 500 mL / NET: -500 mL      PHYSICAL EXAM:  GENERAL: NAD, Comfortable  HEAD:  Atraumatic, Normocephalic  EYES: EOMI, PERRLA, conjunctiva and sclera clear  NECK: Supple, No JVD  CHEST/LUNG: Clear to auscultation bilaterally; No wheeze  HEART: Regular rate and rhythm; No murmurs, rubs, or gallops  ABDOMEN: Soft, Nontender, Nondistended; Bowel sounds present  Neuro: AAOx3, left sided hemiparesis. left UE 1/5, LE 3/5. right UE/LE 5/5  EXTREMITIES:  2+ Peripheral Pulses, No clubbing, cyanosis, or edema  SKIN: No rashes or lesions      LABS:            CAPILLARY BLOOD GLUCOSE      POCT Blood Glucose.: 207 mg/dL (07 Aug 2020 12:50)  POCT Blood Glucose.: 139 mg/dL (07 Aug 2020 08:34)  POCT Blood Glucose.: 267 mg/dL (06 Aug 2020 21:57)  POCT Blood Glucose.: 240 mg/dL (06 Aug 2020 17:25)        Urinalysis Basic - ( 06 Aug 2020 10:30 )    Color: Brown / Appearance: Clear / S.025 / pH: x  Gluc: x / Ketone: Negative  / Bili: Negative / Urobili: <2 mg/dL   Blood: x / Protein: 100 mg/dL / Nitrite: Negative   Leuk Esterase: Negative / RBC: >720 /HPF / WBC 4 /HPF   Sq Epi: x / Non Sq Epi: 0 /HPF / Bacteria: Negative        RADIOLOGY & ADDITIONAL TESTS:    Imaging Personally Reviewed:  [x] YES  [ ] NO    Consultant(s) Notes Reviewed:  [x] YES  [ ] NO      MEDICATIONS  (STANDING):  amLODIPine   Tablet 5 milliGRAM(s) Oral daily  aspirin enteric coated 81 milliGRAM(s) Oral daily  atorvastatin 20 milliGRAM(s) Oral at bedtime  dexAMETHasone  IVPB 8 milliGRAM(s) IV Intermittent every 8 hours  dextrose 5%. 1000 milliLiter(s) (50 mL/Hr) IV Continuous <Continuous>  dextrose 50% Injectable 12.5 Gram(s) IV Push once  dextrose 50% Injectable 25 Gram(s) IV Push once  dextrose 50% Injectable 25 Gram(s) IV Push once  enoxaparin Injectable 40 milliGRAM(s) SubCutaneous daily  furosemide    Tablet 40 milliGRAM(s) Oral daily  insulin lispro (HumaLOG) corrective regimen sliding scale   SubCutaneous at bedtime  insulin lispro (HumaLOG) corrective regimen sliding scale   SubCutaneous three times a day before meals  levETIRAcetam 1000 milliGRAM(s) Oral two times a day  melatonin 3 milliGRAM(s) Oral at bedtime  metoprolol succinate ER 25 milliGRAM(s) Oral daily  pantoprazole    Tablet 40 milliGRAM(s) Oral before breakfast  polyethylene glycol 3350 17 Gram(s) Oral daily  senna 1 Tablet(s) Oral at bedtime  sodium chloride 0.9%. 1000 milliLiter(s) (75 mL/Hr) IV Continuous <Continuous>  tamsulosin 0.4 milliGRAM(s) Oral at bedtime    MEDICATIONS  (PRN):  ALPRAZolam 0.25 milliGRAM(s) Oral every 8 hours PRN anxiety  dextrose 40% Gel 15 Gram(s) Oral once PRN Blood Glucose LESS THAN 70 milliGRAM(s)/deciliter  glucagon  Injectable 1 milliGRAM(s) IntraMuscular once PRN Glucose LESS THAN 70 milligrams/deciliter      Care Discussed with Consultants/Other Providers [x] YES  [ ] NO    HEALTH ISSUES - PROBLEM Dx:

## 2020-08-07 NOTE — DISCHARGE NOTE NURSING/CASE MANAGEMENT/SOCIAL WORK - PATIENT PORTAL LINK FT
You can access the FollowMyHealth Patient Portal offered by [facility] by registering at the following website: [email or web address]. By joining [facility]’s FollowMyHealth portal, you will also be able to view your health information using other applications (apps) compatible with our system.

## 2020-08-07 NOTE — PATIENT PROFILE ADULT - FALL HARM RISK
"[facility]: [id number] [place] - [address] AT [place] [address]    Clinical concerns: Patient states there are no new concerns to discuss with provider.  Dr Hamilton was not notified.         Liz English CMA              "
coagulation(Bleeding disorder R/T clinical cond/anti-coags)

## 2020-08-07 NOTE — H&P ADULT - NSHPREVIEWOFSYSTEMS_GEN_ALL_CORE
REVIEW OF SYSTEMS:  CONSTITUTIONAL: No fever, weight loss, or fatigue  EYES: No eye pain, visual disturbances, or discharge  ENMT:  No difficulty hearing, tinnitus, vertigo; No sinus or throat pain  NECK: No pain or stiffness  BREASTS: No pain, masses, or nipple discharge  RESPIRATORY: No cough, wheezing, chills or hemoptysis; No shortness of breath  CARDIOVASCULAR: No chest pain, palpitations, dizziness, or leg swelling  GASTROINTESTINAL: No abdominal or epigastric pain. No nausea, vomiting, or hematemesis; No diarrhea or constipation. No melena or hematochezia.  GENITOURINARY: + urinary retention,  No dysuria, frequency, hematuria, or incontinence  NEUROLOGICAL: No headaches, memory loss, + loss of strength on left, numbness, or tremors  SKIN: No itching, burning, rashes, or lesions   LYMPH NODES: No enlarged glands  ENDOCRINE: No heat or cold intolerance; No hair loss  MUSCULOSKELETAL: No joint pain or swelling; No muscle, back, or extremity pain  PSYCHIATRIC: No depression, anxiety, mood swings, or difficulty sleeping  HEME/LYMPH: No easy bruising, or bleeding gums  ALLERY AND IMMUNOLOGIC: No hives or eczema

## 2020-08-08 LAB
ALBUMIN SERPL ELPH-MCNC: 3 G/DL — LOW (ref 3.3–5)
ALP SERPL-CCNC: 52 U/L — SIGNIFICANT CHANGE UP (ref 40–120)
ALT FLD-CCNC: 57 U/L — HIGH (ref 10–45)
ANION GAP SERPL CALC-SCNC: 5 MMOL/L — SIGNIFICANT CHANGE UP (ref 5–17)
AST SERPL-CCNC: 19 U/L — SIGNIFICANT CHANGE UP (ref 10–40)
BASOPHILS # BLD AUTO: 0.03 K/UL — SIGNIFICANT CHANGE UP (ref 0–0.2)
BASOPHILS NFR BLD AUTO: 0.2 % — SIGNIFICANT CHANGE UP (ref 0–2)
BILIRUB SERPL-MCNC: 0.5 MG/DL — SIGNIFICANT CHANGE UP (ref 0.2–1.2)
BUN SERPL-MCNC: 38 MG/DL — HIGH (ref 7–23)
CALCIUM SERPL-MCNC: 8.8 MG/DL — SIGNIFICANT CHANGE UP (ref 8.4–10.5)
CHLORIDE SERPL-SCNC: 106 MMOL/L — SIGNIFICANT CHANGE UP (ref 96–108)
CO2 SERPL-SCNC: 30 MMOL/L — SIGNIFICANT CHANGE UP (ref 22–31)
CREAT SERPL-MCNC: 1.09 MG/DL — SIGNIFICANT CHANGE UP (ref 0.5–1.3)
EOSINOPHIL # BLD AUTO: 0 K/UL — SIGNIFICANT CHANGE UP (ref 0–0.5)
EOSINOPHIL NFR BLD AUTO: 0 % — SIGNIFICANT CHANGE UP (ref 0–6)
GLUCOSE BLDC GLUCOMTR-MCNC: 130 MG/DL — HIGH (ref 70–99)
GLUCOSE BLDC GLUCOMTR-MCNC: 166 MG/DL — HIGH (ref 70–99)
GLUCOSE BLDC GLUCOMTR-MCNC: 181 MG/DL — HIGH (ref 70–99)
GLUCOSE BLDC GLUCOMTR-MCNC: 201 MG/DL — HIGH (ref 70–99)
GLUCOSE SERPL-MCNC: 147 MG/DL — HIGH (ref 70–99)
HCT VFR BLD CALC: 38.8 % — LOW (ref 39–50)
HGB BLD-MCNC: 13.2 G/DL — SIGNIFICANT CHANGE UP (ref 13–17)
IMM GRANULOCYTES NFR BLD AUTO: 1 % — SIGNIFICANT CHANGE UP (ref 0–1.5)
LYMPHOCYTES # BLD AUTO: 0.72 K/UL — LOW (ref 1–3.3)
LYMPHOCYTES # BLD AUTO: 5.7 % — LOW (ref 13–44)
MCHC RBC-ENTMCNC: 32.2 PG — SIGNIFICANT CHANGE UP (ref 27–34)
MCHC RBC-ENTMCNC: 34 GM/DL — SIGNIFICANT CHANGE UP (ref 32–36)
MCV RBC AUTO: 94.6 FL — SIGNIFICANT CHANGE UP (ref 80–100)
MONOCYTES # BLD AUTO: 0.42 K/UL — SIGNIFICANT CHANGE UP (ref 0–0.9)
MONOCYTES NFR BLD AUTO: 3.3 % — SIGNIFICANT CHANGE UP (ref 2–14)
NEUTROPHILS # BLD AUTO: 11.34 K/UL — HIGH (ref 1.8–7.4)
NEUTROPHILS NFR BLD AUTO: 89.8 % — HIGH (ref 43–77)
NRBC # BLD: 0 /100 WBCS — SIGNIFICANT CHANGE UP (ref 0–0)
PLATELET # BLD AUTO: 180 K/UL — SIGNIFICANT CHANGE UP (ref 150–400)
POTASSIUM SERPL-MCNC: 4.1 MMOL/L — SIGNIFICANT CHANGE UP (ref 3.5–5.3)
POTASSIUM SERPL-SCNC: 4.1 MMOL/L — SIGNIFICANT CHANGE UP (ref 3.5–5.3)
PROT SERPL-MCNC: 6.4 G/DL — SIGNIFICANT CHANGE UP (ref 6–8.3)
RBC # BLD: 4.1 M/UL — LOW (ref 4.2–5.8)
RBC # FLD: 12.6 % — SIGNIFICANT CHANGE UP (ref 10.3–14.5)
SARS-COV-2 RNA SPEC QL NAA+PROBE: SIGNIFICANT CHANGE UP
SODIUM SERPL-SCNC: 141 MMOL/L — SIGNIFICANT CHANGE UP (ref 135–145)
WBC # BLD: 12.63 K/UL — HIGH (ref 3.8–10.5)
WBC # FLD AUTO: 12.63 K/UL — HIGH (ref 3.8–10.5)

## 2020-08-08 PROCEDURE — 99232 SBSQ HOSP IP/OBS MODERATE 35: CPT

## 2020-08-08 PROCEDURE — 99223 1ST HOSP IP/OBS HIGH 75: CPT

## 2020-08-08 RX ADMIN — ENOXAPARIN SODIUM 40 MILLIGRAM(S): 100 INJECTION SUBCUTANEOUS at 11:55

## 2020-08-08 RX ADMIN — LEVETIRACETAM 1000 MILLIGRAM(S): 250 TABLET, FILM COATED ORAL at 17:11

## 2020-08-08 RX ADMIN — BENZOCAINE AND MENTHOL 1 LOZENGE: 5; 1 LIQUID ORAL at 19:34

## 2020-08-08 RX ADMIN — PANTOPRAZOLE SODIUM 40 MILLIGRAM(S): 20 TABLET, DELAYED RELEASE ORAL at 06:00

## 2020-08-08 RX ADMIN — AMLODIPINE BESYLATE 5 MILLIGRAM(S): 2.5 TABLET ORAL at 06:00

## 2020-08-08 RX ADMIN — SENNA PLUS 2 TABLET(S): 8.6 TABLET ORAL at 17:11

## 2020-08-08 RX ADMIN — POLYETHYLENE GLYCOL 3350 17 GRAM(S): 17 POWDER, FOR SOLUTION ORAL at 11:55

## 2020-08-08 RX ADMIN — Medication 40 MILLIGRAM(S): at 06:00

## 2020-08-08 RX ADMIN — Medication 100 MILLIGRAM(S): at 12:02

## 2020-08-08 RX ADMIN — BENZOCAINE AND MENTHOL 1 LOZENGE: 5; 1 LIQUID ORAL at 06:00

## 2020-08-08 RX ADMIN — ATORVASTATIN CALCIUM 20 MILLIGRAM(S): 80 TABLET, FILM COATED ORAL at 21:40

## 2020-08-08 RX ADMIN — Medication 81 MILLIGRAM(S): at 11:55

## 2020-08-08 RX ADMIN — Medication 8 MILLIGRAM(S): at 15:43

## 2020-08-08 RX ADMIN — Medication 3 MILLIGRAM(S): at 21:39

## 2020-08-08 RX ADMIN — Medication 2: at 11:54

## 2020-08-08 RX ADMIN — Medication 8 MILLIGRAM(S): at 05:27

## 2020-08-08 RX ADMIN — LEVETIRACETAM 1000 MILLIGRAM(S): 250 TABLET, FILM COATED ORAL at 05:27

## 2020-08-08 RX ADMIN — TAMSULOSIN HYDROCHLORIDE 0.4 MILLIGRAM(S): 0.4 CAPSULE ORAL at 21:39

## 2020-08-08 RX ADMIN — Medication 0.25 MILLIGRAM(S): at 21:44

## 2020-08-08 RX ADMIN — Medication 8 MILLIGRAM(S): at 21:39

## 2020-08-08 NOTE — DIETITIAN INITIAL EVALUATION ADULT. - CONTINUE CURRENT NUTRITION CARE PLAN
Diet consistency per speech therapy. Recommend adding consistent carbohydrate + Ensure Enlive QD (350kcals, 20g protein). Will provide SF Magic Cup QD (260kcals, 9g protein)

## 2020-08-08 NOTE — CONSULT NOTE ADULT - ASSESSMENT
78yo male with hx of HTN, HLD, prostate cancer s/p prostatectomy, BCC, GBM (dx in 2018, s/p resection 8/3/18, s/p 2nd resection 2/7/19, s/p 3rd resection 5/2020 s/p multiple does of Temodar), presented to [facility] for acute rehab from [facility] for worsening of his L. sided weakness, noted to have worsening vasogenic edema with progression of GBM, resulting in Left Hemiparesis, Dysphagia, Gait and ADL impairment.       #Debility  -Gait Instability, ADL impairments and Functional impairments  -start Comprehensive Rehab Program of PT/OT & speech 3 hrs/day x 5days/week    #GBM   #Cerebral Edema  #Left hemiplagisa  -Rapidly progressive GBM since prior surgery  -Neuro Onc, Dr. Harman, recs noted   -Continue Decadron 8mg PO q8h of dexamethasone until follow up with Dr. Harman  -Considering starting Avastin for functional improvement. Given prior history of pulmonary embolism while receiving avastin, likely to start Lovenox 1.5mg/kg/day during such therapy.  -Keppra 1000mg BID for Seizure ppx,    #Urinary Retention  -Continue Flomax  -TOV with Bladder scan with prn intermittent catheterization     #Lower extremity edema  -Secondary to Left sided hemiplegia  -LLE Doppler negative for DVT  -Continue Lasix daily  -Continue to monitor     #HTN  -Chronic   -Continue Metoprolol XL 25mg and norvasc 5mg + -Lasix 40mg Daily  -Monitor vitals    #Hyperglycemia  -Ha1c 5.6  -Continue fingerstick + SSI 80yo male with hx of HTN, HLD, prostate cancer s/p prostatectomy, BCC, GBM (dx in 2018, s/p resection 8/3/18, s/p 2nd resection 2/7/19, s/p 3rd resection 5/2020 s/p multiple does of Temodar), presented to [facility] for acute rehab from [facility] for worsening of his L. sided weakness, noted to have worsening vasogenic edema with progression of GBM, resulting in Left Hemiparesis, Dysphagia, Gait and ADL impairment.       #Debility  -Gait Instability, ADL impairments and Functional impairments  -start Comprehensive Rehab Program of PT/OT & speech 3 hrs/day x 5days/week    #GBM   #Cerebral Edema  #Left hemiplagisa  -Rapidly progressive GBM since prior surgery  -Neuro Onc, Dr. Harman, recs noted   -Continue Decadron 8mg PO q8h of dexamethasone until follow up with Dr. Harman  -Considering starting Avastin for functional improvement. Given prior history of pulmonary embolism while receiving avastin, likely to start Lovenox 1.5mg/kg/day during such therapy.  -Keppra 1000mg BID for Seizure ppx,    #Urinary Retention  -Continue Flomax  -TOV with Bladder scan with prn intermittent catheterization     #Lower extremity edema  -Secondary to Left sided hemiplegia  -LLE Doppler negative for DVT  -Continue Lasix daily  -Continue to monitor     #HTN  -Chronic   -Continue Metoprolol XL 25mg and norvasc 5mg + -Lasix 40mg Daily  -Monitor vitals    #Hyperglycemia  -Steroid induced  -Ha1c 5.6  -Continue fingerstick + SSI

## 2020-08-08 NOTE — CONSULT NOTE ADULT - SUBJECTIVE AND OBJECTIVE BOX
HPI:  78yo male with hx of HTN, HLD, prostate cancer s/p prostatectomy, BCC, GBM (dx in 2018, s/p resection 8/3/18, s/p 2nd resection 19, s/p 3rd resection 2020 s/p multiple does of Temodar), presented to [facility] for acute rehab from [facility] for worsening of his L. sided weakness, noted on imaging study to have worsening vasogenic edema involving the R. temporal frontal parietal region again identified increased when compared to previous CT in 2020. Pt underwent MRI w/wo contrast which revealed progression of disease. Pt was without any other symptoms, denied changes in speech/vision, dizziness, headaches, confusion. He was seen in Dr. Harman's office on 20 where he was noted to have worsening strength, lethargy and decreased appetite He had been tapering his steroids for over a month. Recently tapered from 4mg QD --> 3mg QD ---> 2mg QD.    At [facility], pt was admitted under neurology service, was evaluated by Neuro-onc and was started on Decadron.   Hospital course was complicated by urinary retention requiring mejia catheter. Was removed and continued TOV     Home Medications:  amLODIPine 5 mg oral tablet: 1 tab(s) orally once a day (07 Aug 2020 11:43)  aspirin 81 mg oral delayed release tablet: 1 tab(s) orally once a day (07 Aug 2020 11:43)  atorvastatin 20 mg oral tablet: 1 tab(s) orally once a day (at bedtime) (07 Aug 2020 11:43)  dexamethasone: 8 milligram(s) orally every 8 hours (07 Aug 2020 11:)  furosemide 40 mg oral tablet: 1 tab(s) orally once a day (07 Aug 2020 11:43)  levETIRAcetam 1000 mg oral tablet: 1 tab(s) orally 2 times a day (07 Aug 2020 11:43)  melatonin 3 mg oral tablet: 1 tab(s) orally once a day (at bedtime) (07 Aug 2020 11:43)  metoprolol succinate 25 mg oral tablet, extended release: 1 tab(s) orally once a day (07 Aug 2020 11:43)  polyethylene glycol 3350 oral powder for reconstitution: 17 gram(s) orally once a day (07 Aug 2020 11:43)  senna oral tablet: 1 tab(s) orally once a day (at bedtime) (07 Aug 2020 11:43)      PAST MEDICAL & SURGICAL HISTORY:  Fall, sequela  Bilateral hearing loss, unspecified hearing loss type   activity: [employer] -    [place]  Type 2 diabetes mellitus without complication, without long-term current use of insulin: stop oral meds  3 weeks ago  Glioblastoma: biopsy 2018  surgery  6 weeks radiation  35 days of oral chemotherapy last 3 weeks  Basal cell carcinoma (BCC) of left lower leg: s/p resection  right lower leg top  left lower leg  Prostate cancer: s/p radical prostatectomy   HLD (hyperlipidemia)  HTN (hypertension)  S/P colonoscopic polypectomy: 3 years benign polyps  S/P tonsillectomy: age 30  H/O bilateral cataract extraction: 3-4 years ago  S/P prostatectomy:   S/P brain surgery: 2018  Lipoma of neck: s/p resection at age 30  Basal cell carcinoma (BCC) of lower leg: s/p resection  History of tonsillectomy: at age 30      Review of Systems:   CONSTITUTIONAL: No fever, weight loss, or fatigue  EYES: No eye pain, visual disturbances, or discharge  ENMT:  No difficulty hearing, tinnitus, vertigo; No sinus or throat pain  NECK: No pain or stiffness  BREASTS: No pain, masses, or nipple discharge  RESPIRATORY: No cough, wheezing, chills or hemoptysis; No shortness of breath  CARDIOVASCULAR: No chest pain, palpitations, dizziness, or leg swelling  GASTROINTESTINAL: No abdominal or epigastric pain. No nausea, vomiting, or hematemesis; No diarrhea or constipation. No melena or hematochezia.  GENITOURINARY: No dysuria, frequency, hematuria, or incontinence  NEUROLOGICAL: No headaches, memory loss, loss of strength, numbness, or tremors  SKIN: No itching, burning, rashes, or lesions   LYMPH NODES: No enlarged glands  ENDOCRINE: No heat or cold intolerance; No hair loss  MUSCULOSKELETAL: No joint pain or swelling; No muscle, back, or extremity pain  PSYCHIATRIC: No depression, anxiety, mood swings, or difficulty sleeping  HEME/LYMPH: No easy bruising, or bleeding gums  ALLERY AND IMMUNOLOGIC: No hives or eczema    Allergies    No Known Allergies    Intolerances        Social History:   Lives at home with his wife.   Former smoker, quit in his 30s  Social/rare EtOH use    FAMILY HISTORY:  Family history of diabetes mellitus: Father   Family history of prostate cancer in father:   Family history of breast cancer in mother (Sibling): Mother and sister  Family history of lung cancer: mother , long standing smoker    MEDICATIONS  (STANDING):  amLODIPine   Tablet 5 milliGRAM(s) Oral daily  aspirin  chewable 81 milliGRAM(s) Oral daily  atorvastatin 20 milliGRAM(s) Oral at bedtime  dexAMETHasone     Tablet 8 milliGRAM(s) Oral every 8 hours  dextrose 5%. 1000 milliLiter(s) (50 mL/Hr) IV Continuous <Continuous>  dextrose 50% Injectable 12.5 Gram(s) IV Push once  dextrose 50% Injectable 25 Gram(s) IV Push once  dextrose 50% Injectable 25 Gram(s) IV Push once  docusate sodium 100 milliGRAM(s) Oral daily  enoxaparin Injectable 40 milliGRAM(s) SubCutaneous daily  furosemide    Tablet 40 milliGRAM(s) Oral daily  insulin lispro (HumaLOG) corrective regimen sliding scale   SubCutaneous three times a day before meals  insulin lispro (HumaLOG) corrective regimen sliding scale   SubCutaneous at bedtime  levETIRAcetam 1000 milliGRAM(s) Oral two times a day  melatonin 3 milliGRAM(s) Oral at bedtime  metoprolol succinate ER 25 milliGRAM(s) Oral daily  pantoprazole    Tablet 40 milliGRAM(s) Oral before breakfast  polyethylene glycol 3350 17 Gram(s) Oral daily  tamsulosin 0.4 milliGRAM(s) Oral at bedtime    MEDICATIONS  (PRN):  acetaminophen   Tablet .. 650 milliGRAM(s) Oral every 6 hours PRN Temp greater or equal to 38C (100.4F), Mild Pain (1 - 3)  ALPRAZolam 0.25 milliGRAM(s) Oral every 8 hours PRN Anxiety  benzocaine 15 mG/menthol 3.6 mG (Sugar-Free) Lozenge 1 Lozenge Oral two times a day PRN Sore Throat  dextrose 40% Gel 15 Gram(s) Oral once PRN Blood Glucose LESS THAN 70 milliGRAM(s)/deciliter  glucagon  Injectable 1 milliGRAM(s) IntraMuscular once PRN Glucose LESS THAN 70 milligrams/deciliter  senna 2 Tablet(s) Oral at bedtime PRN Constipation      Vital Signs Last 24 Hrs  T(C): 36.8 (07 Aug 2020 20:18), Max: 36.8 (07 Aug 2020 20:18)  T(F): 98.2 (07 Aug 2020 20:18), Max: 98.2 (07 Aug 2020 20:18)  HR: 58 (08 Aug 2020 05:57) (55 - 64)  BP: 146/66 (08 Aug 2020 05:57) (126/64 - 160/60)  BP(mean): --  RR: 16 (07 Aug 2020 20:18) (16 - 18)  SpO2: 95% (07 Aug 2020 20:18) (95% - 98%)  CAPILLARY BLOOD GLUCOSE      POCT Blood Glucose.: 166 mg/dL (08 Aug 2020 08:44)  POCT Blood Glucose.: 153 mg/dL (07 Aug 2020 22:08)  POCT Blood Glucose.: 150 mg/dL (07 Aug 2020 17:57)  POCT Blood Glucose.: 207 mg/dL (07 Aug 2020 12:50)        PHYSICAL EXAM:  GENERAL: NAD, elderly male  HEAD:  Atraumatic, Normocephalic  EYES: EOMI, PERRLA, conjunctiva and sclera clear  NECK: Supple, No JVD  CHEST/LUNG: Clear to auscultation bilaterally; No wheeze  HEART: Regular rate and rhythm; No murmurs, rubs, or gallops  ABDOMEN: Soft, Nontender, Nondistended; Bowel sounds present  EXTREMITIES:  2+ Peripheral Pulses, No clubbing, cyanosis. LLE swelling, nonpitting   MSK: Left sided weakness. No loss of sensation  PSYCH: AAOx3  NEURO: Left sided weakness  SKIN: No rashes or lesions    LABS:                        13.2   12.63 )-----------( 180      ( 08 Aug 2020 06:13 )             38.8     08-08    141  |  106  |  38<H>  ----------------------------<  147<H>  4.1   |  30  |  1.09    Ca    8.8      08 Aug 2020 06:13    TPro  6.4  /  Alb  3.0<L>  /  TBili  0.5  /  DBili  x   /  AST  19  /  ALT  57<H>  /  AlkPhos  52  08-08          Urinalysis Basic - ( 06 Aug 2020 10:30 )    Color: Brown / Appearance: Clear / S.025 / pH: x  Gluc: x / Ketone: Negative  / Bili: Negative / Urobili: <2 mg/dL   Blood: x / Protein: 100 mg/dL / Nitrite: Negative   Leuk Esterase: Negative / RBC: >720 /HPF / WBC 4 /HPF   Sq Epi: x / Non Sq Epi: 0 /HPF / Bacteria: Negative          RADIOLOGY & ADDITIONAL TESTS:    Imaging Personally Reviewed:    Consultant(s) Notes Reviewed:      Care Discussed with Consultants/Other Providers:

## 2020-08-08 NOTE — CHART NOTE - NSCHARTNOTEFT_GEN_A_CORE
Upon Nutritional Assessment by the Registered Dietitian your patient was determined to meet criteria / has evidence of the following diagnosis/diagnoses:            [X]  Moderate Protein Calorie Malnutrition      Findings as based on:  [X] Comprehensive nutrition assessment   [X] Nutrition Focused Physical Exam: moderate/severe muscle wasting/fat loss (see initial assessment)   [X] Other: 17% weight loss within 1 year       Nutrition Plan/Recommendations:    1. Diet consistency per speech therapy. Add Consistent carbohydrate + Ensure Enlive QD  2. Recommend adding MVI po daily  3. Will provide Magic Cup QD       PROVIDER Section:     By signing this assessment you are acknowledging and agree with the diagnosis/diagnoses assigned by the Registered Dietitian    Comments:

## 2020-08-08 NOTE — DIETITIAN INITIAL EVALUATION ADULT. - PERTINENT MEDS FT
lovenox, SSI, xanax, norvasc, lipitor, decadron, colace, lasix, keppra, toprol, protonix, miralax, senna

## 2020-08-08 NOTE — DIETITIAN INITIAL EVALUATION ADULT. - OTHER INFO
79 M w/ PMHx of HTN, HLD, prostate cancer s/p proctectomy basal cell skin cancer, GBM (dx in 2018, s/p resection 8/3/18, s/p 2nd resection 2/7/19, s/p 3rd resection 5/2020 s/p multiple does of Temodar presents w/  R. temporal frontal parietal glioblastoma with worsening vasogenic edema resulting in Left Hemiparesis, Dysphagia, Gait and ADL impairment.   Pt known to writer from previous admission 06/2020. He reports good appetite and PO intake- states that he is hungry for bfast. He has been trying to focus on increasing his protein intake. Receptive to Ensure Supplement QD + Magic Cup QD for added calories and protein. Pt has been losing weight over the last year throughout the course of his illness/surgeries, although he believes that he has regained some of the weight lost due to improved appetite/intake as of late. UBW ~217 compared to current weight, which pt believes to be about 180lbs. Current weight (8/7) 189.8lbs, likely masking additional weight loss due to +3 edema left leg/ankle. Continues on lasix. Reports no BM x 3 days- received senna/miralax last night per RN. Stage I on sacrum. POCT 2/2 steroids (A1c 5.6%) 139-207mg/dl. Recommend adding consistent carbohydrate to current diet- avoidance of concentrated sugar discussed with pt to optimize glycemic control. current dysphagia diet discussed with pt (soft, bite sized c nectar thick liquids), pending swallow eval in-house.

## 2020-08-08 NOTE — DIETITIAN INITIAL EVALUATION ADULT. - PHYSICAL APPEARANCE
NFPE: moderate/severe muscle wasting (temporal, clavicle, interosseous) and subcutaneous fat loss (orbital, buccal, triceps)/other (specify) Height: 5'11", Weight: (8/7) 189.8lbs, BMI: 26.5  IBW: 172lbs +/- 10%, 110% IBW

## 2020-08-08 NOTE — DIETITIAN INITIAL EVALUATION ADULT. - ETIOLOGY
related to inability to meet sufficient calorie/protein needs in setting of increased need with glioblastoma s/p multiple neurosurgeries

## 2020-08-08 NOTE — DIETITIAN INITIAL EVALUATION ADULT. - ADD RECOMMEND
Recommend adding MVI po daily. Pending swallow eval. Obtain and honor food preferences as able. Encourage focus on protein intake. Trend weights, labs & PO intake.

## 2020-08-09 LAB
GLUCOSE BLDC GLUCOMTR-MCNC: 134 MG/DL — HIGH (ref 70–99)
GLUCOSE BLDC GLUCOMTR-MCNC: 159 MG/DL — HIGH (ref 70–99)
GLUCOSE BLDC GLUCOMTR-MCNC: 169 MG/DL — HIGH (ref 70–99)
GLUCOSE BLDC GLUCOMTR-MCNC: 230 MG/DL — HIGH (ref 70–99)

## 2020-08-09 PROCEDURE — 99232 SBSQ HOSP IP/OBS MODERATE 35: CPT

## 2020-08-09 PROCEDURE — 99233 SBSQ HOSP IP/OBS HIGH 50: CPT

## 2020-08-09 RX ORDER — POLYETHYLENE GLYCOL 3350 17 G/17G
17 POWDER, FOR SOLUTION ORAL
Refills: 0 | Status: DISCONTINUED | OUTPATIENT
Start: 2020-08-09 | End: 2020-09-04

## 2020-08-09 RX ORDER — SENNA PLUS 8.6 MG/1
2 TABLET ORAL AT BEDTIME
Refills: 0 | Status: DISCONTINUED | OUTPATIENT
Start: 2020-08-09 | End: 2020-09-04

## 2020-08-09 RX ORDER — TAMSULOSIN HYDROCHLORIDE 0.4 MG/1
0.8 CAPSULE ORAL AT BEDTIME
Refills: 0 | Status: DISCONTINUED | OUTPATIENT
Start: 2020-08-09 | End: 2020-08-28

## 2020-08-09 RX ADMIN — TAMSULOSIN HYDROCHLORIDE 0.8 MILLIGRAM(S): 0.4 CAPSULE ORAL at 21:28

## 2020-08-09 RX ADMIN — Medication 0.25 MILLIGRAM(S): at 06:36

## 2020-08-09 RX ADMIN — Medication 100 MILLIGRAM(S): at 12:17

## 2020-08-09 RX ADMIN — Medication 81 MILLIGRAM(S): at 12:05

## 2020-08-09 RX ADMIN — Medication 1: at 17:55

## 2020-08-09 RX ADMIN — Medication 0.25 MILLIGRAM(S): at 22:17

## 2020-08-09 RX ADMIN — AMLODIPINE BESYLATE 5 MILLIGRAM(S): 2.5 TABLET ORAL at 06:37

## 2020-08-09 RX ADMIN — ENOXAPARIN SODIUM 40 MILLIGRAM(S): 100 INJECTION SUBCUTANEOUS at 12:05

## 2020-08-09 RX ADMIN — Medication 25 MILLIGRAM(S): at 06:38

## 2020-08-09 RX ADMIN — Medication 8 MILLIGRAM(S): at 06:37

## 2020-08-09 RX ADMIN — LEVETIRACETAM 1000 MILLIGRAM(S): 250 TABLET, FILM COATED ORAL at 06:36

## 2020-08-09 RX ADMIN — PANTOPRAZOLE SODIUM 40 MILLIGRAM(S): 20 TABLET, DELAYED RELEASE ORAL at 06:37

## 2020-08-09 RX ADMIN — Medication 8 MILLIGRAM(S): at 21:28

## 2020-08-09 RX ADMIN — LEVETIRACETAM 1000 MILLIGRAM(S): 250 TABLET, FILM COATED ORAL at 17:47

## 2020-08-09 RX ADMIN — Medication 3 MILLIGRAM(S): at 21:28

## 2020-08-09 RX ADMIN — Medication 40 MILLIGRAM(S): at 06:36

## 2020-08-09 RX ADMIN — SENNA PLUS 2 TABLET(S): 8.6 TABLET ORAL at 21:28

## 2020-08-09 RX ADMIN — Medication 1: at 12:19

## 2020-08-09 RX ADMIN — Medication 8 MILLIGRAM(S): at 13:44

## 2020-08-09 RX ADMIN — ATORVASTATIN CALCIUM 20 MILLIGRAM(S): 80 TABLET, FILM COATED ORAL at 21:29

## 2020-08-09 NOTE — PROGRESS NOTE ADULT - ASSESSMENT
78yo male with hx of HTN, HLD, prostate cancer s/p prostatectomy, BCC, GBM (dx in 2018, s/p resection 8/3/18, s/p 2nd resection 2/7/19, s/p 3rd resection 5/2020 s/p multiple does of Temodar), presented to [facility] for acute rehab from [facility] for worsening of his L. sided weakness, noted to have worsening vasogenic edema with progression of GBM, resulting in Left Hemiparesis, Dysphagia, Gait and ADL impairment.     #Debility  -Gait Instability, ADL impairments and Functional impairments  -Continue Comprehensive Rehab Program of PT/OT & speech    #GBM   #Cerebral Edema  #Left hemiplagisa  -Rapidly progressive GBM since prior surgery  -Neuro Onc, Dr. Harman, recs noted   -Continue Decadron 8mg PO q8h of dexamethasone until follow up with Dr. Harman  -Considering starting Avastin for functional improvement. Given prior history of pulmonary embolism while receiving avastin, likely to start Lovenox 1.5mg/kg/day during such therapy.  -Keppra 1000mg BID for Seizure ppx,    #Urinary Retention  -Continue Flomax. Increased to 0.8mg daily  -TOV with Bladder scan with prn intermittent catheterization     #Lower extremity edema  -Secondary to Left sided hemiplegia  -LLE Doppler negative for DVT  -Continue Lasix daily  -Continue to monitor     #HTN  -Chronic   -Continue Metoprolol XL 25mg and norvasc 5mg + -Lasix 40mg Daily  -Monitor vitals    #Hyperglycemia  -Steroid induced  -Ha1c 5.6  -Continue fingerstick + SSI     #Constipation  -Miralax BID + Senna + Dulcolax   -Monitor BM per day
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
## 2020-08-09 NOTE — PROGRESS NOTE ADULT - SUBJECTIVE AND OBJECTIVE BOX
Laz Earl M.D. Pager Number [phone]    Patient is a 79y old  Male who presents with a chief complaint of Glioblastoma (09 Aug 2020 07:56)      SUBJECTIVE / OVERNIGHT EVENTS:  Pt seen and examined at bedside. No acute events overnight.  Pt denies cp, palpitations, sob, abd pain, N/V, fever, chills.    ROS:  All other review of systems negative    Allergies    No Known Allergies    Intolerances        MEDICATIONS  (STANDING):  amLODIPine   Tablet 5 milliGRAM(s) Oral daily  aspirin  chewable 81 milliGRAM(s) Oral daily  atorvastatin 20 milliGRAM(s) Oral at bedtime  dexAMETHasone     Tablet 8 milliGRAM(s) Oral every 8 hours  dextrose 5%. 1000 milliLiter(s) (50 mL/Hr) IV Continuous <Continuous>  dextrose 50% Injectable 12.5 Gram(s) IV Push once  dextrose 50% Injectable 25 Gram(s) IV Push once  dextrose 50% Injectable 25 Gram(s) IV Push once  docusate sodium 100 milliGRAM(s) Oral daily  enoxaparin Injectable 40 milliGRAM(s) SubCutaneous daily  furosemide    Tablet 40 milliGRAM(s) Oral daily  insulin lispro (HumaLOG) corrective regimen sliding scale   SubCutaneous three times a day before meals  insulin lispro (HumaLOG) corrective regimen sliding scale   SubCutaneous at bedtime  levETIRAcetam 1000 milliGRAM(s) Oral two times a day  melatonin 3 milliGRAM(s) Oral at bedtime  metoprolol succinate ER 25 milliGRAM(s) Oral daily  pantoprazole    Tablet 40 milliGRAM(s) Oral before breakfast  polyethylene glycol 3350 17 Gram(s) Oral daily  senna 2 Tablet(s) Oral at bedtime  tamsulosin 0.4 milliGRAM(s) Oral at bedtime    MEDICATIONS  (PRN):  acetaminophen   Tablet .. 650 milliGRAM(s) Oral every 6 hours PRN Temp greater or equal to 38C (100.4F), Mild Pain (1 - 3)  ALPRAZolam 0.25 milliGRAM(s) Oral every 8 hours PRN Anxiety  benzocaine 15 mG/menthol 3.6 mG (Sugar-Free) Lozenge 1 Lozenge Oral two times a day PRN Sore Throat  bisacodyl 5 milliGRAM(s) Oral every 12 hours PRN Constipation  dextrose 40% Gel 15 Gram(s) Oral once PRN Blood Glucose LESS THAN 70 milliGRAM(s)/deciliter  glucagon  Injectable 1 milliGRAM(s) IntraMuscular once PRN Glucose LESS THAN 70 milligrams/deciliter      Vital Signs Last 24 Hrs  T(C): 36.7 (09 Aug 2020 07:41), Max: 36.8 (08 Aug 2020 09:58)  T(F): 98 (09 Aug 2020 07:41), Max: 98.3 (08 Aug 2020 09:58)  HR: 61 (09 Aug 2020 07:41) (60 - 71)  BP: 154/90 (09 Aug 2020 07:41) (124/76 - 164/71)  BP(mean): --  RR: 14 (09 Aug 2020 07:41) (14 - 16)  SpO2: 95% (09 Aug 2020 07:41) (95% - 96%)  CAPILLARY BLOOD GLUCOSE      POCT Blood Glucose.: 134 mg/dL (09 Aug 2020 08:40)  POCT Blood Glucose.: 181 mg/dL (08 Aug 2020 21:38)  POCT Blood Glucose.: 130 mg/dL (08 Aug 2020 17:07)  POCT Blood Glucose.: 201 mg/dL (08 Aug 2020 11:53)    I&O's Summary    08 Aug 2020 07:01  -  09 Aug 2020 07:00  --------------------------------------------------------  IN: 0 mL / OUT: 1600 mL / NET: -1600 mL        PHYSICAL EXAM:  GENERAL: NAD, elderly male  CHEST/LUNG: Clear to auscultation bilaterally; No wheeze  HEART: Regular rate and rhythm; No murmurs, rubs, or gallops  ABDOMEN: Soft, Nontender, Nondistended; Bowel sounds present  EXTREMITIES:  2+ Peripheral Pulses, No clubbing, cyanosis. LLE swelling, nonpitting   MSK: Left sided weakness. No loss of sensation  PSYCH: AAOx3  NEURO: Left sided weakness  SKIN: No rashes or lesions    LABS:                        13.2   12.63 )-----------( 180      ( 08 Aug 2020 06:13 )             38.8     08-08    141  |  106  |  38<H>  ----------------------------<  147<H>  4.1   |  30  |  1.09    Ca    8.8      08 Aug 2020 06:13    TPro  6.4  /  Alb  3.0<L>  /  TBili  0.5  /  DBili  x   /  AST  19  /  ALT  57<H>  /  AlkPhos  52  08-08              RADIOLOGY & ADDITIONAL TESTS:  Results Reviewed:   Imaging Personally Reviewed:  Electrocardiogram Personally Reviewed:    COORDINATION OF CARE:  Care Discussed with Consultants/Other Providers [Y/N]:  Prior or Outpatient Records Reviewed [Y/N]:

## 2020-08-10 LAB
ALBUMIN SERPL ELPH-MCNC: 3.2 G/DL — LOW (ref 3.3–5)
ALP SERPL-CCNC: 61 U/L — SIGNIFICANT CHANGE UP (ref 40–120)
ALT FLD-CCNC: 97 U/L — HIGH (ref 10–45)
ANION GAP SERPL CALC-SCNC: 9 MMOL/L — SIGNIFICANT CHANGE UP (ref 5–17)
AST SERPL-CCNC: 21 U/L — SIGNIFICANT CHANGE UP (ref 10–40)
BASOPHILS # BLD AUTO: 0 K/UL — SIGNIFICANT CHANGE UP (ref 0–0.2)
BASOPHILS NFR BLD AUTO: 0 % — SIGNIFICANT CHANGE UP (ref 0–2)
BILIRUB SERPL-MCNC: 0.6 MG/DL — SIGNIFICANT CHANGE UP (ref 0.2–1.2)
BUN SERPL-MCNC: 40 MG/DL — HIGH (ref 7–23)
CALCIUM SERPL-MCNC: 8.9 MG/DL — SIGNIFICANT CHANGE UP (ref 8.4–10.5)
CHLORIDE SERPL-SCNC: 99 MMOL/L — SIGNIFICANT CHANGE UP (ref 96–108)
CO2 SERPL-SCNC: 30 MMOL/L — SIGNIFICANT CHANGE UP (ref 22–31)
CREAT SERPL-MCNC: 1.19 MG/DL — SIGNIFICANT CHANGE UP (ref 0.5–1.3)
EOSINOPHIL # BLD AUTO: 0 K/UL — SIGNIFICANT CHANGE UP (ref 0–0.5)
EOSINOPHIL NFR BLD AUTO: 0 % — SIGNIFICANT CHANGE UP (ref 0–6)
GLUCOSE BLDC GLUCOMTR-MCNC: 139 MG/DL — HIGH (ref 70–99)
GLUCOSE BLDC GLUCOMTR-MCNC: 165 MG/DL — HIGH (ref 70–99)
GLUCOSE BLDC GLUCOMTR-MCNC: 167 MG/DL — HIGH (ref 70–99)
GLUCOSE BLDC GLUCOMTR-MCNC: 205 MG/DL — HIGH (ref 70–99)
GLUCOSE SERPL-MCNC: 140 MG/DL — HIGH (ref 70–99)
HCT VFR BLD CALC: 42.2 % — SIGNIFICANT CHANGE UP (ref 39–50)
HGB BLD-MCNC: 14.3 G/DL — SIGNIFICANT CHANGE UP (ref 13–17)
LYMPHOCYTES # BLD AUTO: 0.95 K/UL — LOW (ref 1–3.3)
LYMPHOCYTES # BLD AUTO: 7 % — LOW (ref 13–44)
MCHC RBC-ENTMCNC: 31.4 PG — SIGNIFICANT CHANGE UP (ref 27–34)
MCHC RBC-ENTMCNC: 33.9 GM/DL — SIGNIFICANT CHANGE UP (ref 32–36)
MCV RBC AUTO: 92.5 FL — SIGNIFICANT CHANGE UP (ref 80–100)
MONOCYTES # BLD AUTO: 0.27 K/UL — SIGNIFICANT CHANGE UP (ref 0–0.9)
MONOCYTES NFR BLD AUTO: 2 % — SIGNIFICANT CHANGE UP (ref 2–14)
NEUTROPHILS # BLD AUTO: 12.26 K/UL — HIGH (ref 1.8–7.4)
NEUTROPHILS NFR BLD AUTO: 90 % — HIGH (ref 43–77)
PLATELET # BLD AUTO: 200 K/UL — SIGNIFICANT CHANGE UP (ref 150–400)
POTASSIUM SERPL-MCNC: 3.7 MMOL/L — SIGNIFICANT CHANGE UP (ref 3.5–5.3)
POTASSIUM SERPL-SCNC: 3.7 MMOL/L — SIGNIFICANT CHANGE UP (ref 3.5–5.3)
PROT SERPL-MCNC: 6.9 G/DL — SIGNIFICANT CHANGE UP (ref 6–8.3)
RBC # BLD: 4.56 M/UL — SIGNIFICANT CHANGE UP (ref 4.2–5.8)
RBC # FLD: 12.6 % — SIGNIFICANT CHANGE UP (ref 10.3–14.5)
SODIUM SERPL-SCNC: 138 MMOL/L — SIGNIFICANT CHANGE UP (ref 135–145)
WBC # BLD: 13.62 K/UL — HIGH (ref 3.8–10.5)
WBC # FLD AUTO: 13.62 K/UL — HIGH (ref 3.8–10.5)

## 2020-08-10 PROCEDURE — 99232 SBSQ HOSP IP/OBS MODERATE 35: CPT

## 2020-08-10 RX ORDER — DEXAMETHASONE 0.5 MG/5ML
8 ELIXIR ORAL
Refills: 0 | Status: DISCONTINUED | OUTPATIENT
Start: 2020-08-10 | End: 2020-08-21

## 2020-08-10 RX ADMIN — LEVETIRACETAM 1000 MILLIGRAM(S): 250 TABLET, FILM COATED ORAL at 05:17

## 2020-08-10 RX ADMIN — ENOXAPARIN SODIUM 40 MILLIGRAM(S): 100 INJECTION SUBCUTANEOUS at 11:58

## 2020-08-10 RX ADMIN — TAMSULOSIN HYDROCHLORIDE 0.8 MILLIGRAM(S): 0.4 CAPSULE ORAL at 22:19

## 2020-08-10 RX ADMIN — Medication 40 MILLIGRAM(S): at 05:17

## 2020-08-10 RX ADMIN — AMLODIPINE BESYLATE 5 MILLIGRAM(S): 2.5 TABLET ORAL at 05:17

## 2020-08-10 RX ADMIN — Medication 8 MILLIGRAM(S): at 14:24

## 2020-08-10 RX ADMIN — Medication 1: at 17:02

## 2020-08-10 RX ADMIN — Medication 0.25 MILLIGRAM(S): at 22:25

## 2020-08-10 RX ADMIN — LEVETIRACETAM 1000 MILLIGRAM(S): 250 TABLET, FILM COATED ORAL at 17:02

## 2020-08-10 RX ADMIN — Medication 8 MILLIGRAM(S): at 05:17

## 2020-08-10 RX ADMIN — Medication 81 MILLIGRAM(S): at 11:59

## 2020-08-10 RX ADMIN — Medication 3 MILLIGRAM(S): at 22:19

## 2020-08-10 RX ADMIN — ATORVASTATIN CALCIUM 20 MILLIGRAM(S): 80 TABLET, FILM COATED ORAL at 22:19

## 2020-08-10 RX ADMIN — Medication 5 MILLIGRAM(S): at 17:02

## 2020-08-10 RX ADMIN — Medication 8 MILLIGRAM(S): at 18:13

## 2020-08-10 RX ADMIN — Medication 100 MILLIGRAM(S): at 11:59

## 2020-08-10 RX ADMIN — Medication 1: at 07:40

## 2020-08-10 RX ADMIN — Medication 25 MILLIGRAM(S): at 05:17

## 2020-08-10 RX ADMIN — PANTOPRAZOLE SODIUM 40 MILLIGRAM(S): 20 TABLET, DELAYED RELEASE ORAL at 05:17

## 2020-08-10 NOTE — PROGRESS NOTE ADULT - SUBJECTIVE AND OBJECTIVE BOX
Laz Earl M.D. Pager Number [phone]    Patient is a 79y old  Male who presents with a chief complaint of Glioblastoma (09 Aug 2020 09:17)      SUBJECTIVE / OVERNIGHT EVENTS:  Pt seen and examined at bedside. No acute events overnight.  Pt denies cp, palpitations, sob, abd pain, N/V, fever, chills.    ROS:  All other review of systems negative    Allergies    No Known Allergies    Intolerances        MEDICATIONS  (STANDING):  amLODIPine   Tablet 5 milliGRAM(s) Oral daily  aspirin  chewable 81 milliGRAM(s) Oral daily  atorvastatin 20 milliGRAM(s) Oral at bedtime  bisacodyl 5 milliGRAM(s) Oral every 12 hours  dexAMETHasone     Tablet 8 milliGRAM(s) Oral every 8 hours  dextrose 5%. 1000 milliLiter(s) (50 mL/Hr) IV Continuous <Continuous>  dextrose 50% Injectable 12.5 Gram(s) IV Push once  dextrose 50% Injectable 25 Gram(s) IV Push once  dextrose 50% Injectable 25 Gram(s) IV Push once  docusate sodium 100 milliGRAM(s) Oral daily  enoxaparin Injectable 40 milliGRAM(s) SubCutaneous daily  furosemide    Tablet 40 milliGRAM(s) Oral daily  insulin lispro (HumaLOG) corrective regimen sliding scale   SubCutaneous three times a day before meals  insulin lispro (HumaLOG) corrective regimen sliding scale   SubCutaneous at bedtime  levETIRAcetam 1000 milliGRAM(s) Oral two times a day  melatonin 3 milliGRAM(s) Oral at bedtime  metoprolol succinate ER 25 milliGRAM(s) Oral daily  pantoprazole    Tablet 40 milliGRAM(s) Oral before breakfast  polyethylene glycol 3350 17 Gram(s) Oral two times a day  senna 2 Tablet(s) Oral at bedtime  tamsulosin 0.8 milliGRAM(s) Oral at bedtime    MEDICATIONS  (PRN):  acetaminophen   Tablet .. 650 milliGRAM(s) Oral every 6 hours PRN Temp greater or equal to 38C (100.4F), Mild Pain (1 - 3)  ALPRAZolam 0.25 milliGRAM(s) Oral every 8 hours PRN Anxiety  benzocaine 15 mG/menthol 3.6 mG (Sugar-Free) Lozenge 1 Lozenge Oral two times a day PRN Sore Throat  dextrose 40% Gel 15 Gram(s) Oral once PRN Blood Glucose LESS THAN 70 milliGRAM(s)/deciliter  glucagon  Injectable 1 milliGRAM(s) IntraMuscular once PRN Glucose LESS THAN 70 milligrams/deciliter      Vital Signs Last 24 Hrs  T(C): 36.8 (10 Aug 2020 07:37), Max: 37 (09 Aug 2020 21:23)  T(F): 98.2 (10 Aug 2020 07:37), Max: 98.6 (09 Aug 2020 21:23)  HR: 60 (10 Aug 2020 07:37) (60 - 63)  BP: 143/85 (10 Aug 2020 07:37) (143/80 - 155/80)  BP(mean): --  RR: 15 (10 Aug 2020 07:37) (14 - 15)  SpO2: 95% (10 Aug 2020 07:37) (95% - 96%)  CAPILLARY BLOOD GLUCOSE      POCT Blood Glucose.: 139 mg/dL (10 Aug 2020 11:58)  POCT Blood Glucose.: 167 mg/dL (10 Aug 2020 07:39)  POCT Blood Glucose.: 230 mg/dL (09 Aug 2020 21:25)  POCT Blood Glucose.: 159 mg/dL (09 Aug 2020 16:41)    I&O's Summary    09 Aug 2020 07:01  -  10 Aug 2020 07:00  --------------------------------------------------------  IN: 0 mL / OUT: 1400 mL / NET: -1400 mL        PHYSICAL EXAM:  GENERAL: NAD, elderly male  CHEST/LUNG: Clear to auscultation bilaterally; No wheeze  HEART: Regular rate and rhythm; No murmurs, rubs, or gallops  ABDOMEN: Soft, Nontender, Nondistended; Bowel sounds present  EXTREMITIES:  2+ Peripheral Pulses, No clubbing, cyanosis. LLE swelling, nonpitting   MSK: Left sided weakness. No loss of sensation  PSYCH: AAOx3  NEURO: Left sided weakness  SKIN: No rashes or lesions    LABS:                        14.3   13.62 )-----------( 200      ( 10 Aug 2020 05:30 )             42.2     08-10    138  |  99  |  40<H>  ----------------------------<  140<H>  3.7   |  30  |  1.19    Ca    8.9      10 Aug 2020 05:30    TPro  6.9  /  Alb  3.2<L>  /  TBili  0.6  /  DBili  x   /  AST  21  /  ALT  97<H>  /  AlkPhos  61  08-10              RADIOLOGY & ADDITIONAL TESTS:  Results Reviewed:   Imaging Personally Reviewed:  Electrocardiogram Personally Reviewed:    COORDINATION OF CARE:  Care Discussed with Consultants/Other Providers [Y/N]:  Prior or Outpatient Records Reviewed [Y/N]:

## 2020-08-10 NOTE — PROGRESS NOTE ADULT - ASSESSMENT
78yo male with hx of HTN, HLD, prostate cancer s/p prostatectomy, BCC, GBM (dx in 2018, s/p resection 8/3/18, s/p 2nd resection 2/7/19, s/p 3rd resection 5/2020 s/p multiple does of Temodar), presented to [facility] for acute rehab from [facility] for worsening of his L. sided weakness, noted to have worsening vasogenic edema with progression of GBM, resulting in Left Hemiparesis, Dysphagia, Gait and ADL impairment.     #Debility  -Gait Instability, ADL impairments and Functional impairments  -Continue Comprehensive Rehab Program of PT/OT & speech    #GBM   #Cerebral Edema  #Left hemiplagisa  -Rapidly progressive GBM since prior surgery  -Neuro Onc, Dr. Harman, recs noted   -Continue Decadron 8mg PO q8h of dexamethasone until follow up with Dr. Harman  -Considering starting Avastin for functional improvement. Given prior history of pulmonary embolism while receiving avastin, likely to start Lovenox 1.5mg/kg/day during such therapy.  -Keppra 1000mg BID for Seizure ppx,    #Urinary Retention  -Continue Flomax. Increased to 0.8mg daily  -TOV with Bladder scan with prn intermittent catheterization     #Lower extremity edema  -Secondary to Left sided hemiplegia  -LLE Doppler negative for DVT  -Continue Lasix daily  -Continue to monitor     #HTN  -Chronic   -Continue Metoprolol XL 25mg and norvasc 5mg + Lasix 40mg Daily  -Monitor vitals    #Hyperglycemia  -Steroid induced  -Ha1c 5.6  -Continue fingerstick + SSI     #Constipation  -Miralax BID + Senna + Dulcolax   -Monitor BM per day

## 2020-08-10 NOTE — PROGRESS NOTE ADULT - SUBJECTIVE AND OBJECTIVE BOX
HPI:  79 RHM w/ PMH of HTN, HLD, prostate cancer s/p prostectomy, basal cell skin cancer, GBM (dx in 2018, s/p resection 8/3/18, s/p 2nd resection 2/7/19, s/p 3rd resection 5/2020 s/p multiple does of Temodar admitted to [facility] on 8/4/20 w/ worsening of his L. sided weakness. No other complaints. Denied changes in speech, vision, hearing, swallowing, voice quality, numbness/tingling,  balance/room-spinning sensations.    He was seen in Dr. Harman's office on 7/27/20 where he was noted to have worsening strength, lethargy and decreased appetitie. He had been tapering his steroids for over a month. Recently tapered from 4mg QD --> 3mg QD ---> 2mg QD.    CT Head on admission 8.4.2020, showed- Worsening vasogenic edema involving the R. temporal frontal parietal region again identified increased when compared to previous CT in 5/2020.     Patient admitted to neurology floor, started on decadron 4mg q6h later increased to 8mg q8h. MR head w/ and w/o contrast with progression of disease, neuro-onc on board, patient to follow up outpatient for initiation of avastin therapy.   Patient also with urinary retention, urology consulted, suspect urinary retention is neurologic in etiology, on clean intermittent catheterization; further followup to continue at rehab.   Patient stable for discharge to acute rehab 8/7/20.     Of note--patient was admitted to  rehab after his most recent resection in May 2020.  He was at [facility] from 6/4/20  to 6/17/20.  At the time of discharge from  rehab, patient was supervision for eating, grooming, and dressing, and contact guard for transfers.  He was able to ambulate 150' with SAC with CG/min assist and to navigate 12 steps with u/l HR with min assist.  SLP at [facility] noted higher level cognitive deficits with mild impulsivity and reduced insight, necessitating supervision while at home. (07 Aug 2020 14:30)      PAST MEDICAL & SURGICAL HISTORY:  Fall, sequela  Bilateral hearing loss, unspecified hearing loss type   activity: [employer] 1962-65    [place]  Type 2 diabetes mellitus without complication, without long-term current use of insulin: stop oral meds  3 weeks ago  Glioblastoma: biopsy 8/2018  surgery  6 weeks radiation  35 days of oral chemotherapy last 3 weeks  Basal cell carcinoma (BCC) of left lower leg: s/p resection  right lower leg top  left lower leg  Prostate cancer: s/p radical prostatectomy 1995  HLD (hyperlipidemia)  HTN (hypertension)  S/P colonoscopic polypectomy: 3 years benign polyps  S/P tonsillectomy: age 30  H/O bilateral cataract extraction: 3-4 years ago  S/P prostatectomy: 1995  S/P brain surgery: 8/2018  Lipoma of neck: s/p resection at age 30  Basal cell carcinoma (BCC) of lower leg: s/p resection  History of tonsillectomy: at age 30      Subjective:  No new complaints,  Voiding with PVRs in 200s    REVIEW OF SYMPTOMS  urinary retention,  left HP, cognitive deficits      VITALS  Vital Signs Last 24 Hrs  T(C): 36.8 (10 Aug 2020 07:37), Max: 37 (09 Aug 2020 21:23)  T(F): 98.2 (10 Aug 2020 07:37), Max: 98.6 (09 Aug 2020 21:23)  HR: 60 (10 Aug 2020 07:37) (60 - 63)  BP: 143/85 (10 Aug 2020 07:37) (143/80 - 155/80)  BP(mean): --  RR: 15 (10 Aug 2020 07:37) (14 - 15)  SpO2: 95% (10 Aug 2020 07:37) (95% - 96%)      PHYSICAL EXAM  Constitutional - NAD, Comfortable, fatigued  	HEENT - NCAT, EOMI,   	Neck - Supple, No limited ROM  	Chest -  CTAB   	Cardio - warm and well perfused, RRR, no murmur  	Abdomen -  Soft, NTND  	Extremities - +2 left LE ankle/pedal edema, No calf tenderness   	Neurologic Exam:                 	   Cognitive -   	          Orientation: Awake, Alert, AAO to self, place, date, year, situation  	          Attention:  Days of week, recall 3 objects without cuing  	          Memory: Recent/ remote memory intact  	          Thought: process, content appropriate  	   Speech - Fluent, Comprehensible, No dysarthria, No aphasia   	   Cranial Nerves - left visual field deficit, left facial weakness, Tongue midline, EOMI, Shoulder shrug intact  	   Motor -    	                  LEFT    UE - ShAB 2/5, EF 1/5, EE 2/5, WE 0/5,  0/5  	                  RIGHT UE - ShAB 5/5, EF 5/5, EE 5/5, WE 5/5,  WNL  	                  LEFT    LE - HF 3/5, KE 4/5, KF 3/5, DF 0/5, PF 3/5  	                  RIGHT LE - HF 5/5, KE 5/5, DF 5/5, PF 5/5     	   Sensory - Impaired-- extinguishes on left  	   Coordination - impaired on left  	   OculoVestibular -  No nystagmus  	Psychiatric - Mood stable, Affect WNL  Skin on admission: non-blanchable erythema left buttock  RECENT LABS                        14.3   13.62 )-----------( 200      ( 10 Aug 2020 05:30 )             42.2     08-10    138  |  99  |  40<H>  ----------------------------<  140<H>  3.7   |  30  |  1.19    Ca    8.9      10 Aug 2020 05:30    TPro  6.9  /  Alb  3.2<L>  /  TBili  0.6  /  DBili  x   /  AST  21  /  ALT  97<H>  /  AlkPhos  61  08-10            RADIOLOGY/OTHER RESULTS      MEDICATIONS  (STANDING):  amLODIPine   Tablet 5 milliGRAM(s) Oral daily  aspirin  chewable 81 milliGRAM(s) Oral daily  atorvastatin 20 milliGRAM(s) Oral at bedtime  bisacodyl 5 milliGRAM(s) Oral every 12 hours  dexAMETHasone     Tablet 8 milliGRAM(s) Oral every 8 hours  dextrose 5%. 1000 milliLiter(s) (50 mL/Hr) IV Continuous <Continuous>  dextrose 50% Injectable 12.5 Gram(s) IV Push once  dextrose 50% Injectable 25 Gram(s) IV Push once  dextrose 50% Injectable 25 Gram(s) IV Push once  docusate sodium 100 milliGRAM(s) Oral daily  enoxaparin Injectable 40 milliGRAM(s) SubCutaneous daily  furosemide    Tablet 40 milliGRAM(s) Oral daily  insulin lispro (HumaLOG) corrective regimen sliding scale   SubCutaneous three times a day before meals  insulin lispro (HumaLOG) corrective regimen sliding scale   SubCutaneous at bedtime  levETIRAcetam 1000 milliGRAM(s) Oral two times a day  melatonin 3 milliGRAM(s) Oral at bedtime  metoprolol succinate ER 25 milliGRAM(s) Oral daily  pantoprazole    Tablet 40 milliGRAM(s) Oral before breakfast  polyethylene glycol 3350 17 Gram(s) Oral two times a day  senna 2 Tablet(s) Oral at bedtime  tamsulosin 0.8 milliGRAM(s) Oral at bedtime    MEDICATIONS  (PRN):  acetaminophen   Tablet .. 650 milliGRAM(s) Oral every 6 hours PRN Temp greater or equal to 38C (100.4F), Mild Pain (1 - 3)  ALPRAZolam 0.25 milliGRAM(s) Oral every 8 hours PRN Anxiety  benzocaine 15 mG/menthol 3.6 mG (Sugar-Free) Lozenge 1 Lozenge Oral two times a day PRN Sore Throat  dextrose 40% Gel 15 Gram(s) Oral once PRN Blood Glucose LESS THAN 70 milliGRAM(s)/deciliter  glucagon  Injectable 1 milliGRAM(s) IntraMuscular once PRN Glucose LESS THAN 70 milligrams/deciliter

## 2020-08-10 NOTE — PROGRESS NOTE ADULT - ASSESSMENT
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       COMORBIDITES/ACTIVE MEDICAL ISSUES     GBM with vaso genic edema  -Cont. decadron, --d/w Dr. Del Cid-- taper to 8mg BID   --Cont. Keppra 1000mg BID for Seizure ppx,  -- c/w PPI for GI ppx on steroids      Rehab: Gait Instability, ADL impairments and Functional impairments: start Comprehensive Rehab Program of PT/OT & speech 3 hrs/day x 5days/week    - Pain: Tylenol PRN    HTN--   --cont. Metoprolol XL 25mg and norvasc 5mg  --Lasix 40mg    Urinary Retention  --voiding-- PVRs : 252, 195 cc  --Flomax  --monitor PVRs    Diabetes type 2  --FS monitoring and ISS    Anxiety--  Xanax PRN      GI/Bowel: , Senna, PRN    - Diet: Dysphagia 3 with nectar liquids    Stage I pressure ulcer-- offloading, turn q.2h, barrier cream    LE edema-- compression stockings      Precautions / PROPHYLAXIS:   - Falls, Cardiac,  Seizure   - Lungs: Aspiration,   - Pressure injury/Skin:               Turn Q2hrs while in bed, OOB to Chair, PT/OT    - DVT: Lovenox,

## 2020-08-11 DIAGNOSIS — R13.10 DYSPHAGIA, UNSPECIFIED: ICD-10-CM

## 2020-08-11 DIAGNOSIS — R49.0 DYSPHONIA: ICD-10-CM

## 2020-08-11 LAB
GLUCOSE BLDC GLUCOMTR-MCNC: 152 MG/DL — HIGH (ref 70–99)
GLUCOSE BLDC GLUCOMTR-MCNC: 156 MG/DL — HIGH (ref 70–99)
GLUCOSE BLDC GLUCOMTR-MCNC: 161 MG/DL — HIGH (ref 70–99)
GLUCOSE BLDC GLUCOMTR-MCNC: 178 MG/DL — HIGH (ref 70–99)

## 2020-08-11 PROCEDURE — 31575 DIAGNOSTIC LARYNGOSCOPY: CPT

## 2020-08-11 PROCEDURE — 99222 1ST HOSP IP/OBS MODERATE 55: CPT | Mod: 25

## 2020-08-11 PROCEDURE — 99232 SBSQ HOSP IP/OBS MODERATE 35: CPT

## 2020-08-11 RX ADMIN — Medication 1: at 07:53

## 2020-08-11 RX ADMIN — POLYETHYLENE GLYCOL 3350 17 GRAM(S): 17 POWDER, FOR SOLUTION ORAL at 17:46

## 2020-08-11 RX ADMIN — TAMSULOSIN HYDROCHLORIDE 0.8 MILLIGRAM(S): 0.4 CAPSULE ORAL at 22:41

## 2020-08-11 RX ADMIN — Medication 40 MILLIGRAM(S): at 05:46

## 2020-08-11 RX ADMIN — Medication 8 MILLIGRAM(S): at 05:47

## 2020-08-11 RX ADMIN — ATORVASTATIN CALCIUM 20 MILLIGRAM(S): 80 TABLET, FILM COATED ORAL at 22:41

## 2020-08-11 RX ADMIN — ENOXAPARIN SODIUM 40 MILLIGRAM(S): 100 INJECTION SUBCUTANEOUS at 11:40

## 2020-08-11 RX ADMIN — PANTOPRAZOLE SODIUM 40 MILLIGRAM(S): 20 TABLET, DELAYED RELEASE ORAL at 05:47

## 2020-08-11 RX ADMIN — AMLODIPINE BESYLATE 5 MILLIGRAM(S): 2.5 TABLET ORAL at 05:47

## 2020-08-11 RX ADMIN — LEVETIRACETAM 1000 MILLIGRAM(S): 250 TABLET, FILM COATED ORAL at 05:46

## 2020-08-11 RX ADMIN — Medication 3 MILLIGRAM(S): at 22:41

## 2020-08-11 RX ADMIN — LEVETIRACETAM 1000 MILLIGRAM(S): 250 TABLET, FILM COATED ORAL at 17:46

## 2020-08-11 RX ADMIN — Medication 8 MILLIGRAM(S): at 17:46

## 2020-08-11 RX ADMIN — Medication 5 MILLIGRAM(S): at 17:46

## 2020-08-11 RX ADMIN — Medication 1: at 17:02

## 2020-08-11 RX ADMIN — Medication 1: at 11:44

## 2020-08-11 RX ADMIN — Medication 0.25 MILLIGRAM(S): at 22:40

## 2020-08-11 RX ADMIN — Medication 100 MILLIGRAM(S): at 11:37

## 2020-08-11 RX ADMIN — Medication 81 MILLIGRAM(S): at 11:40

## 2020-08-11 RX ADMIN — Medication 25 MILLIGRAM(S): at 05:46

## 2020-08-11 NOTE — CONSULT NOTE ADULT - SUBJECTIVE AND OBJECTIVE BOX
Patient is a 79y old  Male who presents with a chief complaint of Glioblastoma (11 Aug 2020 13:40)      HPI:  79 RHM w/ PMH of HTN, HLD, prostate cancer s/p prostectomy, basal cell skin cancer, GBM (dx in 2018, s/p resection 8/3/18, s/p 2nd resection 2/7/19, s/p 3rd resection 5/2020 s/p multiple does of Temodar admitted to [facility] on 8/4/20 w/ worsening of his L. sided weakness. No other complaints. Denied changes in speech, vision, hearing, swallowing, voice quality, numbness/tingling,  balance/room-spinning sensations.    He was seen in Dr. Harman's office on 7/27/20 where he was noted to have worsening strength, lethargy and decreased appetitie. He had been tapering his steroids for over a month. Recently tapered from 4mg QD --> 3mg QD ---> 2mg QD.    CT Head on admission 8.4.2020, showed- Worsening vasogenic edema involving the R. temporal frontal parietal region again identified increased when compared to previous CT in 5/2020.     Patient admitted to neurology floor, started on decadron 4mg q6h later increased to 8mg q8h. MR head w/ and w/o contrast with progression of disease, neuro-onc on board, patient to follow up outpatient for initiation of avastin therapy.   Patient also with urinary retention, urology consulted, suspect urinary retention is neurologic in etiology, on clean intermittent catheterization; further followup to continue at rehab.   Patient stable for discharge to acute rehab 8/7/20.     Of note--patient was admitted to  rehab after his most recent resection in May 2020.  He was at [facility] from 6/4/20  to 6/17/20.  At the time of discharge from  rehab, patient was supervision for eating, grooming, and dressing, and contact guard for transfers.  He was able to ambulate 150' with SAC with CG/min assist and to navigate 12 steps with u/l HR with min assist.  SLP at [facility] noted higher level cognitive deficits with mild impulsivity and reduced insight, necessitating supervision while at home. (07 Aug 2020 14:30)      Interval Events: ENT called to evaluate Mr. Day's vocal cords.  He currently has dysphagia and is having speech and swallow therapy and was recently advanced from nectar to thin liquids and a regular diet.  There he shows no signs of aspiration, we were called to evaluate his vocal cords due to coughing.  Mr. Day also reports that his voice has been a bit raspy since his last surgery.    REVIEW OF SYSTEMS:    CONSTITUTIONAL: No weakness, fevers or chills  EYES/ENT: No visual changes;  No vertigo or throat pain   NECK: No pain or stiffness  RESPIRATORY: No cough, wheezing, hemoptysis; No shortness of breath  CARDIOVASCULAR: No chest pain or palpitations  GASTROINTESTINAL: No abdominal or epigastric pain. No nausea, vomiting, or hematemesis; No diarrhea or constipation. No melena or hematochezia.  GENITOURINARY: No dysuria, frequency or hematuria  NEUROLOGICAL: No numbness or weakness  SKIN: No itching, burning, rashes, or lesions   All other review of systems is negative unless indicated above.    MEDICATIONS  (STANDING):  amLODIPine   Tablet 5 milliGRAM(s) Oral daily  aspirin  chewable 81 milliGRAM(s) Oral daily  atorvastatin 20 milliGRAM(s) Oral at bedtime  bisacodyl 5 milliGRAM(s) Oral every 12 hours  dexAMETHasone     Tablet 8 milliGRAM(s) Oral two times a day  dextrose 5%. 1000 milliLiter(s) (50 mL/Hr) IV Continuous <Continuous>  dextrose 50% Injectable 12.5 Gram(s) IV Push once  dextrose 50% Injectable 25 Gram(s) IV Push once  dextrose 50% Injectable 25 Gram(s) IV Push once  docusate sodium 100 milliGRAM(s) Oral daily  enoxaparin Injectable 40 milliGRAM(s) SubCutaneous daily  furosemide    Tablet 40 milliGRAM(s) Oral daily  insulin lispro (HumaLOG) corrective regimen sliding scale   SubCutaneous three times a day before meals  insulin lispro (HumaLOG) corrective regimen sliding scale   SubCutaneous at bedtime  levETIRAcetam 1000 milliGRAM(s) Oral two times a day  melatonin 3 milliGRAM(s) Oral at bedtime  metoprolol succinate ER 25 milliGRAM(s) Oral daily  pantoprazole    Tablet 40 milliGRAM(s) Oral before breakfast  polyethylene glycol 3350 17 Gram(s) Oral two times a day  senna 2 Tablet(s) Oral at bedtime  tamsulosin 0.8 milliGRAM(s) Oral at bedtime    MEDICATIONS  (PRN):  acetaminophen   Tablet .. 650 milliGRAM(s) Oral every 6 hours PRN Temp greater or equal to 38C (100.4F), Mild Pain (1 - 3)  ALPRAZolam 0.25 milliGRAM(s) Oral every 8 hours PRN Anxiety  benzocaine 15 mG/menthol 3.6 mG (Sugar-Free) Lozenge 1 Lozenge Oral two times a day PRN Sore Throat  dextrose 40% Gel 15 Gram(s) Oral once PRN Blood Glucose LESS THAN 70 milliGRAM(s)/deciliter  glucagon  Injectable 1 milliGRAM(s) IntraMuscular once PRN Glucose LESS THAN 70 milligrams/deciliter                            14.3   13.62 )-----------( 200      ( 10 Aug 2020 05:30 )             42.2     08-10    138  |  99  |  40<H>  ----------------------------<  140<H>  3.7   |  30  |  1.19    Ca    8.9      10 Aug 2020 05:30    TPro  6.9  /  Alb  3.2<L>  /  TBili  0.6  /  DBili  x   /  AST  21  /  ALT  97<H>  /  AlkPhos  61  08-10    I&O's Detail    10 Aug 2020 07:01  -  11 Aug 2020 07:00  --------------------------------------------------------  IN:  Total IN: 0 mL    OUT:    Intermittent Catheterization - Urethral: 500 mL  Total OUT: 500 mL    Total NET: -500 mL        Vital Signs Last 24 Hrs  T(C): 36.5 (11 Aug 2020 09:59), Max: 36.7 (10 Aug 2020 20:27)  T(F): 97.7 (11 Aug 2020 09:59), Max: 98 (10 Aug 2020 20:27)  HR: 67 (11 Aug 2020 09:59) (61 - 67)  BP: 129/83 (11 Aug 2020 09:59) (129/83 - 156/87)  BP(mean): --  RR: 14 (11 Aug 2020 09:59) (14 - 14)  SpO2: 98% (11 Aug 2020 09:59) (96% - 98%)    CBC Full  -  ( 10 Aug 2020 05:30 )  WBC Count : 13.62 K/uL  RBC Count : 4.56 M/uL  Hemoglobin : 14.3 g/dL  Hematocrit : 42.2 %  Platelet Count - Automated : 200 K/uL  Mean Cell Volume : 92.5 fl  Mean Cell Hemoglobin : 31.4 pg  Mean Cell Hemoglobin Concentration : 33.9 gm/dL  Auto Neutrophil # : 12.26 K/uL  Auto Lymphocyte # : 0.95 K/uL  Auto Monocyte # : 0.27 K/uL  Auto Eosinophil # : 0.00 K/uL  Auto Basophil # : 0.00 K/uL  Auto Neutrophil % : 90.0 %  Auto Lymphocyte % : 7.0 %  Auto Monocyte % : 2.0 %  Auto Eosinophil % : 0.0 %  Auto Basophil % : 0.0 %      PHYSICAL EXAM:  Constitutional Normal: well nourished, well developed, non-dysmorphic, no acute distress    Psychiatric: age appropriate behavior, cooperative    Skin: no obvious skin lesions    Lymphatic: no cervical lymphadenopathy    External Nose:  Normal, no structural deformities  		  Anterior Nasal Cavity:	Normal mucosa, no turbinate hypertrophy, straight septum  					  Oral Cavity:  Good dentition, tongue midline, no lesions or ulcerations    Neck: No palpable lymphadenopathy    Pulmonary: No Acute Distress.     Neurologic: awake and alert        ENDOSCOPIC EXAMINATION:  Poor visualization of the vocal cords but are bilaterally mobile and slightly atrophic.  Small food particles noted in the hypopharynx.

## 2020-08-11 NOTE — PROGRESS NOTE ADULT - SUBJECTIVE AND OBJECTIVE BOX
Patient is a 79y old  Male who presents with a chief complaint of Glioblastoma (11 Aug 2020 12:38)      ----------------------------------------------------------------------    SUBJECTIVE: No acute issues overnight. Still requiring SC for urinary retention 300-500cc x2. Reports no BM in 2 days. He does not want additional meds just yet as he is nervous to "make a mess".       ROS:  Denies: headache, lightheadedness, CP, SOB, abdominal pain, dysuria, nausea, constipation      ----------------------------------------------------------------------  PHYSICAL EXAM:    Vital Signs Last 24 Hrs  T(C): 36.5 (11 Aug 2020 09:59), Max: 36.7 (10 Aug 2020 20:27)  T(F): 97.7 (11 Aug 2020 09:59), Max: 98 (10 Aug 2020 20:27)  HR: 67 (11 Aug 2020 09:59) (61 - 67)  BP: 129/83 (11 Aug 2020 09:59) (129/83 - 156/87)  BP(mean): --  RR: 14 (11 Aug 2020 09:59) (14 - 14)  SpO2: 98% (11 Aug 2020 09:59) (96% - 98%)  Daily     Daily     PHYSICAL EXAM  Constitutional - NAD, Comfortable, fatigued  	HEENT - NCAT, EOMI,   	Neck - Supple, No limited ROM  	Chest -  CTAB   	Cardio - warm and well perfused, RRR, no murmur  	Abdomen -  Soft, NTND  	Extremities - +2 left LE ankle/pedal edema, No calf tenderness   	Neurologic Exam:                 	   Cognitive -   	          Orientation: Awake, Alert, AAO to self, place, date, year, situation  	          Attention:  Days of week, recall 3 objects without cuing  	          Memory: Recent/ remote memory intact  	          Thought: process, content appropriate  	   Speech - Fluent, Comprehensible, No dysarthria, No aphasia   	   Cranial Nerves - left visual field deficit, left facial weakness, Tongue midline, EOMI, Shoulder shrug intact  	   Motor -    	                  LEFT    UE - ShAB 2/5, EF 1/5, EE 2/5, WE 0/5,  0/5  	                  RIGHT UE - ShAB 5/5, EF 5/5, EE 5/5, WE 5/5,  WNL  	                  LEFT    LE - HF 3/5, KE 4/5, KF 3/5, DF 0/5, PF 3/5  	                  RIGHT LE - HF 5/5, KE 5/5, DF 5/5, PF 5/5     	   Sensory - Impaired-- extinguishes on left  	   Coordination - impaired on left  	   OculoVestibular -  No nystagmus  	Psychiatric - Mood stable, Affect WNL  Skin on admission: non-blanchable erythema left buttock    ----------------------------------------------------------------------  RECENT LABS:                          14.3   13.62 )-----------( 200      ( 10 Aug 2020 05:30 )             42.2     08-10    138  |  99  |  40<H>  ----------------------------<  140<H>  3.7   |  30  |  1.19    Ca    8.9      10 Aug 2020 05:30    TPro  6.9  /  Alb  3.2<L>  /  TBili  0.6  /  DBili  x   /  AST  21  /  ALT  97<H>  /  AlkPhos  61  08-10    LIVER FUNCTIONS - ( 10 Aug 2020 05:30 )  Alb: 3.2 g/dL / Pro: 6.9 g/dL / ALK PHOS: 61 U/L / ALT: 97 U/L / AST: 21 U/L / GGT: x               CAPILLARY BLOOD GLUCOSE      POCT Blood Glucose.: 161 mg/dL (11 Aug 2020 11:43)  POCT Blood Glucose.: 152 mg/dL (11 Aug 2020 07:52)  POCT Blood Glucose.: 205 mg/dL (10 Aug 2020 22:18)  POCT Blood Glucose.: 165 mg/dL (10 Aug 2020 17:00)    ----------------------------------------------------------------------  RECENT IMAGING:    ***  ----------------------------------------------------------------------  MEDICATIONS:  MEDICATIONS  (STANDING):  amLODIPine   Tablet 5 milliGRAM(s) Oral daily  aspirin  chewable 81 milliGRAM(s) Oral daily  atorvastatin 20 milliGRAM(s) Oral at bedtime  bisacodyl 5 milliGRAM(s) Oral every 12 hours  dexAMETHasone     Tablet 8 milliGRAM(s) Oral two times a day  dextrose 5%. 1000 milliLiter(s) (50 mL/Hr) IV Continuous <Continuous>  dextrose 50% Injectable 12.5 Gram(s) IV Push once  dextrose 50% Injectable 25 Gram(s) IV Push once  dextrose 50% Injectable 25 Gram(s) IV Push once  docusate sodium 100 milliGRAM(s) Oral daily  enoxaparin Injectable 40 milliGRAM(s) SubCutaneous daily  furosemide    Tablet 40 milliGRAM(s) Oral daily  insulin lispro (HumaLOG) corrective regimen sliding scale   SubCutaneous three times a day before meals  insulin lispro (HumaLOG) corrective regimen sliding scale   SubCutaneous at bedtime  levETIRAcetam 1000 milliGRAM(s) Oral two times a day  melatonin 3 milliGRAM(s) Oral at bedtime  metoprolol succinate ER 25 milliGRAM(s) Oral daily  pantoprazole    Tablet 40 milliGRAM(s) Oral before breakfast  polyethylene glycol 3350 17 Gram(s) Oral two times a day  senna 2 Tablet(s) Oral at bedtime  tamsulosin 0.8 milliGRAM(s) Oral at bedtime    MEDICATIONS  (PRN):  acetaminophen   Tablet .. 650 milliGRAM(s) Oral every 6 hours PRN Temp greater or equal to 38C (100.4F), Mild Pain (1 - 3)  ALPRAZolam 0.25 milliGRAM(s) Oral every 8 hours PRN Anxiety  benzocaine 15 mG/menthol 3.6 mG (Sugar-Free) Lozenge 1 Lozenge Oral two times a day PRN Sore Throat  dextrose 40% Gel 15 Gram(s) Oral once PRN Blood Glucose LESS THAN 70 milliGRAM(s)/deciliter  glucagon  Injectable 1 milliGRAM(s) IntraMuscular once PRN Glucose LESS THAN 70 milligrams/deciliter    ----------------------------------------------------------------------

## 2020-08-11 NOTE — PROGRESS NOTE ADULT - ASSESSMENT
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     Gait, ADL, functional Impairments  - Rehab: Gait Instability, ADL impairments and Functional impairments: start Comprehensive Rehab Program of PT/OT & speech 3 hrs/day x 5days/week    GBM with vaso genic edema  - Cont. Decadron     d/w Dr. Del Cid     taper to 8mg BID   - Cont. Keppra 1000mg BID for Seizure ppx,  -  c/w PPI for GI ppx on steroids    Pain: Tylenol PRN    HTN  - cont. Metoprolol XL 25mg and norvasc 5mg  - Lasix 40mg     Urinary Retention  - requiring SC:   - continue Flomax  - Optimize Bowel regimen, Constipation for two days  - Mobilize    Diabetes type 2  - FS monitoring and ISS    Anxiety  - continue Xanax PRN    GI/Bowel: , Senna, PRN    Diet/ dysphagia:   - Dysphagia 3, SLP upgraded to Soft + thins 8/11.     Speech requesting ENT evaluation for poor vocal quality    Stage I pressure ulcer  - offloading, turn q.2h, barrier cream    LE edema  - compression stockings  - Lasix as above    Precautions / PROPHYLAXIS:   - Falls, Cardiac,  Seizure   - Lungs: Aspiration,   - Pressure injury/Skin:               Turn Q2hrs while in bed, OOB to Chair, PT/OT    - DVT: Lovenox    Functional Prognosis / Discharge Planning  - Meeting held on 8/11  - Nursing needs: urinary retention  - SW: lives with Adult son and spouse, used cane prior   - OT: Mod to max assist with ADLs  - PT: Max transfers, Mod bed mobility, Stood with max assist, did not ambulate --> min assist with house hold distances  - SLP: upgraded to thins but still coughs intermittently without aspiration. Poor attention and memory. poor vocal quality, recommending ENT evaluaiton  - Barriers: left visual field cut, flaccid weakness   - MAGDALENA:  Plan for EVE 8/24

## 2020-08-11 NOTE — PROGRESS NOTE ADULT - ASSESSMENT
80yo male with hx of HTN, HLD, prostate cancer s/p prostatectomy, BCC, GBM (dx in 2018, s/p resection 8/3/18, s/p 2nd resection 2/7/19, s/p 3rd resection 5/2020 s/p multiple does of Temodar), presented to [facility] for acute rehab from [facility] for worsening of his L. sided weakness, noted to have worsening vasogenic edema with progression of GBM, resulting in Left Hemiparesis, Dysphagia, Gait and ADL impairment.     #Debility  -Gait Instability, ADL impairments and Functional impairments  -Continue Comprehensive Rehab Program of PT/OT & speech    #GBM   #Cerebral Edema  #Left hemiplagisa  -Rapidly progressive GBM since prior surgery  -Neuro Onc, Dr. Harman, recs noted   -Continue Decadron 8mg PO BID  -Considering starting Avastin for functional improvement. Given prior history of pulmonary embolism while receiving avastin, likely to start Lovenox 1.5mg/kg/day during such therapy.  -Keppra 1000mg BID for Seizure ppx,    #Urinary Retention  -Continue Flomax  -TOV with Bladder scan with prn intermittent catheterization     #Lower extremity edema  -Secondary to Left sided hemiplegia  -LLE Doppler negative for DVT  -Continue Lasix daily  -Continue to monitor     #HTN  -Chronic   -Continue Metoprolol XL 25mg and norvasc 5mg + Lasix 40mg Daily  -Monitor vitals    #Hyperglycemia  -Steroid induced  -Ha1c 5.6  -Continue fingerstick + SSI     #Constipation  -Miralax BID + Senna + Dulcolax   -Monitor BM per day

## 2020-08-11 NOTE — PROGRESS NOTE ADULT - SUBJECTIVE AND OBJECTIVE BOX
Laz Earl M.D. Pager Number [phone]    Patient is a 79y old  Male who presents with a chief complaint of Glioblastoma (10 Aug 2020 16:16)      SUBJECTIVE / OVERNIGHT EVENTS:  Pt seen and examined at bedside. No acute events overnight.  Pt denies cp, palpitations, sob, abd pain, N/V, fever, chills.    ROS:  All other review of systems negative    Allergies    No Known Allergies    Intolerances        MEDICATIONS  (STANDING):  amLODIPine   Tablet 5 milliGRAM(s) Oral daily  aspirin  chewable 81 milliGRAM(s) Oral daily  atorvastatin 20 milliGRAM(s) Oral at bedtime  bisacodyl 5 milliGRAM(s) Oral every 12 hours  dexAMETHasone     Tablet 8 milliGRAM(s) Oral two times a day  dextrose 5%. 1000 milliLiter(s) (50 mL/Hr) IV Continuous <Continuous>  dextrose 50% Injectable 12.5 Gram(s) IV Push once  dextrose 50% Injectable 25 Gram(s) IV Push once  dextrose 50% Injectable 25 Gram(s) IV Push once  docusate sodium 100 milliGRAM(s) Oral daily  enoxaparin Injectable 40 milliGRAM(s) SubCutaneous daily  furosemide    Tablet 40 milliGRAM(s) Oral daily  insulin lispro (HumaLOG) corrective regimen sliding scale   SubCutaneous three times a day before meals  insulin lispro (HumaLOG) corrective regimen sliding scale   SubCutaneous at bedtime  levETIRAcetam 1000 milliGRAM(s) Oral two times a day  melatonin 3 milliGRAM(s) Oral at bedtime  metoprolol succinate ER 25 milliGRAM(s) Oral daily  pantoprazole    Tablet 40 milliGRAM(s) Oral before breakfast  polyethylene glycol 3350 17 Gram(s) Oral two times a day  senna 2 Tablet(s) Oral at bedtime  tamsulosin 0.8 milliGRAM(s) Oral at bedtime    MEDICATIONS  (PRN):  acetaminophen   Tablet .. 650 milliGRAM(s) Oral every 6 hours PRN Temp greater or equal to 38C (100.4F), Mild Pain (1 - 3)  ALPRAZolam 0.25 milliGRAM(s) Oral every 8 hours PRN Anxiety  benzocaine 15 mG/menthol 3.6 mG (Sugar-Free) Lozenge 1 Lozenge Oral two times a day PRN Sore Throat  dextrose 40% Gel 15 Gram(s) Oral once PRN Blood Glucose LESS THAN 70 milliGRAM(s)/deciliter  glucagon  Injectable 1 milliGRAM(s) IntraMuscular once PRN Glucose LESS THAN 70 milligrams/deciliter      Vital Signs Last 24 Hrs  T(C): 36.5 (11 Aug 2020 09:59), Max: 36.7 (10 Aug 2020 20:27)  T(F): 97.7 (11 Aug 2020 09:59), Max: 98 (10 Aug 2020 20:27)  HR: 67 (11 Aug 2020 09:59) (61 - 67)  BP: 129/83 (11 Aug 2020 09:59) (129/83 - 156/87)  BP(mean): --  RR: 14 (11 Aug 2020 09:59) (14 - 14)  SpO2: 98% (11 Aug 2020 09:59) (96% - 98%)  CAPILLARY BLOOD GLUCOSE      POCT Blood Glucose.: 161 mg/dL (11 Aug 2020 11:43)  POCT Blood Glucose.: 152 mg/dL (11 Aug 2020 07:52)  POCT Blood Glucose.: 205 mg/dL (10 Aug 2020 22:18)  POCT Blood Glucose.: 165 mg/dL (10 Aug 2020 17:00)    I&O's Summary    10 Aug 2020 07:01  -  11 Aug 2020 07:00  --------------------------------------------------------  IN: 0 mL / OUT: 500 mL / NET: -500 mL        PHYSICAL EXAM:  GENERAL: NAD, elderly male  CHEST/LUNG: Clear to auscultation bilaterally; No wheeze  HEART: Regular rate and rhythm; No murmurs, rubs, or gallops  ABDOMEN: Soft, Nontender, Nondistended; Bowel sounds present  EXTREMITIES:  2+ Peripheral Pulses, No clubbing, cyanosis. LLE swelling, nonpitting   MSK: Left sided weakness. No loss of sensation  PSYCH: AAOx3  NEURO: Left sided weakness  SKIN: No rashes or lesions    LABS:                        14.3   13.62 )-----------( 200      ( 10 Aug 2020 05:30 )             42.2     08-10    138  |  99  |  40<H>  ----------------------------<  140<H>  3.7   |  30  |  1.19    Ca    8.9      10 Aug 2020 05:30    TPro  6.9  /  Alb  3.2<L>  /  TBili  0.6  /  DBili  x   /  AST  21  /  ALT  97<H>  /  AlkPhos  61  08-10              RADIOLOGY & ADDITIONAL TESTS:  Results Reviewed:   Imaging Personally Reviewed:  Electrocardiogram Personally Reviewed:    COORDINATION OF CARE:  Care Discussed with Consultants/Other Providers [Y/N]:  Prior or Outpatient Records Reviewed [Y/N]:

## 2020-08-12 LAB
GLUCOSE BLDC GLUCOMTR-MCNC: 179 MG/DL — HIGH (ref 70–99)
GLUCOSE BLDC GLUCOMTR-MCNC: 181 MG/DL — HIGH (ref 70–99)
GLUCOSE BLDC GLUCOMTR-MCNC: 193 MG/DL — HIGH (ref 70–99)
GLUCOSE BLDC GLUCOMTR-MCNC: 258 MG/DL — HIGH (ref 70–99)

## 2020-08-12 PROCEDURE — 99232 SBSQ HOSP IP/OBS MODERATE 35: CPT

## 2020-08-12 RX ORDER — INSULIN LISPRO 100/ML
3 VIAL (ML) SUBCUTANEOUS
Refills: 0 | Status: DISCONTINUED | OUTPATIENT
Start: 2020-08-12 | End: 2020-08-15

## 2020-08-12 RX ADMIN — Medication 3 UNIT(S): at 17:31

## 2020-08-12 RX ADMIN — Medication 1: at 17:31

## 2020-08-12 RX ADMIN — Medication 40 MILLIGRAM(S): at 05:45

## 2020-08-12 RX ADMIN — Medication 3: at 11:54

## 2020-08-12 RX ADMIN — Medication 25 MILLIGRAM(S): at 05:45

## 2020-08-12 RX ADMIN — Medication 0.25 MILLIGRAM(S): at 22:29

## 2020-08-12 RX ADMIN — Medication 8 MILLIGRAM(S): at 05:45

## 2020-08-12 RX ADMIN — Medication 100 MILLIGRAM(S): at 11:52

## 2020-08-12 RX ADMIN — TAMSULOSIN HYDROCHLORIDE 0.8 MILLIGRAM(S): 0.4 CAPSULE ORAL at 22:20

## 2020-08-12 RX ADMIN — Medication 81 MILLIGRAM(S): at 11:52

## 2020-08-12 RX ADMIN — Medication 1: at 07:42

## 2020-08-12 RX ADMIN — AMLODIPINE BESYLATE 5 MILLIGRAM(S): 2.5 TABLET ORAL at 05:45

## 2020-08-12 RX ADMIN — Medication 5 MILLIGRAM(S): at 17:30

## 2020-08-12 RX ADMIN — PANTOPRAZOLE SODIUM 40 MILLIGRAM(S): 20 TABLET, DELAYED RELEASE ORAL at 05:45

## 2020-08-12 RX ADMIN — Medication 3 UNIT(S): at 22:20

## 2020-08-12 RX ADMIN — ATORVASTATIN CALCIUM 20 MILLIGRAM(S): 80 TABLET, FILM COATED ORAL at 22:20

## 2020-08-12 RX ADMIN — Medication 8 MILLIGRAM(S): at 17:30

## 2020-08-12 RX ADMIN — ENOXAPARIN SODIUM 40 MILLIGRAM(S): 100 INJECTION SUBCUTANEOUS at 11:52

## 2020-08-12 RX ADMIN — POLYETHYLENE GLYCOL 3350 17 GRAM(S): 17 POWDER, FOR SOLUTION ORAL at 17:30

## 2020-08-12 RX ADMIN — Medication 3 MILLIGRAM(S): at 22:20

## 2020-08-12 RX ADMIN — SENNA PLUS 2 TABLET(S): 8.6 TABLET ORAL at 22:20

## 2020-08-12 RX ADMIN — LEVETIRACETAM 1000 MILLIGRAM(S): 250 TABLET, FILM COATED ORAL at 05:45

## 2020-08-12 RX ADMIN — LEVETIRACETAM 1000 MILLIGRAM(S): 250 TABLET, FILM COATED ORAL at 17:30

## 2020-08-12 NOTE — PROGRESS NOTE ADULT - ASSESSMENT
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     Gait, ADL, functional Impairments  - Rehab: Gait Instability, ADL impairments and Functional impairments: start Comprehensive Rehab Program of PT/OT & speech 3 hrs/day x 5days/week    GBM with vaso genic edema  - Cont. Decadron     d/w Dr. Del Cid     taper to 8mg BID   - Cont. Keppra 1000mg BID for Seizure ppx,  -  c/w PPI for GI ppx on steroids    Pain: Tylenol PRN    HTN  - cont. Metoprolol XL 25mg and norvasc 5mg  - Lasix 40mg     Urinary Retention  - requiring SC but seems to be improving   - continue Flomax  - Optimize Bowel regimen, again today   - Mobilize    Diabetes type 2  - FS monitoring and ISS    Anxiety  - continue Xanax PRN    GI/Bowel: , Senna, PRN    Diet/ dysphagia:   - Dysphagia 3, SLP upgraded to Soft + thins 8/11.     ENT evaluation for poor vocal quality-> no abnormalities found     Stage I pressure ulcer  - offloading, turn q.2h, barrier cream    LE edema  - compression stockings  - Lasix as above    Precautions / PROPHYLAXIS:   - Falls, Cardiac,  Seizure   - Lungs: Aspiration,   - Pressure injury/Skin:               Turn Q2hrs while in bed, OOB to Chair, PT/OT    - DVT: Lovenox    Functional Prognosis / Discharge Planning  - Meeting held on 8/11  - Nursing needs: urinary retention  - SW: lives with Adult son and spouse, used cane prior   - OT: Mod to max assist with ADLs  - PT: Max transfers, Mod bed mobility, Stood with max assist, did not ambulate --> min assist with house hold distances  - SLP: upgraded to thins but still coughs intermittently without aspiration. Poor attention and memory. poor vocal quality, recommending ENT evaluaiton  - Barriers: left visual field cut, flaccid weakness   - MAGDALENA:  Plan for EVE 8/24

## 2020-08-12 NOTE — PROGRESS NOTE ADULT - ASSESSMENT
78yo male with hx of HTN, HLD, prostate cancer s/p prostatectomy, BCC, GBM (dx in 2018, s/p resection 8/3/18, s/p 2nd resection 2/7/19, s/p 3rd resection 5/2020 s/p multiple does of Temodar), presented to [facility] for acute rehab from [facility] for worsening of his L. sided weakness, noted to have worsening vasogenic edema with progression of GBM, resulting in Left Hemiparesis, Dysphagia, Gait and ADL impairment.     #Debility  -Gait Instability, ADL impairments and Functional impairments  -Continue Comprehensive Rehab Program of PT/OT & speech    #GBM   #Cerebral Edema  #Left hemiplagisa  -Rapidly progressive GBM since prior surgery  -Neuro Onc, Dr. Harman, recs noted   -Continue Decadron 8mg PO BID  -Considering starting Avastin for functional improvement. Given prior history of pulmonary embolism while receiving avastin, likely to start Lovenox 1.5mg/kg/day during such therapy.  -Keppra 1000mg BID for Seizure ppx,    #Urinary Retention  -Continue Flomax  -TOV with Bladder scan with prn intermittent catheterization     #Lower extremity edema  -Secondary to Left sided hemiplegia  -LLE Doppler negative for DVT  -Continue Lasix daily  -Continue to monitor     #HTN  -Chronic   -Continue Metoprolol XL 25mg and norvasc 5mg + Lasix 40mg Daily  -Monitor vitals    #Hyperglycemia  -Steroid induced  -Ha1c 5.6  -Start premeal coverage with 3units as persistently elevated fingersticks  -Continue fingerstick + SSI     #Constipation  -Miralax BID + Senna + Dulcolax   -Monitor BM per day

## 2020-08-12 NOTE — PROGRESS NOTE ADULT - SUBJECTIVE AND OBJECTIVE BOX
Patient is a 79y old  Male who presents with a chief complaint of Glioblastoma (11 Aug 2020 12:38)      ----------------------------------------------------------------------    SUBJECTIVE: No acute issues overnight. Voided. + bm       ROS:  Denies: headache, lightheadedness, CP, SOB, abdominal pain, dysuria, nausea, constipation      ----------------------------------------------------------------------  PHYSICAL EXAM:    Vital Signs Last 24 Hrs  T(C): 36.6 (12 Aug 2020 07:38), Max: 36.6 (12 Aug 2020 07:38)  T(F): 97.9 (12 Aug 2020 07:38), Max: 97.9 (12 Aug 2020 07:38)  HR: 60 (12 Aug 2020 07:38) (60 - 66)  BP: 122/78 (12 Aug 2020 07:38) (122/70 - 136/74)  BP(mean): --  RR: 15 (12 Aug 2020 07:38) (14 - 15)  SpO2: 96% (12 Aug 2020 07:38) (96% - 96%)      PHYSICAL EXAM  Constitutional - NAD, Comfortable, fatigued  	HEENT - NCAT, EOMI,   	Neck - Supple, No limited ROM  	Chest -  CTAB   	Cardio - warm and well perfused, RRR, no murmur  	Abdomen -  Soft, NTND  	Extremities - +2 left LE ankle/pedal edema, No calf tenderness   	Neurologic Exam:                 	   Cognitive -   	          Orientation: Awake, Alert, AAO to self, place, date, year, situation  	          Attention:  Days of week, recall 3 objects without cuing  	          Memory: Recent/ remote memory intact  	          Thought: process, content appropriate  	   Speech - Fluent, Comprehensible, No dysarthria, No aphasia   	   Cranial Nerves - left visual field deficit, left facial weakness, Tongue midline, EOMI, Shoulder shrug intact  	   Motor -    	                  LEFT    UE - ShAB 2/5, EF 1/5, EE 2/5, WE 0/5,  0/5  	                  RIGHT UE - ShAB 5/5, EF 5/5, EE 5/5, WE 5/5,  WNL  	                  LEFT    LE - HF 3/5, KE 4/5, KF 3/5, DF 0/5, PF 3/5  	                  RIGHT LE - HF 5/5, KE 5/5, DF 5/5, PF 5/5     	   Sensory - Impaired-- extinguishes on left  	   Coordination - impaired on left  	   OculoVestibular -  No nystagmus  	Psychiatric - Mood stable, Affect WNL  Skin on admission: non-blanchable erythema left buttock    ----------------------------------------------------------------------        ----------------------------------------------------------------------

## 2020-08-12 NOTE — PROGRESS NOTE ADULT - SUBJECTIVE AND OBJECTIVE BOX
Laz Earl M.D. Pager Number [phone]    Patient is a 79y old  Male who presents with a chief complaint of Glioblastoma (11 Aug 2020 17:21)      SUBJECTIVE / OVERNIGHT EVENTS:  Pt seen and examined at bedside. No acute events overnight.  Pt denies cp, palpitations, sob, abd pain, N/V, fever, chills.    ROS:  All other review of systems negative    Allergies    No Known Allergies    Intolerances        MEDICATIONS  (STANDING):  amLODIPine   Tablet 5 milliGRAM(s) Oral daily  aspirin  chewable 81 milliGRAM(s) Oral daily  atorvastatin 20 milliGRAM(s) Oral at bedtime  bisacodyl 5 milliGRAM(s) Oral every 12 hours  dexAMETHasone     Tablet 8 milliGRAM(s) Oral two times a day  dextrose 5%. 1000 milliLiter(s) (50 mL/Hr) IV Continuous <Continuous>  dextrose 50% Injectable 12.5 Gram(s) IV Push once  dextrose 50% Injectable 25 Gram(s) IV Push once  dextrose 50% Injectable 25 Gram(s) IV Push once  docusate sodium 100 milliGRAM(s) Oral daily  enoxaparin Injectable 40 milliGRAM(s) SubCutaneous daily  furosemide    Tablet 40 milliGRAM(s) Oral daily  insulin lispro (HumaLOG) corrective regimen sliding scale   SubCutaneous three times a day before meals  insulin lispro (HumaLOG) corrective regimen sliding scale   SubCutaneous at bedtime  insulin lispro Injectable (HumaLOG) 3 Unit(s) SubCutaneous Before meals and at bedtime  levETIRAcetam 1000 milliGRAM(s) Oral two times a day  melatonin 3 milliGRAM(s) Oral at bedtime  metoprolol succinate ER 25 milliGRAM(s) Oral daily  pantoprazole    Tablet 40 milliGRAM(s) Oral before breakfast  polyethylene glycol 3350 17 Gram(s) Oral two times a day  senna 2 Tablet(s) Oral at bedtime  tamsulosin 0.8 milliGRAM(s) Oral at bedtime    MEDICATIONS  (PRN):  acetaminophen   Tablet .. 650 milliGRAM(s) Oral every 6 hours PRN Temp greater or equal to 38C (100.4F), Mild Pain (1 - 3)  ALPRAZolam 0.25 milliGRAM(s) Oral every 8 hours PRN Anxiety  benzocaine 15 mG/menthol 3.6 mG (Sugar-Free) Lozenge 1 Lozenge Oral two times a day PRN Sore Throat  dextrose 40% Gel 15 Gram(s) Oral once PRN Blood Glucose LESS THAN 70 milliGRAM(s)/deciliter  glucagon  Injectable 1 milliGRAM(s) IntraMuscular once PRN Glucose LESS THAN 70 milligrams/deciliter      Vital Signs Last 24 Hrs  T(C): 36.6 (12 Aug 2020 07:38), Max: 36.6 (12 Aug 2020 07:38)  T(F): 97.9 (12 Aug 2020 07:38), Max: 97.9 (12 Aug 2020 07:38)  HR: 60 (12 Aug 2020 07:38) (60 - 66)  BP: 122/78 (12 Aug 2020 07:38) (122/70 - 136/74)  BP(mean): --  RR: 15 (12 Aug 2020 07:38) (14 - 15)  SpO2: 96% (12 Aug 2020 07:38) (96% - 96%)  CAPILLARY BLOOD GLUCOSE      POCT Blood Glucose.: 258 mg/dL (12 Aug 2020 11:51)  POCT Blood Glucose.: 179 mg/dL (12 Aug 2020 07:40)  POCT Blood Glucose.: 178 mg/dL (11 Aug 2020 22:40)  POCT Blood Glucose.: 156 mg/dL (11 Aug 2020 17:00)    I&O's Summary      PHYSICAL EXAM:  GENERAL: NAD, elderly male  CHEST/LUNG: Clear to auscultation bilaterally; No wheeze  HEART: Regular rate and rhythm; No murmurs, rubs, or gallops  ABDOMEN: Soft, Nontender, Nondistended; Bowel sounds present  EXTREMITIES:  2+ Peripheral Pulses, No clubbing, cyanosis. LLE swelling, nonpitting   MSK: Left sided weakness. No loss of sensation  PSYCH: AAOx3  NEURO: Left sided weakness  SKIN: No rashes or lesions    LABS:                    RADIOLOGY & ADDITIONAL TESTS:  Results Reviewed:   Imaging Personally Reviewed:  Electrocardiogram Personally Reviewed:    COORDINATION OF CARE:  Care Discussed with Consultants/Other Providers [Y/N]:  Prior or Outpatient Records Reviewed [Y/N]:

## 2020-08-13 LAB
ALBUMIN SERPL ELPH-MCNC: 2.9 G/DL — LOW (ref 3.3–5)
ALP SERPL-CCNC: 50 U/L — SIGNIFICANT CHANGE UP (ref 40–120)
ALT FLD-CCNC: 98 U/L — HIGH (ref 10–45)
ANION GAP SERPL CALC-SCNC: 8 MMOL/L — SIGNIFICANT CHANGE UP (ref 5–17)
AST SERPL-CCNC: 26 U/L — SIGNIFICANT CHANGE UP (ref 10–40)
BASOPHILS # BLD AUTO: 0.03 K/UL — SIGNIFICANT CHANGE UP (ref 0–0.2)
BASOPHILS NFR BLD AUTO: 0.2 % — SIGNIFICANT CHANGE UP (ref 0–2)
BILIRUB SERPL-MCNC: 0.7 MG/DL — SIGNIFICANT CHANGE UP (ref 0.2–1.2)
BUN SERPL-MCNC: 37 MG/DL — HIGH (ref 7–23)
CALCIUM SERPL-MCNC: 8.5 MG/DL — SIGNIFICANT CHANGE UP (ref 8.4–10.5)
CHLORIDE SERPL-SCNC: 100 MMOL/L — SIGNIFICANT CHANGE UP (ref 96–108)
CO2 SERPL-SCNC: 28 MMOL/L — SIGNIFICANT CHANGE UP (ref 22–31)
CREAT SERPL-MCNC: 0.99 MG/DL — SIGNIFICANT CHANGE UP (ref 0.5–1.3)
EOSINOPHIL # BLD AUTO: 0 K/UL — SIGNIFICANT CHANGE UP (ref 0–0.5)
EOSINOPHIL NFR BLD AUTO: 0 % — SIGNIFICANT CHANGE UP (ref 0–6)
GLUCOSE BLDC GLUCOMTR-MCNC: 147 MG/DL — HIGH (ref 70–99)
GLUCOSE BLDC GLUCOMTR-MCNC: 189 MG/DL — HIGH (ref 70–99)
GLUCOSE BLDC GLUCOMTR-MCNC: 223 MG/DL — HIGH (ref 70–99)
GLUCOSE BLDC GLUCOMTR-MCNC: 310 MG/DL — HIGH (ref 70–99)
GLUCOSE SERPL-MCNC: 139 MG/DL — HIGH (ref 70–99)
HCT VFR BLD CALC: 38.6 % — LOW (ref 39–50)
HGB BLD-MCNC: 13.9 G/DL — SIGNIFICANT CHANGE UP (ref 13–17)
IMM GRANULOCYTES NFR BLD AUTO: 1.9 % — HIGH (ref 0–1.5)
LYMPHOCYTES # BLD AUTO: 0.85 K/UL — LOW (ref 1–3.3)
LYMPHOCYTES # BLD AUTO: 6.6 % — LOW (ref 13–44)
MCHC RBC-ENTMCNC: 32.6 PG — SIGNIFICANT CHANGE UP (ref 27–34)
MCHC RBC-ENTMCNC: 36 GM/DL — SIGNIFICANT CHANGE UP (ref 32–36)
MCV RBC AUTO: 90.6 FL — SIGNIFICANT CHANGE UP (ref 80–100)
MONOCYTES # BLD AUTO: 0.48 K/UL — SIGNIFICANT CHANGE UP (ref 0–0.9)
MONOCYTES NFR BLD AUTO: 3.7 % — SIGNIFICANT CHANGE UP (ref 2–14)
NEUTROPHILS # BLD AUTO: 11.36 K/UL — HIGH (ref 1.8–7.4)
NEUTROPHILS NFR BLD AUTO: 87.6 % — HIGH (ref 43–77)
NRBC # BLD: 0 /100 WBCS — SIGNIFICANT CHANGE UP (ref 0–0)
PLATELET # BLD AUTO: 155 K/UL — SIGNIFICANT CHANGE UP (ref 150–400)
POTASSIUM SERPL-MCNC: 4 MMOL/L — SIGNIFICANT CHANGE UP (ref 3.5–5.3)
POTASSIUM SERPL-SCNC: 4 MMOL/L — SIGNIFICANT CHANGE UP (ref 3.5–5.3)
PROT SERPL-MCNC: 6.2 G/DL — SIGNIFICANT CHANGE UP (ref 6–8.3)
RBC # BLD: 4.26 M/UL — SIGNIFICANT CHANGE UP (ref 4.2–5.8)
RBC # FLD: 12.5 % — SIGNIFICANT CHANGE UP (ref 10.3–14.5)
SODIUM SERPL-SCNC: 136 MMOL/L — SIGNIFICANT CHANGE UP (ref 135–145)
WBC # BLD: 12.96 K/UL — HIGH (ref 3.8–10.5)
WBC # FLD AUTO: 12.96 K/UL — HIGH (ref 3.8–10.5)

## 2020-08-13 RX ORDER — ALPRAZOLAM 0.25 MG
0.25 TABLET ORAL EVERY 8 HOURS
Refills: 0 | Status: DISCONTINUED | OUTPATIENT
Start: 2020-08-13 | End: 2020-08-16

## 2020-08-13 RX ADMIN — Medication 81 MILLIGRAM(S): at 12:38

## 2020-08-13 RX ADMIN — Medication 8 MILLIGRAM(S): at 05:33

## 2020-08-13 RX ADMIN — Medication 8 MILLIGRAM(S): at 17:18

## 2020-08-13 RX ADMIN — Medication 2: at 22:16

## 2020-08-13 RX ADMIN — Medication 5 MILLIGRAM(S): at 05:33

## 2020-08-13 RX ADMIN — Medication 3 UNIT(S): at 08:23

## 2020-08-13 RX ADMIN — Medication 3 MILLIGRAM(S): at 22:15

## 2020-08-13 RX ADMIN — Medication 25 MILLIGRAM(S): at 05:33

## 2020-08-13 RX ADMIN — Medication 40 MILLIGRAM(S): at 05:33

## 2020-08-13 RX ADMIN — LEVETIRACETAM 1000 MILLIGRAM(S): 250 TABLET, FILM COATED ORAL at 05:33

## 2020-08-13 RX ADMIN — AMLODIPINE BESYLATE 5 MILLIGRAM(S): 2.5 TABLET ORAL at 05:44

## 2020-08-13 RX ADMIN — TAMSULOSIN HYDROCHLORIDE 0.8 MILLIGRAM(S): 0.4 CAPSULE ORAL at 22:15

## 2020-08-13 RX ADMIN — ATORVASTATIN CALCIUM 20 MILLIGRAM(S): 80 TABLET, FILM COATED ORAL at 22:15

## 2020-08-13 RX ADMIN — POLYETHYLENE GLYCOL 3350 17 GRAM(S): 17 POWDER, FOR SOLUTION ORAL at 05:34

## 2020-08-13 RX ADMIN — Medication 3 UNIT(S): at 17:18

## 2020-08-13 RX ADMIN — Medication 0.25 MILLIGRAM(S): at 22:15

## 2020-08-13 RX ADMIN — Medication 1: at 17:18

## 2020-08-13 RX ADMIN — LEVETIRACETAM 1000 MILLIGRAM(S): 250 TABLET, FILM COATED ORAL at 17:19

## 2020-08-13 RX ADMIN — Medication 3 UNIT(S): at 22:16

## 2020-08-13 RX ADMIN — Medication 5 MILLIGRAM(S): at 17:18

## 2020-08-13 RX ADMIN — Medication 100 MILLIGRAM(S): at 12:36

## 2020-08-13 RX ADMIN — Medication 2: at 12:37

## 2020-08-13 RX ADMIN — Medication 3 UNIT(S): at 12:37

## 2020-08-13 RX ADMIN — ENOXAPARIN SODIUM 40 MILLIGRAM(S): 100 INJECTION SUBCUTANEOUS at 12:36

## 2020-08-13 RX ADMIN — PANTOPRAZOLE SODIUM 40 MILLIGRAM(S): 20 TABLET, DELAYED RELEASE ORAL at 05:33

## 2020-08-13 NOTE — PROGRESS NOTE ADULT - SUBJECTIVE AND OBJECTIVE BOX
Patient is a 79y old  Male who presents with a chief complaint of Glioblastoma (11 Aug 2020 12:38)      ----------------------------------------------------------------------    SUBJECTIVE: No acute issues overnight. Voided. + bm   initially upset at no liquid diet but diet has been upgraded to regular, can have liquid soup       ROS:  Denies: headache, lightheadedness, CP, SOB, abdominal pain, dysuria, nausea, constipation      ----------------------------------------------------------------------  PHYSICAL EXAM:    Vital Signs Last 24 Hrs  T(C): 36.6 (13 Aug 2020 07:40), Max: 36.6 (13 Aug 2020 07:40)  T(F): 97.8 (13 Aug 2020 07:40), Max: 97.8 (13 Aug 2020 07:40)  HR: 62 (13 Aug 2020 07:40) (60 - 62)  BP: 157/80 (13 Aug 2020 07:40) (125/85 - 157/80)  BP(mean): --  RR: 16 (13 Aug 2020 07:40) (15 - 16)  SpO2: 96% (13 Aug 2020 07:40) (96% - 98%)      PHYSICAL EXAM  Constitutional - NAD, Comfortable, fatigued  	HEENT - NCAT, EOMI,   	Neck - Supple, No limited ROM  	Chest -  CTAB   	Cardio - warm and well perfused, RRR, no murmur  	Abdomen -  Soft, NTND  	Extremities - +2 left LE ankle/pedal edema, No calf tenderness   	Neurologic Exam:                 	   Cognitive -   	          Orientation: Awake, Alert, AAO to self, place, date, year, situation  	          Attention:  Days of week, recall 3 objects without cuing  	          Memory: Recent/ remote memory intact  	          Thought: process, content appropriate  	   Speech - Fluent, Comprehensible, No dysarthria, No aphasia   	   Cranial Nerves - left visual field deficit, left facial weakness, Tongue midline, EOMI, Shoulder shrug intact  	   Motor -    	                  LEFT    UE - ShAB 2/5, EF 1/5, EE 2/5, WE 0/5,  0/5  	                  RIGHT UE - ShAB 5/5, EF 5/5, EE 5/5, WE 5/5,  WNL  	                  LEFT    LE - HF 3/5, KE 4/5, KF 3/5, DF 0/5, PF 3/5  	                  RIGHT LE - HF 5/5, KE 5/5, DF 5/5, PF 5/5     	   Sensory - Impaired-- extinguishes on left  	   Coordination - impaired on left  	   OculoVestibular -  No nystagmus  	Psychiatric - Mood stable, Affect WNL  Skin on admission: non-blanchable erythema left buttock    ----------------------------------------------------------------------                          13.9   12.96 )-----------( 155      ( 13 Aug 2020 07:31 )             38.6     08-13    136  |  100  |  37<H>  ----------------------------<  139<H>  4.0   |  28  |  0.99    Ca    8.5      13 Aug 2020 07:31    TPro  6.2  /  Alb  2.9<L>  /  TBili  0.7  /  DBili  x   /  AST  26  /  ALT  98<H>  /  AlkPhos  50  08-13                ----------------------------------------------------------------------

## 2020-08-13 NOTE — PROGRESS NOTE ADULT - ASSESSMENT
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     Gait, ADL, functional Impairments  - Rehab: Gait Instability, ADL impairments and Functional impairments: start Comprehensive Rehab Program of PT/OT & speech 3 hrs/day x 5days/week    GBM with vaso genic edema  - Cont. Decadron     d/w Dr. Del Cid     taper to 8mg BID   - Cont. Keppra 1000mg BID for Seizure ppx,  -  c/w PPI for GI ppx on steroids    Pain: Tylenol PRN    HTN  - cont. Metoprolol XL 25mg and norvasc 5mg  - Lasix 40mg     Urinary Retention  - requiring SC but seems to be improving   - continue Flomax  - Optimize Bowel regimen, again today   - Mobilize    Diabetes type 2  - FS monitoring and ISS    Anxiety  - continue Xanax PRN    GI/Bowel: , Senna, PRN    Diet/ dysphagia:   - regular/easy to chew with thin liquid for soup      ENT evaluation for poor vocal quality-> no abnormalities found     Stage I pressure ulcer  - offloading, turn q.2h, barrier cream    LE edema  - compression stockings  - Lasix as above    Precautions / PROPHYLAXIS:   - Falls, Cardiac,  Seizure   - Lungs: Aspiration,   - Pressure injury/Skin:               Turn Q2hrs while in bed, OOB to Chair, PT/OT    - DVT: Lovenox    Functional Prognosis / Discharge Planning  - Meeting held on 8/11  - Nursing needs: urinary retention  - SW: lives with Adult son and spouse, used cane prior   - OT: Mod to max assist with ADLs  - PT: Max transfers, Mod bed mobility, Stood with max assist, did not ambulate --> min assist with house hold distances  - SLP: upgraded to thins but still coughs intermittently without aspiration. Poor attention and memory. poor vocal quality, recommending ENT evaluaiton  - Barriers: left visual field cut, flaccid weakness   - MAGDALENA:  Plan for EVE 8/24

## 2020-08-13 NOTE — CHART NOTE - NSCHARTNOTEFT_GEN_A_CORE
NUTRITION FOLLOW UP    SOURCE: Patient [X)  Medical Team  [x]    Medical Record (X)    DIET: Regular/Easy to chew. Ensure Enlive TID.     Spoke with patient today after diet upgraded to Regular/Easy to chew per SLP evaluation. May have full liquid soup. Accommodated preferences and patient was much happier on this diet. Drank 100% of his Ensure Enlive which he will receive at all meals. Meal tickets reflected of change.     PO INTAKE: %     CURRENT WEIGHT: 190# (8/7)    PERTINENT MEDS:   Pertinent Medications: MEDICATIONS  (STANDING):  amLODIPine   Tablet 5 milliGRAM(s) Oral daily  aspirin  chewable 81 milliGRAM(s) Oral daily  atorvastatin 20 milliGRAM(s) Oral at bedtime  bisacodyl 5 milliGRAM(s) Oral every 12 hours  dexAMETHasone     Tablet 8 milliGRAM(s) Oral two times a day  dextrose 5%. 1000 milliLiter(s) (50 mL/Hr) IV Continuous <Continuous>  dextrose 50% Injectable 12.5 Gram(s) IV Push once  dextrose 50% Injectable 25 Gram(s) IV Push once  dextrose 50% Injectable 25 Gram(s) IV Push once  docusate sodium 100 milliGRAM(s) Oral daily  enoxaparin Injectable 40 milliGRAM(s) SubCutaneous daily  furosemide    Tablet 40 milliGRAM(s) Oral daily  insulin lispro (HumaLOG) corrective regimen sliding scale   SubCutaneous three times a day before meals  insulin lispro (HumaLOG) corrective regimen sliding scale   SubCutaneous at bedtime  insulin lispro Injectable (HumaLOG) 3 Unit(s) SubCutaneous Before meals and at bedtime  levETIRAcetam 1000 milliGRAM(s) Oral two times a day  melatonin 3 milliGRAM(s) Oral at bedtime  metoprolol succinate ER 25 milliGRAM(s) Oral daily  pantoprazole    Tablet 40 milliGRAM(s) Oral before breakfast  polyethylene glycol 3350 17 Gram(s) Oral two times a day  senna 2 Tablet(s) Oral at bedtime  tamsulosin 0.8 milliGRAM(s) Oral at bedtime    MEDICATIONS  (PRN):  acetaminophen   Tablet .. 650 milliGRAM(s) Oral every 6 hours PRN Temp greater or equal to 38C (100.4F), Mild Pain (1 - 3)  ALPRAZolam 0.25 milliGRAM(s) Oral every 8 hours PRN Anxiety  benzocaine 15 mG/menthol 3.6 mG (Sugar-Free) Lozenge 1 Lozenge Oral two times a day PRN Sore Throat  dextrose 40% Gel 15 Gram(s) Oral once PRN Blood Glucose LESS THAN 70 milliGRAM(s)/deciliter  glucagon  Injectable 1 milliGRAM(s) IntraMuscular once PRN Glucose LESS THAN 70 milligrams/deciliter      PERTINENT LABS:  08-13 Na136 mmol/L Glu 139 mg/dL<H> K+ 4.0 mmol/L Cr  0.99 mg/dL BUN 37 mg/dL<H> 08-13 Alb 2.9 g/dL<L>    SKIN:  open area on sacrum.  EDEMA: none  LAST BM:  8/11    ESTIMATED NEEDS:   [X] no change since previous assessment    PREVIOUS NUTRITION DIAGNOSIS: difficulty with chewing and swallowing      NUTRITION DIAGNOSIS is :  (x) Improving,  RD will follow as per nutrition department protocol.    NUTRITION RECOMMENDATIONS:   1. Continue diet as ordered. Texture per SLP recommendation.   2. Ensure TID with meals.   3. Accommodate preferences as able.    MONITORING AND EVALUATION:   1. Tolerance to diet prescription   2. PO intake  3. Weights  4. Labs  5. Follow Up per protocol     RD to remain available   Ayde De Leon RDN #[id number]

## 2020-08-14 LAB
GLUCOSE BLDC GLUCOMTR-MCNC: 148 MG/DL — HIGH (ref 70–99)
GLUCOSE BLDC GLUCOMTR-MCNC: 205 MG/DL — HIGH (ref 70–99)
GLUCOSE BLDC GLUCOMTR-MCNC: 254 MG/DL — HIGH (ref 70–99)
GLUCOSE BLDC GLUCOMTR-MCNC: 269 MG/DL — HIGH (ref 70–99)

## 2020-08-14 PROCEDURE — 99232 SBSQ HOSP IP/OBS MODERATE 35: CPT

## 2020-08-14 RX ORDER — AMLODIPINE BESYLATE 2.5 MG/1
10 TABLET ORAL DAILY
Refills: 0 | Status: DISCONTINUED | OUTPATIENT
Start: 2020-08-14 | End: 2020-08-14

## 2020-08-14 RX ORDER — AMLODIPINE BESYLATE 2.5 MG/1
5 TABLET ORAL DAILY
Refills: 0 | Status: DISCONTINUED | OUTPATIENT
Start: 2020-08-14 | End: 2020-08-16

## 2020-08-14 RX ADMIN — Medication 100 MILLIGRAM(S): at 12:08

## 2020-08-14 RX ADMIN — Medication 40 MILLIGRAM(S): at 05:31

## 2020-08-14 RX ADMIN — Medication 3 UNIT(S): at 17:01

## 2020-08-14 RX ADMIN — PANTOPRAZOLE SODIUM 40 MILLIGRAM(S): 20 TABLET, DELAYED RELEASE ORAL at 05:31

## 2020-08-14 RX ADMIN — Medication 3 UNIT(S): at 21:46

## 2020-08-14 RX ADMIN — Medication 8 MILLIGRAM(S): at 05:31

## 2020-08-14 RX ADMIN — BENZOCAINE AND MENTHOL 1 LOZENGE: 5; 1 LIQUID ORAL at 17:02

## 2020-08-14 RX ADMIN — Medication 0.25 MILLIGRAM(S): at 22:03

## 2020-08-14 RX ADMIN — Medication 8 MILLIGRAM(S): at 17:01

## 2020-08-14 RX ADMIN — AMLODIPINE BESYLATE 5 MILLIGRAM(S): 2.5 TABLET ORAL at 05:31

## 2020-08-14 RX ADMIN — LEVETIRACETAM 1000 MILLIGRAM(S): 250 TABLET, FILM COATED ORAL at 17:02

## 2020-08-14 RX ADMIN — Medication 1: at 21:45

## 2020-08-14 RX ADMIN — Medication 3 MILLIGRAM(S): at 21:43

## 2020-08-14 RX ADMIN — Medication 3 UNIT(S): at 08:35

## 2020-08-14 RX ADMIN — Medication 81 MILLIGRAM(S): at 11:14

## 2020-08-14 RX ADMIN — ATORVASTATIN CALCIUM 20 MILLIGRAM(S): 80 TABLET, FILM COATED ORAL at 21:43

## 2020-08-14 RX ADMIN — SENNA PLUS 2 TABLET(S): 8.6 TABLET ORAL at 21:44

## 2020-08-14 RX ADMIN — TAMSULOSIN HYDROCHLORIDE 0.8 MILLIGRAM(S): 0.4 CAPSULE ORAL at 21:43

## 2020-08-14 RX ADMIN — ENOXAPARIN SODIUM 40 MILLIGRAM(S): 100 INJECTION SUBCUTANEOUS at 11:14

## 2020-08-14 RX ADMIN — Medication 2: at 17:01

## 2020-08-14 RX ADMIN — POLYETHYLENE GLYCOL 3350 17 GRAM(S): 17 POWDER, FOR SOLUTION ORAL at 17:02

## 2020-08-14 RX ADMIN — Medication 3 UNIT(S): at 12:08

## 2020-08-14 RX ADMIN — LEVETIRACETAM 1000 MILLIGRAM(S): 250 TABLET, FILM COATED ORAL at 05:31

## 2020-08-14 RX ADMIN — Medication 5 MILLIGRAM(S): at 17:07

## 2020-08-14 RX ADMIN — Medication 3: at 12:08

## 2020-08-14 NOTE — PROGRESS NOTE ADULT - SUBJECTIVE AND OBJECTIVE BOX
Patient is a 79y old  Male who presents with a chief complaint of Glioblastoma (14 Aug 2020 11:13)      ----------------------------------------------------------------------    SUBJECTIVE: No acute events overnight. Yesterday patient frustrated with care and food, but diet able to be upgraded. today he is in better spirits. Required PRN Xanax yesterday for anxiety.     PVR 200s and one SC for 600cc   last BM two days ago   Reports he is sleeping well    ROS:  Denies: headache, lightheadedness, CP, SOB, abdominal pain, dysuria, nausea, constipation      ----------------------------------------------------------------------  PHYSICAL EXAM:    Vital Signs Last 24 Hrs  T(C): 36.7 (14 Aug 2020 08:31), Max: 36.8 (13 Aug 2020 19:54)  T(F): 98.1 (14 Aug 2020 08:31), Max: 98.2 (13 Aug 2020 19:54)  HR: 65 (14 Aug 2020 08:31) (58 - 82)  BP: 129/74 (14 Aug 2020 08:31) (129/74 - 151/88)  BP(mean): --  RR: 15 (14 Aug 2020 08:31) (15 - 16)  SpO2: 94% (14 Aug 2020 08:31) (94% - 96%)  Daily     Daily     PHYSICAL EXAM  Constitutional - NAD, Comfortable  	HEENT - NCAT, EOMI  	Chest -  CTAB   	Cardio - warm and well perfused, RRR, no murmur  	Abdomen -  Soft, NTND  	Extremities - +2 left LE ankle/pedal edema, No calf tenderness   	Neurologic Exam:                 	   Cognitive -   	          Orientation: Awake, Alert, AAO to self, place, date, year, situation  	          Memory: Recent/ remote memory intact  	          Thought: content appropriate, slow processing, perseverative  	   Speech - Fluent, Comprehensible, No dysarthria, No aphasia   	   Cranial Nerves - left visual field deficit, left facial weakness, Tongue midline, EOMI, Shoulder shrug intact  	   Motor -  LEFT hemiparesis  	                  LEFT    UE - ShAB 2/5, EF 1/5, EE 2/5, WE 0/5,  0/5  	                  LEFT    LE - HF 3/5, KE 4/5, KF 3/5, DF 0/5, PF 3/5  	   Sensory - Impaired-- extinguishes on left  	   Coordination - impaired on left  	Psychiatric - Mood stable, Affect WNL, anxious      ----------------------------------------------------------------------  RECENT LABS:                          13.9   12.96 )-----------( 155      ( 13 Aug 2020 07:31 )             38.6     08-13    136  |  100  |  37<H>  ----------------------------<  139<H>  4.0   |  28  |  0.99    Ca    8.5      13 Aug 2020 07:31    TPro  6.2  /  Alb  2.9<L>  /  TBili  0.7  /  DBili  x   /  AST  26  /  ALT  98<H>  /  AlkPhos  50  08-13    LIVER FUNCTIONS - ( 13 Aug 2020 07:31 )  Alb: 2.9 g/dL / Pro: 6.2 g/dL / ALK PHOS: 50 U/L / ALT: 98 U/L / AST: 26 U/L / GGT: x               CAPILLARY BLOOD GLUCOSE      POCT Blood Glucose.: 254 mg/dL (14 Aug 2020 11:43)  POCT Blood Glucose.: 148 mg/dL (14 Aug 2020 08:22)  POCT Blood Glucose.: 310 mg/dL (13 Aug 2020 22:10)  POCT Blood Glucose.: 189 mg/dL (13 Aug 2020 16:47)  POCT Blood Glucose.: 223 mg/dL (13 Aug 2020 12:33)    ----------------------------------------------------------------------  RECENT IMAGING:    ***  ----------------------------------------------------------------------  MEDICATIONS:  MEDICATIONS  (STANDING):  amLODIPine   Tablet 5 milliGRAM(s) Oral daily  aspirin  chewable 81 milliGRAM(s) Oral daily  atorvastatin 20 milliGRAM(s) Oral at bedtime  bisacodyl 5 milliGRAM(s) Oral every 12 hours  dexAMETHasone     Tablet 8 milliGRAM(s) Oral two times a day  dextrose 5%. 1000 milliLiter(s) (50 mL/Hr) IV Continuous <Continuous>  dextrose 50% Injectable 12.5 Gram(s) IV Push once  dextrose 50% Injectable 25 Gram(s) IV Push once  dextrose 50% Injectable 25 Gram(s) IV Push once  docusate sodium 100 milliGRAM(s) Oral daily  enoxaparin Injectable 40 milliGRAM(s) SubCutaneous daily  furosemide    Tablet 40 milliGRAM(s) Oral daily  insulin lispro (HumaLOG) corrective regimen sliding scale   SubCutaneous three times a day before meals  insulin lispro (HumaLOG) corrective regimen sliding scale   SubCutaneous at bedtime  insulin lispro Injectable (HumaLOG) 3 Unit(s) SubCutaneous Before meals and at bedtime  levETIRAcetam 1000 milliGRAM(s) Oral two times a day  melatonin 3 milliGRAM(s) Oral at bedtime  metoprolol succinate ER 25 milliGRAM(s) Oral daily  pantoprazole    Tablet 40 milliGRAM(s) Oral before breakfast  polyethylene glycol 3350 17 Gram(s) Oral two times a day  senna 2 Tablet(s) Oral at bedtime  tamsulosin 0.8 milliGRAM(s) Oral at bedtime    MEDICATIONS  (PRN):  acetaminophen   Tablet .. 650 milliGRAM(s) Oral every 6 hours PRN Temp greater or equal to 38C (100.4F), Mild Pain (1 - 3)  ALPRAZolam 0.25 milliGRAM(s) Oral every 8 hours PRN Anxiety  benzocaine 15 mG/menthol 3.6 mG (Sugar-Free) Lozenge 1 Lozenge Oral two times a day PRN Sore Throat  dextrose 40% Gel 15 Gram(s) Oral once PRN Blood Glucose LESS THAN 70 milliGRAM(s)/deciliter  glucagon  Injectable 1 milliGRAM(s) IntraMuscular once PRN Glucose LESS THAN 70 milligrams/deciliter    ----------------------------------------------------------------------

## 2020-08-14 NOTE — PROGRESS NOTE ADULT - ASSESSMENT
78yo male with hx of HTN, HLD, prostate cancer s/p prostatectomy, BCC, GBM (dx in 2018, s/p resection 8/3/18, s/p 2nd resection 2/7/19, s/p 3rd resection 5/2020 s/p multiple does of Temodar), presented to [facility] for acute rehab from [facility] for worsening of his L. sided weakness, noted to have worsening vasogenic edema with progression of GBM, resulting in Left Hemiparesis, Dysphagia, Gait and ADL impairment.     #Debility  -Gait Instability, ADL impairments and Functional impairments  -Continue Comprehensive Rehab Program of PT/OT & speech    #GBM   #Cerebral Edema  #Left hemiplagisa  -Rapidly progressive GBM since prior surgery  -Neuro Onc, Dr. Harman, recs noted   -Continue Decadron 8mg PO BID  -Considering starting Avastin for functional improvement. Given prior history of pulmonary embolism while receiving avastin, likely to start Lovenox 1.5mg/kg/day during such therapy.  -Keppra 1000mg BID for Seizure ppx,    #Urinary Retention  -Continue Flomax  -TOV with Bladder scan with prn intermittent catheterization     #Lower extremity edema  -Secondary to Left sided hemiplegia  -LLE Doppler negative for DVT  -Continue Lasix daily  -Continue to monitor     #HTN  -Chronic   -Continue Metoprolol XL 25mg and norvasc 5mg + Lasix 40mg Daily  -Monitor vitals    #Hyperglycemia  -Steroid induced  -Ha1c 5.6  -c/w premeal coverage with 3units for now  -Continue fingerstick + SSI     #Constipation  -Miralax BID + Senna + Dulcolax   -Monitor BM per day

## 2020-08-14 NOTE — PROGRESS NOTE ADULT - ASSESSMENT
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     Gait, ADL, functional Impairments  - Rehab: Gait Instability, ADL impairments and Functional impairments:  - continue Comprehensive Rehab Program of PT/OT & speech 3 hrs/day x 5days/week    GBM with vaso genic edema  - Cont. Decadron     d/w Dr. Del Cid     taper to 8mg BID   - Cont. Keppra 1000mg BID for Seizure ppx,  - c/w PPI for GI ppx on steroids    Pain  - Tylenol PRN    HTN  - cont. Metoprolol XL 25mg and norvasc 5mg  - Lasix 40mg     Urinary Retention  - requiring SC once daily but seems to be improving  - continue Flomax  - Optimize Bowel regimen  - Mobilize    Diabetes type 2  - FS monitoring and ISS    Anxiety  - continue Xanax PRN    GI/Bowel:   - Senna, dulcolax, Miralax    Diet/ dysphagia:   - regular/easy to chew with thin     ENT evaluation for poor vocal quality-> no abnormalities found     Stage I pressure ulcer  - offloading, turn q.2h, barrier cream    LE edema  - compression stockings  - Lasix as above    Functional Prognosis / Discharge Planning  - Meeting held on 8/11  - Nursing needs: urinary retention  - SW: lives with Adult son and spouse, used cane prior   - OT: Mod to max assist with ADLs  - PT: Max transfers, Mod bed mobility, Stood with max assist, did not ambulate --> min assist with house hold distances  - SLP: upgraded to thins but still coughs intermittently without aspiration. Poor attention and memory. poor vocal quality, recommending ENT evaluaiton  - Barriers: left visual field cut, flaccid weakness   - MAGDALENA:  Plan for EVE 8/24

## 2020-08-14 NOTE — PROGRESS NOTE ADULT - SUBJECTIVE AND OBJECTIVE BOX
Laz Earl M.D. Pager Number [phone]    Patient is a 79y old  Male who presents with a chief complaint of Glioblastoma (13 Aug 2020 15:00)      SUBJECTIVE / OVERNIGHT EVENTS:  Pt seen and examined at bedside. No acute events overnight.  Pt denies cp, palpitations, sob, abd pain, N/V, fever, chills.    ROS:  All other review of systems negative    Allergies    No Known Allergies    Intolerances        MEDICATIONS  (STANDING):  amLODIPine   Tablet 5 milliGRAM(s) Oral daily  aspirin  chewable 81 milliGRAM(s) Oral daily  atorvastatin 20 milliGRAM(s) Oral at bedtime  bisacodyl 5 milliGRAM(s) Oral every 12 hours  dexAMETHasone     Tablet 8 milliGRAM(s) Oral two times a day  dextrose 5%. 1000 milliLiter(s) (50 mL/Hr) IV Continuous <Continuous>  dextrose 50% Injectable 12.5 Gram(s) IV Push once  dextrose 50% Injectable 25 Gram(s) IV Push once  dextrose 50% Injectable 25 Gram(s) IV Push once  docusate sodium 100 milliGRAM(s) Oral daily  enoxaparin Injectable 40 milliGRAM(s) SubCutaneous daily  furosemide    Tablet 40 milliGRAM(s) Oral daily  insulin lispro (HumaLOG) corrective regimen sliding scale   SubCutaneous three times a day before meals  insulin lispro (HumaLOG) corrective regimen sliding scale   SubCutaneous at bedtime  insulin lispro Injectable (HumaLOG) 3 Unit(s) SubCutaneous Before meals and at bedtime  levETIRAcetam 1000 milliGRAM(s) Oral two times a day  melatonin 3 milliGRAM(s) Oral at bedtime  metoprolol succinate ER 25 milliGRAM(s) Oral daily  pantoprazole    Tablet 40 milliGRAM(s) Oral before breakfast  polyethylene glycol 3350 17 Gram(s) Oral two times a day  senna 2 Tablet(s) Oral at bedtime  tamsulosin 0.8 milliGRAM(s) Oral at bedtime    MEDICATIONS  (PRN):  acetaminophen   Tablet .. 650 milliGRAM(s) Oral every 6 hours PRN Temp greater or equal to 38C (100.4F), Mild Pain (1 - 3)  ALPRAZolam 0.25 milliGRAM(s) Oral every 8 hours PRN Anxiety  benzocaine 15 mG/menthol 3.6 mG (Sugar-Free) Lozenge 1 Lozenge Oral two times a day PRN Sore Throat  dextrose 40% Gel 15 Gram(s) Oral once PRN Blood Glucose LESS THAN 70 milliGRAM(s)/deciliter  glucagon  Injectable 1 milliGRAM(s) IntraMuscular once PRN Glucose LESS THAN 70 milligrams/deciliter      Vital Signs Last 24 Hrs  T(C): 36.7 (14 Aug 2020 08:31), Max: 36.8 (13 Aug 2020 19:54)  T(F): 98.1 (14 Aug 2020 08:31), Max: 98.2 (13 Aug 2020 19:54)  HR: 65 (14 Aug 2020 08:31) (58 - 82)  BP: 129/74 (14 Aug 2020 08:31) (129/74 - 151/88)  BP(mean): --  RR: 15 (14 Aug 2020 08:31) (15 - 16)  SpO2: 94% (14 Aug 2020 08:31) (94% - 96%)  CAPILLARY BLOOD GLUCOSE      POCT Blood Glucose.: 148 mg/dL (14 Aug 2020 08:22)  POCT Blood Glucose.: 310 mg/dL (13 Aug 2020 22:10)  POCT Blood Glucose.: 189 mg/dL (13 Aug 2020 16:47)  POCT Blood Glucose.: 223 mg/dL (13 Aug 2020 12:33)    I&O's Summary    13 Aug 2020 07:01  -  14 Aug 2020 07:00  --------------------------------------------------------  IN: 0 mL / OUT: 100 mL / NET: -100 mL        PHYSICAL EXAM:  GENERAL: NAD, elderly male  CHEST/LUNG: Clear to auscultation bilaterally; No wheeze  HEART: Regular rate and rhythm; No murmurs, rubs, or gallops  ABDOMEN: Soft, Nontender, Nondistended; Bowel sounds present  EXTREMITIES:  2+ Peripheral Pulses, No clubbing, cyanosis. LLE swelling, nonpitting   MSK: Left sided weakness. No loss of sensation  PSYCH: AAOx3  NEURO: Left sided weakness  SKIN: No rashes or lesions    LABS:                        13.9   12.96 )-----------( 155      ( 13 Aug 2020 07:31 )             38.6     08-13    136  |  100  |  37<H>  ----------------------------<  139<H>  4.0   |  28  |  0.99    Ca    8.5      13 Aug 2020 07:31    TPro  6.2  /  Alb  2.9<L>  /  TBili  0.7  /  DBili  x   /  AST  26  /  ALT  98<H>  /  AlkPhos  50  08-13              RADIOLOGY & ADDITIONAL TESTS:  Results Reviewed:   Imaging Personally Reviewed:  Electrocardiogram Personally Reviewed:    COORDINATION OF CARE:  Care Discussed with Consultants/Other Providers [Y/N]:  Prior or Outpatient Records Reviewed [Y/N]:

## 2020-08-15 LAB
GLUCOSE BLDC GLUCOMTR-MCNC: 147 MG/DL — HIGH (ref 70–99)
GLUCOSE BLDC GLUCOMTR-MCNC: 194 MG/DL — HIGH (ref 70–99)
GLUCOSE BLDC GLUCOMTR-MCNC: 195 MG/DL — HIGH (ref 70–99)
GLUCOSE BLDC GLUCOMTR-MCNC: 201 MG/DL — HIGH (ref 70–99)

## 2020-08-15 PROCEDURE — 99232 SBSQ HOSP IP/OBS MODERATE 35: CPT

## 2020-08-15 RX ORDER — INSULIN LISPRO 100/ML
6 VIAL (ML) SUBCUTANEOUS
Refills: 0 | Status: DISCONTINUED | OUTPATIENT
Start: 2020-08-15 | End: 2020-08-16

## 2020-08-15 RX ADMIN — Medication 0: at 07:53

## 2020-08-15 RX ADMIN — Medication 3 MILLIGRAM(S): at 21:03

## 2020-08-15 RX ADMIN — Medication 0.25 MILLIGRAM(S): at 21:07

## 2020-08-15 RX ADMIN — Medication 100 MILLIGRAM(S): at 12:07

## 2020-08-15 RX ADMIN — TAMSULOSIN HYDROCHLORIDE 0.8 MILLIGRAM(S): 0.4 CAPSULE ORAL at 21:03

## 2020-08-15 RX ADMIN — LEVETIRACETAM 1000 MILLIGRAM(S): 250 TABLET, FILM COATED ORAL at 17:05

## 2020-08-15 RX ADMIN — Medication 3 UNIT(S): at 07:53

## 2020-08-15 RX ADMIN — ENOXAPARIN SODIUM 40 MILLIGRAM(S): 100 INJECTION SUBCUTANEOUS at 12:05

## 2020-08-15 RX ADMIN — Medication 2: at 16:42

## 2020-08-15 RX ADMIN — Medication 8 MILLIGRAM(S): at 05:38

## 2020-08-15 RX ADMIN — Medication 81 MILLIGRAM(S): at 12:05

## 2020-08-15 RX ADMIN — PANTOPRAZOLE SODIUM 40 MILLIGRAM(S): 20 TABLET, DELAYED RELEASE ORAL at 06:31

## 2020-08-15 RX ADMIN — Medication 6 UNIT(S): at 21:04

## 2020-08-15 RX ADMIN — Medication 1: at 12:05

## 2020-08-15 RX ADMIN — AMLODIPINE BESYLATE 5 MILLIGRAM(S): 2.5 TABLET ORAL at 05:38

## 2020-08-15 RX ADMIN — Medication 6 UNIT(S): at 12:05

## 2020-08-15 RX ADMIN — Medication 6 UNIT(S): at 16:42

## 2020-08-15 RX ADMIN — Medication 40 MILLIGRAM(S): at 05:38

## 2020-08-15 RX ADMIN — Medication 25 MILLIGRAM(S): at 05:38

## 2020-08-15 RX ADMIN — Medication 8 MILLIGRAM(S): at 17:05

## 2020-08-15 RX ADMIN — ATORVASTATIN CALCIUM 20 MILLIGRAM(S): 80 TABLET, FILM COATED ORAL at 21:04

## 2020-08-15 RX ADMIN — LEVETIRACETAM 1000 MILLIGRAM(S): 250 TABLET, FILM COATED ORAL at 05:38

## 2020-08-15 NOTE — PROGRESS NOTE ADULT - SUBJECTIVE AND OBJECTIVE BOX
Patient is a 79y old  Male who presents with a chief complaint of Glioblastoma (15 Aug 2020 11:20)      HPI:  79 RHM w/ PMH of HTN, HLD, prostate cancer s/p prostectomy, basal cell skin cancer, GBM (dx in 2018, s/p resection 8/3/18, s/p 2nd resection 19, s/p 3rd resection 2020 s/p multiple does of Temodar admitted to [facility] on 20 w/ worsening of his L. sided weakness. No other complaints. Denied changes in speech, vision, hearing, swallowing, voice quality, numbness/tingling,  balance/room-spinning sensations.    He was seen in Dr. Harman's office on 20 where he was noted to have worsening strength, lethargy and decreased appetitie. He had been tapering his steroids for over a month. Recently tapered from 4mg QD --> 3mg QD ---> 2mg QD.    CT Head on admission 2020, showed- Worsening vasogenic edema involving the R. temporal frontal parietal region again identified increased when compared to previous CT in 2020.     Patient admitted to neurology floor, started on decadron 4mg q6h later increased to 8mg q8h. MR head w/ and w/o contrast with progression of disease, neuro-onc on board, patient to follow up outpatient for initiation of avastin therapy.   Patient also with urinary retention, urology consulted, suspect urinary retention is neurologic in etiology, on clean intermittent catheterization; further followup to continue at rehab.   Patient stable for discharge to acute rehab 20.     Of note--patient was admitted to  rehab after his most recent resection in May 2020.  He was at [facility] from 20  to 20.  At the time of discharge from  rehab, patient was supervision for eating, grooming, and dressing, and contact guard for transfers.  He was able to ambulate 150' with SAC with CG/min assist and to navigate 12 steps with u/l HR with min assist.  SLP at [facility] noted higher level cognitive deficits with mild impulsivity and reduced insight, necessitating supervision while at home. (07 Aug 2020 14:30)      PAST MEDICAL & SURGICAL HISTORY:  Fall, sequela  Bilateral hearing loss, unspecified hearing loss type   activity: [employer] -    [place]  Type 2 diabetes mellitus without complication, without long-term current use of insulin: stop oral meds  3 weeks ago  Glioblastoma: biopsy 2018  surgery  6 weeks radiation  35 days of oral chemotherapy last 3 weeks  Basal cell carcinoma (BCC) of left lower leg: s/p resection  right lower leg top  left lower leg  Prostate cancer: s/p radical prostatectomy   HLD (hyperlipidemia)  HTN (hypertension)  S/P colonoscopic polypectomy: 3 years benign polyps  S/P tonsillectomy: age 30  H/O bilateral cataract extraction: 3-4 years ago  S/P prostatectomy:   S/P brain surgery: 2018  Lipoma of neck: s/p resection at age 30  Basal cell carcinoma (BCC) of lower leg: s/p resection  History of tonsillectomy: at age 30      MEDICATIONS  (STANDING):  amLODIPine   Tablet 5 milliGRAM(s) Oral daily  aspirin  chewable 81 milliGRAM(s) Oral daily  atorvastatin 20 milliGRAM(s) Oral at bedtime  bisacodyl 5 milliGRAM(s) Oral every 12 hours  dexAMETHasone     Tablet 8 milliGRAM(s) Oral two times a day  dextrose 5%. 1000 milliLiter(s) (50 mL/Hr) IV Continuous <Continuous>  dextrose 50% Injectable 12.5 Gram(s) IV Push once  dextrose 50% Injectable 25 Gram(s) IV Push once  dextrose 50% Injectable 25 Gram(s) IV Push once  docusate sodium 100 milliGRAM(s) Oral daily  enoxaparin Injectable 40 milliGRAM(s) SubCutaneous daily  furosemide    Tablet 40 milliGRAM(s) Oral daily  insulin lispro (HumaLOG) corrective regimen sliding scale   SubCutaneous three times a day before meals  insulin lispro (HumaLOG) corrective regimen sliding scale   SubCutaneous at bedtime  insulin lispro Injectable (HumaLOG) 6 Unit(s) SubCutaneous Before meals and at bedtime  levETIRAcetam 1000 milliGRAM(s) Oral two times a day  melatonin 3 milliGRAM(s) Oral at bedtime  metoprolol succinate ER 25 milliGRAM(s) Oral daily  pantoprazole    Tablet 40 milliGRAM(s) Oral before breakfast  polyethylene glycol 3350 17 Gram(s) Oral two times a day  senna 2 Tablet(s) Oral at bedtime  tamsulosin 0.8 milliGRAM(s) Oral at bedtime    MEDICATIONS  (PRN):  acetaminophen   Tablet .. 650 milliGRAM(s) Oral every 6 hours PRN Temp greater or equal to 38C (100.4F), Mild Pain (1 - 3)  ALPRAZolam 0.25 milliGRAM(s) Oral every 8 hours PRN Anxiety  benzocaine 15 mG/menthol 3.6 mG (Sugar-Free) Lozenge 1 Lozenge Oral two times a day PRN Sore Throat  dextrose 40% Gel 15 Gram(s) Oral once PRN Blood Glucose LESS THAN 70 milliGRAM(s)/deciliter  glucagon  Injectable 1 milliGRAM(s) IntraMuscular once PRN Glucose LESS THAN 70 milligrams/deciliter      Allergies    No Known Allergies    Intolerances          VITALS  79y  Vital Signs Last 24 Hrs  T(C): 36.8 (15 Aug 2020 08:39), Max: 36.8 (15 Aug 2020 08:39)  T(F): 98.2 (15 Aug 2020 08:39), Max: 98.2 (15 Aug 2020 08:39)  HR: 65 (15 Aug 2020 08:39) (65 - 72)  BP: 167/77 (15 Aug 2020 08:39) (144/78 - 167/77)  BP(mean): --  RR: 15 (15 Aug 2020 08:39) (15 - 15)  SpO2: 96% (15 Aug 2020 08:39) (94% - 96%)  Daily     Daily Weight in k.7 (15 Aug 2020 00:00)        RECENT LABS:                      CAPILLARY BLOOD GLUCOSE      POCT Blood Glucose.: 195 mg/dL (15 Aug 2020 12:02)  POCT Blood Glucose.: 147 mg/dL (15 Aug 2020 07:52)  POCT Blood Glucose.: 269 mg/dL (14 Aug 2020 21:41)  POCT Blood Glucose.: 205 mg/dL (14 Aug 2020 16:45)

## 2020-08-15 NOTE — PROGRESS NOTE ADULT - ASSESSMENT
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     Gait, ADL, functional Impairments  - Rehab: Gait Instability, ADL impairments and Functional impairments:  - continue Comprehensive Rehab Program of PT/OT & speech 3 hrs/day x 5days/week    GBM with vaso genic edema  - Cont. Decadron     d/w Dr. Del Cid     taper to 8mg BID   - Cont. Keppra 1000mg BID for Seizure ppx,  - c/w PPI for GI ppx on steroids    Pain  - Tylenol PRN    HTN  - cont. Metoprolol XL 25mg and norvasc 5mg  - Lasix 40mg     Urinary Retention  - requiring SC once daily but seems to be improving  - continue Flomax  - Optimize Bowel regimen  - Mobilize    Diabetes type 2  - FS monitoring and ISS    Anxiety  - continue Xanax PRN  -neuropschological supportive counseling, recreation therapies    GI/Bowel:   - Senna, dulcolax, Miralax    Diet/ dysphagia:   - regular/easy to chew with thin     ENT evaluation for poor vocal quality-> no abnormalities found     Stage I pressure ulcer  - offloading, turn q.2h, barrier cream    LE edema  - compression stockings  - Lasix as above    Functional Prognosis / Discharge Planning  - Meeting held on 8/11  - Nursing needs: urinary retention  - SW: lives with Adult son and spouse, used cane prior   - OT: Mod to max assist with ADLs  - PT: Max transfers, Mod bed mobility, Stood with max assist, did not ambulate --> min assist with house hold distances  - SLP: upgraded to thins but still coughs intermittently without aspiration. Poor attention and memory. poor vocal quality, recommending ENT evaluaiton  - Barriers: left visual field cut, flaccid weakness   - MAGDALENA:  Plan for [facility] 8/24. May need psychology evaluation to determine capacity to make discharge decisions as patient is currently adamant in his refusal for [facility] transfer. SW follow up to discuss other options such as private hire/other famly support

## 2020-08-15 NOTE — PROGRESS NOTE ADULT - COMMENTS
Patient initially stable, no complaints. Deneis H/A, dizziness, visual change, difficulty sleeping. denied B/B issues, pain.     however, later asks about ability of hospital to "send a person against their wishes". IRf goals and discharge decisions including progress, safety in community and options for assistance. Patient insists that the only way he will go to [facility] is if he's "handcuffed and shot"

## 2020-08-15 NOTE — PROGRESS NOTE ADULT - CONSTITUTIONAL COMMENTS
alert, pleasant but reduced problem solving . Becomes upset when possibility of [facility] discussed and states "I want you to put in there that I won't go to a nursing home"

## 2020-08-15 NOTE — PROGRESS NOTE ADULT - ASSESSMENT
78yo male with hx of HTN, HLD, prostate cancer s/p prostatectomy, BCC, GBM (dx in 2018, s/p resection 8/3/18, s/p 2nd resection 2/7/19, s/p 3rd resection 5/2020 s/p multiple does of Temodar), presented to [facility] for acute rehab from [facility] for worsening of his L. sided weakness, noted to have worsening vasogenic edema with progression of GBM, resulting in Left Hemiparesis, Dysphagia, Gait and ADL impairment.     #Debility  -Gait Instability, ADL impairments and Functional impairments  -Continue Comprehensive Rehab Program of PT/OT & speech    #GBM   #Cerebral Edema  #Left hemiplagisa  -Rapidly progressive GBM since prior surgery  -Neuro Onc, Dr. Harman, recs noted   -Continue Decadron 8mg PO BID  -Considering starting Avastin for functional improvement. Given prior history of pulmonary embolism while receiving avastin, likely to start Lovenox 1.5mg/kg/day during such therapy.  -Keppra 1000mg BID for Seizure ppx,    #Urinary Retention  -Continue Flomax  -TOV with Bladder scan with prn intermittent catheterization     #Lower extremity edema  -Secondary to Left sided hemiplegia  -LLE Doppler negative for DVT  -Continue Lasix daily  -Continue to monitor     #HTN  -Chronic   -Continue Metoprolol XL 25mg and norvasc 5mg + Lasix 40mg Daily  -Monitor vitals    #Hyperglycemia  -Steroid induced  -Ha1c 5.6  -Increase premeal coverage to 6units  -Continue fingerstick + SSI     #Constipation  -Miralax BID + Senna + Dulcolax   -Monitor BM per day

## 2020-08-15 NOTE — PROGRESS NOTE ADULT - SUBJECTIVE AND OBJECTIVE BOX
Laz Earl M.D. Pager Number [phone]    Patient is a 79y old  Male who presents with a chief complaint of Glioblastoma (14 Aug 2020 12:24)      SUBJECTIVE / OVERNIGHT EVENTS:  Pt seen and examined at bedside. No acute events overnight.  Pt denies cp, palpitations, sob, abd pain, N/V, fever, chills.    ROS:  All other review of systems negative    Allergies    No Known Allergies    Intolerances        MEDICATIONS  (STANDING):  amLODIPine   Tablet 5 milliGRAM(s) Oral daily  aspirin  chewable 81 milliGRAM(s) Oral daily  atorvastatin 20 milliGRAM(s) Oral at bedtime  bisacodyl 5 milliGRAM(s) Oral every 12 hours  dexAMETHasone     Tablet 8 milliGRAM(s) Oral two times a day  dextrose 5%. 1000 milliLiter(s) (50 mL/Hr) IV Continuous <Continuous>  dextrose 50% Injectable 12.5 Gram(s) IV Push once  dextrose 50% Injectable 25 Gram(s) IV Push once  dextrose 50% Injectable 25 Gram(s) IV Push once  docusate sodium 100 milliGRAM(s) Oral daily  enoxaparin Injectable 40 milliGRAM(s) SubCutaneous daily  furosemide    Tablet 40 milliGRAM(s) Oral daily  insulin lispro (HumaLOG) corrective regimen sliding scale   SubCutaneous three times a day before meals  insulin lispro (HumaLOG) corrective regimen sliding scale   SubCutaneous at bedtime  levETIRAcetam 1000 milliGRAM(s) Oral two times a day  melatonin 3 milliGRAM(s) Oral at bedtime  metoprolol succinate ER 25 milliGRAM(s) Oral daily  pantoprazole    Tablet 40 milliGRAM(s) Oral before breakfast  polyethylene glycol 3350 17 Gram(s) Oral two times a day  senna 2 Tablet(s) Oral at bedtime  tamsulosin 0.8 milliGRAM(s) Oral at bedtime    MEDICATIONS  (PRN):  acetaminophen   Tablet .. 650 milliGRAM(s) Oral every 6 hours PRN Temp greater or equal to 38C (100.4F), Mild Pain (1 - 3)  ALPRAZolam 0.25 milliGRAM(s) Oral every 8 hours PRN Anxiety  benzocaine 15 mG/menthol 3.6 mG (Sugar-Free) Lozenge 1 Lozenge Oral two times a day PRN Sore Throat  dextrose 40% Gel 15 Gram(s) Oral once PRN Blood Glucose LESS THAN 70 milliGRAM(s)/deciliter  glucagon  Injectable 1 milliGRAM(s) IntraMuscular once PRN Glucose LESS THAN 70 milligrams/deciliter      Vital Signs Last 24 Hrs  T(C): 36.8 (15 Aug 2020 08:39), Max: 36.8 (15 Aug 2020 08:39)  T(F): 98.2 (15 Aug 2020 08:39), Max: 98.2 (15 Aug 2020 08:39)  HR: 65 (15 Aug 2020 08:39) (65 - 72)  BP: 167/77 (15 Aug 2020 08:39) (144/78 - 167/77)  BP(mean): --  RR: 15 (15 Aug 2020 08:39) (15 - 15)  SpO2: 96% (15 Aug 2020 08:39) (94% - 96%)  CAPILLARY BLOOD GLUCOSE      POCT Blood Glucose.: 147 mg/dL (15 Aug 2020 07:52)  POCT Blood Glucose.: 269 mg/dL (14 Aug 2020 21:41)  POCT Blood Glucose.: 205 mg/dL (14 Aug 2020 16:45)  POCT Blood Glucose.: 254 mg/dL (14 Aug 2020 11:43)    I&O's Summary    14 Aug 2020 07:01  -  15 Aug 2020 07:00  --------------------------------------------------------  IN: 0 mL / OUT: 450 mL / NET: -450 mL        PHYSICAL EXAM:  GENERAL: NAD, elderly male  CHEST/LUNG: Clear to auscultation bilaterally; No wheeze  HEART: Regular rate and rhythm; No murmurs, rubs, or gallops  ABDOMEN: Soft, Nontender, Nondistended; Bowel sounds present  EXTREMITIES:  2+ Peripheral Pulses, No clubbing, cyanosis. LLE swelling, nonpitting   MSK: Left sided weakness. No loss of sensation  PSYCH: AAOx3  NEURO: Left sided weakness    LABS:                    RADIOLOGY & ADDITIONAL TESTS:  Results Reviewed:   Imaging Personally Reviewed:  Electrocardiogram Personally Reviewed:    COORDINATION OF CARE:  Care Discussed with Consultants/Other Providers [Y/N]:  Prior or Outpatient Records Reviewed [Y/N]:

## 2020-08-16 LAB
GLUCOSE BLDC GLUCOMTR-MCNC: 139 MG/DL — HIGH (ref 70–99)
GLUCOSE BLDC GLUCOMTR-MCNC: 200 MG/DL — HIGH (ref 70–99)
GLUCOSE BLDC GLUCOMTR-MCNC: 216 MG/DL — HIGH (ref 70–99)
GLUCOSE BLDC GLUCOMTR-MCNC: 256 MG/DL — HIGH (ref 70–99)

## 2020-08-16 PROCEDURE — 99232 SBSQ HOSP IP/OBS MODERATE 35: CPT

## 2020-08-16 RX ORDER — INSULIN LISPRO 100/ML
9 VIAL (ML) SUBCUTANEOUS
Refills: 0 | Status: DISCONTINUED | OUTPATIENT
Start: 2020-08-16 | End: 2020-08-19

## 2020-08-16 RX ORDER — AMLODIPINE BESYLATE 2.5 MG/1
7.5 TABLET ORAL DAILY
Refills: 0 | Status: DISCONTINUED | OUTPATIENT
Start: 2020-08-16 | End: 2020-09-03

## 2020-08-16 RX ADMIN — Medication 1: at 21:24

## 2020-08-16 RX ADMIN — Medication 8 MILLIGRAM(S): at 17:02

## 2020-08-16 RX ADMIN — Medication 1: at 12:03

## 2020-08-16 RX ADMIN — Medication 9 UNIT(S): at 21:24

## 2020-08-16 RX ADMIN — Medication 25 MILLIGRAM(S): at 05:57

## 2020-08-16 RX ADMIN — Medication 100 MILLIGRAM(S): at 12:04

## 2020-08-16 RX ADMIN — PANTOPRAZOLE SODIUM 40 MILLIGRAM(S): 20 TABLET, DELAYED RELEASE ORAL at 05:58

## 2020-08-16 RX ADMIN — Medication 8 MILLIGRAM(S): at 05:57

## 2020-08-16 RX ADMIN — Medication 9 UNIT(S): at 17:01

## 2020-08-16 RX ADMIN — Medication 81 MILLIGRAM(S): at 12:02

## 2020-08-16 RX ADMIN — Medication 6 UNIT(S): at 07:40

## 2020-08-16 RX ADMIN — SENNA PLUS 2 TABLET(S): 8.6 TABLET ORAL at 21:24

## 2020-08-16 RX ADMIN — ATORVASTATIN CALCIUM 20 MILLIGRAM(S): 80 TABLET, FILM COATED ORAL at 21:24

## 2020-08-16 RX ADMIN — Medication 9 UNIT(S): at 12:03

## 2020-08-16 RX ADMIN — Medication 3 MILLIGRAM(S): at 21:24

## 2020-08-16 RX ADMIN — Medication 0: at 07:40

## 2020-08-16 RX ADMIN — LEVETIRACETAM 1000 MILLIGRAM(S): 250 TABLET, FILM COATED ORAL at 17:02

## 2020-08-16 RX ADMIN — LEVETIRACETAM 1000 MILLIGRAM(S): 250 TABLET, FILM COATED ORAL at 05:57

## 2020-08-16 RX ADMIN — AMLODIPINE BESYLATE 5 MILLIGRAM(S): 2.5 TABLET ORAL at 05:57

## 2020-08-16 RX ADMIN — Medication 40 MILLIGRAM(S): at 05:57

## 2020-08-16 RX ADMIN — TAMSULOSIN HYDROCHLORIDE 0.8 MILLIGRAM(S): 0.4 CAPSULE ORAL at 21:24

## 2020-08-16 RX ADMIN — Medication 2: at 17:00

## 2020-08-16 RX ADMIN — POLYETHYLENE GLYCOL 3350 17 GRAM(S): 17 POWDER, FOR SOLUTION ORAL at 17:02

## 2020-08-16 RX ADMIN — Medication 0.25 MILLIGRAM(S): at 21:23

## 2020-08-16 RX ADMIN — ENOXAPARIN SODIUM 40 MILLIGRAM(S): 100 INJECTION SUBCUTANEOUS at 12:02

## 2020-08-16 NOTE — PROGRESS NOTE ADULT - SUBJECTIVE AND OBJECTIVE BOX
Patient is a 79y old  Male who presents with a chief complaint of Glioblastoma (16 Aug 2020 09:43)      HPI:  79 RHM w/ PMH of HTN, HLD, prostate cancer s/p prostectomy, basal cell skin cancer, GBM (dx in 2018, s/p resection 8/3/18, s/p 2nd resection 19, s/p 3rd resection 2020 s/p multiple does of Temodar admitted to [facility] on 20 w/ worsening of his L. sided weakness. No other complaints. Denied changes in speech, vision, hearing, swallowing, voice quality, numbness/tingling,  balance/room-spinning sensations.    He was seen in Dr. Harman's office on 20 where he was noted to have worsening strength, lethargy and decreased appetitie. He had been tapering his steroids for over a month. Recently tapered from 4mg QD --> 3mg QD ---> 2mg QD.    CT Head on admission 2020, showed- Worsening vasogenic edema involving the R. temporal frontal parietal region again identified increased when compared to previous CT in 2020.     Patient admitted to neurology floor, started on decadron 4mg q6h later increased to 8mg q8h. MR head w/ and w/o contrast with progression of disease, neuro-onc on board, patient to follow up outpatient for initiation of avastin therapy.   Patient also with urinary retention, urology consulted, suspect urinary retention is neurologic in etiology, on clean intermittent catheterization; further followup to continue at rehab.   Patient stable for discharge to acute rehab 20.     Of note--patient was admitted to  rehab after his most recent resection in May 2020.  He was at [facility] from 20  to 20.  At the time of discharge from  rehab, patient was supervision for eating, grooming, and dressing, and contact guard for transfers.  He was able to ambulate 150' with SAC with CG/min assist and to navigate 12 steps with u/l HR with min assist.  SLP at [facility] noted higher level cognitive deficits with mild impulsivity and reduced insight, necessitating supervision while at home. (07 Aug 2020 14:30)      PAST MEDICAL & SURGICAL HISTORY:  Fall, sequela  Bilateral hearing loss, unspecified hearing loss type   activity: [employer] -    [place]  Type 2 diabetes mellitus without complication, without long-term current use of insulin: stop oral meds  3 weeks ago  Glioblastoma: biopsy 2018  surgery  6 weeks radiation  35 days of oral chemotherapy last 3 weeks  Basal cell carcinoma (BCC) of left lower leg: s/p resection  right lower leg top  left lower leg  Prostate cancer: s/p radical prostatectomy   HLD (hyperlipidemia)  HTN (hypertension)  S/P colonoscopic polypectomy: 3 years benign polyps  S/P tonsillectomy: age 30  H/O bilateral cataract extraction: 3-4 years ago  S/P prostatectomy:   S/P brain surgery: 2018  Lipoma of neck: s/p resection at age 30  Basal cell carcinoma (BCC) of lower leg: s/p resection  History of tonsillectomy: at age 30      MEDICATIONS  (STANDING):  amLODIPine   Tablet 7.5 milliGRAM(s) Oral daily  aspirin  chewable 81 milliGRAM(s) Oral daily  atorvastatin 20 milliGRAM(s) Oral at bedtime  bisacodyl 5 milliGRAM(s) Oral every 12 hours  dexAMETHasone     Tablet 8 milliGRAM(s) Oral two times a day  dextrose 5%. 1000 milliLiter(s) (50 mL/Hr) IV Continuous <Continuous>  dextrose 50% Injectable 12.5 Gram(s) IV Push once  dextrose 50% Injectable 25 Gram(s) IV Push once  dextrose 50% Injectable 25 Gram(s) IV Push once  docusate sodium 100 milliGRAM(s) Oral daily  enoxaparin Injectable 40 milliGRAM(s) SubCutaneous daily  furosemide    Tablet 40 milliGRAM(s) Oral daily  insulin lispro (HumaLOG) corrective regimen sliding scale   SubCutaneous three times a day before meals  insulin lispro (HumaLOG) corrective regimen sliding scale   SubCutaneous at bedtime  insulin lispro Injectable (HumaLOG) 9 Unit(s) SubCutaneous Before meals and at bedtime  levETIRAcetam 1000 milliGRAM(s) Oral two times a day  melatonin 3 milliGRAM(s) Oral at bedtime  metoprolol succinate ER 25 milliGRAM(s) Oral daily  pantoprazole    Tablet 40 milliGRAM(s) Oral before breakfast  polyethylene glycol 3350 17 Gram(s) Oral two times a day  senna 2 Tablet(s) Oral at bedtime  tamsulosin 0.8 milliGRAM(s) Oral at bedtime    MEDICATIONS  (PRN):  acetaminophen   Tablet .. 650 milliGRAM(s) Oral every 6 hours PRN Temp greater or equal to 38C (100.4F), Mild Pain (1 - 3)  ALPRAZolam 0.25 milliGRAM(s) Oral every 8 hours PRN Anxiety  benzocaine 15 mG/menthol 3.6 mG (Sugar-Free) Lozenge 1 Lozenge Oral two times a day PRN Sore Throat  dextrose 40% Gel 15 Gram(s) Oral once PRN Blood Glucose LESS THAN 70 milliGRAM(s)/deciliter  glucagon  Injectable 1 milliGRAM(s) IntraMuscular once PRN Glucose LESS THAN 70 milligrams/deciliter      Allergies    No Known Allergies    Intolerances          VITALS  79y  Vital Signs Last 24 Hrs  T(C): 36.7 (16 Aug 2020 07:38), Max: 36.7 (16 Aug 2020 07:38)  T(F): 98 (16 Aug 2020 07:38), Max: 98 (16 Aug 2020 07:38)  HR: 55 (16 Aug 2020 07:38) (55 - 76)  BP: 147/86 (16 Aug 2020 07:38) (140/75 - 160/75)  BP(mean): --  RR: 15 (16 Aug 2020 07:38) (15 - 16)  SpO2: 98% (16 Aug 2020 07:38) (95% - 98%)  Daily     Daily Weight in k.7 (15 Aug 2020 22:00)        RECENT LABS:                      CAPILLARY BLOOD GLUCOSE      POCT Blood Glucose.: 139 mg/dL (16 Aug 2020 07:37)  POCT Blood Glucose.: 194 mg/dL (15 Aug 2020 21:01)  POCT Blood Glucose.: 201 mg/dL (15 Aug 2020 16:32)  POCT Blood Glucose.: 195 mg/dL (15 Aug 2020 12:02)      Review of Systems:   · Additional ROS	Patient less upset and irritable today. Slept well, no issues overnight. Denies H/A, pain. No b/B complaints	    Physical Exam:   · Constitutional	detailed exam	  · Constitutional Comments	alert, Reduced problem-solving and overall insight. Mood appears stable aside from anxiety re: possibility EVE placement.	  · Respiratory	detailed exam	  · Respiratory Details	respirations non-labored; clear to auscultation bilaterally	  · Cardiovascular	detailed exam	  · Cardiovascular Details	regular rate and rhythm	  · Gastrointestinal	detailed exam	  · GI Normal	soft; nontender; bowel sounds normal	  · Extremities	detailed exam	  · Extremities Comments	no calf swelling +soft, NT bilaterally no pedal edema

## 2020-08-16 NOTE — PROGRESS NOTE ADULT - ASSESSMENT
78yo male with hx of HTN, HLD, prostate cancer s/p prostatectomy, BCC, GBM (dx in 2018, s/p resection 8/3/18, s/p 2nd resection 2/7/19, s/p 3rd resection 5/2020 s/p multiple does of Temodar), presented to [facility] for acute rehab from [facility] for worsening of his L. sided weakness, noted to have worsening vasogenic edema with progression of GBM, resulting in Left Hemiparesis, Dysphagia, Gait and ADL impairment.     #Debility  -Gait Instability, ADL impairments and Functional impairments  -Continue Comprehensive Rehab Program of PT/OT & speech    #GBM   #Cerebral Edema  #Left hemiplagisa  -Rapidly progressive GBM since prior surgery  -Neuro Onc, Dr. Harman, recs noted   -Continue Decadron 8mg PO BID  -Considering starting Avastin for functional improvement. Given prior history of pulmonary embolism while receiving avastin, likely to start Lovenox 1.5mg/kg/day during such therapy.  -Keppra 1000mg BID for Seizure ppx,    #Urinary Retention  -Continue Flomax  -TOV with Bladder scan with prn intermittent catheterization     #Lower extremity edema  -Secondary to Left sided hemiplegia  -LLE Doppler negative for DVT  -Continue Lasix daily  -Continue to monitor     #HTN  -Chronic   -Continue Metoprolol XL 25mg + Lasix 40mg Daily  -Increase Amlodipine to 7.5mg daily  -Monitor vitals    #Hyperglycemia  -Steroid induced  -Ha1c 5.6  -Increase premeal coverage to 9units  -Continue fingerstick + SSI     #Constipation  -Miralax BID + Senna + Dulcolax   -Monitor BM per day

## 2020-08-16 NOTE — PROGRESS NOTE ADULT - SUBJECTIVE AND OBJECTIVE BOX
Laz Earl M.D. Pager Number [phone]    Patient is a 79y old  Male who presents with a chief complaint of Glioblastoma (15 Aug 2020 14:05)      SUBJECTIVE / OVERNIGHT EVENTS:  Pt seen and examined at bedside. No acute events overnight.  Pt denies cp, palpitations, sob, abd pain, N/V, fever, chills.    ROS:  All other review of systems negative    Allergies    No Known Allergies    Intolerances        MEDICATIONS  (STANDING):  amLODIPine   Tablet 5 milliGRAM(s) Oral daily  aspirin  chewable 81 milliGRAM(s) Oral daily  atorvastatin 20 milliGRAM(s) Oral at bedtime  bisacodyl 5 milliGRAM(s) Oral every 12 hours  dexAMETHasone     Tablet 8 milliGRAM(s) Oral two times a day  dextrose 5%. 1000 milliLiter(s) (50 mL/Hr) IV Continuous <Continuous>  dextrose 50% Injectable 12.5 Gram(s) IV Push once  dextrose 50% Injectable 25 Gram(s) IV Push once  dextrose 50% Injectable 25 Gram(s) IV Push once  docusate sodium 100 milliGRAM(s) Oral daily  enoxaparin Injectable 40 milliGRAM(s) SubCutaneous daily  furosemide    Tablet 40 milliGRAM(s) Oral daily  insulin lispro (HumaLOG) corrective regimen sliding scale   SubCutaneous three times a day before meals  insulin lispro (HumaLOG) corrective regimen sliding scale   SubCutaneous at bedtime  insulin lispro Injectable (HumaLOG) 9 Unit(s) SubCutaneous Before meals and at bedtime  levETIRAcetam 1000 milliGRAM(s) Oral two times a day  melatonin 3 milliGRAM(s) Oral at bedtime  metoprolol succinate ER 25 milliGRAM(s) Oral daily  pantoprazole    Tablet 40 milliGRAM(s) Oral before breakfast  polyethylene glycol 3350 17 Gram(s) Oral two times a day  senna 2 Tablet(s) Oral at bedtime  tamsulosin 0.8 milliGRAM(s) Oral at bedtime    MEDICATIONS  (PRN):  acetaminophen   Tablet .. 650 milliGRAM(s) Oral every 6 hours PRN Temp greater or equal to 38C (100.4F), Mild Pain (1 - 3)  ALPRAZolam 0.25 milliGRAM(s) Oral every 8 hours PRN Anxiety  benzocaine 15 mG/menthol 3.6 mG (Sugar-Free) Lozenge 1 Lozenge Oral two times a day PRN Sore Throat  dextrose 40% Gel 15 Gram(s) Oral once PRN Blood Glucose LESS THAN 70 milliGRAM(s)/deciliter  glucagon  Injectable 1 milliGRAM(s) IntraMuscular once PRN Glucose LESS THAN 70 milligrams/deciliter      Vital Signs Last 24 Hrs  T(C): 36.7 (16 Aug 2020 07:38), Max: 36.7 (16 Aug 2020 07:38)  T(F): 98 (16 Aug 2020 07:38), Max: 98 (16 Aug 2020 07:38)  HR: 55 (16 Aug 2020 07:38) (55 - 76)  BP: 147/86 (16 Aug 2020 07:38) (140/75 - 160/75)  BP(mean): --  RR: 15 (16 Aug 2020 07:38) (15 - 16)  SpO2: 98% (16 Aug 2020 07:38) (95% - 98%)  CAPILLARY BLOOD GLUCOSE      POCT Blood Glucose.: 139 mg/dL (16 Aug 2020 07:37)  POCT Blood Glucose.: 194 mg/dL (15 Aug 2020 21:01)  POCT Blood Glucose.: 201 mg/dL (15 Aug 2020 16:32)  POCT Blood Glucose.: 195 mg/dL (15 Aug 2020 12:02)    I&O's Summary    15 Aug 2020 07:01  -  16 Aug 2020 07:00  --------------------------------------------------------  IN: 0 mL / OUT: 350 mL / NET: -350 mL        PHYSICAL EXAM:  GENERAL: NAD, elderly male  CHEST/LUNG: Clear to auscultation bilaterally; No wheeze  HEART: Regular rate and rhythm; No murmurs, rubs, or gallops  ABDOMEN: Soft, Nontender, Nondistended; Bowel sounds present  EXTREMITIES:  2+ Peripheral Pulses, No clubbing, cyanosis. LLE swelling, nonpitting   MSK: Left sided weakness. No loss of sensation  PSYCH: AAOx3  NEURO: Left sided weakness    LABS:                    RADIOLOGY & ADDITIONAL TESTS:  Results Reviewed:   Imaging Personally Reviewed:  Electrocardiogram Personally Reviewed:    COORDINATION OF CARE:  Care Discussed with Consultants/Other Providers [Y/N]:  Prior or Outpatient Records Reviewed [Y/N]:

## 2020-08-16 NOTE — PROGRESS NOTE ADULT - ASSESSMENT
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     GBM with vaso genic edema  - Cont. Decadron,  taper to 8mg BID   - Cont. Keppra 1000mg BID for Seizure ppx,  - c/w PPI for GI ppx on steroids  - continue Comprehensive Rehab Program of PT/OT & speech 3 hrs/day x 5days/week    Pain  - Tylenol PRN    HTN  - -Continue Metoprolol XL 25mg + Lasix 40mg Daily  -Increase Amlodipine to 7.5mg daily  -140/75 - 160/75) 8/16, hospitalist recs read and appreciated    Urinary Retention  - continue Flomax  - Optimize Bowel regimen  - Mobilize    Diabetes type 2  - FS monitoring and ISS    Anxiety  - continue Xanax PRN  -neuropsychological supportive counseling, recreation therapies    GI/Bowel:   - Senna, dulcolax, Miralax    Diet/ dysphagia:   - regular/easy to chew with thin     ENT evaluation for poor vocal quality-> no abnormalities found     Stage I pressure ulcer  - offloading, turn q.2h, barrier cream    LE edema  - compression stockings  - Lasix as above  s   - MAGDALENA:  Plan for [facility] 8/24. May need psychology evaluation to determine capacity to make discharge decisions as patient is currently adamant in his refusal for [facility] transfer. SW follow up to discuss other options such as private hire/other famly support

## 2020-08-17 DIAGNOSIS — F06.34 MOOD DISORDER DUE TO KNOWN PHYSIOLOGICAL CONDITION WITH MIXED FEATURES: ICD-10-CM

## 2020-08-17 LAB
ALBUMIN SERPL ELPH-MCNC: 2.6 G/DL — LOW (ref 3.3–5)
ALP SERPL-CCNC: 63 U/L — SIGNIFICANT CHANGE UP (ref 40–120)
ALT FLD-CCNC: 122 U/L — HIGH (ref 10–45)
ANION GAP SERPL CALC-SCNC: 3 MMOL/L — LOW (ref 5–17)
AST SERPL-CCNC: 29 U/L — SIGNIFICANT CHANGE UP (ref 10–40)
BASOPHILS # BLD AUTO: 0 K/UL — SIGNIFICANT CHANGE UP (ref 0–0.2)
BASOPHILS NFR BLD AUTO: 0 % — SIGNIFICANT CHANGE UP (ref 0–2)
BILIRUB SERPL-MCNC: 0.4 MG/DL — SIGNIFICANT CHANGE UP (ref 0.2–1.2)
BUN SERPL-MCNC: 40 MG/DL — HIGH (ref 7–23)
CALCIUM SERPL-MCNC: 8.6 MG/DL — SIGNIFICANT CHANGE UP (ref 8.4–10.5)
CHLORIDE SERPL-SCNC: 102 MMOL/L — SIGNIFICANT CHANGE UP (ref 96–108)
CO2 SERPL-SCNC: 33 MMOL/L — HIGH (ref 22–31)
CREAT SERPL-MCNC: 0.98 MG/DL — SIGNIFICANT CHANGE UP (ref 0.5–1.3)
EOSINOPHIL # BLD AUTO: 0 K/UL — SIGNIFICANT CHANGE UP (ref 0–0.5)
EOSINOPHIL NFR BLD AUTO: 0 % — SIGNIFICANT CHANGE UP (ref 0–6)
GLUCOSE BLDC GLUCOMTR-MCNC: 154 MG/DL — HIGH (ref 70–99)
GLUCOSE BLDC GLUCOMTR-MCNC: 191 MG/DL — HIGH (ref 70–99)
GLUCOSE BLDC GLUCOMTR-MCNC: 210 MG/DL — HIGH (ref 70–99)
GLUCOSE BLDC GLUCOMTR-MCNC: 216 MG/DL — HIGH (ref 70–99)
GLUCOSE SERPL-MCNC: 141 MG/DL — HIGH (ref 70–99)
HCT VFR BLD CALC: 38.1 % — LOW (ref 39–50)
HGB BLD-MCNC: 13.2 G/DL — SIGNIFICANT CHANGE UP (ref 13–17)
LYMPHOCYTES # BLD AUTO: 0.48 K/UL — LOW (ref 1–3.3)
LYMPHOCYTES # BLD AUTO: 3 % — LOW (ref 13–44)
MCHC RBC-ENTMCNC: 32.5 PG — SIGNIFICANT CHANGE UP (ref 27–34)
MCHC RBC-ENTMCNC: 34.6 GM/DL — SIGNIFICANT CHANGE UP (ref 32–36)
MCV RBC AUTO: 93.8 FL — SIGNIFICANT CHANGE UP (ref 80–100)
MONOCYTES # BLD AUTO: 0.48 K/UL — SIGNIFICANT CHANGE UP (ref 0–0.9)
MONOCYTES NFR BLD AUTO: 3 % — SIGNIFICANT CHANGE UP (ref 2–14)
NEUTROPHILS # BLD AUTO: 15.13 K/UL — HIGH (ref 1.8–7.4)
NEUTROPHILS NFR BLD AUTO: 93 % — HIGH (ref 43–77)
PLATELET # BLD AUTO: 194 K/UL — SIGNIFICANT CHANGE UP (ref 150–400)
POTASSIUM SERPL-MCNC: 4.3 MMOL/L — SIGNIFICANT CHANGE UP (ref 3.5–5.3)
POTASSIUM SERPL-SCNC: 4.3 MMOL/L — SIGNIFICANT CHANGE UP (ref 3.5–5.3)
PROT SERPL-MCNC: 5.7 G/DL — LOW (ref 6–8.3)
RBC # BLD: 4.06 M/UL — LOW (ref 4.2–5.8)
RBC # FLD: 12.4 % — SIGNIFICANT CHANGE UP (ref 10.3–14.5)
SODIUM SERPL-SCNC: 138 MMOL/L — SIGNIFICANT CHANGE UP (ref 135–145)
WBC # BLD: 16.1 K/UL — HIGH (ref 3.8–10.5)
WBC # FLD AUTO: 16.1 K/UL — HIGH (ref 3.8–10.5)

## 2020-08-17 PROCEDURE — 99232 SBSQ HOSP IP/OBS MODERATE 35: CPT

## 2020-08-17 RX ORDER — ALPRAZOLAM 0.25 MG
0.25 TABLET ORAL THREE TIMES A DAY
Refills: 0 | Status: DISCONTINUED | OUTPATIENT
Start: 2020-08-17 | End: 2020-08-24

## 2020-08-17 RX ORDER — OLANZAPINE 15 MG/1
2.5 TABLET, FILM COATED ORAL AT BEDTIME
Refills: 0 | Status: DISCONTINUED | OUTPATIENT
Start: 2020-08-17 | End: 2020-08-17

## 2020-08-17 RX ADMIN — Medication 0.25 MILLIGRAM(S): at 22:47

## 2020-08-17 RX ADMIN — Medication 9 UNIT(S): at 11:36

## 2020-08-17 RX ADMIN — LEVETIRACETAM 1000 MILLIGRAM(S): 250 TABLET, FILM COATED ORAL at 18:10

## 2020-08-17 RX ADMIN — Medication 3 MILLIGRAM(S): at 22:46

## 2020-08-17 RX ADMIN — Medication 1: at 07:54

## 2020-08-17 RX ADMIN — Medication 8 MILLIGRAM(S): at 06:11

## 2020-08-17 RX ADMIN — PANTOPRAZOLE SODIUM 40 MILLIGRAM(S): 20 TABLET, DELAYED RELEASE ORAL at 06:11

## 2020-08-17 RX ADMIN — Medication 8 MILLIGRAM(S): at 18:09

## 2020-08-17 RX ADMIN — Medication 9 UNIT(S): at 07:54

## 2020-08-17 RX ADMIN — Medication 5 MILLIGRAM(S): at 18:09

## 2020-08-17 RX ADMIN — Medication 1: at 11:36

## 2020-08-17 RX ADMIN — Medication 81 MILLIGRAM(S): at 11:36

## 2020-08-17 RX ADMIN — Medication 9 UNIT(S): at 22:48

## 2020-08-17 RX ADMIN — SENNA PLUS 2 TABLET(S): 8.6 TABLET ORAL at 22:46

## 2020-08-17 RX ADMIN — LEVETIRACETAM 1000 MILLIGRAM(S): 250 TABLET, FILM COATED ORAL at 06:11

## 2020-08-17 RX ADMIN — Medication 100 MILLIGRAM(S): at 11:42

## 2020-08-17 RX ADMIN — ATORVASTATIN CALCIUM 20 MILLIGRAM(S): 80 TABLET, FILM COATED ORAL at 22:47

## 2020-08-17 RX ADMIN — ENOXAPARIN SODIUM 40 MILLIGRAM(S): 100 INJECTION SUBCUTANEOUS at 11:37

## 2020-08-17 RX ADMIN — AMLODIPINE BESYLATE 7.5 MILLIGRAM(S): 2.5 TABLET ORAL at 06:11

## 2020-08-17 RX ADMIN — Medication 9 UNIT(S): at 16:54

## 2020-08-17 RX ADMIN — Medication 40 MILLIGRAM(S): at 06:11

## 2020-08-17 RX ADMIN — TAMSULOSIN HYDROCHLORIDE 0.8 MILLIGRAM(S): 0.4 CAPSULE ORAL at 22:47

## 2020-08-17 RX ADMIN — Medication 2: at 16:57

## 2020-08-17 RX ADMIN — POLYETHYLENE GLYCOL 3350 17 GRAM(S): 17 POWDER, FOR SOLUTION ORAL at 18:10

## 2020-08-17 NOTE — PROGRESS NOTE ADULT - ASSESSMENT
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     GBM with vaso genic edema  - Cont. Decadron,  taper to 8mg BID   - Cont. Keppra 1000mg BID for Seizure ppx,  - c/w PPI for GI ppx on steroids  - continue Comprehensive Rehab Program of PT/OT & speech 3 hrs/day x 5days/week    HTN  - -Continue Metoprolol XL 25mg + Lasix 40mg Daily  - Amlodipine to 7.5mg daily    Urinary Retention  - continue Flomax  - Optimize Bowel regimen  - Mobilize    Diabetes type 2  - FS monitoring and ISS    Mood/ Cognition  - anxiety and paranoid though process  - continue Xanax PRN  - neuropsych eval to determine capacity to make decisions as patient refusing [facility] or home  - Psych consulted due to paranoid behavior; unclear if due to steroids?    GI/Bowel:   - Senna, dulcolax, Miralax    Diet/ dysphagia:   - regular/easy to chew with thin     ENT evaluation for poor vocal quality-> no abnormalities found     Stage I pressure ulcer  - offloading, turn q.2h, barrier cream    LE edema  - compression stockings  - Lasix as above    Pain  - Tylenol PRN      Functional Prognosis / Discharge Planning  - Meeting held on 8/11  - Nursing needs: urinary retention  - SW: lives with Adult son and spouse, used cane prior   - OT: Mod to max assist with ADLs  - PT: Max transfers, Mod bed mobility, Stood with max assist, did not ambulate --> min assist with house hold distances  - SLP: upgraded to thins but still coughs intermittently without aspiration. Poor attention and memory. poor vocal quality, recommending ENT evaluaiton  - Barriers: left visual field cut, flaccid weakness   - MAGDALENA:  Plan for [facility] 8/24. May need psychology evaluation to determine capacity to make discharge decisions as patient is currently adamant in his refusal for [facility] transfer. SW follow up to discuss other options such as private hire/other famly support

## 2020-08-17 NOTE — PROGRESS NOTE ADULT - SUBJECTIVE AND OBJECTIVE BOX
Patient is a 79y old  Male who presents with a chief complaint of Glioblastoma (17 Aug 2020 12:12)      ----------------------------------------------------------------------    SUBJECTIVE:      ROS:  Positive:  Denies: headache, lightheadedness, CP, SOB, abdominal pain, dysuria, nausea, constipation      ----------------------------------------------------------------------  PHYSICAL EXAM:    Vital Signs Last 24 Hrs  T(C): 36.6 (16 Aug 2020 21:00), Max: 36.6 (16 Aug 2020 21:00)  T(F): 97.8 (16 Aug 2020 21:00), Max: 97.8 (16 Aug 2020 21:00)  HR: 54 (17 Aug 2020 06:10) (54 - 65)  BP: 156/77 (17 Aug 2020 06:10) (130/68 - 156/77)  BP(mean): --  RR: 14 (16 Aug 2020 21:00) (14 - 14)  SpO2: 97% (16 Aug 2020 21:00) (97% - 97%)  Daily     Daily Weight in k.1 (16 Aug 2020 22:00)      ***      ----------------------------------------------------------------------  RECENT LABS:                          13.2   16.10 )-----------( 194      ( 17 Aug 2020 06:00 )             38.1     08-17    138  |  102  |  40<H>  ----------------------------<  141<H>  4.3   |  33<H>  |  0.98    Ca    8.6      17 Aug 2020 06:00    TPro  5.7<L>  /  Alb  2.6<L>  /  TBili  0.4  /  DBili  x   /  AST  29  /  ALT  122<H>  /  AlkPhos  63  08-17    LIVER FUNCTIONS - ( 17 Aug 2020 06:00 )  Alb: 2.6 g/dL / Pro: 5.7 g/dL / ALK PHOS: 63 U/L / ALT: 122 U/L / AST: 29 U/L / GGT: x               CAPILLARY BLOOD GLUCOSE      POCT Blood Glucose.: 191 mg/dL (17 Aug 2020 11:33)  POCT Blood Glucose.: 154 mg/dL (17 Aug 2020 07:52)  POCT Blood Glucose.: 256 mg/dL (16 Aug 2020 21:22)  POCT Blood Glucose.: 216 mg/dL (16 Aug 2020 16:56)    ----------------------------------------------------------------------  RECENT IMAGING:    ***  ----------------------------------------------------------------------  MEDICATIONS:  MEDICATIONS  (STANDING):  amLODIPine   Tablet 7.5 milliGRAM(s) Oral daily  aspirin  chewable 81 milliGRAM(s) Oral daily  atorvastatin 20 milliGRAM(s) Oral at bedtime  bisacodyl 5 milliGRAM(s) Oral every 12 hours  dexAMETHasone     Tablet 8 milliGRAM(s) Oral two times a day  dextrose 5%. 1000 milliLiter(s) (50 mL/Hr) IV Continuous <Continuous>  dextrose 50% Injectable 12.5 Gram(s) IV Push once  dextrose 50% Injectable 25 Gram(s) IV Push once  dextrose 50% Injectable 25 Gram(s) IV Push once  docusate sodium 100 milliGRAM(s) Oral daily  enoxaparin Injectable 40 milliGRAM(s) SubCutaneous daily  furosemide    Tablet 40 milliGRAM(s) Oral daily  insulin lispro (HumaLOG) corrective regimen sliding scale   SubCutaneous three times a day before meals  insulin lispro (HumaLOG) corrective regimen sliding scale   SubCutaneous at bedtime  insulin lispro Injectable (HumaLOG) 9 Unit(s) SubCutaneous Before meals and at bedtime  levETIRAcetam 1000 milliGRAM(s) Oral two times a day  melatonin 3 milliGRAM(s) Oral at bedtime  metoprolol succinate ER 25 milliGRAM(s) Oral daily  pantoprazole    Tablet 40 milliGRAM(s) Oral before breakfast  polyethylene glycol 3350 17 Gram(s) Oral two times a day  senna 2 Tablet(s) Oral at bedtime  tamsulosin 0.8 milliGRAM(s) Oral at bedtime    MEDICATIONS  (PRN):  acetaminophen   Tablet .. 650 milliGRAM(s) Oral every 6 hours PRN Temp greater or equal to 38C (100.4F), Mild Pain (1 - 3)  ALPRAZolam 0.25 milliGRAM(s) Oral every 8 hours PRN Anxiety  benzocaine 15 mG/menthol 3.6 mG (Sugar-Free) Lozenge 1 Lozenge Oral two times a day PRN Sore Throat  dextrose 40% Gel 15 Gram(s) Oral once PRN Blood Glucose LESS THAN 70 milliGRAM(s)/deciliter  glucagon  Injectable 1 milliGRAM(s) IntraMuscular once PRN Glucose LESS THAN 70 milligrams/deciliter    ---------------------------------------------------------------------- Patient is a 79y old  Male who presents with a chief complaint of Glioblastoma (17 Aug 2020 12:12)      ----------------------------------------------------------------------    SUBJECTIVE: No acute issues overnight. Patient seen this morning, frustrated. Paranoid that we are trying to send him to another facility. He perseverated over being discharged to another facility and could not engage in therapy as he was distracted and frustrated. stated "If you send me out there will be  outside and ill get the news involved".    ROS:  Denies: headache, lightheadedness, CP, SOB, abdominal pain, dysuria, nausea, constipation      ----------------------------------------------------------------------  PHYSICAL EXAM:    Vital Signs Last 24 Hrs  T(C): 36.6 (16 Aug 2020 21:00), Max: 36.6 (16 Aug 2020 21:00)  T(F): 97.8 (16 Aug 2020 21:00), Max: 97.8 (16 Aug 2020 21:00)  HR: 54 (17 Aug 2020 06:10) (54 - 65)  BP: 156/77 (17 Aug 2020 06:10) (130/68 - 156/77)  BP(mean): --  RR: 14 (16 Aug 2020 21:00) (14 - 14)  SpO2: 97% (16 Aug 2020 21:00) (97% - 97%)  Daily     Daily Weight in k.1 (16 Aug 2020 22:00)    PHYSICAL EXAM  Constitutional - NAD, Comfortable  	HEENT - NCAT, EOMI  	Chest -  CTAB   	Cardio - warm and well perfused, RRR, no murmur  	Abdomen -  Soft, NTND  	Extremities - +2 left LE ankle/pedal edema, No calf tenderness   	Neurologic Exam:                 	   Cognitive -   	          Orientation: Awake, Alert, AAO to self, place, date, year, situation  	          Memory: Recent/ remote memory intact  	          Thought: content appropriate, slow processing, perseverative  	   Speech - Fluent, Comprehensible, No dysarthria, No aphasia   	   Cranial Nerves - left visual field deficit, left facial weakness, Tongue midline, EOMI, Shoulder shrug intact  	   Motor -  LEFT hemiparesis  	                  LEFT    UE - ShAB 2/5, EF 1/5, EE 2/5, WE 0/5,  0/5  	                  LEFT    LE - HF 3/5, KE 4/5, KF 3/5, DF 0/5, PF 3/5  	   Sensory - Impaired-- extinguishes on left  	   Coordination - impaired on left  Psychiatric - Mood stable, Affect WNL, anxious      ----------------------------------------------------------------------  RECENT LABS:                          13.2   16.10 )-----------( 194      ( 17 Aug 2020 06:00 )             38.1     08-    138  |  102  |  40<H>  ----------------------------<  141<H>  4.3   |  33<H>  |  0.98    Ca    8.6      17 Aug 2020 06:00    TPro  5.7<L>  /  Alb  2.6<L>  /  TBili  0.4  /  DBili  x   /  AST  29  /  ALT  122<H>  /  AlkPhos  63      LIVER FUNCTIONS - ( 17 Aug 2020 06:00 )  Alb: 2.6 g/dL / Pro: 5.7 g/dL / ALK PHOS: 63 U/L / ALT: 122 U/L / AST: 29 U/L / GGT: x               CAPILLARY BLOOD GLUCOSE      POCT Blood Glucose.: 191 mg/dL (17 Aug 2020 11:33)  POCT Blood Glucose.: 154 mg/dL (17 Aug 2020 07:52)  POCT Blood Glucose.: 256 mg/dL (16 Aug 2020 21:22)  POCT Blood Glucose.: 216 mg/dL (16 Aug 2020 16:56)    ----------------------------------------------------------------------  RECENT IMAGING:    ***  ----------------------------------------------------------------------  MEDICATIONS:  MEDICATIONS  (STANDING):  amLODIPine   Tablet 7.5 milliGRAM(s) Oral daily  aspirin  chewable 81 milliGRAM(s) Oral daily  atorvastatin 20 milliGRAM(s) Oral at bedtime  bisacodyl 5 milliGRAM(s) Oral every 12 hours  dexAMETHasone     Tablet 8 milliGRAM(s) Oral two times a day  dextrose 5%. 1000 milliLiter(s) (50 mL/Hr) IV Continuous <Continuous>  dextrose 50% Injectable 12.5 Gram(s) IV Push once  dextrose 50% Injectable 25 Gram(s) IV Push once  dextrose 50% Injectable 25 Gram(s) IV Push once  docusate sodium 100 milliGRAM(s) Oral daily  enoxaparin Injectable 40 milliGRAM(s) SubCutaneous daily  furosemide    Tablet 40 milliGRAM(s) Oral daily  insulin lispro (HumaLOG) corrective regimen sliding scale   SubCutaneous three times a day before meals  insulin lispro (HumaLOG) corrective regimen sliding scale   SubCutaneous at bedtime  insulin lispro Injectable (HumaLOG) 9 Unit(s) SubCutaneous Before meals and at bedtime  levETIRAcetam 1000 milliGRAM(s) Oral two times a day  melatonin 3 milliGRAM(s) Oral at bedtime  metoprolol succinate ER 25 milliGRAM(s) Oral daily  pantoprazole    Tablet 40 milliGRAM(s) Oral before breakfast  polyethylene glycol 3350 17 Gram(s) Oral two times a day  senna 2 Tablet(s) Oral at bedtime  tamsulosin 0.8 milliGRAM(s) Oral at bedtime    MEDICATIONS  (PRN):  acetaminophen   Tablet .. 650 milliGRAM(s) Oral every 6 hours PRN Temp greater or equal to 38C (100.4F), Mild Pain (1 - 3)  ALPRAZolam 0.25 milliGRAM(s) Oral every 8 hours PRN Anxiety  benzocaine 15 mG/menthol 3.6 mG (Sugar-Free) Lozenge 1 Lozenge Oral two times a day PRN Sore Throat  dextrose 40% Gel 15 Gram(s) Oral once PRN Blood Glucose LESS THAN 70 milliGRAM(s)/deciliter  glucagon  Injectable 1 milliGRAM(s) IntraMuscular once PRN Glucose LESS THAN 70 milligrams/deciliter    ----------------------------------------------------------------------

## 2020-08-17 NOTE — BEHAVIORAL HEALTH ASSESSMENT NOTE - NSBHCHARTREVIEWLAB_PSY_A_CORE FT
LABS:                        13.2   16.10 )-----------( 194      ( 17 Aug 2020 06:00 )             38.1     08-17    138  |  102  |  40<H>  ----------------------------<  141<H>  4.3   |  33<H>  |  0.98    Ca    8.6      17 Aug 2020 06:00    TPro  5.7<L>  /  Alb  2.6<L>  /  TBili  0.4  /  DBili  x   /  AST  29  /  ALT  122<H>  /  AlkPhos  63  08-17

## 2020-08-17 NOTE — BEHAVIORAL HEALTH ASSESSMENT NOTE - HPI (INCLUDE ILLNESS QUALITY, SEVERITY, DURATION, TIMING, CONTEXT, MODIFYING FACTORS, ASSOCIATED SIGNS AND SYMPTOMS)
79 RHM w/ PMH of HTN, HLD, prostate cancer s/p prostectomy, basal cell skin cancer, GBM (dx in 2018, s/p resection 8/3/18, s/p 2nd resection 2/7/19, s/p 3rd resection 5/2020 s/p multiple does of Temodar admitted to [facility] on 8/4/20 w/ worsening of his L. sided weakness. No other complaints. Denied changes in speech, vision, hearing, swallowing, voice quality, numbness/tingling,  balance/room-spinning sensations.    He was seen in Dr. Harman's office on 7/27/20 where he was noted to have worsening strength, lethargy and decreased appetitie. He had been tapering his steroids for over a month. Recently tapered from 4mg QD --> 3mg QD ---> 2mg QD.    CT Head on admission 8.4.2020, showed- Worsening vasogenic edema involving the R. temporal frontal parietal region again identified increased when compared to previous CT in 5/2020.     Patient admitted to neurology floor, started on decadron 4mg q6h later increased to 8mg q8h. MR head w/ and w/o contrast with progression of disease, neuro-onc on board, patient to follow up outpatient for initiation of avastin therapy.   Patient also with urinary retention, urology consulted, suspect urinary retention is neurologic in etiology, on clean intermittent catheterization; further followup to continue at rehab.   Patient stable for discharge to acute rehab 8/7/20.  Patient has been confused at times, angry, with poor frustration tolerance .wants things done immediately.    Of note--patient was admitted to  rehab after his most recent resection in May 2020.  He was at [facility] from 6/4/20  to 6/17/20.  At the time of discharge from  rehab, patient was supervision for eating, grooming, and dressing, and contact guard for transfers.  He was able to ambulate 150' with SAC with CG/min assist and to navigate 12 steps with u/l HR with min assist.  SLP at [facility] noted higher level cognitive deficits with mild impulsivity and reduced insight, necessitating supervision while at home.

## 2020-08-17 NOTE — PROGRESS NOTE ADULT - SUBJECTIVE AND OBJECTIVE BOX
Patient is a 79y old  Male who presents with a chief complaint of Glioblastoma (16 Aug 2020 10:17)      Patient seen and examined at bedside.    ALLERGIES:  No Known Allergies    MEDICATIONS:  ALPRAZolam 0.25 milliGRAM(s) Oral every 8 hours PRN  amLODIPine   Tablet 7.5 milliGRAM(s) Oral daily  benzocaine 15 mG/menthol 3.6 mG (Sugar-Free) Lozenge 1 Lozenge Oral two times a day PRN  bisacodyl 5 milliGRAM(s) Oral every 12 hours  dexAMETHasone     Tablet 8 milliGRAM(s) Oral two times a day  docusate sodium 100 milliGRAM(s) Oral daily  insulin lispro Injectable (HumaLOG) 9 Unit(s) SubCutaneous Before meals and at bedtime  polyethylene glycol 3350 17 Gram(s) Oral two times a day  senna 2 Tablet(s) Oral at bedtime  tamsulosin 0.8 milliGRAM(s) Oral at bedtime    Vital Signs Last 24 Hrs  T(F): 97.8 (16 Aug 2020 21:00), Max: 97.8 (16 Aug 2020 21:00)  HR: 54 (17 Aug 2020 06:10) (54 - 65)  BP: 156/77 (17 Aug 2020 06:10) (130/68 - 156/77)  RR: 14 (16 Aug 2020 21:00) (14 - 14)  SpO2: 97% (16 Aug 2020 21:00) (97% - 97%)  I&O's Summary    16 Aug 2020 07:01  -  17 Aug 2020 07:00  --------------------------------------------------------  IN: 0 mL / OUT: 300 mL / NET: -300 mL    17 Aug 2020 07:01  -  17 Aug 2020 12:13  --------------------------------------------------------  IN: 0 mL / OUT: 200 mL / NET: -200 mL        PHYSICAL EXAM:  General: NAD, agitated in nature   ENT: MMM  Neck: Supple, No JVD  Lungs: Clear to auscultation bilaterally  Cardio: RRR, S1/S2, 2/6 systolic murmur  Abdomen: Soft, Nontender, Nondistended; Bowel sounds present  Extremities: No cyanosis, No edema    LABS:                        13.2   16.10 )-----------( 194      ( 17 Aug 2020 06:00 )             38.1     08-17    138  |  102  |  40  ----------------------------<  141  4.3   |  33  |  0.98    Ca    8.6      17 Aug 2020 06:00    TPro  5.7  /  Alb  2.6  /  TBili  0.4  /  DBili  x   /  AST  29  /  ALT  122  /  AlkPhos  63  08-17    eGFR if Non African American: 74 mL/min/1.73M2 (08-17-20 @ 06:00)  eGFR if African American: 85 mL/min/1.73M2 (08-17-20 @ 06:00)                          POCT Blood Glucose.: 191 mg/dL (17 Aug 2020 11:33)  POCT Blood Glucose.: 154 mg/dL (17 Aug 2020 07:52)  POCT Blood Glucose.: 256 mg/dL (16 Aug 2020 21:22)  POCT Blood Glucose.: 216 mg/dL (16 Aug 2020 16:56)            RADIOLOGY & ADDITIONAL TESTS:    Care Discussed with Consultants/Other Providers:

## 2020-08-17 NOTE — BEHAVIORAL HEALTH ASSESSMENT NOTE - NSBHCHARTREVIEWVS_PSY_A_CORE FT
Vital Signs Last 24 Hrs  T(C): 36.6 (16 Aug 2020 21:00), Max: 36.6 (16 Aug 2020 21:00)  T(F): 97.8 (16 Aug 2020 21:00), Max: 97.8 (16 Aug 2020 21:00)  HR: 54 (17 Aug 2020 06:10) (54 - 65)  BP: 156/77 (17 Aug 2020 06:10) (130/68 - 156/77)  BP(mean): --  RR: 14 (16 Aug 2020 21:00) (14 - 14)  SpO2: 97% (16 Aug 2020 21:00) (97% - 97%)
Yes

## 2020-08-17 NOTE — BEHAVIORAL HEALTH ASSESSMENT NOTE - SUMMARY
79 RHM w/ PMH of HTN, HLD, prostate cancer s/p prostectomy, basal cell skin cancer, GBM (dx in 2018, s/p resection 8/3/18, s/p 2nd resection 2/7/19, s/p 3rd resection 5/2020 s/p multiple does of Temodar admitted to [facility] on 8/4/20 w/ worsening of his L. sided weakness., started therapy on 8/20, Patient has been angry  with anxiety, poor frustration tolerance ,refusing psychiatric help, and not allowing to talk to his family, while his son is standing at his bedside during the interview.

## 2020-08-17 NOTE — PROGRESS NOTE ADULT - ASSESSMENT
80yo male with hx of HTN, HLD, prostate cancer s/p prostatectomy, BCC, GBM (dx in 2018, s/p resection 8/3/18, s/p 2nd resection 2/7/19, s/p 3rd resection 5/2020 s/p multiple does of Temodar), presented to [facility] for acute rehab from [facility] for worsening of his L. sided weakness, noted to have worsening vasogenic edema with progression of GBM, resulting in Left Hemiparesis, Dysphagia, Gait and ADL impairment.     #Debility  -Gait Instability, ADL impairments and Functional impairments  -Continue Comprehensive Rehab Program of PT/OT & speech    #GBM   #Cerebral Edema  #Left hemiplagisa  -Rapidly progressive GBM since prior surgery  -Neuro Onc, Dr. Harman, recs noted   -Continue Decadron 8mg PO BID  -Considering starting Avastin for functional improvement. Given prior history of pulmonary embolism while receiving avastin, likely to start Lovenox 1.5mg/kg/day during such therapy.  -Keppra 1000mg BID for Seizure ppx,    #Urinary Retention  -Continue Flomax  -TOV with Bladder scan with prn intermittent catheterization     #Lower extremity edema  -Secondary to Left sided hemiplegia  -LLE Doppler negative for DVT  -Continue Lasix daily  -Continue to monitor     #HTN  -Chronic   -Continue Metoprolol XL 25mg + Lasix 40mg Daily  -Increase Amlodipine to 7.5mg daily  -Monitor vitals    #Hyperglycemia  - [id number]  -Steroid induced  -Ha1c 5.6  -Increase premeal coverage to 9units  -Continue fingerstick + SSI     #Mood  -Patient expressing paranoid thought process  -May be 2/2 to steroids  -Agree with primary team in consulting neuropsych    #Constipation  -Miralax BID + Senna + Dulcolax   -Monitor BM per day

## 2020-08-18 LAB
GLUCOSE BLDC GLUCOMTR-MCNC: 171 MG/DL — HIGH (ref 70–99)
GLUCOSE BLDC GLUCOMTR-MCNC: 172 MG/DL — HIGH (ref 70–99)
GLUCOSE BLDC GLUCOMTR-MCNC: 194 MG/DL — HIGH (ref 70–99)
GLUCOSE BLDC GLUCOMTR-MCNC: 237 MG/DL — HIGH (ref 70–99)

## 2020-08-18 PROCEDURE — 99232 SBSQ HOSP IP/OBS MODERATE 35: CPT

## 2020-08-18 RX ORDER — OLANZAPINE 15 MG/1
2.5 TABLET, FILM COATED ORAL AT BEDTIME
Refills: 0 | Status: DISCONTINUED | OUTPATIENT
Start: 2020-08-18 | End: 2020-08-31

## 2020-08-18 RX ORDER — OLANZAPINE 15 MG/1
2.5 TABLET, FILM COATED ORAL ONCE
Refills: 0 | Status: COMPLETED | OUTPATIENT
Start: 2020-08-18 | End: 2020-08-18

## 2020-08-18 RX ADMIN — Medication 9 UNIT(S): at 22:01

## 2020-08-18 RX ADMIN — OLANZAPINE 2.5 MILLIGRAM(S): 15 TABLET, FILM COATED ORAL at 10:27

## 2020-08-18 RX ADMIN — Medication 40 MILLIGRAM(S): at 06:31

## 2020-08-18 RX ADMIN — ENOXAPARIN SODIUM 40 MILLIGRAM(S): 100 INJECTION SUBCUTANEOUS at 11:50

## 2020-08-18 RX ADMIN — Medication 1: at 11:49

## 2020-08-18 RX ADMIN — Medication 8 MILLIGRAM(S): at 21:51

## 2020-08-18 RX ADMIN — Medication 1: at 07:59

## 2020-08-18 RX ADMIN — PANTOPRAZOLE SODIUM 40 MILLIGRAM(S): 20 TABLET, DELAYED RELEASE ORAL at 06:31

## 2020-08-18 RX ADMIN — Medication 81 MILLIGRAM(S): at 11:50

## 2020-08-18 RX ADMIN — Medication 25 MILLIGRAM(S): at 06:31

## 2020-08-18 RX ADMIN — Medication 0.25 MILLIGRAM(S): at 06:31

## 2020-08-18 RX ADMIN — Medication 0.25 MILLIGRAM(S): at 13:35

## 2020-08-18 RX ADMIN — LEVETIRACETAM 1000 MILLIGRAM(S): 250 TABLET, FILM COATED ORAL at 06:31

## 2020-08-18 RX ADMIN — ATORVASTATIN CALCIUM 20 MILLIGRAM(S): 80 TABLET, FILM COATED ORAL at 21:52

## 2020-08-18 RX ADMIN — Medication 100 MILLIGRAM(S): at 13:30

## 2020-08-18 RX ADMIN — Medication 9 UNIT(S): at 16:30

## 2020-08-18 RX ADMIN — AMLODIPINE BESYLATE 7.5 MILLIGRAM(S): 2.5 TABLET ORAL at 06:31

## 2020-08-18 RX ADMIN — Medication 0.25 MILLIGRAM(S): at 21:51

## 2020-08-18 RX ADMIN — Medication 9 UNIT(S): at 11:49

## 2020-08-18 RX ADMIN — TAMSULOSIN HYDROCHLORIDE 0.8 MILLIGRAM(S): 0.4 CAPSULE ORAL at 21:51

## 2020-08-18 RX ADMIN — OLANZAPINE 2.5 MILLIGRAM(S): 15 TABLET, FILM COATED ORAL at 21:52

## 2020-08-18 RX ADMIN — SENNA PLUS 2 TABLET(S): 8.6 TABLET ORAL at 21:51

## 2020-08-18 RX ADMIN — LEVETIRACETAM 1000 MILLIGRAM(S): 250 TABLET, FILM COATED ORAL at 21:51

## 2020-08-18 RX ADMIN — Medication 8 MILLIGRAM(S): at 06:31

## 2020-08-18 RX ADMIN — Medication 9 UNIT(S): at 07:59

## 2020-08-18 RX ADMIN — Medication 3 MILLIGRAM(S): at 21:52

## 2020-08-18 RX ADMIN — Medication 1: at 16:29

## 2020-08-18 NOTE — PROGRESS NOTE ADULT - ASSESSMENT
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     GBM with vaso genic edema  - Cont. Decadron,  taper to 8mg BID   - Cont. Keppra 1000mg BID for Seizure ppx,  - c/w PPI for GI ppx on steroids  - continue Comprehensive Rehab Program of PT/OT & speech 3 hrs/day x 5days/week    HTN  - Metoprolol XL 25mg + Lasix 40mg Daily  - Amlodipine to 7.5mg daily    Urinary Retention - improved  - continue Flomax  - Optimize Bowel regimen  - Mobilize    Diabetes type 2  - FS monitoring and ISS    Mood/ Cognition  - anxiety and paranoid though process  - continue Xanax PRN  - neuropsych eval to determine capacity to make decisions as patient refusing EVE or home  - Psych consulted due to paranoid behavior; frustration, depression, anxiety, and exacerbate by steroids     Xanax TID     Zyprexa qHS    GI/Bowel:   - Senna, dulcolax, Miralax    Diet/ dysphagia:   - regular/easy to chew with thin     ENT evaluation for poor vocal quality-> no abnormalities found     Stage I pressure ulcer  - offloading, turn q.2h, barrier cream    LE edema  - compression stockings  - Lasix as above    Pain  - Tylenol PRN      Functional Prognosis / Discharge Planning  - Meeting held on 8/18  - Nursing needs: urinary retention  - SW: lives with Adult son and spouse, used cane prior   - OT: Mod to max assist with ADLs, total for toileting  - PT: Max transfers, Mod bed mobility, Stood with max assist, did not ambulate, propels w/c 75' with RUE Mo assist --> W/c level  - SLP: upgraded to soft and easy to chew thins but still coughs intermittently without aspiration. Poor receptive quality. Decreased insight into deficits, distracted.  - Barriers: left visual field cut, flaccid weakness, poo  - MAGDALENA:  Plan for EVE vs home with Hospice 8/26. May need psychology evaluation to determine capacity to make discharge decisions as patient is currently adamant in his refusal for EVE transfer. SW follow up to discuss other options such as private hire/other Family support 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     GBM with vaso genic edema  - Cont. Decadron,  taper to 8mg BID   - Cont. Keppra 1000mg BID for Seizure ppx,  - c/w PPI for GI ppx on steroids  - continue Comprehensive Rehab Program of PT/OT & speech 3 hrs/day x 5days/week    HTN  - Metoprolol XL 25mg + Lasix 40mg Daily  - Amlodipine to 7.5mg daily    Urinary Retention - improved  - continue Flomax  - Optimize Bowel regimen  - Mobilize    Diabetes type 2  - FS monitoring and ISS    Mood/ Cognition  - anxiety and paranoid though process  - continue Xanax PRN  - Psychiatry/neuropsych eval to determine capacity to make decisions as patient refusing EVE or home--  - Psych consulted due to paranoid behavior; frustration, depression, anxiety, and exacerbate by steroids     Xanax TID     Zyprexa qHS  --Gave STAT dose of zyprexa this AM as pt. was very irrate    GI/Bowel:   - Senna, dulcolax, Miralax    Diet/ dysphagia:   - regular/easy to chew with thin     ENT evaluation for poor vocal quality-> no abnormalities found     Stage I pressure ulcer  - offloading, turn q.2h, barrier cream    LE edema  - compression stockings  - Lasix as above    Pain  - Tylenol PRN      Functional Prognosis / Discharge Planning  - Meeting held on 8/18  - Nursing needs: urinary retention  - SW: lives with Adult son and spouse, used cane prior   - OT: Mod to max assist with ADLs, total for toileting  - PT: Max transfers, Mod bed mobility, Stood with max assist, did not ambulate, propels w/c 75' with RUE Mo assist --> W/c level  - SLP: upgraded to soft and easy to chew thins but still coughs intermittently without aspiration. Poor receptive quality. Decreased insight into deficits, distracted.  - Barriers: left visual field cut, flaccid weakness, poo  - MAGDALENA:  Plan for EVE vs home with Hospice 8/26. May need psychology evaluation to determine capacity to make discharge decisions as patient is currently adamant in his refusal for EVE transfer. SW follow up to discuss other options such as private hire/other Family support

## 2020-08-18 NOTE — PROGRESS NOTE ADULT - ASSESSMENT
78yo male with hx of HTN, HLD, prostate cancer s/p prostatectomy, BCC, GBM (dx in 2018, s/p resection 8/3/18, s/p 2nd resection 2/7/19, s/p 3rd resection 5/2020 s/p multiple does of Temodar), presented to [facility] for acute rehab from [facility] for worsening of his L. sided weakness, noted to have worsening vasogenic edema with progression of GBM, resulting in Left Hemiparesis, Dysphagia, Gait and ADL impairment.     #Debility  -Gait Instability, ADL impairments and Functional impairments  -Continue Comprehensive Rehab Program of PT/OT & speech    #GBM   #Cerebral Edema  #Left hemiplagisa  -Rapidly progressive GBM since prior surgery  -Neuro Onc, Dr. Harman, recs noted   -Continue Decadron 8mg PO BID  -Considering starting Avastin for functional improvement. Given prior history of pulmonary embolism while receiving avastin, likely to start Lovenox 1.5mg/kg/day during such therapy.  -Keppra 1000mg BID for Seizure ppx,    #Urinary Retention  -Continue Flomax  -TOV with Bladder scan with prn intermittent catheterization     #Lower extremity edema  -Secondary to Left sided hemiplegia  -LLE Doppler negative for DVT  -Continue Lasix daily  -Continue to monitor     #HTN  -Chronic   -Continue Metoprolol XL 25mg + Lasix 40mg Daily  -Increase Amlodipine to 7.5mg daily  -Monitor vitals    #Hyperglycemia  - [id number]  -Steroid induced  -Ha1c 5.6  -Increase premeal coverage to 9units  -Continue fingerstick + SSI     #Mood  -Improved since yesterday, patient with some insight that his mood affects other people   -May be 2/2 to steroids  -Agree with primary team in consulting neuropsych    #Constipation  -Miralax BID + Senna + Dulcolax   -Monitor BM per day

## 2020-08-18 NOTE — PROGRESS NOTE ADULT - ATTENDING COMMENTS
Pt. seen with resident.  Agree with documentation above as per resident with amendments made as appropriate. Patient medically stable. Making limited progress towards rehab goals.     Pt. Depressed, frustrated and angry over his situation and especially regarding discharge planning.  Agitated, paranoid, difficult to reason with.  Psychiatry consult reviewed and appreciated.  Started on xanax.  Will trial zyprexa.    Will discuss prognosis and coordination of care with Neurooncology --Dr. Del Cid.

## 2020-08-18 NOTE — PROGRESS NOTE ADULT - SUBJECTIVE AND OBJECTIVE BOX
Patient is a 79y old  Male who presents with a chief complaint of Glioblastoma (18 Aug 2020 10:35)      Patient seen and examined at bedside.    ALLERGIES:  No Known Allergies    MEDICATIONS:  ALPRAZolam 0.25 milliGRAM(s) Oral every 8 hours PRN  ALPRAZolam 0.25 milliGRAM(s) Oral three times a day  amLODIPine   Tablet 7.5 milliGRAM(s) Oral daily  benzocaine 15 mG/menthol 3.6 mG (Sugar-Free) Lozenge 1 Lozenge Oral two times a day PRN  bisacodyl 5 milliGRAM(s) Oral every 12 hours  dexAMETHasone     Tablet 8 milliGRAM(s) Oral two times a day  docusate sodium 100 milliGRAM(s) Oral daily  insulin lispro Injectable (HumaLOG) 9 Unit(s) SubCutaneous Before meals and at bedtime  OLANZapine 2.5 milliGRAM(s) Oral at bedtime  polyethylene glycol 3350 17 Gram(s) Oral two times a day  senna 2 Tablet(s) Oral at bedtime  tamsulosin 0.8 milliGRAM(s) Oral at bedtime    Vital Signs Last 24 Hrs  T(F): 97.9 (18 Aug 2020 08:00), Max: 97.9 (18 Aug 2020 08:00)  HR: 77 (18 Aug 2020 08:00) (69 - 77)  BP: 135/78 (18 Aug 2020 08:00) (111/62 - 150/68)  RR: 14 (18 Aug 2020 08:00) (14 - 14)  SpO2: 96% (18 Aug 2020 08:00) (95% - 96%)  I&O's Summary    17 Aug 2020 07:01  -  18 Aug 2020 07:00  --------------------------------------------------------  IN: 0 mL / OUT: 800 mL / NET: -800 mL    18 Aug 2020 07:01  -  18 Aug 2020 15:56  --------------------------------------------------------  IN: 480 mL / OUT: 400 mL / NET: 80 mL        PHYSICAL EXAM:  General: NAD, A/O x 3, not decreased agitation compared to yesterday   ENT: MMM  Neck: Supple, No JVD  Lungs: Clear to auscultation bilaterally  Cardio: RRR, S1/S2, No murmurs  Abdomen: Soft, Nontender, Nondistended; Bowel sounds present  Extremities: No cyanosis, left sided +1 edema     LABS:                        13.2   16.10 )-----------( 194      ( 17 Aug 2020 06:00 )             38.1     08-17    138  |  102  |  40  ----------------------------<  141  4.3   |  33  |  0.98    Ca    8.6      17 Aug 2020 06:00    TPro  5.7  /  Alb  2.6  /  TBili  0.4  /  DBili  x   /  AST  29  /  ALT  122  /  AlkPhos  63  08-17    eGFR if Non African American: 74 mL/min/1.73M2 (08-17-20 @ 06:00)  eGFR if African American: 85 mL/min/1.73M2 (08-17-20 @ 06:00)                          POCT Blood Glucose.: 172 mg/dL (18 Aug 2020 11:37)  POCT Blood Glucose.: 171 mg/dL (18 Aug 2020 07:30)  POCT Blood Glucose.: 210 mg/dL (17 Aug 2020 22:45)  POCT Blood Glucose.: 216 mg/dL (17 Aug 2020 16:50)            RADIOLOGY & ADDITIONAL TESTS:    Care Discussed with Consultants/Other Providers:

## 2020-08-18 NOTE — PROGRESS NOTE ADULT - SUBJECTIVE AND OBJECTIVE BOX
Patient is a 79y old  Male who presents with a chief complaint of Glioblastoma (17 Aug 2020 14:53)      ----------------------------------------------------------------------    SUBJECTIVE:      ROS:  Positive:  Denies: headache, lightheadedness, CP, SOB, abdominal pain, dysuria, nausea, constipation      ----------------------------------------------------------------------  PHYSICAL EXAM:    Vital Signs Last 24 Hrs  T(C): 36.4 (17 Aug 2020 20:54), Max: 36.4 (17 Aug 2020 20:54)  T(F): 97.6 (17 Aug 2020 20:54), Max: 97.6 (17 Aug 2020 20:54)  HR: 71 (18 Aug 2020 06:27) (69 - 71)  BP: 150/68 (18 Aug 2020 06:27) (111/62 - 150/68)  BP(mean): --  RR: 14 (17 Aug 2020 20:54) (14 - 14)  SpO2: 95% (17 Aug 2020 20:54) (95% - 95%)  Daily     Daily       ***      ----------------------------------------------------------------------  RECENT LABS:                          13.2   16.10 )-----------( 194      ( 17 Aug 2020 06:00 )             38.1     08-17    138  |  102  |  40<H>  ----------------------------<  141<H>  4.3   |  33<H>  |  0.98    Ca    8.6      17 Aug 2020 06:00    TPro  5.7<L>  /  Alb  2.6<L>  /  TBili  0.4  /  DBili  x   /  AST  29  /  ALT  122<H>  /  AlkPhos  63  08-17    LIVER FUNCTIONS - ( 17 Aug 2020 06:00 )  Alb: 2.6 g/dL / Pro: 5.7 g/dL / ALK PHOS: 63 U/L / ALT: 122 U/L / AST: 29 U/L / GGT: x               CAPILLARY BLOOD GLUCOSE      POCT Blood Glucose.: 171 mg/dL (18 Aug 2020 07:30)  POCT Blood Glucose.: 210 mg/dL (17 Aug 2020 22:45)  POCT Blood Glucose.: 216 mg/dL (17 Aug 2020 16:50)  POCT Blood Glucose.: 191 mg/dL (17 Aug 2020 11:33)    ----------------------------------------------------------------------  RECENT IMAGING:    ***  ----------------------------------------------------------------------  MEDICATIONS:  MEDICATIONS  (STANDING):  ALPRAZolam 0.25 milliGRAM(s) Oral three times a day  amLODIPine   Tablet 7.5 milliGRAM(s) Oral daily  aspirin  chewable 81 milliGRAM(s) Oral daily  atorvastatin 20 milliGRAM(s) Oral at bedtime  bisacodyl 5 milliGRAM(s) Oral every 12 hours  dexAMETHasone     Tablet 8 milliGRAM(s) Oral two times a day  dextrose 5%. 1000 milliLiter(s) (50 mL/Hr) IV Continuous <Continuous>  dextrose 50% Injectable 12.5 Gram(s) IV Push once  dextrose 50% Injectable 25 Gram(s) IV Push once  dextrose 50% Injectable 25 Gram(s) IV Push once  docusate sodium 100 milliGRAM(s) Oral daily  enoxaparin Injectable 40 milliGRAM(s) SubCutaneous daily  furosemide    Tablet 40 milliGRAM(s) Oral daily  insulin lispro (HumaLOG) corrective regimen sliding scale   SubCutaneous three times a day before meals  insulin lispro (HumaLOG) corrective regimen sliding scale   SubCutaneous at bedtime  insulin lispro Injectable (HumaLOG) 9 Unit(s) SubCutaneous Before meals and at bedtime  levETIRAcetam 1000 milliGRAM(s) Oral two times a day  melatonin 3 milliGRAM(s) Oral at bedtime  metoprolol succinate ER 25 milliGRAM(s) Oral daily  OLANZapine 2.5 milliGRAM(s) Oral at bedtime  pantoprazole    Tablet 40 milliGRAM(s) Oral before breakfast  polyethylene glycol 3350 17 Gram(s) Oral two times a day  senna 2 Tablet(s) Oral at bedtime  tamsulosin 0.8 milliGRAM(s) Oral at bedtime    MEDICATIONS  (PRN):  acetaminophen   Tablet .. 650 milliGRAM(s) Oral every 6 hours PRN Temp greater or equal to 38C (100.4F), Mild Pain (1 - 3)  ALPRAZolam 0.25 milliGRAM(s) Oral every 8 hours PRN Anxiety  benzocaine 15 mG/menthol 3.6 mG (Sugar-Free) Lozenge 1 Lozenge Oral two times a day PRN Sore Throat  dextrose 40% Gel 15 Gram(s) Oral once PRN Blood Glucose LESS THAN 70 milliGRAM(s)/deciliter  glucagon  Injectable 1 milliGRAM(s) IntraMuscular once PRN Glucose LESS THAN 70 milligrams/deciliter    ---------------------------------------------------------------------- Patient is a 79y old  Male who presents with a chief complaint of Glioblastoma (17 Aug 2020 14:53)      ----------------------------------------------------------------------    SUBJECTIVE:  Pt. angry that going to [facility] [place] after AR was brought up --Perseverates on how he will call the police if anyone tries to discharge him to [facility] [place] and that we should not be discussing his condition or discharge with his family. continues to have paranoid thoughts,  slept most of the night as per sleep log      ROS:  Positive:  Agitation, Paranoid, left HP, cognitive deficits  Denies: headache, lightheadedness, CP, SOB, abdominal pain, dysuria, nausea, constipation      ----------------------------------------------------------------------      Vital Signs Last 24 Hrs  T(C): 36.4 (17 Aug 2020 20:54), Max: 36.4 (17 Aug 2020 20:54)  T(F): 97.6 (17 Aug 2020 20:54), Max: 97.6 (17 Aug 2020 20:54)  HR: 71 (18 Aug 2020 06:27) (69 - 71)  BP: 150/68 (18 Aug 2020 06:27) (111/62 - 150/68)  BP(mean): --  RR: 14 (17 Aug 2020 20:54) (14 - 14)  SpO2: 95% (17 Aug 2020 20:54) (95% - 95%)  Daily     Daily       PHYSICAL EXAM:  Constitutional - NAD, Comfortable  	HEENT - NCAT, EOMI  	Chest -  CTAB   	Cardio - warm and well perfused, RRR, no murmur  	Abdomen -  Soft, NTND  	Extremities - +2 left LE ankle/pedal edema, No calf tenderness   	Neurologic Exam:                 	   Cognitive -   	          Orientation: Awake, Alert,  	          Memory: Recent/ remote memory intact  	          Thought:  slow processing, perseverative, Paranoia  	   Speech - Fluent, Comprehensible, No dysarthria, No aphasia   	   Cranial Nerves - left visual field deficit, left facial weakness, Tongue midline, EOMI, Shoulder shrug intact  	   Motor -  LEFT hemiparesis  	                  LEFT    UE - ShAB 2/5, EF 1/5, EE 2/5, WE 0/5,  0/5  	                  LEFT    LE - HF 3/5, KE 4/5, KF 3/5, DF 0/5, PF 3/5  	   Sensory - Impaired-- extinguishes on left  	   Coordination - impaired on left  Psychiatric - agitated, anxious, paranoid        ----------------------------------------------------------------------  RECENT LABS:                          13.2   16.10 )-----------( 194      ( 17 Aug 2020 06:00 )             38.1     08-17    138  |  102  |  40<H>  ----------------------------<  141<H>  4.3   |  33<H>  |  0.98    Ca    8.6      17 Aug 2020 06:00    TPro  5.7<L>  /  Alb  2.6<L>  /  TBili  0.4  /  DBili  x   /  AST  29  /  ALT  122<H>  /  AlkPhos  63  08-17    LIVER FUNCTIONS - ( 17 Aug 2020 06:00 )  Alb: 2.6 g/dL / Pro: 5.7 g/dL / ALK PHOS: 63 U/L / ALT: 122 U/L / AST: 29 U/L / GGT: x               CAPILLARY BLOOD GLUCOSE      POCT Blood Glucose.: 171 mg/dL (18 Aug 2020 07:30)  POCT Blood Glucose.: 210 mg/dL (17 Aug 2020 22:45)  POCT Blood Glucose.: 216 mg/dL (17 Aug 2020 16:50)  POCT Blood Glucose.: 191 mg/dL (17 Aug 2020 11:33)    ----------------------------------------------------------------------  RECENT IMAGING:    ***  ----------------------------------------------------------------------  MEDICATIONS:  MEDICATIONS  (STANDING):  ALPRAZolam 0.25 milliGRAM(s) Oral three times a day  amLODIPine   Tablet 7.5 milliGRAM(s) Oral daily  aspirin  chewable 81 milliGRAM(s) Oral daily  atorvastatin 20 milliGRAM(s) Oral at bedtime  bisacodyl 5 milliGRAM(s) Oral every 12 hours  dexAMETHasone     Tablet 8 milliGRAM(s) Oral two times a day  dextrose 5%. 1000 milliLiter(s) (50 mL/Hr) IV Continuous <Continuous>  dextrose 50% Injectable 12.5 Gram(s) IV Push once  dextrose 50% Injectable 25 Gram(s) IV Push once  dextrose 50% Injectable 25 Gram(s) IV Push once  docusate sodium 100 milliGRAM(s) Oral daily  enoxaparin Injectable 40 milliGRAM(s) SubCutaneous daily  furosemide    Tablet 40 milliGRAM(s) Oral daily  insulin lispro (HumaLOG) corrective regimen sliding scale   SubCutaneous three times a day before meals  insulin lispro (HumaLOG) corrective regimen sliding scale   SubCutaneous at bedtime  insulin lispro Injectable (HumaLOG) 9 Unit(s) SubCutaneous Before meals and at bedtime  levETIRAcetam 1000 milliGRAM(s) Oral two times a day  melatonin 3 milliGRAM(s) Oral at bedtime  metoprolol succinate ER 25 milliGRAM(s) Oral daily  OLANZapine 2.5 milliGRAM(s) Oral at bedtime  pantoprazole    Tablet 40 milliGRAM(s) Oral before breakfast  polyethylene glycol 3350 17 Gram(s) Oral two times a day  senna 2 Tablet(s) Oral at bedtime  tamsulosin 0.8 milliGRAM(s) Oral at bedtime    MEDICATIONS  (PRN):  acetaminophen   Tablet .. 650 milliGRAM(s) Oral every 6 hours PRN Temp greater or equal to 38C (100.4F), Mild Pain (1 - 3)  ALPRAZolam 0.25 milliGRAM(s) Oral every 8 hours PRN Anxiety  benzocaine 15 mG/menthol 3.6 mG (Sugar-Free) Lozenge 1 Lozenge Oral two times a day PRN Sore Throat  dextrose 40% Gel 15 Gram(s) Oral once PRN Blood Glucose LESS THAN 70 milliGRAM(s)/deciliter  glucagon  Injectable 1 milliGRAM(s) IntraMuscular once PRN Glucose LESS THAN 70 milligrams/deciliter    ----------------------------------------------------------------------

## 2020-08-19 LAB
GLUCOSE BLDC GLUCOMTR-MCNC: 167 MG/DL — HIGH (ref 70–99)
GLUCOSE BLDC GLUCOMTR-MCNC: 219 MG/DL — HIGH (ref 70–99)
GLUCOSE BLDC GLUCOMTR-MCNC: 220 MG/DL — HIGH (ref 70–99)
GLUCOSE BLDC GLUCOMTR-MCNC: 246 MG/DL — HIGH (ref 70–99)

## 2020-08-19 PROCEDURE — 99233 SBSQ HOSP IP/OBS HIGH 50: CPT

## 2020-08-19 PROCEDURE — 99232 SBSQ HOSP IP/OBS MODERATE 35: CPT

## 2020-08-19 RX ORDER — INSULIN GLARGINE 100 [IU]/ML
10 INJECTION, SOLUTION SUBCUTANEOUS ONCE
Refills: 0 | Status: DISCONTINUED | OUTPATIENT
Start: 2020-08-19 | End: 2020-08-19

## 2020-08-19 RX ORDER — INSULIN LISPRO 100/ML
10 VIAL (ML) SUBCUTANEOUS ONCE
Refills: 0 | Status: COMPLETED | OUTPATIENT
Start: 2020-08-19 | End: 2020-08-19

## 2020-08-19 RX ORDER — INSULIN LISPRO 100/ML
10 VIAL (ML) SUBCUTANEOUS
Refills: 0 | Status: DISCONTINUED | OUTPATIENT
Start: 2020-08-19 | End: 2020-08-20

## 2020-08-19 RX ADMIN — AMLODIPINE BESYLATE 7.5 MILLIGRAM(S): 2.5 TABLET ORAL at 05:48

## 2020-08-19 RX ADMIN — Medication 81 MILLIGRAM(S): at 11:51

## 2020-08-19 RX ADMIN — PANTOPRAZOLE SODIUM 40 MILLIGRAM(S): 20 TABLET, DELAYED RELEASE ORAL at 05:48

## 2020-08-19 RX ADMIN — TAMSULOSIN HYDROCHLORIDE 0.8 MILLIGRAM(S): 0.4 CAPSULE ORAL at 21:43

## 2020-08-19 RX ADMIN — Medication 40 MILLIGRAM(S): at 05:48

## 2020-08-19 RX ADMIN — Medication 25 MILLIGRAM(S): at 05:48

## 2020-08-19 RX ADMIN — Medication 5 MILLIGRAM(S): at 17:04

## 2020-08-19 RX ADMIN — Medication 10 UNIT(S): at 11:52

## 2020-08-19 RX ADMIN — LEVETIRACETAM 1000 MILLIGRAM(S): 250 TABLET, FILM COATED ORAL at 05:47

## 2020-08-19 RX ADMIN — ENOXAPARIN SODIUM 40 MILLIGRAM(S): 100 INJECTION SUBCUTANEOUS at 11:51

## 2020-08-19 RX ADMIN — Medication 650 MILLIGRAM(S): at 13:55

## 2020-08-19 RX ADMIN — Medication 10 UNIT(S): at 08:44

## 2020-08-19 RX ADMIN — Medication 0.25 MILLIGRAM(S): at 05:48

## 2020-08-19 RX ADMIN — Medication 650 MILLIGRAM(S): at 14:15

## 2020-08-19 RX ADMIN — Medication 10 UNIT(S): at 17:05

## 2020-08-19 RX ADMIN — Medication 10 UNIT(S): at 21:46

## 2020-08-19 RX ADMIN — SENNA PLUS 2 TABLET(S): 8.6 TABLET ORAL at 21:43

## 2020-08-19 RX ADMIN — Medication 8 MILLIGRAM(S): at 17:04

## 2020-08-19 RX ADMIN — Medication 2: at 11:51

## 2020-08-19 RX ADMIN — ATORVASTATIN CALCIUM 20 MILLIGRAM(S): 80 TABLET, FILM COATED ORAL at 21:43

## 2020-08-19 RX ADMIN — Medication 0.25 MILLIGRAM(S): at 18:41

## 2020-08-19 RX ADMIN — Medication 0.25 MILLIGRAM(S): at 21:43

## 2020-08-19 RX ADMIN — Medication 100 MILLIGRAM(S): at 11:51

## 2020-08-19 RX ADMIN — Medication 8 MILLIGRAM(S): at 05:48

## 2020-08-19 RX ADMIN — OLANZAPINE 2.5 MILLIGRAM(S): 15 TABLET, FILM COATED ORAL at 21:43

## 2020-08-19 RX ADMIN — Medication 2: at 17:04

## 2020-08-19 RX ADMIN — Medication 3 MILLIGRAM(S): at 21:43

## 2020-08-19 RX ADMIN — Medication 1: at 07:58

## 2020-08-19 RX ADMIN — LEVETIRACETAM 1000 MILLIGRAM(S): 250 TABLET, FILM COATED ORAL at 17:04

## 2020-08-19 NOTE — PROGRESS NOTE ADULT - SUBJECTIVE AND OBJECTIVE BOX
HPI:  79 RHM w/ PMH of HTN, HLD, prostate cancer s/p prostectomy, basal cell skin cancer, GBM (dx in 2018, s/p resection 8/3/18, s/p 2nd resection 2/7/19, s/p 3rd resection 5/2020 s/p multiple does of Temodar admitted to [facility] on 8/4/20 w/ worsening of his L. sided weakness. No other complaints. Denied changes in speech, vision, hearing, swallowing, voice quality, numbness/tingling,  balance/room-spinning sensations.    He was seen in Dr. Harman's office on 7/27/20 where he was noted to have worsening strength, lethargy and decreased appetitie. He had been tapering his steroids for over a month. Recently tapered from 4mg QD --> 3mg QD ---> 2mg QD.    CT Head on admission 8.4.2020, showed- Worsening vasogenic edema involving the R. temporal frontal parietal region again identified increased when compared to previous CT in 5/2020.     Patient admitted to neurology floor, started on decadron 4mg q6h later increased to 8mg q8h. MR head w/ and w/o contrast with progression of disease, neuro-onc on board, patient to follow up outpatient for initiation of avastin therapy.   Patient also with urinary retention, urology consulted, suspect urinary retention is neurologic in etiology, on clean intermittent catheterization; further followup to continue at rehab.   Patient stable for discharge to acute rehab 8/7/20.     Of note--patient was admitted to  rehab after his most recent resection in May 2020.  He was at [facility] from 6/4/20  to 6/17/20.  At the time of discharge from  rehab, patient was supervision for eating, grooming, and dressing, and contact guard for transfers.  He was able to ambulate 150' with SAC with CG/min assist and to navigate 12 steps with u/l HR with min assist.  SLP at [facility] noted higher level cognitive deficits with mild impulsivity and reduced insight, necessitating supervision while at home. (07 Aug 2020 14:30)      PAST MEDICAL & SURGICAL HISTORY:  Fall, sequela  Bilateral hearing loss, unspecified hearing loss type   activity: [employer] 1962-65    [place]  Type 2 diabetes mellitus without complication, without long-term current use of insulin: stop oral meds  3 weeks ago  Glioblastoma: biopsy 8/2018  surgery  6 weeks radiation  35 days of oral chemotherapy last 3 weeks  Basal cell carcinoma (BCC) of left lower leg: s/p resection  right lower leg top  left lower leg  Prostate cancer: s/p radical prostatectomy 1995  HLD (hyperlipidemia)  HTN (hypertension)  S/P colonoscopic polypectomy: 3 years benign polyps  S/P tonsillectomy: age 30  H/O bilateral cataract extraction: 3-4 years ago  S/P prostatectomy: 1995  S/P brain surgery: 8/2018  Lipoma of neck: s/p resection at age 30  Basal cell carcinoma (BCC) of lower leg: s/p resection  History of tonsillectomy: at age 30      Subjective: Much calmer today.  Apologetic for yelling at me yesterday.  Emotional regarding his condition but motivated to work in therapy and improve.  Denies pain.  Slept through the night    REVIEW OF SYMPTOMS  Positive:  depressed, left HP, cognitive deficits  Denies: headache, lightheadedness, CP, SOB, abdominal pain, dysuria, nausea, constipation      VITALS  Vital Signs Last 24 Hrs  T(C): 36.6 (19 Aug 2020 07:50), Max: 36.6 (19 Aug 2020 07:50)  T(F): 97.8 (19 Aug 2020 07:50), Max: 97.8 (19 Aug 2020 07:50)  HR: 63 (19 Aug 2020 07:50) (61 - 63)  BP: 114/66 (19 Aug 2020 07:50) (114/66 - 123/77)  BP(mean): --  RR: 16 (19 Aug 2020 07:50) (16 - 16)  SpO2: 96% (19 Aug 2020 07:50) (95% - 96%)      PHYSICAL EXAM  Constitutional - NAD, Comfortable  	HEENT - NCAT, EOMI  	Chest -  CTAB   	Cardio - warm and well perfused, RRR, no murmur  	Abdomen -  Soft, NTND  	Extremities - +2 left LE ankle/pedal edema, No calf tenderness   	Neurologic Exam:                 	   Cognitive -   	          Orientation: Awake, Alert,  	      	          Thought:  slow processing, perseverative, Paranoia  	   Speech - Fluent, Comprehensible, No dysarthria, No aphasia   	   Cranial Nerves - left visual field deficit, left facial weakness, Tongue midline, EOMI, Shoulder shrug intact  	   Motor -  LEFT hemiparesis  	                  LEFT    UE - ShAB 2/5, EF 1/5, EE 2/5, WE 0/5,  0/5  	                  LEFT    LE - HF 3/5, KE 4/5, KF 3/5, DF 0/5, PF 3/5  	   Sensory - Impaired-- extinguishes on left  	   Coordination - impaired on left  Psychiatric -depressed    RECENT LABS                  RADIOLOGY/OTHER RESULTS      MEDICATIONS  (STANDING):  ALPRAZolam 0.25 milliGRAM(s) Oral three times a day  amLODIPine   Tablet 7.5 milliGRAM(s) Oral daily  aspirin  chewable 81 milliGRAM(s) Oral daily  atorvastatin 20 milliGRAM(s) Oral at bedtime  bisacodyl 5 milliGRAM(s) Oral every 12 hours  dexAMETHasone     Tablet 8 milliGRAM(s) Oral two times a day  dextrose 5%. 1000 milliLiter(s) (50 mL/Hr) IV Continuous <Continuous>  dextrose 50% Injectable 12.5 Gram(s) IV Push once  dextrose 50% Injectable 25 Gram(s) IV Push once  dextrose 50% Injectable 25 Gram(s) IV Push once  docusate sodium 100 milliGRAM(s) Oral daily  enoxaparin Injectable 40 milliGRAM(s) SubCutaneous daily  furosemide    Tablet 40 milliGRAM(s) Oral daily  insulin lispro (HumaLOG) corrective regimen sliding scale   SubCutaneous three times a day before meals  insulin lispro (HumaLOG) corrective regimen sliding scale   SubCutaneous at bedtime  insulin lispro Injectable (HumaLOG) 10 Unit(s) SubCutaneous Before meals and at bedtime  levETIRAcetam 1000 milliGRAM(s) Oral two times a day  melatonin 3 milliGRAM(s) Oral at bedtime  metoprolol succinate ER 25 milliGRAM(s) Oral daily  OLANZapine 2.5 milliGRAM(s) Oral at bedtime  pantoprazole    Tablet 40 milliGRAM(s) Oral before breakfast  polyethylene glycol 3350 17 Gram(s) Oral two times a day  senna 2 Tablet(s) Oral at bedtime  tamsulosin 0.8 milliGRAM(s) Oral at bedtime    MEDICATIONS  (PRN):  acetaminophen   Tablet .. 650 milliGRAM(s) Oral every 6 hours PRN Temp greater or equal to 38C (100.4F), Mild Pain (1 - 3)  ALPRAZolam 0.25 milliGRAM(s) Oral every 8 hours PRN Anxiety  benzocaine 15 mG/menthol 3.6 mG (Sugar-Free) Lozenge 1 Lozenge Oral two times a day PRN Sore Throat  dextrose 40% Gel 15 Gram(s) Oral once PRN Blood Glucose LESS THAN 70 milliGRAM(s)/deciliter  glucagon  Injectable 1 milliGRAM(s) IntraMuscular once PRN Glucose LESS THAN 70 milligrams/deciliter

## 2020-08-19 NOTE — PROGRESS NOTE ADULT - ASSESSMENT
80yo male with hx of HTN, HLD, prostate cancer s/p prostatectomy, BCC, GBM (dx in 2018, s/p resection 8/3/18, s/p 2nd resection 2/7/19, s/p 3rd resection 5/2020 s/p multiple does of Temodar), presented to [facility] for acute rehab from [facility] for worsening of his L. sided weakness, noted to have worsening vasogenic edema with progression of GBM, resulting in Left Hemiparesis, Dysphagia, Gait and ADL impairment.     #Debility  -Gait Instability, ADL impairments and Functional impairments  -Continue Comprehensive Rehab Program of PT/OT & speech    #GBM   #Cerebral Edema  #Left hemiplagisa  -Rapidly progressive GBM since prior surgery  -Neuro Onc, Dr. Harman, recs noted   -Continue Decadron 8mg PO BID  -Keppra 1000mg BID for Seizure ppx,    #Urinary Retention  -Continue Flomax  -TOV with Bladder scan with prn intermittent catheterization     #Lower extremity edema  -Secondary to Left sided hemiplegia  -LLE Doppler negative for DVT  -Continue Lasix daily  -Continue to monitor     #HTN  -last 24 hours [id number]  -Chronic   -Continue Metoprolol XL 25mg + Lasix 40mg Daily  -Increase Amlodipine to 7.5mg daily  -Monitor vitals    #Hyperglycemia  - [id number]  -Steroid induced  -Ha1c 5.6  -Increased premeal coverage to 10 units 8/19  -Continue fingerstick + SSI     #Mood  -continues to improve and patient with insight on how is response to other people with affect his care   -May be 2/2 to steroids  -Agree with primary team in consulting neuropsych    #Constipation  -Miralax BID + Senna + Dulcolax   -Monitor BM per day

## 2020-08-19 NOTE — PROGRESS NOTE ADULT - ATTENDING COMMENTS
**Addendum: Spoke with Neuro-oncology NP, Neyda De Los Santos, regarding patient's status in AR and his prognosis.   Pt. making limited progress in therapy and no significant improvement in left sided Hemiparesis as of yet.  Pt's overall prognosis is poor-- Patient does have option of treatment with Avastin, which may help improve his function if he is not adequately responding to steroids, but will not prolong his life.  If patient does not want to trial Avastin, hospice would be appropriate.  Neurooncology discussed options with pt's son earlier today.  I will follow up with patient and family regarding treatment options and discharge options of [facility] where he can go for outpatient Avastin infusions or home hospice.

## 2020-08-19 NOTE — PROGRESS NOTE ADULT - ASSESSMENT
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     GBM with vaso genic edema  - Cont. Decadron,  taper  8mg BID   - Cont. Keppra 1000mg BID for Seizure ppx,  - c/w PPI for GI ppx on steroids  - continue Comprehensive Rehab Program of PT/OT & speech 3 hrs/day x 5days/week    HTN--BP controlled  - Metoprolol XL 25mg + Lasix 40mg Daily  - Amlodipine to 7.5mg daily    Urinary Retention - resolved  - continue Flomax  --voiding--incontinent at times  - Optimize Bowel regimen  - Mobilize    Diabetes type 2  - FS monitoring and ISS    Mood/ Cognition  - anxiety and paranoid thought process--significantly improved with Zyprexa  - continue Xanax PRN  - Psychiatry/neuropsych eval to determine capacity to make decisions as patient refusing [facility] or home--  - Psych consulted due to paranoid behavior; frustration, depression, anxiety, and exacerbate by steroids    --cont.  Xanax TID &  Zyprexa qHS      GI/Bowel:   - Senna, dulcolax, Miralax    Diet/ dysphagia:   - regular/easy to chew with thin     ENT evaluation for poor vocal quality-> no abnormalities found     Stage I pressure ulcer  - offloading, turn q.2h, barrier cream    LE edema  - compression stockings  - Lasix as above    Pain  - Tylenol PRN      Functional Prognosis / Discharge Planning  - Meeting held on 8/18  - Nursing needs: urinary retention  - SW: lives with Adult son and spouse, used cane prior   - OT: Mod to max assist with ADLs, total for toileting  - PT: Max transfers, Mod bed mobility, Stood with max assist, did not ambulate, propels w/c 75' with RUE Mo assist --> W/c level  - SLP: upgraded to soft and easy to chew thins but still coughs intermittently without aspiration. Poor receptive quality. Decreased insight into deficits, distracted.  - Barriers: left visual field cut, flaccid weakness, poo  - MAGDALENA:  Plan for [facility] vs home with Hospice 8/26. May need psychology evaluation to determine capacity to make discharge decisions as patient is currently adamant in his refusal for [facility] transfer. SW follow up to discuss other options such as private hire/other Family support

## 2020-08-19 NOTE — PROGRESS NOTE ADULT - SUBJECTIVE AND OBJECTIVE BOX
Patient is a 79y old  Male who presents with a chief complaint of Glioblastoma (18 Aug 2020 15:55)      Patient seen and examined at bedside.  Patient reports being is a better state of mind and is optimistic regarding the future and his rehabilitation.     ALLERGIES:  No Known Allergies    MEDICATIONS:  ALPRAZolam 0.25 milliGRAM(s) Oral every 8 hours PRN  ALPRAZolam 0.25 milliGRAM(s) Oral three times a day  amLODIPine   Tablet 7.5 milliGRAM(s) Oral daily  benzocaine 15 mG/menthol 3.6 mG (Sugar-Free) Lozenge 1 Lozenge Oral two times a day PRN  bisacodyl 5 milliGRAM(s) Oral every 12 hours  dexAMETHasone     Tablet 8 milliGRAM(s) Oral two times a day  docusate sodium 100 milliGRAM(s) Oral daily  insulin lispro Injectable (HumaLOG) 10 Unit(s) SubCutaneous Before meals and at bedtime  OLANZapine 2.5 milliGRAM(s) Oral at bedtime  polyethylene glycol 3350 17 Gram(s) Oral two times a day  senna 2 Tablet(s) Oral at bedtime  tamsulosin 0.8 milliGRAM(s) Oral at bedtime    Vital Signs Last 24 Hrs  T(F): 97.8 (19 Aug 2020 07:50), Max: 97.8 (19 Aug 2020 07:50)  HR: 63 (19 Aug 2020 07:50) (61 - 63)  BP: 114/66 (19 Aug 2020 07:50) (114/66 - 123/77)  RR: 16 (19 Aug 2020 07:50) (16 - 16)  SpO2: 96% (19 Aug 2020 07:50) (95% - 96%)  I&O's Summary    18 Aug 2020 07:01  -  19 Aug 2020 07:00  --------------------------------------------------------  IN: 480 mL / OUT: 400 mL / NET: 80 mL        PHYSICAL EXAM:  General: NAD, A/O x 3  ENT: MMM  Neck: Supple, No JVD  Lungs: Clear to auscultation bilaterally  Cardio: RRR, S1/S2, 2/6 systolic murmur   Abdomen: Soft, Nontender, Nondistended; Bowel sounds present  Extremities: No cyanosis, +1 edema left side    LABS:                        13.2   16.10 )-----------( 194      ( 17 Aug 2020 06:00 )             38.1     08-17    138  |  102  |  40  ----------------------------<  141  4.3   |  33  |  0.98    Ca    8.6      17 Aug 2020 06:00    TPro  5.7  /  Alb  2.6  /  TBili  0.4  /  DBili  x   /  AST  29  /  ALT  122  /  AlkPhos  63  08-17    eGFR if Non African American: 74 mL/min/1.73M2 (08-17-20 @ 06:00)  eGFR if African American: 85 mL/min/1.73M2 (08-17-20 @ 06:00)                          POCT Blood Glucose.: 167 mg/dL (19 Aug 2020 07:53)  POCT Blood Glucose.: 237 mg/dL (18 Aug 2020 21:49)  POCT Blood Glucose.: 194 mg/dL (18 Aug 2020 16:23)  POCT Blood Glucose.: 172 mg/dL (18 Aug 2020 11:37)            RADIOLOGY & ADDITIONAL TESTS:    Care Discussed with Consultants/Other Providers:

## 2020-08-20 LAB
ALBUMIN SERPL ELPH-MCNC: 2.5 G/DL — LOW (ref 3.3–5)
ALP SERPL-CCNC: 64 U/L — SIGNIFICANT CHANGE UP (ref 40–120)
ALT FLD-CCNC: 128 U/L — HIGH (ref 10–45)
ANION GAP SERPL CALC-SCNC: 7 MMOL/L — SIGNIFICANT CHANGE UP (ref 5–17)
AST SERPL-CCNC: 32 U/L — SIGNIFICANT CHANGE UP (ref 10–40)
BASOPHILS # BLD AUTO: 0 K/UL — SIGNIFICANT CHANGE UP (ref 0–0.2)
BASOPHILS NFR BLD AUTO: 0 % — SIGNIFICANT CHANGE UP (ref 0–2)
BILIRUB SERPL-MCNC: 0.4 MG/DL — SIGNIFICANT CHANGE UP (ref 0.2–1.2)
BUN SERPL-MCNC: 46 MG/DL — HIGH (ref 7–23)
CALCIUM SERPL-MCNC: 8.6 MG/DL — SIGNIFICANT CHANGE UP (ref 8.4–10.5)
CHLORIDE SERPL-SCNC: 100 MMOL/L — SIGNIFICANT CHANGE UP (ref 96–108)
CO2 SERPL-SCNC: 29 MMOL/L — SIGNIFICANT CHANGE UP (ref 22–31)
CREAT SERPL-MCNC: 0.98 MG/DL — SIGNIFICANT CHANGE UP (ref 0.5–1.3)
EOSINOPHIL # BLD AUTO: 0 K/UL — SIGNIFICANT CHANGE UP (ref 0–0.5)
EOSINOPHIL NFR BLD AUTO: 0 % — SIGNIFICANT CHANGE UP (ref 0–6)
GLUCOSE BLDC GLUCOMTR-MCNC: 167 MG/DL — HIGH (ref 70–99)
GLUCOSE BLDC GLUCOMTR-MCNC: 177 MG/DL — HIGH (ref 70–99)
GLUCOSE BLDC GLUCOMTR-MCNC: 222 MG/DL — HIGH (ref 70–99)
GLUCOSE BLDC GLUCOMTR-MCNC: 304 MG/DL — HIGH (ref 70–99)
GLUCOSE SERPL-MCNC: 174 MG/DL — HIGH (ref 70–99)
HCT VFR BLD CALC: 36.9 % — LOW (ref 39–50)
HGB BLD-MCNC: 12.6 G/DL — LOW (ref 13–17)
LYMPHOCYTES # BLD AUTO: 1.51 K/UL — SIGNIFICANT CHANGE UP (ref 1–3.3)
LYMPHOCYTES # BLD AUTO: 10 % — LOW (ref 13–44)
MCHC RBC-ENTMCNC: 31.7 PG — SIGNIFICANT CHANGE UP (ref 27–34)
MCHC RBC-ENTMCNC: 34.1 GM/DL — SIGNIFICANT CHANGE UP (ref 32–36)
MCV RBC AUTO: 92.7 FL — SIGNIFICANT CHANGE UP (ref 80–100)
MONOCYTES # BLD AUTO: 0.45 K/UL — SIGNIFICANT CHANGE UP (ref 0–0.9)
MONOCYTES NFR BLD AUTO: 3 % — SIGNIFICANT CHANGE UP (ref 2–14)
NEUTROPHILS # BLD AUTO: 12.79 K/UL — HIGH (ref 1.8–7.4)
NEUTROPHILS NFR BLD AUTO: 85 % — HIGH (ref 43–77)
PLATELET # BLD AUTO: 182 K/UL — SIGNIFICANT CHANGE UP (ref 150–400)
POTASSIUM SERPL-MCNC: 4.1 MMOL/L — SIGNIFICANT CHANGE UP (ref 3.5–5.3)
POTASSIUM SERPL-SCNC: 4.1 MMOL/L — SIGNIFICANT CHANGE UP (ref 3.5–5.3)
PROT SERPL-MCNC: 5.5 G/DL — LOW (ref 6–8.3)
RBC # BLD: 3.98 M/UL — LOW (ref 4.2–5.8)
RBC # FLD: 12.5 % — SIGNIFICANT CHANGE UP (ref 10.3–14.5)
SODIUM SERPL-SCNC: 136 MMOL/L — SIGNIFICANT CHANGE UP (ref 135–145)
WBC # BLD: 15.05 K/UL — HIGH (ref 3.8–10.5)
WBC # FLD AUTO: 15.05 K/UL — HIGH (ref 3.8–10.5)

## 2020-08-20 PROCEDURE — 99232 SBSQ HOSP IP/OBS MODERATE 35: CPT

## 2020-08-20 PROCEDURE — 99233 SBSQ HOSP IP/OBS HIGH 50: CPT

## 2020-08-20 RX ORDER — ALPRAZOLAM 0.25 MG
0.25 TABLET ORAL EVERY 8 HOURS
Refills: 0 | Status: DISCONTINUED | OUTPATIENT
Start: 2020-08-21 | End: 2020-08-21

## 2020-08-20 RX ORDER — INSULIN LISPRO 100/ML
11 VIAL (ML) SUBCUTANEOUS
Refills: 0 | Status: DISCONTINUED | OUTPATIENT
Start: 2020-08-20 | End: 2020-08-21

## 2020-08-20 RX ORDER — INSULIN LISPRO 100/ML
VIAL (ML) SUBCUTANEOUS
Refills: 0 | Status: DISCONTINUED | OUTPATIENT
Start: 2020-08-20 | End: 2020-09-03

## 2020-08-20 RX ADMIN — Medication 0.25 MILLIGRAM(S): at 22:58

## 2020-08-20 RX ADMIN — AMLODIPINE BESYLATE 7.5 MILLIGRAM(S): 2.5 TABLET ORAL at 06:14

## 2020-08-20 RX ADMIN — SENNA PLUS 2 TABLET(S): 8.6 TABLET ORAL at 23:07

## 2020-08-20 RX ADMIN — Medication 10 UNIT(S): at 08:08

## 2020-08-20 RX ADMIN — Medication 4: at 11:54

## 2020-08-20 RX ADMIN — Medication 0.25 MILLIGRAM(S): at 06:15

## 2020-08-20 RX ADMIN — Medication 5 MILLIGRAM(S): at 06:15

## 2020-08-20 RX ADMIN — Medication 5 MILLIGRAM(S): at 18:21

## 2020-08-20 RX ADMIN — Medication 11 UNIT(S): at 15:43

## 2020-08-20 RX ADMIN — Medication 25 MILLIGRAM(S): at 06:14

## 2020-08-20 RX ADMIN — ENOXAPARIN SODIUM 40 MILLIGRAM(S): 100 INJECTION SUBCUTANEOUS at 11:49

## 2020-08-20 RX ADMIN — Medication 81 MILLIGRAM(S): at 11:49

## 2020-08-20 RX ADMIN — Medication 3 MILLIGRAM(S): at 22:57

## 2020-08-20 RX ADMIN — PANTOPRAZOLE SODIUM 40 MILLIGRAM(S): 20 TABLET, DELAYED RELEASE ORAL at 06:15

## 2020-08-20 RX ADMIN — Medication 4: at 15:43

## 2020-08-20 RX ADMIN — TAMSULOSIN HYDROCHLORIDE 0.8 MILLIGRAM(S): 0.4 CAPSULE ORAL at 22:58

## 2020-08-20 RX ADMIN — Medication 10 UNIT(S): at 11:54

## 2020-08-20 RX ADMIN — LEVETIRACETAM 1000 MILLIGRAM(S): 250 TABLET, FILM COATED ORAL at 06:14

## 2020-08-20 RX ADMIN — OLANZAPINE 2.5 MILLIGRAM(S): 15 TABLET, FILM COATED ORAL at 22:57

## 2020-08-20 RX ADMIN — Medication 650 MILLIGRAM(S): at 09:44

## 2020-08-20 RX ADMIN — LEVETIRACETAM 1000 MILLIGRAM(S): 250 TABLET, FILM COATED ORAL at 18:22

## 2020-08-20 RX ADMIN — ATORVASTATIN CALCIUM 20 MILLIGRAM(S): 80 TABLET, FILM COATED ORAL at 22:58

## 2020-08-20 RX ADMIN — Medication 100 MILLIGRAM(S): at 11:49

## 2020-08-20 RX ADMIN — Medication 8 MILLIGRAM(S): at 18:21

## 2020-08-20 RX ADMIN — Medication 1: at 08:08

## 2020-08-20 RX ADMIN — POLYETHYLENE GLYCOL 3350 17 GRAM(S): 17 POWDER, FOR SOLUTION ORAL at 18:22

## 2020-08-20 RX ADMIN — Medication 40 MILLIGRAM(S): at 06:14

## 2020-08-20 RX ADMIN — Medication 8 MILLIGRAM(S): at 06:15

## 2020-08-20 RX ADMIN — Medication 650 MILLIGRAM(S): at 10:03

## 2020-08-20 RX ADMIN — POLYETHYLENE GLYCOL 3350 17 GRAM(S): 17 POWDER, FOR SOLUTION ORAL at 06:16

## 2020-08-20 RX ADMIN — Medication 0.25 MILLIGRAM(S): at 15:26

## 2020-08-20 NOTE — CHART NOTE - NSCHARTNOTEFT_GEN_A_CORE
NUTRITION FOLLOW UP    SOURCE: Patient [X)   Family [ ]    Medical Record (X)    DIET: Regular/ Easy to Chew  SUPPLEMENTS: Ensure Enlive 8oz TID (350kcals, 20g protein per serving)    Pt reports good appetite/po intake. Very happy with meals. Reports good acceptance of oral nutrition supplements. No BM documented since 8/15 (continues on miralax, dulcolax, senna. Encouraged adequate oral hydration + increased fiber intake at meals. Pt agreeable to adding fruit cup TID (berries) + extra water bottles. Pt continues on decadron- variable glucose levels noted.     CURRENT WEIGHT:    (8/07) 190lbs  (8/17) 187.6lbs   +3 edema left leg/left ankle likely contributing to weight fluctuation, +lasix     PERTINENT MEDS:   Pertinent Medications: MEDICATIONS  (STANDING):  ALPRAZolam 0.25 milliGRAM(s) Oral three times a day  amLODIPine   Tablet 7.5 milliGRAM(s) Oral daily  aspirin  chewable 81 milliGRAM(s) Oral daily  atorvastatin 20 milliGRAM(s) Oral at bedtime  bisacodyl 5 milliGRAM(s) Oral every 12 hours  dexAMETHasone     Tablet 8 milliGRAM(s) Oral two times a day  dextrose 5%. 1000 milliLiter(s) (50 mL/Hr) IV Continuous <Continuous>  dextrose 50% Injectable 12.5 Gram(s) IV Push once  dextrose 50% Injectable 25 Gram(s) IV Push once  dextrose 50% Injectable 25 Gram(s) IV Push once  docusate sodium 100 milliGRAM(s) Oral daily  enoxaparin Injectable 40 milliGRAM(s) SubCutaneous daily  furosemide    Tablet 40 milliGRAM(s) Oral daily  insulin lispro (HumaLOG) corrective regimen sliding scale   SubCutaneous three times a day before meals  insulin lispro (HumaLOG) corrective regimen sliding scale   SubCutaneous at bedtime  insulin lispro Injectable (HumaLOG) 11 Unit(s) SubCutaneous three times a day before meals  levETIRAcetam 1000 milliGRAM(s) Oral two times a day  melatonin 3 milliGRAM(s) Oral at bedtime  metoprolol succinate ER 25 milliGRAM(s) Oral daily  OLANZapine 2.5 milliGRAM(s) Oral at bedtime  pantoprazole    Tablet 40 milliGRAM(s) Oral before breakfast  polyethylene glycol 3350 17 Gram(s) Oral two times a day  senna 2 Tablet(s) Oral at bedtime  tamsulosin 0.8 milliGRAM(s) Oral at bedtime    MEDICATIONS  (PRN):  acetaminophen   Tablet .. 650 milliGRAM(s) Oral every 6 hours PRN Temp greater or equal to 38C (100.4F), Mild Pain (1 - 3)  ALPRAZolam 0.25 milliGRAM(s) Oral every 8 hours PRN Anxiety  benzocaine 15 mG/menthol 3.6 mG (Sugar-Free) Lozenge 1 Lozenge Oral two times a day PRN Sore Throat  dextrose 40% Gel 15 Gram(s) Oral once PRN Blood Glucose LESS THAN 70 milliGRAM(s)/deciliter  glucagon  Injectable 1 milliGRAM(s) IntraMuscular once PRN Glucose LESS THAN 70 milligrams/deciliter      PERTINENT LABS:  08-20 Na136 mmol/L Glu 174 mg/dL<H> K+ 4.1 mmol/L Cr  0.98 mg/dL BUN 46 mg/dL<H> 08-20 Alb 2.5 g/dL<L>    POCT: (8/19) 167- 246mg/dl, (8/20) 167mg/dl    SKIN:  stage I sacrum - continues on Ensure Enlive TID  EDEMA: none  LAST BM:  8/15    ESTIMATED NEEDS:   [X] no change since previous assessment    PREVIOUS NUTRITION DIAGNOSIS: difficulty with chewing and swallowing      NUTRITION DIAGNOSIS is :  (x) Improving,  RD will follow as per nutrition department protocol.    NEW NUTRITION DIAGNOSIS: Altered GI fxn related to constipation as evidenced by no BM x 5 days    NUTRITION RECOMMENDATIONS:   1. Continue diet as ordered. Texture per SLP recommendation.   2. Ensure TID with meals. Fresh fruit cup TID (berries) + extra water.   3. Accommodate preferences as able.  4. Bowel regimen per medical team    MONITORING AND EVALUATION:   1. Tolerance to diet prescription   2. PO intake  3. Weights  4. Labs  5. Follow Up per protocol     RD to remain available   Garima Leblanc RDN   Pager #[phone]

## 2020-08-20 NOTE — PROGRESS NOTE ADULT - ASSESSMENT
78yo male with hx of HTN, HLD, prostate cancer s/p prostatectomy, BCC, GBM (dx in 2018, s/p resection 8/3/18, s/p 2nd resection 2/7/19, s/p 3rd resection 5/2020 s/p multiple does of Temodar), presented to [facility] for acute rehab from [facility] for worsening of his L. sided weakness, noted to have worsening vasogenic edema with progression of GBM, resulting in Left Hemiparesis, Dysphagia, Gait and ADL impairment.     #Debility  -Gait Instability, ADL impairments and Functional impairments  -Continue Comprehensive Rehab Program of PT/OT & speech    #GBM   #Cerebral Edema  #Left hemiplagisa  -Rapidly progressive GBM since prior surgery  -Neuro Onc, Dr. Harman, recs noted   -Continue Decadron 8mg PO BID  -Keppra 1000mg BID for Seizure ppx,    #HTN  -Continue Amlodipine, Lasix, Metoprolol  -Monitor vitals    #Hyperglycemia  -Steroid induced  -Ha1c 5.6  -Unclear why patient is on standing short acting insulin at bedtime. Plan to start long acting insulin, Lantus, tomorrow pending AM blood glucose trends  -Increase Humalog to 11 units TID, increase to moderate dose sliding scale  -Continue hypoglycemia protocol and accu-cheks    #Leukocytosis  -Likely steroid induced  -Continue to monitor    #BPH  -Continue Flomax  -TOV with Bladder scan with prn intermittent catheterization     #Lower extremity edema  -Secondary to Left sided hemiplegia  -LLE Doppler negative for DVT  -Continue Lasix daily  -Continue to monitor     #Prophylactic Measure  -DVT prophylaxis: Lovenox  -Bowel regimen

## 2020-08-20 NOTE — PROGRESS NOTE ADULT - SUBJECTIVE AND OBJECTIVE BOX
Patient is a 79y old  Male who presents with a chief complaint of Glioblastoma (20 Aug 2020 13:03)      Patient seen and examined at bedside. No events overnight. Tolerating diet. Tolerating therapy.    ALLERGIES:  No Known Allergies    MEDICATIONS  (STANDING):  ALPRAZolam 0.25 milliGRAM(s) Oral three times a day  amLODIPine   Tablet 7.5 milliGRAM(s) Oral daily  aspirin  chewable 81 milliGRAM(s) Oral daily  atorvastatin 20 milliGRAM(s) Oral at bedtime  bisacodyl 5 milliGRAM(s) Oral every 12 hours  dexAMETHasone     Tablet 8 milliGRAM(s) Oral two times a day  dextrose 5%. 1000 milliLiter(s) (50 mL/Hr) IV Continuous <Continuous>  dextrose 50% Injectable 12.5 Gram(s) IV Push once  dextrose 50% Injectable 25 Gram(s) IV Push once  dextrose 50% Injectable 25 Gram(s) IV Push once  docusate sodium 100 milliGRAM(s) Oral daily  enoxaparin Injectable 40 milliGRAM(s) SubCutaneous daily  furosemide    Tablet 40 milliGRAM(s) Oral daily  insulin lispro (HumaLOG) corrective regimen sliding scale   SubCutaneous three times a day before meals  insulin lispro (HumaLOG) corrective regimen sliding scale   SubCutaneous at bedtime  insulin lispro Injectable (HumaLOG) 11 Unit(s) SubCutaneous three times a day before meals  levETIRAcetam 1000 milliGRAM(s) Oral two times a day  melatonin 3 milliGRAM(s) Oral at bedtime  metoprolol succinate ER 25 milliGRAM(s) Oral daily  OLANZapine 2.5 milliGRAM(s) Oral at bedtime  pantoprazole    Tablet 40 milliGRAM(s) Oral before breakfast  polyethylene glycol 3350 17 Gram(s) Oral two times a day  senna 2 Tablet(s) Oral at bedtime  tamsulosin 0.8 milliGRAM(s) Oral at bedtime    MEDICATIONS  (PRN):  acetaminophen   Tablet .. 650 milliGRAM(s) Oral every 6 hours PRN Temp greater or equal to 38C (100.4F), Mild Pain (1 - 3)  ALPRAZolam 0.25 milliGRAM(s) Oral every 8 hours PRN Anxiety  benzocaine 15 mG/menthol 3.6 mG (Sugar-Free) Lozenge 1 Lozenge Oral two times a day PRN Sore Throat  dextrose 40% Gel 15 Gram(s) Oral once PRN Blood Glucose LESS THAN 70 milliGRAM(s)/deciliter  glucagon  Injectable 1 milliGRAM(s) IntraMuscular once PRN Glucose LESS THAN 70 milligrams/deciliter    Vital Signs Last 24 Hrs  T(F): 98.1 (20 Aug 2020 07:37), Max: 98.1 (20 Aug 2020 07:37)  HR: 52 (20 Aug 2020 07:37) (52 - 74)  BP: 134/78 (20 Aug 2020 07:37) (125/75 - 134/78)  RR: 14 (20 Aug 2020 07:37) (14 - 17)  SpO2: 94% (20 Aug 2020 07:37) (92% - 94%)  I&O's Summary    19 Aug 2020 07:01  -  20 Aug 2020 07:00  --------------------------------------------------------  IN: 0 mL / OUT: 1000 mL / NET: -1000 mL          PHYSICAL EXAM:  General: NAD, A/O x 3  HEENT: PERRL, EOMI, MMM, no scleral icterus, R cranial incision healed  Neck: Supple, No JVD  Lungs: Clear to auscultation bilaterally, no wheezes, rales, rhonchi  Cardio: RRR, S1/S2, No murmurs  Abdomen: Soft, Nontender, Nondistended; Bowel sounds present  Extremities: No calf tenderness, trace lower extremity edema L>R  Neuro: able to move RUE, left hemiparesis     LABS:                        12.6   15.05 )-----------( 182      ( 20 Aug 2020 06:35 )             36.9       08-20    136  |  100  |  46  ----------------------------<  174  4.1   |  29  |  0.98    Ca    8.6      20 Aug 2020 06:35    TPro  5.5  /  Alb  2.5  /  TBili  0.4  /  DBili  x   /  AST  32  /  ALT  128  /  AlkPhos  64  08-20     eGFR if African American: 85 mL/min/1.73M2 (08-20-20 @ 06:35)  eGFR if Non African American: 74 mL/min/1.73M2 (08-20-20 @ 06:35)                           POCT Blood Glucose.: 304 mg/dL (20 Aug 2020 11:50)  POCT Blood Glucose.: 167 mg/dL (20 Aug 2020 08:06)  POCT Blood Glucose.: 246 mg/dL (19 Aug 2020 20:29)  POCT Blood Glucose.: 219 mg/dL (19 Aug 2020 16:58)            RADIOLOGY & ADDITIONAL TESTS:    Care Discussed with Consultants/Other Providers: yes

## 2020-08-20 NOTE — PROGRESS NOTE ADULT - ASSESSMENT
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     GBM with vaso genic edema  --Pt. with poor prognosis-- pt. candidate for avastin after discharge as per neuro-oncology  - Cont. Decadron,   8mg BID   - Cont. Keppra 1000mg BID for Seizure ppx,  - c/w PPI for GI ppx on steroids  - continue Comprehensive Rehab Program of PT/OT & speech 3 hrs/day x 5days/week      HTN--BP controlled  - Metoprolol XL 25mg + Lasix 40mg Daily  - Amlodipine to 7.5mg daily    Urinary Retention - resolved  - continue Flomax  --voiding--incontinent at times  - Optimize Bowel regimen  - Mobilize    Diabetes type 2  - FS monitoring and ISS  Hospitalist increased premeal coverage to 10 units yesterday    Mood/ Cognition  - anxiety and paranoid thought process--significantly improved with Zyprexa  - continue Xanax PRN  - Psych consulted due to paranoid behavior; frustration, depression, anxiety, and exacerbate by steroids    --cont.  Xanax TID &  Zyprexa qHS      GI/Bowel:   - Senna, dulcolax, Miralax    Diet/ dysphagia:   - regular/easy to chew with thin     ENT evaluation for poor vocal quality-> no abnormalities found     Stage I pressure ulcer  - offloading, turn q.2h, barrier cream    LE edema  - compression stockings  - Lasix as above    Pain  - Tylenol PRN      Functional Prognosis / Discharge Planning  - Meeting held on 8/18  - Nursing needs: urinary retention  - SW: lives with Adult son and spouse, used cane prior   - OT: Mod to max assist with ADLs, total for toileting  - PT: Max transfers, Mod bed mobility, Stood with max assist, did not ambulate, propels w/c 75' with RUE Mo assist --> W/c level  - SLP: upgraded to soft and easy to chew thins but still coughs intermittently without aspiration. Poor receptive quality. Decreased insight into deficits, distracted.  - Barriers: left visual field cut, flaccid weakness, poo  - MAGDALENA:  Plan for EVE vs home with Hospice 8/26. May need psychology evaluation to determine capacity to make discharge decisions. SW follow up to discuss other options such as private hire/other Family support

## 2020-08-20 NOTE — PROGRESS NOTE ADULT - ATTENDING COMMENTS
**Addendum: Spoke with pt. regarding my conversation with Neuro-oncology regarding the option of avastin treatment.  Pt. is interested in pursuing this after discharge.  Discussed pt's progress in therapy and barriers to discharge and discharge options-- EVE vs. Home--possibly with Hospice-- and that home discharge will require him to have hands on assistance all the time, which is going to be challenging for his wife.  Patient expressed that he had a very bad experience with his father's care in a Nursing Home/EVE and is not interested in a place like that.  He understands that home discharge is going to be challenging.  Agreeable to speak with Palliative care regarding his options and hospice.  gave permission to contact his son to discuss.  Called Nolberto, but no answer so left message.  Will f/u with son and Dr. Magana when returns next week.

## 2020-08-20 NOTE — PROGRESS NOTE ADULT - SUBJECTIVE AND OBJECTIVE BOX
HPI:  79 RHM w/ PMH of HTN, HLD, prostate cancer s/p prostectomy, basal cell skin cancer, GBM (dx in 2018, s/p resection 8/3/18, s/p 2nd resection 2/7/19, s/p 3rd resection 5/2020 s/p multiple does of Temodar admitted to [facility] on 8/4/20 w/ worsening of his L. sided weakness. No other complaints. Denied changes in speech, vision, hearing, swallowing, voice quality, numbness/tingling,  balance/room-spinning sensations.    He was seen in Dr. Harman's office on 7/27/20 where he was noted to have worsening strength, lethargy and decreased appetitie. He had been tapering his steroids for over a month. Recently tapered from 4mg QD --> 3mg QD ---> 2mg QD.    CT Head on admission 8.4.2020, showed- Worsening vasogenic edema involving the R. temporal frontal parietal region again identified increased when compared to previous CT in 5/2020.     Patient admitted to neurology floor, started on decadron 4mg q6h later increased to 8mg q8h. MR head w/ and w/o contrast with progression of disease, neuro-onc on board, patient to follow up outpatient for initiation of avastin therapy.   Patient also with urinary retention, urology consulted, suspect urinary retention is neurologic in etiology, on clean intermittent catheterization; further followup to continue at rehab.   Patient stable for discharge to acute rehab 8/7/20.     Of note--patient was admitted to  rehab after his most recent resection in May 2020.  He was at [facility] from 6/4/20  to 6/17/20.  At the time of discharge from  rehab, patient was supervision for eating, grooming, and dressing, and contact guard for transfers.  He was able to ambulate 150' with SAC with CG/min assist and to navigate 12 steps with u/l HR with min assist.  SLP at [facility] noted higher level cognitive deficits with mild impulsivity and reduced insight, necessitating supervision while at home. (07 Aug 2020 14:30)      PAST MEDICAL & SURGICAL HISTORY:  Fall, sequela  Bilateral hearing loss, unspecified hearing loss type   activity: [employer] 1962-65    [place]  Type 2 diabetes mellitus without complication, without long-term current use of insulin: stop oral meds  3 weeks ago  Glioblastoma: biopsy 8/2018  surgery  6 weeks radiation  35 days of oral chemotherapy last 3 weeks  Basal cell carcinoma (BCC) of left lower leg: s/p resection  right lower leg top  left lower leg  Prostate cancer: s/p radical prostatectomy 1995  HLD (hyperlipidemia)  HTN (hypertension)  S/P colonoscopic polypectomy: 3 years benign polyps  S/P tonsillectomy: age 30  H/O bilateral cataract extraction: 3-4 years ago  S/P prostatectomy: 1995  S/P brain surgery: 8/2018  Lipoma of neck: s/p resection at age 30  Basal cell carcinoma (BCC) of lower leg: s/p resection  History of tonsillectomy: at age 30      Subjective:  No new complaints or overnight issues.  Pt. in pleasant mood and motivated in therapy    REVIEW OF SYMPTOMS  Positive:  depressed, left HP, cognitive deficits  Denies: headache, lightheadedness, CP, SOB, abdominal pain, dysuria, nausea, constipation        VITALS  Vital Signs Last 24 Hrs  T(C): 36.7 (20 Aug 2020 07:37), Max: 36.7 (20 Aug 2020 07:37)  T(F): 98.1 (20 Aug 2020 07:37), Max: 98.1 (20 Aug 2020 07:37)  HR: 52 (20 Aug 2020 07:37) (52 - 74)  BP: 134/78 (20 Aug 2020 07:37) (125/75 - 134/78)  BP(mean): --  RR: 14 (20 Aug 2020 07:37) (14 - 17)  SpO2: 94% (20 Aug 2020 07:37) (92% - 94%)      PHYSICAL EXAM  Constitutional - NAD, Comfortable  	HEENT - NCAT, EOMI  	Chest -  CTAB   	Cardio - warm and well perfused, RRR, no murmur  	Abdomen -  Soft, NTND  	Extremities - +2 left LE ankle/pedal edema, No calf tenderness   	Neurologic Exam:                 	   Cognitive -   	          Orientation: Awake, Alert,  	      	          Thought:  slow processing, perseverative, Paranoia  	   Speech - Fluent, Comprehensible, No dysarthria, No aphasia   	   Cranial Nerves - left visual field deficit, left facial weakness, Tongue midline, EOMI, Shoulder shrug intact  	   Motor -  LEFT hemiparesis  	                  LEFT    UE - ShAB 2/5, EF 1/5, EE 2/5, WE 0/5,  0/5  	                  LEFT    LE - HF 3/5, KE 4/5, KF 3/5, DF 0/5, PF 3/5  	   Sensory - Impaired-- extinguishes on left  	   Coordination - impaired on left  Psychiatric -pleasant, motivated    RECENT LABS                        12.6   15.05 )-----------( 182      ( 20 Aug 2020 06:35 )             36.9     08-20    136  |  100  |  46<H>  ----------------------------<  174<H>  4.1   |  29  |  0.98    Ca    8.6      20 Aug 2020 06:35    TPro  5.5<L>  /  Alb  2.5<L>  /  TBili  0.4  /  DBili  x   /  AST  32  /  ALT  128<H>  /  AlkPhos  64  08-20            RADIOLOGY/OTHER RESULTS      MEDICATIONS  (STANDING):  ALPRAZolam 0.25 milliGRAM(s) Oral three times a day  amLODIPine   Tablet 7.5 milliGRAM(s) Oral daily  aspirin  chewable 81 milliGRAM(s) Oral daily  atorvastatin 20 milliGRAM(s) Oral at bedtime  bisacodyl 5 milliGRAM(s) Oral every 12 hours  dexAMETHasone     Tablet 8 milliGRAM(s) Oral two times a day  dextrose 5%. 1000 milliLiter(s) (50 mL/Hr) IV Continuous <Continuous>  dextrose 50% Injectable 12.5 Gram(s) IV Push once  dextrose 50% Injectable 25 Gram(s) IV Push once  dextrose 50% Injectable 25 Gram(s) IV Push once  docusate sodium 100 milliGRAM(s) Oral daily  enoxaparin Injectable 40 milliGRAM(s) SubCutaneous daily  furosemide    Tablet 40 milliGRAM(s) Oral daily  insulin lispro (HumaLOG) corrective regimen sliding scale   SubCutaneous three times a day before meals  insulin lispro (HumaLOG) corrective regimen sliding scale   SubCutaneous at bedtime  insulin lispro Injectable (HumaLOG) 10 Unit(s) SubCutaneous Before meals and at bedtime  levETIRAcetam 1000 milliGRAM(s) Oral two times a day  melatonin 3 milliGRAM(s) Oral at bedtime  metoprolol succinate ER 25 milliGRAM(s) Oral daily  OLANZapine 2.5 milliGRAM(s) Oral at bedtime  pantoprazole    Tablet 40 milliGRAM(s) Oral before breakfast  polyethylene glycol 3350 17 Gram(s) Oral two times a day  senna 2 Tablet(s) Oral at bedtime  tamsulosin 0.8 milliGRAM(s) Oral at bedtime    MEDICATIONS  (PRN):  acetaminophen   Tablet .. 650 milliGRAM(s) Oral every 6 hours PRN Temp greater or equal to 38C (100.4F), Mild Pain (1 - 3)  ALPRAZolam 0.25 milliGRAM(s) Oral every 8 hours PRN Anxiety  benzocaine 15 mG/menthol 3.6 mG (Sugar-Free) Lozenge 1 Lozenge Oral two times a day PRN Sore Throat  dextrose 40% Gel 15 Gram(s) Oral once PRN Blood Glucose LESS THAN 70 milliGRAM(s)/deciliter  glucagon  Injectable 1 milliGRAM(s) IntraMuscular once PRN Glucose LESS THAN 70 milligrams/deciliter

## 2020-08-21 LAB
GLUCOSE BLDC GLUCOMTR-MCNC: 133 MG/DL — HIGH (ref 70–99)
GLUCOSE BLDC GLUCOMTR-MCNC: 165 MG/DL — HIGH (ref 70–99)
GLUCOSE BLDC GLUCOMTR-MCNC: 183 MG/DL — HIGH (ref 70–99)
GLUCOSE BLDC GLUCOMTR-MCNC: 215 MG/DL — HIGH (ref 70–99)

## 2020-08-21 PROCEDURE — 99232 SBSQ HOSP IP/OBS MODERATE 35: CPT

## 2020-08-21 PROCEDURE — 99233 SBSQ HOSP IP/OBS HIGH 50: CPT

## 2020-08-21 RX ORDER — INSULIN LISPRO 100/ML
12 VIAL (ML) SUBCUTANEOUS
Refills: 0 | Status: DISCONTINUED | OUTPATIENT
Start: 2020-08-21 | End: 2020-08-25

## 2020-08-21 RX ADMIN — Medication 40 MILLIGRAM(S): at 06:09

## 2020-08-21 RX ADMIN — Medication 100 MILLIGRAM(S): at 11:15

## 2020-08-21 RX ADMIN — TAMSULOSIN HYDROCHLORIDE 0.8 MILLIGRAM(S): 0.4 CAPSULE ORAL at 21:21

## 2020-08-21 RX ADMIN — Medication 5 MILLIGRAM(S): at 06:10

## 2020-08-21 RX ADMIN — LEVETIRACETAM 1000 MILLIGRAM(S): 250 TABLET, FILM COATED ORAL at 17:47

## 2020-08-21 RX ADMIN — Medication 0.25 MILLIGRAM(S): at 21:20

## 2020-08-21 RX ADMIN — Medication 8 MILLIGRAM(S): at 06:10

## 2020-08-21 RX ADMIN — Medication 0.25 MILLIGRAM(S): at 14:18

## 2020-08-21 RX ADMIN — LEVETIRACETAM 1000 MILLIGRAM(S): 250 TABLET, FILM COATED ORAL at 06:10

## 2020-08-21 RX ADMIN — Medication 0.25 MILLIGRAM(S): at 06:09

## 2020-08-21 RX ADMIN — AMLODIPINE BESYLATE 7.5 MILLIGRAM(S): 2.5 TABLET ORAL at 06:10

## 2020-08-21 RX ADMIN — POLYETHYLENE GLYCOL 3350 17 GRAM(S): 17 POWDER, FOR SOLUTION ORAL at 17:47

## 2020-08-21 RX ADMIN — Medication 4: at 11:16

## 2020-08-21 RX ADMIN — Medication 12 UNIT(S): at 16:21

## 2020-08-21 RX ADMIN — Medication 3 MILLIGRAM(S): at 21:20

## 2020-08-21 RX ADMIN — Medication 11 UNIT(S): at 11:16

## 2020-08-21 RX ADMIN — ENOXAPARIN SODIUM 40 MILLIGRAM(S): 100 INJECTION SUBCUTANEOUS at 11:15

## 2020-08-21 RX ADMIN — Medication 5 MILLIGRAM(S): at 17:47

## 2020-08-21 RX ADMIN — Medication 0: at 08:01

## 2020-08-21 RX ADMIN — Medication 11 UNIT(S): at 08:02

## 2020-08-21 RX ADMIN — PANTOPRAZOLE SODIUM 40 MILLIGRAM(S): 20 TABLET, DELAYED RELEASE ORAL at 06:11

## 2020-08-21 RX ADMIN — OLANZAPINE 2.5 MILLIGRAM(S): 15 TABLET, FILM COATED ORAL at 21:20

## 2020-08-21 RX ADMIN — Medication 2: at 16:22

## 2020-08-21 RX ADMIN — Medication 81 MILLIGRAM(S): at 11:15

## 2020-08-21 NOTE — PROGRESS NOTE ADULT - SUBJECTIVE AND OBJECTIVE BOX
HPI:  79 RHM w/ PMH of HTN, HLD, prostate cancer s/p prostectomy, basal cell skin cancer, GBM (dx in 2018, s/p resection 8/3/18, s/p 2nd resection 2/7/19, s/p 3rd resection 5/2020 s/p multiple does of Temodar admitted to [facility] on 8/4/20 w/ worsening of his L. sided weakness. No other complaints. Denied changes in speech, vision, hearing, swallowing, voice quality, numbness/tingling,  balance/room-spinning sensations.    He was seen in Dr. Harman's office on 7/27/20 where he was noted to have worsening strength, lethargy and decreased appetitie. He had been tapering his steroids for over a month. Recently tapered from 4mg QD --> 3mg QD ---> 2mg QD.    CT Head on admission 8.4.2020, showed- Worsening vasogenic edema involving the R. temporal frontal parietal region again identified increased when compared to previous CT in 5/2020.     Patient admitted to neurology floor, started on decadron 4mg q6h later increased to 8mg q8h. MR head w/ and w/o contrast with progression of disease, neuro-onc on board, patient to follow up outpatient for initiation of avastin therapy.   Patient also with urinary retention, urology consulted, suspect urinary retention is neurologic in etiology, on clean intermittent catheterization; further followup to continue at rehab.   Patient stable for discharge to acute rehab 8/7/20.     Of note--patient was admitted to  rehab after his most recent resection in May 2020.  He was at [facility] from 6/4/20  to 6/17/20.  At the time of discharge from  rehab, patient was supervision for eating, grooming, and dressing, and contact guard for transfers.  He was able to ambulate 150' with SAC with CG/min assist and to navigate 12 steps with u/l HR with min assist.  SLP at [facility] noted higher level cognitive deficits with mild impulsivity and reduced insight, necessitating supervision while at home. (07 Aug 2020 14:30)      PAST MEDICAL & SURGICAL HISTORY:  Fall, sequela  Bilateral hearing loss, unspecified hearing loss type   activity: [employer] 1962-65    [place]  Type 2 diabetes mellitus without complication, without long-term current use of insulin: stop oral meds  3 weeks ago  Glioblastoma: biopsy 8/2018  surgery  6 weeks radiation  35 days of oral chemotherapy last 3 weeks  Basal cell carcinoma (BCC) of left lower leg: s/p resection  right lower leg top  left lower leg  Prostate cancer: s/p radical prostatectomy 1995  HLD (hyperlipidemia)  HTN (hypertension)  S/P colonoscopic polypectomy: 3 years benign polyps  S/P tonsillectomy: age 30  H/O bilateral cataract extraction: 3-4 years ago  S/P prostatectomy: 1995  S/P brain surgery: 8/2018  Lipoma of neck: s/p resection at age 30  Basal cell carcinoma (BCC) of lower leg: s/p resection  History of tonsillectomy: at age 30      Subjective:  Didn't seem as well last night as anxious about his future.  Participating and motivated in therapy.  States he wants to get the "treatment that will prolong his life the most."  Would like to follow up with Dr. Magana on Monday to discuss his options.       REVIEW OF SYMPTOMS  Positive:  depressed, left HP, cognitive deficits  Denies: headache, lightheadedness, CP, SOB, abdominal pain, dysuria, nausea, constipation      VITALS  Vital Signs Last 24 Hrs  T(C): 36.7 (21 Aug 2020 07:51), Max: 36.7 (21 Aug 2020 07:51)  T(F): 98 (21 Aug 2020 07:51), Max: 98 (21 Aug 2020 07:51)  HR: 62 (21 Aug 2020 07:51) (59 - 72)  BP: 145/84 (21 Aug 2020 07:51) (132/69 - 145/84)  BP(mean): --  RR: 14 (21 Aug 2020 07:51) (14 - 16)  SpO2: 94% (21 Aug 2020 07:51) (94% - 94%)      PHYSICAL EXAM  Constitutional - NAD, Comfortable  	HEENT - NCAT, EOMI  	Chest -  CTAB   	Cardio - warm and well perfused, RRR, no murmur  	Abdomen -  Soft, NTND  	Extremities - +2 left LE ankle/pedal edema, No calf tenderness   	Neurologic Exam:                 	   Cognitive -   	          Orientation: Awake, Alert,  	      	          Thought:  slow processing, perseverative, Paranoia  	   Speech - Fluent, Comprehensible, No dysarthria, No aphasia   	   Cranial Nerves - left visual field deficit, left facial weakness, Tongue midline, EOMI, Shoulder shrug intact  	   Motor -  LEFT hemiparesis  	                  LEFT    UE - ShAB 2/5, EF 2/5, EE 2/5, WE 0/5,  0/5  	                  LEFT    LE - HF 3/5, KE 4/5, KF 3/5, DF 1/5, PF 3/5  	   Sensory - Impaired-- extinguishes on left  	   Coordination - impaired on left  Psychiatric -pleasant, motivated but depressed    RECENT LABS                        12.6   15.05 )-----------( 182      ( 20 Aug 2020 06:35 )             36.9     08-20    136  |  100  |  46<H>  ----------------------------<  174<H>  4.1   |  29  |  0.98    Ca    8.6      20 Aug 2020 06:35    TPro  5.5<L>  /  Alb  2.5<L>  /  TBili  0.4  /  DBili  x   /  AST  32  /  ALT  128<H>  /  AlkPhos  64  08-20            RADIOLOGY/OTHER RESULTS      MEDICATIONS  (STANDING):  ALPRAZolam 0.25 milliGRAM(s) Oral three times a day  amLODIPine   Tablet 7.5 milliGRAM(s) Oral daily  aspirin  chewable 81 milliGRAM(s) Oral daily  atorvastatin 20 milliGRAM(s) Oral at bedtime  bisacodyl 5 milliGRAM(s) Oral every 12 hours  dexAMETHasone     Tablet 8 milliGRAM(s) Oral two times a day  dextrose 5%. 1000 milliLiter(s) (50 mL/Hr) IV Continuous <Continuous>  dextrose 50% Injectable 12.5 Gram(s) IV Push once  dextrose 50% Injectable 25 Gram(s) IV Push once  dextrose 50% Injectable 25 Gram(s) IV Push once  docusate sodium 100 milliGRAM(s) Oral daily  enoxaparin Injectable 40 milliGRAM(s) SubCutaneous daily  furosemide    Tablet 40 milliGRAM(s) Oral daily  insulin lispro (HumaLOG) corrective regimen sliding scale   SubCutaneous three times a day before meals  insulin lispro (HumaLOG) corrective regimen sliding scale   SubCutaneous at bedtime  insulin lispro Injectable (HumaLOG) 12 Unit(s) SubCutaneous three times a day before meals  levETIRAcetam 1000 milliGRAM(s) Oral two times a day  melatonin 3 milliGRAM(s) Oral at bedtime  metoprolol succinate ER 25 milliGRAM(s) Oral daily  OLANZapine 2.5 milliGRAM(s) Oral at bedtime  pantoprazole    Tablet 40 milliGRAM(s) Oral before breakfast  polyethylene glycol 3350 17 Gram(s) Oral two times a day  senna 2 Tablet(s) Oral at bedtime  tamsulosin 0.8 milliGRAM(s) Oral at bedtime    MEDICATIONS  (PRN):  acetaminophen   Tablet .. 650 milliGRAM(s) Oral every 6 hours PRN Temp greater or equal to 38C (100.4F), Mild Pain (1 - 3)  ALPRAZolam 0.25 milliGRAM(s) Oral every 8 hours PRN Anxiety  benzocaine 15 mG/menthol 3.6 mG (Sugar-Free) Lozenge 1 Lozenge Oral two times a day PRN Sore Throat  dextrose 40% Gel 15 Gram(s) Oral once PRN Blood Glucose LESS THAN 70 milliGRAM(s)/deciliter  glucagon  Injectable 1 milliGRAM(s) IntraMuscular once PRN Glucose LESS THAN 70 milligrams/deciliter

## 2020-08-21 NOTE — PROGRESS NOTE ADULT - SUBJECTIVE AND OBJECTIVE BOX
Patient is a 79y old  Male who presents with a chief complaint of Glioblastoma (20 Aug 2020 14:22)      Patient seen and examined at bedside. States he is upset about the option of hospice and "doesn't want to give up." Reassurance given. Tolerating diet.     ALLERGIES:  No Known Allergies    MEDICATIONS  (STANDING):  ALPRAZolam 0.25 milliGRAM(s) Oral three times a day  amLODIPine   Tablet 7.5 milliGRAM(s) Oral daily  aspirin  chewable 81 milliGRAM(s) Oral daily  atorvastatin 20 milliGRAM(s) Oral at bedtime  bisacodyl 5 milliGRAM(s) Oral every 12 hours  dexAMETHasone     Tablet 8 milliGRAM(s) Oral two times a day  dextrose 5%. 1000 milliLiter(s) (50 mL/Hr) IV Continuous <Continuous>  dextrose 50% Injectable 12.5 Gram(s) IV Push once  dextrose 50% Injectable 25 Gram(s) IV Push once  dextrose 50% Injectable 25 Gram(s) IV Push once  docusate sodium 100 milliGRAM(s) Oral daily  enoxaparin Injectable 40 milliGRAM(s) SubCutaneous daily  furosemide    Tablet 40 milliGRAM(s) Oral daily  insulin lispro (HumaLOG) corrective regimen sliding scale   SubCutaneous three times a day before meals  insulin lispro (HumaLOG) corrective regimen sliding scale   SubCutaneous at bedtime  insulin lispro Injectable (HumaLOG) 12 Unit(s) SubCutaneous three times a day before meals  levETIRAcetam 1000 milliGRAM(s) Oral two times a day  melatonin 3 milliGRAM(s) Oral at bedtime  metoprolol succinate ER 25 milliGRAM(s) Oral daily  OLANZapine 2.5 milliGRAM(s) Oral at bedtime  pantoprazole    Tablet 40 milliGRAM(s) Oral before breakfast  polyethylene glycol 3350 17 Gram(s) Oral two times a day  senna 2 Tablet(s) Oral at bedtime  tamsulosin 0.8 milliGRAM(s) Oral at bedtime    MEDICATIONS  (PRN):  acetaminophen   Tablet .. 650 milliGRAM(s) Oral every 6 hours PRN Temp greater or equal to 38C (100.4F), Mild Pain (1 - 3)  ALPRAZolam 0.25 milliGRAM(s) Oral every 8 hours PRN Anxiety  benzocaine 15 mG/menthol 3.6 mG (Sugar-Free) Lozenge 1 Lozenge Oral two times a day PRN Sore Throat  dextrose 40% Gel 15 Gram(s) Oral once PRN Blood Glucose LESS THAN 70 milliGRAM(s)/deciliter  glucagon  Injectable 1 milliGRAM(s) IntraMuscular once PRN Glucose LESS THAN 70 milligrams/deciliter    Vital Signs Last 24 Hrs  T(F): 98 (21 Aug 2020 07:51), Max: 98 (21 Aug 2020 07:51)  HR: 62 (21 Aug 2020 07:51) (59 - 72)  BP: 145/84 (21 Aug 2020 07:51) (132/69 - 145/84)  RR: 14 (21 Aug 2020 07:51) (14 - 16)  SpO2: 94% (21 Aug 2020 07:51) (94% - 94%)  I&O's Summary        PHYSICAL EXAM:  General: NAD, A/O x 3  HEENT: PERRL, EOMI, MMM, no scleral icterus, R cranial incision healed  Neck: Supple, No JVD  Lungs: Clear to auscultation bilaterally, no wheezes, rales, rhonchi  Cardio: RRR, S1/S2, No murmurs  Abdomen: Soft, Nontender, Nondistended; Bowel sounds present  Extremities: No calf tenderness, trace lower extremity edema L>R  Neuro: able to move RUE, left hemiparesis       LABS:                        12.6   15.05 )-----------( 182      ( 20 Aug 2020 06:35 )             36.9       08-20    136  |  100  |  46  ----------------------------<  174  4.1   |  29  |  0.98    Ca    8.6      20 Aug 2020 06:35    TPro  5.5  /  Alb  2.5  /  TBili  0.4  /  DBili  x   /  AST  32  /  ALT  128  /  AlkPhos  64  08-20     eGFR if African American: 85 mL/min/1.73M2 (08-20-20 @ 06:35)  eGFR if Non African American: 74 mL/min/1.73M2 (08-20-20 @ 06:35)                 POCT Blood Glucose.: 215 mg/dL (21 Aug 2020 11:13)  POCT Blood Glucose.: 133 mg/dL (21 Aug 2020 07:58)  POCT Blood Glucose.: 177 mg/dL (20 Aug 2020 22:15)  POCT Blood Glucose.: 222 mg/dL (20 Aug 2020 15:42)            RADIOLOGY & ADDITIONAL TESTS:    Care Discussed with Consultants/Other Providers: yes

## 2020-08-21 NOTE — PROGRESS NOTE ADULT - ASSESSMENT
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     GBM with vaso genic edema  --Pt. with poor prognosis-- pt. candidate for avastin after discharge as per neuro-oncology---will d/w Dr. Del Cid on Monday when he returns as to risks/benefits of treatment and if worthwhile for pt. to pursue as outpatient.   --Will benefit from palliative care assessment  - Cont. Decadron-- 8mg BID   - Cont. Keppra 1000mg BID for Seizure ppx,  - c/w PPI for GI ppx on steroids  - continue Comprehensive Rehab Program of PT/OT & speech 3 hrs/day x 5days/week      HTN--BP controlled  - Metoprolol XL 25mg + Lasix 40mg Daily  - Amlodipine to 7.5mg daily    Urinary Retention - resolved  - continue Flomax  --voiding--incontinent at times  - Optimize Bowel regimen  - Mobilize    Diabetes type 2  - FS monitoring and ISS  Hospitalist increased premeal coverage to 10 units yesterday    Mood/ Cognition  - anxiety and paranoid thought process--significantly improved with Zyprexa  - continue Xanax PRN  - Psych consulted due to paranoid behavior; frustration, depression, anxiety, and exacerbate by steroids    --cont.  Xanax TID &  Zyprexa qHS      GI/Bowel:   - Senna, dulcolax, Miralax    Diet/ dysphagia:   - regular/easy to chew with thin     ENT evaluation for poor vocal quality-> no abnormalities found     Stage I pressure ulcer  - offloading, turn q.2h, barrier cream    LE edema  - compression stockings  - Lasix as above    Pain  - Tylenol PRN      Functional Prognosis / Discharge Planning  - Meeting held on 8/18  - Nursing needs: urinary retention  - SW: lives with Adult son and spouse, used cane prior   - OT: Mod to max assist with ADLs, total for toileting  - PT: Max transfers, Mod bed mobility, Stood with max assist, did not ambulate, propels w/c 75' with RUE Mo assist --> W/c level  - SLP: upgraded to soft and easy to chew thins but still coughs intermittently without aspiration. Poor receptive quality. Decreased insight into deficits, distracted.  - Barriers: left visual field cut, flaccid weakness, poo  - MAGDALENA:  Plan for EVE 8/26 vs home Possibly with Hospice.  --spoke with pt's Son, Nba, at length regarding pt's status, limited progress in therapy, my discussion with neurooncology, and discharge options of home vs. EVE and Hospice.  Pt. will need round the clock care if goes home and I reviewed home care hours covered by Medicaid or home hospice are limited and family would have to supplement for more if pt. went home.  Wife is home but overwhelmed and cannot provide hands on assistance and son is working during the day.  They have limited resources.  Son prefers pt. continue rehab in [facility], [place], but pt. very resistant to going to a "nursing home"-- will continue to discuss.  All questions answered.

## 2020-08-22 LAB
GLUCOSE BLDC GLUCOMTR-MCNC: 116 MG/DL — HIGH (ref 70–99)
GLUCOSE BLDC GLUCOMTR-MCNC: 117 MG/DL — HIGH (ref 70–99)
GLUCOSE BLDC GLUCOMTR-MCNC: 119 MG/DL — HIGH (ref 70–99)
GLUCOSE BLDC GLUCOMTR-MCNC: 84 MG/DL — SIGNIFICANT CHANGE UP (ref 70–99)

## 2020-08-22 PROCEDURE — 99232 SBSQ HOSP IP/OBS MODERATE 35: CPT

## 2020-08-22 PROCEDURE — 99232 SBSQ HOSP IP/OBS MODERATE 35: CPT | Mod: GC

## 2020-08-22 RX ORDER — INSULIN LISPRO 100/ML
9 VIAL (ML) SUBCUTANEOUS ONCE
Refills: 0 | Status: COMPLETED | OUTPATIENT
Start: 2020-08-22 | End: 2020-08-22

## 2020-08-22 RX ADMIN — Medication 12 UNIT(S): at 17:20

## 2020-08-22 RX ADMIN — Medication 0.25 MILLIGRAM(S): at 05:20

## 2020-08-22 RX ADMIN — Medication 40 MILLIGRAM(S): at 05:21

## 2020-08-22 RX ADMIN — Medication 9 UNIT(S): at 12:45

## 2020-08-22 RX ADMIN — Medication 100 MILLIGRAM(S): at 12:49

## 2020-08-22 RX ADMIN — TAMSULOSIN HYDROCHLORIDE 0.8 MILLIGRAM(S): 0.4 CAPSULE ORAL at 21:29

## 2020-08-22 RX ADMIN — LEVETIRACETAM 1000 MILLIGRAM(S): 250 TABLET, FILM COATED ORAL at 05:21

## 2020-08-22 RX ADMIN — Medication 81 MILLIGRAM(S): at 12:47

## 2020-08-22 RX ADMIN — ENOXAPARIN SODIUM 40 MILLIGRAM(S): 100 INJECTION SUBCUTANEOUS at 12:48

## 2020-08-22 RX ADMIN — Medication 12 UNIT(S): at 08:15

## 2020-08-22 RX ADMIN — ATORVASTATIN CALCIUM 20 MILLIGRAM(S): 80 TABLET, FILM COATED ORAL at 21:29

## 2020-08-22 RX ADMIN — Medication 5 MILLIGRAM(S): at 05:21

## 2020-08-22 RX ADMIN — Medication 3 MILLIGRAM(S): at 21:29

## 2020-08-22 RX ADMIN — LEVETIRACETAM 1000 MILLIGRAM(S): 250 TABLET, FILM COATED ORAL at 17:21

## 2020-08-22 RX ADMIN — Medication 0.25 MILLIGRAM(S): at 13:20

## 2020-08-22 RX ADMIN — Medication 0.25 MILLIGRAM(S): at 21:30

## 2020-08-22 RX ADMIN — Medication 25 MILLIGRAM(S): at 05:21

## 2020-08-22 RX ADMIN — AMLODIPINE BESYLATE 7.5 MILLIGRAM(S): 2.5 TABLET ORAL at 05:21

## 2020-08-22 RX ADMIN — Medication 1 APPLICATION(S): at 22:50

## 2020-08-22 RX ADMIN — OLANZAPINE 2.5 MILLIGRAM(S): 15 TABLET, FILM COATED ORAL at 21:29

## 2020-08-22 NOTE — PROGRESS NOTE ADULT - SUBJECTIVE AND OBJECTIVE BOX
Patient is a 79y old  Male who presents with a chief complaint of Glioblastoma (21 Aug 2020 14:40)    No overnight events noted.    Patient seen and examined at bedside.    ALLERGIES:  No Known Allergies    MEDICATIONS  (STANDING):  ALPRAZolam 0.25 milliGRAM(s) Oral three times a day  amLODIPine   Tablet 7.5 milliGRAM(s) Oral daily  aspirin  chewable 81 milliGRAM(s) Oral daily  atorvastatin 20 milliGRAM(s) Oral at bedtime  bisacodyl 5 milliGRAM(s) Oral every 12 hours  dextrose 5%. 1000 milliLiter(s) (50 mL/Hr) IV Continuous <Continuous>  dextrose 50% Injectable 12.5 Gram(s) IV Push once  dextrose 50% Injectable 25 Gram(s) IV Push once  dextrose 50% Injectable 25 Gram(s) IV Push once  docusate sodium 100 milliGRAM(s) Oral daily  enoxaparin Injectable 40 milliGRAM(s) SubCutaneous daily  furosemide    Tablet 40 milliGRAM(s) Oral daily  insulin lispro (HumaLOG) corrective regimen sliding scale   SubCutaneous three times a day before meals  insulin lispro (HumaLOG) corrective regimen sliding scale   SubCutaneous at bedtime  insulin lispro Injectable (HumaLOG) 12 Unit(s) SubCutaneous three times a day before meals  levETIRAcetam 1000 milliGRAM(s) Oral two times a day  melatonin 3 milliGRAM(s) Oral at bedtime  metoprolol succinate ER 25 milliGRAM(s) Oral daily  OLANZapine 2.5 milliGRAM(s) Oral at bedtime  pantoprazole    Tablet 40 milliGRAM(s) Oral before breakfast  polyethylene glycol 3350 17 Gram(s) Oral two times a day  senna 2 Tablet(s) Oral at bedtime  tamsulosin 0.8 milliGRAM(s) Oral at bedtime    MEDICATIONS  (PRN):  acetaminophen   Tablet .. 650 milliGRAM(s) Oral every 6 hours PRN Temp greater or equal to 38C (100.4F), Mild Pain (1 - 3)  ALPRAZolam 0.25 milliGRAM(s) Oral every 8 hours PRN Anxiety  benzocaine 15 mG/menthol 3.6 mG (Sugar-Free) Lozenge 1 Lozenge Oral two times a day PRN Sore Throat  dextrose 40% Gel 15 Gram(s) Oral once PRN Blood Glucose LESS THAN 70 milliGRAM(s)/deciliter  glucagon  Injectable 1 milliGRAM(s) IntraMuscular once PRN Glucose LESS THAN 70 milligrams/deciliter    Vital Signs Last 24 Hrs  T(F): 98 (22 Aug 2020 08:54), Max: 98.2 (21 Aug 2020 20:09)  HR: 81 (22 Aug 2020 08:54) (81 - 91)  BP: 135/73 (22 Aug 2020 08:54) (111/70 - 135/73)  RR: 14 (22 Aug 2020 08:54) (14 - 14)  SpO2: 94% (22 Aug 2020 08:54) (93% - 94%)  I&O's Summary    PHYSICAL EXAM:  General: NAD, A/O x 3  HEENT: PERRL, EOMI, MMM, no scleral icterus, R cranial incision healed  Neck: Supple, No JVD  Lungs: Clear to auscultation bilaterally, no wheezes, rales, rhonchi  Cardio: RRR, S1/S2, No murmurs  Abdomen: Soft, Nontender, Nondistended; Bowel sounds present  Extremities: No calf tenderness, trace lower extremity edema L>R  Neuro: able to move RUE, left hemiparesis     LABS:                        12.6   15.05 )-----------( 182      ( 20 Aug 2020 06:35 )             36.9       08-20    136  |  100  |  46  ----------------------------<  174  4.1   |  29  |  0.98    Ca    8.6      20 Aug 2020 06:35    TPro  5.5  /  Alb  2.5  /  TBili  0.4  /  DBili  x   /  AST  32  /  ALT  128  /  AlkPhos  64  08-20     eGFR if African American: 85 mL/min/1.73M2 (08-20-20 @ 06:35)  eGFR if Non African American: 74 mL/min/1.73M2 (08-20-20 @ 06:35)       POCT Blood Glucose.: 84 mg/dL (22 Aug 2020 11:36)  POCT Blood Glucose.: 119 mg/dL (22 Aug 2020 08:00)  POCT Blood Glucose.: 183 mg/dL (21 Aug 2020 22:31)  POCT Blood Glucose.: 165 mg/dL (21 Aug 2020 16:20)

## 2020-08-22 NOTE — PROGRESS NOTE ADULT - ASSESSMENT
78yo male with hx of HTN, HLD, prostate cancer s/p prostatectomy, BCC, GBM (dx in 2018, s/p resection 8/3/18, s/p 2nd resection 2/7/19, s/p 3rd resection 5/2020 s/p multiple does of Temodar), presented to [facility] for acute rehab from [facility] for worsening of his L. sided weakness, noted to have worsening vasogenic edema with progression of GBM, resulting in Left Hemiparesis, Dysphagia, Gait and ADL impairment.     #Debility  -Gait Instability, ADL impairments and Functional impairments  -Comprehensive Rehab Program of PT/OT & speech per primary team    #GBM   #Cerebral Edema  #Left hemiplagisa  -Rapidly progressive GBM since prior surgery  -Neuro Onc, Dr. Harman, recs noted   -Continue Decadron 8mg PO BID  -Keppra 1000mg BID for Seizure ppx    #HTN  -controlled, conitinue Amlodipine, Lasix, Metoprolol    #Hyperglycemia  -Steroid induced  -Ha1c 5.6  -Increase Humalog to 12 units TID, continue moderate dose sliding scale  -Continue hypoglycemia protocol and accu-cheks    #Leukocytosis  -Likely steroid induced    #BPH  -Continue Flomax  -TOV with Bladder scan with prn intermittent catheterization     #Lower extremity edema - improving  -Secondary to Left sided hemiplegia  -LLE Doppler negative for DVT  -Continue Lasix daily    #Prophylactic Measure  -DVT prophylaxis: Lovenox SQ  -Bowel regimen

## 2020-08-22 NOTE — PROGRESS NOTE ADULT - ASSESSMENT
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     GBM with vaso genic edema  --Pt. with poor prognosis-- pt. candidate for avastin after discharge as per neuro-oncology---will d/w Dr. Del Cid on Monday when he returns as to risks/benefits of treatment and if worthwhile for pt. to pursue as outpatient.   --Will benefit from palliative care assessment  - Cont. Decadron-- 8mg BID   - Cont. Keppra 1000mg BID for Seizure ppx,  - c/w PPI for GI ppx on steroids  - continue Comprehensive Rehab Program of PT/OT & speech 3 hrs/day x 5days/week      HTN--BP controlled  - Metoprolol XL 25mg + Lasix 40mg Daily  - Amlodipine to 7.5mg daily    Urinary Retention - resolved  - continue Flomax  --voiding--incontinent at times  - Optimize Bowel regimen  - Mobilize    Diabetes type 2  - FS monitoring and ISS  Hospitalist increased premeal coverage to 10 units yesterday    Mood/ Cognition  - anxiety and paranoid thought process--significantly improved with Zyprexa  - continue Xanax PRN  - Psych consulted due to paranoid behavior; frustration, depression, anxiety, and exacerbate by steroids    --cont.  Xanax TID &  Zyprexa qHS      GI/Bowel:   - Senna, dulcolax, Miralax    Diet/ dysphagia:   - regular/easy to chew with thin     ENT evaluation for poor vocal quality-> no abnormalities found     Stage I pressure ulcer  - offloading, turn q.2h, barrier cream    LE edema  - compression stockings  - Lasix as above    Pain  - Tylenol PRN      Functional Prognosis / Discharge Planning  - Meeting held on 8/18  - Nursing needs: urinary retention  - SW: lives with Adult son and spouse, used cane prior   - OT: Mod to max assist with ADLs, total for toileting  - PT: Max transfers, Mod bed mobility, Stood with max assist, did not ambulate, propels w/c 75' with RUE Mo assist --> W/c level  - SLP: upgraded to soft and easy to chew thins but still coughs intermittently without aspiration. Poor receptive quality. Decreased insight into deficits, distracted.  - Barriers: left visual field cut, flaccid weakness, poo  - MAGDALENA:  Plan for EVE 8/26 vs home Possibly with Hospice.

## 2020-08-22 NOTE — PROGRESS NOTE ADULT - SUBJECTIVE AND OBJECTIVE BOX
HPI:  79 RHM w/ PMH of HTN, HLD, prostate cancer s/p prostectomy, basal cell skin cancer, GBM (dx in 2018, s/p resection 8/3/18, s/p 2nd resection 2/7/19, s/p 3rd resection 5/2020 s/p multiple does of Temodar admitted to [facility] on 8/4/20 w/ worsening of his L. sided weakness. No other complaints. Denied changes in speech, vision, hearing, swallowing, voice quality, numbness/tingling,  balance/room-spinning sensations.    He was seen in Dr. Harman's office on 7/27/20 where he was noted to have worsening strength, lethargy and decreased appetitie. He had been tapering his steroids for over a month. Recently tapered from 4mg QD --> 3mg QD ---> 2mg QD.    CT Head on admission 8.4.2020, showed- Worsening vasogenic edema involving the R. temporal frontal parietal region again identified increased when compared to previous CT in 5/2020.     Patient admitted to neurology floor, started on decadron 4mg q6h later increased to 8mg q8h. MR head w/ and w/o contrast with progression of disease, neuro-onc on board, patient to follow up outpatient for initiation of avastin therapy.   Patient also with urinary retention, urology consulted, suspect urinary retention is neurologic in etiology, on clean intermittent catheterization; further followup to continue at rehab.   Patient stable for discharge to acute rehab 8/7/20.     Of note--patient was admitted to  rehab after his most recent resection in May 2020.  He was at [facility] from 6/4/20  to 6/17/20.  At the time of discharge from  rehab, patient was supervision for eating, grooming, and dressing, and contact guard for transfers.  He was able to ambulate 150' with SAC with CG/min assist and to navigate 12 steps with u/l HR with min assist.  SLP at [facility] noted higher level cognitive deficits with mild impulsivity and reduced insight, necessitating supervision while at home. (07 Aug 2020 14:30)        Subjective: Doing ok, slept well.     REVIEW OF SYMPTOMS  Positive:  depressed, left HP, cognitive deficits  Denies: headache, lightheadedness, CP, SOB, abdominal pain, dysuria, nausea, constipation      Vital Signs Last 24 Hrs  T(C): 36.7 (22 Aug 2020 08:54), Max: 36.8 (21 Aug 2020 20:09)  T(F): 98 (22 Aug 2020 08:54), Max: 98.2 (21 Aug 2020 20:09)  HR: 81 (22 Aug 2020 08:54) (81 - 91)  BP: 135/73 (22 Aug 2020 08:54) (111/70 - 135/73)  BP(mean): --  RR: 14 (22 Aug 2020 08:54) (14 - 14)  SpO2: 94% (22 Aug 2020 08:54) (93% - 94%)    PHYSICAL EXAM  Constitutional - NAD, Comfortable  	HEENT - NCAT, EOMI  	Chest -  CTAB   	Cardio - warm and well perfused, RRR, no murmur  	Abdomen -  Soft, NTND  	Extremities - +2 left LE ankle/pedal edema, No calf tenderness   	Neurologic Exam:                 	   Cognitive -   	          Orientation: Awake, Alert,  	      	          Thought:  slow processing, perseverative, Paranoia  	   Speech - Fluent, Comprehensible, No dysarthria, No aphasia   	   Cranial Nerves - left visual field deficit, left facial weakness, Tongue midline, EOMI, Shoulder shrug intact  	   Motor -  LEFT hemiparesis  	                  LEFT    UE - ShAB 2/5, EF 2/5, EE 2/5, WE 0/5,  0/5  	                  LEFT    LE - HF 3/5, KE 4/5, KF 3/5, DF 1/5, PF 3/5  	   Sensory - Impaired-- extinguishes on left  	   Coordination - impaired on left  Psychiatric -pleasant, motivated but depressed

## 2020-08-22 NOTE — PROGRESS NOTE BEHAVIORAL HEALTH - NSBHFUPINTERVALHXFT_PSY_A_CORE
As per Rn patient has neen depressed saying that somebody talk about hospice and he knows what it is.Patient has been pleasant today , talked about his family As per Rn patient has been depressed saying that somebody talk about hospice and he knows what it is.Patient has been pleasant today , talked about his family

## 2020-08-23 LAB
GLUCOSE BLDC GLUCOMTR-MCNC: 132 MG/DL — HIGH (ref 70–99)
GLUCOSE BLDC GLUCOMTR-MCNC: 134 MG/DL — HIGH (ref 70–99)
GLUCOSE BLDC GLUCOMTR-MCNC: 134 MG/DL — HIGH (ref 70–99)
GLUCOSE BLDC GLUCOMTR-MCNC: 87 MG/DL — SIGNIFICANT CHANGE UP (ref 70–99)

## 2020-08-23 PROCEDURE — 99232 SBSQ HOSP IP/OBS MODERATE 35: CPT

## 2020-08-23 RX ORDER — DEXAMETHASONE 0.5 MG/5ML
8 ELIXIR ORAL
Refills: 0 | Status: DISCONTINUED | OUTPATIENT
Start: 2020-08-23 | End: 2020-08-24

## 2020-08-23 RX ADMIN — Medication 1 APPLICATION(S): at 05:25

## 2020-08-23 RX ADMIN — Medication 8 MILLIGRAM(S): at 20:47

## 2020-08-23 RX ADMIN — Medication 12 UNIT(S): at 07:58

## 2020-08-23 RX ADMIN — OLANZAPINE 2.5 MILLIGRAM(S): 15 TABLET, FILM COATED ORAL at 21:47

## 2020-08-23 RX ADMIN — Medication 1 APPLICATION(S): at 17:18

## 2020-08-23 RX ADMIN — Medication 25 MILLIGRAM(S): at 05:24

## 2020-08-23 RX ADMIN — AMLODIPINE BESYLATE 7.5 MILLIGRAM(S): 2.5 TABLET ORAL at 05:23

## 2020-08-23 RX ADMIN — Medication 81 MILLIGRAM(S): at 12:53

## 2020-08-23 RX ADMIN — SENNA PLUS 2 TABLET(S): 8.6 TABLET ORAL at 21:47

## 2020-08-23 RX ADMIN — ATORVASTATIN CALCIUM 20 MILLIGRAM(S): 80 TABLET, FILM COATED ORAL at 21:47

## 2020-08-23 RX ADMIN — Medication 0.25 MILLIGRAM(S): at 21:47

## 2020-08-23 RX ADMIN — TAMSULOSIN HYDROCHLORIDE 0.8 MILLIGRAM(S): 0.4 CAPSULE ORAL at 21:47

## 2020-08-23 RX ADMIN — Medication 40 MILLIGRAM(S): at 05:24

## 2020-08-23 RX ADMIN — LEVETIRACETAM 1000 MILLIGRAM(S): 250 TABLET, FILM COATED ORAL at 05:24

## 2020-08-23 RX ADMIN — Medication 0.25 MILLIGRAM(S): at 13:45

## 2020-08-23 RX ADMIN — Medication 3 MILLIGRAM(S): at 22:05

## 2020-08-23 RX ADMIN — Medication 12 UNIT(S): at 17:16

## 2020-08-23 RX ADMIN — LEVETIRACETAM 1000 MILLIGRAM(S): 250 TABLET, FILM COATED ORAL at 17:17

## 2020-08-23 RX ADMIN — ENOXAPARIN SODIUM 40 MILLIGRAM(S): 100 INJECTION SUBCUTANEOUS at 12:53

## 2020-08-23 RX ADMIN — Medication 0.25 MILLIGRAM(S): at 05:23

## 2020-08-23 RX ADMIN — Medication 12 UNIT(S): at 12:57

## 2020-08-23 RX ADMIN — Medication 650 MILLIGRAM(S): at 20:47

## 2020-08-23 RX ADMIN — Medication 5 MILLIGRAM(S): at 17:17

## 2020-08-23 RX ADMIN — Medication 650 MILLIGRAM(S): at 21:47

## 2020-08-23 NOTE — PROGRESS NOTE ADULT - SUBJECTIVE AND OBJECTIVE BOX
Patient is a 79y old  Male who presents with a chief complaint of Glioblastoma (22 Aug 2020 14:10)    No overnight events noted.  Patient reports no issues/symptoms.    Patient seen and examined at bedside.    ALLERGIES:  No Known Allergies    MEDICATIONS  (STANDING):  ALPRAZolam 0.25 milliGRAM(s) Oral three times a day  amLODIPine   Tablet 7.5 milliGRAM(s) Oral daily  aspirin  chewable 81 milliGRAM(s) Oral daily  atorvastatin 20 milliGRAM(s) Oral at bedtime  bisacodyl 5 milliGRAM(s) Oral every 12 hours  dextrose 5%. 1000 milliLiter(s) (50 mL/Hr) IV Continuous <Continuous>  dextrose 50% Injectable 12.5 Gram(s) IV Push once  dextrose 50% Injectable 25 Gram(s) IV Push once  dextrose 50% Injectable 25 Gram(s) IV Push once  docusate sodium 100 milliGRAM(s) Oral daily  enoxaparin Injectable 40 milliGRAM(s) SubCutaneous daily  furosemide    Tablet 40 milliGRAM(s) Oral daily  insulin lispro (HumaLOG) corrective regimen sliding scale   SubCutaneous three times a day before meals  insulin lispro (HumaLOG) corrective regimen sliding scale   SubCutaneous at bedtime  insulin lispro Injectable (HumaLOG) 12 Unit(s) SubCutaneous three times a day before meals  levETIRAcetam 1000 milliGRAM(s) Oral two times a day  melatonin 3 milliGRAM(s) Oral at bedtime  metoprolol succinate ER 25 milliGRAM(s) Oral daily  OLANZapine 2.5 milliGRAM(s) Oral at bedtime  pantoprazole    Tablet 40 milliGRAM(s) Oral before breakfast  polyethylene glycol 3350 17 Gram(s) Oral two times a day  senna 2 Tablet(s) Oral at bedtime  tamsulosin 0.8 milliGRAM(s) Oral at bedtime  triamcinolone 0.1% Cream 1 Application(s) Topical two times a day    MEDICATIONS  (PRN):  acetaminophen   Tablet .. 650 milliGRAM(s) Oral every 6 hours PRN Temp greater or equal to 38C (100.4F), Mild Pain (1 - 3)  ALPRAZolam 0.25 milliGRAM(s) Oral every 8 hours PRN Anxiety  benzocaine 15 mG/menthol 3.6 mG (Sugar-Free) Lozenge 1 Lozenge Oral two times a day PRN Sore Throat  dextrose 40% Gel 15 Gram(s) Oral once PRN Blood Glucose LESS THAN 70 milliGRAM(s)/deciliter  glucagon  Injectable 1 milliGRAM(s) IntraMuscular once PRN Glucose LESS THAN 70 milligrams/deciliter    Vital Signs Last 24 Hrs  T(F): 98.8 (22 Aug 2020 19:57), Max: 98.8 (22 Aug 2020 19:57)  HR: 92 (23 Aug 2020 05:18) (81 - 96)  BP: 124/75 (23 Aug 2020 05:18) (116/73 - 135/73)  RR: 14 (22 Aug 2020 19:57) (14 - 14)  SpO2: 93% (22 Aug 2020 19:57) (93% - 94%)  I&O's Summary    22 Aug 2020 07:01  -  23 Aug 2020 07:00  --------------------------------------------------------  IN: 0 mL / OUT: 760 mL / NET: -760 mL    23 Aug 2020 07:01  -  23 Aug 2020 08:53  --------------------------------------------------------  IN: 0 mL / OUT: 100 mL / NET: -100 mL    PHYSICAL EXAM:  General: NAD, A/O x 3  HEENT: PERRL, EOMI, MMM, no scleral icterus, R cranial incision healed  Neck: Supple, No JVD  Lungs: Clear to auscultation bilaterally, no wheezes, rales, rhonchi  Cardio: RRR, S1/S2, No murmurs  Abdomen: Soft, Nontender, Nondistended; Bowel sounds present  Extremities: No calf tenderness, trace lower extremity edema L>R  Neuro: able to move SUPRIYA JAMA hemiparesis     LABS:                        12.6   15.05 )-----------( 182      ( 20 Aug 2020 06:35 )             36.9       08-20    136  |  100  |  46  ----------------------------<  174  4.1   |  29  |  0.98    Ca    8.6      20 Aug 2020 06:35    TPro  5.5  /  Alb  2.5  /  TBili  0.4  /  DBili  x   /  AST  32  /  ALT  128  /  AlkPhos  64  08-20       POCT Blood Glucose.: 134 mg/dL (23 Aug 2020 07:53)  POCT Blood Glucose.: 116 mg/dL (22 Aug 2020 22:42)  POCT Blood Glucose.: 117 mg/dL (22 Aug 2020 16:56)  POCT Blood Glucose.: 84 mg/dL (22 Aug 2020 11:36)

## 2020-08-23 NOTE — PROGRESS NOTE ADULT - ASSESSMENT
80yo male with hx of HTN, HLD, prostate cancer s/p prostatectomy, BCC, GBM (dx in 2018, s/p resection 8/3/18, s/p 2nd resection 2/7/19, s/p 3rd resection 5/2020 s/p multiple does of Temodar), presented to [facility] for acute rehab from [facility] for worsening of his L. sided weakness, noted to have worsening vasogenic edema with progression of GBM, resulting in Left Hemiparesis, Dysphagia, Gait and ADL impairment.     #Debility  -Gait Instability, ADL impairments and Functional impairments  -Comprehensive Rehab Program of PT/OT & speech per primary team    #GBM   #Cerebral Edema  -poor prognosis, may be candidate for avastin after discharge per neuro-oncology  -primary team to d/w Dr. Del Cid on Monday regarding risks/benefits of treatment and if worthwhile for patient to pursue as outpatient.   -may benefit from palliative care assessment    #Left hemiplagisa  -Rapidly progressive GBM since prior surgery  -Neuro Onc, Dr. Harman, recs noted   -was on Decadron 8mg PO BID, do not see current order, will speak with primary team  -Keppra 1000mg BID for Seizure ppx    #HTN  -controlled, conitinue Amlodipine, Lasix, Metoprolol    #Hyperglycemia  -Steroid induced  -Ha1c 5.6  -Increase Humalog to 12 units TID, continue moderate dose sliding scale  -Continue hypoglycemia protocol and accu-cheks    #Leukocytosis  -Likely steroid induced    #BPH  -Continue Flomax  -TOV with Bladder scan with prn intermittent catheterization     #Lower extremity edema - improving  -Secondary to Left sided hemiplegia  -LLE Doppler negative for DVT  -Continue Lasix       DVT prophylaxis: Lovenox SQ 78yo male with hx of HTN, HLD, prostate cancer s/p prostatectomy, BCC, GBM (dx in 2018, s/p resection 8/3/18, s/p 2nd resection 2/7/19, s/p 3rd resection 5/2020 s/p multiple does of Temodar), presented to [facility] for acute rehab from [facility] for worsening of his L. sided weakness, noted to have worsening vasogenic edema with progression of GBM, resulting in Left Hemiparesis, Dysphagia, Gait and ADL impairment.     #Debility  -Gait Instability, ADL impairments and Functional impairments  -Comprehensive Rehab Program of PT/OT & speech per primary team    #GBM   #Cerebral Edema  -poor prognosis, may be candidate for avastin after discharge per neuro-oncology  -primary team to d/w Dr. Del Cid on Monday regarding risks/benefits of treatment and if worthwhile for patient to pursue as outpatient.   -may benefit from palliative care assessment    #Left hemiplagisa  -Rapidly progressive GBM since prior surgery  -Neuro Onc, Dr. Harman, recs noted   -was on Decadron 8mg PO BID --> 8/21 6am last dose, pharmacy relays med was dc'd by Dr Earl 8/21, will speak with primary team  -Keppra 1000mg BID for Seizure ppx    #HTN  -controlled, conitinue Amlodipine, Lasix, Metoprolol    #Hyperglycemia  -Steroid induced  -Ha1c 5.6  -Increase Humalog to 12 units TID, continue moderate dose sliding scale  -Continue hypoglycemia protocol and accu-cheks    #Leukocytosis  -Likely steroid induced    #BPH  -Continue Flomax  -TOV with Bladder scan with prn intermittent catheterization     #Lower extremity edema - improving  -Secondary to Left sided hemiplegia  -LLE Doppler negative for DVT  -Continue Lasix       DVT prophylaxis: Lovenox SQ

## 2020-08-23 NOTE — PROGRESS NOTE BEHAVIORAL HEALTH - NSBHFUPINTERVALHXFT_PSY_A_CORE
79 RHM w/ PMH of HTN, HLD, prostate cancer s/p prostectomy, basal cell skin cancer, GBM (dx in 2018, s/p resection 8/3/18, s/p 2nd resection 2/7/19, s/p 3rd resection 5/2020 s/p multiple does of Temodar admitted to [facility] on 8/4/20 w/ worsening of his L. sided weakness. Admit to rehab on 8/07, has been very labile, angry , confused, was seen for consult by psychiatry and started on Zyprexa and Xanax, doing better now .

## 2020-08-24 LAB
ALBUMIN SERPL ELPH-MCNC: 2.5 G/DL — LOW (ref 3.3–5)
ALP SERPL-CCNC: 67 U/L — SIGNIFICANT CHANGE UP (ref 40–120)
ALT FLD-CCNC: 85 U/L — HIGH (ref 10–45)
ANION GAP SERPL CALC-SCNC: 10 MMOL/L — SIGNIFICANT CHANGE UP (ref 5–17)
AST SERPL-CCNC: 25 U/L — SIGNIFICANT CHANGE UP (ref 10–40)
BASOPHILS # BLD AUTO: 0.02 K/UL — SIGNIFICANT CHANGE UP (ref 0–0.2)
BASOPHILS NFR BLD AUTO: 0.3 % — SIGNIFICANT CHANGE UP (ref 0–2)
BILIRUB SERPL-MCNC: 0.6 MG/DL — SIGNIFICANT CHANGE UP (ref 0.2–1.2)
BUN SERPL-MCNC: 40 MG/DL — HIGH (ref 7–23)
CALCIUM SERPL-MCNC: 8.4 MG/DL — SIGNIFICANT CHANGE UP (ref 8.4–10.5)
CHLORIDE SERPL-SCNC: 99 MMOL/L — SIGNIFICANT CHANGE UP (ref 96–108)
CO2 SERPL-SCNC: 29 MMOL/L — SIGNIFICANT CHANGE UP (ref 22–31)
CREAT SERPL-MCNC: 1.12 MG/DL — SIGNIFICANT CHANGE UP (ref 0.5–1.3)
EOSINOPHIL # BLD AUTO: 0.01 K/UL — SIGNIFICANT CHANGE UP (ref 0–0.5)
EOSINOPHIL NFR BLD AUTO: 0.1 % — SIGNIFICANT CHANGE UP (ref 0–6)
GLUCOSE BLDC GLUCOMTR-MCNC: 179 MG/DL — HIGH (ref 70–99)
GLUCOSE BLDC GLUCOMTR-MCNC: 191 MG/DL — HIGH (ref 70–99)
GLUCOSE BLDC GLUCOMTR-MCNC: 198 MG/DL — HIGH (ref 70–99)
GLUCOSE BLDC GLUCOMTR-MCNC: 347 MG/DL — HIGH (ref 70–99)
GLUCOSE SERPL-MCNC: 195 MG/DL — HIGH (ref 70–99)
HCT VFR BLD CALC: 39.2 % — SIGNIFICANT CHANGE UP (ref 39–50)
HGB BLD-MCNC: 12.9 G/DL — LOW (ref 13–17)
IMM GRANULOCYTES NFR BLD AUTO: 1.3 % — SIGNIFICANT CHANGE UP (ref 0–1.5)
LYMPHOCYTES # BLD AUTO: 0.53 K/UL — LOW (ref 1–3.3)
LYMPHOCYTES # BLD AUTO: 6.9 % — LOW (ref 13–44)
MCHC RBC-ENTMCNC: 31.9 PG — SIGNIFICANT CHANGE UP (ref 27–34)
MCHC RBC-ENTMCNC: 32.9 GM/DL — SIGNIFICANT CHANGE UP (ref 32–36)
MCV RBC AUTO: 96.8 FL — SIGNIFICANT CHANGE UP (ref 80–100)
MONOCYTES # BLD AUTO: 0.12 K/UL — SIGNIFICANT CHANGE UP (ref 0–0.9)
MONOCYTES NFR BLD AUTO: 1.6 % — LOW (ref 2–14)
NEUTROPHILS # BLD AUTO: 6.89 K/UL — SIGNIFICANT CHANGE UP (ref 1.8–7.4)
NEUTROPHILS NFR BLD AUTO: 89.8 % — HIGH (ref 43–77)
NRBC # BLD: 0 /100 WBCS — SIGNIFICANT CHANGE UP (ref 0–0)
PLATELET # BLD AUTO: 129 K/UL — LOW (ref 150–400)
POTASSIUM SERPL-MCNC: 3.9 MMOL/L — SIGNIFICANT CHANGE UP (ref 3.5–5.3)
POTASSIUM SERPL-SCNC: 3.9 MMOL/L — SIGNIFICANT CHANGE UP (ref 3.5–5.3)
PROT SERPL-MCNC: 6.1 G/DL — SIGNIFICANT CHANGE UP (ref 6–8.3)
RBC # BLD: 4.05 M/UL — LOW (ref 4.2–5.8)
RBC # FLD: 13 % — SIGNIFICANT CHANGE UP (ref 10.3–14.5)
SODIUM SERPL-SCNC: 138 MMOL/L — SIGNIFICANT CHANGE UP (ref 135–145)
WBC # BLD: 7.67 K/UL — SIGNIFICANT CHANGE UP (ref 3.8–10.5)
WBC # FLD AUTO: 7.67 K/UL — SIGNIFICANT CHANGE UP (ref 3.8–10.5)

## 2020-08-24 PROCEDURE — 99223 1ST HOSP IP/OBS HIGH 75: CPT

## 2020-08-24 PROCEDURE — 99233 SBSQ HOSP IP/OBS HIGH 50: CPT

## 2020-08-24 RX ORDER — DEXAMETHASONE 0.5 MG/5ML
24 ELIXIR ORAL ONCE
Refills: 0 | Status: COMPLETED | OUTPATIENT
Start: 2020-08-24 | End: 2020-08-24

## 2020-08-24 RX ORDER — ALPRAZOLAM 0.25 MG
0.25 TABLET ORAL THREE TIMES A DAY
Refills: 0 | Status: DISCONTINUED | OUTPATIENT
Start: 2020-08-25 | End: 2020-08-31

## 2020-08-24 RX ORDER — DEXAMETHASONE 0.5 MG/5ML
8 ELIXIR ORAL
Refills: 0 | Status: DISCONTINUED | OUTPATIENT
Start: 2020-08-24 | End: 2020-08-31

## 2020-08-24 RX ORDER — OLANZAPINE 15 MG/1
2.5 TABLET, FILM COATED ORAL
Refills: 0 | Status: DISCONTINUED | OUTPATIENT
Start: 2020-08-24 | End: 2020-08-31

## 2020-08-24 RX ADMIN — Medication 5 MILLIGRAM(S): at 06:03

## 2020-08-24 RX ADMIN — Medication 1 APPLICATION(S): at 17:05

## 2020-08-24 RX ADMIN — Medication 25 MILLIGRAM(S): at 06:02

## 2020-08-24 RX ADMIN — Medication 12 UNIT(S): at 17:03

## 2020-08-24 RX ADMIN — Medication 81 MILLIGRAM(S): at 12:21

## 2020-08-24 RX ADMIN — LEVETIRACETAM 1000 MILLIGRAM(S): 250 TABLET, FILM COATED ORAL at 17:05

## 2020-08-24 RX ADMIN — Medication 40 MILLIGRAM(S): at 08:06

## 2020-08-24 RX ADMIN — Medication 0.25 MILLIGRAM(S): at 06:02

## 2020-08-24 RX ADMIN — Medication 12 UNIT(S): at 08:01

## 2020-08-24 RX ADMIN — Medication 8 MILLIGRAM(S): at 06:05

## 2020-08-24 RX ADMIN — OLANZAPINE 2.5 MILLIGRAM(S): 15 TABLET, FILM COATED ORAL at 21:14

## 2020-08-24 RX ADMIN — POLYETHYLENE GLYCOL 3350 17 GRAM(S): 17 POWDER, FOR SOLUTION ORAL at 17:05

## 2020-08-24 RX ADMIN — Medication 12 UNIT(S): at 12:19

## 2020-08-24 RX ADMIN — Medication 2: at 17:03

## 2020-08-24 RX ADMIN — Medication 2: at 21:13

## 2020-08-24 RX ADMIN — Medication 100 MILLIGRAM(S): at 13:04

## 2020-08-24 RX ADMIN — Medication 2: at 12:19

## 2020-08-24 RX ADMIN — AMLODIPINE BESYLATE 7.5 MILLIGRAM(S): 2.5 TABLET ORAL at 06:02

## 2020-08-24 RX ADMIN — Medication 24 MILLIGRAM(S): at 12:19

## 2020-08-24 RX ADMIN — Medication 0.25 MILLIGRAM(S): at 21:14

## 2020-08-24 RX ADMIN — SENNA PLUS 2 TABLET(S): 8.6 TABLET ORAL at 21:14

## 2020-08-24 RX ADMIN — PANTOPRAZOLE SODIUM 40 MILLIGRAM(S): 20 TABLET, DELAYED RELEASE ORAL at 06:02

## 2020-08-24 RX ADMIN — Medication 1 APPLICATION(S): at 06:07

## 2020-08-24 RX ADMIN — Medication 3 MILLIGRAM(S): at 21:14

## 2020-08-24 RX ADMIN — Medication 8 MILLIGRAM(S): at 21:15

## 2020-08-24 RX ADMIN — ATORVASTATIN CALCIUM 20 MILLIGRAM(S): 80 TABLET, FILM COATED ORAL at 21:15

## 2020-08-24 RX ADMIN — TAMSULOSIN HYDROCHLORIDE 0.8 MILLIGRAM(S): 0.4 CAPSULE ORAL at 21:15

## 2020-08-24 RX ADMIN — LEVETIRACETAM 1000 MILLIGRAM(S): 250 TABLET, FILM COATED ORAL at 06:02

## 2020-08-24 RX ADMIN — Medication 5 MILLIGRAM(S): at 17:04

## 2020-08-24 RX ADMIN — Medication 0.25 MILLIGRAM(S): at 13:07

## 2020-08-24 RX ADMIN — Medication 2: at 08:01

## 2020-08-24 RX ADMIN — ENOXAPARIN SODIUM 40 MILLIGRAM(S): 100 INJECTION SUBCUTANEOUS at 12:21

## 2020-08-24 NOTE — CONSULT NOTE ADULT - ASSESSMENT
80yo male with hx of HTN, HLD, prostate cancer s/p prostatectomy, BCC, GBM (dx in 2018, s/p resection 8/3/18, s/p 2nd resection 2/7/19, s/p 3rd resection 5/2020 s/p multiple does of Temodar), presented to [facility] for acute rehab from [facility] for worsening of his L. sided weakness, noted to have worsening vasogenic edema with progression of GBM, resulting in Left Hemiparesis, Dysphagia, Gait and ADL impairment.   #Palliative:  Pt has shown poor response to pt.  When talking about placement patient clearly does not want to go to a EVE.  It seems as if home is the only reasonable choice at this time.  Call placec to son.  Message has been left to reive.  #Debility  -Gait Instability, ADL impairments and Functional impairments  -Comprehensive Rehab Program of PT/OT & speech per primary team    #GBM   #Cerebral Edema  -poor prognosis, may be candidate for avastin after discharge per neuro-oncology  -primary team to d/w Dr. Del Cid on Monday regarding risks/benefits of treatment and if worthwhile for patient to pursue as outpatient.   -may benefit from palliative care assessment    #Left hemiplagisa  -Rapidly progressive GBM since prior surgery  -Neuro Onc, Dr. Harman, recs noted   -was on Decadron 8mg PO BID --> 8/21 6am last dose, pharmacy relays med was dc'd by Dr Earl 8/21, will speak with primary team  -Keppra 1000mg BID for Seizure ppx    #HTN  -controlled, conitinue Amlodipine, Lasix, Metoprolol    #Hyperglycemia  -Steroid induced  -Ha1c 5.6  -Increase Humalog to 12 units TID, continue moderate dose sliding scale  -Continue hypoglycemia protocol and accu-cheks    #Leukocytosis  -Likely steroid induced    #BPH  -Continue Flomax  -TOV with Bladder scan with prn intermittent catheterization     #Lower extremity edema - improving  -Secondary to Left sided hemiplegia  -LLE Doppler negative for DVT  -Continue Lasix       DVT prophylaxis: Lovenox SQ

## 2020-08-24 NOTE — PROGRESS NOTE ADULT - ASSESSMENT
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     GBM with vaso genic edema  --Pt. with poor prognosis-- Spoke with Dr. Del Cid regarding option of Avastin for treatment-- feels it is high risk for patient due to risk of thrombosis and difficulty with safe transportation to obtain.  Will contact pt. to d/w him.    --Will benefit from palliative care assessment--Dr. Mustafa will see pt. today  - Pt. missed Decadron Sat and Sun AM as was unintentionally cancelled--pt. clinically somewhat weaker this AM-- d/w Dr. Del Cid--rec. to dose pt. 24 mg STAT then decrease to daily.   - Cont. Keppra 1000mg BID for Seizure ppx,  - c/w PPI for GI ppx on steroids  - continue Comprehensive Rehab Program of PT/OT & speech 3 hrs/day x 5days/week      HTN--BP controlled  - Metoprolol XL 25mg + Lasix 40mg Daily  - Amlodipine to 7.5mg daily    Urinary Retention - resolved  - continue Flomax  --voiding--incontinent at times  - Optimize Bowel regimen  - Mobilize    Diabetes type 2  - FS monitoring and ISS  premeal insulin coverage as per hospitalist    Mood/ Cognition  - anxiety and paranoid thought process--cont. Zyprexa--cont. qhs  --zyprexa 2.5 mg PRN --pt. more agitated this AM-- may get increase in agitation with Decadron bolus.   - continue Xanax PRN  - Psych consulted due to paranoid behavior; frustration, depression, anxiety, and exacerbate by steroids    --cont.  Xanax TID &  Zyprexa qHS      GI/Bowel:   - Senna, dulcolax, Miralax    Diet/ dysphagia:   - regular/easy to chew with thin     ENT evaluation for poor vocal quality-> no abnormalities found     Stage I pressure ulcer  - offloading, turn q.2h, barrier cream    LE edema  - compression stockings  - Lasix as above    Pain  - Tylenol PRN      Functional Prognosis / Discharge Planning  - Meeting held on 8/18  - Nursing needs: urinary retention  - SW: lives with Adult son and spouse, used cane prior   - OT: Mod to max assist with ADLs, total for toileting  - PT: Max transfers, Mod bed mobility, Stood with max assist, did not ambulate, propels w/c 75' with RUE Mo assist --> W/c level  - SLP: upgraded to soft and easy to chew thins but still coughs intermittently without aspiration. Poor receptive quality. Decreased insight into deficits, distracted.  - Barriers: left visual field cut, flaccid weakness, poo  - MAGDALENA:  Plan for EVE 8/26 vs home Possibly with Hospice.

## 2020-08-24 NOTE — CONSULT NOTE ADULT - SUBJECTIVE AND OBJECTIVE BOX
HPI:  79 RHM w/ PMH of HTN, HLD, prostate cancer s/p prostectomy, basal cell skin cancer, GBM (dx in 2018, s/p resection 8/3/18, s/p 2nd resection 19, s/p 3rd resection 2020 s/p multiple does of Temodar admitted to [facility] on 20 w/ worsening of his L. sided weakness. No other complaints. Denied changes in speech, vision, hearing, swallowing, voice quality, numbness/tingling,  balance/room-spinning sensations.    He was seen in Dr. Harman's office on 20 where he was noted to have worsening strength, lethargy and decreased appetitie. He had been tapering his steroids for over a month. Recently tapered from 4mg QD --> 3mg QD ---> 2mg QD.    CT Head on admission 2020, showed- Worsening vasogenic edema involving the R. temporal frontal parietal region again identified increased when compared to previous CT in 2020.     Patient admitted to neurology floor, started on decadron 4mg q6h later increased to 8mg q8h. MR head w/ and w/o contrast with progression of disease, neuro-onc on board, patient to follow up outpatient for initiation of avastin therapy.   Patient also with urinary retention, urology consulted, suspect urinary retention is neurologic in etiology, on clean intermittent catheterization; further followup to continue at rehab.   Patient stable for discharge to acute rehab 20.     Of note--patient was admitted to  rehab after his most recent resection in May 2020.  He was at [facility] from 20  to 20.  At the time of discharge from  rehab, patient was supervision for eating, grooming, and dressing, and contact guard for transfers.  He was able to ambulate 150' with SAC with CG/min assist and to navigate 12 steps with u/l HR with min assist.  SLP at [facility] noted higher level cognitive deficits with mild impulsivity and reduced insight, necessitating supervision while at home. (07 Aug 2020 14:30)  Palliative:  case reviewed with Dr Quarles.  On current admission patient is not showing significant improvement and will need to go to either home or [facility]    PAST MEDICAL & SURGICAL HISTORY:  Fall, sequela  Bilateral hearing loss, unspecified hearing loss type   activity: [employer]    [place]  Type 2 diabetes mellitus without complication, without long-term current use of insulin: stop oral meds  3 weeks ago  Glioblastoma: biopsy 2018  surgery  6 weeks radiation  35 days of oral chemotherapy last 3 weeks  Basal cell carcinoma (BCC) of left lower leg: s/p resection  right lower leg top  left lower leg  Prostate cancer: s/p radical prostatectomy   HLD (hyperlipidemia)  HTN (hypertension)  S/P colonoscopic polypectomy: 3 years benign polyps  S/P tonsillectomy: age 30  H/O bilateral cataract extraction: 3-4 years ago  S/P prostatectomy:   S/P brain surgery: 2018  Lipoma of neck: s/p resection at age 30  Basal cell carcinoma (BCC) of lower leg: s/p resection  History of tonsillectomy: at age 30      SOCIAL HISTORY:    Admitted from:  home   Substance abuse history:    denies           Surrogate/HCP/Guardian: Nolberto St           FAMILY HISTORY:  Family history of diabetes mellitus: Father   Family history of prostate cancer in father:   Family history of breast cancer in mother (Sibling): Mother and sister  Family history of lung cancer: mother , long standing smoker    Baseline ADLs (prior to admission):    Allergies    No Known Allergies    Intolerances      Present Symptoms:   Dyspnea: no  Nausea/Vomiting: no  Anxiety: yes  Depressed  yes  Fatigue: denies  Loss of appetite: no  Pain:     denies                         MEDICATIONS  (STANDING):  ALPRAZolam 0.25 milliGRAM(s) Oral three times a day  amLODIPine   Tablet 7.5 milliGRAM(s) Oral daily  aspirin  chewable 81 milliGRAM(s) Oral daily  atorvastatin 20 milliGRAM(s) Oral at bedtime  bisacodyl 5 milliGRAM(s) Oral every 12 hours  dexAMETHasone     Tablet 8 milliGRAM(s) Oral <User Schedule>  dextrose 5%. 1000 milliLiter(s) (50 mL/Hr) IV Continuous <Continuous>  dextrose 50% Injectable 12.5 Gram(s) IV Push once  dextrose 50% Injectable 25 Gram(s) IV Push once  dextrose 50% Injectable 25 Gram(s) IV Push once  docusate sodium 100 milliGRAM(s) Oral daily  enoxaparin Injectable 40 milliGRAM(s) SubCutaneous daily  furosemide    Tablet 40 milliGRAM(s) Oral daily  insulin lispro (HumaLOG) corrective regimen sliding scale   SubCutaneous three times a day before meals  insulin lispro (HumaLOG) corrective regimen sliding scale   SubCutaneous at bedtime  insulin lispro Injectable (HumaLOG) 12 Unit(s) SubCutaneous three times a day before meals  levETIRAcetam 1000 milliGRAM(s) Oral two times a day  melatonin 3 milliGRAM(s) Oral at bedtime  metoprolol succinate ER 25 milliGRAM(s) Oral daily  OLANZapine 2.5 milliGRAM(s) Oral at bedtime  pantoprazole    Tablet 40 milliGRAM(s) Oral before breakfast  polyethylene glycol 3350 17 Gram(s) Oral two times a day  senna 2 Tablet(s) Oral at bedtime  tamsulosin 0.8 milliGRAM(s) Oral at bedtime  triamcinolone 0.1% Cream 1 Application(s) Topical two times a day    MEDICATIONS  (PRN):  acetaminophen   Tablet .. 650 milliGRAM(s) Oral every 6 hours PRN Temp greater or equal to 38C (100.4F), Mild Pain (1 - 3)  ALPRAZolam 0.25 milliGRAM(s) Oral every 8 hours PRN Anxiety  benzocaine 15 mG/menthol 3.6 mG (Sugar-Free) Lozenge 1 Lozenge Oral two times a day PRN Sore Throat  dextrose 40% Gel 15 Gram(s) Oral once PRN Blood Glucose LESS THAN 70 milliGRAM(s)/deciliter  glucagon  Injectable 1 milliGRAM(s) IntraMuscular once PRN Glucose LESS THAN 70 milligrams/deciliter  OLANZapine 2.5 milliGRAM(s) Oral two times a day PRN agitation      PHYSICAL EXAM:    Vital Signs Last 24 Hrs  T(C): 36.6 (24 Aug 2020 09:37), Max: 36.6 (24 Aug 2020 09:37)  T(F): 97.9 (24 Aug 2020 09:37), Max: 97.9 (24 Aug 2020 09:37)  HR: 75 (24 Aug 2020 09:37) (73 - 98)  BP: 112/72 (24 Aug 2020 09:37) (112/72 - 124/72)  BP(mean): --  RR: 15 (24 Aug 2020 09:37) (15 - 16)  SpO2: 94% (24 Aug 2020 09:37) (93% - 94%)    General: alert  oriented x __2__      HEENT: normal    Lungs: comfortable  CV: normal   GI: normal    : normal   Musculoskeletal:  weakness    Skin: normal    Neuro: as per phsyiatry  LABS:                        12.9   7.67  )-----------( 129      ( 24 Aug 2020 07:05 )             39.2         138  |  99  |  40<H>  ----------------------------<  195<H>  3.9   |  29  |  1.12    Ca    8.4      24 Aug 2020 07:05    TPro  6.1  /  Alb  2.5<L>  /  TBili  0.6  /  DBili  x   /  AST  25  /  ALT  85<H>  /  AlkPhos  67          RADIOLOGY & ADDITIONAL STUDIES:  < from: MR Head w/wo IV Cont (20 @ 22:31) >    PROCEDURE DATE:  2020            INTERPRETATION:  PROCEDURE: MRI brain with and without contrast    INDICATION: Status post resection of a right frontal GBM and chemoradiation, IDH-1 negative,MGMT unmethylated. Increased weakness, fatigue, difficulty with ambulation, and decreased appetite, rule out worsening disease.    TECHNIQUE: Multiplanar, multisequence MR imaging of the brain was performed.  7.5 cc of Gadavist was administered intravenously without reported complication. 0.0 cc was discarded.    COMPARISON: CT head 2018; MRI brain 2018; CT head 2020; MRI brain 2020.    FINDINGS:  There are postoperative changes related to prior right frontoparietal craniotomy for resection of a right posterior frontal mass. There is resolved pneumocephalus since the prior MRI. There is a trace heterogeneous extradural fluid collection subjacent to the craniotomy which has decreased in size and measures 2 mm. Dural thickening and hemosiderin staining is present subjacent to the craniotomy flap.    There is slight interval decrease in size in a right posterior frontal resection cavity. There is linear susceptibility along the margins of the resection cavity consistent with blood product.    There is significant progression of nodular enhancement which is now circumferential along the margins of the resection cavity and extends medially to the lateral wall of the body of the right lateral ventricle. There is restricted diffusion within the superior medial enhancing component suggestive of hypercellularity.    There is moderate interval increase in extent of nonenhancing FLAIR hyperintensity surrounding the resection cavity which involves the right frontal, parietal, occipital and temporal lobes.      There is no evidence of acute infarction. There are mild patchy areas of FLAIR hyperintensity in the periventricular and subcortical white matter which are nonspecific but likely related to chronic microangiopathic changes. No midline shift or other significant mass effect is noted. There is normal flow voids within the major cranial vessels suggestive of their patency. There has been prior bilateral cataract surgery. The paranasal sinuses are predominantly clear. The mastoid air cells are predominantly clear.    IMPRESSION: There are postoperative changes related to prior right frontal parietal craniotomy. There is significant progression of nodular enhancement along the margins of the right frontal resection cavity which extend to the lateral wall of the right lateral ventricle as described. Restricted diffusion is demonstrated within the medial component of the nodular enhancement which may suggest hypercellularity. There is moderate interval increase in extent of surrounding nonenhancing FLAIR hyperintensity.              SUSANNAH ORTEGA M.D., RADIOLOGY RESIDENT  This document has been electronically signed.  LEIGH BRENNAN M.D., ATTENDING RADIOLOGIST  This document has been electronically signed. Aug  6 2020 10:05AM    < end of copied text >    ADVANCE DIRECTIVES: none.  Call placed to son to review [facility]  Advanced Care Planning discussion total time spent:  45 minutes

## 2020-08-25 LAB
GLUCOSE BLDC GLUCOMTR-MCNC: 173 MG/DL — HIGH (ref 70–99)
GLUCOSE BLDC GLUCOMTR-MCNC: 232 MG/DL — HIGH (ref 70–99)
GLUCOSE BLDC GLUCOMTR-MCNC: 278 MG/DL — HIGH (ref 70–99)
GLUCOSE BLDC GLUCOMTR-MCNC: 298 MG/DL — HIGH (ref 70–99)

## 2020-08-25 PROCEDURE — 99232 SBSQ HOSP IP/OBS MODERATE 35: CPT

## 2020-08-25 PROCEDURE — 99233 SBSQ HOSP IP/OBS HIGH 50: CPT

## 2020-08-25 RX ORDER — INSULIN LISPRO 100/ML
14 VIAL (ML) SUBCUTANEOUS
Refills: 0 | Status: DISCONTINUED | OUTPATIENT
Start: 2020-08-25 | End: 2020-09-02

## 2020-08-25 RX ORDER — INSULIN GLARGINE 100 [IU]/ML
4 INJECTION, SOLUTION SUBCUTANEOUS AT BEDTIME
Refills: 0 | Status: DISCONTINUED | OUTPATIENT
Start: 2020-08-25 | End: 2020-09-03

## 2020-08-25 RX ADMIN — Medication 5 MILLIGRAM(S): at 05:03

## 2020-08-25 RX ADMIN — Medication 6: at 07:55

## 2020-08-25 RX ADMIN — ENOXAPARIN SODIUM 40 MILLIGRAM(S): 100 INJECTION SUBCUTANEOUS at 11:03

## 2020-08-25 RX ADMIN — ATORVASTATIN CALCIUM 20 MILLIGRAM(S): 80 TABLET, FILM COATED ORAL at 22:09

## 2020-08-25 RX ADMIN — PANTOPRAZOLE SODIUM 40 MILLIGRAM(S): 20 TABLET, DELAYED RELEASE ORAL at 06:00

## 2020-08-25 RX ADMIN — Medication 1 APPLICATION(S): at 16:24

## 2020-08-25 RX ADMIN — LEVETIRACETAM 1000 MILLIGRAM(S): 250 TABLET, FILM COATED ORAL at 16:24

## 2020-08-25 RX ADMIN — Medication 100 MILLIGRAM(S): at 11:03

## 2020-08-25 RX ADMIN — Medication 8 MILLIGRAM(S): at 07:55

## 2020-08-25 RX ADMIN — INSULIN GLARGINE 4 UNIT(S): 100 INJECTION, SOLUTION SUBCUTANEOUS at 22:10

## 2020-08-25 RX ADMIN — Medication 12 UNIT(S): at 11:14

## 2020-08-25 RX ADMIN — Medication 2: at 16:22

## 2020-08-25 RX ADMIN — OLANZAPINE 2.5 MILLIGRAM(S): 15 TABLET, FILM COATED ORAL at 22:09

## 2020-08-25 RX ADMIN — Medication 40 MILLIGRAM(S): at 05:03

## 2020-08-25 RX ADMIN — Medication 12 UNIT(S): at 07:55

## 2020-08-25 RX ADMIN — Medication 0.25 MILLIGRAM(S): at 14:10

## 2020-08-25 RX ADMIN — Medication 1 APPLICATION(S): at 05:04

## 2020-08-25 RX ADMIN — Medication 14 UNIT(S): at 16:00

## 2020-08-25 RX ADMIN — Medication 25 MILLIGRAM(S): at 05:02

## 2020-08-25 RX ADMIN — TAMSULOSIN HYDROCHLORIDE 0.8 MILLIGRAM(S): 0.4 CAPSULE ORAL at 22:09

## 2020-08-25 RX ADMIN — Medication 0.25 MILLIGRAM(S): at 22:09

## 2020-08-25 RX ADMIN — Medication 3 MILLIGRAM(S): at 22:13

## 2020-08-25 RX ADMIN — Medication 0.25 MILLIGRAM(S): at 05:02

## 2020-08-25 RX ADMIN — Medication 6: at 11:14

## 2020-08-25 RX ADMIN — SENNA PLUS 2 TABLET(S): 8.6 TABLET ORAL at 22:09

## 2020-08-25 RX ADMIN — POLYETHYLENE GLYCOL 3350 17 GRAM(S): 17 POWDER, FOR SOLUTION ORAL at 16:24

## 2020-08-25 RX ADMIN — LEVETIRACETAM 1000 MILLIGRAM(S): 250 TABLET, FILM COATED ORAL at 05:02

## 2020-08-25 RX ADMIN — AMLODIPINE BESYLATE 7.5 MILLIGRAM(S): 2.5 TABLET ORAL at 05:02

## 2020-08-25 RX ADMIN — Medication 81 MILLIGRAM(S): at 11:14

## 2020-08-25 RX ADMIN — Medication 5 MILLIGRAM(S): at 16:23

## 2020-08-25 NOTE — PROGRESS NOTE ADULT - ASSESSMENT
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   #Palliative:  Reviewed with social work.  We are looking into options regarding disposition.  Patient and family are not interested in [facility]. Home islikely the only option.  Pt family ruth need help at home.  GBM with vaso genic edema  --Pt. with poor prognosis-- Spoke with Dr. Del Cid regarding option of Avastin for treatment-- feels it is high risk for patient due to risk of thrombosis and difficulty with safe transportation to obtain.  Will contact pt. to d/w him.      - Pt. missed Decadron Sat and Sun AM as was unintentionally cancelled--pt. clinically somewhat weaker this AM-- d/w Dr. Del Cid--rec. to dose pt. 24 mg STAT then decrease to daily.   - Cont. Keppra 1000mg BID for Seizure ppx,  - c/w PPI for GI ppx on steroids  - continue Comprehensive Rehab Program of PT/OT & speech 3 hrs/day x 5days/week      HTN--BP controlled  - Metoprolol XL 25mg + Lasix 40mg Daily  - Amlodipine to 7.5mg daily    Urinary Retention - resolved  - continue Flomax  --voiding--incontinent at times  - Optimize Bowel regimen  - Mobilize    Diabetes type 2  - FS monitoring and ISS  premeal insulin coverage as per hospitalist    Mood/ Cognition  - anxiety and paranoid thought process--cont. Zyprexa--cont. qhs  --zyprexa 2.5 mg PRN --pt. more agitated this AM-- may get increase in agitation with Decadron bolus.   - continue Xanax PRN  - Psych consulted due to paranoid behavior; frustration, depression, anxiety, and exacerbate by steroids    --cont.  Xanax TID &  Zyprexa qHS      GI/Bowel:   - Senna, dulcolax, Miralax    Diet/ dysphagia:   - regular/easy to chew with thin     ENT evaluation for poor vocal quality-> no abnormalities found     Stage I pressure ulcer  - offloading, turn q.2h, barrier cream    LE edema  - compression stockings  - Lasix as above    Pain  - Tylenol PRN      Functional Prognosis / Discharge Planning  - Meeting held on 8/18  - Nursing needs: urinary retention  - SW: lives with Adult son and spouse, used cane prior   - OT: Mod to max assist with ADLs, total for toileting  - PT: Max transfers, Mod bed mobility, Stood with max assist, did not ambulate, propels w/c 75' with RUE Mo assist --> W/c level  - SLP: upgraded to soft and easy to chew thins but still coughs intermittently without aspiration. Poor receptive quality. Decreased insight into deficits, distracted.  - Barriers: left visual field cut, flaccid weakness, poo  - MAGDALENA:  Plan for [facility] 8/26 vs home Possibly with Hospice.

## 2020-08-25 NOTE — PROGRESS NOTE ADULT - ATTENDING COMMENTS
Pt. seen with resident.  Agree with documentation above as per resident with amendments made as appropriate. Patient medically stable. Making progress towards rehab goals.    case d/w palliative care.  f/u with SW regarding if pt. can get extension of home aid hours as home discharge preferable.

## 2020-08-25 NOTE — PROGRESS NOTE ADULT - ASSESSMENT
80yo male with hx of HTN, HLD, prostate cancer s/p prostatectomy, BCC, GBM (dx in 2018, s/p resection 8/3/18, s/p 2nd resection 2/7/19, s/p 3rd resection 5/2020 s/p multiple does of Temodar), presented to [facility] for acute rehab from [facility] for worsening of his L. sided weakness, noted to have worsening vasogenic edema with progression of GBM, resulting in Left Hemiparesis, Dysphagia, Gait and ADL impairment.     #Debility  -Gait Instability, ADL impairments and Functional impairments  -Comprehensive Rehab Program of PT/OT & speech per primary team    #GBM   #Cerebral Edema  -poor prognosis, may be candidate for avastin after discharge per neuro-oncology  -primary team to d/w Dr. Del Cid on Monday regarding risks/benefits of treatment and if worthwhile for patient to pursue as outpatient.   -may benefit from palliative care assessment    #Left hemiplagisa  -Rapidly progressive GBM since prior surgery  -Neuro Onc, Dr. Harman, recs noted   -was on Decadron 8mg PO BID --> 8/21 6am last dose, pharmacy relays med was dc'd by Dr Earl 8/21, will speak with primary team  -Keppra 1000mg BID for Seizure ppx    #HTN  -controlled, conitinue Amlodipine, Lasix, Metoprolol    #Hyperglycemia  -Steroid induced  -Ha1c 5.6  -Increase Humalog to 14 units TID,  start Lantus 4units QHS. continue moderate dose sliding scale  -Continue hypoglycemia protocol and accu-cheks    #Leukocytosis  -Likely steroid induced    #BPH  -Continue Flomax  -TOV with Bladder scan with prn intermittent catheterization     #Lower extremity edema - improving  -Secondary to Left sided hemiplegia  -LLE Doppler negative for DVT  -Continue Lasix       DVT prophylaxis: Lovenox SQ

## 2020-08-25 NOTE — PROGRESS NOTE BEHAVIORAL HEALTH - OTHER
as per Pt notes as per PT notes as per Physical therapy notes. Not tested by clinician pt stated "my moods not good"

## 2020-08-25 NOTE — PROGRESS NOTE ADULT - SUBJECTIVE AND OBJECTIVE BOX
Laz Earl M.D. Pager Number [phone]    Patient is a 79y old  Male who presents with a chief complaint of Glioblastoma (25 Aug 2020 12:53)      SUBJECTIVE / OVERNIGHT EVENTS:  Pt seen and examined at bedside. No acute events overnight.  Pt denies cp, palpitations, sob, abd pain, N/V, fever, chills.    ROS:  All other review of systems negative    Allergies    No Known Allergies    Intolerances        MEDICATIONS  (STANDING):  ALPRAZolam 0.25 milliGRAM(s) Oral three times a day  amLODIPine   Tablet 7.5 milliGRAM(s) Oral daily  aspirin  chewable 81 milliGRAM(s) Oral daily  atorvastatin 20 milliGRAM(s) Oral at bedtime  bisacodyl 5 milliGRAM(s) Oral every 12 hours  dexAMETHasone     Tablet 8 milliGRAM(s) Oral <User Schedule>  dextrose 5%. 1000 milliLiter(s) (50 mL/Hr) IV Continuous <Continuous>  dextrose 50% Injectable 12.5 Gram(s) IV Push once  dextrose 50% Injectable 25 Gram(s) IV Push once  dextrose 50% Injectable 25 Gram(s) IV Push once  docusate sodium 100 milliGRAM(s) Oral daily  enoxaparin Injectable 40 milliGRAM(s) SubCutaneous daily  furosemide    Tablet 40 milliGRAM(s) Oral daily  insulin lispro (HumaLOG) corrective regimen sliding scale   SubCutaneous three times a day before meals  insulin lispro (HumaLOG) corrective regimen sliding scale   SubCutaneous at bedtime  levETIRAcetam 1000 milliGRAM(s) Oral two times a day  melatonin 3 milliGRAM(s) Oral at bedtime  metoprolol succinate ER 25 milliGRAM(s) Oral daily  OLANZapine 2.5 milliGRAM(s) Oral at bedtime  pantoprazole    Tablet 40 milliGRAM(s) Oral before breakfast  polyethylene glycol 3350 17 Gram(s) Oral two times a day  senna 2 Tablet(s) Oral at bedtime  tamsulosin 0.8 milliGRAM(s) Oral at bedtime  triamcinolone 0.1% Cream 1 Application(s) Topical two times a day    MEDICATIONS  (PRN):  acetaminophen   Tablet .. 650 milliGRAM(s) Oral every 6 hours PRN Temp greater or equal to 38C (100.4F), Mild Pain (1 - 3)  ALPRAZolam 0.25 milliGRAM(s) Oral every 8 hours PRN Anxiety  benzocaine 15 mG/menthol 3.6 mG (Sugar-Free) Lozenge 1 Lozenge Oral two times a day PRN Sore Throat  dextrose 40% Gel 15 Gram(s) Oral once PRN Blood Glucose LESS THAN 70 milliGRAM(s)/deciliter  glucagon  Injectable 1 milliGRAM(s) IntraMuscular once PRN Glucose LESS THAN 70 milligrams/deciliter  OLANZapine 2.5 milliGRAM(s) Oral two times a day PRN agitation      Vital Signs Last 24 Hrs  T(C): 36.8 (25 Aug 2020 07:20), Max: 36.8 (25 Aug 2020 07:20)  T(F): 98.2 (25 Aug 2020 07:20), Max: 98.2 (25 Aug 2020 07:20)  HR: 84 (25 Aug 2020 07:20) (83 - 95)  BP: 146/70 (25 Aug 2020 07:20) (112/61 - 146/70)  BP(mean): --  RR: 14 (25 Aug 2020 07:20) (14 - 15)  SpO2: 94% (25 Aug 2020 07:20) (93% - 94%)  CAPILLARY BLOOD GLUCOSE      POCT Blood Glucose.: 298 mg/dL (25 Aug 2020 10:58)  POCT Blood Glucose.: 278 mg/dL (25 Aug 2020 07:46)  POCT Blood Glucose.: 347 mg/dL (24 Aug 2020 21:05)  POCT Blood Glucose.: 179 mg/dL (24 Aug 2020 16:57)    I&O's Summary      PHYSICAL EXAM:  GENERAL: NAD, well-developed  HEAD:  Atraumatic, Normocephalic  EYES: EOMI, PERRLA, conjunctiva and sclera clear  NECK: Supple, No JVD  CHEST/LUNG: Clear to auscultation bilaterally; No wheeze  HEART: Regular rate and rhythm; No murmurs, rubs, or gallops  ABDOMEN: Soft, Nontender, Nondistended; Bowel sounds present  EXTREMITIES:  2+ Peripheral Pulses, No clubbing, cyanosis, or edema  NEUROLOGY: AAOx3, LUE hemiparesis  PSYCH: calm  SKIN: No rashes or lesions    LABS:                        12.9   7.67  )-----------( 129      ( 24 Aug 2020 07:05 )             39.2     08-24    138  |  99  |  40<H>  ----------------------------<  195<H>  3.9   |  29  |  1.12    Ca    8.4      24 Aug 2020 07:05    TPro  6.1  /  Alb  2.5<L>  /  TBili  0.6  /  DBili  x   /  AST  25  /  ALT  85<H>  /  AlkPhos  67  08-24              RADIOLOGY & ADDITIONAL TESTS:  Results Reviewed:   Imaging Personally Reviewed:  Electrocardiogram Personally Reviewed:    COORDINATION OF CARE:  Care Discussed with Consultants/Other Providers [Y/N]:  Prior or Outpatient Records Reviewed [Y/N]:

## 2020-08-25 NOTE — PROGRESS NOTE ADULT - SUBJECTIVE AND OBJECTIVE BOX
HPI:  79 RHM w/ PMH of HTN, HLD, prostate cancer s/p prostectomy, basal cell skin cancer, GBM (dx in 2018, s/p resection 8/3/18, s/p 2nd resection 2/7/19, s/p 3rd resection 5/2020 s/p multiple does of Temodar admitted to [facility] on 8/4/20 w/ worsening of his L. sided weakness. No other complaints. Denied changes in speech, vision, hearing, swallowing, voice quality, numbness/tingling,  balance/room-spinning sensations.    He was seen in Dr. Harmna's office on 7/27/20 where he was noted to have worsening strength, lethargy and decreased appetitie. He had been tapering his steroids for over a month. Recently tapered from 4mg QD --> 3mg QD ---> 2mg QD.    CT Head on admission 8.4.2020, showed- Worsening vasogenic edema involving the R. temporal frontal parietal region again identified increased when compared to previous CT in 5/2020.     Patient admitted to neurology floor, started on decadron 4mg q6h later increased to 8mg q8h. MR head w/ and w/o contrast with progression of disease, neuro-onc on board, patient to follow up outpatient for initiation of avastin therapy.   Patient also with urinary retention, urology consulted, suspect urinary retention is neurologic in etiology, on clean intermittent catheterization; further followup to continue at rehab.   Patient stable for discharge to acute rehab 8/7/20.     Of note--patient was admitted to  rehab after his most recent resection in May 2020.  He was at [facility] from 6/4/20  to 6/17/20.  At the time of discharge from  rehab, patient was supervision for eating, grooming, and dressing, and contact guard for transfers.  He was able to ambulate 150' with SAC with CG/min assist and to navigate 12 steps with u/l HR with min assist.  SLP at [facility] noted higher level cognitive deficits with mild impulsivity and reduced insight, necessitating supervision while at home. (07 Aug 2020 14:30)      PAST MEDICAL & SURGICAL HISTORY:  Fall, sequela  Bilateral hearing loss, unspecified hearing loss type   activity: [employer] 1962-65    [place]  Type 2 diabetes mellitus without complication, without long-term current use of insulin: stop oral meds  3 weeks ago  Glioblastoma: biopsy 8/2018  surgery  6 weeks radiation  35 days of oral chemotherapy last 3 weeks  Basal cell carcinoma (BCC) of left lower leg: s/p resection  right lower leg top  left lower leg  Prostate cancer: s/p radical prostatectomy 1995  HLD (hyperlipidemia)  HTN (hypertension)  S/P colonoscopic polypectomy: 3 years benign polyps  S/P tonsillectomy: age 30  H/O bilateral cataract extraction: 3-4 years ago  S/P prostatectomy: 1995  S/P brain surgery: 8/2018  Lipoma of neck: s/p resection at age 30  Basal cell carcinoma (BCC) of lower leg: s/p resection  History of tonsillectomy: at age 30      Subjective: Patient seen and evaluated this morning. Spoke at length with patient in regards to     REVIEW OF SYMPTOMS  Positive:  depressed, left HP, cognitive deficits  Denies: headache, lightheadedness, CP, SOB, abdominal pain, dysuria, nausea, constipation        VITALS  Vital Signs Last 24 Hrs  T(C): 36.6 (24 Aug 2020 09:37), Max: 36.6 (24 Aug 2020 09:37)  T(F): 97.9 (24 Aug 2020 09:37), Max: 97.9 (24 Aug 2020 09:37)  HR: 75 (24 Aug 2020 09:37) (73 - 98)  BP: 112/72 (24 Aug 2020 09:37) (112/72 - 124/72)  BP(mean): --  RR: 15 (24 Aug 2020 09:37) (15 - 16)  SpO2: 94% (24 Aug 2020 09:37) (93% - 94%)      PHYSICAL EXAM  Constitutional - NAD, Comfortable  	HEENT - NCAT, EOMI  	Chest -  CTAB   	Cardio - warm and well perfused, RRR, no murmur  	Abdomen -  Soft, NTND  	Extremities - No edema, No calf tenderness   	Neurologic Exam:                 	   Cognitive -   	          Orientation: Awake, Alert,  	      	          Thought:  slow processing, perseverative, agitated  	   Speech - Fluent, Comprehensible,   	   Cranial Nerves - left visual field deficit, left facial weakness, Tongue midline, EOMI, Shoulder shrug intact  	   Motor -  LEFT hemiparesis  	                  LEFT    UE - ShAB 2/5, EF 1/5, EE 1/5, WE 0/5,  0/5  	                  LEFT    LE - HF 2/5, KE 3/5, KF 3/5, DF 1/5, PF 3/5  	   Sensory - Impaired-- extinguishes on left  	   Coordination - impaired on left  Psychiatric -agitated, depressed, anxious      RECENT LABS                        12.9   7.67  )-----------( 129      ( 24 Aug 2020 07:05 )             39.2     08-24    138  |  99  |  40<H>  ----------------------------<  195<H>  3.9   |  29  |  1.12    Ca    8.4      24 Aug 2020 07:05    TPro  6.1  /  Alb  2.5<L>  /  TBili  0.6  /  DBili  x   /  AST  25  /  ALT  85<H>  /  AlkPhos  67  08-24            RADIOLOGY/OTHER RESULTS      MEDICATIONS  (STANDING):  ALPRAZolam 0.25 milliGRAM(s) Oral three times a day  amLODIPine   Tablet 7.5 milliGRAM(s) Oral daily  aspirin  chewable 81 milliGRAM(s) Oral daily  atorvastatin 20 milliGRAM(s) Oral at bedtime  bisacodyl 5 milliGRAM(s) Oral every 12 hours  dexAMETHasone     Tablet 8 milliGRAM(s) Oral two times a day  dexAMETHasone     Tablet 24 milliGRAM(s) Oral once  dextrose 5%. 1000 milliLiter(s) (50 mL/Hr) IV Continuous <Continuous>  dextrose 50% Injectable 12.5 Gram(s) IV Push once  dextrose 50% Injectable 25 Gram(s) IV Push once  dextrose 50% Injectable 25 Gram(s) IV Push once  docusate sodium 100 milliGRAM(s) Oral daily  enoxaparin Injectable 40 milliGRAM(s) SubCutaneous daily  furosemide    Tablet 40 milliGRAM(s) Oral daily  insulin lispro (HumaLOG) corrective regimen sliding scale   SubCutaneous three times a day before meals  insulin lispro (HumaLOG) corrective regimen sliding scale   SubCutaneous at bedtime  insulin lispro Injectable (HumaLOG) 12 Unit(s) SubCutaneous three times a day before meals  levETIRAcetam 1000 milliGRAM(s) Oral two times a day  melatonin 3 milliGRAM(s) Oral at bedtime  metoprolol succinate ER 25 milliGRAM(s) Oral daily  OLANZapine 2.5 milliGRAM(s) Oral at bedtime  pantoprazole    Tablet 40 milliGRAM(s) Oral before breakfast  polyethylene glycol 3350 17 Gram(s) Oral two times a day  senna 2 Tablet(s) Oral at bedtime  tamsulosin 0.8 milliGRAM(s) Oral at bedtime  triamcinolone 0.1% Cream 1 Application(s) Topical two times a day    MEDICATIONS  (PRN):  acetaminophen   Tablet .. 650 milliGRAM(s) Oral every 6 hours PRN Temp greater or equal to 38C (100.4F), Mild Pain (1 - 3)  ALPRAZolam 0.25 milliGRAM(s) Oral every 8 hours PRN Anxiety  benzocaine 15 mG/menthol 3.6 mG (Sugar-Free) Lozenge 1 Lozenge Oral two times a day PRN Sore Throat  dextrose 40% Gel 15 Gram(s) Oral once PRN Blood Glucose LESS THAN 70 milliGRAM(s)/deciliter  glucagon  Injectable 1 milliGRAM(s) IntraMuscular once PRN Glucose LESS THAN 70 milligrams/deciliter HPI:  79 RHM w/ PMH of HTN, HLD, prostate cancer s/p prostectomy, basal cell skin cancer, GBM (dx in 2018, s/p resection 8/3/18, s/p 2nd resection 2/7/19, s/p 3rd resection 5/2020 s/p multiple does of Temodar admitted to [facility] on 8/4/20 w/ worsening of his L. sided weakness. No other complaints. Denied changes in speech, vision, hearing, swallowing, voice quality, numbness/tingling,  balance/room-spinning sensations.    He was seen in Dr. Harman's office on 7/27/20 where he was noted to have worsening strength, lethargy and decreased appetitie. He had been tapering his steroids for over a month. Recently tapered from 4mg QD --> 3mg QD ---> 2mg QD.    CT Head on admission 8.4.2020, showed- Worsening vasogenic edema involving the R. temporal frontal parietal region again identified increased when compared to previous CT in 5/2020.     Patient admitted to neurology floor, started on decadron 4mg q6h later increased to 8mg q8h. MR head w/ and w/o contrast with progression of disease, neuro-onc on board, patient to follow up outpatient for initiation of avastin therapy.   Patient also with urinary retention, urology consulted, suspect urinary retention is neurologic in etiology, on clean intermittent catheterization; further followup to continue at rehab.   Patient stable for discharge to acute rehab 8/7/20.     Of note--patient was admitted to  rehab after his most recent resection in May 2020.  He was at [facility] from 6/4/20  to 6/17/20.  At the time of discharge from  rehab, patient was supervision for eating, grooming, and dressing, and contact guard for transfers.  He was able to ambulate 150' with SAC with CG/min assist and to navigate 12 steps with u/l HR with min assist.  SLP at [facility] noted higher level cognitive deficits with mild impulsivity and reduced insight, necessitating supervision while at home. (07 Aug 2020 14:30)      PAST MEDICAL & SURGICAL HISTORY:  Fall, sequela  Bilateral hearing loss, unspecified hearing loss type   activity: [employer] 1960-65    [place]  Type 2 diabetes mellitus without complication, without long-term current use of insulin: stop oral meds  3 weeks ago  Glioblastoma: biopsy 8/2018  surgery  6 weeks radiation  35 days of oral chemotherapy last 3 weeks  Basal cell carcinoma (BCC) of left lower leg: s/p resection  right lower leg top  left lower leg  Prostate cancer: s/p radical prostatectomy 1995  HLD (hyperlipidemia)  HTN (hypertension)  S/P colonoscopic polypectomy: 3 years benign polyps  S/P tonsillectomy: age 30  H/O bilateral cataract extraction: 3-4 years ago  S/P prostatectomy: 1995  S/P brain surgery: 8/2018  Lipoma of neck: s/p resection at age 30  Basal cell carcinoma (BCC) of lower leg: s/p resection  History of tonsillectomy: at age 30      Subjective: Patient seen and evaluated this morning. Spoke at length with patient in regards to Avastin tx, with tx not aimed at prolong his life. Also spoke to patient about trying to get additional aide in addition to the 20 hours of aide that would be provided at home hospice. When spoken to about possibility of going to [facility] patient became angry and stated that he is refusing to go due to prior negative experiences he had with his father in a nursing home. Patient was consistent perseverating on his refusal to go to [facility]. No acute medical issues noted overnight.     REVIEW OF SYMPTOMS  Positive:  depressed, left HP, cognitive deficits  Denies: headache, lightheadedness, CP, SOB, abdominal pain, dysuria, nausea, constipation    Vital Signs Last 24 Hrs  T(C): 36.8 (25 Aug 2020 07:20), Max: 36.8 (25 Aug 2020 07:20)  T(F): 98.2 (25 Aug 2020 07:20), Max: 98.2 (25 Aug 2020 07:20)  HR: 84 (25 Aug 2020 07:20) (83 - 95)  BP: 146/70 (25 Aug 2020 07:20) (112/61 - 146/70)  BP(mean): --  RR: 14 (25 Aug 2020 07:20) (14 - 15)  SpO2: 94% (25 Aug 2020 07:20) (93% - 94%)      PHYSICAL EXAM  Constitutional - NAD, Comfortable  	HEENT - NCAT, EOMI  	Chest -  CTAB   	Cardio - warm and well perfused, RRR, no murmur  	Abdomen -  Soft, NTND  	Extremities - No edema, No calf tenderness   	Neurologic Exam:                 	   Cognitive -   	          Orientation: Awake, Alert,  	      	          Thought:  slow processing, perseverative, agitated  	   Speech - Fluent, Comprehensible,   	   Cranial Nerves - left visual field deficit, left facial weakness, Tongue midline, EOMI, Shoulder shrug intact  	   Motor -  LEFT hemiparesis  	   Sensory - Impaired-- extinguishes on left  	   Coordination - impaired on left  Psychiatric -agitated, depressed, anxious      RECENT LABS/IMAGING                        12.9   7.67  )-----------( 129      ( 24 Aug 2020 07:05 )             39.2     08-24    138  |  99  |  40<H>  ----------------------------<  195<H>  3.9   |  29  |  1.12    Ca    8.4      24 Aug 2020 07:05    TPro  6.1  /  Alb  2.5<L>  /  TBili  0.6  /  DBili  x   /  AST  25  /  ALT  85<H>  /  AlkPhos  67  08-24      RADIOLOGY/OTHER RESULTS      MEDICATIONS  (STANDING):  ALPRAZolam 0.25 milliGRAM(s) Oral three times a day  amLODIPine   Tablet 7.5 milliGRAM(s) Oral daily  aspirin  chewable 81 milliGRAM(s) Oral daily  atorvastatin 20 milliGRAM(s) Oral at bedtime  bisacodyl 5 milliGRAM(s) Oral every 12 hours  dexAMETHasone     Tablet 8 milliGRAM(s) Oral <User Schedule>  dextrose 5%. 1000 milliLiter(s) (50 mL/Hr) IV Continuous <Continuous>  dextrose 50% Injectable 12.5 Gram(s) IV Push once  dextrose 50% Injectable 25 Gram(s) IV Push once  dextrose 50% Injectable 25 Gram(s) IV Push once  docusate sodium 100 milliGRAM(s) Oral daily  enoxaparin Injectable 40 milliGRAM(s) SubCutaneous daily  furosemide    Tablet 40 milliGRAM(s) Oral daily  insulin glargine Injectable (LANTUS) 4 Unit(s) SubCutaneous at bedtime  insulin lispro (HumaLOG) corrective regimen sliding scale   SubCutaneous three times a day before meals  insulin lispro (HumaLOG) corrective regimen sliding scale   SubCutaneous at bedtime  insulin lispro Injectable (HumaLOG) 14 Unit(s) SubCutaneous three times a day before meals  levETIRAcetam 1000 milliGRAM(s) Oral two times a day  melatonin 3 milliGRAM(s) Oral at bedtime  metoprolol succinate ER 25 milliGRAM(s) Oral daily  OLANZapine 2.5 milliGRAM(s) Oral at bedtime  pantoprazole    Tablet 40 milliGRAM(s) Oral before breakfast  polyethylene glycol 3350 17 Gram(s) Oral two times a day  senna 2 Tablet(s) Oral at bedtime  tamsulosin 0.8 milliGRAM(s) Oral at bedtime  triamcinolone 0.1% Cream 1 Application(s) Topical two times a day    MEDICATIONS  (PRN):  acetaminophen   Tablet .. 650 milliGRAM(s) Oral every 6 hours PRN Temp greater or equal to 38C (100.4F), Mild Pain (1 - 3)  ALPRAZolam 0.25 milliGRAM(s) Oral every 8 hours PRN Anxiety  benzocaine 15 mG/menthol 3.6 mG (Sugar-Free) Lozenge 1 Lozenge Oral two times a day PRN Sore Throat  dextrose 40% Gel 15 Gram(s) Oral once PRN Blood Glucose LESS THAN 70 milliGRAM(s)/deciliter  glucagon  Injectable 1 milliGRAM(s) IntraMuscular once PRN Glucose LESS THAN 70 milligrams/deciliter  OLANZapine 2.5 milliGRAM(s) Oral two times a day PRN agitation

## 2020-08-25 NOTE — PROGRESS NOTE BEHAVIORAL HEALTH - NSBHFUPINTERVALHXFT_PSY_A_CORE
79 RHM w/ PMH of HTN, HLD, prostate cancer s/p prostectomy, basal cell skin cancer, GBM (dx in 2018, s/p resection 8/3/18, s/p 2nd resection 2/7/19, s/p 3rd resection 5/2020 s/p multiple does of Temodar admitted to [facility] on 8/4/20 w/ worsening of his L. sided weakness. No other complaints. Denied changes in speech, vision, hearing, swallowing, voice quality, numbness/tingling,  balance/room-spinning sensations. He was seen in Dr. Harman's office on 7/27/20 where he was noted to have worsening strength, lethargy and decreased appetitie. He had been tapering his steroids for over a month. Recently tapered from 4mg QD --> 3mg QD ---> 2mg QD.    CT Head on admission 8.4.2020, showed- Worsening vasogenic edema involving the R. temporal frontal parietal region again identified increased when compared to previous CT in 5/2020. Patient admitted to neurology floor, started on decadron 4mg q6h later increased to 8mg q8h. MR head w/ and w/o contrast with progression of disease, neuro-onc on board, patient to follow up outpatient for initiation of avastin therapy. Patient also with urinary retention, urology consulted, suspect urinary retention is neurologic in etiology, on clean intermittent catheterization; further followup to continue at rehab. Patient stable for discharge to acute rehab 8/7/20. Of note--patient was admitted to  rehab after his most recent resection in May 2020.  He was at [facility] from 6/4/20  to 6/17/20.  At the time of discharge from  rehab, patient was supervision for eating, grooming, and dressing, and contact guard for transfers.  He was able to ambulate 150' with SAC with CG/min assist and to navigate 12 steps with u/l HR with min assist.  SLP at [facility] noted higher level cognitive deficits with mild impulsivity and reduced insight, necessitating supervision while at home. (07 Aug 2020 14:30)    Psychiatry: Follow up visit made to pt. Pt expressed that his mood is "not good" secondary to being told by the treatment team that his prognosis is poor. Pt was tearful during interview as he was expressing all his medical issues. Pt was seen recently seen on 8/23/2020 by psychiatrist for evaluation of angry mood and confusion. At that time pt was started on Zyprexa and Xanax and his anger has improved. As per rehab note in paper chart, this morning pt ended OT therapy early because he felt too tired to perform. He states he was not able to sleep well last night because his roommate kept moving around and making noise.  Pt states his appetite is good. Denies feeling anxious today. Pt denies suicidal ideation/homicidal ideation  and states "im too chicken to ever try to kill myself". No further recommendations at this time. Continue psychiatric medications as prescribed and call with new changes.

## 2020-08-25 NOTE — PROGRESS NOTE ADULT - ASSESSMENT
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     GBM with vaso genic edema  --Pt. with poor prognosis-- Spoke with Dr. Del Cid regarding option of Avastin for treatment-- feels it is high risk for patient due to risk of thrombosis and difficulty with safe transportation to obtain.  Will contact pt. to d/w him.    --Will benefit from palliative care assessment--Dr. Mustafa will see pt. today  - Pt. missed Decadron Sat and Sun AM as was unintentionally cancelled--pt. clinically somewhat weaker this AM-- d/w Dr. Del Cid--rec. to dose pt. 24 mg STAT then decrease to daily.   - Cont. Keppra 1000mg BID for Seizure ppx,  - c/w PPI for GI ppx on steroids  - continue Comprehensive Rehab Program of PT/OT & speech 3 hrs/day x 5days/week      HTN--BP controlled  - Metoprolol XL 25mg + Lasix 40mg Daily  - Amlodipine to 7.5mg daily    Urinary Retention - resolved  - continue Flomax  --voiding--incontinent at times  - Optimize Bowel regimen  - Mobilize    Diabetes type 2  - FS monitoring and ISS  premeal insulin coverage as per hospitalist    Mood/ Cognition  - anxiety and paranoid thought process--cont. Zyprexa--cont. qhs  --zyprexa 2.5 mg PRN --pt. more agitated this AM-- may get increase in agitation with Decadron bolus.   - continue Xanax PRN  - Psych consulted due to paranoid behavior; frustration, depression, anxiety, and exacerbate by steroids    --cont.  Xanax TID &  Zyprexa qHS      GI/Bowel:   - Senna, dulcolax, Miralax    Diet/ dysphagia:   - regular/easy to chew with thin     ENT evaluation for poor vocal quality-> no abnormalities found     Stage I pressure ulcer  - offloading, turn q.2h, barrier cream    LE edema  - compression stockings  - Lasix as above    Pain  - Tylenol PRN      Functional Prognosis / Discharge Planning  - Meeting held on 8/18  - Nursing needs: urinary retention  - SW: lives with Adult son and spouse, used cane prior   - OT: Mod to max assist with ADLs, total for toileting  - PT: Max transfers, Mod bed mobility, Stood with max assist, did not ambulate, propels w/c 75' with RUE Mo assist --> W/c level  - SLP: upgraded to soft and easy to chew thins but still coughs intermittently without aspiration. Poor receptive quality. Decreased insight into deficits, distracted.  - Barriers: left visual field cut, flaccid weakness, poo  - MAGDALENA:  Plan for EVE 8/26 vs home Possibly with Hospice. 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     GBM with vaso genic edema  --Pt. with poor prognosis-- Spoke with Dr. Del Cid regarding option of Avastin for treatment-- feels it is high risk for patient due to risk of thrombosis and difficulty with safe transportation to obtain.  Will contact pt. to d/w him.    --palliative care assessment--Dr. Mustafa following patient   - Pt. missed Decadron Sat and Sun AM as was unintentionally cancelled--pt. clinically somewhat weaker this AM-- d/w Dr. Del Cid--rec. to dose pt. 24 mg STAT on 8/24 then decrease to daily.   - Continue 8mg decadron daily   - Cont. Keppra 1000mg BID for Seizure ppx,  - c/w PPI for GI ppx on steroids  - continue Comprehensive Rehab Program of PT/OT & speech 3 hrs/day x 5days/week    HTN--BP controlled  - Metoprolol XL 25mg + Lasix 40mg Daily  - Amlodipine to 7.5mg daily    Urinary Retention - resolved  - continue Flomax  --voiding--incontinent at times  - Optimize Bowel regimen  - Mobilize    Diabetes type 2  - FS monitoring and ISS  -Increase Humalog to 14 units TID,  start Lantus 4units QHS 8/25   -premeal insulin coverage as per hospitalist    Mood/ Cognition  - anxiety and paranoid thought process--cont. Zyprexa--cont. qhs  --zyprexa 2.5 mg PRN --pt. more agitated this AM-- may get increase in agitation with Decadron bolus.   - continue Xanax PRN  - Psych consulted due to paranoid behavior; frustration, depression, anxiety, and exacerbate by steroids    --cont.  Xanax TID &  Zyprexa qHS  -Psych follow up with decision making capacity       GI/Bowel:   - Senna, dulcolax, Miralax    Diet/ dysphagia:   - regular/easy to chew with thin     ENT evaluation for poor vocal quality-> no abnormalities found     Stage I pressure ulcer  - offloading, turn q.2h, barrier cream    LE edema  - compression stockings  - Lasix as above    Pain  - Tylenol PRN      Functional Prognosis / Discharge Planning  - Meeting held on 8/25   - Nursing needs: urinary retention  - SW: lives with Adult son and spouse, used cane prior   - OT: Not much progress, Max A for dressing, max A when getting out of bed, poor sitting balance   - PT: Not much progress, max A for transfers, can propel WC with min to mod A  - SLP: poor insight with perseverance on not going to EVE   - Barriers: left visual field cut, flaccid weakness,  - MAGDALENA:  Plan for EVE 8/26 vs home Possibly with Hospice. 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     GBM with vaso genic edema  --Pt. with poor prognosis-- Spoke with Dr. Del Cid regarding option of Avastin for treatment-- feels it is high risk for patient due to risk of thrombosis and difficulty with safe transportation to obtain. He has contacted pt. and d/w him as well    --palliative care assessment--Dr. Mustafa following patient   - Continue 8mg decadron daily   - Cont. Keppra 1000mg BID for Seizure ppx,  - c/w PPI for GI ppx on steroids  - continue Comprehensive Rehab Program of PT/OT & speech 3 hrs/day x 5days/week    HTN--BP controlled  - Metoprolol XL 25mg + Lasix 40mg Daily  - Amlodipine to 7.5mg daily    Urinary Retention - resolved  - continue Flomax  --voiding--incontinent at times  - Optimize Bowel regimen  - Mobilize    Diabetes type 2  - FS monitoring and ISS  -Increase Humalog to 14 units TID,  start Lantus 4 units QHS 8/25   - insulin coverage as per hospitalist    Mood/ Cognition  - anxiety and paranoid thought process--cont. Zyprexa--cont. qhs  --zyprexa 2.5 mg PRN --pt. more agitated this AM-- may get increase in agitation with Decadron bolus.   - continue Xanax PRN  - Psych consulted due to paranoid behavior; frustration, depression, anxiety, and exacerbate by steroids    --cont.  Xanax TID &  Zyprexa qHS  -Psych follow up for mood and decision making capacity       GI/Bowel:   - Senna, dulcolax, Miralax    Diet/ dysphagia:   - regular/easy to chew with thin     ENT evaluation for poor vocal quality-> no abnormalities found     Stage I pressure ulcer  - offloading, turn q.2h, barrier cream    LE edema  - compression stockings  - Lasix as above    Pain  - Tylenol PRN      Functional Prognosis / Discharge Planning  - Meeting held on 8/25   - Nursing needs: urinary retention  - SW: lives with Adult son and spouse, used cane prior   - OT: Not much progress, Max A for dressing, max A transfers, poor sitting balance   - PT: Not much progress, max A for transfers, can propel WC with min to mod A  - SLP: poor insight with perseverance on not going to EVE   - Barriers: left visual field cut, flaccid weakness,  - MAGDALENA:  Plan for EVE  vs home  Hospice.--plan for this week

## 2020-08-25 NOTE — PROGRESS NOTE BEHAVIORAL HEALTH - NSBHCONSULTFOLLOWDETAILS_PSY_A_CORE FT
case reviewed with Dr. Beavers, will potentially need re- evaluation for capacity, would need specifics regarding what decisions need to be made.

## 2020-08-25 NOTE — PROGRESS NOTE ADULT - SUBJECTIVE AND OBJECTIVE BOX
Follow-up Pall Progress Note    Mike Mustafa MD  [phone]    No new events overnight.  Denies SOB/CP. Remains belligerent.    Medications:  Vital Signs Last 24 Hrs  T(C): 36.8 (25 Aug 2020 07:20), Max: 36.8 (25 Aug 2020 07:20)  T(F): 98.2 (25 Aug 2020 07:20), Max: 98.2 (25 Aug 2020 07:20)  HR: 84 (25 Aug 2020 07:20) (83 - 95)  BP: 146/70 (25 Aug 2020 07:20) (112/61 - 146/70)  BP(mean): --  RR: 14 (25 Aug 2020 07:20) (14 - 15)  SpO2: 94% (25 Aug 2020 07:20) (93% - 94%)            LABS:                        12.9   7.67  )-----------( 129      ( 24 Aug 2020 07:05 )             39.2     08-24    138  |  99  |  40<H>  ----------------------------<  195<H>  3.9   |  29  |  1.12    Ca    8.4      24 Aug 2020 07:05    TPro  6.1  /  Alb  2.5<L>  /  TBili  0.6  /  DBili  x   /  AST  25  /  ALT  85<H>  /  AlkPhos  67  08-24              CULTURES:        Physical Examination:  PULM: Clear to auscultation bilaterally, no significant sputum production  CVS: Regular rate and rhythm, no murmurs, rubs, or gallops  ABD: Soft, non-tender  EXT:  No clubbing, cyanosis, or edema

## 2020-08-26 DIAGNOSIS — G31.84 MILD COGNITIVE IMPAIRMENT OF UNCERTAIN OR UNKNOWN ETIOLOGY: ICD-10-CM

## 2020-08-26 LAB
GLUCOSE BLDC GLUCOMTR-MCNC: 145 MG/DL — HIGH (ref 70–99)
GLUCOSE BLDC GLUCOMTR-MCNC: 156 MG/DL — HIGH (ref 70–99)
GLUCOSE BLDC GLUCOMTR-MCNC: 180 MG/DL — HIGH (ref 70–99)
GLUCOSE BLDC GLUCOMTR-MCNC: 266 MG/DL — HIGH (ref 70–99)

## 2020-08-26 PROCEDURE — 99233 SBSQ HOSP IP/OBS HIGH 50: CPT

## 2020-08-26 PROCEDURE — 99232 SBSQ HOSP IP/OBS MODERATE 35: CPT

## 2020-08-26 RX ADMIN — Medication 1 APPLICATION(S): at 17:03

## 2020-08-26 RX ADMIN — Medication 5 MILLIGRAM(S): at 17:03

## 2020-08-26 RX ADMIN — Medication 40 MILLIGRAM(S): at 05:45

## 2020-08-26 RX ADMIN — INSULIN GLARGINE 4 UNIT(S): 100 INJECTION, SOLUTION SUBCUTANEOUS at 21:18

## 2020-08-26 RX ADMIN — Medication 14 UNIT(S): at 12:14

## 2020-08-26 RX ADMIN — Medication 650 MILLIGRAM(S): at 10:50

## 2020-08-26 RX ADMIN — SENNA PLUS 2 TABLET(S): 8.6 TABLET ORAL at 21:17

## 2020-08-26 RX ADMIN — Medication 1 APPLICATION(S): at 08:01

## 2020-08-26 RX ADMIN — LEVETIRACETAM 1000 MILLIGRAM(S): 250 TABLET, FILM COATED ORAL at 17:04

## 2020-08-26 RX ADMIN — Medication 14 UNIT(S): at 17:02

## 2020-08-26 RX ADMIN — Medication 0.25 MILLIGRAM(S): at 14:25

## 2020-08-26 RX ADMIN — ATORVASTATIN CALCIUM 20 MILLIGRAM(S): 80 TABLET, FILM COATED ORAL at 21:17

## 2020-08-26 RX ADMIN — Medication 81 MILLIGRAM(S): at 12:14

## 2020-08-26 RX ADMIN — Medication 3 MILLIGRAM(S): at 21:16

## 2020-08-26 RX ADMIN — PANTOPRAZOLE SODIUM 40 MILLIGRAM(S): 20 TABLET, DELAYED RELEASE ORAL at 05:45

## 2020-08-26 RX ADMIN — Medication 8 MILLIGRAM(S): at 08:00

## 2020-08-26 RX ADMIN — Medication 5 MILLIGRAM(S): at 05:45

## 2020-08-26 RX ADMIN — OLANZAPINE 2.5 MILLIGRAM(S): 15 TABLET, FILM COATED ORAL at 21:17

## 2020-08-26 RX ADMIN — Medication 0.25 MILLIGRAM(S): at 21:16

## 2020-08-26 RX ADMIN — Medication 25 MILLIGRAM(S): at 05:45

## 2020-08-26 RX ADMIN — Medication 2: at 17:03

## 2020-08-26 RX ADMIN — Medication 0.25 MILLIGRAM(S): at 05:45

## 2020-08-26 RX ADMIN — LEVETIRACETAM 1000 MILLIGRAM(S): 250 TABLET, FILM COATED ORAL at 05:45

## 2020-08-26 RX ADMIN — ENOXAPARIN SODIUM 40 MILLIGRAM(S): 100 INJECTION SUBCUTANEOUS at 12:14

## 2020-08-26 RX ADMIN — Medication 2: at 08:00

## 2020-08-26 RX ADMIN — AMLODIPINE BESYLATE 7.5 MILLIGRAM(S): 2.5 TABLET ORAL at 05:45

## 2020-08-26 RX ADMIN — Medication 14 UNIT(S): at 08:01

## 2020-08-26 RX ADMIN — POLYETHYLENE GLYCOL 3350 17 GRAM(S): 17 POWDER, FOR SOLUTION ORAL at 17:03

## 2020-08-26 RX ADMIN — TAMSULOSIN HYDROCHLORIDE 0.8 MILLIGRAM(S): 0.4 CAPSULE ORAL at 21:17

## 2020-08-26 RX ADMIN — Medication 1: at 21:19

## 2020-08-26 RX ADMIN — Medication 100 MILLIGRAM(S): at 12:14

## 2020-08-26 RX ADMIN — Medication 650 MILLIGRAM(S): at 10:06

## 2020-08-26 NOTE — PROGRESS NOTE ADULT - SUBJECTIVE AND OBJECTIVE BOX
HPI:  79 RHM w/ PMH of HTN, HLD, prostate cancer s/p prostectomy, basal cell skin cancer, GBM (dx in 2018, s/p resection 8/3/18, s/p 2nd resection 2/7/19, s/p 3rd resection 5/2020 s/p multiple does of Temodar admitted to [facility] on 8/4/20 w/ worsening of his L. sided weakness. No other complaints. Denied changes in speech, vision, hearing, swallowing, voice quality, numbness/tingling,  balance/room-spinning sensations.    He was seen in Dr. Harman's office on 7/27/20 where he was noted to have worsening strength, lethargy and decreased appetitie. He had been tapering his steroids for over a month. Recently tapered from 4mg QD --> 3mg QD ---> 2mg QD.    CT Head on admission 8.4.2020, showed- Worsening vasogenic edema involving the R. temporal frontal parietal region again identified increased when compared to previous CT in 5/2020.     Patient admitted to neurology floor, started on decadron 4mg q6h later increased to 8mg q8h. MR head w/ and w/o contrast with progression of disease, neuro-onc on board, patient to follow up outpatient for initiation of avastin therapy.   Patient also with urinary retention, urology consulted, suspect urinary retention is neurologic in etiology, on clean intermittent catheterization; further followup to continue at rehab.   Patient stable for discharge to acute rehab 8/7/20.     Of note--patient was admitted to  rehab after his most recent resection in May 2020.  He was at [facility] from 6/4/20  to 6/17/20.  At the time of discharge from  rehab, patient was supervision for eating, grooming, and dressing, and contact guard for transfers.  He was able to ambulate 150' with SAC with CG/min assist and to navigate 12 steps with u/l HR with min assist.  SLP at [facility] noted higher level cognitive deficits with mild impulsivity and reduced insight, necessitating supervision while at home. (07 Aug 2020 14:30)      Subjective    No new complaints      PAST MEDICAL & SURGICAL HISTORY:  Fall, sequela  Bilateral hearing loss, unspecified hearing loss type   activity: [employer] 1962-65    [place]  Type 2 diabetes mellitus without complication, without long-term current use of insulin: stop oral meds  3 weeks ago  Glioblastoma: biopsy 8/2018  surgery  6 weeks radiation  35 days of oral chemotherapy last 3 weeks  Basal cell carcinoma (BCC) of left lower leg: s/p resection  right lower leg top  left lower leg  Prostate cancer: s/p radical prostatectomy 1995  HLD (hyperlipidemia)  HTN (hypertension)  S/P colonoscopic polypectomy: 3 years benign polyps  S/P tonsillectomy: age 30  H/O bilateral cataract extraction: 3-4 years ago  S/P prostatectomy: 1995  S/P brain surgery: 8/2018  Lipoma of neck: s/p resection at age 30  Basal cell carcinoma (BCC) of lower leg: s/p resection  History of tonsillectomy: at age 30      MedsMEDICATIONS  (STANDING):  ALPRAZolam 0.25 milliGRAM(s) Oral three times a day  amLODIPine   Tablet 7.5 milliGRAM(s) Oral daily  aspirin  chewable 81 milliGRAM(s) Oral daily  atorvastatin 20 milliGRAM(s) Oral at bedtime  bisacodyl 5 milliGRAM(s) Oral every 12 hours  dexAMETHasone     Tablet 8 milliGRAM(s) Oral <User Schedule>  dextrose 5%. 1000 milliLiter(s) (50 mL/Hr) IV Continuous <Continuous>  dextrose 50% Injectable 12.5 Gram(s) IV Push once  dextrose 50% Injectable 25 Gram(s) IV Push once  dextrose 50% Injectable 25 Gram(s) IV Push once  docusate sodium 100 milliGRAM(s) Oral daily  enoxaparin Injectable 40 milliGRAM(s) SubCutaneous daily  furosemide    Tablet 40 milliGRAM(s) Oral daily  insulin glargine Injectable (LANTUS) 4 Unit(s) SubCutaneous at bedtime  insulin lispro (HumaLOG) corrective regimen sliding scale   SubCutaneous three times a day before meals  insulin lispro (HumaLOG) corrective regimen sliding scale   SubCutaneous at bedtime  insulin lispro Injectable (HumaLOG) 14 Unit(s) SubCutaneous three times a day before meals  levETIRAcetam 1000 milliGRAM(s) Oral two times a day  melatonin 3 milliGRAM(s) Oral at bedtime  metoprolol succinate ER 25 milliGRAM(s) Oral daily  OLANZapine 2.5 milliGRAM(s) Oral at bedtime  pantoprazole    Tablet 40 milliGRAM(s) Oral before breakfast  polyethylene glycol 3350 17 Gram(s) Oral two times a day  senna 2 Tablet(s) Oral at bedtime  tamsulosin 0.8 milliGRAM(s) Oral at bedtime  triamcinolone 0.1% Cream 1 Application(s) Topical two times a day    MEDICATIONS  (PRN):  acetaminophen   Tablet .. 650 milliGRAM(s) Oral every 6 hours PRN Temp greater or equal to 38C (100.4F), Mild Pain (1 - 3)  ALPRAZolam 0.25 milliGRAM(s) Oral every 8 hours PRN Anxiety  benzocaine 15 mG/menthol 3.6 mG (Sugar-Free) Lozenge 1 Lozenge Oral two times a day PRN Sore Throat  dextrose 40% Gel 15 Gram(s) Oral once PRN Blood Glucose LESS THAN 70 milliGRAM(s)/deciliter  glucagon  Injectable 1 milliGRAM(s) IntraMuscular once PRN Glucose LESS THAN 70 milligrams/deciliter  OLANZapine 2.5 milliGRAM(s) Oral two times a day PRN agitation      Vital Signs Last 24 Hrs  T(C): 36.4 (26 Aug 2020 07:50), Max: 36.4 (26 Aug 2020 07:50)  T(F): 97.5 (26 Aug 2020 07:50), Max: 97.5 (26 Aug 2020 07:50)  HR: 64 (26 Aug 2020 07:50) (64 - 84)  BP: 128/72 (26 Aug 2020 07:50) (121/70 - 137/64)  BP(mean): --  RR: 16 (26 Aug 2020 07:50) (16 - 16)  SpO2: 94% (26 Aug 2020 07:50) (94% - 94%)  I&O's Summary    25 Aug 2020 07:01  -  26 Aug 2020 07:00  --------------------------------------------------------  IN: 480 mL / OUT: 500 mL / NET: -20 mL    26 Aug 2020 07:01  -  26 Aug 2020 14:56  --------------------------------------------------------  IN: 0 mL / OUT: 700 mL / NET: -700 mL        PHYSICAL EXAM:  GENERAL: NAD  NECK: Supple  NERVOUS SYSTEM:  awake and alert  HEART: S1s2 NL , RRR  CHEST/LUNG: Clear to percussion bilaterally  ABDOMEN: Soft, Nontender, Nondistended; Bowel sounds present  EXTREMITIES:  No edema        POCT Blood Glucose.: 145 mg/dL (26 Aug 2020 12:12)  POCT Blood Glucose.: 156 mg/dL (26 Aug 2020 07:57)  POCT Blood Glucose.: 232 mg/dL (25 Aug 2020 22:08)  POCT Blood Glucose.: 173 mg/dL (25 Aug 2020 15:58)      Imaging Personally Reviewed:  [ ] YES  [ ] NO        Care Discussed with Consultants/Other Providers [ x] YES  [ ] NO    GBM   PT/OT per rehab  Palliative care following  Decadron/Keppra    HTN  Norvasc/Metoprolol    Hyperglycemia sec to steroids  Lantus/Humalog

## 2020-08-26 NOTE — PROGRESS NOTE ADULT - ASSESSMENT
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     GBM with vaso genic edema  --Pt. with poor prognosis-- Spoke with Dr. Del Cid regarding option of Avastin for treatment-- feels it is high risk for patient due to risk of thrombosis and difficulty with safe transportation to obtain. He has contacted pt. and d/w him as well    --palliative care assessment--Dr. Mustafa following patient   - Continue 8mg decadron daily   - Cont. Keppra 1000mg BID for Seizure ppx,  - c/w PPI for GI ppx on steroids  - continue Comprehensive Rehab Program of PT/OT & speech 3 hrs/day x 5days/week    HTN--BP controlled  - Metoprolol XL 25mg + Lasix 40mg Daily  - Amlodipine to 7.5mg daily    Urinary Retention - resolved  - continue Flomax  --voiding--incontinent at times  - Optimize Bowel regimen  - Mobilize    Diabetes type 2  - FS monitoring and ISS  -Increase Humalog to 14 units TID,  start Lantus 4 units QHS 8/25   - insulin coverage as per hospitalist    Mood/ Cognition  - anxiety and paranoid thought process--cont. Zyprexa--cont. qhs  --zyprexa 2.5 mg PRN --pt. more agitated this AM-- may get increase in agitation with Decadron bolus.   - continue Xanax PRN  - Psych consulted due to paranoid behavior; frustration, depression, anxiety, and exacerbate by steroids    --cont.  Xanax TID &  Zyprexa qHS  -Psych follow up for mood and decision making capacity       GI/Bowel:   - Senna, dulcolax, Miralax    Diet/ dysphagia:   - regular/easy to chew with thin     ENT evaluation for poor vocal quality-> no abnormalities found     Stage I pressure ulcer  - offloading, turn q.2h, barrier cream    LE edema  - compression stockings  - Lasix as above    Pain  - Tylenol PRN      Functional Prognosis / Discharge Planning  - Meeting held on 8/25   - Nursing needs: urinary retention  - SW: lives with Adult son and spouse, used cane prior   - OT: Not much progress, Max A for dressing, max A transfers, poor sitting balance   - PT: Not much progress, max A for transfers, can propel WC with min to mod A  - SLP: poor insight with perseverance on not going to EVE   - Barriers: left visual field cut, flaccid weakness,  - MAGDALENA:  Plan for EVE  vs home  Hospice.--plan for this week  Pall:  goc :  reviewed with son.  He feels his father can make those decisions.  Awaitying pych evaluation to comment on competency.
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
## 2020-08-26 NOTE — PROGRESS NOTE ADULT - ASSESSMENT
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     GBM with vaso genic edema  --Pt. with poor prognosis-- Spoke with Dr. Del Cid regarding option of Avastin for treatment-- feels it is high risk for patient due to risk of thrombosis and difficulty with safe transportation to obtain. He has contacted pt. and d/w him as well    --palliative care following--d/w Dr. Mustafa --will discuss MOLST form with pt's son  - Continue 8mg decadron daily   - Cont. Keppra 1000mg BID for Seizure ppx,  - c/w PPI for GI ppx on steroids  - continue Comprehensive Rehab Program of PT/OT & speech 3 hrs/day x 5days/week      HTN--BP controlled  - Metoprolol XL 25mg + Lasix 40mg Daily  - Amlodipine to 7.5mg daily    Urinary Retention -   -PVRs elevated --cont. to monitor   --will consider urology consult  - continue Flomax  --voiding--incontinent at times  - Optimize Bowel regimen--last BM 8/25  - Mobilize    Diabetes type 2  - FS monitoring and ISS  -Insulin Adjusted yesterday-- Humalog increased to 14 units TID,  started Lantus 4 units QHS 8/25   -cont. to monitor  - insulin coverage as per hospitalist    Mood/ Cognition  - anxiety and paranoid thought process--improved--cont. Zyprexa qhs  --zyprexa 2.5 mg PRN --  - continue Xanax PRN    --cont.  Xanax TID &  Zyprexa qHS  -Psych following  for mood --spoke with Dr. Beavers regarding patient's decision making  decisions regarding his disposition/discharge and Advanced directives.        GI/Bowel:   - Senna, dulcolax, Miralax    Diet/ dysphagia:   - regular/easy to chew with thin     ENT evaluation for poor vocal quality-> no abnormalities found     Stage I pressure ulcer  - offloading, turn q.2h, barrier cream    LE edema  - compression stockings  - Lasix as above    Pain  - Tylenol PRN      Functional Prognosis / Discharge Planning  - Meeting held on 8/25   - Nursing needs: urinary retention  - SW: lives with Adult son and spouse, used cane prior   - OT: Not much progress, Max A for dressing, max A transfers, poor sitting balance   - PT: Not much progress, max A for transfers, can propel WC with min to mod A  - SLP: poor insight with perseverance on not going to [facility]   - Barriers: left visual field cut, flaccid weakness,  - MAGDALENA: d/c home with home  Hospice --     *Spoke with pt. at length regarding acute rehabilitation, progress in rehab, discharge needs and options.  Pt. clearly does not want discharge to [facility] --seems to have PTSD regarding past experience with his father in [facility].  Wants discharge home-- pt. will need assistance for all mobility and ADLs.  Son works during the day and wife cannot physically assist him.  Pt. will have 20 hours/week from home hospice but will need more --SW exploring if pt. has any benefit [facility] ([facility] and provided son with private hire list.  Pt. wants safe discharge home and does not want to burden his family.  All questions answered.  Continuing to explore resources. 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     GBM with vaso genic edema  --Pt. with poor prognosis-- Spoke with Dr. Del Cid regarding option of Avastin for treatment-- feels it is high risk for patient due to risk of thrombosis and difficulty with safe transportation to obtain. He has contacted pt. and d/w him as well    --palliative care following--d/w Dr. Mustafa --will discuss MOLST form with pt's son  - Continue 8mg decadron daily   - Cont. Keppra 1000mg BID for Seizure ppx,  - c/w PPI for GI ppx on steroids  - continue Comprehensive Rehab Program of PT/OT & speech 3 hrs/day x 5days/week      HTN--BP controlled  - Metoprolol XL 25mg + Lasix 40mg Daily  - Amlodipine to 7.5mg daily    Urinary Retention -   -PVRs elevated --cont. to monitor   --will consider urology consult  - continue Flomax  --voiding--incontinent at times  - Optimize Bowel regimen--last BM 8/25  - Mobilize    Diabetes type 2  - FS monitoring and ISS  -Insulin Adjusted yesterday-- Humalog increased to 14 units TID,  started Lantus 4 units QHS 8/25   -cont. to monitor  - insulin coverage as per hospitalist    Mood/ Cognition  - anxiety and paranoid thought process--improved--cont. Zyprexa qhs  --zyprexa 2.5 mg PRN --  - continue Xanax PRN    --cont.  Xanax TID &  Zyprexa qHS  -Psych following  for mood --spoke with Dr. Beavers regarding patient's decision making  decisions regarding his disposition/discharge and Advanced directives.        GI/Bowel:   - Senna, dulcolax, Miralax    Diet/ dysphagia:   - regular/easy to chew with thin     ENT evaluation for poor vocal quality-> no abnormalities found     Stage I pressure ulcer  - offloading, turn q.2h, barrier cream    LE edema  - compression stockings  - Lasix as above    Pain  - Tylenol PRN      Functional Prognosis / Discharge Planning  - Meeting held on 8/25   - Nursing needs: urinary retention  - SW: lives with Adult son and spouse, used cane prior   - OT: Not much progress, Max A for dressing, max A transfers, poor sitting balance   - PT: Not much progress, max A for transfers, can propel WC with min to mod A  - SLP: poor insight with perseverance on not going to [facility]   - Barriers: left visual field cut, flaccid weakness,  - MAGDALENA: d/c home with home  Hospice --     *Spoke with pt. at length regarding acute rehabilitation, progress in rehab, discharge needs and options.  Pt. clearly does not want discharge to [facility] --seems to have PTSD regarding past experience with his father in [facility].  Wants discharge home-- pt. will need assistance for all mobility and ADLs.  Son works during the day and wife cannot physically assist him.  Pt. will have 20 hours/week from home hospice but will need more --SW exploring if pt. has any benefit to [facility] ([facility]) and provided son with private hire list.  Pt. wants safe discharge home and does not want to burden his family.  All questions answered.  Continuing to explore resources.      **Patient will need a Semi-electric Hospital Bed  as he requires frequent changes in body position  to alleviate pain and prevent contractures.  He requires the head of bed to be elevated more than 30 degree most of the time due to aspiration risk.  He also requires an adjustable bed for positioning to allow caregiver to transfer to and from bed safely due to the severity of his left hemiparesis.

## 2020-08-26 NOTE — PROGRESS NOTE ADULT - SUBJECTIVE AND OBJECTIVE BOX
Follow-up Pall Progress Note    Mike Mustafa MD  [phone]    No new  events overnight.  Denies SOB/CP.     Medications:  Vital Signs Last 24 Hrs  T(C): 36.4 (26 Aug 2020 07:50), Max: 36.4 (26 Aug 2020 07:50)  T(F): 97.5 (26 Aug 2020 07:50), Max: 97.5 (26 Aug 2020 07:50)  HR: 64 (26 Aug 2020 07:50) (64 - 84)  BP: 128/72 (26 Aug 2020 07:50) (121/70 - 137/64)  BP(mean): --  RR: 16 (26 Aug 2020 07:50) (16 - 16)  SpO2: 94% (26 Aug 2020 07:50) (94% - 94%)          08-25 @ 07:01  -  08-26 @ 07:00  --------------------------------------------------------  IN: 480 mL / OUT: 500 mL / NET: -20 mL        LABS:                    CULTURES:        Physical Examination:  PULM: Clear to auscultation bilaterally, no significant sputum production  CVS: Regular rate and rhythm, no murmurs, rubs, or gallops  ABD: Soft, non-tender  EXT:  No clubbing, cyanosis, or edema

## 2020-08-26 NOTE — PROGRESS NOTE ADULT - SUBJECTIVE AND OBJECTIVE BOX
HPI:  79 RHM w/ PMH of HTN, HLD, prostate cancer s/p prostectomy, basal cell skin cancer, GBM (dx in 2018, s/p resection 8/3/18, s/p 2nd resection 2/7/19, s/p 3rd resection 5/2020 s/p multiple does of Temodar admitted to [facility] on 8/4/20 w/ worsening of his L. sided weakness. No other complaints. Denied changes in speech, vision, hearing, swallowing, voice quality, numbness/tingling,  balance/room-spinning sensations.    He was seen in Dr. Harman's office on 7/27/20 where he was noted to have worsening strength, lethargy and decreased appetitie. He had been tapering his steroids for over a month. Recently tapered from 4mg QD --> 3mg QD ---> 2mg QD.    CT Head on admission 8.4.2020, showed- Worsening vasogenic edema involving the R. temporal frontal parietal region again identified increased when compared to previous CT in 5/2020.     Patient admitted to neurology floor, started on decadron 4mg q6h later increased to 8mg q8h. MR head w/ and w/o contrast with progression of disease, neuro-onc on board, patient to follow up outpatient for initiation of avastin therapy.   Patient also with urinary retention, urology consulted, suspect urinary retention is neurologic in etiology, on clean intermittent catheterization; further followup to continue at rehab.   Patient stable for discharge to acute rehab 8/7/20.     Of note--patient was admitted to  rehab after his most recent resection in May 2020.  He was at [facility] from 6/4/20  to 6/17/20.  At the time of discharge from  rehab, patient was supervision for eating, grooming, and dressing, and contact guard for transfers.  He was able to ambulate 150' with SAC with CG/min assist and to navigate 12 steps with u/l HR with min assist.  SLP at [facility] noted higher level cognitive deficits with mild impulsivity and reduced insight, necessitating supervision while at home. (07 Aug 2020 14:30)      PAST MEDICAL & SURGICAL HISTORY:  Fall, sequela  Bilateral hearing loss, unspecified hearing loss type   activity: [employer] 1962-65    [place]  Type 2 diabetes mellitus without complication, without long-term current use of insulin: stop oral meds  3 weeks ago  Glioblastoma: biopsy 8/2018  surgery  6 weeks radiation  35 days of oral chemotherapy last 3 weeks  Basal cell carcinoma (BCC) of left lower leg: s/p resection  right lower leg top  left lower leg  Prostate cancer: s/p radical prostatectomy 1995  HLD (hyperlipidemia)  HTN (hypertension)  S/P colonoscopic polypectomy: 3 years benign polyps  S/P tonsillectomy: age 30  H/O bilateral cataract extraction: 3-4 years ago  S/P prostatectomy: 1995  S/P brain surgery: 8/2018  Lipoma of neck: s/p resection at age 30  Basal cell carcinoma (BCC) of lower leg: s/p resection  History of tonsillectomy: at age 30      Subjective: Pt. calmer today, anxious about his prognosis and discharge, denies pain,  difficulty sleeping last night due to noise and agitation from roommate.  Pt. voided this AM 700cc with PVR of 344 (true PVR)).  as per nurse pt. continence improving. Incontinent at night sometimes.  PVR yesterday noted to be 499 (do not know if true PVR)    REVIEW OF SYMPTOMS  Positive:  depressed, left HP, cognitive deficits  Denies: headache, lightheadedness, CP, SOB, abdominal pain, dysuria, nausea, constipation      VITALS  Vital Signs Last 24 Hrs  T(C): 36.4 (26 Aug 2020 07:50), Max: 36.4 (26 Aug 2020 07:50)  T(F): 97.5 (26 Aug 2020 07:50), Max: 97.5 (26 Aug 2020 07:50)  HR: 64 (26 Aug 2020 07:50) (64 - 84)  BP: 128/72 (26 Aug 2020 07:50) (121/70 - 137/64)  BP(mean): --  RR: 16 (26 Aug 2020 07:50) (16 - 16)  SpO2: 94% (26 Aug 2020 07:50) (94% - 94%)      PHYSICAL EXAM  Constitutional - NAD, Comfortable  	HEENT - NCAT, EOMI  	Chest -  CTAB   	Cardio - warm and well perfused, RRR, no murmur  	Abdomen -  Soft, NTND  	Extremities - No edema, No calf tenderness   	Neurologic Exam:                 	   Cognitive -   	          Orientation: Awake, Alert,  	      	          Thought:  slow processing, perseverative, agitated  	   Speech - Fluent, Comprehensible,   	   Cranial Nerves - left visual field deficit, left facial weakness, Tongue midline, EOMI, Shoulder shrug intact  	   Motor -  LEFT hemiparesis  	   Sensory - Impaired-- extinguishes on left  	   Coordination - impaired on left  Psychiatric -agitated, depressed, anxious    RECENT LABS                  RADIOLOGY/OTHER RESULTS      MEDICATIONS  (STANDING):  ALPRAZolam 0.25 milliGRAM(s) Oral three times a day  amLODIPine   Tablet 7.5 milliGRAM(s) Oral daily  aspirin  chewable 81 milliGRAM(s) Oral daily  atorvastatin 20 milliGRAM(s) Oral at bedtime  bisacodyl 5 milliGRAM(s) Oral every 12 hours  dexAMETHasone     Tablet 8 milliGRAM(s) Oral <User Schedule>  dextrose 5%. 1000 milliLiter(s) (50 mL/Hr) IV Continuous <Continuous>  dextrose 50% Injectable 12.5 Gram(s) IV Push once  dextrose 50% Injectable 25 Gram(s) IV Push once  dextrose 50% Injectable 25 Gram(s) IV Push once  docusate sodium 100 milliGRAM(s) Oral daily  enoxaparin Injectable 40 milliGRAM(s) SubCutaneous daily  furosemide    Tablet 40 milliGRAM(s) Oral daily  insulin glargine Injectable (LANTUS) 4 Unit(s) SubCutaneous at bedtime  insulin lispro (HumaLOG) corrective regimen sliding scale   SubCutaneous three times a day before meals  insulin lispro (HumaLOG) corrective regimen sliding scale   SubCutaneous at bedtime  insulin lispro Injectable (HumaLOG) 14 Unit(s) SubCutaneous three times a day before meals  levETIRAcetam 1000 milliGRAM(s) Oral two times a day  melatonin 3 milliGRAM(s) Oral at bedtime  metoprolol succinate ER 25 milliGRAM(s) Oral daily  OLANZapine 2.5 milliGRAM(s) Oral at bedtime  pantoprazole    Tablet 40 milliGRAM(s) Oral before breakfast  polyethylene glycol 3350 17 Gram(s) Oral two times a day  senna 2 Tablet(s) Oral at bedtime  tamsulosin 0.8 milliGRAM(s) Oral at bedtime  triamcinolone 0.1% Cream 1 Application(s) Topical two times a day    MEDICATIONS  (PRN):  acetaminophen   Tablet .. 650 milliGRAM(s) Oral every 6 hours PRN Temp greater or equal to 38C (100.4F), Mild Pain (1 - 3)  ALPRAZolam 0.25 milliGRAM(s) Oral every 8 hours PRN Anxiety  benzocaine 15 mG/menthol 3.6 mG (Sugar-Free) Lozenge 1 Lozenge Oral two times a day PRN Sore Throat  dextrose 40% Gel 15 Gram(s) Oral once PRN Blood Glucose LESS THAN 70 milliGRAM(s)/deciliter  glucagon  Injectable 1 milliGRAM(s) IntraMuscular once PRN Glucose LESS THAN 70 milligrams/deciliter  OLANZapine 2.5 milliGRAM(s) Oral two times a day PRN agitation

## 2020-08-27 LAB
ALBUMIN SERPL ELPH-MCNC: 2.4 G/DL — LOW (ref 3.3–5)
ALP SERPL-CCNC: 68 U/L — SIGNIFICANT CHANGE UP (ref 40–120)
ALT FLD-CCNC: 74 U/L — HIGH (ref 10–45)
ANION GAP SERPL CALC-SCNC: 8 MMOL/L — SIGNIFICANT CHANGE UP (ref 5–17)
AST SERPL-CCNC: 22 U/L — SIGNIFICANT CHANGE UP (ref 10–40)
BASOPHILS # BLD AUTO: 0.02 K/UL — SIGNIFICANT CHANGE UP (ref 0–0.2)
BASOPHILS NFR BLD AUTO: 0.3 % — SIGNIFICANT CHANGE UP (ref 0–2)
BILIRUB SERPL-MCNC: 0.4 MG/DL — SIGNIFICANT CHANGE UP (ref 0.2–1.2)
BUN SERPL-MCNC: 43 MG/DL — HIGH (ref 7–23)
CALCIUM SERPL-MCNC: 8.8 MG/DL — SIGNIFICANT CHANGE UP (ref 8.4–10.5)
CHLORIDE SERPL-SCNC: 102 MMOL/L — SIGNIFICANT CHANGE UP (ref 96–108)
CO2 SERPL-SCNC: 31 MMOL/L — SIGNIFICANT CHANGE UP (ref 22–31)
CREAT SERPL-MCNC: 0.79 MG/DL — SIGNIFICANT CHANGE UP (ref 0.5–1.3)
EOSINOPHIL # BLD AUTO: 0.03 K/UL — SIGNIFICANT CHANGE UP (ref 0–0.5)
EOSINOPHIL NFR BLD AUTO: 0.4 % — SIGNIFICANT CHANGE UP (ref 0–6)
GLUCOSE BLDC GLUCOMTR-MCNC: 124 MG/DL — HIGH (ref 70–99)
GLUCOSE BLDC GLUCOMTR-MCNC: 198 MG/DL — HIGH (ref 70–99)
GLUCOSE BLDC GLUCOMTR-MCNC: 225 MG/DL — HIGH (ref 70–99)
GLUCOSE BLDC GLUCOMTR-MCNC: 75 MG/DL — SIGNIFICANT CHANGE UP (ref 70–99)
GLUCOSE SERPL-MCNC: 124 MG/DL — HIGH (ref 70–99)
HCT VFR BLD CALC: 36.8 % — LOW (ref 39–50)
HGB BLD-MCNC: 12.3 G/DL — LOW (ref 13–17)
IMM GRANULOCYTES NFR BLD AUTO: 1.3 % — SIGNIFICANT CHANGE UP (ref 0–1.5)
LYMPHOCYTES # BLD AUTO: 0.72 K/UL — LOW (ref 1–3.3)
LYMPHOCYTES # BLD AUTO: 10.1 % — LOW (ref 13–44)
MCHC RBC-ENTMCNC: 31.6 PG — SIGNIFICANT CHANGE UP (ref 27–34)
MCHC RBC-ENTMCNC: 33.4 GM/DL — SIGNIFICANT CHANGE UP (ref 32–36)
MCV RBC AUTO: 94.6 FL — SIGNIFICANT CHANGE UP (ref 80–100)
MONOCYTES # BLD AUTO: 0.31 K/UL — SIGNIFICANT CHANGE UP (ref 0–0.9)
MONOCYTES NFR BLD AUTO: 4.3 % — SIGNIFICANT CHANGE UP (ref 2–14)
NEUTROPHILS # BLD AUTO: 5.97 K/UL — SIGNIFICANT CHANGE UP (ref 1.8–7.4)
NEUTROPHILS NFR BLD AUTO: 83.6 % — HIGH (ref 43–77)
NRBC # BLD: 0 /100 WBCS — SIGNIFICANT CHANGE UP (ref 0–0)
PLATELET # BLD AUTO: 121 K/UL — LOW (ref 150–400)
POTASSIUM SERPL-MCNC: 3.6 MMOL/L — SIGNIFICANT CHANGE UP (ref 3.5–5.3)
POTASSIUM SERPL-SCNC: 3.6 MMOL/L — SIGNIFICANT CHANGE UP (ref 3.5–5.3)
PROT SERPL-MCNC: 5.8 G/DL — LOW (ref 6–8.3)
RBC # BLD: 3.89 M/UL — LOW (ref 4.2–5.8)
RBC # FLD: 12.6 % — SIGNIFICANT CHANGE UP (ref 10.3–14.5)
SODIUM SERPL-SCNC: 141 MMOL/L — SIGNIFICANT CHANGE UP (ref 135–145)
WBC # BLD: 7.14 K/UL — SIGNIFICANT CHANGE UP (ref 3.8–10.5)
WBC # FLD AUTO: 7.14 K/UL — SIGNIFICANT CHANGE UP (ref 3.8–10.5)

## 2020-08-27 PROCEDURE — 99233 SBSQ HOSP IP/OBS HIGH 50: CPT

## 2020-08-27 RX ORDER — IBUPROFEN 200 MG
400 TABLET ORAL EVERY 8 HOURS
Refills: 0 | Status: DISCONTINUED | OUTPATIENT
Start: 2020-08-27 | End: 2020-09-02

## 2020-08-27 RX ORDER — LIDOCAINE 4 G/100G
1 CREAM TOPICAL DAILY
Refills: 0 | Status: DISCONTINUED | OUTPATIENT
Start: 2020-08-27 | End: 2020-09-04

## 2020-08-27 RX ADMIN — PANTOPRAZOLE SODIUM 40 MILLIGRAM(S): 20 TABLET, DELAYED RELEASE ORAL at 06:19

## 2020-08-27 RX ADMIN — Medication 5 MILLIGRAM(S): at 06:20

## 2020-08-27 RX ADMIN — OLANZAPINE 2.5 MILLIGRAM(S): 15 TABLET, FILM COATED ORAL at 21:49

## 2020-08-27 RX ADMIN — TAMSULOSIN HYDROCHLORIDE 0.8 MILLIGRAM(S): 0.4 CAPSULE ORAL at 21:38

## 2020-08-27 RX ADMIN — Medication 0.25 MILLIGRAM(S): at 21:37

## 2020-08-27 RX ADMIN — Medication 0.25 MILLIGRAM(S): at 06:24

## 2020-08-27 RX ADMIN — INSULIN GLARGINE 4 UNIT(S): 100 INJECTION, SOLUTION SUBCUTANEOUS at 21:37

## 2020-08-27 RX ADMIN — LIDOCAINE 1 PATCH: 4 CREAM TOPICAL at 16:30

## 2020-08-27 RX ADMIN — LEVETIRACETAM 1000 MILLIGRAM(S): 250 TABLET, FILM COATED ORAL at 16:35

## 2020-08-27 RX ADMIN — AMLODIPINE BESYLATE 7.5 MILLIGRAM(S): 2.5 TABLET ORAL at 06:21

## 2020-08-27 RX ADMIN — Medication 4: at 16:36

## 2020-08-27 RX ADMIN — Medication 3 MILLIGRAM(S): at 21:37

## 2020-08-27 RX ADMIN — Medication 14 UNIT(S): at 07:49

## 2020-08-27 RX ADMIN — Medication 14 UNIT(S): at 16:36

## 2020-08-27 RX ADMIN — Medication 40 MILLIGRAM(S): at 06:20

## 2020-08-27 RX ADMIN — Medication 100 MILLIGRAM(S): at 12:56

## 2020-08-27 RX ADMIN — ATORVASTATIN CALCIUM 20 MILLIGRAM(S): 80 TABLET, FILM COATED ORAL at 21:37

## 2020-08-27 RX ADMIN — LIDOCAINE 1 PATCH: 4 CREAM TOPICAL at 12:22

## 2020-08-27 RX ADMIN — ENOXAPARIN SODIUM 40 MILLIGRAM(S): 100 INJECTION SUBCUTANEOUS at 12:56

## 2020-08-27 RX ADMIN — Medication 8 MILLIGRAM(S): at 07:49

## 2020-08-27 RX ADMIN — Medication 25 MILLIGRAM(S): at 06:19

## 2020-08-27 RX ADMIN — LEVETIRACETAM 1000 MILLIGRAM(S): 250 TABLET, FILM COATED ORAL at 06:17

## 2020-08-27 RX ADMIN — Medication 0.25 MILLIGRAM(S): at 13:30

## 2020-08-27 RX ADMIN — Medication 81 MILLIGRAM(S): at 12:56

## 2020-08-27 RX ADMIN — Medication 1 APPLICATION(S): at 06:17

## 2020-08-27 NOTE — PROGRESS NOTE ADULT - SUBJECTIVE AND OBJECTIVE BOX
Follow-up Pall Progress Note    Mike Mustafa MD  [phone]    No new  events overnight.  Denies SOB/CP.   Less obstinate today.  Pt is currently contemplating MOLST  Medications:  Vital Signs Last 24 Hrs  T(C): 36.3 (27 Aug 2020 08:02), Max: 36.4 (26 Aug 2020 21:10)  T(F): 97.4 (27 Aug 2020 08:02), Max: 97.6 (26 Aug 2020 21:10)  HR: 68 (27 Aug 2020 08:02) (63 - 75)  BP: 129/76 (27 Aug 2020 08:02) (129/76 - 151/76)  BP(mean): --  RR: 16 (27 Aug 2020 08:02) (16 - 16)  SpO2: 94% (27 Aug 2020 08:02) (94% - 94%)          08-26 @ 07:01  -  08-27 @ 07:00  --------------------------------------------------------  IN: 0 mL / OUT: 800 mL / NET: -800 mL        LABS:                        12.3   7.14  )-----------( 121      ( 27 Aug 2020 06:43 )             36.8     08-27    141  |  102  |  43<H>  ----------------------------<  124<H>  3.6   |  31  |  0.79    Ca    8.8      27 Aug 2020 06:43    TPro  5.8<L>  /  Alb  2.4<L>  /  TBili  0.4  /  DBili  x   /  AST  22  /  ALT  74<H>  /  AlkPhos  68  08-27              CULTURES:        Physical Examination:  PULM: Clear to auscultation bilaterally, no significant sputum production  CVS: Regular rate and rhythm, no murmurs, rubs, or gallops  ABD: Soft, non-tender  EXT:  No clubbing, cyanosis, or edema    RADIOLOGY REVIEWED  CXR:    CT chest:    TTE:

## 2020-08-27 NOTE — PROGRESS NOTE ADULT - ASSESSMENT
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     GBM with vaso genic edema  --Pt. with poor prognosis-- Spoke with Dr. Del Cid regarding option of Avastin for treatment-- feels it is high risk for patient due to risk of thrombosis and difficulty with safe transportation to obtain. He has contacted pt. and d/w him as well    --palliative care following--d/w Dr. Mustafa --will discuss MOLST form with pt's son  - Continue 8mg decadron daily   - Cont. Keppra 1000mg BID for Seizure ppx,  - c/w PPI for GI ppx on steroids  - continue Comprehensive Rehab Program of PT/OT & speech 3 hrs/day x 5days/week      HTN--BP controlled  - Metoprolol XL 25mg + Lasix 40mg Daily  - Amlodipine to 7.5mg daily    Urinary Retention -   - PVRs elevated --cont. to monitor   - PVRs 320 this AM 8/27   -Urology consult requested 8/27   - continue Flomax  --voiding--incontinent at times  - Optimize Bowel regimen--last BM 8/25  - Mobilize    Diabetes type 2  - FS monitoring and ISS  -Insulin Adjusted 8/25-- Humalog increased to 14 units TID,  started Lantus 4 units QHS 8/25   -cont. to monitor  - insulin coverage as per hospitalist    Mood/ Cognition  - anxiety and paranoid thought process--improved--cont. Zyprexa qhs   --zyprexa 2.5 mg PRN --  - continue Xanax PRN    --cont.  Xanax TID &  Zyprexa qHS  -Psych following  for mood --spoke with Dr. Beavers regarding patient's decision making  decisions regarding his disposition/discharge and Advanced directives.        GI/Bowel:   - Senna, dulcolax, Miralax    Diet/ dysphagia:   - regular/easy to chew with thin     ENT evaluation for poor vocal quality-> no abnormalities found     Stage I pressure ulcer  - offloading, turn q.2h, barrier cream    LE edema  - compression stockings  - Lasix as above    Pain  - Tylenol PRN      Functional Prognosis / Discharge Planning  - Meeting held on 8/25   - Nursing needs: urinary retention  - SW: lives with Adult son and spouse, used cane prior   - OT: Not much progress, Max A for dressing, max A transfers, poor sitting balance   - PT: Not much progress, max A for transfers, can propel WC with min to mod A  - SLP: poor insight with perseverance on not going to [facility]   - Barriers: left visual field cut, flaccid weakness,  - MAGDALENA: d/c home with home  Hospice --     *Spoke with pt. at length regarding acute rehabilitation, progress in rehab, discharge needs and options.  Pt. clearly does not want discharge to [facility] --seems to have PTSD regarding past experience with his father in [facility].  Wants discharge home-- pt. will need assistance for all mobility and ADLs.  Son works during the day and wife cannot physically assist him.  Pt. will have 20 hours/week from home hospice but will need more --SW exploring if pt. has any benefit to [facility] ([facility]) and provided son with private hire list.  Pt. wants safe discharge home and does not want to burden his family.  All questions answered.  Continuing to explore resources.      **Patient will need a Semi-electric Hospital Bed  as he requires frequent changes in body position  to alleviate pain and prevent contractures.  He requires the head of bed to be elevated more than 30 degree most of the time due to aspiration risk.  He also requires an adjustable bed for positioning to allow caregiver to transfer to and from bed safely due to the severity of his left hemiparesis. 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     GBM with vaso genic edema  --Pt. with poor prognosis-- Spoke with Dr. Del Cid regarding option of Avastin for treatment-- feels it is high risk for patient due to risk of thrombosis and difficulty with safe transportation to obtain. He has contacted pt. and d/w him as well    --palliative care following--d/w Dr. Mustafa --discussed advanced care directives/MOLST form with pt.-- he needs more time to review and decide  - Continue 8mg decadron daily   - Cont. Keppra 1000mg BID for Seizure ppx,  - c/w PPI for GI ppx on steroids  - continue Comprehensive Rehab Program of PT/OT & speech 3 hrs/day x 5days/week      HTN--BP controlled  - Metoprolol XL 25mg + Lasix 40mg Daily  - Amlodipine to 7.5mg daily    Urinary Retention -   - PVRs elevated --cont. to monitor   - PVRs 320 this AM 8/27   -Urology consult requested 8/27   - continue Flomax  --voiding--incontinent at times  - Optimize Bowel regimen--last BM 8/25  - Mobilize    Diabetes type 2  - FS monitoring and ISS  -Insulin Adjusted 8/25-- Humalog increased to 14 units TID,  started Lantus 4 units QHS 8/25   -cont. to monitor  - insulin coverage as per hospitalist    Mood/ Cognition  - anxiety and paranoid thought process--improved--cont. Zyprexa qhs   --zyprexa 2.5 mg PRN --  - continue Xanax PRN    --cont.  Xanax TID &  Zyprexa qHS  -Psych following --spoke with Dr. Beavers--pt. had decision making capacity      GI/Bowel:   - Senna, dulcolax, Miralax    Diet/ dysphagia:   - regular/easy to chew with thin     ENT evaluation for poor vocal quality-> no abnormalities found     Stage I pressure ulcer  - offloading, turn q.2h, barrier cream    LE edema  - compression stockings  - Lasix as above    Pain  - Tylenol PRN      Functional Prognosis / Discharge Planning  - Meeting held on 8/25   - Nursing needs: urinary retention  - SW: lives with Adult son and spouse, used cane prior   - OT: Not much progress, Max A for dressing, max A transfers, poor sitting balance   - PT: Not much progress, max A for transfers, can propel WC with min to mod A  - SLP: poor insight with perseverance on not going to [facility]   - Barriers: left visual field cut, flaccid weakness,  - MAGDALENA: d/c home with home  Hospice --

## 2020-08-27 NOTE — CHART NOTE - NSCHARTNOTEFT_GEN_A_CORE
NUTRITION FOLLOW UP    SOURCE: Patient [X)   Family [ ]    Medical Record (X)    DIET: Regular/Ensure TID     Patient reports no concerns, happy with his diet and still likes Ensure with his meals. No new nutritional concerns as he is tolerating a regular textured diet.    PO INTAKE: %       CURRENT WEIGHT: 187.3# (8/26)  188.4# (8/25)    PERTINENT MEDS:   Pertinent Medications: MEDICATIONS  (STANDING):  ALPRAZolam 0.25 milliGRAM(s) Oral three times a day  amLODIPine   Tablet 7.5 milliGRAM(s) Oral daily  aspirin  chewable 81 milliGRAM(s) Oral daily  atorvastatin 20 milliGRAM(s) Oral at bedtime  bisacodyl 5 milliGRAM(s) Oral every 12 hours  dexAMETHasone     Tablet 8 milliGRAM(s) Oral <User Schedule>  dextrose 5%. 1000 milliLiter(s) (50 mL/Hr) IV Continuous <Continuous>  dextrose 50% Injectable 12.5 Gram(s) IV Push once  dextrose 50% Injectable 25 Gram(s) IV Push once  dextrose 50% Injectable 25 Gram(s) IV Push once  docusate sodium 100 milliGRAM(s) Oral daily  enoxaparin Injectable 40 milliGRAM(s) SubCutaneous daily  furosemide    Tablet 40 milliGRAM(s) Oral daily  insulin glargine Injectable (LANTUS) 4 Unit(s) SubCutaneous at bedtime  insulin lispro (HumaLOG) corrective regimen sliding scale   SubCutaneous three times a day before meals  insulin lispro (HumaLOG) corrective regimen sliding scale   SubCutaneous at bedtime  insulin lispro Injectable (HumaLOG) 14 Unit(s) SubCutaneous three times a day before meals  levETIRAcetam 1000 milliGRAM(s) Oral two times a day  lidocaine   Patch 1 Patch Transdermal daily  melatonin 3 milliGRAM(s) Oral at bedtime  metoprolol succinate ER 25 milliGRAM(s) Oral daily  OLANZapine 2.5 milliGRAM(s) Oral at bedtime  pantoprazole    Tablet 40 milliGRAM(s) Oral before breakfast  polyethylene glycol 3350 17 Gram(s) Oral two times a day  senna 2 Tablet(s) Oral at bedtime  tamsulosin 0.8 milliGRAM(s) Oral at bedtime  triamcinolone 0.1% Cream 1 Application(s) Topical two times a day    MEDICATIONS  (PRN):  acetaminophen   Tablet .. 650 milliGRAM(s) Oral every 6 hours PRN Temp greater or equal to 38C (100.4F), Mild Pain (1 - 3)  ALPRAZolam 0.25 milliGRAM(s) Oral every 8 hours PRN Anxiety  benzocaine 15 mG/menthol 3.6 mG (Sugar-Free) Lozenge 1 Lozenge Oral two times a day PRN Sore Throat  dextrose 40% Gel 15 Gram(s) Oral once PRN Blood Glucose LESS THAN 70 milliGRAM(s)/deciliter  glucagon  Injectable 1 milliGRAM(s) IntraMuscular once PRN Glucose LESS THAN 70 milligrams/deciliter  ibuprofen  Tablet. 400 milliGRAM(s) Oral every 8 hours PRN Moderate Pain (4 - 6)  OLANZapine 2.5 milliGRAM(s) Oral two times a day PRN agitation      PERTINENT LABS:  08-27 Na141 mmol/L Glu 124 mg/dL<H> K+ 3.6 mmol/L Cr  0.79 mg/dL BUN 43 mg/dL<H> 08-27 Alb 2.4 g/dL<L>  08-07-20 @ 10:57 A1C 5.6  05-18-20 @ 11:46 A1C 5.3      SKIN:  intact.  Per RN documentation on 8/25 skin dry and intact on area that used to be stage 1 sacrum.   EDEMA: none per RN documentation  LAST BM:  8/26    ESTIMATED NEEDS:   [X] no change since previous assessment  [ ] recalculated:     PREVIOUS NUTRITION DIAGNOSIS:  difficulty with chewing and swallowing     NUTRITION DIAGNOSIS is :  Resolved.    NUTRITION RECOMMENDATIONS:   1. Continue diet as ordered.   2. Oral supplements with meals TID  3. Accommodate preferences .     MONITORING AND EVALUATION:   1. Tolerance to diet prescription   2. PO intake  3. Weights  4. Labs  5. Follow Up per protocol     RD to remain available   Ayde De Leon RDN #[id number]

## 2020-08-27 NOTE — PROGRESS NOTE ADULT - ATTENDING COMMENTS
Pt. seen with resident.  Agree with documentation above as per resident with amendments made as appropriate. Patient medically stable. Limited progress in therapy-- plan for discharge with home hospice.  Family and SW looking into more resources to provide care for pt. at home.

## 2020-08-27 NOTE — PROGRESS NOTE ADULT - SUBJECTIVE AND OBJECTIVE BOX
HPI:  79 RHM w/ PMH of HTN, HLD, prostate cancer s/p prostectomy, basal cell skin cancer, GBM (dx in 2018, s/p resection 8/3/18, s/p 2nd resection 2/7/19, s/p 3rd resection 5/2020 s/p multiple does of Temodar admitted to [facility] on 8/4/20 w/ worsening of his L. sided weakness. No other complaints. Denied changes in speech, vision, hearing, swallowing, voice quality, numbness/tingling,  balance/room-spinning sensations.    He was seen in Dr. Harman's office on 7/27/20 where he was noted to have worsening strength, lethargy and decreased appetitie. He had been tapering his steroids for over a month. Recently tapered from 4mg QD --> 3mg QD ---> 2mg QD.    CT Head on admission 8.4.2020, showed- Worsening vasogenic edema involving the R. temporal frontal parietal region again identified increased when compared to previous CT in 5/2020.     Patient admitted to neurology floor, started on decadron 4mg q6h later increased to 8mg q8h. MR head w/ and w/o contrast with progression of disease, neuro-onc on board, patient to follow up outpatient for initiation of avastin therapy.   Patient also with urinary retention, urology consulted, suspect urinary retention is neurologic in etiology, on clean intermittent catheterization; further followup to continue at rehab.   Patient stable for discharge to acute rehab 8/7/20.     Of note--patient was admitted to  rehab after his most recent resection in May 2020.  He was at [facility] from 6/4/20  to 6/17/20.  At the time of discharge from  rehab, patient was supervision for eating, grooming, and dressing, and contact guard for transfers.  He was able to ambulate 150' with SAC with CG/min assist and to navigate 12 steps with u/l HR with min assist.  SLP at [facility] noted higher level cognitive deficits with mild impulsivity and reduced insight, necessitating supervision while at home. (07 Aug 2020 14:30)      PAST MEDICAL & SURGICAL HISTORY:  Fall, sequela  Bilateral hearing loss, unspecified hearing loss type   activity: [place]ct[place] [id number]    [place]  Type 2 diabetes mellitus without complication, without long-term current use of insulin: stop oral meds  3 weeks ago  Glioblastoma: biopsy 8/2018  surgery  6 weeks radiation  35 days of oral chemotherapy last 3 weeks  Basal cell carcinoma (BCC) of left lower leg: s/p resection  right lower leg top  left lower leg  Prostate cancer: s/p radical prostatectomy 1995  HLD (hyperlipidemia)  HTN (hypertension)  S/P colonoscopic polypectomy: 3 years benign polyps  S/P tonsillectomy: age 30  H/O bilateral cataract extraction: 3-4 years ago  S/P prostatectomy: 1995  S/P brain surgery: 8/2018  Lipoma of neck: s/p resection at age 30  Basal cell carcinoma (BCC) of lower leg: s/p resection  History of tonsillectomy: at age 30      Subjective: Patient seen and evaluated this morning. Patient endorsed to feeling weak overall. As per nursing staff, patient still retaining. PVRs noted to be 320 this AM.     REVIEW OF SYMPTOMS  Positive:  depressed, left HP, cognitive deficits  Denies: headache, lightheadedness, CP, SOB, abdominal pain, dysuria, nausea, constipation    Vital Signs Last 24 Hrs  T(C): 36.3 (27 Aug 2020 08:02), Max: 36.4 (26 Aug 2020 21:10)  T(F): 97.4 (27 Aug 2020 08:02), Max: 97.6 (26 Aug 2020 21:10)  HR: 68 (27 Aug 2020 08:02) (63 - 75)  BP: 129/76 (27 Aug 2020 08:02) (129/76 - 151/76)  BP(mean): --  RR: 16 (27 Aug 2020 08:02) (16 - 16)  SpO2: 94% (27 Aug 2020 08:02) (94% - 94%)    PHYSICAL EXAM  Constitutional - NAD, Comfortable  	HEENT - NCAT, EOMI  	Chest -  CTAB   	Cardio - warm and well perfused, RRR, no murmur  	Abdomen -  Soft, NTND  	Extremities - No edema, No calf tenderness   	Neurologic Exam:                 	   Cognitive -   	          Orientation: Awake, Alert,  	      	          Thought:  slow processing, perseverative, agitated  	   Speech - Fluent, Comprehensible,   	   Cranial Nerves - left visual field deficit, left facial weakness, Tongue midline, EOMI, Shoulder shrug intact  	   Motor -  LEFT hemiparesis  	   Sensory - Impaired-- extinguishes on left  	   Coordination - impaired on left  Psychiatric -agitated, depressed, anxious    RECENT LABS/IMAGING                        12.3   7.14  )-----------( 121      ( 27 Aug 2020 06:43 )             36.8     08-27    141  |  102  |  43<H>  ----------------------------<  124<H>  3.6   |  31  |  0.79    Ca    8.8      27 Aug 2020 06:43    TPro  5.8<L>  /  Alb  2.4<L>  /  TBili  0.4  /  DBili  x   /  AST  22  /  ALT  74<H>  /  AlkPhos  68  08-27      RADIOLOGY/OTHER RESULTS    MEDICATIONS  (STANDING):  ALPRAZolam 0.25 milliGRAM(s) Oral three times a day  amLODIPine   Tablet 7.5 milliGRAM(s) Oral daily  aspirin  chewable 81 milliGRAM(s) Oral daily  atorvastatin 20 milliGRAM(s) Oral at bedtime  bisacodyl 5 milliGRAM(s) Oral every 12 hours  dexAMETHasone     Tablet 8 milliGRAM(s) Oral <User Schedule>  dextrose 5%. 1000 milliLiter(s) (50 mL/Hr) IV Continuous <Continuous>  dextrose 50% Injectable 12.5 Gram(s) IV Push once  dextrose 50% Injectable 25 Gram(s) IV Push once  dextrose 50% Injectable 25 Gram(s) IV Push once  docusate sodium 100 milliGRAM(s) Oral daily  enoxaparin Injectable 40 milliGRAM(s) SubCutaneous daily  furosemide    Tablet 40 milliGRAM(s) Oral daily  insulin glargine Injectable (LANTUS) 4 Unit(s) SubCutaneous at bedtime  insulin lispro (HumaLOG) corrective regimen sliding scale   SubCutaneous three times a day before meals  insulin lispro (HumaLOG) corrective regimen sliding scale   SubCutaneous at bedtime  insulin lispro Injectable (HumaLOG) 14 Unit(s) SubCutaneous three times a day before meals  levETIRAcetam 1000 milliGRAM(s) Oral two times a day  lidocaine   Patch 1 Patch Transdermal daily  melatonin 3 milliGRAM(s) Oral at bedtime  metoprolol succinate ER 25 milliGRAM(s) Oral daily  OLANZapine 2.5 milliGRAM(s) Oral at bedtime  pantoprazole    Tablet 40 milliGRAM(s) Oral before breakfast  polyethylene glycol 3350 17 Gram(s) Oral two times a day  senna 2 Tablet(s) Oral at bedtime  tamsulosin 0.8 milliGRAM(s) Oral at bedtime  triamcinolone 0.1% Cream 1 Application(s) Topical two times a day    MEDICATIONS  (PRN):  acetaminophen   Tablet .. 650 milliGRAM(s) Oral every 6 hours PRN Temp greater or equal to 38C (100.4F), Mild Pain (1 - 3)  ALPRAZolam 0.25 milliGRAM(s) Oral every 8 hours PRN Anxiety  benzocaine 15 mG/menthol 3.6 mG (Sugar-Free) Lozenge 1 Lozenge Oral two times a day PRN Sore Throat  dextrose 40% Gel 15 Gram(s) Oral once PRN Blood Glucose LESS THAN 70 milliGRAM(s)/deciliter  glucagon  Injectable 1 milliGRAM(s) IntraMuscular once PRN Glucose LESS THAN 70 milligrams/deciliter  ibuprofen  Tablet. 400 milliGRAM(s) Oral every 8 hours PRN Moderate Pain (4 - 6)  OLANZapine 2.5 milliGRAM(s) Oral two times a day PRN agitation

## 2020-08-27 NOTE — PROGRESS NOTE ADULT - ASSESSMENT
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     GBM with vaso genic edema  --Pt. with poor prognosis-- Spoke with Dr. Del Cid regarding option of Avastin for treatment-- feels it is high risk for patient due to risk of thrombosis and difficulty with safe transportation to obtain. He has contacted pt. and d/w him as well    --palliative care following--MOLST discussed with son.  He feels father can make all decisions.  Pt currently contemplating MOLST  - Continue 8mg decadron daily   - Cont. Keppra 1000mg BID for Seizure ppx,  - c/w PPI for GI ppx on steroids  - continue Comprehensive Rehab Program of PT/OT & speech 3 hrs/day x 5days/week      HTN--BP controlled  - Metoprolol XL 25mg + Lasix 40mg Daily  - Amlodipine to 7.5mg daily    Urinary Retention -   - PVRs elevated --cont. to monitor   - PVRs 320 this AM 8/27   -Urology consult requested 8/27   - continue Flomax  --voiding--incontinent at times  - Optimize Bowel regimen--last BM 8/25  - Mobilize    Diabetes type 2  - FS monitoring and ISS  -Insulin Adjusted 8/25-- Humalog increased to 14 units TID,  started Lantus 4 units QHS 8/25   -cont. to monitor  - insulin coverage as per hospitalist    Mood/ Cognition  - anxiety and paranoid thought process--improved--cont. Zyprexa qhs   --zyprexa 2.5 mg PRN --  - continue Xanax PRN    --cont.  Xanax TID &  Zyprexa qHS  -Psych following  for mood --spoke with Dr. Beavers regarding patient's decision making  decisions regarding his disposition/discharge and Advanced directives.        GI/Bowel:   - Senna, dulcolax, Miralax    Diet/ dysphagia:   - regular/easy to chew with thin     ENT evaluation for poor vocal quality-> no abnormalities found     Stage I pressure ulcer  - offloading, turn q.2h, barrier cream    LE edema  - compression stockings  - Lasix as above    Pain  - Tylenol PRN      Functional Prognosis / Discharge Planning  - Meeting held on 8/25   - Nursing needs: urinary retention  - SW: lives with Adult son and spouse, used cane prior   - OT: Not much progress, Max A for dressing, max A transfers, poor sitting balance   - PT: Not much progress, max A for transfers, can propel WC with min to mod A  - SLP: poor insight with perseverance on not going to [facility]   - Barriers: left visual field cut, flaccid weakness,  - MAGDALENA: d/c home with home  Hospice --     *Spoke with pt. at length regarding acute rehabilitation, progress in rehab, discharge needs and options.  Pt. clearly does not want discharge to [facility] --seems to have PTSD regarding past experience with his father in [facility].  Wants discharge home-- pt. will need assistance for all mobility and ADLs.  Son works during the day and wife cannot physically assist him.  Pt. will have 20 hours/week from home hospice but will need more --SW exploring if pt. has any benefit to [facility] (served [facility] years) and provided son with private hire list.  Pt. wants safe discharge home and does not want to burden his family.  All questions answered.  Continuing to explore resources.      **Patient will need a Semi-electric Hospital Bed  as he requires frequent changes in body position  to alleviate pain and prevent contractures.  He requires the head of bed to be elevated more than 30 degree most of the time due to aspiration risk.  He also requires an adjustable bed for positioning to allow caregiver to transfer to and from bed safely due to the severity of his left hemiparesis.

## 2020-08-28 DIAGNOSIS — R33.9 RETENTION OF URINE, UNSPECIFIED: ICD-10-CM

## 2020-08-28 DIAGNOSIS — C61 MALIGNANT NEOPLASM OF PROSTATE: ICD-10-CM

## 2020-08-28 LAB
GLUCOSE BLDC GLUCOMTR-MCNC: 109 MG/DL — HIGH (ref 70–99)
GLUCOSE BLDC GLUCOMTR-MCNC: 117 MG/DL — HIGH (ref 70–99)
GLUCOSE BLDC GLUCOMTR-MCNC: 184 MG/DL — HIGH (ref 70–99)
GLUCOSE BLDC GLUCOMTR-MCNC: 352 MG/DL — HIGH (ref 70–99)

## 2020-08-28 PROCEDURE — 99232 SBSQ HOSP IP/OBS MODERATE 35: CPT

## 2020-08-28 PROCEDURE — 99233 SBSQ HOSP IP/OBS HIGH 50: CPT

## 2020-08-28 RX ORDER — ESCITALOPRAM OXALATE 10 MG/1
5 TABLET, FILM COATED ORAL
Refills: 0 | Status: DISCONTINUED | OUTPATIENT
Start: 2020-08-28 | End: 2020-08-31

## 2020-08-28 RX ORDER — ALPRAZOLAM 0.25 MG
0.25 TABLET ORAL EVERY 8 HOURS
Refills: 0 | Status: DISCONTINUED | OUTPATIENT
Start: 2020-08-29 | End: 2020-09-04

## 2020-08-28 RX ADMIN — Medication 1 APPLICATION(S): at 17:31

## 2020-08-28 RX ADMIN — LEVETIRACETAM 1000 MILLIGRAM(S): 250 TABLET, FILM COATED ORAL at 17:29

## 2020-08-28 RX ADMIN — Medication 0.25 MILLIGRAM(S): at 21:20

## 2020-08-28 RX ADMIN — Medication 0.25 MILLIGRAM(S): at 13:34

## 2020-08-28 RX ADMIN — ESCITALOPRAM OXALATE 5 MILLIGRAM(S): 10 TABLET, FILM COATED ORAL at 16:46

## 2020-08-28 RX ADMIN — Medication 25 MILLIGRAM(S): at 06:31

## 2020-08-28 RX ADMIN — LIDOCAINE 1 PATCH: 4 CREAM TOPICAL at 00:00

## 2020-08-28 RX ADMIN — Medication 8 MILLIGRAM(S): at 08:19

## 2020-08-28 RX ADMIN — Medication 14 UNIT(S): at 16:46

## 2020-08-28 RX ADMIN — LEVETIRACETAM 1000 MILLIGRAM(S): 250 TABLET, FILM COATED ORAL at 06:31

## 2020-08-28 RX ADMIN — PANTOPRAZOLE SODIUM 40 MILLIGRAM(S): 20 TABLET, DELAYED RELEASE ORAL at 06:31

## 2020-08-28 RX ADMIN — Medication 14 UNIT(S): at 08:14

## 2020-08-28 RX ADMIN — OLANZAPINE 2.5 MILLIGRAM(S): 15 TABLET, FILM COATED ORAL at 21:20

## 2020-08-28 RX ADMIN — ENOXAPARIN SODIUM 40 MILLIGRAM(S): 100 INJECTION SUBCUTANEOUS at 11:57

## 2020-08-28 RX ADMIN — Medication 3 MILLIGRAM(S): at 21:20

## 2020-08-28 RX ADMIN — Medication 1 APPLICATION(S): at 06:40

## 2020-08-28 RX ADMIN — Medication 81 MILLIGRAM(S): at 11:56

## 2020-08-28 RX ADMIN — ATORVASTATIN CALCIUM 20 MILLIGRAM(S): 80 TABLET, FILM COATED ORAL at 21:20

## 2020-08-28 RX ADMIN — Medication 2: at 16:45

## 2020-08-28 RX ADMIN — Medication 14 UNIT(S): at 11:58

## 2020-08-28 RX ADMIN — Medication 40 MILLIGRAM(S): at 06:31

## 2020-08-28 RX ADMIN — SENNA PLUS 2 TABLET(S): 8.6 TABLET ORAL at 21:20

## 2020-08-28 RX ADMIN — INSULIN GLARGINE 4 UNIT(S): 100 INJECTION, SOLUTION SUBCUTANEOUS at 21:27

## 2020-08-28 RX ADMIN — LIDOCAINE 1 PATCH: 4 CREAM TOPICAL at 23:48

## 2020-08-28 RX ADMIN — LIDOCAINE 1 PATCH: 4 CREAM TOPICAL at 11:58

## 2020-08-28 RX ADMIN — Medication 100 MILLIGRAM(S): at 11:58

## 2020-08-28 RX ADMIN — Medication 0.25 MILLIGRAM(S): at 06:30

## 2020-08-28 RX ADMIN — LIDOCAINE 1 PATCH: 4 CREAM TOPICAL at 20:09

## 2020-08-28 RX ADMIN — Medication 3: at 21:22

## 2020-08-28 RX ADMIN — AMLODIPINE BESYLATE 7.5 MILLIGRAM(S): 2.5 TABLET ORAL at 06:31

## 2020-08-28 NOTE — PROGRESS NOTE ADULT - ASSESSMENT
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     GBM with vaso genic edema  --Pt. with poor prognosis-- Spoke with Dr. Del Cid regarding option of Avastin for treatment-- feels it is high risk for patient due to risk of thrombosis and difficulty with safe transportation to obtain. He has contacted pt. and d/w him as well    --palliative care following-- Dr. Mustafa --discussed advanced care directives/MOLST form with pt.-- he needs more time to review and decide.  Pt understands concept of dnr and is considering his options.. He is scheduled for dc home with hospice soon.  - Continue 8mg decadron daily   - Cont. Keppra 1000mg BID for Seizure ppx,  - c/w PPI for GI ppx on steroids  - continue Comprehensive Rehab Program of PT/OT & speech 3 hrs/day x 5days/week      HTN--BP controlled  - Metoprolol XL 25mg + Lasix 40mg Daily  - Amlodipine to 7.5mg daily    Urinary Retention -   - PVRs elevated --cont. to monitor   - As per urology recs 8/28:  flomax stopped since patient is s/p prostatectomy. bowel care, avoid anticholinergics if possible, mobilize, IC prn.  - Flomac dc'd as per uro recs 8/28   --voiding--incontinent at times  - Optimize Bowel regimen--last BM 8/27  - Mobilize    Diabetes type 2  - FS monitoring and ISS  -Insulin Adjusted 8/25-- Humalog 14 units TID,  started Lantus 4 units QHS 8/25   --FS controlled  -cont. to monitor  - insulin coverage as per hospitalist    Mood/ Cognition  - anxiety and paranoid thought process---cont. Zyprexa qhs   --Depressed/Anxious-- will benefit from trial of Lexapro--  --zyprexa 2.5 mg PRN --  - continue Xanax PRN    --cont.  Xanax TID &  Zyprexa qHS  -Psych following --spoke with Dr. Beavers--pt. has decision making capacity      GI/Bowel:   - Senna, dulcolax, Miralax    Diet/ dysphagia:   - regular/easy to chew with thin     ENT evaluation for poor vocal quality-> no abnormalities found     Stage I pressure ulcer  - offloading, turn q.2h, barrier cream    LE edema  - compression stockings  - Lasix as above    Pain  - Tylenol PRN      Functional Prognosis / Discharge Planning  - Meeting held on 8/25   - Nursing needs: urinary retention  - SW: lives with Adult son and spouse, used cane prior   - OT: Not much progress, Max A for dressing, max A transfers, poor sitting balance   - PT: Not much progress, max A for transfers, can propel WC with min to mod A  - SLP: poor insight with perseverance on not going to [facility]   - Barriers: left visual field cut, flaccid weakness,  - MAGDALENA: d/c home with home  Hospice --

## 2020-08-28 NOTE — PROGRESS NOTE ADULT - ASSESSMENT
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     GBM with vaso genic edema  --Pt. with poor prognosis-- Spoke with Dr. Del Cid regarding option of Avastin for treatment-- feels it is high risk for patient due to risk of thrombosis and difficulty with safe transportation to obtain. He has contacted pt. and d/w him as well    --palliative care following--d/w Dr. Mustafa --discussed advanced care directives/MOLST form with pt.-- he needs more time to review and decide  - Continue 8mg decadron daily   - Cont. Keppra 1000mg BID for Seizure ppx,  - c/w PPI for GI ppx on steroids  - continue Comprehensive Rehab Program of PT/OT & speech 3 hrs/day x 5days/week      HTN--BP controlled  - Metoprolol XL 25mg + Lasix 40mg Daily  - Amlodipine to 7.5mg daily    Urinary Retention -   - PVRs elevated --cont. to monitor   - As per urology recs 8/28:  flomax stopped since patient is s/p prostatectomy. bowel care, avoid anticholinergics if possible, mobilize, IC prn.  - Flomac dc'd as per uro recs 8/28   --voiding--incontinent at times  - Optimize Bowel regimen--last BM 8/27  - Mobilize    Diabetes type 2  - FS monitoring and ISS  -Insulin Adjusted 8/25-- Humalog increased to 14 units TID,  started Lantus 4 units QHS 8/25   -cont. to monitor  - insulin coverage as per hospitalist    Mood/ Cognition  - anxiety and paranoid thought process--improved--cont. Zyprexa qhs   --zyprexa 2.5 mg PRN --  - continue Xanax PRN    --cont.  Xanax TID &  Zyprexa qHS  -Psych following --spoke with Dr. Beavers--pt. had decision making capacity      GI/Bowel:   - Senna, dulcolax, Miralax    Diet/ dysphagia:   - regular/easy to chew with thin     ENT evaluation for poor vocal quality-> no abnormalities found     Stage I pressure ulcer  - offloading, turn q.2h, barrier cream    LE edema  - compression stockings  - Lasix as above    Pain  - Tylenol PRN      Functional Prognosis / Discharge Planning  - Meeting held on 8/25   - Nursing needs: urinary retention  - SW: lives with Adult son and spouse, used cane prior   - OT: Not much progress, Max A for dressing, max A transfers, poor sitting balance   - PT: Not much progress, max A for transfers, can propel WC with min to mod A  - SLP: poor insight with perseverance on not going to [facility]   - Barriers: left visual field cut, flaccid weakness,  - MAGDALENA: d/c home with home  Hospice -- 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     GBM with vaso genic edema  --Pt. with poor prognosis-- Spoke with Dr. Del Cid regarding option of Avastin for treatment-- feels it is high risk for patient due to risk of thrombosis and difficulty with safe transportation to obtain. He has contacted pt. and d/w him as well    --palliative care following--d/w Dr. Mustafa --discussed advanced care directives/MOLST form with pt.-- he needs more time to review and decide  - Continue 8mg decadron daily   - Cont. Keppra 1000mg BID for Seizure ppx,  - c/w PPI for GI ppx on steroids  - continue Comprehensive Rehab Program of PT/OT & speech 3 hrs/day x 5days/week      HTN--BP controlled  - Metoprolol XL 25mg + Lasix 40mg Daily  - Amlodipine to 7.5mg daily    Urinary Retention -   - PVRs elevated --cont. to monitor   - As per urology recs 8/28:  flomax stopped since patient is s/p prostatectomy. bowel care, avoid anticholinergics if possible, mobilize, IC prn.  - Flomac dc'd as per uro recs 8/28   --voiding--incontinent at times  - Optimize Bowel regimen--last BM 8/27  - Mobilize    Diabetes type 2  - FS monitoring and ISS  -Insulin Adjusted 8/25-- Humalog increased to 14 units TID,  started Lantus 4 units QHS 8/25   -cont. to monitor  - insulin coverage as per hospitalist    Mood/ Cognition  - anxiety and paranoid thought process---cont. Zyprexa qhs   --Depressed/Anxious-- will benefit from trial of Lexapro--  --zyprexa 2.5 mg PRN --  - continue Xanax PRN    --cont.  Xanax TID &  Zyprexa qHS  -Psych following --spoke with Dr. Beavers--pt. had decision making capacity      GI/Bowel:   - Senna, dulcolax, Miralax    Diet/ dysphagia:   - regular/easy to chew with thin     ENT evaluation for poor vocal quality-> no abnormalities found     Stage I pressure ulcer  - offloading, turn q.2h, barrier cream    LE edema  - compression stockings  - Lasix as above    Pain  - Tylenol PRN      Functional Prognosis / Discharge Planning  - Meeting held on 8/25   - Nursing needs: urinary retention  - SW: lives with Adult son and spouse, used cane prior   - OT: Not much progress, Max A for dressing, max A transfers, poor sitting balance   - PT: Not much progress, max A for transfers, can propel WC with min to mod A  - SLP: poor insight with perseverance on not going to [facility]   - Barriers: left visual field cut, flaccid weakness,  - MAGDALENA: d/c home with home  Hospice -- 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     GBM with vaso genic edema  --Pt. with poor prognosis-- Spoke with Dr. Del Cid regarding option of Avastin for treatment-- feels it is high risk for patient due to risk of thrombosis and difficulty with safe transportation to obtain. He has contacted pt. and d/w him as well    --palliative care following--d/w Dr. Mustafa --discussed advanced care directives/MOLST form with pt.-- he needs more time to review and decide  - Continue 8mg decadron daily   - Cont. Keppra 1000mg BID for Seizure ppx,  - c/w PPI for GI ppx on steroids  - continue Comprehensive Rehab Program of PT/OT & speech 3 hrs/day x 5days/week      HTN--BP controlled  - Metoprolol XL 25mg + Lasix 40mg Daily  - Amlodipine to 7.5mg daily    Urinary Retention -   - PVRs elevated --cont. to monitor   - As per urology recs 8/28:  flomax stopped since patient is s/p prostatectomy. bowel care, avoid anticholinergics if possible, mobilize, IC prn.  - Flomac dc'd as per uro recs 8/28   --voiding--incontinent at times  - Optimize Bowel regimen--last BM 8/27  - Mobilize    Diabetes type 2  - FS monitoring and ISS  -Insulin Adjusted 8/25-- Humalog 14 units TID,  started Lantus 4 units QHS 8/25   --FS controlled  -cont. to monitor  - insulin coverage as per hospitalist    Mood/ Cognition  - anxiety and paranoid thought process---cont. Zyprexa qhs   --Depressed/Anxious-- will benefit from trial of Lexapro--  --zyprexa 2.5 mg PRN --  - continue Xanax PRN    --cont.  Xanax TID &  Zyprexa qHS  -Psych following --spoke with Dr. Beavers--pt. had decision making capacity      GI/Bowel:   - Senna, dulcolax, Miralax    Diet/ dysphagia:   - regular/easy to chew with thin     ENT evaluation for poor vocal quality-> no abnormalities found     Stage I pressure ulcer  - offloading, turn q.2h, barrier cream    LE edema  - compression stockings  - Lasix as above    Pain  - Tylenol PRN      Functional Prognosis / Discharge Planning  - Meeting held on 8/25   - Nursing needs: urinary retention  - SW: lives with Adult son and spouse, used cane prior   - OT: Not much progress, Max A for dressing, max A transfers, poor sitting balance   - PT: Not much progress, max A for transfers, can propel WC with min to mod A  - SLP: poor insight with perseverance on not going to [facility]   - Barriers: left visual field cut, flaccid weakness,  - MAGDALENA: d/c home with home  Hospice -- 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     GBM with vaso genic edema  --Pt. with poor prognosis-- Spoke with Dr. Del Cid regarding option of Avastin for treatment-- feels it is high risk for patient due to risk of thrombosis and difficulty with safe transportation to obtain. He has contacted pt. and d/w him as well    --palliative care following--d/w Dr. Mustafa --discussed advanced care directives/MOLST form with pt.-- he needs more time to review and decide  - Continue 8mg decadron daily   - Cont. Keppra 1000mg BID for Seizure ppx,  - c/w PPI for GI ppx on steroids  - continue Comprehensive Rehab Program of PT/OT & speech 3 hrs/day x 5days/week      HTN--BP controlled  - Metoprolol XL 25mg + Lasix 40mg Daily  - Amlodipine to 7.5mg daily    Urinary Retention -   - PVRs elevated --cont. to monitor   - As per urology recs 8/28:  flomax stopped since patient is s/p prostatectomy. bowel care, avoid anticholinergics if possible, mobilize, IC prn.  - Flomac dc'd as per uro recs 8/28   --voiding--incontinent at times  - Optimize Bowel regimen--last BM 8/27  - Mobilize    Diabetes type 2  - FS monitoring and ISS  -Insulin Adjusted 8/25-- Humalog 14 units TID,  started Lantus 4 units QHS 8/25   --FS controlled  -cont. to monitor  - insulin coverage as per hospitalist    Mood/ Cognition  - anxiety and paranoid thought process---cont. Zyprexa qhs   --Depressed/Anxious-- will benefit from trial of Lexapro--  --zyprexa 2.5 mg PRN --  - continue Xanax PRN    --cont.  Xanax TID &  Zyprexa qHS  -Psych following --spoke with Dr. Beavers--pt. has decision making capacity      GI/Bowel:   - Senna, dulcolax, Miralax    Diet/ dysphagia:   - regular/easy to chew with thin     ENT evaluation for poor vocal quality-> no abnormalities found     Stage I pressure ulcer  - offloading, turn q.2h, barrier cream    LE edema  - compression stockings  - Lasix as above    Pain  - Tylenol PRN      Functional Prognosis / Discharge Planning  - Meeting held on 8/25   - Nursing needs: urinary retention  - SW: lives with Adult son and spouse, used cane prior   - OT: Not much progress, Max A for dressing, max A transfers, poor sitting balance   - PT: Not much progress, max A for transfers, can propel WC with min to mod A  - SLP: poor insight with perseverance on not going to [facility]   - Barriers: left visual field cut, flaccid weakness,  - MAGDALENA: d/c home with home  Hospice --

## 2020-08-28 NOTE — PROGRESS NOTE ADULT - ASSESSMENT
78yo male with hx of HTN, HLD, prostate cancer s/p prostatectomy, BCC, GBM (dx in 2018, s/p resection 8/3/18, s/p 2nd resection 2/7/19, s/p 3rd resection 5/2020 s/p multiple does of Temodar), presented to [facility] for acute rehab from [facility] for worsening of his L. sided weakness, noted to have worsening vasogenic edema with progression of GBM, resulting in Left Hemiparesis, Dysphagia, Gait and ADL impairment.     #Debility  -Gait Instability, ADL impairments and Functional impairments  -Comprehensive Rehab Program of PT/OT & speech per primary team    #GBM   #Cerebral Edema  #Left hemiplegia   -continue decadron + keppra  -palliative care  -too high risk for Avastin secondary to risk for thrombosis and difficulty with safe transport    #HTN  -controlled, conitinue Amlodipine, Lasix, Metoprolol    #Hyperglycemia  -Steroid induced  -Ha1c 5.6  -Increase Humalog to 14 units TID,  start Lantus 4units QHS  -Continue hypoglycemia protocol and accu-cheks    #Leukocytosis, resolved  -steroid induced    #BPH  -Urology c/s reviewed 8/27, d/c flomax given prostatectomy, avoid anticholinergics, mobilize, IC prn    #Lower extremity edema - improving  -Secondary to Left sided hemiplegia  -LLE Doppler negative for DVT  -Continue Lasix       DVT prophylaxis: Lovenox SQ  CBC/CMP 8/27 reviewed

## 2020-08-28 NOTE — PROGRESS NOTE ADULT - SUBJECTIVE AND OBJECTIVE BOX
Patient is a 79y old  Male who presents with a chief complaint of Glioblastoma (28 Aug 2020 08:10)    No overnight events noted.  Patient is considerably depressed and angry about his current functional status.  States the only changes today are "I am more depressed."  Voiced anger about not being able to be independent with his self care.  Offered Psych/behavorial health f/u today, pt declined.    Patient seen and examined at bedside.    ALLERGIES:  No Known Allergies    MEDICATIONS  (STANDING):  ALPRAZolam 0.25 milliGRAM(s) Oral three times a day  amLODIPine   Tablet 7.5 milliGRAM(s) Oral daily  aspirin  chewable 81 milliGRAM(s) Oral daily  atorvastatin 20 milliGRAM(s) Oral at bedtime  bisacodyl 5 milliGRAM(s) Oral every 12 hours  dexAMETHasone     Tablet 8 milliGRAM(s) Oral <User Schedule>  dextrose 5%. 1000 milliLiter(s) (50 mL/Hr) IV Continuous <Continuous>  dextrose 50% Injectable 12.5 Gram(s) IV Push once  dextrose 50% Injectable 25 Gram(s) IV Push once  dextrose 50% Injectable 25 Gram(s) IV Push once  docusate sodium 100 milliGRAM(s) Oral daily  enoxaparin Injectable 40 milliGRAM(s) SubCutaneous daily  furosemide    Tablet 40 milliGRAM(s) Oral daily  insulin glargine Injectable (LANTUS) 4 Unit(s) SubCutaneous at bedtime  insulin lispro (HumaLOG) corrective regimen sliding scale   SubCutaneous three times a day before meals  insulin lispro (HumaLOG) corrective regimen sliding scale   SubCutaneous at bedtime  insulin lispro Injectable (HumaLOG) 14 Unit(s) SubCutaneous three times a day before meals  levETIRAcetam 1000 milliGRAM(s) Oral two times a day  lidocaine   Patch 1 Patch Transdermal daily  melatonin 3 milliGRAM(s) Oral at bedtime  metoprolol succinate ER 25 milliGRAM(s) Oral daily  OLANZapine 2.5 milliGRAM(s) Oral at bedtime  pantoprazole    Tablet 40 milliGRAM(s) Oral before breakfast  polyethylene glycol 3350 17 Gram(s) Oral two times a day  senna 2 Tablet(s) Oral at bedtime  triamcinolone 0.1% Cream 1 Application(s) Topical two times a day    MEDICATIONS  (PRN):  acetaminophen   Tablet .. 650 milliGRAM(s) Oral every 6 hours PRN Temp greater or equal to 38C (100.4F), Mild Pain (1 - 3)  ALPRAZolam 0.25 milliGRAM(s) Oral every 8 hours PRN Anxiety  benzocaine 15 mG/menthol 3.6 mG (Sugar-Free) Lozenge 1 Lozenge Oral two times a day PRN Sore Throat  dextrose 40% Gel 15 Gram(s) Oral once PRN Blood Glucose LESS THAN 70 milliGRAM(s)/deciliter  glucagon  Injectable 1 milliGRAM(s) IntraMuscular once PRN Glucose LESS THAN 70 milligrams/deciliter  ibuprofen  Tablet. 400 milliGRAM(s) Oral every 8 hours PRN Moderate Pain (4 - 6)  OLANZapine 2.5 milliGRAM(s) Oral two times a day PRN agitation    Vital Signs Last 24 Hrs  T(F): 98.4 (27 Aug 2020 21:31), Max: 98.4 (27 Aug 2020 21:31)  HR: 61 (28 Aug 2020 06:29) (61 - 71)  BP: 134/76 (28 Aug 2020 06:29) (134/76 - 137/67)  RR: 15 (27 Aug 2020 21:31) (15 - 15)  SpO2: 94% (27 Aug 2020 21:31) (94% - 94%)    PHYSICAL EXAM:  General: aao x3, in depressed alternating with angry mood  HEENT: R cranial incision healed  Neck: Supple, No JVD  Lungs: cta anteriorly, b/l- no wheezes, rales, rhonchi  Cardio: rrr S1/S2, No murmurs  Abdomen: Soft, Nontender, Nondistended; Bowel sounds present  Extremities: No calf tenderness, lower extremity edema L>R  Neuro: able to move RUE, LUE >LLE hemiparesis     LABS:                        12.3   7.14  )-----------( 121      ( 27 Aug 2020 06:43 )             36.8       08-27    141  |  102  |  43  ----------------------------<  124  3.6   |  31  |  0.79    Ca    8.8      27 Aug 2020 06:43    TPro  5.8  /  Alb  2.4  /  TBili  0.4  /  DBili  x   /  AST  22  /  ALT  74  /  AlkPhos  68  08-27     eGFR if Non African American: 87 mL/min/1.73M2 (08-27-20 @ 06:43)  eGFR if African American: 100 mL/min/1.73M2 (08-27-20 @ 06:43)       POCT Blood Glucose.: 109 mg/dL (28 Aug 2020 07:56)  POCT Blood Glucose.: 198 mg/dL (27 Aug 2020 21:34)  POCT Blood Glucose.: 225 mg/dL (27 Aug 2020 16:14)  POCT Blood Glucose.: 75 mg/dL (27 Aug 2020 11:36)

## 2020-08-28 NOTE — PROGRESS NOTE ADULT - SUBJECTIVE AND OBJECTIVE BOX
Follow-up Pall Progress Note    Mike Mustafa MD  [phone]    No new respiratory overnight.  Denies SOB/CP.   Less combative today.  Reviewed molst with patient .  He is considering dnr.  Medications:  Vital Signs Last 24 Hrs  T(C): 36.8 (28 Aug 2020 10:02), Max: 36.9 (27 Aug 2020 21:31)  T(F): 98.2 (28 Aug 2020 10:02), Max: 98.4 (27 Aug 2020 21:31)  HR: 87 (28 Aug 2020 10:02) (61 - 87)  BP: 128/81 (28 Aug 2020 10:02) (128/81 - 137/67)  BP(mean): --  RR: 15 (28 Aug 2020 10:02) (15 - 15)  SpO2: 98% (28 Aug 2020 10:02) (94% - 98%)            LABS:                        12.3   7.14  )-----------( 121      ( 27 Aug 2020 06:43 )             36.8     08-27    141  |  102  |  43<H>  ----------------------------<  124<H>  3.6   |  31  |  0.79    Ca    8.8      27 Aug 2020 06:43    TPro  5.8<L>  /  Alb  2.4<L>  /  TBili  0.4  /  DBili  x   /  AST  22  /  ALT  74<H>  /  AlkPhos  68  08-27              CULTURES:        Physical Examination:  PULM: Clear to auscultation bilaterally, no significant sputum production  CVS: Regular rate and rhythm, no murmurs, rubs, or gallops  ABD: Soft, non-tender  EXT:  No clubbing, cyanosis, or edema    RADIOLOGY REVIEWED  CXR:    CT chest:    TTE:

## 2020-08-28 NOTE — CONSULT NOTE ADULT - PROBLEM SELECTOR RECOMMENDATION 9
- Vocal cords mobile bilaterally, may progress with diet as per Speech & Swallow  - If symptoms persist, can follow up with ENT (Dr. Ferrer - [phone]) after discharge.
flomax stopped since patient is s/p prostatectomy. bowel care, avoid anticholinergics if possible, mobilize, IC prn

## 2020-08-28 NOTE — PROGRESS NOTE ADULT - ATTENDING COMMENTS
Pt. seen this AM.  Agree with documentation above as per resident with amendments made as appropriate. Patient medically stable. Making progress towards rehab goals.     Pt. Pt. seen this AM.  Agree with documentation above as per resident with amendments made as appropriate. Patient medically stable. Making progress towards rehab goals.     Pt. depressed and frustrated over situation-- will benefit from trial of lexapro--start 5mg    Advanced care directives/MOLST form d/w Pt.-- Pt. wants more time to review form but indicated that he does not want to be intubated --so likely will be DNR/DNI    **SW and I Spoke with pt's sonFrancisco and his wife at length regarding patient's status and discharge planning to home.  Son will review private hire list,  SW scheduled family training on Tuesday.   All questions answered.

## 2020-08-28 NOTE — CONSULT NOTE ADULT - SUBJECTIVE AND OBJECTIVE BOX
Patient is a 79y old  Male who presents with a chief complaint of Glioblastoma (27 Aug 2020 16:00)      HPI:  79 RHM w/ PMH of HTN, HLD, prostate cancer s/p prostectomy, basal cell skin cancer, GBM (dx in 2018, s/p resection 8/3/18, s/p 2nd resection 19, s/p 3rd resection 2020 s/p multiple does of Temodar admitted to [facility] on 20 w/ worsening of his L. sided weakness. No other complaints. Denied changes in speech, vision, hearing, swallowing, voice quality, numbness/tingling,  balance/room-spinning sensations.    He was seen in Dr. Harman's office on 20 where he was noted to have worsening strength, lethargy and decreased appetitie. He had been tapering his steroids for over a month. Recently tapered from 4mg QD --> 3mg QD ---> 2mg QD.    CT Head on admission 2020, showed- Worsening vasogenic edema involving the R. temporal frontal parietal region again identified increased when compared to previous CT in 2020.     Patient admitted to neurology floor, started on decadron 4mg q6h later increased to 8mg q8h. MR head w/ and w/o contrast with progression of disease, neuro-onc on board, patient to follow up outpatient for initiation of avastin therapy.   Patient also with urinary retention, urology consulted, suspect urinary retention is neurologic in etiology, on clean intermittent catheterization; further followup to continue at rehab.   Patient stable for discharge to acute rehab 20.     Of note--patient was admitted to  rehab after his most recent resection in May 2020.  He was at [facility] from 20  to 20.  At the time of discharge from  rehab, patient was supervision for eating, grooming, and dressing, and contact guard for transfers.  He was able to ambulate 150' with SAC with CG/min assist and to navigate 12 steps with u/l HR with min assist.  SLP at [facility] noted higher level cognitive deficits with mild impulsivity and reduced insight, necessitating supervision while at home. (07 Aug 2020 14:30)    asked to see for urinary retention, prior radical prostatectomy for cancer, on flomax      PAST MEDICAL & SURGICAL HISTORY:  Fall, sequela  Bilateral hearing loss, unspecified hearing loss type   activity: [employer] 1962-    [place]  Type 2 diabetes mellitus without complication, without long-term current use of insulin: stop oral meds  3 weeks ago  Glioblastoma: biopsy 2018  surgery  6 weeks radiation  35 days of oral chemotherapy last 3 weeks  Basal cell carcinoma (BCC) of left lower leg: s/p resection  right lower leg top  left lower leg  Prostate cancer: s/p radical prostatectomy   HLD (hyperlipidemia)  HTN (hypertension)  S/P colonoscopic polypectomy: 3 years benign polyps  S/P tonsillectomy: age 30  H/O bilateral cataract extraction: 3-4 years ago  S/P prostatectomy:   S/P brain surgery: 2018  Lipoma of neck: s/p resection at age 30  Basal cell carcinoma (BCC) of lower leg: s/p resection  History of tonsillectomy: at age 30      REVIEW OF SYSTEMS:    CONSTITUTIONAL:  no fever or chills  HEENT: No visual changes  ENDO: No sweating  NECK: No pain or stiffness  MUSCULOSKELETAL: No back pain, no joint pain  RESPIRATORY: No shortness of breath  CARDIOVASCULAR: No chest pain  GASTROINTESTINAL: No abdominal or epigastric pain. No nausea, vomiting,  No diarrhea or constipation.   NEUROLOGICAL: No mental status changes  PSYCH: No depression, no mood changes  SKIN: No itching      MEDICATIONS  (STANDING):  ALPRAZolam 0.25 milliGRAM(s) Oral three times a day  amLODIPine   Tablet 7.5 milliGRAM(s) Oral daily  aspirin  chewable 81 milliGRAM(s) Oral daily  atorvastatin 20 milliGRAM(s) Oral at bedtime  bisacodyl 5 milliGRAM(s) Oral every 12 hours  dexAMETHasone     Tablet 8 milliGRAM(s) Oral <User Schedule>  dextrose 5%. 1000 milliLiter(s) (50 mL/Hr) IV Continuous <Continuous>  dextrose 50% Injectable 12.5 Gram(s) IV Push once  dextrose 50% Injectable 25 Gram(s) IV Push once  dextrose 50% Injectable 25 Gram(s) IV Push once  docusate sodium 100 milliGRAM(s) Oral daily  enoxaparin Injectable 40 milliGRAM(s) SubCutaneous daily  furosemide    Tablet 40 milliGRAM(s) Oral daily  insulin glargine Injectable (LANTUS) 4 Unit(s) SubCutaneous at bedtime  insulin lispro (HumaLOG) corrective regimen sliding scale   SubCutaneous three times a day before meals  insulin lispro (HumaLOG) corrective regimen sliding scale   SubCutaneous at bedtime  insulin lispro Injectable (HumaLOG) 14 Unit(s) SubCutaneous three times a day before meals  levETIRAcetam 1000 milliGRAM(s) Oral two times a day  lidocaine   Patch 1 Patch Transdermal daily  melatonin 3 milliGRAM(s) Oral at bedtime  metoprolol succinate ER 25 milliGRAM(s) Oral daily  OLANZapine 2.5 milliGRAM(s) Oral at bedtime  pantoprazole    Tablet 40 milliGRAM(s) Oral before breakfast  polyethylene glycol 3350 17 Gram(s) Oral two times a day  senna 2 Tablet(s) Oral at bedtime  triamcinolone 0.1% Cream 1 Application(s) Topical two times a day    MEDICATIONS  (PRN):  acetaminophen   Tablet .. 650 milliGRAM(s) Oral every 6 hours PRN Temp greater or equal to 38C (100.4F), Mild Pain (1 - 3)  ALPRAZolam 0.25 milliGRAM(s) Oral every 8 hours PRN Anxiety  benzocaine 15 mG/menthol 3.6 mG (Sugar-Free) Lozenge 1 Lozenge Oral two times a day PRN Sore Throat  dextrose 40% Gel 15 Gram(s) Oral once PRN Blood Glucose LESS THAN 70 milliGRAM(s)/deciliter  glucagon  Injectable 1 milliGRAM(s) IntraMuscular once PRN Glucose LESS THAN 70 milligrams/deciliter  ibuprofen  Tablet. 400 milliGRAM(s) Oral every 8 hours PRN Moderate Pain (4 - 6)  OLANZapine 2.5 milliGRAM(s) Oral two times a day PRN agitation      Allergies    No Known Allergies    Intolerances        SOCIAL HISTORY: No illicit drug use    FAMILY HISTORY:  Family history of diabetes mellitus: Father   Family history of prostate cancer in father:   Family history of breast cancer in mother (Sibling): Mother and sister  Family history of lung cancer: mother , long standing smoker        T(C): 36.9 (20 @ 21:31), Max: 36.9 (20 @ 21:31)  T(F): 98.4 (20 @ 21:31), Max: 98.4 (20 @ 21:31)  HR: 61 (20 @ 06:29) (61 - 75)  BP: 134/76 (20 @ 06:29) (129/76 - 151/76)  RR: 15 (20 @ 21:31) (15 - 16)  SpO2: 94% (20 @ 21:31) (94% - 94%)      PHYSICAL EXAM:    Constitutional: alert, no acute distress  Back: No CVA tenderness  Respiratory: No accessory respiratory muscle use  Abd: Soft, NT/ND  no organomegaly  no hernia  : no bladder distention, penis/testes benign  Extremities: no edema  Neurological: A/O x 3  Psychiatric: Normal mood, normal affect  Skin: No rashes      20 @ 07:01  -  20 @ 07:00  --------------------------------------------------------  IN:  Total IN: 0 mL    OUT:    Intermittent Catheterization - Urethral: 500 mL  Total OUT: 500 mL    Total NET: -500 mL              LABS:                        12.3   7.14  )-----------( 121      ( 27 Aug 2020 06:43 )             36.8                 RADIOLOGY & ADDITIONAL STUDIES:

## 2020-08-28 NOTE — PROGRESS NOTE BEHAVIORAL HEALTH - NSBHFUPINTERVALHXFT_PSY_A_CORE
79 RHM w/ PMH of HTN, HLD, prostate cancer s/p proctectomy, basal cell skin cancer, GBM (dx in 2018, s/p resection 8/3/18, s/p 2nd resection 2/7/19, s/p 3rd resection 5/2020 s/p multiple does of Temodar admitted to [facility] on 8/4/20 w/ worsening of his L. sided weakness. No other complaints. Denied changes in speech, vision, hearing, swallowing, voice quality, numbness/tingling,  balance/room-spinning sensations. He was seen in Dr. Harman's office on 7/27/20 where he was noted to have worsening strength, lethargy and decreased appetite. He had been tapering his steroids for over a month. Recently tapered from 4mg QD --> 3mg QD ---> 2mg QD.    CT Head on admission 8.4.2020, showed- Worsening vasogenic edema involving the R. temporal frontal parietal region again identified increased when compared to previous CT in 5/2020. Patient admitted to neurology floor, started on decadron 4mg q6h later increased to 8mg q8h. MR head w/ and w/o contrast with progression of disease, neuro-onc on board, patient to follow up outpatient for initiation of avastin therapy. Patient also with urinary retention, urology consulted, suspect urinary retention is neurologic in etiology, on clean intermittent catheterization; further followup to continue at rehab. Patient stable for discharge to acute rehab 8/7/20. Of note--patient was admitted to  rehab after his most recent resection in May 2020.  He was at [facility] from 6/4/20  to 6/17/20.  At the time of discharge from  rehab, patient was supervision for eating, grooming, and dressing, and contact guard for transfers.  He was able to ambulate 150' with SAC with CG/min assist and to navigate 12 steps with u/l HR with min assist.  SLP at [facility] noted higher level cognitive deficits with mild impulsivity and reduced insight, necessitating supervision while at home. (07 Aug 2020 14:30)    During rehab course at [facility] pt expressed that his mood is "not good" secondary to being told by the treatment team that his prognosis is poor. Pt was tearful during interview as he was expressing all his medical issues. Pt was seen recently seen on 8/23/2020 by psychiatrist for evaluation of angry mood and confusion. At that time pt was started on Zyprexa and Xanax and his anger has improved.     Pt seen and evaluated several days later by Psychiatrist Dr. Beavers and was asked to comment on decision making capacity. As per Psychiatrist MD Beavers, It is felt that patient retains decision making capacity , and can also sign a MOLST.     Today a follow up visit was made to pt by writer. The pt was occasionally tearful during conversation and stated "Mentally, I am not doing well". The pt stated that in the morning he felt worse after the rehab team came to speak with him regarding end of life care but now he feels better since they are gone. He reports his energy is fair, concentration is fair, appetite is good (pt was eating during interview and ate 75% of lunch). He denies suicidal/homicidal ideation and or perceptual disturbances. Pt did have a delay in thought process and was alert and oriented to person, place, however confused to the time, day or month. The pt states it was Saturday August 18th.

## 2020-08-28 NOTE — CONSULT NOTE ADULT - CONSULT REQUESTED DATE/TIME
Patient's wife called Dr. Smith's office stating the Raynaud's disease is worsening,his fingers are white and cold most of the time.  He would like to know what Dr. Smith would recommend.    Call patient at [phone]  Annia,wife,did also leave her number [phone] but no oral disclosure jessica.   28-Aug-2020 08:10

## 2020-08-28 NOTE — PROGRESS NOTE ADULT - SUBJECTIVE AND OBJECTIVE BOX
HPI:  79 RHM w/ PMH of HTN, HLD, prostate cancer s/p prostectomy, basal cell skin cancer, GBM (dx in 2018, s/p resection 8/3/18, s/p 2nd resection 2/7/19, s/p 3rd resection 5/2020 s/p multiple does of Temodar admitted to [facility] on 8/4/20 w/ worsening of his L. sided weakness. No other complaints. Denied changes in speech, vision, hearing, swallowing, voice quality, numbness/tingling,  balance/room-spinning sensations.    He was seen in Dr. Harman's office on 7/27/20 where he was noted to have worsening strength, lethargy and decreased appetitie. He had been tapering his steroids for over a month. Recently tapered from 4mg QD --> 3mg QD ---> 2mg QD.    CT Head on admission 8.4.2020, showed- Worsening vasogenic edema involving the R. temporal frontal parietal region again identified increased when compared to previous CT in 5/2020.     Patient admitted to neurology floor, started on decadron 4mg q6h later increased to 8mg q8h. MR head w/ and w/o contrast with progression of disease, neuro-onc on board, patient to follow up outpatient for initiation of avastin therapy.   Patient also with urinary retention, urology consulted, suspect urinary retention is neurologic in etiology, on clean intermittent catheterization; further followup to continue at rehab.   Patient stable for discharge to acute rehab 8/7/20.     Of note--patient was admitted to  rehab after his most recent resection in May 2020.  He was at [facility] from 6/4/20  to 6/17/20.  At the time of discharge from  rehab, patient was supervision for eating, grooming, and dressing, and contact guard for transfers.  He was able to ambulate 150' with SAC with CG/min assist and to navigate 12 steps with u/l HR with min assist.  SLP at [facility] noted higher level cognitive deficits with mild impulsivity and reduced insight, necessitating supervision while at home. (07 Aug 2020 14:30)      PAST MEDICAL & SURGICAL HISTORY:  Fall, sequela  Bilateral hearing loss, unspecified hearing loss type   activity: [employer] 1962-65    [place]  Type 2 diabetes mellitus without complication, without long-term current use of insulin: stop oral meds  3 weeks ago  Glioblastoma: biopsy 8/2018  surgery  6 weeks radiation  35 days of oral chemotherapy last 3 weeks  Basal cell carcinoma (BCC) of left lower leg: s/p resection  right lower leg top  left lower leg  Prostate cancer: s/p radical prostatectomy 1995  HLD (hyperlipidemia)  HTN (hypertension)  S/P colonoscopic polypectomy: 3 years benign polyps  S/P tonsillectomy: age 30  H/O bilateral cataract extraction: 3-4 years ago  S/P prostatectomy: 1995  S/P brain surgery: 8/2018  Lipoma of neck: s/p resection at age 30  Basal cell carcinoma (BCC) of lower leg: s/p resection  History of tonsillectomy: at age 30      Subjective: Patient seen and evaluated this morning. Patient endorsed to having a rough night due to his roommates being loud. Patient otherwise endorsed to feeling the same as yesterday.     REVIEW OF SYMPTOMS  Positive:  depressed, left HP, cognitive deficits  Denies: headache, lightheadedness, CP, SOB, abdominal pain, dysuria, nausea, constipation    Vital Signs Last 24 Hrs  T(C): 36.8 (28 Aug 2020 10:02), Max: 36.9 (27 Aug 2020 21:31)  T(F): 98.2 (28 Aug 2020 10:02), Max: 98.4 (27 Aug 2020 21:31)  HR: 87 (28 Aug 2020 10:02) (61 - 87)  BP: 128/81 (28 Aug 2020 10:02) (128/81 - 137/67)  BP(mean): --  RR: 15 (28 Aug 2020 10:02) (15 - 15)  SpO2: 98% (28 Aug 2020 10:02) (94% - 98%)    PHYSICAL EXAM  Constitutional - NAD, Comfortable  	HEENT - NCAT, EOMI  	Chest -  CTAB   	Cardio - warm and well perfused, RRR, no murmur  	Abdomen -  Soft, NTND  	Extremities - No edema, No calf tenderness   	Neurologic Exam:                 	   Cognitive -   	          Orientation: Awake, Alert,  	      	          Thought:  slow processing, perseverative, agitated  	   Speech - Fluent, Comprehensible,   	   Cranial Nerves - left visual field deficit, left facial weakness, Tongue midline, EOMI, Shoulder shrug intact  	   Motor -  LEFT hemiparesis  	   Sensory - Impaired-- extinguishes on left  	   Coordination - impaired on left  Psychiatric -agitated, depressed, anxious    RECENT LABS/IMAGING                        12.3   7.14  )-----------( 121      ( 27 Aug 2020 06:43 )             36.8     08-27    141  |  102  |  43<H>  ----------------------------<  124<H>  3.6   |  31  |  0.79    Ca    8.8      27 Aug 2020 06:43    TPro  5.8<L>  /  Alb  2.4<L>  /  TBili  0.4  /  DBili  x   /  AST  22  /  ALT  74<H>  /  AlkPhos  68  08-27    CAPILLARY BLOOD GLUCOSE  POCT Blood Glucose.: 109 mg/dL (28 Aug 2020 07:56)  POCT Blood Glucose.: 198 mg/dL (27 Aug 2020 21:34)  POCT Blood Glucose.: 225 mg/dL (27 Aug 2020 16:14)        RADIOLOGY/OTHER RESULTS    MEDICATIONS  (STANDING):  ALPRAZolam 0.25 milliGRAM(s) Oral three times a day  amLODIPine   Tablet 7.5 milliGRAM(s) Oral daily  aspirin  chewable 81 milliGRAM(s) Oral daily  atorvastatin 20 milliGRAM(s) Oral at bedtime  bisacodyl 5 milliGRAM(s) Oral every 12 hours  dexAMETHasone     Tablet 8 milliGRAM(s) Oral <User Schedule>  dextrose 5%. 1000 milliLiter(s) (50 mL/Hr) IV Continuous <Continuous>  dextrose 50% Injectable 12.5 Gram(s) IV Push once  dextrose 50% Injectable 25 Gram(s) IV Push once  dextrose 50% Injectable 25 Gram(s) IV Push once  docusate sodium 100 milliGRAM(s) Oral daily  enoxaparin Injectable 40 milliGRAM(s) SubCutaneous daily  furosemide    Tablet 40 milliGRAM(s) Oral daily  insulin glargine Injectable (LANTUS) 4 Unit(s) SubCutaneous at bedtime  insulin lispro (HumaLOG) corrective regimen sliding scale   SubCutaneous three times a day before meals  insulin lispro (HumaLOG) corrective regimen sliding scale   SubCutaneous at bedtime  insulin lispro Injectable (HumaLOG) 14 Unit(s) SubCutaneous three times a day before meals  levETIRAcetam 1000 milliGRAM(s) Oral two times a day  lidocaine   Patch 1 Patch Transdermal daily  melatonin 3 milliGRAM(s) Oral at bedtime  metoprolol succinate ER 25 milliGRAM(s) Oral daily  OLANZapine 2.5 milliGRAM(s) Oral at bedtime  pantoprazole    Tablet 40 milliGRAM(s) Oral before breakfast  polyethylene glycol 3350 17 Gram(s) Oral two times a day  senna 2 Tablet(s) Oral at bedtime  triamcinolone 0.1% Cream 1 Application(s) Topical two times a day    MEDICATIONS  (PRN):  acetaminophen   Tablet .. 650 milliGRAM(s) Oral every 6 hours PRN Temp greater or equal to 38C (100.4F), Mild Pain (1 - 3)  ALPRAZolam 0.25 milliGRAM(s) Oral every 8 hours PRN Anxiety  benzocaine 15 mG/menthol 3.6 mG (Sugar-Free) Lozenge 1 Lozenge Oral two times a day PRN Sore Throat  dextrose 40% Gel 15 Gram(s) Oral once PRN Blood Glucose LESS THAN 70 milliGRAM(s)/deciliter  glucagon  Injectable 1 milliGRAM(s) IntraMuscular once PRN Glucose LESS THAN 70 milligrams/deciliter  ibuprofen  Tablet. 400 milliGRAM(s) Oral every 8 hours PRN Moderate Pain (4 - 6)  OLANZapine 2.5 milliGRAM(s) Oral two times a day PRN agitation

## 2020-08-29 LAB
GLUCOSE BLDC GLUCOMTR-MCNC: 116 MG/DL — HIGH (ref 70–99)
GLUCOSE BLDC GLUCOMTR-MCNC: 135 MG/DL — HIGH (ref 70–99)
GLUCOSE BLDC GLUCOMTR-MCNC: 160 MG/DL — HIGH (ref 70–99)
GLUCOSE BLDC GLUCOMTR-MCNC: 179 MG/DL — HIGH (ref 70–99)

## 2020-08-29 PROCEDURE — 99232 SBSQ HOSP IP/OBS MODERATE 35: CPT

## 2020-08-29 RX ADMIN — Medication 14 UNIT(S): at 08:07

## 2020-08-29 RX ADMIN — Medication 0.25 MILLIGRAM(S): at 22:00

## 2020-08-29 RX ADMIN — ESCITALOPRAM OXALATE 5 MILLIGRAM(S): 10 TABLET, FILM COATED ORAL at 16:54

## 2020-08-29 RX ADMIN — Medication 14 UNIT(S): at 17:13

## 2020-08-29 RX ADMIN — SENNA PLUS 2 TABLET(S): 8.6 TABLET ORAL at 21:37

## 2020-08-29 RX ADMIN — Medication 25 MILLIGRAM(S): at 05:37

## 2020-08-29 RX ADMIN — Medication 81 MILLIGRAM(S): at 11:57

## 2020-08-29 RX ADMIN — Medication 3 MILLIGRAM(S): at 21:37

## 2020-08-29 RX ADMIN — Medication 8 MILLIGRAM(S): at 08:07

## 2020-08-29 RX ADMIN — LEVETIRACETAM 1000 MILLIGRAM(S): 250 TABLET, FILM COATED ORAL at 17:15

## 2020-08-29 RX ADMIN — PANTOPRAZOLE SODIUM 40 MILLIGRAM(S): 20 TABLET, DELAYED RELEASE ORAL at 06:06

## 2020-08-29 RX ADMIN — INSULIN GLARGINE 4 UNIT(S): 100 INJECTION, SOLUTION SUBCUTANEOUS at 21:37

## 2020-08-29 RX ADMIN — Medication 1 APPLICATION(S): at 17:16

## 2020-08-29 RX ADMIN — OLANZAPINE 2.5 MILLIGRAM(S): 15 TABLET, FILM COATED ORAL at 21:37

## 2020-08-29 RX ADMIN — POLYETHYLENE GLYCOL 3350 17 GRAM(S): 17 POWDER, FOR SOLUTION ORAL at 17:15

## 2020-08-29 RX ADMIN — LIDOCAINE 1 PATCH: 4 CREAM TOPICAL at 23:38

## 2020-08-29 RX ADMIN — Medication 0.25 MILLIGRAM(S): at 15:59

## 2020-08-29 RX ADMIN — Medication 0.25 MILLIGRAM(S): at 05:35

## 2020-08-29 RX ADMIN — Medication 2: at 12:41

## 2020-08-29 RX ADMIN — LIDOCAINE 1 PATCH: 4 CREAM TOPICAL at 11:57

## 2020-08-29 RX ADMIN — LIDOCAINE 1 PATCH: 4 CREAM TOPICAL at 20:06

## 2020-08-29 RX ADMIN — LEVETIRACETAM 1000 MILLIGRAM(S): 250 TABLET, FILM COATED ORAL at 05:35

## 2020-08-29 RX ADMIN — Medication 40 MILLIGRAM(S): at 05:37

## 2020-08-29 RX ADMIN — Medication 1 APPLICATION(S): at 05:37

## 2020-08-29 RX ADMIN — Medication 5 MILLIGRAM(S): at 17:14

## 2020-08-29 RX ADMIN — ATORVASTATIN CALCIUM 20 MILLIGRAM(S): 80 TABLET, FILM COATED ORAL at 22:00

## 2020-08-29 RX ADMIN — AMLODIPINE BESYLATE 7.5 MILLIGRAM(S): 2.5 TABLET ORAL at 05:37

## 2020-08-29 RX ADMIN — ENOXAPARIN SODIUM 40 MILLIGRAM(S): 100 INJECTION SUBCUTANEOUS at 11:57

## 2020-08-29 RX ADMIN — Medication 14 UNIT(S): at 12:40

## 2020-08-29 NOTE — PROGRESS NOTE ADULT - SUBJECTIVE AND OBJECTIVE BOX
No overnight events.  patient concerned about meds/medicare coverage     REVIEW OF SYSTEMS  Constitutional - No fever,  No fatigue  Neurological - No headaches, No loss of strength  Musculoskeletal - No joint pain, No joint swelling, No muscle pain    VITALS  T(C): 36.5 (08-29-20 @ 08:12), Max: 36.5 (08-28-20 @ 21:05)  HR: 71 (08-29-20 @ 08:12) (63 - 84)  BP: 123/80 (08-29-20 @ 08:12) (123/80 - 164/80)  RR: 14 (08-29-20 @ 08:12) (14 - 16)  SpO2: 96% (08-29-20 @ 08:12) (94% - 96%)  Wt(kg): --       MEDICATIONS   acetaminophen   Tablet .. 650 milliGRAM(s) every 6 hours PRN  ALPRAZolam 0.25 milliGRAM(s) three times a day  ALPRAZolam 0.25 milliGRAM(s) every 8 hours PRN  amLODIPine   Tablet 7.5 milliGRAM(s) daily  aspirin  chewable 81 milliGRAM(s) daily  atorvastatin 20 milliGRAM(s) at bedtime  benzocaine 15 mG/menthol 3.6 mG (Sugar-Free) Lozenge 1 Lozenge two times a day PRN  bisacodyl 5 milliGRAM(s) every 12 hours  dexAMETHasone     Tablet 8 milliGRAM(s) <User Schedule>  dextrose 40% Gel 15 Gram(s) once PRN  dextrose 5%. 1000 milliLiter(s) <Continuous>  dextrose 50% Injectable 12.5 Gram(s) once  dextrose 50% Injectable 25 Gram(s) once  dextrose 50% Injectable 25 Gram(s) once  docusate sodium 100 milliGRAM(s) daily  enoxaparin Injectable 40 milliGRAM(s) daily  escitalopram 5 milliGRAM(s) <User Schedule>  furosemide    Tablet 40 milliGRAM(s) daily  glucagon  Injectable 1 milliGRAM(s) once PRN  ibuprofen  Tablet. 400 milliGRAM(s) every 8 hours PRN  insulin glargine Injectable (LANTUS) 4 Unit(s) at bedtime  insulin lispro (HumaLOG) corrective regimen sliding scale   three times a day before meals  insulin lispro (HumaLOG) corrective regimen sliding scale   at bedtime  insulin lispro Injectable (HumaLOG) 14 Unit(s) three times a day before meals  levETIRAcetam 1000 milliGRAM(s) two times a day  lidocaine   Patch 1 Patch daily  melatonin 3 milliGRAM(s) at bedtime  metoprolol succinate ER 25 milliGRAM(s) daily  OLANZapine 2.5 milliGRAM(s) two times a day PRN  OLANZapine 2.5 milliGRAM(s) at bedtime  pantoprazole    Tablet 40 milliGRAM(s) before breakfast  polyethylene glycol 3350 17 Gram(s) two times a day  senna 2 Tablet(s) at bedtime  triamcinolone 0.1% Cream 1 Application(s) two times a day      RECENT LABS/IMAGING                      POCT Blood Glucose.: 160 mg/dL (08-29-20 @ 12:01)  POCT Blood Glucose.: 135 mg/dL (08-29-20 @ 08:06)  POCT Blood Glucose.: 352 mg/dL (08-28-20 @ 21:18)  POCT Blood Glucose.: 184 mg/dL (08-28-20 @ 16:17)    ---------    PHYSICAL EXAM  Constitutional - NAD, Comfortable, in bed   	HEENT - NCAT, EOMI  	Chest -  CTAB   	Cardio - warm and well perfused, RRR, no murmur  	Abdomen -  Soft, NTND  	Extremities - No edema, No calf tenderness   	Neurologic Exam:                 	   Cognitive -   	          Orientation: Awake, Alert,  	      	          Thought:  slow processing, perseverative, agitated  	   Speech - Fluent, Comprehensible,   	   Cranial Nerves - left visual field deficit, left facial weakness, Tongue midline, EOMI, Shoulder shrug intact  	   Motor -  LEFT hemiparesis  	   Sensory - Impaired-- extinguishes on left  	   Coordination - impaired on left  Psychiatric -agitated, depressed, anxious    ASSESSMENT/PLAN  79y Male h/o HTN, HLD, prostate cancer GBM, with functional deficits after 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   avastin high risk due to thrombosis  decadron/keppra  urinary retention, flomax discontinued, monitor PVR  bowel regimen, senna, dulcolax, miralax  zyprexa/trial lexapro, xanax prn   Continue current medical management  Pain - Tylenol PRN  DVT PPX - lovenox   Continue 3hrs a day of comprehensive rehab program.

## 2020-08-29 NOTE — PROGRESS NOTE ADULT - SUBJECTIVE AND OBJECTIVE BOX
Patient is a 79y old  Male who presents with a chief complaint of Glioblastoma (28 Aug 2020 15:38)    No overnight events noted.  Patient is confused this morning, repeatedly asks me if there's a co-pay for my exam.  Focused on "not giving money unless reviewed w/ insurance" despite reassurance I am the medical doctor here to just exam him.  Patient seen and examined at bedside.    ALLERGIES:  No Known Allergies    MEDICATIONS  (STANDING):  ALPRAZolam 0.25 milliGRAM(s) Oral three times a day  amLODIPine   Tablet 7.5 milliGRAM(s) Oral daily  aspirin  chewable 81 milliGRAM(s) Oral daily  atorvastatin 20 milliGRAM(s) Oral at bedtime  bisacodyl 5 milliGRAM(s) Oral every 12 hours  dexAMETHasone     Tablet 8 milliGRAM(s) Oral <User Schedule>  dextrose 5%. 1000 milliLiter(s) (50 mL/Hr) IV Continuous <Continuous>  dextrose 50% Injectable 12.5 Gram(s) IV Push once  dextrose 50% Injectable 25 Gram(s) IV Push once  dextrose 50% Injectable 25 Gram(s) IV Push once  docusate sodium 100 milliGRAM(s) Oral daily  enoxaparin Injectable 40 milliGRAM(s) SubCutaneous daily  escitalopram 5 milliGRAM(s) Oral <User Schedule>  furosemide    Tablet 40 milliGRAM(s) Oral daily  insulin glargine Injectable (LANTUS) 4 Unit(s) SubCutaneous at bedtime  insulin lispro (HumaLOG) corrective regimen sliding scale   SubCutaneous three times a day before meals  insulin lispro (HumaLOG) corrective regimen sliding scale   SubCutaneous at bedtime  insulin lispro Injectable (HumaLOG) 14 Unit(s) SubCutaneous three times a day before meals  levETIRAcetam 1000 milliGRAM(s) Oral two times a day  lidocaine   Patch 1 Patch Transdermal daily  melatonin 3 milliGRAM(s) Oral at bedtime  metoprolol succinate ER 25 milliGRAM(s) Oral daily  OLANZapine 2.5 milliGRAM(s) Oral at bedtime  pantoprazole    Tablet 40 milliGRAM(s) Oral before breakfast  polyethylene glycol 3350 17 Gram(s) Oral two times a day  senna 2 Tablet(s) Oral at bedtime  triamcinolone 0.1% Cream 1 Application(s) Topical two times a day    MEDICATIONS  (PRN):  acetaminophen   Tablet .. 650 milliGRAM(s) Oral every 6 hours PRN Temp greater or equal to 38C (100.4F), Mild Pain (1 - 3)  ALPRAZolam 0.25 milliGRAM(s) Oral every 8 hours PRN Anxiety  benzocaine 15 mG/menthol 3.6 mG (Sugar-Free) Lozenge 1 Lozenge Oral two times a day PRN Sore Throat  dextrose 40% Gel 15 Gram(s) Oral once PRN Blood Glucose LESS THAN 70 milliGRAM(s)/deciliter  glucagon  Injectable 1 milliGRAM(s) IntraMuscular once PRN Glucose LESS THAN 70 milligrams/deciliter  ibuprofen  Tablet. 400 milliGRAM(s) Oral every 8 hours PRN Moderate Pain (4 - 6)  OLANZapine 2.5 milliGRAM(s) Oral two times a day PRN agitation    Vital Signs Last 24 Hrs  T(F): 97.7 (29 Aug 2020 08:12), Max: 98.2 (28 Aug 2020 10:02)  HR: 71 (29 Aug 2020 08:12) (63 - 87)  BP: 123/80 (29 Aug 2020 08:12) (123/80 - 164/80)  RR: 14 (29 Aug 2020 08:12) (14 - 16)  SpO2: 96% (29 Aug 2020 08:12) (94% - 98%)    PHYSICAL EXAM:  General: aao x3, less depressed/anxious but confused to place and reason for admission, who I am...  HEENT: R cranial incision healed  Neck: Supple, No JVD  Lungs: cta anteriorly, b/l- no wheezes, rales, rhonchi  Cardio: rrr S1/S2, No murmurs  Abdomen: Soft, Nontender, Nondistended; Bowel sounds present  Extremities: No calf tenderness, lower extremity edema L>R  Neuro: able to move RUE, LUE >LLE hemiparesis     LABS:                        12.3   7.14  )-----------( 121      ( 27 Aug 2020 06:43 )             36.8       08-27    141  |  102  |  43  ----------------------------<  124  3.6   |  31  |  0.79    Ca    8.8      27 Aug 2020 06:43    TPro  5.8  /  Alb  2.4  /  TBili  0.4  /  DBili  x   /  AST  22  /  ALT  74  /  AlkPhos  68  08-27     eGFR if Non African American: 87 mL/min/1.73M2 (08-27-20 @ 06:43)  eGFR if African American: 100 mL/min/1.73M2 (08-27-20 @ 06:43)       POCT Blood Glucose.: 135 mg/dL (29 Aug 2020 08:06)  POCT Blood Glucose.: 352 mg/dL (28 Aug 2020 21:18)  POCT Blood Glucose.: 184 mg/dL (28 Aug 2020 16:17)  POCT Blood Glucose.: 117 mg/dL (28 Aug 2020 11:55)

## 2020-08-29 NOTE — PROGRESS NOTE ADULT - ASSESSMENT
78yo male with hx of HTN, HLD, prostate cancer s/p prostatectomy, BCC, GBM (dx in 2018, s/p resection 8/3/18, s/p 2nd resection 2/7/19, s/p 3rd resection 5/2020 s/p multiple does of Temodar), presented to [facility] for acute rehab from [facility] for worsening of his L. sided weakness, noted to have worsening vasogenic edema with progression of GBM, resulting in Left Hemiparesis, Dysphagia, Gait and ADL impairment.     #Debility  -Gait Instability, ADL impairments and Functional impairments  -Comprehensive Rehab Program of PT/OT & speech per primary team    #GBM   #Cerebral Edema  #Left hemiplegia   -continue decadron + keppra  -palliative care  -too high risk for Avastin secondary to risk for thrombosis and difficulty with safe transport    #HTN  -controlled, conitinue Amlodipine, Lasix, Metoprolol    #Hyperglycemia  -Steroid induced  -Ha1c 5.6  -Increase Humalog to 14 units TID,  start Lantus 4units QHS  -Continue hypoglycemia protocol and accu-cheks    #Leukocytosis, resolved  -steroid induced    #BPH  -Urology c/s reviewed 8/27, d/c flomax given prostatectomy, avoid anticholinergics, mobilize, IC prn    #Lower extremity edema - improving  -Secondary to Left sided hemiplegia  -LLE Doppler negative for DVT  -Continue Lasix       DVT prophylaxis: Lovenox SQ  CBC/CMP 8/27 reviewed

## 2020-08-30 LAB
GLUCOSE BLDC GLUCOMTR-MCNC: 108 MG/DL — HIGH (ref 70–99)
GLUCOSE BLDC GLUCOMTR-MCNC: 118 MG/DL — HIGH (ref 70–99)
GLUCOSE BLDC GLUCOMTR-MCNC: 149 MG/DL — HIGH (ref 70–99)
GLUCOSE BLDC GLUCOMTR-MCNC: 164 MG/DL — HIGH (ref 70–99)
GLUCOSE BLDC GLUCOMTR-MCNC: 178 MG/DL — HIGH (ref 70–99)

## 2020-08-30 PROCEDURE — 99232 SBSQ HOSP IP/OBS MODERATE 35: CPT

## 2020-08-30 PROCEDURE — 99233 SBSQ HOSP IP/OBS HIGH 50: CPT

## 2020-08-30 RX ADMIN — Medication 100 MILLIGRAM(S): at 12:44

## 2020-08-30 RX ADMIN — Medication 81 MILLIGRAM(S): at 12:40

## 2020-08-30 RX ADMIN — ATORVASTATIN CALCIUM 20 MILLIGRAM(S): 80 TABLET, FILM COATED ORAL at 21:20

## 2020-08-30 RX ADMIN — LEVETIRACETAM 1000 MILLIGRAM(S): 250 TABLET, FILM COATED ORAL at 17:34

## 2020-08-30 RX ADMIN — OLANZAPINE 2.5 MILLIGRAM(S): 15 TABLET, FILM COATED ORAL at 21:20

## 2020-08-30 RX ADMIN — LIDOCAINE 1 PATCH: 4 CREAM TOPICAL at 12:39

## 2020-08-30 RX ADMIN — Medication 1 APPLICATION(S): at 17:38

## 2020-08-30 RX ADMIN — Medication 8 MILLIGRAM(S): at 07:47

## 2020-08-30 RX ADMIN — Medication 3 MILLIGRAM(S): at 21:21

## 2020-08-30 RX ADMIN — Medication 2: at 12:42

## 2020-08-30 RX ADMIN — Medication 2: at 17:31

## 2020-08-30 RX ADMIN — Medication 0.25 MILLIGRAM(S): at 14:09

## 2020-08-30 RX ADMIN — Medication 5 MILLIGRAM(S): at 17:33

## 2020-08-30 RX ADMIN — SENNA PLUS 2 TABLET(S): 8.6 TABLET ORAL at 21:20

## 2020-08-30 RX ADMIN — ENOXAPARIN SODIUM 40 MILLIGRAM(S): 100 INJECTION SUBCUTANEOUS at 12:40

## 2020-08-30 RX ADMIN — Medication 14 UNIT(S): at 12:42

## 2020-08-30 RX ADMIN — Medication 14 UNIT(S): at 17:30

## 2020-08-30 RX ADMIN — POLYETHYLENE GLYCOL 3350 17 GRAM(S): 17 POWDER, FOR SOLUTION ORAL at 17:34

## 2020-08-30 RX ADMIN — Medication 1 APPLICATION(S): at 06:15

## 2020-08-30 RX ADMIN — LIDOCAINE 1 PATCH: 4 CREAM TOPICAL at 17:28

## 2020-08-30 RX ADMIN — Medication 0.25 MILLIGRAM(S): at 21:19

## 2020-08-30 RX ADMIN — INSULIN GLARGINE 4 UNIT(S): 100 INJECTION, SOLUTION SUBCUTANEOUS at 21:24

## 2020-08-30 RX ADMIN — ESCITALOPRAM OXALATE 5 MILLIGRAM(S): 10 TABLET, FILM COATED ORAL at 17:33

## 2020-08-30 RX ADMIN — Medication 14 UNIT(S): at 09:10

## 2020-08-30 NOTE — PROGRESS NOTE ADULT - SUBJECTIVE AND OBJECTIVE BOX
patient increasingly anxious, states he feels "sensitive", which is not normal for him  also notes feeling of fullness or swelling above right ear  now on 1:1 observation       REVIEW OF SYSTEMS  Constitutional - No fever,  No fatigue  Neurological - No headaches, No loss of strength  Musculoskeletal - No joint pain, No joint swelling, No muscle pain    VITALS  T(C): 36.7 (08-30-20 @ 08:00), Max: 36.7 (08-30-20 @ 08:00)  HR: 73 (08-30-20 @ 08:00) (66 - 76)  BP: 153/86 (08-30-20 @ 08:00) (135/74 - 154/73)  RR: 14 (08-30-20 @ 08:00) (14 - 14)  SpO2: 95% (08-30-20 @ 08:00) (94% - 95%)  Wt(kg): --       MEDICATIONS   acetaminophen   Tablet .. 650 milliGRAM(s) every 6 hours PRN  ALPRAZolam 0.25 milliGRAM(s) three times a day  ALPRAZolam 0.25 milliGRAM(s) every 8 hours PRN  amLODIPine   Tablet 7.5 milliGRAM(s) daily  aspirin  chewable 81 milliGRAM(s) daily  atorvastatin 20 milliGRAM(s) at bedtime  benzocaine 15 mG/menthol 3.6 mG (Sugar-Free) Lozenge 1 Lozenge two times a day PRN  bisacodyl 5 milliGRAM(s) every 12 hours  bisacodyl Suppository 10 milliGRAM(s) daily PRN  dexAMETHasone     Tablet 8 milliGRAM(s) <User Schedule>  dextrose 40% Gel 15 Gram(s) once PRN  dextrose 5%. 1000 milliLiter(s) <Continuous>  dextrose 50% Injectable 12.5 Gram(s) once  dextrose 50% Injectable 25 Gram(s) once  dextrose 50% Injectable 25 Gram(s) once  docusate sodium 100 milliGRAM(s) daily  enoxaparin Injectable 40 milliGRAM(s) daily  escitalopram 5 milliGRAM(s) <User Schedule>  furosemide    Tablet 40 milliGRAM(s) daily  glucagon  Injectable 1 milliGRAM(s) once PRN  ibuprofen  Tablet. 400 milliGRAM(s) every 8 hours PRN  insulin glargine Injectable (LANTUS) 4 Unit(s) at bedtime  insulin lispro (HumaLOG) corrective regimen sliding scale   three times a day before meals  insulin lispro (HumaLOG) corrective regimen sliding scale   at bedtime  insulin lispro Injectable (HumaLOG) 14 Unit(s) three times a day before meals  levETIRAcetam 1000 milliGRAM(s) two times a day  lidocaine   Patch 1 Patch daily  melatonin 3 milliGRAM(s) at bedtime  metoprolol succinate ER 25 milliGRAM(s) daily  OLANZapine 2.5 milliGRAM(s) two times a day PRN  OLANZapine 2.5 milliGRAM(s) at bedtime  pantoprazole    Tablet 40 milliGRAM(s) before breakfast  polyethylene glycol 3350 17 Gram(s) two times a day  senna 2 Tablet(s) at bedtime  triamcinolone 0.1% Cream 1 Application(s) two times a day      RECENT LABS/IMAGING      PHYSICAL EXAM  Constitutional - NAD, Comfortable, in bed   	HEENT - NCAT, EOMI  	Chest -  CTAB   	Cardio - warm and well perfused, RRR, no murmur  	Abdomen -  Soft, NTND  	Extremities - No edema, No calf tenderness   	Neurologic Exam:                 	   Cognitive -   	        Orientation: Awake, Alert,  	        Speech - Fluent, Comprehensible,   	   Cranial Nerves - left visual field deficit, left facial weakness, Tongue midline, EOMI, Shoulder shrug intact  	   Motor -  LEFT hemiparesis  	   Sensory - Impaired-- extinguishes on left  	   Coordination - impaired on left  Psychiatric -agitated, depressed, anxious    ASSESSMENT/PLAN  79y Male h/o HTN, HLD, prostate cancer GBM, with functional deficits after 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   avastin high risk due to thrombosis  decadron/keppra  urinary retention, flomax discontinued, monitor PVR  bowel regimen, senna, dulcolax, miralax, no BM since 8/27  zyprexa/trial lexapro, xanax prn, patient with suicidal ideations this morning, refusing meds, needs neuropsych follow up, continue 1:1 observation   Continue current medical management  Pain - Tylenol PRN  DVT PPX - lovenox   Continue 3hrs a day of comprehensive rehab program.                         POCT Blood Glucose.: 118 mg/dL (08-30-20 @ 09:09)  POCT Blood Glucose.: 108 mg/dL (08-30-20 @ 07:39)  POCT Blood Glucose.: 179 mg/dL (08-29-20 @ 21:32)  POCT Blood Glucose.: 116 mg/dL (08-29-20 @ 16:54)  POCT Blood Glucose.: 160 mg/dL (08-29-20 @ 12:01)    ---------

## 2020-08-30 NOTE — PROGRESS NOTE ADULT - ASSESSMENT
80yo male with hx of HTN, HLD, prostate cancer s/p prostatectomy, BCC, GBM (dx in 2018, s/p resection 8/3/18, s/p 2nd resection 2/7/19, s/p 3rd resection 5/2020 s/p multiple does of Temodar), presented to [facility] for acute rehab from [facility] for worsening of his L. sided weakness, noted to have worsening vasogenic edema with progression of GBM, resulting in Left Hemiparesis, Dysphagia, Gait and ADL impairment.     #Debility  -Gait Instability, ADL impairments and Functional impairments  -Comprehensive Rehab Program of PT/OT & speech per primary team    #GBM   #Cerebral Edema  #Left hemiplegia   -continue decadron + keppra  -palliative care  -too high risk for Avastin secondary to risk for thrombosis and difficulty with safe transport  -patient with depressive statements to staff this weekend, voiced suicidal ideation overnight, currently on 1:1 observation  -on lexapro, zyprexa and xanax   -recommend Psych/Behavorial health f/u for safe discharge, patient home with hospice?    #HTN  -controlled on Amlodipine, Lasix, Metoprolol    #Hyperglycemia  -Steroid induced  -Ha1c 5.6  -well controlled on Humalog to 14 units TID + Lantus 4units QHS  -Continue hypoglycemia protocol and accu-cheks    #Leukocytosis, resolved  -steroid induced    #BPH  -Urology c/s reviewed 8/27, d/c flomax given prostatectomy, avoid anticholinergics, mobilize, IC prn  -approx 800cc urine obtained via SC 8/30    #Lower extremity edema - improving  -Secondary to Left sided hemiplegia  -LLE Doppler negative for DVT  -Continue Lasix       DVT prophylaxis: Lovenox SQ  CBC/CMP 8/27 reviewed

## 2020-08-30 NOTE — CHART NOTE - NSCHARTNOTEFT_GEN_A_CORE
Was informed by patient's nurse at approximately 6:20AM that patient had expressed suicidal ideations. Patient had informed nurse that he wished to die, that he would like to find a gun and shoot himself with it. Patient's nurse attempted to comfort patient, but he continued to state he wanted to die and that he "would find a way today". Patient began refusing medications.    He was seen at bedside shortly afterwards and he reiterated his desire to die a "quick death". Patient states that he "did not want to live this way" and continually expressed he did not want to be a "burden", especially to his family. Unclear if patient has access to firearm, but repeatedly states he "would find a way". Attempted to comfort patient, but he wished to be left alone; states that he was "naive" about his condition/prognosis and did not know things would turn out this way. States he "does not want to live a cripple". Further states he does not want any food or his medications.      Plan:  #Suicidal Ideations  - Patient was followed by psychiatry during this stay for confusion and depression; currently taking Lexapro, Xanax, and Zyprexa standing with PRNs for agitation. According most recent note, have signed off.  - psychiatry consult during the day for suicidal ideation  - currently in no acute danger to himself, no clear plan. However given his statements, will place on constant observation  - per palliative notes, plan for DC to home with hospice. However, given statements above, will have to reassess for safety prior to discharge

## 2020-08-30 NOTE — PROGRESS NOTE ADULT - SUBJECTIVE AND OBJECTIVE BOX
Patient is a 79y old  Male who presents with a chief complaint of Glioblastoma (29 Aug 2020 14:01)    Patient with suicidal ideation overnight.  Currently on 1:1 observation.  He is in slightly better mood this morning c/w Fri/Sat morning, but states he "wants his youth back."  He had mentioned similar theme of not wanting to be in his current state with dependence on others on Friday.  Patient seen and examined at bedside.    ALLERGIES:  No Known Allergies    Vital Signs Last 24 Hrs  T(F): 98 (30 Aug 2020 08:00), Max: 98 (30 Aug 2020 08:00)  HR: 73 (30 Aug 2020 08:00) (66 - 76)  BP: 153/86 (30 Aug 2020 08:00) (135/74 - 154/73)  RR: 14 (30 Aug 2020 08:00) (14 - 14)  SpO2: 95% (30 Aug 2020 08:00) (94% - 95%)    PHYSICAL EXAM:  General: awake and alert, appears comfortable but also depressed, 1:1 observation present at bedside  HEENT: R cranial incision healed  Neck: Supple, No JVD  Lungs: cta anteriorly, b/l- no wheezes, rales, rhonchi  Cardio: rrr S1/S2, No murmurs  Abdomen: Soft, Nontender, Nondistended; Bowel sounds present  Extremities: No calf tenderness, lower extremity edema L>R  Neuro: able to move RUE, LUE >LLE hemiparesis     LABS:                   12.3   7.14  )-----------( 121      ( 27 Aug 2020 06:43 )             36.8       08-27    141  |  102  |  43  ----------------------------<  124  3.6   |  31  |  0.79    Ca    8.8      27 Aug 2020 06:43    TPro  5.8  /  Alb  2.4  /  TBili  0.4  /  DBili  x   /  AST  22  /  ALT  74  /  AlkPhos  68  08-27     POCT Blood Glucose.: 118 mg/dL (30 Aug 2020 09:09)  POCT Blood Glucose.: 108 mg/dL (30 Aug 2020 07:39)  POCT Blood Glucose.: 179 mg/dL (29 Aug 2020 21:32)  POCT Blood Glucose.: 116 mg/dL (29 Aug 2020 16:54)  POCT Blood Glucose.: 160 mg/dL (29 Aug 2020 12:01) Patient is a 79y old  Male who presents with a chief complaint of Glioblastoma (29 Aug 2020 14:01)    Patient with suicidal ideation overnight.  Currently on 1:1 observation.  He is in slightly better mood this morning c/w Fri/Sat morning, but states he "wants his youth back."  He had mentioned similar theme of not wanting to be in his current state with dependence on others on Friday.  Still with urine retention, approx 800cc obtained via SC this morning.  Patient seen and examined at bedside.    ALLERGIES:  No Known Allergies    Vital Signs Last 24 Hrs  T(F): 98 (30 Aug 2020 08:00), Max: 98 (30 Aug 2020 08:00)  HR: 73 (30 Aug 2020 08:00) (66 - 76)  BP: 153/86 (30 Aug 2020 08:00) (135/74 - 154/73)  RR: 14 (30 Aug 2020 08:00) (14 - 14)  SpO2: 95% (30 Aug 2020 08:00) (94% - 95%)    PHYSICAL EXAM:  General: awake and alert, appears comfortable but also depressed, 1:1 observation present at bedside  HEENT: R cranial incision healed  Neck: Supple, No JVD  Lungs: cta anteriorly, b/l- no wheezes, rales, rhonchi  Cardio: rrr S1/S2, No murmurs  Abdomen: Soft, Nontender, Nondistended; Bowel sounds present  Extremities: No calf tenderness, lower extremity edema L>R  Neuro: able to move RUE, LUE >LLE hemiparesis     LABS:                   12.3   7.14  )-----------( 121      ( 27 Aug 2020 06:43 )             36.8       08-27    141  |  102  |  43  ----------------------------<  124  3.6   |  31  |  0.79    Ca    8.8      27 Aug 2020 06:43    TPro  5.8  /  Alb  2.4  /  TBili  0.4  /  DBili  x   /  AST  22  /  ALT  74  /  AlkPhos  68  08-27     POCT Blood Glucose.: 118 mg/dL (30 Aug 2020 09:09)  POCT Blood Glucose.: 108 mg/dL (30 Aug 2020 07:39)  POCT Blood Glucose.: 179 mg/dL (29 Aug 2020 21:32)  POCT Blood Glucose.: 116 mg/dL (29 Aug 2020 16:54)  POCT Blood Glucose.: 160 mg/dL (29 Aug 2020 12:01)

## 2020-08-31 LAB
ALBUMIN SERPL ELPH-MCNC: 2.6 G/DL — LOW (ref 3.3–5)
ALP SERPL-CCNC: 75 U/L — SIGNIFICANT CHANGE UP (ref 40–120)
ALT FLD-CCNC: 68 U/L — HIGH (ref 10–45)
ANION GAP SERPL CALC-SCNC: 9 MMOL/L — SIGNIFICANT CHANGE UP (ref 5–17)
AST SERPL-CCNC: 28 U/L — SIGNIFICANT CHANGE UP (ref 10–40)
BASOPHILS # BLD AUTO: 0.06 K/UL — SIGNIFICANT CHANGE UP (ref 0–0.2)
BASOPHILS NFR BLD AUTO: 0.6 % — SIGNIFICANT CHANGE UP (ref 0–2)
BILIRUB SERPL-MCNC: 0.5 MG/DL — SIGNIFICANT CHANGE UP (ref 0.2–1.2)
BUN SERPL-MCNC: 38 MG/DL — HIGH (ref 7–23)
CALCIUM SERPL-MCNC: 8.6 MG/DL — SIGNIFICANT CHANGE UP (ref 8.4–10.5)
CHLORIDE SERPL-SCNC: 101 MMOL/L — SIGNIFICANT CHANGE UP (ref 96–108)
CO2 SERPL-SCNC: 28 MMOL/L — SIGNIFICANT CHANGE UP (ref 22–31)
CREAT SERPL-MCNC: 0.87 MG/DL — SIGNIFICANT CHANGE UP (ref 0.5–1.3)
EOSINOPHIL # BLD AUTO: 0.03 K/UL — SIGNIFICANT CHANGE UP (ref 0–0.5)
EOSINOPHIL NFR BLD AUTO: 0.3 % — SIGNIFICANT CHANGE UP (ref 0–6)
GLUCOSE BLDC GLUCOMTR-MCNC: 103 MG/DL — HIGH (ref 70–99)
GLUCOSE BLDC GLUCOMTR-MCNC: 148 MG/DL — HIGH (ref 70–99)
GLUCOSE BLDC GLUCOMTR-MCNC: 175 MG/DL — HIGH (ref 70–99)
GLUCOSE BLDC GLUCOMTR-MCNC: 267 MG/DL — HIGH (ref 70–99)
GLUCOSE SERPL-MCNC: 104 MG/DL — HIGH (ref 70–99)
HCT VFR BLD CALC: 39.3 % — SIGNIFICANT CHANGE UP (ref 39–50)
HGB BLD-MCNC: 13.3 G/DL — SIGNIFICANT CHANGE UP (ref 13–17)
IMM GRANULOCYTES NFR BLD AUTO: 5.5 % — HIGH (ref 0–1.5)
LYMPHOCYTES # BLD AUTO: 1.38 K/UL — SIGNIFICANT CHANGE UP (ref 1–3.3)
LYMPHOCYTES # BLD AUTO: 14.7 % — SIGNIFICANT CHANGE UP (ref 13–44)
MCHC RBC-ENTMCNC: 32.5 PG — SIGNIFICANT CHANGE UP (ref 27–34)
MCHC RBC-ENTMCNC: 33.8 GM/DL — SIGNIFICANT CHANGE UP (ref 32–36)
MCV RBC AUTO: 96.1 FL — SIGNIFICANT CHANGE UP (ref 80–100)
MONOCYTES # BLD AUTO: 0.27 K/UL — SIGNIFICANT CHANGE UP (ref 0–0.9)
MONOCYTES NFR BLD AUTO: 2.9 % — SIGNIFICANT CHANGE UP (ref 2–14)
NEUTROPHILS # BLD AUTO: 7.11 K/UL — SIGNIFICANT CHANGE UP (ref 1.8–7.4)
NEUTROPHILS NFR BLD AUTO: 76 % — SIGNIFICANT CHANGE UP (ref 43–77)
NRBC # BLD: 1 /100 WBCS — HIGH (ref 0–0)
PLATELET # BLD AUTO: 160 K/UL — SIGNIFICANT CHANGE UP (ref 150–400)
POTASSIUM SERPL-MCNC: 3.8 MMOL/L — SIGNIFICANT CHANGE UP (ref 3.5–5.3)
POTASSIUM SERPL-SCNC: 3.8 MMOL/L — SIGNIFICANT CHANGE UP (ref 3.5–5.3)
PROT SERPL-MCNC: 6.3 G/DL — SIGNIFICANT CHANGE UP (ref 6–8.3)
RBC # BLD: 4.09 M/UL — LOW (ref 4.2–5.8)
RBC # FLD: 12.8 % — SIGNIFICANT CHANGE UP (ref 10.3–14.5)
SODIUM SERPL-SCNC: 138 MMOL/L — SIGNIFICANT CHANGE UP (ref 135–145)
WBC # BLD: 9.37 K/UL — SIGNIFICANT CHANGE UP (ref 3.8–10.5)
WBC # FLD AUTO: 9.37 K/UL — SIGNIFICANT CHANGE UP (ref 3.8–10.5)

## 2020-08-31 PROCEDURE — 99233 SBSQ HOSP IP/OBS HIGH 50: CPT

## 2020-08-31 PROCEDURE — 99232 SBSQ HOSP IP/OBS MODERATE 35: CPT

## 2020-08-31 RX ORDER — ALPRAZOLAM 0.25 MG
0.25 TABLET ORAL THREE TIMES A DAY
Refills: 0 | Status: DISCONTINUED | OUTPATIENT
Start: 2020-08-31 | End: 2020-09-03

## 2020-08-31 RX ORDER — ALPRAZOLAM 0.25 MG
0.5 TABLET ORAL AT BEDTIME
Refills: 0 | Status: DISCONTINUED | OUTPATIENT
Start: 2020-08-31 | End: 2020-09-04

## 2020-08-31 RX ORDER — ESCITALOPRAM OXALATE 10 MG/1
10 TABLET, FILM COATED ORAL
Refills: 0 | Status: DISCONTINUED | OUTPATIENT
Start: 2020-08-31 | End: 2020-09-04

## 2020-08-31 RX ADMIN — LEVETIRACETAM 1000 MILLIGRAM(S): 250 TABLET, FILM COATED ORAL at 05:12

## 2020-08-31 RX ADMIN — Medication 100 MILLIGRAM(S): at 12:43

## 2020-08-31 RX ADMIN — Medication 0.25 MILLIGRAM(S): at 13:02

## 2020-08-31 RX ADMIN — Medication 6: at 16:40

## 2020-08-31 RX ADMIN — Medication 3 MILLIGRAM(S): at 21:00

## 2020-08-31 RX ADMIN — ENOXAPARIN SODIUM 40 MILLIGRAM(S): 100 INJECTION SUBCUTANEOUS at 12:39

## 2020-08-31 RX ADMIN — Medication 5 MILLIGRAM(S): at 17:32

## 2020-08-31 RX ADMIN — Medication 8 MILLIGRAM(S): at 07:56

## 2020-08-31 RX ADMIN — SENNA PLUS 2 TABLET(S): 8.6 TABLET ORAL at 21:00

## 2020-08-31 RX ADMIN — ATORVASTATIN CALCIUM 20 MILLIGRAM(S): 80 TABLET, FILM COATED ORAL at 21:00

## 2020-08-31 RX ADMIN — Medication 14 UNIT(S): at 16:41

## 2020-08-31 RX ADMIN — INSULIN GLARGINE 4 UNIT(S): 100 INJECTION, SOLUTION SUBCUTANEOUS at 21:05

## 2020-08-31 RX ADMIN — ESCITALOPRAM OXALATE 10 MILLIGRAM(S): 10 TABLET, FILM COATED ORAL at 17:31

## 2020-08-31 RX ADMIN — LEVETIRACETAM 1000 MILLIGRAM(S): 250 TABLET, FILM COATED ORAL at 17:32

## 2020-08-31 RX ADMIN — LIDOCAINE 1 PATCH: 4 CREAM TOPICAL at 20:29

## 2020-08-31 RX ADMIN — Medication 1 APPLICATION(S): at 17:33

## 2020-08-31 RX ADMIN — Medication 0.25 MILLIGRAM(S): at 05:12

## 2020-08-31 RX ADMIN — POLYETHYLENE GLYCOL 3350 17 GRAM(S): 17 POWDER, FOR SOLUTION ORAL at 17:32

## 2020-08-31 RX ADMIN — Medication 40 MILLIGRAM(S): at 05:12

## 2020-08-31 RX ADMIN — Medication 0.25 MILLIGRAM(S): at 21:00

## 2020-08-31 RX ADMIN — PANTOPRAZOLE SODIUM 40 MILLIGRAM(S): 20 TABLET, DELAYED RELEASE ORAL at 06:32

## 2020-08-31 RX ADMIN — Medication 0.5 MILLIGRAM(S): at 21:00

## 2020-08-31 RX ADMIN — Medication 5 MILLIGRAM(S): at 05:11

## 2020-08-31 RX ADMIN — Medication 14 UNIT(S): at 12:38

## 2020-08-31 RX ADMIN — Medication 25 MILLIGRAM(S): at 05:12

## 2020-08-31 RX ADMIN — Medication 14 UNIT(S): at 07:56

## 2020-08-31 RX ADMIN — AMLODIPINE BESYLATE 7.5 MILLIGRAM(S): 2.5 TABLET ORAL at 05:12

## 2020-08-31 RX ADMIN — Medication 81 MILLIGRAM(S): at 12:38

## 2020-08-31 RX ADMIN — Medication 1 APPLICATION(S): at 06:24

## 2020-08-31 RX ADMIN — LIDOCAINE 1 PATCH: 4 CREAM TOPICAL at 12:39

## 2020-08-31 RX ADMIN — LIDOCAINE 1 PATCH: 4 CREAM TOPICAL at 00:12

## 2020-08-31 NOTE — PROGRESS NOTE ADULT - SUBJECTIVE AND OBJECTIVE BOX
Patient is a 79y old  Male who presents with a chief complaint of Glioblastoma (30 Aug 2020 11:21)    HPI:  79 RHM w/ PMH of HTN, HLD, prostate cancer s/p prostectomy, basal cell skin cancer, GBM (dx in 2018, s/p resection 8/3/18, s/p 2nd resection 2/7/19, s/p 3rd resection 5/2020 s/p multiple does of Temodar admitted to [facility] on 8/4/20 w/ worsening of his L. sided weakness. No other complaints. Denied changes in speech, vision, hearing, swallowing, voice quality, numbness/tingling,  balance/room-spinning sensations.    He was seen in Dr. Harman's office on 7/27/20 where he was noted to have worsening strength, lethargy and decreased appetitie. He had been tapering his steroids for over a month. Recently tapered from 4mg QD --> 3mg QD ---> 2mg QD.    CT Head on admission 8.4.2020, showed- Worsening vasogenic edema involving the R. temporal frontal parietal region again identified increased when compared to previous CT in 5/2020.     Patient admitted to neurology floor, started on decadron 4mg q6h later increased to 8mg q8h. MR head w/ and w/o contrast with progression of disease, neuro-onc on board, patient to follow up outpatient for initiation of avastin therapy.   Patient also with urinary retention, urology consulted, suspect urinary retention is neurologic in etiology, on clean intermittent catheterization; further followup to continue at rehab.   Patient stable for discharge to acute rehab 8/7/20.     Of note--patient was admitted to  rehab after his most recent resection in May 2020.  He was at [facility] from 6/4/20  to 6/17/20.  At the time of discharge from  rehab, patient was supervision for eating, grooming, and dressing, and contact guard for transfers.  He was able to ambulate 150' with SAC with CG/min assist and to navigate 12 steps with u/l HR with min assist.  SLP at [facility] noted higher level cognitive deficits with mild impulsivity and reduced insight, necessitating supervision while at home. (07 Aug 2020 14:30)    voiding but IC required    Interval Events:  Patient seen and examined at bedside.    MEDICATIONS:  MEDICATIONS  (STANDING):  ALPRAZolam 0.25 milliGRAM(s) Oral three times a day  amLODIPine   Tablet 7.5 milliGRAM(s) Oral daily  aspirin  chewable 81 milliGRAM(s) Oral daily  atorvastatin 20 milliGRAM(s) Oral at bedtime  bisacodyl 5 milliGRAM(s) Oral every 12 hours  dexAMETHasone     Tablet 8 milliGRAM(s) Oral <User Schedule>  dextrose 5%. 1000 milliLiter(s) (50 mL/Hr) IV Continuous <Continuous>  dextrose 50% Injectable 12.5 Gram(s) IV Push once  dextrose 50% Injectable 25 Gram(s) IV Push once  dextrose 50% Injectable 25 Gram(s) IV Push once  docusate sodium 100 milliGRAM(s) Oral daily  enoxaparin Injectable 40 milliGRAM(s) SubCutaneous daily  escitalopram 5 milliGRAM(s) Oral <User Schedule>  furosemide    Tablet 40 milliGRAM(s) Oral daily  insulin glargine Injectable (LANTUS) 4 Unit(s) SubCutaneous at bedtime  insulin lispro (HumaLOG) corrective regimen sliding scale   SubCutaneous three times a day before meals  insulin lispro (HumaLOG) corrective regimen sliding scale   SubCutaneous at bedtime  insulin lispro Injectable (HumaLOG) 14 Unit(s) SubCutaneous three times a day before meals  levETIRAcetam 1000 milliGRAM(s) Oral two times a day  lidocaine   Patch 1 Patch Transdermal daily  melatonin 3 milliGRAM(s) Oral at bedtime  metoprolol succinate ER 25 milliGRAM(s) Oral daily  OLANZapine 2.5 milliGRAM(s) Oral at bedtime  pantoprazole    Tablet 40 milliGRAM(s) Oral before breakfast  polyethylene glycol 3350 17 Gram(s) Oral two times a day  senna 2 Tablet(s) Oral at bedtime  triamcinolone 0.1% Cream 1 Application(s) Topical two times a day    MEDICATIONS  (PRN):  acetaminophen   Tablet .. 650 milliGRAM(s) Oral every 6 hours PRN Temp greater or equal to 38C (100.4F), Mild Pain (1 - 3)  ALPRAZolam 0.25 milliGRAM(s) Oral every 8 hours PRN Anxiety  benzocaine 15 mG/menthol 3.6 mG (Sugar-Free) Lozenge 1 Lozenge Oral two times a day PRN Sore Throat  bisacodyl Suppository 10 milliGRAM(s) Rectal daily PRN Constipation  dextrose 40% Gel 15 Gram(s) Oral once PRN Blood Glucose LESS THAN 70 milliGRAM(s)/deciliter  glucagon  Injectable 1 milliGRAM(s) IntraMuscular once PRN Glucose LESS THAN 70 milligrams/deciliter  ibuprofen  Tablet. 400 milliGRAM(s) Oral every 8 hours PRN Moderate Pain (4 - 6)  OLANZapine 2.5 milliGRAM(s) Oral two times a day PRN agitation      Allergies    No Known Allergies    Intolerances        T(C): 36.4 (08-30-20 @ 21:37), Max: 36.7 (08-30-20 @ 08:00)  T(F): 97.5 (08-30-20 @ 21:37), Max: 98 (08-30-20 @ 08:00)  HR: 61 (08-31-20 @ 05:22) (61 - 76)  BP: 131/81 (08-31-20 @ 05:22) (123/80 - 154/73)  RR: 14 (08-31-20 @ 05:22) (14 - 14)  SpO2: 95% (08-31-20 @ 05:22) (93% - 96%)          08-30-20 @ 07:01  -  08-31-20 @ 07:00  --------------------------------------------------------  IN:  Total IN: 0 mL    OUT:    Intermittent Catheterization - Urethral: 850 mL  Total OUT: 850 mL    Total NET: -850 mL          LABS:      CBC Full  -  ( 31 Aug 2020 06:50 )  WBC Count : 9.37 K/uL  RBC Count : 4.09 M/uL  Hemoglobin : 13.3 g/dL  Hematocrit : 39.3 %  Platelet Count - Automated : 160 K/uL  Mean Cell Volume : 96.1 fl  Mean Cell Hemoglobin : 32.5 pg  Mean Cell Hemoglobin Concentration : 33.8 gm/dL  Auto Neutrophil # : x  Auto Lymphocyte # : x  Auto Monocyte # : x  Auto Eosinophil # : x  Auto Basophil # : x  Auto Neutrophil % : x  Auto Lymphocyte % : x  Auto Monocyte % : x  Auto Eosinophil % : x  Auto Basophil % : x                        Physical Exam    Constitutional: alert, no acute distress    Abdomen: soft, nontender, nondistended, no HSM    Genitourinary: no bladder distention

## 2020-08-31 NOTE — PROGRESS NOTE ADULT - ASSESSMENT
80yo male with hx of HTN, HLD, prostate cancer s/p prostatectomy, BCC, GBM (dx in 2018, s/p resection 8/3/18, s/p 2nd resection 2/7/19, s/p 3rd resection 5/2020 s/p multiple does of Temodar), presented to [facility] for acute rehab from [facility] for worsening of his L. sided weakness, noted to have worsening vasogenic edema with progression of GBM, resulting in Left Hemiparesis, Dysphagia, Gait and ADL impairment.   Palliative:  Attempted to review MOLST with patient today.  However friend was at bedside and did not wish to discuss it at this time.  Will reattempt in am.  #Debility  -Gait Instability, ADL impairments and Functional impairments  -Comprehensive Rehab Program of PT/OT & speech per primary team    #GBM   #Cerebral Edema  #Left hemiplegia   -continue decadron + protonix  -palliative care  -too high risk for Avastin secondary to risk for thrombosis and difficulty with safe transport  -patient with depressive statements to staff this weekend, voiced suicidal ideation overnight, currently on 1:1 observation  -on lexapro, zyprexa and xanax   -recommend Psych/Behavorial health f/u     # urinary retention - able to urinate but requires straight cath  appreciate urology conuslt  avoid anticholinergic medications  bowel care  per urology might consider bethanecol   no need for flomax as pt s/p prostatectomy     #HTN  -controlled on Amlodipine, Lasix, Metoprolol    #Hyperglycemia  -Steroid induced  -Ha1c 5.6  -well controlled on Humalog to 14 units TID + Lantus 4units QHS  -Continue hypoglycemia protocol and accu-cheks    #Lower extremity edema - improving  - negative DVT  - cont leg elevation     DVT prophylaxis: Lovenox SQ  CBC/CMP 8/27 reviewed

## 2020-08-31 NOTE — PROGRESS NOTE BEHAVIORAL HEALTH - NSBHCHARTREVIEWVS_PSY_A_CORE FT
T(C): 36.8 (28 Aug 2020 10:02), Max: 36.9 (27 Aug 2020 21:31)  T(F): 98.2 (28 Aug 2020 10:02), Max: 98.4 (27 Aug 2020 21:31)  HR: 87 (28 Aug 2020 10:02) (61 - 87)  BP: 128/81 (28 Aug 2020 10:02) (128/81 - 137/67)  RR: 15 (28 Aug 2020 10:02) (15 - 15)  SpO2: 98% (28 Aug 2020 10:02) (94% - 98%)
Vital Signs Last 24 Hrs  T(C): 36.4 (26 Aug 2020 07:50), Max: 36.4 (26 Aug 2020 07:50)  T(F): 97.5 (26 Aug 2020 07:50), Max: 97.5 (26 Aug 2020 07:50)  HR: 64 (26 Aug 2020 07:50) (64 - 84)  BP: 128/72 (26 Aug 2020 07:50) (121/70 - 137/64)  BP(mean): --  RR: 16 (26 Aug 2020 07:50) (16 - 16)  SpO2: 94% (26 Aug 2020 07:50) (94% - 94%)
Vital Signs Last 24 Hrs  T(C): 37.1 (22 Aug 2020 19:57), Max: 37.1 (22 Aug 2020 19:57)  T(F): 98.8 (22 Aug 2020 19:57), Max: 98.8 (22 Aug 2020 19:57)  HR: 92 (23 Aug 2020 05:18) (92 - 96)  BP: 124/75 (23 Aug 2020 05:18) (116/73 - 124/75)  BP(mean): --  RR: 14 (22 Aug 2020 19:57) (14 - 14)  SpO2: 93% (22 Aug 2020 19:57) (93% - 93%)
Vital Signs Last 24 Hrs  T(C): 36.7 (22 Aug 2020 08:54), Max: 36.8 (21 Aug 2020 20:09)  T(F): 98 (22 Aug 2020 08:54), Max: 98.2 (21 Aug 2020 20:09)  HR: 81 (22 Aug 2020 08:54) (81 - 91)  BP: 135/73 (22 Aug 2020 08:54) (111/70 - 135/73)  BP(mean): --  RR: 14 (22 Aug 2020 08:54) (14 - 14)  SpO2: 94% (22 Aug 2020 08:54) (93% - 94%)
ICU Vital Signs Last 24 Hrs  T(C): 36.8 (31 Aug 2020 09:19), Max: 36.8 (31 Aug 2020 09:19)  T(F): 98.2 (31 Aug 2020 09:19), Max: 98.2 (31 Aug 2020 09:19)  HR: 78 (31 Aug 2020 09:19) (61 - 78)  BP: 144/85 (31 Aug 2020 09:19) (124/76 - 144/85)  RR: 14 (31 Aug 2020 09:19) (14 - 14)  SpO2: 95% (31 Aug 2020 09:19) (93% - 95%)
T(C): 36.8 (25 Aug 2020 07:20), Max: 36.8 (25 Aug 2020 07:20)  T(F): 98.2 (25 Aug 2020 07:20), Max: 98.2 (25 Aug 2020 07:20)  HR: 84 (25 Aug 2020 07:20) (83 - 95)  BP: 146/70 (25 Aug 2020 07:20) (112/61 - 146/70)  RR: 14 (25 Aug 2020 07:20) (14 - 15)  SpO2: 94% (25 Aug 2020 07:20) (93% - 94%)

## 2020-08-31 NOTE — PROGRESS NOTE ADULT - SUBJECTIVE AND OBJECTIVE BOX
Follow-up Pall Progress Note    Mike Mustafa MD  [phone]    No new  events overnight.  Denies SOB/CP. Currently comfortable talking with friend.    Medications:  Vital Signs Last 24 Hrs  T(C): 36.8 (31 Aug 2020 09:19), Max: 36.8 (31 Aug 2020 09:19)  T(F): 98.2 (31 Aug 2020 09:19), Max: 98.2 (31 Aug 2020 09:19)  HR: 78 (31 Aug 2020 09:19) (61 - 78)  BP: 144/85 (31 Aug 2020 09:19) (124/76 - 144/85)  BP(mean): --  RR: 14 (31 Aug 2020 09:19) (14 - 14)  SpO2: 95% (31 Aug 2020 09:19) (93% - 95%)          08-30 @ 07:01  -  08-31 @ 07:00  --------------------------------------------------------  IN: 0 mL / OUT: 1251 mL / NET: -1251 mL        LABS:                        13.3   9.37  )-----------( 160      ( 31 Aug 2020 06:50 )             39.3     08-31    138  |  101  |  38<H>  ----------------------------<  104<H>  3.8   |  28  |  0.87    Ca    8.6      31 Aug 2020 06:50    TPro  6.3  /  Alb  2.6<L>  /  TBili  0.5  /  DBili  x   /  AST  28  /  ALT  68<H>  /  AlkPhos  75  08-31              CULTURES:        Physical Examination:  PULM: Clear to auscultation bilaterally, no significant sputum production  CVS: Regular rate and rhythm, no murmurs, rubs, or gallops  ABD: Soft, non-tender  EXT:  No clubbing, cyanosis, or edema

## 2020-08-31 NOTE — PROGRESS NOTE BEHAVIORAL HEALTH - OTHER
as per PT notes as per Physical therapy notes. Not tested by clinician "good today. Im feeling better"

## 2020-08-31 NOTE — PROGRESS NOTE BEHAVIORAL HEALTH - SECONDARY DX1
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

## 2020-08-31 NOTE — PROGRESS NOTE ADULT - ATTENDING COMMENTS
Pt. seen with resident.  Agree with documentation above as per resident with amendments made as appropriate. Patient medically stable.     Neuropsychology consulted for depression, passive SI-- spoke with Dr. Toribio  Psychiatry follow up.    Pt. had 1 PVR measured yesterday which was elevated 725-- pt. voiding -- ordered for PVR checks few times daily  Meds reviewed-- zyprexa can contribute to urinary retention --d/w Psychiatry, Dr. Singleton-- rec. d/c zyprexa and dose 0.5mg xanax qhs.    Increase lexapro to 10mg. Pt. seen with resident.  Agree with documentation above as per resident with amendments made as appropriate. Patient medically stable.     Neuropsychology consulted for depression, passive SI-- spoke with Dr. Toribio  Psychiatry follow up.    Pt. had 1 PVR measured yesterday which was elevated 725-- pt. voiding -- ordered for PVR checks few times daily  Meds reviewed-- zyprexa can contribute to urinary retention --d/w Psychiatry, Dr. Singleton-- rec. d/c zyprexa and dose 0.5mg xanax qhs.    Increase lexapro to 10mg.    taper off decadron as per neurooncology--  monitor FS and adjust insulin PRN    --Family training in preparation for discharge  --Diabetes education for pt. and family

## 2020-08-31 NOTE — PROGRESS NOTE BEHAVIORAL HEALTH - SUMMARY
Today a follow up visit was made to pt. The pt was occasionally tearful during conversation and stated "Mentally, I am not doing well". The pt stated that in the morning he felt worse after the rehab team came to speak with him regarding end of life care but now he feels better since they are gone. He reports his energy is fair, concentration is fair, appetite is good (pt was eating during interview and ate 75% of lunch). He denies suicidal/homicidal ideation and or perceptual disturbances. Pt did have a delay in articulating. He was alert and oriented to person, place, however confused to the time, day. He did know it was August. The pt states it was Saturday August 18th.
see HPI and interval history, so far response to Zyprexa and Xanax good.
Pt expressed that his mood is "not good" secondary to being told by the treatment team that his prognosis is poor. Pt was occasionally tearful during interview as he was expressing all his medical issues to writer . Pt was seen recently seen on 8/23/2020 by psychiatrist for evaluation of angry mood and confusion. At that time pt was started on Zyprexa and Xanax and his anger has improved. He states he was not able to sleep well last night because his roommate kept moving around and making noise.  Pt states his appetite is good. Denies feeling anxious today. Pt denies suicidal ideation/homicidal ideation. No further recommendations at this time. Continue psychiatric medications (zyprexa and xanax) as prescribed and call with new changes.
Pt seen and evaluated today by writer. Pt reports that his mood is better today and he slept good last night. RN at bedside during interview and pt smiling and offering his meal to share with staff. As per medical team, the pt reported over the weekend that he would be better off "not here". Medical team was concerned that pt was speaking in between the lines and voicing suicidal thoughts. The pt denies suicidal/homicidal ideation during interview with writer. Will have to follow up to see if pts thoughts have changed. He denies feeling guilty but does admit to feeling hopeless about his current medical situation. He is alert and oriented x2 to person and place. Pt was able to tell writer that it was august, but thought it was Saturday, and he was not able to tell writer what day of the month it was.

## 2020-08-31 NOTE — PROGRESS NOTE BEHAVIORAL HEALTH - NSBHCONSFOLLOWNEEDS_PSY_A_CORE
no psychiatric contraindications to discharge
Call with new changes or questions./no psychiatric contraindications to discharge

## 2020-08-31 NOTE — PROGRESS NOTE BEHAVIORAL HEALTH - NSBHCHARTREVIEWLAB_PSY_A_CORE FT
LABS:               CAPILLARY BLOOD GLUCOSE      POCT Blood Glucose.: 119 mg/dL (22 Aug 2020 08:00)  POCT Blood Glucose.: 183 mg/dL (21 Aug 2020 22:31)  POCT Blood Glucose.: 165 mg/dL (21 Aug 2020 16:20)
12.3   7.14  )-----------( 121      ( 27 Aug 2020 06:43 )             36.8       08-27    141  |  102  |  43<H>  ----------------------------<  124<H>  3.6   |  31  |  0.79    Ca    8.8      27 Aug 2020 06:43    TPro  5.8<L>  /  Alb  2.4<L>  /  TBili  0.4  /  DBili  x   /  AST  22  /  ALT  74<H>  /  AlkPhos  68  08-27
POCT Blood Glucose.: 87 mg/dL (23 Aug 2020 11:50)  POCT Blood Glucose.: 134 mg/dL (23 Aug 2020 07:53)  POCT Blood Glucose.: 116 mg/dL (22 Aug 2020 22:42)  POCT Blood Glucose.: 117 mg/dL (22 Aug 2020 16:56)            RADIOLOGY & ADDITIONAL TESTS:    Consultant(s) Notes Reviewed:  [x ] YES  [ ] NO  Care Discussed with Consultants/Other Providers [ x] YES  [ ] NO  Imaging Personally Reviewed:  [ ] YES  [ ] NO
13.3   9.37  )-----------( 160      ( 31 Aug 2020 06:50 )             39.3       08-31    138  |  101  |  38<H>  ----------------------------<  104<H>  3.8   |  28  |  0.87    Ca    8.6      31 Aug 2020 06:50    TPro  6.3  /  Alb  2.6<L>  /  TBili  0.5  /  DBili  x   /  AST  28  /  ALT  68<H>  /  AlkPhos  75  08-31
12.9   7.67  )-----------( 129      ( 24 Aug 2020 07:05 )             39.2     08-24    138  |  99  |  40<H>  ----------------------------<  195<H>  3.9   |  29  |  1.12    Ca    8.4      24 Aug 2020 07:05    TPro  6.1  /  Alb  2.5<L>  /  TBili  0.6  /  DBili  x   /  AST  25  /  ALT  85<H>  /  AlkPhos  67  08-24

## 2020-08-31 NOTE — PROGRESS NOTE BEHAVIORAL HEALTH - AXIS III
HTN, HLD, prostate cancer, basal cell skin cancer, Glioblastoma.

## 2020-08-31 NOTE — PROGRESS NOTE BEHAVIORAL HEALTH - NSBHCONSULTFOLLOWAFTERCARE_PSY_A_CORE FT
As per treatment team. If [facility] is still in place, they may be a resource, or would refer to [facility] which may also be a resource.
will f/u
As per treatment team. If [facility] is still in place, they may be a resource, or would refer to [facility] which may also be a resource.
will f/u
as per treatment team.   If [facility] is still in place, they may be a resource, or would refer to [facility] which may also be a resource.
Continue medications for anxiety, follow up with psychiatrist.

## 2020-08-31 NOTE — PROGRESS NOTE ADULT - ASSESSMENT
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     GBM with vaso genic edema  --Pt. with poor prognosis-- Spoke with Dr. Del Cid regarding option of Avastin for treatment-- feels it is high risk for patient due to risk of thrombosis and difficulty with safe transportation to obtain. He has contacted pt. and d/w him as well    --palliative care following--d/w Dr. Mustafa --discussed advanced care directives/MOLST form with pt.-- he needs more time to review and decide  - Continue 8mg decadron daily   - Cont. Keppra 1000mg BID for Seizure ppx,  - c/w PPI for GI ppx on steroids  - continue Comprehensive Rehab Program of PT/OT & speech 3 hrs/day x 5days/week      HTN--BP controlled  - Metoprolol XL 25mg + Lasix 40mg Daily  - Amlodipine to 7.5mg daily    Urinary Retention -   - PVRs elevated --cont. to monitor   - As per urology recs 8/28:  flomax stopped since patient is s/p prostatectomy. bowel care, avoid anticholinergics if possible, mobilize, IC prn.  - As per uro Could consider bethanecol but would prefer to eliminate anticholinergics first.   - Flomac dc'd as per uro recs 8/28   --voiding--incontinent at times  - Optimize Bowel regimen   - Mobilize    Diabetes type 2  - FS monitoring and ISS  -Insulin Adjusted 8/25-- Humalog 14 units TID,  started Lantus 4 units QHS 8/25   --FS controlled  - insulin coverage as per hospitalist    Mood/ Cognition  - Anxiety and paranoid thought process---cont. Zyprexa qhs   -Depressed/Anxious-- started lexapro 5mg 8/28   -FU psych for suicidal ideations that patient endorsed over the weekend   -Neuropsych consulted   --zyprexa 2.5 mg PRN --  - continue Xanax PRN  -cont.  Xanax TID &  Zyprexa qHS  -Psych following --spoke with Dr. Beavers--pt. has decision making capacity      GI/Bowel:   - Senna, dulcolax, Miralax    Diet/ dysphagia:   - regular/easy to chew with thin     ENT evaluation for poor vocal quality-> no abnormalities found     Stage I pressure ulcer  - offloading, turn q.2h, barrier cream    LE edema  - compression stockings  - Lasix as above    Pain  - Tylenol PRN      Functional Prognosis / Discharge Planning  - Meeting held on 8/25   - Nursing needs: urinary retention  - SW: lives with Adult son and spouse, used cane prior   - OT: Not much progress, Max A for dressing, max A transfers, poor sitting balance   - PT: Not much progress, max A for transfers, can propel WC with min to mod A  - SLP: poor insight with perseverance on not going to [facility]   - Barriers: left visual field cut, flaccid weakness,  - MAGDALENA: d/c home with home  Hospice -- 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     GBM with vaso genic edema  --Pt. with poor prognosis-- Spoke with Dr. DelC id regarding option of Avastin for treatment-- feels it is high risk for patient due to risk of thrombosis and difficulty with safe transportation to obtain. He has contacted pt. and d/w him as well    --palliative care following--d/w Dr. Mustafa --discussed advanced care directives/MOLST form with pt.-- pt. expressed he wants to be DNR/DNI  - Continue 8mg decadron daily   - Cont. Keppra 1000mg BID for Seizure ppx,  - c/w PPI for GI ppx on steroids  - continue Comprehensive Rehab Program of PT/OT & speech 3 hrs/day x 5days/week      HTN--BP controlled  - Metoprolol XL 25mg + Lasix 40mg Daily  - Amlodipine to 7.5mg daily    Urinary Retention -   - PVRs elevated--725 yesterday --cont. to monitor -- order written to check few times daily.  - As per urology recs 8/28:  flomax stopped since patient is s/p prostatectomy. bowel care, avoid anticholinergics if possible, mobilize, IC prn.  - As per uro Could consider bethanecol but would prefer to eliminate anticholinergics first, but pt. on xanax which may contribute but at this time needed due to level of anxiety.   - Flomac dc'd as per uro recs 8/28   --voiding--incontinent at times  - Optimize Bowel regimen   - Mobilize    Diabetes type 2  - FS monitoring and ISS  -Insulin Adjusted 8/25-- Humalog 14 units TID,  started Lantus 4 units QHS 8/25   --FS controlled  - insulin coverage as per hospitalist    Mood/ Cognition  - Anxiety and paranoid thought process---cont. Zyprexa qhs   -Depressed/Anxious-- increase lexapro 10mg   -FU psych for suicidal ideations that patient endorsed over the weekend   -Neuropsych consulted   --zyprexa 2.5 mg PRN --  - continue Xanax PRN  -cont.  Xanax TID &  Zyprexa qHS  -Psych following --spoke with Dr. Beavers--pt. has decision making capacity      GI/Bowel:   - Senna, dulcolax, Miralax    Diet/ dysphagia:   - regular/easy to chew with thin     ENT evaluation for poor vocal quality-> no abnormalities found     Stage I pressure ulcer  - offloading, turn q.2h, barrier cream    LE edema  - compression stockings  - Lasix as above    Pain  - Tylenol PRN      Functional Prognosis / Discharge Planning  - Meeting held on 8/25   - Nursing needs: urinary retention  - SW: lives with Adult son and spouse, used cane prior   - OT: Not much progress, Max A for dressing, max A transfers, poor sitting balance   - PT: Not much progress, max A for transfers, can propel WC with min to mod A  - SLP: poor insight with perseverance on not going to [facility]   - Barriers: left visual field cut, flaccid weakness,  - MAGDALENA: d/c home with home  Hospice -- 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     GBM with vaso genic edema  --Pt. with poor prognosis-- Spoke with Dr. Del Cid regarding option of Avastin for treatment-- feels it is high risk for patient due to risk of thrombosis and difficulty with safe transportation to obtain. He has contacted pt. and d/w him as well    --palliative care following--d/w Dr. uMstafa --discussed advanced care directives/MOLST form with pt.-- pt. expressed he wants to be DNR/DNI  - taper off decadron   - Cont. Keppra 1000mg BID for Seizure ppx,  - c/w PPI for GI ppx on steroids  - continue Comprehensive Rehab Program of PT/OT & speech 3 hrs/day x 5days/week      HTN--BP controlled  - Metoprolol XL 25mg + Lasix 40mg Daily  - Amlodipine to 7.5mg daily    Urinary Retention -   - PVRs elevated--725 yesterday --cont. to monitor -- order written to check few times daily.  - As per urology recs 8/28:  flomax stopped since patient is s/p prostatectomy. bowel care, avoid anticholinergics if possible, mobilize, IC prn.  - As per uro Could consider bethanecol but would prefer to eliminate anticholinergics first,   --d/w psych if pt can d/c zyprexa-- stopped zyprexa and added xanax 0.5mg qhs  - Flomac dc'd as per uro recs 8/28   --voiding--incontinent at times  - Optimize Bowel regimen   - Mobilize    Diabetes type 2  - FS monitoring and ISS  -Insulin Adjusted 8/25-- Humalog 14 units TID,  & Lantus 4 units QHS 8/25   --FS controlled--may need further adjustment off decadron.  - insulin coverage as per hospitalist  --nursing order to train family how to administer insulin injections.     Mood/ Cognition  - Anxiety and paranoid thought process---cont. Zyprexa qhs   -Depressed/Anxious-- increase lexapro 10mg   -FU psych for suicidal ideations that patient endorsed over the weekend   -Neuropsych consulted   --zyprexa 2.5 mg PRN --  - continue Xanax PRN  -cont.  Xanax TID &  Zyprexa qHS  -Psych following --spoke with Dr. Beavers--pt. has decision making capacity      GI/Bowel:   - Senna, dulcolax, Miralax    Diet/ dysphagia:   - regular/easy to chew with thin     ENT evaluation for poor vocal quality-> no abnormalities found     Stage I pressure ulcer  - offloading, turn q.2h, barrier cream    LE edema  - compression stockings  - Lasix as above    Pain  - Tylenol PRN      Functional Prognosis / Discharge Planning  - Meeting held on 8/25   - Nursing needs: urinary retention  - SW: lives with Adult son and spouse, used cane prior   - OT: Not much progress, Max A for dressing, max A transfers, poor sitting balance   - PT: Not much progress, max A for transfers, can propel WC with min to mod A  - SLP: poor insight with perseverance on not going to [facility]   - Barriers: left visual field cut, flaccid weakness,  - MAGDALENA: d/c home with home  Hospice --

## 2020-08-31 NOTE — PROGRESS NOTE BEHAVIORAL HEALTH - NSBHCONSULTMEDANXIETY_PSY_A_CORE FT
ALPRAZolam 0.25 milliGRAM(s) Oral every 8 hours PRN Anxiety
ALPRAZolam 0.25 milliGRAM(s) Oral every 8 hours PRN Anxiety
Xanax 0.25mg Q8 hours PRN

## 2020-08-31 NOTE — PROGRESS NOTE ADULT - SUBJECTIVE AND OBJECTIVE BOX
HPI:  79 RHM w/ PMH of HTN, HLD, prostate cancer s/p prostectomy, basal cell skin cancer, GBM (dx in 2018, s/p resection 8/3/18, s/p 2nd resection 2/7/19, s/p 3rd resection 5/2020 s/p multiple does of Temodar admitted to [facility] on 8/4/20 w/ worsening of his L. sided weakness. No other complaints. Denied changes in speech, vision, hearing, swallowing, voice quality, numbness/tingling,  balance/room-spinning sensations.    He was seen in Dr. Harman's office on 7/27/20 where he was noted to have worsening strength, lethargy and decreased appetitie. He had been tapering his steroids for over a month. Recently tapered from 4mg QD --> 3mg QD ---> 2mg QD.    CT Head on admission 8.4.2020, showed- Worsening vasogenic edema involving the R. temporal frontal parietal region again identified increased when compared to previous CT in 5/2020.     Patient admitted to neurology floor, started on decadron 4mg q6h later increased to 8mg q8h. MR head w/ and w/o contrast with progression of disease, neuro-onc on board, patient to follow up outpatient for initiation of avastin therapy.   Patient also with urinary retention, urology consulted, suspect urinary retention is neurologic in etiology, on clean intermittent catheterization; further followup to continue at rehab.   Patient stable for discharge to acute rehab 8/7/20.     Of note--patient was admitted to  rehab after his most recent resection in May 2020.  He was at [facility] from 6/4/20  to 6/17/20.  At the time of discharge from  rehab, patient was supervision for eating, grooming, and dressing, and contact guard for transfers.  He was able to ambulate 150' with SAC with CG/min assist and to navigate 12 steps with u/l HR with min assist.  SLP at [facility] noted higher level cognitive deficits with mild impulsivity and reduced insight, necessitating supervision while at home. (07 Aug 2020 14:30)      PAST MEDICAL & SURGICAL HISTORY:  Fall, sequela  Bilateral hearing loss, unspecified hearing loss type   activity: [place] 1960-65    [place]  Type 2 diabetes mellitus without complication, without long-term current use of insulin: stop oral meds  3 weeks ago  Glioblastoma: biopsy 8/2018  surgery  6 weeks radiation  35 days of oral chemotherapy last 3 weeks  Basal cell carcinoma (BCC) of left lower leg: s/p resection  right lower leg top  left lower leg  Prostate cancer: s/p radical prostatectomy 1995  HLD (hyperlipidemia)  HTN (hypertension)  S/P colonoscopic polypectomy: 3 years benign polyps  S/P tonsillectomy: age 30  H/O bilateral cataract extraction: 3-4 years ago  S/P prostatectomy: 1995  S/P brain surgery: 8/2018  Lipoma of neck: s/p resection at age 30  Basal cell carcinoma (BCC) of lower leg: s/p resection  History of tonsillectomy: at age 30      Subjective: Patient seen and evaluated this morning. Patient endorsed to having a nightmare over the weekend. Patient noted to have suicidal ideations over the weekend. Was placed on 1:1. Psych was reconsulted for followup. Neuro psych also consulted for eval. No acute medical issues noted overnight.     REVIEW OF SYMPTOMS  Positive:  depressed, left HP, cognitive deficits  Denies: headache, lightheadedness, CP, SOB, abdominal pain, dysuria, nausea, constipation    Vital Signs Last 24 Hrs  T(C): 36.8 (31 Aug 2020 09:19), Max: 36.8 (31 Aug 2020 09:19)  T(F): 98.2 (31 Aug 2020 09:19), Max: 98.2 (31 Aug 2020 09:19)  HR: 78 (31 Aug 2020 09:19) (61 - 78)  BP: 144/85 (31 Aug 2020 09:19) (124/76 - 144/85)  BP(mean): --  RR: 14 (31 Aug 2020 09:19) (14 - 14)  SpO2: 95% (31 Aug 2020 09:19) (93% - 95%)    PHYSICAL EXAM  Constitutional - NAD, Comfortable  	HEENT - NCAT, EOMI  	Chest -  CTAB   	Cardio - warm and well perfused, RRR, no murmur  	Abdomen -  Soft, NTND  	Extremities - No edema, No calf tenderness   	Neurologic Exam:                 	   Cognitive -   	          Orientation: Awake, Alert,  	      	          Thought:  slow processing, perseverative, agitated  	   Speech - Fluent, Comprehensible,   	   Cranial Nerves - left visual field deficit, left facial weakness, Tongue midline, EOMI, Shoulder shrug intact  	   Motor -  LEFT hemiparesis  	   Sensory - Impaired-- extinguishes on left  	   Coordination - impaired on left  Psychiatric -agitated, depressed, anxious    RECENT LABS/IMAGING                        13.3   9.37  )-----------( 160      ( 31 Aug 2020 06:50 )             39.3     08-31    138  |  101  |  38<H>  ----------------------------<  104<H>  3.8   |  28  |  0.87    Ca    8.6      31 Aug 2020 06:50    TPro  6.3  /  Alb  2.6<L>  /  TBili  0.5  /  DBili  x   /  AST  28  /  ALT  68<H>  /  AlkPhos  75  08-31      CAPILLARY BLOOD GLUCOSE  POCT Blood Glucose.: 148 mg/dL (31 Aug 2020 12:09)  POCT Blood Glucose.: 103 mg/dL (31 Aug 2020 07:44)  POCT Blood Glucose.: 149 mg/dL (30 Aug 2020 21:22)  POCT Blood Glucose.: 178 mg/dL (30 Aug 2020 17:01)      RADIOLOGY/OTHER RESULTS    MEDICATIONS  (STANDING):  ALPRAZolam 0.25 milliGRAM(s) Oral three times a day  amLODIPine   Tablet 7.5 milliGRAM(s) Oral daily  aspirin  chewable 81 milliGRAM(s) Oral daily  atorvastatin 20 milliGRAM(s) Oral at bedtime  bisacodyl 5 milliGRAM(s) Oral every 12 hours  dexAMETHasone     Tablet 8 milliGRAM(s) Oral <User Schedule>  dextrose 5%. 1000 milliLiter(s) (50 mL/Hr) IV Continuous <Continuous>  dextrose 50% Injectable 12.5 Gram(s) IV Push once  dextrose 50% Injectable 25 Gram(s) IV Push once  dextrose 50% Injectable 25 Gram(s) IV Push once  docusate sodium 100 milliGRAM(s) Oral daily  enoxaparin Injectable 40 milliGRAM(s) SubCutaneous daily  escitalopram 5 milliGRAM(s) Oral <User Schedule>  furosemide    Tablet 40 milliGRAM(s) Oral daily  insulin glargine Injectable (LANTUS) 4 Unit(s) SubCutaneous at bedtime  insulin lispro (HumaLOG) corrective regimen sliding scale   SubCutaneous three times a day before meals  insulin lispro (HumaLOG) corrective regimen sliding scale   SubCutaneous at bedtime  insulin lispro Injectable (HumaLOG) 14 Unit(s) SubCutaneous three times a day before meals  levETIRAcetam 1000 milliGRAM(s) Oral two times a day  lidocaine   Patch 1 Patch Transdermal daily  melatonin 3 milliGRAM(s) Oral at bedtime  metoprolol succinate ER 25 milliGRAM(s) Oral daily  OLANZapine 2.5 milliGRAM(s) Oral at bedtime  pantoprazole    Tablet 40 milliGRAM(s) Oral before breakfast  polyethylene glycol 3350 17 Gram(s) Oral two times a day  senna 2 Tablet(s) Oral at bedtime  triamcinolone 0.1% Cream 1 Application(s) Topical two times a day    MEDICATIONS  (PRN):  acetaminophen   Tablet .. 650 milliGRAM(s) Oral every 6 hours PRN Temp greater or equal to 38C (100.4F), Mild Pain (1 - 3)  ALPRAZolam 0.25 milliGRAM(s) Oral every 8 hours PRN Anxiety  benzocaine 15 mG/menthol 3.6 mG (Sugar-Free) Lozenge 1 Lozenge Oral two times a day PRN Sore Throat  bisacodyl Suppository 10 milliGRAM(s) Rectal daily PRN Constipation  dextrose 40% Gel 15 Gram(s) Oral once PRN Blood Glucose LESS THAN 70 milliGRAM(s)/deciliter  glucagon  Injectable 1 milliGRAM(s) IntraMuscular once PRN Glucose LESS THAN 70 milligrams/deciliter  ibuprofen  Tablet. 400 milliGRAM(s) Oral every 8 hours PRN Moderate Pain (4 - 6)  OLANZapine 2.5 milliGRAM(s) Oral two times a day PRN agitation HPI:  79 RHM w/ PMH of HTN, HLD, prostate cancer s/p prostectomy, basal cell skin cancer, GBM (dx in 2018, s/p resection 8/3/18, s/p 2nd resection 2/7/19, s/p 3rd resection 5/2020 s/p multiple does of Temodar admitted to [facility] on 8/4/20 w/ worsening of his L. sided weakness. No other complaints. Denied changes in speech, vision, hearing, swallowing, voice quality, numbness/tingling,  balance/room-spinning sensations.    He was seen in Dr. Harman's office on 7/27/20 where he was noted to have worsening strength, lethargy and decreased appetitie. He had been tapering his steroids for over a month. Recently tapered from 4mg QD --> 3mg QD ---> 2mg QD.    CT Head on admission 8.4.2020, showed- Worsening vasogenic edema involving the R. temporal frontal parietal region again identified increased when compared to previous CT in 5/2020.     Patient admitted to neurology floor, started on decadron 4mg q6h later increased to 8mg q8h. MR head w/ and w/o contrast with progression of disease, neuro-onc on board, patient to follow up outpatient for initiation of avastin therapy.   Patient also with urinary retention, urology consulted, suspect urinary retention is neurologic in etiology, on clean intermittent catheterization; further followup to continue at rehab.   Patient stable for discharge to acute rehab 8/7/20.     Of note--patient was admitted to  rehab after his most recent resection in May 2020.  He was at [facility] from 6/4/20  to 6/17/20.  At the time of discharge from  rehab, patient was supervision for eating, grooming, and dressing, and contact guard for transfers.  He was able to ambulate 150' with SAC with CG/min assist and to navigate 12 steps with u/l HR with min assist.  SLP at [facility] noted higher level cognitive deficits with mild impulsivity and reduced insight, necessitating supervision while at home. (07 Aug 2020 14:30)      PAST MEDICAL & SURGICAL HISTORY:  Fall, sequela  Bilateral hearing loss, unspecified hearing loss type   activity: [place] 1960-65    [place]  Type 2 diabetes mellitus without complication, without long-term current use of insulin: stop oral meds  3 weeks ago  Glioblastoma: biopsy 8/2018  surgery  6 weeks radiation  35 days of oral chemotherapy last 3 weeks  Basal cell carcinoma (BCC) of left lower leg: s/p resection  right lower leg top  left lower leg  Prostate cancer: s/p radical prostatectomy 1995  HLD (hyperlipidemia)  HTN (hypertension)  S/P colonoscopic polypectomy: 3 years benign polyps  S/P tonsillectomy: age 30  H/O bilateral cataract extraction: 3-4 years ago  S/P prostatectomy: 1995  S/P brain surgery: 8/2018  Lipoma of neck: s/p resection at age 30  Basal cell carcinoma (BCC) of lower leg: s/p resection  History of tonsillectomy: at age 30      Subjective: Patient seen and evaluated this morning. Patient endorsed to having a nightmare over the weekend. Patient noted to have suicidal ideations over the weekend. Was placed on 1:1. Psych was reconsulted for followup. Neuro psych also consulted for eval. No acute medical issues noted overnight.     REVIEW OF SYMPTOMS  Positive:  depressed, left HP, cognitive deficits  Denies: headache, lightheadedness, CP, SOB, abdominal pain, dysuria, nausea, constipation    Vital Signs Last 24 Hrs  T(C): 36.8 (31 Aug 2020 09:19), Max: 36.8 (31 Aug 2020 09:19)  T(F): 98.2 (31 Aug 2020 09:19), Max: 98.2 (31 Aug 2020 09:19)  HR: 78 (31 Aug 2020 09:19) (61 - 78)  BP: 144/85 (31 Aug 2020 09:19) (124/76 - 144/85)  BP(mean): --  RR: 14 (31 Aug 2020 09:19) (14 - 14)  SpO2: 95% (31 Aug 2020 09:19) (93% - 95%)    PHYSICAL EXAM  Constitutional - NAD, Comfortable  	HEENT - NCAT, EOMI  	Chest -  CTAB   	Cardio - warm and well perfused, RRR, no murmur  	Abdomen -  Soft, NTND  	Extremities - No edema, No calf tenderness   	Neurologic Exam:                 	   Cognitive -   	          Orientation: Awake, Alert,  	      	          Thought:  slow processing, perseverative, agitated  	   Speech - Fluent, Comprehensible,   	   Cranial Nerves - left visual field deficit, left facial weakness, Tongue midline, EOMI, Shoulder shrug intact  	   Motor -  LEFT hemiparesis  	   Sensory - Impaired-- extinguishes on left  	   Coordination - impaired on left  Psychiatric - depressed, anxious    RECENT LABS/IMAGING                        13.3   9.37  )-----------( 160      ( 31 Aug 2020 06:50 )             39.3     08-31    138  |  101  |  38<H>  ----------------------------<  104<H>  3.8   |  28  |  0.87    Ca    8.6      31 Aug 2020 06:50    TPro  6.3  /  Alb  2.6<L>  /  TBili  0.5  /  DBili  x   /  AST  28  /  ALT  68<H>  /  AlkPhos  75  08-31      CAPILLARY BLOOD GLUCOSE  POCT Blood Glucose.: 148 mg/dL (31 Aug 2020 12:09)  POCT Blood Glucose.: 103 mg/dL (31 Aug 2020 07:44)  POCT Blood Glucose.: 149 mg/dL (30 Aug 2020 21:22)  POCT Blood Glucose.: 178 mg/dL (30 Aug 2020 17:01)      RADIOLOGY/OTHER RESULTS    MEDICATIONS  (STANDING):  ALPRAZolam 0.25 milliGRAM(s) Oral three times a day  amLODIPine   Tablet 7.5 milliGRAM(s) Oral daily  aspirin  chewable 81 milliGRAM(s) Oral daily  atorvastatin 20 milliGRAM(s) Oral at bedtime  bisacodyl 5 milliGRAM(s) Oral every 12 hours  dexAMETHasone     Tablet 8 milliGRAM(s) Oral <User Schedule>  dextrose 5%. 1000 milliLiter(s) (50 mL/Hr) IV Continuous <Continuous>  dextrose 50% Injectable 12.5 Gram(s) IV Push once  dextrose 50% Injectable 25 Gram(s) IV Push once  dextrose 50% Injectable 25 Gram(s) IV Push once  docusate sodium 100 milliGRAM(s) Oral daily  enoxaparin Injectable 40 milliGRAM(s) SubCutaneous daily  escitalopram 5 milliGRAM(s) Oral <User Schedule>  furosemide    Tablet 40 milliGRAM(s) Oral daily  insulin glargine Injectable (LANTUS) 4 Unit(s) SubCutaneous at bedtime  insulin lispro (HumaLOG) corrective regimen sliding scale   SubCutaneous three times a day before meals  insulin lispro (HumaLOG) corrective regimen sliding scale   SubCutaneous at bedtime  insulin lispro Injectable (HumaLOG) 14 Unit(s) SubCutaneous three times a day before meals  levETIRAcetam 1000 milliGRAM(s) Oral two times a day  lidocaine   Patch 1 Patch Transdermal daily  melatonin 3 milliGRAM(s) Oral at bedtime  metoprolol succinate ER 25 milliGRAM(s) Oral daily  OLANZapine 2.5 milliGRAM(s) Oral at bedtime  pantoprazole    Tablet 40 milliGRAM(s) Oral before breakfast  polyethylene glycol 3350 17 Gram(s) Oral two times a day  senna 2 Tablet(s) Oral at bedtime  triamcinolone 0.1% Cream 1 Application(s) Topical two times a day    MEDICATIONS  (PRN):  acetaminophen   Tablet .. 650 milliGRAM(s) Oral every 6 hours PRN Temp greater or equal to 38C (100.4F), Mild Pain (1 - 3)  ALPRAZolam 0.25 milliGRAM(s) Oral every 8 hours PRN Anxiety  benzocaine 15 mG/menthol 3.6 mG (Sugar-Free) Lozenge 1 Lozenge Oral two times a day PRN Sore Throat  bisacodyl Suppository 10 milliGRAM(s) Rectal daily PRN Constipation  dextrose 40% Gel 15 Gram(s) Oral once PRN Blood Glucose LESS THAN 70 milliGRAM(s)/deciliter  glucagon  Injectable 1 milliGRAM(s) IntraMuscular once PRN Glucose LESS THAN 70 milligrams/deciliter  ibuprofen  Tablet. 400 milliGRAM(s) Oral every 8 hours PRN Moderate Pain (4 - 6)  OLANZapine 2.5 milliGRAM(s) Oral two times a day PRN agitation

## 2020-08-31 NOTE — PROGRESS NOTE ADULT - ASSESSMENT
80yo male with hx of HTN, HLD, prostate cancer s/p prostatectomy, BCC, GBM (dx in 2018, s/p resection 8/3/18, s/p 2nd resection 2/7/19, s/p 3rd resection 5/2020 s/p multiple does of Temodar), presented to [facility] for acute rehab from [facility] for worsening of his L. sided weakness, noted to have worsening vasogenic edema with progression of GBM, resulting in Left Hemiparesis, Dysphagia, Gait and ADL impairment.     #Debility  -Gait Instability, ADL impairments and Functional impairments  -Comprehensive Rehab Program of PT/OT & speech per primary team    #GBM   #Cerebral Edema  #Left hemiplegia   -continue decadron + protonix  -palliative care  -too high risk for Avastin secondary to risk for thrombosis and difficulty with safe transport  -patient with depressive statements to staff this weekend, voiced suicidal ideation overnight, currently on 1:1 observation  -on lexapro, zyprexa and xanax   -recommend Psych/Behavorial health f/u     # urinary retention - able to urinate but requires straight cath  appreciate urology conuslt  avoid anticholinergic medications  bowel care  per urology might consider bethanecol   no need for flomax as pt s/p prostatectomy     #HTN  -controlled on Amlodipine, Lasix, Metoprolol    #Hyperglycemia  -Steroid induced  -Ha1c 5.6  -well controlled on Humalog to 14 units TID + Lantus 4units QHS  -Continue hypoglycemia protocol and accu-cheks    #Lower extremity edema - improving  - negative DVT  - cont leg elevation     DVT prophylaxis: Lovenox SQ  CBC/CMP 8/27 reviewed

## 2020-08-31 NOTE — PROGRESS NOTE ADULT - SUBJECTIVE AND OBJECTIVE BOX
HPI:  79 RHM w/ PMH of HTN, HLD, prostate cancer s/p prostectomy, basal cell skin cancer, GBM (dx in 2018, s/p resection 8/3/18, s/p 2nd resection 2/7/19, s/p 3rd resection 5/2020 s/p multiple does of Temodar admitted to [facility] on 8/4/20 w/ worsening of his L. sided weakness. No other complaints. Denied changes in speech, vision, hearing, swallowing, voice quality, numbness/tingling,  balance/room-spinning sensations.    He was seen in Dr. Harman's office on 7/27/20 where he was noted to have worsening strength, lethargy and decreased appetitie. He had been tapering his steroids for over a month. Recently tapered from 4mg QD --> 3mg QD ---> 2mg QD.    CT Head on admission 8.4.2020, showed- Worsening vasogenic edema involving the R. temporal frontal parietal region again identified increased when compared to previous CT in 5/2020.     Patient admitted to neurology floor, started on decadron 4mg q6h later increased to 8mg q8h. MR head w/ and w/o contrast with progression of disease, neuro-onc on board, patient to follow up outpatient for initiation of avastin therapy.   Patient also with urinary retention, urology consulted, suspect urinary retention is neurologic in etiology, on clean intermittent catheterization; further followup to continue at rehab.   Patient stable for discharge to acute rehab 8/7/20.     Of note--patient was admitted to  rehab after his most recent resection in May 2020.  He was at [facility] from 6/4/20  to 6/17/20.  At the time of discharge from  rehab, patient was supervision for eating, grooming, and dressing, and contact guard for transfers.  He was able to ambulate 150' with SAC with CG/min assist and to navigate 12 steps with u/l HR with min assist.  SLP at [facility] noted higher level cognitive deficits with mild impulsivity and reduced insight, necessitating supervision while at home. (07 Aug 2020 14:30)      PAST MEDICAL & SURGICAL HISTORY:  Fall, sequela  Bilateral hearing loss, unspecified hearing loss type   activity: [place] 1960-65    [place]  Type 2 diabetes mellitus without complication, without long-term current use of insulin: stop oral meds  3 weeks ago  Glioblastoma: biopsy 8/2018  surgery  6 weeks radiation  35 days of oral chemotherapy last 3 weeks  Basal cell carcinoma (BCC) of left lower leg: s/p resection  right lower leg top  left lower leg  Prostate cancer: s/p radical prostatectomy 1995  HLD (hyperlipidemia)  HTN (hypertension)  S/P colonoscopic polypectomy: 3 years benign polyps  S/P tonsillectomy: age 30  H/O bilateral cataract extraction: 3-4 years ago  S/P prostatectomy: 1995  S/P brain surgery: 8/2018  Lipoma of neck: s/p resection at age 30  Basal cell carcinoma (BCC) of lower leg: s/p resection  History of tonsillectomy: at age 30      Subjective:  Pt. agitated this AM.  states had a bad evening and c/o his care.  Denies pain.  Frustrated and emotional    REVIEW OF SYMPTOMS  Positive:  depressed, left HP, cognitive deficits  Denies: headache, lightheadedness, CP, SOB, abdominal pain, dysuria, nausea, constipation        VITALS  Vital Signs Last 24 Hrs  T(C): 36.6 (24 Aug 2020 09:37), Max: 36.6 (24 Aug 2020 09:37)  T(F): 97.9 (24 Aug 2020 09:37), Max: 97.9 (24 Aug 2020 09:37)  HR: 75 (24 Aug 2020 09:37) (73 - 98)  BP: 112/72 (24 Aug 2020 09:37) (112/72 - 124/72)  BP(mean): --  RR: 15 (24 Aug 2020 09:37) (15 - 16)  SpO2: 94% (24 Aug 2020 09:37) (93% - 94%)      PHYSICAL EXAM  Constitutional - NAD, Comfortable  	HEENT - NCAT, EOMI  	Chest -  CTAB   	Cardio - warm and well perfused, RRR, no murmur  	Abdomen -  Soft, NTND  	Extremities - No edema, No calf tenderness   	Neurologic Exam:                 	   Cognitive -   	          Orientation: Awake, Alert,  	      	          Thought:  slow processing, perseverative, agitated  	   Speech - Fluent, Comprehensible,   	   Cranial Nerves - left visual field deficit, left facial weakness, Tongue midline, EOMI, Shoulder shrug intact  	   Motor -  LEFT hemiparesis  	                  LEFT    UE - ShAB 2/5, EF 1/5, EE 1/5, WE 0/5,  0/5  	                  LEFT    LE - HF 2/5, KE 3/5, KF 3/5, DF 1/5, PF 3/5  	   Sensory - Impaired-- extinguishes on left  	   Coordination - impaired on left  Psychiatric -agitated, depressed, anxious      RECENT LABS                        12.9   7.67  )-----------( 129      ( 24 Aug 2020 07:05 )             39.2     08-24    138  |  99  |  40<H>  ----------------------------<  195<H>  3.9   |  29  |  1.12    Ca    8.4      24 Aug 2020 07:05    TPro  6.1  /  Alb  2.5<L>  /  TBili  0.6  /  DBili  x   /  AST  25  /  ALT  85<H>  /  AlkPhos  67  08-24            RADIOLOGY/OTHER RESULTS      MEDICATIONS  (STANDING):  ALPRAZolam 0.25 milliGRAM(s) Oral three times a day  amLODIPine   Tablet 7.5 milliGRAM(s) Oral daily  aspirin  chewable 81 milliGRAM(s) Oral daily  atorvastatin 20 milliGRAM(s) Oral at bedtime  bisacodyl 5 milliGRAM(s) Oral every 12 hours  dexAMETHasone     Tablet 8 milliGRAM(s) Oral two times a day  dexAMETHasone     Tablet 24 milliGRAM(s) Oral once  dextrose 5%. 1000 milliLiter(s) (50 mL/Hr) IV Continuous <Continuous>  dextrose 50% Injectable 12.5 Gram(s) IV Push once  dextrose 50% Injectable 25 Gram(s) IV Push once  dextrose 50% Injectable 25 Gram(s) IV Push once  docusate sodium 100 milliGRAM(s) Oral daily  enoxaparin Injectable 40 milliGRAM(s) SubCutaneous daily  furosemide    Tablet 40 milliGRAM(s) Oral daily  insulin lispro (HumaLOG) corrective regimen sliding scale   SubCutaneous three times a day before meals  insulin lispro (HumaLOG) corrective regimen sliding scale   SubCutaneous at bedtime  insulin lispro Injectable (HumaLOG) 12 Unit(s) SubCutaneous three times a day before meals  levETIRAcetam 1000 milliGRAM(s) Oral two times a day  melatonin 3 milliGRAM(s) Oral at bedtime  metoprolol succinate ER 25 milliGRAM(s) Oral daily  OLANZapine 2.5 milliGRAM(s) Oral at bedtime  pantoprazole    Tablet 40 milliGRAM(s) Oral before breakfast  polyethylene glycol 3350 17 Gram(s) Oral two times a day  senna 2 Tablet(s) Oral at bedtime  tamsulosin 0.8 milliGRAM(s) Oral at bedtime  triamcinolone 0.1% Cream 1 Application(s) Topical two times a day    MEDICATIONS  (PRN):  acetaminophen   Tablet .. 650 milliGRAM(s) Oral every 6 hours PRN Temp greater or equal to 38C (100.4F), Mild Pain (1 - 3)  ALPRAZolam 0.25 milliGRAM(s) Oral every 8 hours PRN Anxiety  benzocaine 15 mG/menthol 3.6 mG (Sugar-Free) Lozenge 1 Lozenge Oral two times a day PRN Sore Throat  dextrose 40% Gel 15 Gram(s) Oral once PRN Blood Glucose LESS THAN 70 milliGRAM(s)/deciliter  glucagon  Injectable 1 milliGRAM(s) IntraMuscular once PRN Glucose LESS THAN 70 milligrams/deciliter no

## 2020-08-31 NOTE — PROGRESS NOTE BEHAVIORAL HEALTH - NSBHFUPINTERVALHXFT_PSY_A_CORE
79 RHM w/ PMH of HTN, HLD, prostate cancer s/p proctectomy, basal cell skin cancer, GBM (dx in 2018, s/p resection 8/3/18, s/p 2nd resection 2/7/19, s/p 3rd resection 5/2020 s/p multiple does of Temodar admitted to [facility] on 8/4/20 w/ worsening of his L. sided weakness. No other complaints. Denied changes in speech, vision, hearing, swallowing, voice quality, numbness/tingling,  balance/room-spinning sensations. He was seen in Dr. Harman's office on 7/27/20 where he was noted to have worsening strength, lethargy and decreased appetite. He had been tapering his steroids for over a month. Recently tapered from 4mg QD --> 3mg QD ---> 2mg QD.  CT Head on admission 8.4.2020, showed- Worsening vasogenic edema involving the R. temporal frontal parietal region again identified increased when compared to previous CT in 5/2020. Patient admitted to neurology floor, started on decadron 4mg q6h later increased to 8mg q8h. MR head w/ and w/o contrast with progression of disease, neuro-onc on board, patient to follow up outpatient for initiation of avastin therapy. Patient also with urinary retention, urology consulted, suspect urinary retention is neurologic in etiology, on clean intermittent catheterization; further followup to continue at rehab. Patient stable for discharge to acute rehab 8/7/20. Of note--patient was admitted to  rehab after his most recent resection in May 2020.  He was at [facility] from 6/4/20  to 6/17/20.  At the time of discharge from  rehab, patient was supervision for eating, grooming, and dressing, and contact guard for transfers.  He was able to ambulate 150' with SAC with CG/min assist and to navigate 12 steps with u/l HR with min assist.  SLP at [facility] noted higher level cognitive deficits with mild impulsivity and reduced insight, necessitating supervision while at home. (07 Aug 2020 14:30)    During rehab course at [facility] pt expressed that his mood is "not good" secondary to being told by the treatment team that his prognosis is poor. Pt was tearful during interview as he was expressing all his medical issues. Pt was seen recently seen on 8/23/2020 by psychiatrist for evaluation of angry mood and confusion. At that time pt was started on Zyprexa and Xanax and his anger has improved. Pt seen and evaluated several days later by Psychiatrist Dr. Beavers and was asked to comment on decision making capacity. As per Psychiatrist MD Beavers, It is felt that patient retains decision making capacity , and can also sign a MOLST.     Psychiatry: Pt seen and evaluated today by writer. Pt reports that his mood is better today and he slept good last night. RN at bedside during interview and pt smiling and offering his meal to share with staff. As per medical team, the pt reported over the weekend that he would be better off "not here". Medical team was concerned that pt was speaking in between the lines and voicing suicidal thoughts. The pt denies suicidal/homicidal ideation during interview with writer. Will have to follow up to see if pts thoughts have changed. He denies feeling guilty but does admit to feeling hopeless about his current medical situation. He states "I cant give up". Pt has been consistently tearful during follow up interviews with writer, however today pt was not tearful. His energy is fair, he looks forward to physical therapy. Appetite is good. Pt was observed eating and consuming nearly 75% of his meal. He states "I am ready to go home now and be with my wife". Pt observed to have increased latency during conversation. He is alert and oriented x2 to person and place. Pt was able to tell writer that it was august, but thought it was Saturday, and he was not able to tell writer what day of the month it was.

## 2020-08-31 NOTE — PROGRESS NOTE BEHAVIORAL HEALTH - PRIMARY DX
Minor neurocognitive disorder
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

## 2020-08-31 NOTE — PROGRESS NOTE BEHAVIORAL HEALTH - NSBHPTASSESSDT_PSY_A_CORE
23-Aug-2020 14:46
26-Aug-2020 18:41
28-Aug-2020 13:00
31-Aug-2020 12:08
22-Aug-2020 11:13
25-Aug-2020 13:45

## 2020-08-31 NOTE — PROGRESS NOTE ADULT - SUBJECTIVE AND OBJECTIVE BOX
Patient is a 79y old  Male who presents with a chief complaint of Glioblastoma (31 Aug 2020 13:29)      Patient seen and examined at bedside. no acute event overnight. + urinary retention. no dysuria.    ALLERGIES:  No Known Allergies    MEDICATIONS  (STANDING):  ALPRAZolam 0.25 milliGRAM(s) Oral three times a day  amLODIPine   Tablet 7.5 milliGRAM(s) Oral daily  aspirin  chewable 81 milliGRAM(s) Oral daily  atorvastatin 20 milliGRAM(s) Oral at bedtime  bisacodyl 5 milliGRAM(s) Oral every 12 hours  dexAMETHasone     Tablet 8 milliGRAM(s) Oral <User Schedule>  dextrose 5%. 1000 milliLiter(s) (50 mL/Hr) IV Continuous <Continuous>  dextrose 50% Injectable 12.5 Gram(s) IV Push once  dextrose 50% Injectable 25 Gram(s) IV Push once  dextrose 50% Injectable 25 Gram(s) IV Push once  docusate sodium 100 milliGRAM(s) Oral daily  enoxaparin Injectable 40 milliGRAM(s) SubCutaneous daily  escitalopram 5 milliGRAM(s) Oral <User Schedule>  furosemide    Tablet 40 milliGRAM(s) Oral daily  insulin glargine Injectable (LANTUS) 4 Unit(s) SubCutaneous at bedtime  insulin lispro (HumaLOG) corrective regimen sliding scale   SubCutaneous three times a day before meals  insulin lispro (HumaLOG) corrective regimen sliding scale   SubCutaneous at bedtime  insulin lispro Injectable (HumaLOG) 14 Unit(s) SubCutaneous three times a day before meals  levETIRAcetam 1000 milliGRAM(s) Oral two times a day  lidocaine   Patch 1 Patch Transdermal daily  melatonin 3 milliGRAM(s) Oral at bedtime  metoprolol succinate ER 25 milliGRAM(s) Oral daily  OLANZapine 2.5 milliGRAM(s) Oral at bedtime  pantoprazole    Tablet 40 milliGRAM(s) Oral before breakfast  polyethylene glycol 3350 17 Gram(s) Oral two times a day  senna 2 Tablet(s) Oral at bedtime  triamcinolone 0.1% Cream 1 Application(s) Topical two times a day    MEDICATIONS  (PRN):  acetaminophen   Tablet .. 650 milliGRAM(s) Oral every 6 hours PRN Temp greater or equal to 38C (100.4F), Mild Pain (1 - 3)  ALPRAZolam 0.25 milliGRAM(s) Oral every 8 hours PRN Anxiety  benzocaine 15 mG/menthol 3.6 mG (Sugar-Free) Lozenge 1 Lozenge Oral two times a day PRN Sore Throat  bisacodyl Suppository 10 milliGRAM(s) Rectal daily PRN Constipation  dextrose 40% Gel 15 Gram(s) Oral once PRN Blood Glucose LESS THAN 70 milliGRAM(s)/deciliter  glucagon  Injectable 1 milliGRAM(s) IntraMuscular once PRN Glucose LESS THAN 70 milligrams/deciliter  ibuprofen  Tablet. 400 milliGRAM(s) Oral every 8 hours PRN Moderate Pain (4 - 6)  OLANZapine 2.5 milliGRAM(s) Oral two times a day PRN agitation    Vital Signs Last 24 Hrs  T(F): 98.2 (31 Aug 2020 09:19), Max: 98.2 (31 Aug 2020 09:19)  HR: 78 (31 Aug 2020 09:19) (61 - 78)  BP: 144/85 (31 Aug 2020 09:19) (124/76 - 144/85)  RR: 14 (31 Aug 2020 09:19) (14 - 14)  SpO2: 95% (31 Aug 2020 09:19) (93% - 95%)  I&O's Summary    30 Aug 2020 07:01  -  31 Aug 2020 07:00  --------------------------------------------------------  IN: 0 mL / OUT: 1251 mL / NET: -1251 mL    31 Aug 2020 07:01  -  31 Aug 2020 15:11  --------------------------------------------------------  IN: 0 mL / OUT: 350 mL / NET: -350 mL      PHYSICAL EXAM:  General: NAD, awake alert   ENT: MMM  Neck: Supple, No JVD  Lungs: Clear to auscultation bilaterally  Cardio: RRR, S1/S2, No murmurs  Abdomen: Soft, Nontender, Nondistended; Bowel sounds present  Extremities: No calf tenderness, LLE edema   neuro: L sided hemipareiss LUE > LLE    LABS:                        13.3   9.37  )-----------( 160      ( 31 Aug 2020 06:50 )             39.3     08-31    138  |  101  |  38  ----------------------------<  104  3.8   |  28  |  0.87    Ca    8.6      31 Aug 2020 06:50    TPro  6.3  /  Alb  2.6  /  TBili  0.5  /  DBili  x   /  AST  28  /  ALT  68  /  AlkPhos  75  08-31    eGFR if Non African American: 83 mL/min/1.73M2 (08-31-20 @ 06:50)  eGFR if : 96 mL/min/1.73M2 (08-31-20 @ 06:50)        POCT Blood Glucose.: 148 mg/dL (31 Aug 2020 12:09)  POCT Blood Glucose.: 103 mg/dL (31 Aug 2020 07:44)  POCT Blood Glucose.: 149 mg/dL (30 Aug 2020 21:22)  POCT Blood Glucose.: 178 mg/dL (30 Aug 2020 17:01)      RADIOLOGY & ADDITIONAL TESTS:    Care Discussed with Consultants/Other Providers:

## 2020-08-31 NOTE — PROGRESS NOTE ADULT - PROBLEM SELECTOR PLAN 1
voiding but IC required, avoid anticholinergics if possible...bowel care...mobilize... ? could consider bethanecol but would prefer to eliminate anticholinergics first

## 2020-08-31 NOTE — PROGRESS NOTE BEHAVIORAL HEALTH - NSBHCONSULTMEDS_PSY_A_CORE FT
MEDICATIONS  (STANDING):  ALPRAZolam 0.25 milliGRAM(s) Oral three times a day  amLODIPine   Tablet 7.5 milliGRAM(s) Oral daily  aspirin  chewable 81 milliGRAM(s) Oral daily  atorvastatin 20 milliGRAM(s) Oral at bedtime  bisacodyl 5 milliGRAM(s) Oral every 12 hours  dexAMETHasone     Tablet 8 milliGRAM(s) Oral <User Schedule>  dextrose 5%. 1000 milliLiter(s) (50 mL/Hr) IV Continuous <Continuous>  dextrose 50% Injectable 12.5 Gram(s) IV Push once  dextrose 50% Injectable 25 Gram(s) IV Push once  dextrose 50% Injectable 25 Gram(s) IV Push once  docusate sodium 100 milliGRAM(s) Oral daily  enoxaparin Injectable 40 milliGRAM(s) SubCutaneous daily  escitalopram 5 milliGRAM(s) Oral <User Schedule>  furosemide    Tablet 40 milliGRAM(s) Oral daily  insulin glargine Injectable (LANTUS) 4 Unit(s) SubCutaneous at bedtime  insulin lispro (HumaLOG) corrective regimen sliding scale   SubCutaneous three times a day before meals  insulin lispro (HumaLOG) corrective regimen sliding scale   SubCutaneous at bedtime  insulin lispro Injectable (HumaLOG) 14 Unit(s) SubCutaneous three times a day before meals  levETIRAcetam 1000 milliGRAM(s) Oral two times a day  lidocaine   Patch 1 Patch Transdermal daily  melatonin 3 milliGRAM(s) Oral at bedtime  metoprolol succinate ER 25 milliGRAM(s) Oral daily  OLANZapine 2.5 milliGRAM(s) Oral at bedtime  pantoprazole    Tablet 40 milliGRAM(s) Oral before breakfast  polyethylene glycol 3350 17 Gram(s) Oral two times a day  senna 2 Tablet(s) Oral at bedtime  triamcinolone 0.1% Cream 1 Application(s) Topical two times a day
Zyprexa 5 mg, Xanax 025 mg tid.
MEDICATIONS  (STANDING):  ALPRAZolam 0.25 milliGRAM(s) Oral three times a day  amLODIPine   Tablet 7.5 milliGRAM(s) Oral daily  aspirin  chewable 81 milliGRAM(s) Oral daily  atorvastatin 20 milliGRAM(s) Oral at bedtime  bisacodyl 5 milliGRAM(s) Oral every 12 hours  dexAMETHasone     Tablet 8 milliGRAM(s) Oral <User Schedule>  dextrose 5%. 1000 milliLiter(s) (50 mL/Hr) IV Continuous <Continuous>  dextrose 50% Injectable 12.5 Gram(s) IV Push once  dextrose 50% Injectable 25 Gram(s) IV Push once  dextrose 50% Injectable 25 Gram(s) IV Push once  docusate sodium 100 milliGRAM(s) Oral daily  enoxaparin Injectable 40 milliGRAM(s) SubCutaneous daily  furosemide    Tablet 40 milliGRAM(s) Oral daily  insulin glargine Injectable (LANTUS) 4 Unit(s) SubCutaneous at bedtime  insulin lispro (HumaLOG) corrective regimen sliding scale   SubCutaneous three times a day before meals  insulin lispro (HumaLOG) corrective regimen sliding scale   SubCutaneous at bedtime  insulin lispro Injectable (HumaLOG) 14 Unit(s) SubCutaneous three times a day before meals  levETIRAcetam 1000 milliGRAM(s) Oral two times a day  melatonin 3 milliGRAM(s) Oral at bedtime  metoprolol succinate ER 25 milliGRAM(s) Oral daily  OLANZapine 2.5 milliGRAM(s) Oral at bedtime  pantoprazole    Tablet 40 milliGRAM(s) Oral before breakfast  polyethylene glycol 3350 17 Gram(s) Oral two times a day  senna 2 Tablet(s) Oral at bedtime  tamsulosin 0.8 milliGRAM(s) Oral at bedtime  triamcinolone 0.1% Cream 1 Application(s) Topical two times a day
Xanax 0.25mg PO TID  Zyprexa 2.5mg PO QHS
MEDICATIONS  (STANDING):  ALPRAZolam 0.25 milliGRAM(s) Oral three times a day  ALPRAZolam 0.5 milliGRAM(s) Oral at bedtime  amLODIPine   Tablet 7.5 milliGRAM(s) Oral daily  aspirin  chewable 81 milliGRAM(s) Oral daily  atorvastatin 20 milliGRAM(s) Oral at bedtime  bisacodyl 5 milliGRAM(s) Oral every 12 hours  dextrose 5%. 1000 milliLiter(s) (50 mL/Hr) IV Continuous <Continuous>  dextrose 50% Injectable 12.5 Gram(s) IV Push once  dextrose 50% Injectable 25 Gram(s) IV Push once  dextrose 50% Injectable 25 Gram(s) IV Push once  docusate sodium 100 milliGRAM(s) Oral daily  enoxaparin Injectable 40 milliGRAM(s) SubCutaneous daily  escitalopram 10 milliGRAM(s) Oral <User Schedule>  furosemide    Tablet 40 milliGRAM(s) Oral daily  insulin glargine Injectable (LANTUS) 4 Unit(s) SubCutaneous at bedtime  insulin lispro (HumaLOG) corrective regimen sliding scale   SubCutaneous three times a day before meals  insulin lispro (HumaLOG) corrective regimen sliding scale   SubCutaneous at bedtime  insulin lispro Injectable (HumaLOG) 14 Unit(s) SubCutaneous three times a day before meals  levETIRAcetam 1000 milliGRAM(s) Oral two times a day  lidocaine   Patch 1 Patch Transdermal daily  melatonin 3 milliGRAM(s) Oral at bedtime  metoprolol succinate ER 25 milliGRAM(s) Oral daily  pantoprazole    Tablet 40 milliGRAM(s) Oral before breakfast  polyethylene glycol 3350 17 Gram(s) Oral two times a day  senna 2 Tablet(s) Oral at bedtime  triamcinolone 0.1% Cream 1 Application(s) Topical two times a day

## 2020-09-01 LAB
GLUCOSE BLDC GLUCOMTR-MCNC: 126 MG/DL — HIGH (ref 70–99)
GLUCOSE BLDC GLUCOMTR-MCNC: 126 MG/DL — HIGH (ref 70–99)
GLUCOSE BLDC GLUCOMTR-MCNC: 127 MG/DL — HIGH (ref 70–99)
GLUCOSE BLDC GLUCOMTR-MCNC: 88 MG/DL — SIGNIFICANT CHANGE UP (ref 70–99)

## 2020-09-01 PROCEDURE — 99232 SBSQ HOSP IP/OBS MODERATE 35: CPT

## 2020-09-01 PROCEDURE — 96116 NUBHVL XM PHYS/QHP 1ST HR: CPT

## 2020-09-01 RX ORDER — PHENYLEPHRINE-SHARK LIVER OIL-MINERAL OIL-PETROLATUM RECTAL OINTMENT
1 OINTMENT (GRAM) RECTAL THREE TIMES A DAY
Refills: 0 | Status: DISCONTINUED | OUTPATIENT
Start: 2020-09-01 | End: 2020-09-04

## 2020-09-01 RX ADMIN — Medication 0.25 MILLIGRAM(S): at 22:54

## 2020-09-01 RX ADMIN — ATORVASTATIN CALCIUM 20 MILLIGRAM(S): 80 TABLET, FILM COATED ORAL at 21:11

## 2020-09-01 RX ADMIN — PANTOPRAZOLE SODIUM 40 MILLIGRAM(S): 20 TABLET, DELAYED RELEASE ORAL at 06:25

## 2020-09-01 RX ADMIN — LIDOCAINE 1 PATCH: 4 CREAM TOPICAL at 12:03

## 2020-09-01 RX ADMIN — Medication 0.25 MILLIGRAM(S): at 14:23

## 2020-09-01 RX ADMIN — Medication 14 UNIT(S): at 12:51

## 2020-09-01 RX ADMIN — ENOXAPARIN SODIUM 40 MILLIGRAM(S): 100 INJECTION SUBCUTANEOUS at 12:03

## 2020-09-01 RX ADMIN — PHENYLEPHRINE-SHARK LIVER OIL-MINERAL OIL-PETROLATUM RECTAL OINTMENT 1 APPLICATION(S): at 21:12

## 2020-09-01 RX ADMIN — Medication 0.5 MILLIGRAM(S): at 21:12

## 2020-09-01 RX ADMIN — LEVETIRACETAM 1000 MILLIGRAM(S): 250 TABLET, FILM COATED ORAL at 05:24

## 2020-09-01 RX ADMIN — LIDOCAINE 1 PATCH: 4 CREAM TOPICAL at 00:07

## 2020-09-01 RX ADMIN — POLYETHYLENE GLYCOL 3350 17 GRAM(S): 17 POWDER, FOR SOLUTION ORAL at 05:23

## 2020-09-01 RX ADMIN — Medication 0.25 MILLIGRAM(S): at 05:24

## 2020-09-01 RX ADMIN — Medication 100 MILLIGRAM(S): at 12:03

## 2020-09-01 RX ADMIN — Medication 40 MILLIGRAM(S): at 05:24

## 2020-09-01 RX ADMIN — Medication 81 MILLIGRAM(S): at 12:03

## 2020-09-01 RX ADMIN — LIDOCAINE 1 PATCH: 4 CREAM TOPICAL at 19:24

## 2020-09-01 RX ADMIN — Medication 1 APPLICATION(S): at 17:16

## 2020-09-01 RX ADMIN — INSULIN GLARGINE 4 UNIT(S): 100 INJECTION, SOLUTION SUBCUTANEOUS at 21:12

## 2020-09-01 RX ADMIN — LEVETIRACETAM 1000 MILLIGRAM(S): 250 TABLET, FILM COATED ORAL at 17:08

## 2020-09-01 RX ADMIN — Medication 5 MILLIGRAM(S): at 17:07

## 2020-09-01 RX ADMIN — Medication 1 APPLICATION(S): at 05:24

## 2020-09-01 RX ADMIN — PHENYLEPHRINE-SHARK LIVER OIL-MINERAL OIL-PETROLATUM RECTAL OINTMENT 1 APPLICATION(S): at 14:24

## 2020-09-01 RX ADMIN — Medication 25 MILLIGRAM(S): at 05:24

## 2020-09-01 RX ADMIN — Medication 14 UNIT(S): at 08:50

## 2020-09-01 RX ADMIN — ESCITALOPRAM OXALATE 10 MILLIGRAM(S): 10 TABLET, FILM COATED ORAL at 17:07

## 2020-09-01 RX ADMIN — Medication 5 MILLIGRAM(S): at 05:24

## 2020-09-01 RX ADMIN — AMLODIPINE BESYLATE 7.5 MILLIGRAM(S): 2.5 TABLET ORAL at 05:24

## 2020-09-01 RX ADMIN — Medication 3 MILLIGRAM(S): at 21:12

## 2020-09-01 NOTE — PROGRESS NOTE ADULT - ATTENDING COMMENTS
Pt. seen with resident.  Agree with documentation above as per resident with amendments made as appropriate. Patient medically stable. Making very limited progress towards rehab goals.     Monitor PVRs off zyprexa  d/c plan to home hospice 9/7-- family training today  Pt. DNR/DNI

## 2020-09-01 NOTE — PROGRESS NOTE ADULT - SUBJECTIVE AND OBJECTIVE BOX
HPI:  79 RHM w/ PMH of HTN, HLD, prostate cancer s/p prostectomy, basal cell skin cancer, GBM (dx in 2018, s/p resection 8/3/18, s/p 2nd resection 2/7/19, s/p 3rd resection 5/2020 s/p multiple does of Temodar admitted to [facility] on 8/4/20 w/ worsening of his L. sided weakness. No other complaints. Denied changes in speech, vision, hearing, swallowing, voice quality, numbness/tingling,  balance/room-spinning sensations.    He was seen in Dr. Harman's office on 7/27/20 where he was noted to have worsening strength, lethargy and decreased appetitie. He had been tapering his steroids for over a month. Recently tapered from 4mg QD --> 3mg QD ---> 2mg QD.    CT Head on admission 8.4.2020, showed- Worsening vasogenic edema involving the R. temporal frontal parietal region again identified increased when compared to previous CT in 5/2020.     Patient admitted to neurology floor, started on decadron 4mg q6h later increased to 8mg q8h. MR head w/ and w/o contrast with progression of disease, neuro-onc on board, patient to follow up outpatient for initiation of avastin therapy.   Patient also with urinary retention, urology consulted, suspect urinary retention is neurologic in etiology, on clean intermittent catheterization; further followup to continue at rehab.   Patient stable for discharge to acute rehab 8/7/20.     Of note--patient was admitted to  rehab after his most recent resection in May 2020.  He was at [facility] from 6/4/20  to 6/17/20.  At the time of discharge from  rehab, patient was supervision for eating, grooming, and dressing, and contact guard for transfers.  He was able to ambulate 150' with SAC with CG/min assist and to navigate 12 steps with u/l HR with min assist.  SLP at [facility] noted higher level cognitive deficits with mild impulsivity and reduced insight, necessitating supervision while at home. (07 Aug 2020 14:30)      PAST MEDICAL & SURGICAL HISTORY:  Fall, sequela  Bilateral hearing loss, unspecified hearing loss type   activity: [place] [employer] 1962-65    [place]  Type 2 diabetes mellitus without complication, without long-term current use of insulin: stop oral meds  3 weeks ago  Glioblastoma: biopsy 8/2018  surgery  6 weeks radiation  35 days of oral chemotherapy last 3 weeks  Basal cell carcinoma (BCC) of left lower leg: s/p resection  right lower leg top  left lower leg  Prostate cancer: s/p radical prostatectomy 1995  HLD (hyperlipidemia)  HTN (hypertension)  S/P colonoscopic polypectomy: 3 years benign polyps  S/P tonsillectomy: age 30  H/O bilateral cataract extraction: 3-4 years ago  S/P prostatectomy: 1995  S/P brain surgery: 8/2018  Lipoma of neck: s/p resection at age 30  Basal cell carcinoma (BCC) of lower leg: s/p resection  History of tonsillectomy: at age 30      Subjective: Patient seen and evaluated this morning. Patient ednorse to having very painful burning sensation on his rectum overnight. patient stated that he never had this type of pain before. His last BM was on 8/30. Patient still with retention. No acute medical issues noted overnight.     REVIEW OF SYMPTOMS  Positive:  depressed, left HP, cognitive deficits, burning rectal sensation, urinary retention   Denies: headache, lightheadedness, CP, SOB, abdominal pain, dysuria, nausea, constipation    Vital Signs Last 24 Hrs  T(C): 36.4 (01 Sep 2020 07:56), Max: 36.4 (01 Sep 2020 07:56)  T(F): 97.5 (01 Sep 2020 07:56), Max: 97.5 (01 Sep 2020 07:56)  HR: 67 (01 Sep 2020 07:56) (67 - 90)  BP: 136/80 (01 Sep 2020 07:56) (131/76 - 137/79)  BP(mean): --  RR: 14 (01 Sep 2020 07:56) (14 - 14)  SpO2: 94% (01 Sep 2020 07:56) (94% - 94%)    PHYSICAL EXAM  Constitutional - NAD, Comfortable  	HEENT - NCAT, EOMI  	Chest -  CTAB   	Cardio - warm and well perfused, RRR, no murmur  	Abdomen -  Soft, NTND  	Extremities - No edema, No calf tenderness   	Neurologic Exam:                 	   Cognitive -   	          Orientation: Awake, Alert,  	      	          Thought:  slow processing, perseverative, agitated  	   Speech - Fluent, Comprehensible,   	   Cranial Nerves - left visual field deficit, left facial weakness, Tongue midline, EOMI, Shoulder shrug intact  	   Motor -  LEFT hemiparesis  	   Sensory - Impaired-- extinguishes on left  	   Coordination - impaired on left  Psychiatric - depressed, anxious    RECENT LABS/IMAGING                        13.3   9.37  )-----------( 160      ( 31 Aug 2020 06:50 )             39.3     08-31    138  |  101  |  38<H>  ----------------------------<  104<H>  3.8   |  28  |  0.87    Ca    8.6      31 Aug 2020 06:50    TPro  6.3  /  Alb  2.6<L>  /  TBili  0.5  /  DBili  x   /  AST  28  /  ALT  68<H>  /  AlkPhos  75  08-31        CAPILLARY BLOOD GLUCOSE  POCT Blood Glucose.: 126 mg/dL (01 Sep 2020 12:02)  POCT Blood Glucose.: 127 mg/dL (01 Sep 2020 07:55)  POCT Blood Glucose.: 175 mg/dL (31 Aug 2020 21:02)  POCT Blood Glucose.: 267 mg/dL (31 Aug 2020 16:34)      RADIOLOGY/OTHER RESULTS    MEDICATIONS  (STANDING):  ALPRAZolam 0.5 milliGRAM(s) Oral at bedtime  ALPRAZolam 0.25 milliGRAM(s) Oral three times a day  amLODIPine   Tablet 7.5 milliGRAM(s) Oral daily  aspirin  chewable 81 milliGRAM(s) Oral daily  atorvastatin 20 milliGRAM(s) Oral at bedtime  bisacodyl 5 milliGRAM(s) Oral every 12 hours  dextrose 5%. 1000 milliLiter(s) (50 mL/Hr) IV Continuous <Continuous>  dextrose 50% Injectable 12.5 Gram(s) IV Push once  dextrose 50% Injectable 25 Gram(s) IV Push once  dextrose 50% Injectable 25 Gram(s) IV Push once  docusate sodium 100 milliGRAM(s) Oral daily  enoxaparin Injectable 40 milliGRAM(s) SubCutaneous daily  escitalopram 10 milliGRAM(s) Oral <User Schedule>  furosemide    Tablet 40 milliGRAM(s) Oral daily  insulin glargine Injectable (LANTUS) 4 Unit(s) SubCutaneous at bedtime  insulin lispro (HumaLOG) corrective regimen sliding scale   SubCutaneous three times a day before meals  insulin lispro (HumaLOG) corrective regimen sliding scale   SubCutaneous at bedtime  insulin lispro Injectable (HumaLOG) 14 Unit(s) SubCutaneous three times a day before meals  levETIRAcetam 1000 milliGRAM(s) Oral two times a day  lidocaine   Patch 1 Patch Transdermal daily  melatonin 3 milliGRAM(s) Oral at bedtime  metoprolol succinate ER 25 milliGRAM(s) Oral daily  pantoprazole    Tablet 40 milliGRAM(s) Oral before breakfast  polyethylene glycol 3350 17 Gram(s) Oral two times a day  senna 2 Tablet(s) Oral at bedtime  triamcinolone 0.1% Cream 1 Application(s) Topical two times a day    MEDICATIONS  (PRN):  acetaminophen   Tablet .. 650 milliGRAM(s) Oral every 6 hours PRN Temp greater or equal to 38C (100.4F), Mild Pain (1 - 3)  ALPRAZolam 0.25 milliGRAM(s) Oral every 8 hours PRN Anxiety  benzocaine 15 mG/menthol 3.6 mG (Sugar-Free) Lozenge 1 Lozenge Oral two times a day PRN Sore Throat  bisacodyl Suppository 10 milliGRAM(s) Rectal daily PRN Constipation  dextrose 40% Gel 15 Gram(s) Oral once PRN Blood Glucose LESS THAN 70 milliGRAM(s)/deciliter  glucagon  Injectable 1 milliGRAM(s) IntraMuscular once PRN Glucose LESS THAN 70 milligrams/deciliter  ibuprofen  Tablet. 400 milliGRAM(s) Oral every 8 hours PRN Moderate Pain (4 - 6)

## 2020-09-01 NOTE — PROGRESS NOTE ADULT - ASSESSMENT
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     GBM with vaso genic edema  --Pt. with poor prognosis-- Spoke with Dr. Del Cid regarding option of Avastin for treatment-- feels it is high risk for patient due to risk of thrombosis and difficulty with safe transportation to obtain. He has contacted pt. and d/w him as well    --palliative care following--d/w Dr. Mustafa --discussed advanced care directives/MOLST form with pt.-- pt. expressed he wants to be DNR/DNI  - tapered off decadron on 8/31   - Cont. Keppra 1000mg BID for Seizure ppx,  - c/w PPI for GI ppx on steroids  - continue Comprehensive Rehab Program of PT/OT & speech 3 hrs/day x 5days/week      HTN--BP controlled  - Metoprolol XL 25mg + Lasix 40mg Daily  - Amlodipine to 7.5mg daily    Urinary Retention -   - As per urology recs 8/28:  flomax stopped since patient is s/p prostatectomy. bowel care, avoid anticholinergics if possible, mobilize, IC prn.  - As per uro Could consider bethanecol but would prefer to eliminate anticholinergics first,   --d/w psych if pt can d/c zyprexa-- stopped zyprexa and added xanax 0.5mg qhs on 8/31 (could be contributing to urinary retention)   - Flomac dc'd as per uro recs 8/28   --voiding--incontinent at times  - Optimize Bowel regimen   - Mobilize    Diabetes type 2  - FS monitoring and ISS  -Insulin Adjusted 8/25-- Humalog 14 units TID,  & Lantus 4 units QHS 8/25   --FS controlled--may need further adjustment off decadron.  - insulin coverage as per hospitalist  --nursing order to train family how to administer insulin injections.     Mood/ Cognition  - Anxiety and paranoid thought process---Zyorexa dc'd   -Depressed/Anxious-- increased lexapro 10mg   -Pysch following, currently without suicidal ideations   -Neuropsych consulted- recs appreciated   -Continue xamax .5mg  qhs, started 8/31   -continue Xanax PRN  -cont.  Xanax TID  -Psych following --spoke with Dr. Beavers--pt. has decision making capacity    Hemorrhoids?   -Started on preparation H 9/21   -Last BM 8/30       GI/Bowel:   - Senna, dulcolax, Miralax  -Last BM 8/30     Diet/ dysphagia:   - regular/easy to chew with thin     ENT evaluation for poor vocal quality-> no abnormalities found     Stage I pressure ulcer  - offloading, turn q.2h, barrier cream    LE edema  - compression stockings  - Lasix as above    Pain  - Tylenol PRN      Functional Prognosis / Discharge Planning  - Meeting held on 9/1   - Nursing needs: urinary retention  - SW: lives with Adult son and spouse, used cane prior   - OT: Not much progress, Max A for dressing, max A transfers, poor sitting balance   - PT: Not much progress, max A for transfers, can propel WC with min to mod A  - SLP: poor insight with perseverance on not going to [facility]   - Barriers: left visual field cut, flaccid weakness,  - MAGDALENA: d/c home with home  Hospice -- 9/7?? 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     GBM with vaso genic edema  --Pt. with poor prognosis-- Spoke with Dr. Del Cid regarding option of Avastin for treatment-- feels it is high risk for patient due to risk of thrombosis and difficulty with safe transportation to obtain. He has contacted pt. and d/w him as well    --palliative care following--d/w Dr. Mustafa --discussed advanced care directives/MOLST form with pt.-- pt. expressed he wants to be DNR/DNI  - tapered off decadron on 8/31   - Cont. Keppra 1000mg BID for Seizure ppx,  - c/w PPI for GI ppx on steroids  - continue Comprehensive Rehab Program of PT/OT & speech 3 hrs/day x 5days/week      HTN--BP controlled  - Metoprolol XL 25mg + Lasix 40mg Daily  - Amlodipine to 7.5mg daily    Urinary Retention -   - As per urology recs 8/28:  flomax stopped since patient is s/p prostatectomy. bowel care, avoid anticholinergics if possible, mobilize, IC prn.  - As per uro Could consider bethanecol but would prefer to eliminate anticholinergics first,   --d/w psych if pt can d/c zyprexa-- stopped zyprexa and added xanax 0.5mg qhs on 8/31 (could be contributing to urinary retention)   - Flomac dc'd as per uro recs 8/28   --voiding--incontinent at times  - Optimize Bowel regimen   - Mobilize    Diabetes type 2  - FS monitoring and ISS  -Insulin Adjusted 8/25-- Humalog 14 units TID,  & Lantus 4 units QHS 8/25   --FS controlled--may need further adjustment off decadron.  - insulin coverage as per hospitalist  --nursing order to train family how to administer insulin injections.     Mood/ Cognition  - Anxiety and paranoid thought process---Zyorexa dc'd   -Depressed/Anxious-- increased lexapro 10mg   -Pysch following, currently without suicidal ideations   -Neuropsych consulted- recs appreciated   -Continue xamax .5mg  qhs, started 8/31   -continue Xanax PRN  -cont.  Xanax TID  -Psych following --spoke with Dr. Beavers--pt. has decision making capacity    Hemorrhoids?   -Started on preparation H 9/21   -Last BM 8/30       GI/Bowel:   - Senna, dulcolax, Miralax  -Last BM 8/30     Diet/ dysphagia:   - regular/easy to chew with thin     ENT evaluation for poor vocal quality-> no abnormalities found     Stage I pressure ulcer  - offloading, turn q.2h, barrier cream    LE edema  - compression stockings  - Lasix as above    Pain  - Tylenol PRN      Functional Prognosis / Discharge Planning  - Meeting held on 9/1   - Nursing needs: urinary retention  - SW: lives with Adult son and spouse, used cane prior   - OT: Not much progress, Max A for all ADLs   - PT: Not much progress, Needs mechanical lift with 2 people assist   - SLP: Memory slightly improved but not much process   - Barriers: left visual field cut, flaccid weakness,  - MAGDALENA: d/c home with home  Hospice -- 9/7??  will possibly need mechanical lift when dc home 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     GBM with vaso genic edema  --Pt. with poor prognosis-- Spoke with Dr. Del Cid regarding option of Avastin for treatment-- feels it is high risk for patient due to risk of thrombosis and difficulty with safe transportation to obtain. He has contacted pt. and d/w him as well    --palliative care following--d/w Dr. Mustafa --discussed advanced care directives/MOLST form with pt.-- pt. expressed he wants to be DNR/DNI  - tapered off decadron on 8/31   - Cont. Keppra 1000mg BID for Seizure ppx,  - c/w PPI for GI ppx on steroids  - continue Comprehensive Rehab Program of PT/OT & speech 3 hrs/day x 5days/week      HTN--BP controlled  - Metoprolol XL 25mg + Lasix 40mg Daily  - Amlodipine to 7.5mg daily    Urinary Retention -   - As per urology recs 8/28:  flomax stopped since patient is s/p prostatectomy. bowel care, avoid anticholinergics if possible, mobilize, IC prn.  - As per uro --stopped zyprexa (could be contributing to urinary retention) -- d/w with Psych  Could consider bethanecol as per urology-- if pt. still retaining tomorrow --will start  --voiding--PVRs 550, 38--monitor  - Optimize Bowel regimen   - Mobilize    Diabetes type 2  - FS monitoring and ISS  -Insulin Adjusted 8/25-- Humalog 14 units TID,  & Lantus 4 units QHS 8/25   --FS controlled--may need further adjustment off decadron.  - insulin coverage as per hospitalist  --nursing order to train family how to administer insulin injections.     Mood/ Cognition  - Anxiety and paranoid thought process---Zyprexa dc'd   -Depressed/Anxious-- cont.  lexapro 10mg --increased 8/31  -Pysch following, currently without suicidal ideations --d/c 1:1 supervision  -Neuropsych consulted- recs appreciated   -Continue xamax .5mg  qhs, started 8/31   -continue Xanax PRN  -cont.  Xanax TID  -Psych following --spoke with Dr. Beavers--pt. has decision making capacity    Hemorrhoids?   -Started on preparation H 9/21   -Last BM 8/30       GI/Bowel:   - Senna, dulcolax, Miralax  -Last BM 8/30     Diet/ dysphagia:   - regular/easy to chew with thin     ENT evaluation for poor vocal quality-> no abnormalities found     Stage I pressure ulcer  - offloading, turn q.2h, barrier cream    LE edema  - compression stockings  - Lasix as above    Pain  - Tylenol PRN      Functional Prognosis / Discharge Planning  - Meeting held on 9/1   - Nursing needs: urinary retention  - SW: lives with Adult son and spouse, used cane prior   - OT: Not much progress, Max A for all ADLs   - PT: Not much progress, Needs mechanical lift with 2 people assist   - SLP: Memory slightly improved but not much process   - Barriers: left visual field cut, flaccid weakness,  - MAGDALENA: d/c home with home  Hospice -- 9/7  will possibly need mechanical lift when dc home

## 2020-09-01 NOTE — CONSULT NOTE ADULT - ASSESSMENT
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 He exhibited difficulties with time orientation, concentration, working memory (both serial calculations & backward spelling), short-term memory for verbal information, visuospatial skills (visuomotor integration), and executive functions (abstract reasoning, problem solving, organizational skills). Also, speed of cognitive processing is slow. His affect is constricted, and he reports a few depressive symptoms including depression, tearfulness, anxiety, helplessness/hopelessness, poor sleep, low energy and fatigue. Also, he appears to experience benign VH. Pt will need significant supervision upon discharge due to cognitive deficits. FIM scores: Social Interaction 5; Problem Solving 4; Memory 4.     Plan: Individual supportive psychotherapy to monitor cognition, affect/mood, and behavior. Continue with current psychotropic medication regimen. Cognitive remediation during speech therapy sessions is strongly recommended. Participation in recreation/art therapy in order to have pleasure and mastery experiences and regain/reestablish a sense of routine.

## 2020-09-01 NOTE — CONSULT NOTE ADULT - SUBJECTIVE AND OBJECTIVE BOX
Pt is a 78 y/o, right-handed male with PMHx of HTN, HLD, prostate cancer s/p prostectomy, basal cell skin cancer, GBM (dx in 2018, s/p resection 8/3/18, s/p 2nd resection 2/7/19, s/p 3rd resection 5/2020 s/p multiple does of Temodar admitted to [facility] on 8/4/20 w/ worsening of his left-sided weakness. No other complaints. Denied changes in speech, vision, hearing, swallowing, voice quality, numbness/tingling,  balance/room-spinning sensations. He was seen in Dr. Harman's office on 7/27/20 where he was noted to have worsening strength, lethargy and decreased appetite. He had been tapering his steroids for over a month. Recently tapered from 4mg QD --> 3mg QD ---> 2mg QD.  CT Head on admission 8.4.2020, showed worsening vasogenic edema involving the right temporal frontal parietal region again identified increased when compared to previous CT in 5/2020. Pt admitted to neurology floor, started on decadron 4mg q6h later increased to 8mg q8h. MR head w/ and w/o contrast with progression of disease, neuro-onc on board, Pt to follow up outpatient for initiation of Avastin therapy.  Pt also with urinary retention, urology consulted, suspect urinary retention is neurologic in etiology, on clean intermittent catheterization; further followup to continue at rehab. Pt stable for discharge to acute rehab 8/7/20. Of note, Pt was admitted to  rehab after his most recent resection in May 2020.  He was at [facility] from 6/4/20  to 6/17/20.  At the time of discharge from  rehab, Pt was supervision for eating, grooming, and dressing, and contact guard for transfers.  He was able to ambulate 150' with SAC with CG/min assist and to navigate 12 steps with u/l HR with min assist.  SLP at [facility] noted higher level cognitive deficits with mild impulsivity and reduced insight, necessitating supervision while at home. PMHx:   . Current meds: Lexapro 10 mg QD, Alprazolam 0.5 mg HS/0.25 mg TID/0.25mg q8 hrs PRN anxiety. Please see list in Pt’s chart. Social Hx:      Findings: Pt was seen for an initial assessment of his/her cognitive and emotional functioning. MoCA was administered; Pt’s results (/30) were in the range. His/Her scores in mood measures suggested levels of anxiety and depression (GAD7 = /21; PHQ9 = /27). Pt was alert,  Ox3, and cooperative.     Attn/Conc: Simple auditory attention - . Sustained auditory attention - . Concentration - . Working memory for calculations – ( /5 serial 7s). Language: Pt’s speech was of  . Naming - . Sentence repetition - . Phonemic fluency - .Memory: Encoding of 5 words was (/5); delayed verbal recall – (/5, improving to /5 with cueing). LTM was for US presidents (/4, improving to /4 with cueing). Visual memory –. Visuospatial: Visuomotor integration – for copy of a 3D figure, was noted. Executive Functions: Set-shifting - . Organizational skills - . Abstract reasoning - . Initiation - . Self-awareness - . Verbal problem solving – .      Emotional functioning: Affect - . Mood - ; Pt reported experiencing . On mood measures s/he additionally reported .  Thought processes were.  No abnormal thought contents were observed.  Pt denied any AH/VH. Pt also denied SI/HI/I/P. Insight - WFL. Judgment - fair.          Assessment:          FIM scores: Social Interaction ; Problem Solving ; Memory .     Plan: Individual supportive psychotherapy to monitor cognition, affect/mood, and behavior. Cognitive remediation during speech therapy sessions. Participation in recreation/art therapy in order to have pleasure and mastery experiences and regain/reestablish a sense of routine. Pt is a 80 y/o, right-handed male with PMHx of HTN, HLD, prostate cancer s/p prostectomy, basal cell skin cancer, GBM (dx in 2018, s/p resection 8/3/18, s/p 2nd resection 2/7/19, s/p 3rd resection 5/2020 s/p multiple does of Temodar admitted to [facility] on 8/4/20 w/ worsening of his left-sided weakness. No other complaints. Denied changes in speech, vision, hearing, swallowing, voice quality, numbness/tingling,  balance/room-spinning sensations. He was seen in Dr. Harman's office on 7/27/20 where he was noted to have worsening strength, lethargy and decreased appetite. He had been tapering his steroids for over a month. Recently tapered from 4mg QD --> 3mg QD ---> 2mg QD.  CT Head on admission 8.4.2020, showed worsening vasogenic edema involving the right temporal frontal parietal region again identified increased when compared to previous CT in 5/2020. Pt admitted to neurology floor, started on decadron 4mg q6h later increased to 8mg q8h. MR head w/ and w/o contrast with progression of disease, neuro-onc on board, Pt to follow up outpatient for initiation of Avastin therapy.  Pt also with urinary retention, urology consulted, suspect urinary retention is neurologic in etiology, on clean intermittent catheterization; further followup to continue at rehab. Pt stable for discharge to acute rehab 8/7/20. Of note, Pt was admitted to  rehab after his most recent resection in May 2020.  He was at [facility] from 6/4/20  to 6/17/20.  At the time of discharge from  rehab, Pt was supervision for eating, grooming, and dressing, and contact guard for transfers.  He was able to ambulate 150' with SAC with CG/min assist and to navigate 12 steps with u/l HR with min assist.  SLP at [facility] noted higher level cognitive deficits with mild impulsivity and reduced insight, necessitating supervision while at home. PMHx: As noted above. Current meds: Lexapro 10 mg QD, Alprazolam 0.5 mg HS/0.25 mg TID/0.25mg q8 hrs PRN anxiety. Please see list in Pt’s chart. Social Hx:  Pt is  and has two adult children. He completed some college and is a retired . He has hx of anxiety, smoking and etoh. He is [employer]. Pt enjoys watching baseball games.    Findings: Pt was seen for an initial assessment of his cognitive and emotional functioning. MMSE was administered; Pt’s results (19/30) were in the Moderate Impairment range. His scores in a measure of depression in the elderly suggested levels of depression (GDS = /15). Pt was alert, partly Ox3 (disoriented to exact date, day of the week and season), and cooperative. Attn/Conc: Simple auditory attention - intact.  Concentration - impaired. Working memory for calculations – significantly impaired (0/5 serial 7s); backward spelling was moderately impaired (1/5). Language: Pt’s speech was of normal volume, pitch, with variable pace. Naming - intact. Sentence repetition - intact. Memory: Encoding of 3 words was intact (3/3); short-delayed verbal recall – significantly impaired (0/3, improving to 3/3 with logical cueing). LTM was for US presidents (/4, improving to /4 with cueing). Visual memory – . Visuospatial: Visuomotor integration – impaired for copy of a 3D figure, extreme simplification of the original stimulus was noted. Executive Functions: Motor planning - WNL. Organizational skills - . Abstract reasoning - . Verbal problem solving – .      Emotional functioning: Affect - constricted, depressed. Mood - dysphoric ("I've felt better"); Pt reported experiencing anxiety, depression, crying spells, worry, helplessness/hopelessness, poor sleep, low energy, and fatigue. On a mood measure he additionally reported .  Thought processes were goal-directed.  No abnormal thought contents were observed.  Pt reported VH (i.e., seeing airplanes); he denied any AH. Pt also denied SI/HI/I/P. Insight - WFL. Judgment - .          Assessment:          FIM scores: Social Interaction ; Problem Solving ; Memory .     Plan: Individual supportive psychotherapy to monitor cognition, affect/mood, and behavior. Cognitive remediation during speech therapy sessions. Participation in recreation/art therapy in order to have pleasure and mastery experiences and regain/reestablish a sense of routine. Pt is a 80 y/o, right-handed male with PMHx of HTN, HLD, prostate cancer s/p prostectomy, basal cell skin cancer, GBM (dx in 2018, s/p resection 8/3/18, s/p 2nd resection 2/7/19, s/p 3rd resection 5/2020 s/p multiple does of Temodar admitted to [facility] on 8/4/20 w/ worsening of his left-sided weakness. No other complaints. Denied changes in speech, vision, hearing, swallowing, voice quality, numbness/tingling,  balance/room-spinning sensations. He was seen in Dr. Harman's office on 7/27/20 where he was noted to have worsening strength, lethargy and decreased appetite. He had been tapering his steroids for over a month. Recently tapered from 4mg QD --> 3mg QD ---> 2mg QD.  CT Head on admission 8.4.2020, showed worsening vasogenic edema involving the right temporal frontal parietal region again identified increased when compared to previous CT in 5/2020. Pt admitted to neurology floor, started on decadron 4mg q6h later increased to 8mg q8h. MR head w/ and w/o contrast with progression of disease, neuro-onc on board, Pt to follow up outpatient for initiation of Avastin therapy.  Pt also with urinary retention, urology consulted, suspect urinary retention is neurologic in etiology, on clean intermittent catheterization; further followup to continue at rehab. Pt stable for discharge to acute rehab 8/7/20. Of note, Pt was admitted to  rehab after his most recent resection in May 2020.  He was at [facility] from 6/4/20  to 6/17/20.  At the time of discharge from  rehab, Pt was supervision for eating, grooming, and dressing, and contact guard for transfers.  He was able to ambulate 150' with SAC with CG/min assist and to navigate 12 steps with u/l HR with min assist.  SLP at [facility] noted higher level cognitive deficits with mild impulsivity and reduced insight, necessitating supervision while at home. PMHx: As noted above. Current meds: Lexapro 10 mg QD, Alprazolam 0.5 mg HS/0.25 mg TID/0.25mg q8 hrs PRN anxiety. Please see list in Pt’s chart. Social Hx:  Pt is  and has two adult children. He completed some college and is a retired . He has hx of anxiety, smoking and etoh. He is [employer]. Pt enjoys watching baseball games.    Findings: Pt was seen for an initial assessment of his cognitive and emotional functioning. MMSE was administered; Pt’s results (19/30) were in the Moderate Impairment range. Pt was alert, partly Ox3 (disoriented to exact date, day of the week and season), and cooperative. Attn/Conc: Simple auditory attention - intact.  Concentration - impaired. Working memory for calculations – significantly impaired (0/5 serial 7s); backward spelling was moderately impaired (1/5). Slow cognitive processing was noted. Language: Pt’s speech was of normal volume, pitch, with variable pace. Naming - intact. Sentence repetition - intact. Memory: Encoding of 3 words was intact (3/3); short-delayed verbal recall – significantly impaired (0/3, improving to 3/3 with logical cueing). LTM was moderately impaired for US presidents (2/4, improving to 4/4 with cueing). Visual memory – impaired. Visuospatial: Visuomotor integration – impaired for copy of a 3D figure, extreme simplification of the original stimulus was noted. Executive Functions: Motor planning - WNL. Organizational skills - moderately impaired. Abstract reasoning - mildly impaired. Verbal problem solving – mildly impaired. Emotional functioning: Affect - constricted, depressed. Mood - dysphoric ("I've felt better"); Pt reported experiencing anxiety, depression, crying spells, worry, helplessness/hopelessness, poor sleep, low energy, and fatigue. Thought processes were goal-directed.  No abnormal thought contents were observed.  Pt reported VH (i.e., seeing airplanes); he denied any AH. Pt also denied SI/HI/I/P. Insight - WFL. Judgment - fair.

## 2020-09-01 NOTE — GOALS OF CARE CONVERSATION - ADVANCED CARE PLANNING - CONVERSATION DETAILS
Met with patient again at bedside. His wife and son are with him, and we discussed goals of home hospice; comfort, staying home for needed care. Allowing hospice MD and RN to help decide how and what to treat to meet these goals. Also reviewed MOLST. Pt. agreed to the following: DNR/I, comfort care, do not send to hospital unless pain or other severe sx. cannot otherwise be controlled, no feeding tube, limited use of abx., no IV.   Also reviewed that this form may be changed or voided. Patient and family expressed understanding.   Plan is to go home on home hospice.
Met with patient at bedside. He asked what I was there for, and I explained I wanted to discuss possible advanced directives. He stated he was "not going to sign anything' and that "you don't need to come back later".
Re-approached patient, wife Yulissa at bedside. He apologized for speaking harshly earlier today. He still did not want to sign anything, but was willing to listen to me explain the purpose of a HCP form and MOLST form. He was unsure if he would want attempted resuscitation or intubation in the event of cardiac arrest or cardiopulmonary decompensation. His wife said she wanted  "to try" to get him back. He continued to be unsure and asked that I leave the forms for him to read over.

## 2020-09-01 NOTE — PROGRESS NOTE ADULT - SUBJECTIVE AND OBJECTIVE BOX
Patient is a 79y old  Male who presents with a chief complaint of Glioblastoma (31 Aug 2020 16:20)    HPI:  79 RHM w/ PMH of HTN, HLD, prostate cancer s/p prostectomy, basal cell skin cancer, GBM (dx in 2018, s/p resection 8/3/18, s/p 2nd resection 2/7/19, s/p 3rd resection 5/2020 s/p multiple does of Temodar admitted to [facility] on 8/4/20 w/ worsening of his L. sided weakness. No other complaints. Denied changes in speech, vision, hearing, swallowing, voice quality, numbness/tingling,  balance/room-spinning sensations.    He was seen in Dr. Harman's office on 7/27/20 where he was noted to have worsening strength, lethargy and decreased appetitie. He had been tapering his steroids for over a month. Recently tapered from 4mg QD --> 3mg QD ---> 2mg QD.    CT Head on admission 8.4.2020, showed- Worsening vasogenic edema involving the R. temporal frontal parietal region again identified increased when compared to previous CT in 5/2020.     Patient admitted to neurology floor, started on decadron 4mg q6h later increased to 8mg q8h. MR head w/ and w/o contrast with progression of disease, neuro-onc on board, patient to follow up outpatient for initiation of avastin therapy.   Patient also with urinary retention, urology consulted, suspect urinary retention is neurologic in etiology, on clean intermittent catheterization; further followup to continue at rehab.   Patient stable for discharge to acute rehab 8/7/20.     Of note--patient was admitted to  rehab after his most recent resection in May 2020.  He was at [facility] from 6/4/20  to 6/17/20.  At the time of discharge from  rehab, patient was supervision for eating, grooming, and dressing, and contact guard for transfers.  He was able to ambulate 150' with SAC with CG/min assist and to navigate 12 steps with u/l HR with min assist.  SLP at [facility] noted higher level cognitive deficits with mild impulsivity and reduced insight, necessitating supervision while at home. (07 Aug 2020 14:30)    still with voiding difficulty, constipated and IC required	    Interval Events:  Patient seen and examined at bedside.    MEDICATIONS:  MEDICATIONS  (STANDING):  ALPRAZolam 0.5 milliGRAM(s) Oral at bedtime  ALPRAZolam 0.25 milliGRAM(s) Oral three times a day  amLODIPine   Tablet 7.5 milliGRAM(s) Oral daily  aspirin  chewable 81 milliGRAM(s) Oral daily  atorvastatin 20 milliGRAM(s) Oral at bedtime  bisacodyl 5 milliGRAM(s) Oral every 12 hours  dextrose 5%. 1000 milliLiter(s) (50 mL/Hr) IV Continuous <Continuous>  dextrose 50% Injectable 12.5 Gram(s) IV Push once  dextrose 50% Injectable 25 Gram(s) IV Push once  dextrose 50% Injectable 25 Gram(s) IV Push once  docusate sodium 100 milliGRAM(s) Oral daily  enoxaparin Injectable 40 milliGRAM(s) SubCutaneous daily  escitalopram 10 milliGRAM(s) Oral <User Schedule>  furosemide    Tablet 40 milliGRAM(s) Oral daily  insulin glargine Injectable (LANTUS) 4 Unit(s) SubCutaneous at bedtime  insulin lispro (HumaLOG) corrective regimen sliding scale   SubCutaneous three times a day before meals  insulin lispro (HumaLOG) corrective regimen sliding scale   SubCutaneous at bedtime  insulin lispro Injectable (HumaLOG) 14 Unit(s) SubCutaneous three times a day before meals  levETIRAcetam 1000 milliGRAM(s) Oral two times a day  lidocaine   Patch 1 Patch Transdermal daily  melatonin 3 milliGRAM(s) Oral at bedtime  metoprolol succinate ER 25 milliGRAM(s) Oral daily  pantoprazole    Tablet 40 milliGRAM(s) Oral before breakfast  polyethylene glycol 3350 17 Gram(s) Oral two times a day  senna 2 Tablet(s) Oral at bedtime  triamcinolone 0.1% Cream 1 Application(s) Topical two times a day    MEDICATIONS  (PRN):  acetaminophen   Tablet .. 650 milliGRAM(s) Oral every 6 hours PRN Temp greater or equal to 38C (100.4F), Mild Pain (1 - 3)  ALPRAZolam 0.25 milliGRAM(s) Oral every 8 hours PRN Anxiety  benzocaine 15 mG/menthol 3.6 mG (Sugar-Free) Lozenge 1 Lozenge Oral two times a day PRN Sore Throat  bisacodyl Suppository 10 milliGRAM(s) Rectal daily PRN Constipation  dextrose 40% Gel 15 Gram(s) Oral once PRN Blood Glucose LESS THAN 70 milliGRAM(s)/deciliter  glucagon  Injectable 1 milliGRAM(s) IntraMuscular once PRN Glucose LESS THAN 70 milligrams/deciliter  ibuprofen  Tablet. 400 milliGRAM(s) Oral every 8 hours PRN Moderate Pain (4 - 6)      Allergies    No Known Allergies    Intolerances        T(C): 36.4 (09-01-20 @ 07:56), Max: 36.8 (08-31-20 @ 09:19)  T(F): 97.5 (09-01-20 @ 07:56), Max: 98.2 (08-31-20 @ 09:19)  HR: 67 (09-01-20 @ 07:56) (61 - 90)  BP: 136/80 (09-01-20 @ 07:56) (124/76 - 144/85)  RR: 14 (09-01-20 @ 07:56) (14 - 14)  SpO2: 94% (09-01-20 @ 07:56) (93% - 95%)          08-30-20 @ 07:01  -  08-31-20 @ 07:00  --------------------------------------------------------  IN:  Total IN: 0 mL    OUT:    Intermittent Catheterization - Urethral: 850 mL  Total OUT: 850 mL    Total NET: -850 mL          LABS:      CBC Full  -  ( 31 Aug 2020 06:50 )  WBC Count : 9.37 K/uL  RBC Count : 4.09 M/uL  Hemoglobin : 13.3 g/dL  Hematocrit : 39.3 %  Platelet Count - Automated : 160 K/uL  Mean Cell Volume : 96.1 fl  Mean Cell Hemoglobin : 32.5 pg  Mean Cell Hemoglobin Concentration : 33.8 gm/dL  Auto Neutrophil # : 7.11 K/uL  Auto Lymphocyte # : 1.38 K/uL  Auto Monocyte # : 0.27 K/uL  Auto Eosinophil # : 0.03 K/uL  Auto Basophil # : 0.06 K/uL  Auto Neutrophil % : 76.0 %  Auto Lymphocyte % : 14.7 %  Auto Monocyte % : 2.9 %  Auto Eosinophil % : 0.3 %  Auto Basophil % : 0.6 %    08-31    138  |  101  |  38<H>  ----------------------------<  104<H>  3.8   |  28  |  0.87    Ca    8.6      31 Aug 2020 06:50    TPro  6.3  /  Alb  2.6<L>  /  TBili  0.5  /  DBili  x   /  AST  28  /  ALT  68<H>  /  AlkPhos  75  08-31                  Physical Exam    Constitutional: alert, no acute distress    Abdomen: soft, nontender, nondistended, no HSM    Genitourinary: no bladder distention

## 2020-09-01 NOTE — GOALS OF CARE CONVERSATION - ADVANCED CARE PLANNING - TREATMENT GUIDELINES
Comfort measures only/DNR Order/No artificial nutrition/Antibiotic trial/No IV fluids/Do not re-hospitalize

## 2020-09-02 LAB
ANION GAP SERPL CALC-SCNC: 8 MMOL/L — SIGNIFICANT CHANGE UP (ref 5–17)
APPEARANCE UR: ABNORMAL
BASOPHILS # BLD AUTO: 0.02 K/UL — SIGNIFICANT CHANGE UP (ref 0–0.2)
BASOPHILS NFR BLD AUTO: 0.2 % — SIGNIFICANT CHANGE UP (ref 0–2)
BILIRUB UR-MCNC: NEGATIVE — SIGNIFICANT CHANGE UP
BUN SERPL-MCNC: 39 MG/DL — HIGH (ref 7–23)
CALCIUM SERPL-MCNC: 8.5 MG/DL — SIGNIFICANT CHANGE UP (ref 8.4–10.5)
CHLORIDE SERPL-SCNC: 101 MMOL/L — SIGNIFICANT CHANGE UP (ref 96–108)
CO2 SERPL-SCNC: 31 MMOL/L — SIGNIFICANT CHANGE UP (ref 22–31)
COLOR SPEC: YELLOW — SIGNIFICANT CHANGE UP
CREAT SERPL-MCNC: 0.91 MG/DL — SIGNIFICANT CHANGE UP (ref 0.5–1.3)
DIFF PNL FLD: ABNORMAL
EOSINOPHIL # BLD AUTO: 0.02 K/UL — SIGNIFICANT CHANGE UP (ref 0–0.5)
EOSINOPHIL NFR BLD AUTO: 0.2 % — SIGNIFICANT CHANGE UP (ref 0–6)
GLUCOSE BLDC GLUCOMTR-MCNC: 102 MG/DL — HIGH (ref 70–99)
GLUCOSE BLDC GLUCOMTR-MCNC: 109 MG/DL — HIGH (ref 70–99)
GLUCOSE BLDC GLUCOMTR-MCNC: 141 MG/DL — HIGH (ref 70–99)
GLUCOSE BLDC GLUCOMTR-MCNC: 67 MG/DL — LOW (ref 70–99)
GLUCOSE BLDC GLUCOMTR-MCNC: 78 MG/DL — SIGNIFICANT CHANGE UP (ref 70–99)
GLUCOSE BLDC GLUCOMTR-MCNC: 87 MG/DL — SIGNIFICANT CHANGE UP (ref 70–99)
GLUCOSE BLDC GLUCOMTR-MCNC: 88 MG/DL — SIGNIFICANT CHANGE UP (ref 70–99)
GLUCOSE BLDC GLUCOMTR-MCNC: 89 MG/DL — SIGNIFICANT CHANGE UP (ref 70–99)
GLUCOSE BLDC GLUCOMTR-MCNC: 98 MG/DL — SIGNIFICANT CHANGE UP (ref 70–99)
GLUCOSE SERPL-MCNC: 64 MG/DL — LOW (ref 70–99)
GLUCOSE UR QL: NEGATIVE — SIGNIFICANT CHANGE UP
HCT VFR BLD CALC: 38.1 % — LOW (ref 39–50)
HGB BLD-MCNC: 12.7 G/DL — LOW (ref 13–17)
IMM GRANULOCYTES NFR BLD AUTO: 4 % — HIGH (ref 0–1.5)
KETONES UR-MCNC: NEGATIVE — SIGNIFICANT CHANGE UP
LACTATE SERPL-SCNC: 1.7 MMOL/L — SIGNIFICANT CHANGE UP (ref 0.7–2)
LEUKOCYTE ESTERASE UR-ACNC: ABNORMAL
LYMPHOCYTES # BLD AUTO: 0.77 K/UL — LOW (ref 1–3.3)
LYMPHOCYTES # BLD AUTO: 9.1 % — LOW (ref 13–44)
MCHC RBC-ENTMCNC: 32.1 PG — SIGNIFICANT CHANGE UP (ref 27–34)
MCHC RBC-ENTMCNC: 33.3 GM/DL — SIGNIFICANT CHANGE UP (ref 32–36)
MCV RBC AUTO: 96.2 FL — SIGNIFICANT CHANGE UP (ref 80–100)
MONOCYTES # BLD AUTO: 0.18 K/UL — SIGNIFICANT CHANGE UP (ref 0–0.9)
MONOCYTES NFR BLD AUTO: 2.1 % — SIGNIFICANT CHANGE UP (ref 2–14)
NEUTROPHILS # BLD AUTO: 7.09 K/UL — SIGNIFICANT CHANGE UP (ref 1.8–7.4)
NEUTROPHILS NFR BLD AUTO: 84.4 % — HIGH (ref 43–77)
NITRITE UR-MCNC: POSITIVE
NRBC # BLD: 0 /100 WBCS — SIGNIFICANT CHANGE UP (ref 0–0)
PH UR: 7 — SIGNIFICANT CHANGE UP (ref 5–8)
PLATELET # BLD AUTO: 154 K/UL — SIGNIFICANT CHANGE UP (ref 150–400)
POTASSIUM SERPL-MCNC: 3.5 MMOL/L — SIGNIFICANT CHANGE UP (ref 3.5–5.3)
POTASSIUM SERPL-SCNC: 3.5 MMOL/L — SIGNIFICANT CHANGE UP (ref 3.5–5.3)
PROT UR-MCNC: 30 MG/DL
RBC # BLD: 3.96 M/UL — LOW (ref 4.2–5.8)
RBC # FLD: 13.3 % — SIGNIFICANT CHANGE UP (ref 10.3–14.5)
SODIUM SERPL-SCNC: 140 MMOL/L — SIGNIFICANT CHANGE UP (ref 135–145)
SP GR SPEC: 1.01 — SIGNIFICANT CHANGE UP (ref 1.01–1.02)
UROBILINOGEN FLD QL: 1
WBC # BLD: 8.42 K/UL — SIGNIFICANT CHANGE UP (ref 3.8–10.5)
WBC # FLD AUTO: 8.42 K/UL — SIGNIFICANT CHANGE UP (ref 3.8–10.5)

## 2020-09-02 PROCEDURE — 99233 SBSQ HOSP IP/OBS HIGH 50: CPT

## 2020-09-02 PROCEDURE — 93010 ELECTROCARDIOGRAM REPORT: CPT

## 2020-09-02 PROCEDURE — 71045 X-RAY EXAM CHEST 1 VIEW: CPT | Mod: 26

## 2020-09-02 RX ORDER — SODIUM CHLORIDE 9 MG/ML
500 INJECTION, SOLUTION INTRAVENOUS
Refills: 0 | Status: DISCONTINUED | OUTPATIENT
Start: 2020-09-02 | End: 2020-09-03

## 2020-09-02 RX ORDER — CEFTRIAXONE 500 MG/1
1000 INJECTION, POWDER, FOR SOLUTION INTRAMUSCULAR; INTRAVENOUS ONCE
Refills: 0 | Status: COMPLETED | OUTPATIENT
Start: 2020-09-02 | End: 2020-09-02

## 2020-09-02 RX ORDER — SODIUM CHLORIDE 9 MG/ML
1000 INJECTION, SOLUTION INTRAVENOUS
Refills: 0 | Status: DISCONTINUED | OUTPATIENT
Start: 2020-09-02 | End: 2020-09-02

## 2020-09-02 RX ORDER — BETHANECHOL CHLORIDE 25 MG
5 TABLET ORAL
Refills: 0 | Status: DISCONTINUED | OUTPATIENT
Start: 2020-09-02 | End: 2020-09-03

## 2020-09-02 RX ORDER — IBUPROFEN 200 MG
400 TABLET ORAL EVERY 8 HOURS
Refills: 0 | Status: DISCONTINUED | OUTPATIENT
Start: 2020-09-02 | End: 2020-09-04

## 2020-09-02 RX ORDER — CEFTRIAXONE 500 MG/1
1000 INJECTION, POWDER, FOR SOLUTION INTRAMUSCULAR; INTRAVENOUS EVERY 24 HOURS
Refills: 0 | Status: DISCONTINUED | OUTPATIENT
Start: 2020-09-03 | End: 2020-09-03

## 2020-09-02 RX ADMIN — LIDOCAINE 1 PATCH: 4 CREAM TOPICAL at 00:00

## 2020-09-02 RX ADMIN — ENOXAPARIN SODIUM 40 MILLIGRAM(S): 100 INJECTION SUBCUTANEOUS at 11:26

## 2020-09-02 RX ADMIN — ATORVASTATIN CALCIUM 20 MILLIGRAM(S): 80 TABLET, FILM COATED ORAL at 22:23

## 2020-09-02 RX ADMIN — LIDOCAINE 1 PATCH: 4 CREAM TOPICAL at 11:27

## 2020-09-02 RX ADMIN — Medication 650 MILLIGRAM(S): at 10:13

## 2020-09-02 RX ADMIN — LEVETIRACETAM 1000 MILLIGRAM(S): 250 TABLET, FILM COATED ORAL at 05:41

## 2020-09-02 RX ADMIN — Medication 0.25 MILLIGRAM(S): at 05:43

## 2020-09-02 RX ADMIN — Medication 400 MILLIGRAM(S): at 22:23

## 2020-09-02 RX ADMIN — Medication 25 MILLIGRAM(S): at 05:42

## 2020-09-02 RX ADMIN — Medication 650 MILLIGRAM(S): at 21:01

## 2020-09-02 RX ADMIN — Medication 650 MILLIGRAM(S): at 09:28

## 2020-09-02 RX ADMIN — PHENYLEPHRINE-SHARK LIVER OIL-MINERAL OIL-PETROLATUM RECTAL OINTMENT 1 APPLICATION(S): at 21:08

## 2020-09-02 RX ADMIN — LIDOCAINE 1 PATCH: 4 CREAM TOPICAL at 23:20

## 2020-09-02 RX ADMIN — Medication 14 UNIT(S): at 17:24

## 2020-09-02 RX ADMIN — Medication 5 MILLIGRAM(S): at 17:23

## 2020-09-02 RX ADMIN — PHENYLEPHRINE-SHARK LIVER OIL-MINERAL OIL-PETROLATUM RECTAL OINTMENT 1 APPLICATION(S): at 05:43

## 2020-09-02 RX ADMIN — Medication 0.25 MILLIGRAM(S): at 14:13

## 2020-09-02 RX ADMIN — PHENYLEPHRINE-SHARK LIVER OIL-MINERAL OIL-PETROLATUM RECTAL OINTMENT 1 APPLICATION(S): at 14:13

## 2020-09-02 RX ADMIN — AMLODIPINE BESYLATE 7.5 MILLIGRAM(S): 2.5 TABLET ORAL at 05:42

## 2020-09-02 RX ADMIN — PANTOPRAZOLE SODIUM 40 MILLIGRAM(S): 20 TABLET, DELAYED RELEASE ORAL at 06:01

## 2020-09-02 RX ADMIN — Medication 40 MILLIGRAM(S): at 05:42

## 2020-09-02 RX ADMIN — Medication 14 UNIT(S): at 09:02

## 2020-09-02 RX ADMIN — LIDOCAINE 1 PATCH: 4 CREAM TOPICAL at 19:55

## 2020-09-02 RX ADMIN — Medication 1 APPLICATION(S): at 17:28

## 2020-09-02 RX ADMIN — Medication 1 APPLICATION(S): at 05:43

## 2020-09-02 RX ADMIN — Medication 81 MILLIGRAM(S): at 11:25

## 2020-09-02 RX ADMIN — Medication 14 UNIT(S): at 11:26

## 2020-09-02 RX ADMIN — LEVETIRACETAM 1000 MILLIGRAM(S): 250 TABLET, FILM COATED ORAL at 17:23

## 2020-09-02 RX ADMIN — Medication 650 MILLIGRAM(S): at 20:03

## 2020-09-02 RX ADMIN — ESCITALOPRAM OXALATE 10 MILLIGRAM(S): 10 TABLET, FILM COATED ORAL at 17:23

## 2020-09-02 RX ADMIN — CEFTRIAXONE 100 MILLIGRAM(S): 500 INJECTION, POWDER, FOR SOLUTION INTRAMUSCULAR; INTRAVENOUS at 09:30

## 2020-09-02 RX ADMIN — SODIUM CHLORIDE 50 MILLILITER(S): 9 INJECTION, SOLUTION INTRAVENOUS at 23:59

## 2020-09-02 RX ADMIN — Medication 400 MILLIGRAM(S): at 23:21

## 2020-09-02 NOTE — PROGRESS NOTE ADULT - ASSESSMENT
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     Fever--due to UTI  --w/u ordered.-- urine and blood cx pending  --CXR and Lactate neg.    --started on Rocephin-- d/w hospitalist  --hold therapy this AM    GBM with vaso genic edema  --Pt. with poor prognosis-- Spoke with Dr. Del Cid regarding option of Avastin for treatment-- feels it is high risk for patient due to risk of thrombosis and difficulty with safe transportation to obtain. He has contacted pt. and d/w him as well    **palliative care:-discussed advanced care directives/MOLST form --completed by pt.-- pt. is DNR/DNI  - tapered off decadron on 8/31   - Cont. Keppra 1000mg BID for Seizure ppx,  - c/w PPI for GI ppx on steroids  - continue Comprehensive Rehab Program of PT/OT & speech 3 hrs/day x 5days/week--Held this AM due to fever      HTN--BP controlled  - Metoprolol XL 25mg + Lasix 40mg Daily  - Amlodipine to 7.5mg daily    Urinary Retention -   - As per urology recs 8/28:  flomax stopped since patient is s/p prostatectomy. bowel care, avoid anticholinergics if possible, mobilize, IC prn.  - stopped zyprexa as likely contributing to urinary retention -- d/w with Psych  --will start trial bethanecol-- start 5mg BID  --voiding-- monitor  --ceftriaxone for UTI    Diabetes type 2  - FS monitoring and ISS  -Insulin Adjusted 8/25-- Humalog 14 units TID,  & Lantus 4 units QHS 8/25   --FS controlled--may need further adjustment off decadron.  - insulin coverage as per hospitalist  --nursing order to train family how to administer insulin injections.     Mood/ Cognition  - Anxiety and paranoid thought process---Zyprexa dc'd   -Depressed/Anxious-- cont.  lexapro 10mg --increased 8/31  -Pysch following, currently without suicidal ideations --d/c 1:1 supervision 9/1  -Neuropsych consulted- recs appreciated   -Continue xanax .5mg  qhs, started 8/31   -continue Xanax PRN  -cont.  Xanax TID  -Psych following --spoke with Dr. Beavers--pt. has decision making capacity    Hemorrhoids?   -Started on preparation H 9/21   -Last BM 8/30       GI/Bowel:   - Senna, dulcolax, Miralax  -Last BM 8/30     Diet/ dysphagia:   - regular/easy to chew with thin     ENT evaluation for poor vocal quality-> no abnormalities found     Stage I pressure ulcer  - offloading, turn q.2h, barrier cream    LE edema  - compression stockings  - Lasix as above    Pain  - Tylenol PRN      Functional Prognosis / Discharge Planning  - Meeting held on 9/1   - Nursing needs: urinary retention  - SW: lives with Adult son and spouse, used cane prior   - OT: Not much progress, Max A for all ADLs   - PT: Not much progress, Needs mechanical lift with 2 people assist   - SLP: Memory slightly improved but not much process   - Barriers: left visual field cut, flaccid weakness,  - MAGDALENA: d/c home with home  Hospice -- 9/7  will possibly need mechanical lift when dc home

## 2020-09-02 NOTE — PROGRESS NOTE ADULT - SUBJECTIVE AND OBJECTIVE BOX
Patient is a 79y old  Male who presents with a chief complaint of Glioblastoma (01 Sep 2020 12:21)      Patient seen and examined at bedside. pt found to have a fever 101 today, admits to feeling tired. denies chills, cough, sob, cp, abd pain, n/v/d or dysuria.     ALLERGIES:  No Known Allergies    MEDICATIONS  (STANDING):  ALPRAZolam 0.5 milliGRAM(s) Oral at bedtime  ALPRAZolam 0.25 milliGRAM(s) Oral three times a day  amLODIPine   Tablet 7.5 milliGRAM(s) Oral daily  aspirin  chewable 81 milliGRAM(s) Oral daily  atorvastatin 20 milliGRAM(s) Oral at bedtime  bisacodyl 5 milliGRAM(s) Oral every 12 hours  dextrose 5%. 1000 milliLiter(s) (50 mL/Hr) IV Continuous <Continuous>  dextrose 50% Injectable 12.5 Gram(s) IV Push once  dextrose 50% Injectable 25 Gram(s) IV Push once  dextrose 50% Injectable 25 Gram(s) IV Push once  docusate sodium 100 milliGRAM(s) Oral daily  enoxaparin Injectable 40 milliGRAM(s) SubCutaneous daily  escitalopram 10 milliGRAM(s) Oral <User Schedule>  furosemide    Tablet 40 milliGRAM(s) Oral daily  hemorrhoidal Ointment 1 Application(s) Rectal three times a day  insulin glargine Injectable (LANTUS) 4 Unit(s) SubCutaneous at bedtime  insulin lispro (HumaLOG) corrective regimen sliding scale   SubCutaneous three times a day before meals  insulin lispro (HumaLOG) corrective regimen sliding scale   SubCutaneous at bedtime  insulin lispro Injectable (HumaLOG) 14 Unit(s) SubCutaneous three times a day before meals  levETIRAcetam 1000 milliGRAM(s) Oral two times a day  lidocaine   Patch 1 Patch Transdermal daily  melatonin 3 milliGRAM(s) Oral at bedtime  metoprolol succinate ER 25 milliGRAM(s) Oral daily  pantoprazole    Tablet 40 milliGRAM(s) Oral before breakfast  polyethylene glycol 3350 17 Gram(s) Oral two times a day  senna 2 Tablet(s) Oral at bedtime  triamcinolone 0.1% Cream 1 Application(s) Topical two times a day    MEDICATIONS  (PRN):  acetaminophen   Tablet .. 650 milliGRAM(s) Oral every 6 hours PRN Temp greater or equal to 38C (100.4F), Mild Pain (1 - 3)  ALPRAZolam 0.25 milliGRAM(s) Oral every 8 hours PRN Anxiety  benzocaine 15 mG/menthol 3.6 mG (Sugar-Free) Lozenge 1 Lozenge Oral two times a day PRN Sore Throat  bisacodyl Suppository 10 milliGRAM(s) Rectal daily PRN Constipation  dextrose 40% Gel 15 Gram(s) Oral once PRN Blood Glucose LESS THAN 70 milliGRAM(s)/deciliter  glucagon  Injectable 1 milliGRAM(s) IntraMuscular once PRN Glucose LESS THAN 70 milligrams/deciliter  ibuprofen  Tablet. 400 milliGRAM(s) Oral every 8 hours PRN Moderate Pain (4 - 6)    Vital Signs Last 24 Hrs  T(F): 101 (02 Sep 2020 08:18), Max: 101 (02 Sep 2020 08:18)  HR: 56 (02 Sep 2020 08:18) (56 - 84)  BP: 118/67 (02 Sep 2020 08:18) (118/67 - 148/81)  RR: 15 (02 Sep 2020 08:18) (15 - 15)  SpO2: 100% (02 Sep 2020 08:18) (94% - 100%)  I&O's Summary    01 Sep 2020 07:01  -  02 Sep 2020 07:00  --------------------------------------------------------  IN: 0 mL / OUT: 600 mL / NET: -600 mL      PHYSICAL EXAM:  General: NAD, A/O x 3  ENT: MMM  Neck: Supple, No JVD  Lungs: Clear to auscultation bilaterally  Cardio: RRR, S1/S2, No murmurs  Abdomen: Soft, Nontender, Nondistended; Bowel sounds present  Extremities: No calf tenderness, No pitting edema  neuro: left hemiparesis    LABS:                        13.3   9.37  )-----------( 160      ( 31 Aug 2020 06:50 )             39.3     08-31    138  |  101  |  38  ----------------------------<  104  3.8   |  28  |  0.87    Ca    8.6      31 Aug 2020 06:50    TPro  6.3  /  Alb  2.6  /  TBili  0.5  /  DBili  x   /  AST  28  /  ALT  68  /  AlkPhos  75      eGFR if Non African American: 83 mL/min/1.73M2 (20 @ 06:50)  eGFR if : 96 mL/min/1.73M2 (20 @ 06:50)      Lactate, Blood: 1.7 mmol/L ( @ 08:31)      POCT Blood Glucose.: 109 mg/dL (02 Sep 2020 08:16)  POCT Blood Glucose.: 126 mg/dL (01 Sep 2020 21:11)  POCT Blood Glucose.: 88 mg/dL (01 Sep 2020 16:55)  POCT Blood Glucose.: 126 mg/dL (01 Sep 2020 12:02)      Urinalysis Basic - ( 02 Sep 2020 08:31 )    Color: Yellow / Appearance: Slightly Turbid / S.010 / pH: x  Gluc: x / Ketone: Negative  / Bili: Negative / Urobili: 1   Blood: x / Protein: 30 mg/dL / Nitrite: Positive   Leuk Esterase: Small / RBC: 5-10 /HPF / WBC 26-50 /HPF   Sq Epi: x / Non Sq Epi: Neg.-Few / Bacteria: TNTC /HPF        RADIOLOGY & ADDITIONAL TESTS:  < from: Xray Chest 1 View- PORTABLE-Urgent (20 @ 09:07) >    Low lung volumes are present.  There is minimal blunting at the left costophrenic angle which may reflect trace pleural effusion and/or pleural thickening.    No lobar lung consolidation is noted.    Impression:    Findings as discussed above.    < end of copied text >      Care Discussed with Consultants/Other Providers: discussed with Dr. Earl

## 2020-09-02 NOTE — PROGRESS NOTE ADULT - SUBJECTIVE AND OBJECTIVE BOX
HPI:  79 RHM w/ PMH of HTN, HLD, prostate cancer s/p prostectomy, basal cell skin cancer, GBM (dx in 2018, s/p resection 8/3/18, s/p 2nd resection 19, s/p 3rd resection 2020 s/p multiple does of Temodar admitted to [facility] on 20 w/ worsening of his L. sided weakness. No other complaints. Denied changes in speech, vision, hearing, swallowing, voice quality, numbness/tingling,  balance/room-spinning sensations.    He was seen in Dr. Harman's office on 20 where he was noted to have worsening strength, lethargy and decreased appetitie. He had been tapering his steroids for over a month. Recently tapered from 4mg QD --> 3mg QD ---> 2mg QD.    CT Head on admission 2020, showed- Worsening vasogenic edema involving the R. temporal frontal parietal region again identified increased when compared to previous CT in 2020.     Patient admitted to neurology floor, started on decadron 4mg q6h later increased to 8mg q8h. MR head w/ and w/o contrast with progression of disease, neuro-onc on board, patient to follow up outpatient for initiation of avastin therapy.   Patient also with urinary retention, urology consulted, suspect urinary retention is neurologic in etiology, on clean intermittent catheterization; further followup to continue at rehab.   Patient stable for discharge to acute rehab 20.     Of note--patient was admitted to  rehab after his most recent resection in May 2020.  He was at [facility] from 20  to 20.  At the time of discharge from  rehab, patient was supervision for eating, grooming, and dressing, and contact guard for transfers.  He was able to ambulate 150' with SAC with CG/min assist and to navigate 12 steps with u/l HR with min assist.  SLP at [facility] noted higher level cognitive deficits with mild impulsivity and reduced insight, necessitating supervision while at home. (07 Aug 2020 14:30)      PAST MEDICAL & SURGICAL HISTORY:  Fall, sequela  Bilateral hearing loss, unspecified hearing loss type   activity: [employer] -    [place]  Type 2 diabetes mellitus without complication, without long-term current use of insulin: stop oral meds  3 weeks ago  Glioblastoma: biopsy 2018  surgery  6 weeks radiation  35 days of oral chemotherapy last 3 weeks  Basal cell carcinoma (BCC) of left lower leg: s/p resection  right lower leg top  left lower leg  Prostate cancer: s/p radical prostatectomy   HLD (hyperlipidemia)  HTN (hypertension)  S/P colonoscopic polypectomy: 3 years benign polyps  S/P tonsillectomy: age 30  H/O bilateral cataract extraction: 3-4 years ago  S/P prostatectomy:   S/P brain surgery: 2018  Lipoma of neck: s/p resection at age 30  Basal cell carcinoma (BCC) of lower leg: s/p resection  History of tonsillectomy: at age 30      Subjective:  Fever 101 this AM. other VS WNL.  Pt. more lethargic.  Pt. denies feeling chills or feverish but states he feels more fatigued.  Voiding but still retaining--  this AM.  Pt. notes very mild burning with urination and mild congestion,  no sore throat, coughing, abdominal pain, N/V.     REVIEW OF SYMPTOMS  Positive:  Fever. depressed, left HP, cognitive deficits, urinary retention   Denies: headache, lightheadedness, CP, SOB, abdominal pain, dysuria, nausea, constipation        VITALS  Vital Signs Last 24 Hrs  T(C): 38.3 (02 Sep 2020 08:18), Max: 38.3 (02 Sep 2020 08:18)  T(F): 101 (02 Sep 2020 08:18), Max: 101 (02 Sep 2020 08:18)  HR: 56 (02 Sep 2020 08:18) (56 - 84)  BP: 118/67 (02 Sep 2020 08:18) (118/67 - 148/81)  BP(mean): --  RR: 15 (02 Sep 2020 08:18) (15 - 15)  SpO2: 100% (02 Sep 2020 08:18) (94% - 100%)      PHYSICAL EXAM  Constitutional - NAD, Comfortable  	HEENT - NCAT, EOMI  	Chest -  CTAB   	Cardio - warm and well perfused, RRR, no murmur  	Abdomen -  Soft, NTND  	Extremities - No edema, No calf tenderness   	Neurologic Exam:                 	   Cognitive -   	          Orientation: Awake, Alert,  	      	          Thought:  slow processing, perseverative, agitated  	   Speech - Fluent, Comprehensible,   	   Cranial Nerves - left visual field deficit, left facial weakness, Tongue midline, EOMI, Shoulder shrug intact  	   Motor -  LEFT hemiparesis  	   Sensory - Impaired-- extinguishes on left  	   Coordination - impaired on left  Psychiatric - depressed, anxious    RECENT LABS            Urinalysis Basic - ( 02 Sep 2020 08:31 )    Color: Yellow / Appearance: Slightly Turbid / S.010 / pH: x  Gluc: x / Ketone: Negative  / Bili: Negative / Urobili: 1   Blood: x / Protein: 30 mg/dL / Nitrite: Positive   Leuk Esterase: Small / RBC: 5-10 /HPF / WBC 26-50 /HPF   Sq Epi: x / Non Sq Epi: Neg.-Few / Bacteria: TNTC /HPF          RADIOLOGY/OTHER RESULTS      MEDICATIONS  (STANDING):  ALPRAZolam 0.5 milliGRAM(s) Oral at bedtime  ALPRAZolam 0.25 milliGRAM(s) Oral three times a day  amLODIPine   Tablet 7.5 milliGRAM(s) Oral daily  aspirin  chewable 81 milliGRAM(s) Oral daily  atorvastatin 20 milliGRAM(s) Oral at bedtime  bisacodyl 5 milliGRAM(s) Oral every 12 hours  dextrose 5%. 1000 milliLiter(s) (50 mL/Hr) IV Continuous <Continuous>  dextrose 50% Injectable 12.5 Gram(s) IV Push once  dextrose 50% Injectable 25 Gram(s) IV Push once  dextrose 50% Injectable 25 Gram(s) IV Push once  docusate sodium 100 milliGRAM(s) Oral daily  enoxaparin Injectable 40 milliGRAM(s) SubCutaneous daily  escitalopram 10 milliGRAM(s) Oral <User Schedule>  furosemide    Tablet 40 milliGRAM(s) Oral daily  hemorrhoidal Ointment 1 Application(s) Rectal three times a day  insulin glargine Injectable (LANTUS) 4 Unit(s) SubCutaneous at bedtime  insulin lispro (HumaLOG) corrective regimen sliding scale   SubCutaneous three times a day before meals  insulin lispro (HumaLOG) corrective regimen sliding scale   SubCutaneous at bedtime  insulin lispro Injectable (HumaLOG) 14 Unit(s) SubCutaneous three times a day before meals  levETIRAcetam 1000 milliGRAM(s) Oral two times a day  lidocaine   Patch 1 Patch Transdermal daily  melatonin 3 milliGRAM(s) Oral at bedtime  metoprolol succinate ER 25 milliGRAM(s) Oral daily  pantoprazole    Tablet 40 milliGRAM(s) Oral before breakfast  polyethylene glycol 3350 17 Gram(s) Oral two times a day  senna 2 Tablet(s) Oral at bedtime  triamcinolone 0.1% Cream 1 Application(s) Topical two times a day    MEDICATIONS  (PRN):  acetaminophen   Tablet .. 650 milliGRAM(s) Oral every 6 hours PRN Temp greater or equal to 38C (100.4F), Mild Pain (1 - 3)  ALPRAZolam 0.25 milliGRAM(s) Oral every 8 hours PRN Anxiety  benzocaine 15 mG/menthol 3.6 mG (Sugar-Free) Lozenge 1 Lozenge Oral two times a day PRN Sore Throat  bisacodyl Suppository 10 milliGRAM(s) Rectal daily PRN Constipation  dextrose 40% Gel 15 Gram(s) Oral once PRN Blood Glucose LESS THAN 70 milliGRAM(s)/deciliter  glucagon  Injectable 1 milliGRAM(s) IntraMuscular once PRN Glucose LESS THAN 70 milligrams/deciliter  ibuprofen  Tablet. 400 milliGRAM(s) Oral every 8 hours PRN Moderate Pain (4 - 6)

## 2020-09-02 NOTE — PROGRESS NOTE ADULT - ASSESSMENT
78yo male with hx of HTN, HLD, prostate cancer s/p prostatectomy, BCC, GBM (dx in 2018, s/p resection 8/3/18, s/p 2nd resection 2/7/19, s/p 3rd resection 5/2020 s/p multiple does of Temodar), presented to [facility] for acute rehab from [facility] for worsening of his L. sided weakness, noted to have worsening vasogenic edema with progression of GBM, resulting in Left Hemiparesis, Dysphagia, Gait and ADL impairment.     #Debility  -Gait Instability, ADL impairments and Functional impairments  -Comprehensive Rehab Program of PT/OT & speech per primary team    #GBM   #Cerebral Edema  #Left hemiplegia   -continue decadron + protonix  -palliative care  -too high risk for Avastin secondary to risk for thrombosis and difficulty with safe transport      # urinary retention - able to urinate but requires straight cath  appreciate urology consult  anticholinergic med- Zyprexa discontinued   bowel care  per urology might consider bethanecol  cont antibiotics for UTI, if persistent urinary retention, start bethanecol  no need for flomax as pt s/p prostatectomy     # UTI  - fever 101 9/2  - Lactate 1.7  - UA positive. follow up Urine cx and blood cx  - start Rocephin 1g q24    # Depression/anxiety  - cont xanax and escitalopram  - seen by neuropsych    #HTN  -controlled on Amlodipine, Lasix, Metoprolol    #Hyperglycemia  -Steroid induced  -Ha1c 5.6  -insulin regimen adjusted  -Continue hypoglycemia protocol and accu-cheks    #Lower extremity edema - improving  - negative DVT  - cont leg elevation     DVT prophylaxis: Lovenox SQ    CODE status: DNR/DNI. Note that pt stated no IV Fluid on MOLST form

## 2020-09-02 NOTE — PROGRESS NOTE ADULT - ASSESSMENT
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     Fever--due to UTI  --w/u ordered.-- urine and blood cx pending  --CXR and Lactate neg.    --started on Rocephin-- d/w hospitalist  --hold therapy this AM    GBM with vaso genic edema  --Pt. with poor prognosis-- Spoke with Dr. Del Cid regarding option of Avastin for treatment-- feels it is high risk for patient due to risk of thrombosis and difficulty with safe transportation to obtain. He has contacted pt. and d/w him as well    --palliative care following--d/w Dr. Mustafa --discussed advanced care directives/MOLST form --completed by pt.-- pt. is DNR/DNI  - tapered off decadron on 8/31   - Cont. Keppra 1000mg BID for Seizure ppx,  - c/w PPI for GI ppx on steroids  - continue Comprehensive Rehab Program of PT/OT & speech 3 hrs/day x 5days/week--Held this AM due to fever      HTN--BP controlled  - Metoprolol XL 25mg + Lasix 40mg Daily  - Amlodipine to 7.5mg daily    Urinary Retention -   - As per urology recs 8/28:  flomax stopped since patient is s/p prostatectomy. bowel care, avoid anticholinergics if possible, mobilize, IC prn.  - stopped zyprexa as likely contributing to urinary retention -- d/w with Psych  --will start trial bethanecol-- start 5mg BID  --voiding-- monitor  --ceftriaxone for UTI    Diabetes type 2  - FS monitoring and ISS  -Insulin Adjusted 8/25-- Humalog 14 units TID,  & Lantus 4 units QHS 8/25   --FS controlled--may need further adjustment off decadron.  - insulin coverage as per hospitalist  --nursing order to train family how to administer insulin injections.     Mood/ Cognition  - Anxiety and paranoid thought process---Zyprexa dc'd   -Depressed/Anxious-- cont.  lexapro 10mg --increased 8/31  -Pysch following, currently without suicidal ideations --d/c 1:1 supervision 9/1  -Neuropsych consulted- recs appreciated   -Continue xanax .5mg  qhs, started 8/31   -continue Xanax PRN  -cont.  Xanax TID  -Psych following --spoke with Dr. Beavers--pt. has decision making capacity    Hemorrhoids?   -Started on preparation H 9/21   -Last BM 8/30       GI/Bowel:   - Senna, dulcolax, Miralax  -Last BM 8/30     Diet/ dysphagia:   - regular/easy to chew with thin     ENT evaluation for poor vocal quality-> no abnormalities found     Stage I pressure ulcer  - offloading, turn q.2h, barrier cream    LE edema  - compression stockings  - Lasix as above    Pain  - Tylenol PRN      Functional Prognosis / Discharge Planning  - Meeting held on 9/1   - Nursing needs: urinary retention  - SW: lives with Adult son and spouse, used cane prior   - OT: Not much progress, Max A for all ADLs   - PT: Not much progress, Needs mechanical lift with 2 people assist   - SLP: Memory slightly improved but not much process   - Barriers: left visual field cut, flaccid weakness,  - MAGDALENA: d/c home with home  Hospice -- 9/7  will possibly need mechanical lift when dc home

## 2020-09-02 NOTE — CHART NOTE - NSCHARTNOTEFT_GEN_A_CORE
Reviewed Agitated Behavior Scale records from the night shifts of 8/30, 8/31 and 9/1. Total Agitation and associated behaviors scale scores (i.e., disinhibited behaviors, aggressive behaviors and lability) were within normal limits. No significant agitation is exhibited by Pt at this time. Continue monitoring agitation and routine observation.

## 2020-09-02 NOTE — PROGRESS NOTE ADULT - SUBJECTIVE AND OBJECTIVE BOX
Follow-up Pall Progress Note    Mike Mustafa MD  [phone]      Febrile.  Denies SOB/CP.     Medications:  Vital Signs Last 24 Hrs  T(C): 38.3 (02 Sep 2020 08:18), Max: 38.3 (02 Sep 2020 08:18)  T(F): 101 (02 Sep 2020 08:18), Max: 101 (02 Sep 2020 08:18)  HR: 56 (02 Sep 2020 08:18) (56 - 84)  BP: 118/67 (02 Sep 2020 08:18) (118/67 - 148/81)  BP(mean): --  RR: 15 (02 Sep 2020 08:18) (15 - 15)  SpO2: 100% (02 Sep 2020 08:18) (94% - 100%)          09-01 @ 07:01  -  09-02 @ 07:00  --------------------------------------------------------  IN: 0 mL / OUT: 600 mL / NET: -600 mL        LABS:                    CULTURES:        Physical Examination:  PULM: Clear to auscultation bilaterally, no significant sputum production  CVS: Regular rate and rhythm, no murmurs, rubs, or gallops  ABD: Soft, non-tender  EXT:  No clubbing, cyanosis, or edema    RADIOLOGY REVIEWED  CXR:    CT chest:    TTE:

## 2020-09-03 DIAGNOSIS — N39.0 URINARY TRACT INFECTION, SITE NOT SPECIFIED: ICD-10-CM

## 2020-09-03 LAB
ALBUMIN SERPL ELPH-MCNC: 2.1 G/DL — LOW (ref 3.3–5)
ALP SERPL-CCNC: 63 U/L — SIGNIFICANT CHANGE UP (ref 40–120)
ALT FLD-CCNC: 43 U/L — SIGNIFICANT CHANGE UP (ref 10–45)
ANION GAP SERPL CALC-SCNC: 7 MMOL/L — SIGNIFICANT CHANGE UP (ref 5–17)
AST SERPL-CCNC: 21 U/L — SIGNIFICANT CHANGE UP (ref 10–40)
BASOPHILS # BLD AUTO: 0.02 K/UL — SIGNIFICANT CHANGE UP (ref 0–0.2)
BASOPHILS NFR BLD AUTO: 0.3 % — SIGNIFICANT CHANGE UP (ref 0–2)
BILIRUB SERPL-MCNC: 0.8 MG/DL — SIGNIFICANT CHANGE UP (ref 0.2–1.2)
BUN SERPL-MCNC: 37 MG/DL — HIGH (ref 7–23)
CALCIUM SERPL-MCNC: 8.1 MG/DL — LOW (ref 8.4–10.5)
CHLORIDE SERPL-SCNC: 99 MMOL/L — SIGNIFICANT CHANGE UP (ref 96–108)
CO2 SERPL-SCNC: 29 MMOL/L — SIGNIFICANT CHANGE UP (ref 22–31)
CREAT SERPL-MCNC: 0.81 MG/DL — SIGNIFICANT CHANGE UP (ref 0.5–1.3)
EOSINOPHIL # BLD AUTO: 0.03 K/UL — SIGNIFICANT CHANGE UP (ref 0–0.5)
EOSINOPHIL NFR BLD AUTO: 0.4 % — SIGNIFICANT CHANGE UP (ref 0–6)
GLUCOSE BLDC GLUCOMTR-MCNC: 115 MG/DL — HIGH (ref 70–99)
GLUCOSE BLDC GLUCOMTR-MCNC: 124 MG/DL — HIGH (ref 70–99)
GLUCOSE BLDC GLUCOMTR-MCNC: 130 MG/DL — HIGH (ref 70–99)
GLUCOSE BLDC GLUCOMTR-MCNC: 169 MG/DL — HIGH (ref 70–99)
GLUCOSE BLDC GLUCOMTR-MCNC: 84 MG/DL — SIGNIFICANT CHANGE UP (ref 70–99)
GLUCOSE BLDC GLUCOMTR-MCNC: 88 MG/DL — SIGNIFICANT CHANGE UP (ref 70–99)
GLUCOSE BLDC GLUCOMTR-MCNC: 95 MG/DL — SIGNIFICANT CHANGE UP (ref 70–99)
GLUCOSE BLDC GLUCOMTR-MCNC: 98 MG/DL — SIGNIFICANT CHANGE UP (ref 70–99)
GLUCOSE SERPL-MCNC: 122 MG/DL — HIGH (ref 70–99)
HCT VFR BLD CALC: 33.1 % — LOW (ref 39–50)
HGB BLD-MCNC: 11.3 G/DL — LOW (ref 13–17)
IMM GRANULOCYTES NFR BLD AUTO: 2.7 % — HIGH (ref 0–1.5)
LYMPHOCYTES # BLD AUTO: 0.58 K/UL — LOW (ref 1–3.3)
LYMPHOCYTES # BLD AUTO: 8.3 % — LOW (ref 13–44)
MAGNESIUM SERPL-MCNC: 2 MG/DL — SIGNIFICANT CHANGE UP (ref 1.6–2.6)
MCHC RBC-ENTMCNC: 32.4 PG — SIGNIFICANT CHANGE UP (ref 27–34)
MCHC RBC-ENTMCNC: 34.1 GM/DL — SIGNIFICANT CHANGE UP (ref 32–36)
MCV RBC AUTO: 94.8 FL — SIGNIFICANT CHANGE UP (ref 80–100)
MONOCYTES # BLD AUTO: 0.19 K/UL — SIGNIFICANT CHANGE UP (ref 0–0.9)
MONOCYTES NFR BLD AUTO: 2.7 % — SIGNIFICANT CHANGE UP (ref 2–14)
NEUTROPHILS # BLD AUTO: 5.97 K/UL — SIGNIFICANT CHANGE UP (ref 1.8–7.4)
NEUTROPHILS NFR BLD AUTO: 85.6 % — HIGH (ref 43–77)
NRBC # BLD: 0 /100 WBCS — SIGNIFICANT CHANGE UP (ref 0–0)
NT-PROBNP SERPL-SCNC: 121 PG/ML — SIGNIFICANT CHANGE UP (ref 0–300)
PLATELET # BLD AUTO: 119 K/UL — LOW (ref 150–400)
POTASSIUM SERPL-MCNC: 3.3 MMOL/L — LOW (ref 3.5–5.3)
POTASSIUM SERPL-SCNC: 3.3 MMOL/L — LOW (ref 3.5–5.3)
PROT SERPL-MCNC: 5.6 G/DL — LOW (ref 6–8.3)
RBC # BLD: 3.49 M/UL — LOW (ref 4.2–5.8)
RBC # FLD: 13.4 % — SIGNIFICANT CHANGE UP (ref 10.3–14.5)
SODIUM SERPL-SCNC: 135 MMOL/L — SIGNIFICANT CHANGE UP (ref 135–145)
WBC # BLD: 6.98 K/UL — SIGNIFICANT CHANGE UP (ref 3.8–10.5)
WBC # FLD AUTO: 6.98 K/UL — SIGNIFICANT CHANGE UP (ref 3.8–10.5)

## 2020-09-03 PROCEDURE — 71045 X-RAY EXAM CHEST 1 VIEW: CPT | Mod: 26

## 2020-09-03 PROCEDURE — 99233 SBSQ HOSP IP/OBS HIGH 50: CPT | Mod: GC

## 2020-09-03 PROCEDURE — 71250 CT THORAX DX C-: CPT | Mod: 26

## 2020-09-03 PROCEDURE — 99233 SBSQ HOSP IP/OBS HIGH 50: CPT

## 2020-09-03 RX ORDER — VANCOMYCIN HCL 1 G
1000 VIAL (EA) INTRAVENOUS EVERY 12 HOURS
Refills: 0 | Status: DISCONTINUED | OUTPATIENT
Start: 2020-09-03 | End: 2020-09-03

## 2020-09-03 RX ORDER — SODIUM CHLORIDE 9 MG/ML
1000 INJECTION INTRAMUSCULAR; INTRAVENOUS; SUBCUTANEOUS
Refills: 0 | Status: DISCONTINUED | OUTPATIENT
Start: 2020-09-03 | End: 2020-09-03

## 2020-09-03 RX ORDER — ALPRAZOLAM 0.25 MG
0.25 TABLET ORAL
Refills: 0 | Status: DISCONTINUED | OUTPATIENT
Start: 2020-09-03 | End: 2020-09-04

## 2020-09-03 RX ORDER — SACCHAROMYCES BOULARDII 250 MG
250 POWDER IN PACKET (EA) ORAL
Refills: 0 | Status: DISCONTINUED | OUTPATIENT
Start: 2020-09-03 | End: 2020-09-04

## 2020-09-03 RX ORDER — VANCOMYCIN HCL 1 G
1000 VIAL (EA) INTRAVENOUS ONCE
Refills: 0 | Status: COMPLETED | OUTPATIENT
Start: 2020-09-03 | End: 2020-09-03

## 2020-09-03 RX ORDER — CEFEPIME 1 G/1
2000 INJECTION, POWDER, FOR SOLUTION INTRAMUSCULAR; INTRAVENOUS EVERY 8 HOURS
Refills: 0 | Status: DISCONTINUED | OUTPATIENT
Start: 2020-09-03 | End: 2020-09-04

## 2020-09-03 RX ORDER — BETHANECHOL CHLORIDE 25 MG
10 TABLET ORAL THREE TIMES A DAY
Refills: 0 | Status: DISCONTINUED | OUTPATIENT
Start: 2020-09-03 | End: 2020-09-04

## 2020-09-03 RX ORDER — POTASSIUM CHLORIDE 20 MEQ
40 PACKET (EA) ORAL ONCE
Refills: 0 | Status: DISCONTINUED | OUTPATIENT
Start: 2020-09-03 | End: 2020-09-03

## 2020-09-03 RX ORDER — VANCOMYCIN HCL 1 G
1000 VIAL (EA) INTRAVENOUS EVERY 12 HOURS
Refills: 0 | Status: DISCONTINUED | OUTPATIENT
Start: 2020-09-03 | End: 2020-09-04

## 2020-09-03 RX ORDER — POTASSIUM CHLORIDE 20 MEQ
40 PACKET (EA) ORAL ONCE
Refills: 0 | Status: COMPLETED | OUTPATIENT
Start: 2020-09-03 | End: 2020-09-03

## 2020-09-03 RX ORDER — MEROPENEM 1 G/30ML
2000 INJECTION INTRAVENOUS ONCE
Refills: 0 | Status: COMPLETED | OUTPATIENT
Start: 2020-09-03 | End: 2020-09-03

## 2020-09-03 RX ADMIN — POLYETHYLENE GLYCOL 3350 17 GRAM(S): 17 POWDER, FOR SOLUTION ORAL at 17:02

## 2020-09-03 RX ADMIN — AMLODIPINE BESYLATE 7.5 MILLIGRAM(S): 2.5 TABLET ORAL at 06:00

## 2020-09-03 RX ADMIN — Medication 81 MILLIGRAM(S): at 12:16

## 2020-09-03 RX ADMIN — Medication 250 MILLIGRAM(S): at 17:04

## 2020-09-03 RX ADMIN — MEROPENEM 200 MILLIGRAM(S): 1 INJECTION INTRAVENOUS at 09:21

## 2020-09-03 RX ADMIN — SENNA PLUS 2 TABLET(S): 8.6 TABLET ORAL at 22:13

## 2020-09-03 RX ADMIN — CEFEPIME 100 MILLIGRAM(S): 1 INJECTION, POWDER, FOR SOLUTION INTRAMUSCULAR; INTRAVENOUS at 16:19

## 2020-09-03 RX ADMIN — Medication 25 MILLIGRAM(S): at 06:00

## 2020-09-03 RX ADMIN — PHENYLEPHRINE-SHARK LIVER OIL-MINERAL OIL-PETROLATUM RECTAL OINTMENT 1 APPLICATION(S): at 06:01

## 2020-09-03 RX ADMIN — Medication 0.25 MILLIGRAM(S): at 13:48

## 2020-09-03 RX ADMIN — Medication 10 MILLIGRAM(S): at 13:48

## 2020-09-03 RX ADMIN — ATORVASTATIN CALCIUM 20 MILLIGRAM(S): 80 TABLET, FILM COATED ORAL at 22:12

## 2020-09-03 RX ADMIN — CEFEPIME 100 MILLIGRAM(S): 1 INJECTION, POWDER, FOR SOLUTION INTRAMUSCULAR; INTRAVENOUS at 23:46

## 2020-09-03 RX ADMIN — Medication 2: at 12:18

## 2020-09-03 RX ADMIN — Medication 10 MILLIGRAM(S): at 22:12

## 2020-09-03 RX ADMIN — Medication 250 MILLIGRAM(S): at 17:01

## 2020-09-03 RX ADMIN — Medication 1 APPLICATION(S): at 17:03

## 2020-09-03 RX ADMIN — LIDOCAINE 1 PATCH: 4 CREAM TOPICAL at 19:04

## 2020-09-03 RX ADMIN — LEVETIRACETAM 1000 MILLIGRAM(S): 250 TABLET, FILM COATED ORAL at 17:02

## 2020-09-03 RX ADMIN — Medication 40 MILLIGRAM(S): at 06:01

## 2020-09-03 RX ADMIN — Medication 650 MILLIGRAM(S): at 06:01

## 2020-09-03 RX ADMIN — Medication 3 MILLIGRAM(S): at 22:12

## 2020-09-03 RX ADMIN — LEVETIRACETAM 1000 MILLIGRAM(S): 250 TABLET, FILM COATED ORAL at 06:00

## 2020-09-03 RX ADMIN — SODIUM CHLORIDE 75 MILLILITER(S): 9 INJECTION INTRAMUSCULAR; INTRAVENOUS; SUBCUTANEOUS at 09:08

## 2020-09-03 RX ADMIN — ENOXAPARIN SODIUM 40 MILLIGRAM(S): 100 INJECTION SUBCUTANEOUS at 12:16

## 2020-09-03 RX ADMIN — Medication 1 APPLICATION(S): at 06:02

## 2020-09-03 RX ADMIN — LIDOCAINE 1 PATCH: 4 CREAM TOPICAL at 12:17

## 2020-09-03 RX ADMIN — Medication 5 MILLIGRAM(S): at 17:02

## 2020-09-03 RX ADMIN — Medication 250 MILLIGRAM(S): at 09:56

## 2020-09-03 RX ADMIN — PHENYLEPHRINE-SHARK LIVER OIL-MINERAL OIL-PETROLATUM RECTAL OINTMENT 1 APPLICATION(S): at 22:20

## 2020-09-03 RX ADMIN — Medication 650 MILLIGRAM(S): at 07:22

## 2020-09-03 RX ADMIN — Medication 5 MILLIGRAM(S): at 06:00

## 2020-09-03 RX ADMIN — Medication 40 MILLIEQUIVALENT(S): at 09:08

## 2020-09-03 RX ADMIN — Medication 100 MILLIGRAM(S): at 12:16

## 2020-09-03 RX ADMIN — Medication 0.5 MILLIGRAM(S): at 22:12

## 2020-09-03 RX ADMIN — LIDOCAINE 1 PATCH: 4 CREAM TOPICAL at 23:57

## 2020-09-03 RX ADMIN — ESCITALOPRAM OXALATE 10 MILLIGRAM(S): 10 TABLET, FILM COATED ORAL at 17:01

## 2020-09-03 RX ADMIN — Medication 5 MILLIGRAM(S): at 06:01

## 2020-09-03 RX ADMIN — CEFTRIAXONE 100 MILLIGRAM(S): 500 INJECTION, POWDER, FOR SOLUTION INTRAMUSCULAR; INTRAVENOUS at 07:48

## 2020-09-03 RX ADMIN — PANTOPRAZOLE SODIUM 40 MILLIGRAM(S): 20 TABLET, DELAYED RELEASE ORAL at 06:01

## 2020-09-03 RX ADMIN — Medication 0.25 MILLIGRAM(S): at 06:01

## 2020-09-03 NOTE — PROVIDER CONTACT NOTE (OTHER) - RECOMMENDATIONS
MD to assess to pt.
C-xray portable at the bedside done. U/A collected and sent to lab. Blood cultures and lactate drawn before IV antibiotics started. EKG was done. Tylenol 650mg po given. PVR 236ml.
Hold lantus?
Tylenol administered.
emotional support and encouragement
increase rate of fluids? bolus?
Monitor pt

## 2020-09-03 NOTE — PROGRESS NOTE ADULT - ASSESSMENT
78yo male with hx of HTN, HLD, prostate cancer s/p prostatectomy, BCC, GBM (dx in 2018, s/p resection 8/3/18, s/p 2nd resection 2/7/19, s/p 3rd resection 5/2020 s/p multiple does of Temodar), presented to [facility] for acute rehab from [facility] for worsening of his L. sided weakness, noted to have worsening vasogenic edema with progression of GBM, resulting in Left Hemiparesis, Dysphagia, Gait and ADL impairment.     #Debility  -Gait Instability, ADL impairments and Functional impairments  -Comprehensive Rehab Program of PT/OT & speech per primary team    #GBM   #Cerebral Edema  #Left hemiplegia   -continue decadron + protonix  -palliative care. pt is DNR/DNI  -too high risk for Avastin secondary to risk for thrombosis and difficulty with safe transport      # urinary retention - able to urinate but requires straight cath  appreciate urology consult  anticholinergic med- Zyprexa discontinued   bowel care  cont bethanechol  no need for flomax as pt s/p prostatectomy     # UTI  - T max 102 9/2  - UA positive. follow up Urine cx and blood cx  - start Rocephin 1g q24    # Acute hypoxic resp failure  - send pro- BNP and check echo  - ct chest pending  - keep O2 >94%, oxygen supplement prn    # Depression/anxiety  - cont xanax and escitalopram  - seen by neuropsych    #HTN  - low BP today 94/60 this morning  - HOLD amlodipine  - cont Lasix, Metoprolol    #Hyperglycemia  -Steroid induced  -Ha1c 5.6  -cont current insulin regimen  -Continue hypoglycemia protocol and accu-cheks    #Lower extremity edema - improving  - negative DVT  - cont leg elevation     DVT prophylaxis: Lovenox SQ    CODE status: DNR/DNI. Note that pt stated no IV Fluid on MOLST form 78yo male with hx of HTN, HLD, prostate cancer s/p prostatectomy, BCC, GBM (dx in 2018, s/p resection 8/3/18, s/p 2nd resection 2/7/19, s/p 3rd resection 5/2020 s/p multiple does of Temodar), presented to [facility] for acute rehab from [facility] for worsening of his L. sided weakness, noted to have worsening vasogenic edema with progression of GBM, resulting in Left Hemiparesis, Dysphagia, Gait and ADL impairment.     #Debility  -Gait Instability, ADL impairments and Functional impairments  -Comprehensive Rehab Program of PT/OT & speech per primary team    #GBM   #Cerebral Edema  #Left hemiplegia   -continue decadron + protonix  -palliative care. pt is DNR/DNI  -too high risk for Avastin secondary to risk for thrombosis and difficulty with safe transport      # urinary retention - able to urinate but requires straight cath  appreciate urology consult  anticholinergic med- Zyprexa discontinued   bowel care  cont bethanechol  no need for flomax as pt s/p prostatectomy     # UTI  - T max 102 9/2  - UA positive. follow up Urine cx and blood cx  - continue abx    # Acute hypoxic resp failure probably due to complex PNA  - pro- bnp 121  - ct chest : bilateral upper lobes and right middle lobe infiltrates  - start vancomycin and cefepime  - keep O2 >94%, oxygen supplement prn    # Depression/anxiety  - cont xanax and escitalopram  - seen by neuropsych    #HTN  - low BP today 94/60 this morning  - HOLD amlodipine  - cont Lasix, Metoprolol    #Hyperglycemia  -Steroid induced  -Ha1c 5.6  -cont current insulin regimen  -Continue hypoglycemia protocol and accu-cheks    #Lower extremity edema - improving  - negative DVT  - cont leg elevation     DVT prophylaxis: Lovenox SQ    CODE status: DNR/DNI. Note that pt stated no IV Fluid on MOLST form

## 2020-09-03 NOTE — PROGRESS NOTE ADULT - ATTENDING COMMENTS
Pt. seen with resident.  Agree with documentation above as per resident with amendments made as appropriate. Patient medically stable.     Pt. spiked fever overnight, and drowsy this AM-- Hypoxic on Room air-- sTAT CT chest ordered-- bilat. UL and right Mid lobe GGO-- BNP neg so likely due to pneumonia-- d/w Dr. Jolley-- pt. antibiotic changed to vanco & cefipime to cover UTI and Pneumonia.  Awaiting cx sensitivities.   Hold therapy today.   Spoke with pt's Son, Francisco, and updated on patients status and rehab plan of care.  All questions answered.

## 2020-09-03 NOTE — CHART NOTE - NSCHARTNOTEFT_GEN_A_CORE
Assessment:   Patient seen for:     Source: [x] medical review, [x] patient, [x] RN    Factors impacting intake: [x] other: lack of appetite.      Intake: 50%     Diet Presciption: Diet, Regular:   Supplement Feeding Modality:  Oral  Ensure Enlive Cans or Servings Per Day:  1       Frequency:  Three Times a day (08-13-20 @ 09:30)    Current Weight: 09/03: 184 Lbs  09/01: 192 Lbs ?  08/30: 187.3 Lbs  08/07: 189.8 Lbs    % Weight Change: decreased 3% in 27 days.     Pt is confused, as per RN pt has fair appetite. PO intake 50%. No GI distress. No chewing/swallowing difficulties reported. Noted Ensure Enlive piled up at the night stand. Pt reports does not like chocolate or strawberry flavor. Will provide pt's preferred flavor -vanilla-. Noted wt fluctuation may be secondary to forrest LE edema. Spoke with SLP to evaluate pt for chewing/swallowing difficulties. Pt could not tell story. Requested feeding assistance for patient as per need. Will provide Magic cup to increase calories/protein intake QI.     Pertinent Medications: MEDICATIONS  (STANDING):  ALPRAZolam 0.5 milliGRAM(s) Oral at bedtime  ALPRAZolam 0.25 milliGRAM(s) Oral three times a day  aspirin  chewable 81 milliGRAM(s) Oral daily  atorvastatin 20 milliGRAM(s) Oral at bedtime  bethanechol 10 milliGRAM(s) Oral three times a day  bisacodyl 5 milliGRAM(s) Oral every 12 hours  cefTRIAXone   IVPB 1000 milliGRAM(s) IV Intermittent every 24 hours  dextrose 5%. 1000 milliLiter(s) (50 mL/Hr) IV Continuous <Continuous>  dextrose 50% Injectable 12.5 Gram(s) IV Push once  dextrose 50% Injectable 25 Gram(s) IV Push once  dextrose 50% Injectable 25 Gram(s) IV Push once  docusate sodium 100 milliGRAM(s) Oral daily  enoxaparin Injectable 40 milliGRAM(s) SubCutaneous daily  escitalopram 10 milliGRAM(s) Oral <User Schedule>  furosemide    Tablet 40 milliGRAM(s) Oral daily  hemorrhoidal Ointment 1 Application(s) Rectal three times a day  insulin glargine Injectable (LANTUS) 4 Unit(s) SubCutaneous at bedtime  insulin lispro (HumaLOG) corrective regimen sliding scale   SubCutaneous three times a day before meals  insulin lispro (HumaLOG) corrective regimen sliding scale   SubCutaneous at bedtime  levETIRAcetam 1000 milliGRAM(s) Oral two times a day  lidocaine   Patch 1 Patch Transdermal daily  melatonin 3 milliGRAM(s) Oral at bedtime  metoprolol succinate ER 25 milliGRAM(s) Oral daily  pantoprazole    Tablet 40 milliGRAM(s) Oral before breakfast  polyethylene glycol 3350 17 Gram(s) Oral two times a day  senna 2 Tablet(s) Oral at bedtime  triamcinolone 0.1% Cream 1 Application(s) Topical two times a day    MEDICATIONS  (PRN):  acetaminophen   Tablet .. 650 milliGRAM(s) Oral every 6 hours PRN Temp greater or equal to 38C (100.4F), Mild Pain (1 - 3)  ALPRAZolam 0.25 milliGRAM(s) Oral every 8 hours PRN Anxiety  benzocaine 15 mG/menthol 3.6 mG (Sugar-Free) Lozenge 1 Lozenge Oral two times a day PRN Sore Throat  bisacodyl Suppository 10 milliGRAM(s) Rectal daily PRN Constipation  dextrose 40% Gel 15 Gram(s) Oral once PRN Blood Glucose LESS THAN 70 milliGRAM(s)/deciliter  glucagon  Injectable 1 milliGRAM(s) IntraMuscular once PRN Glucose LESS THAN 70 milligrams/deciliter  ibuprofen  Tablet. 400 milliGRAM(s) Oral every 8 hours PRN Temp greater or equal to 38C (100.4F), Moderate Pain (4 - 6)    Pertinent Labs: 09-03 Na135 mmol/L Glu 122 mg/dL<H> K+ 3.3 mmol/L<L> Cr  0.81 mg/dL BUN 37 mg/dL<H> 09-03 Alb 2.1 g/dL<L>     CAPILLARY BLOOD GLUCOSE      POCT Blood Glucose.: 169 mg/dL (03 Sep 2020 12:15)  POCT Blood Glucose.: 124 mg/dL (03 Sep 2020 07:43)  POCT Blood Glucose.: 115 mg/dL (03 Sep 2020 02:22)  POCT Blood Glucose.: 95 mg/dL (03 Sep 2020 01:16)  POCT Blood Glucose.: 84 mg/dL (03 Sep 2020 00:56)  POCT Blood Glucose.: 88 mg/dL (03 Sep 2020 00:35)  POCT Blood Glucose.: 98 mg/dL (03 Sep 2020 00:18)  POCT Blood Glucose.: 87 mg/dL (02 Sep 2020 23:39)  POCT Blood Glucose.: 88 mg/dL (02 Sep 2020 23:28)  POCT Blood Glucose.: 98 mg/dL (02 Sep 2020 23:13)  POCT Blood Glucose.: 89 mg/dL (02 Sep 2020 22:52)  POCT Blood Glucose.: 78 mg/dL (02 Sep 2020 22:35)  POCT Blood Glucose.: 67 mg/dL (02 Sep 2020 22:15)  POCT Blood Glucose.: 102 mg/dL (02 Sep 2020 17:16)      Skin: PI stage I in sacrum.     Edema: bilateral ankles +1.    Estimated Needs:   [x] no change since previous assessment    Previous Nutrition Diagnosis:   [x] Moderate Malnutrition     Nutrition Diagnosis is [x] ongoing     Interventions:   Recommend  [x] Continue with current diet: Regular, consistency as per SLP  [x] Nutrition Supplement: Ensure Enlive 8 oz TID (only vanilla flavor)  [x] Nutrition Support: provide assistance with feedings.   [x] Other: Will provide Magic cup QI (290 calories, 9 gm protein) (vanilla)  [x] Honor pt's food preferences as feasible with prescribed diet.   [x] Monitor chewing/swallowing     Monitoring and Evaluation:   Continue to monitor Nutritional intake, Tolerance to diet prescription, weights, labs, skin integrity.  other:  RD remains available upon request and will follow up per protocol.

## 2020-09-03 NOTE — PROGRESS NOTE ADULT - SUBJECTIVE AND OBJECTIVE BOX
Patient is a 79y old  Male who presents with a chief complaint of Glioblastoma (03 Sep 2020 07:45)      Patient seen and examined at bedside. pt reports feeling tired no other complaints. T max 102 overnight. Pt desaturated this morning. SpO2 to 85% on RA and 94% with 2L NC.    ALLERGIES:  No Known Allergies    MEDICATIONS  (STANDING):  ALPRAZolam 0.5 milliGRAM(s) Oral at bedtime  ALPRAZolam 0.25 milliGRAM(s) Oral three times a day  aspirin  chewable 81 milliGRAM(s) Oral daily  atorvastatin 20 milliGRAM(s) Oral at bedtime  bethanechol 10 milliGRAM(s) Oral three times a day  bisacodyl 5 milliGRAM(s) Oral every 12 hours  cefTRIAXone   IVPB 1000 milliGRAM(s) IV Intermittent every 24 hours  dextrose 5%. 1000 milliLiter(s) (50 mL/Hr) IV Continuous <Continuous>  dextrose 50% Injectable 12.5 Gram(s) IV Push once  dextrose 50% Injectable 25 Gram(s) IV Push once  dextrose 50% Injectable 25 Gram(s) IV Push once  docusate sodium 100 milliGRAM(s) Oral daily  enoxaparin Injectable 40 milliGRAM(s) SubCutaneous daily  escitalopram 10 milliGRAM(s) Oral <User Schedule>  furosemide    Tablet 40 milliGRAM(s) Oral daily  hemorrhoidal Ointment 1 Application(s) Rectal three times a day  insulin glargine Injectable (LANTUS) 4 Unit(s) SubCutaneous at bedtime  insulin lispro (HumaLOG) corrective regimen sliding scale   SubCutaneous three times a day before meals  insulin lispro (HumaLOG) corrective regimen sliding scale   SubCutaneous at bedtime  levETIRAcetam 1000 milliGRAM(s) Oral two times a day  lidocaine   Patch 1 Patch Transdermal daily  melatonin 3 milliGRAM(s) Oral at bedtime  metoprolol succinate ER 25 milliGRAM(s) Oral daily  pantoprazole    Tablet 40 milliGRAM(s) Oral before breakfast  polyethylene glycol 3350 17 Gram(s) Oral two times a day  senna 2 Tablet(s) Oral at bedtime  sodium chloride 0.9%. 1000 milliLiter(s) (75 mL/Hr) IV Continuous <Continuous>  triamcinolone 0.1% Cream 1 Application(s) Topical two times a day    MEDICATIONS  (PRN):  acetaminophen   Tablet .. 650 milliGRAM(s) Oral every 6 hours PRN Temp greater or equal to 38C (100.4F), Mild Pain (1 - 3)  ALPRAZolam 0.25 milliGRAM(s) Oral every 8 hours PRN Anxiety  benzocaine 15 mG/menthol 3.6 mG (Sugar-Free) Lozenge 1 Lozenge Oral two times a day PRN Sore Throat  bisacodyl Suppository 10 milliGRAM(s) Rectal daily PRN Constipation  dextrose 40% Gel 15 Gram(s) Oral once PRN Blood Glucose LESS THAN 70 milliGRAM(s)/deciliter  glucagon  Injectable 1 milliGRAM(s) IntraMuscular once PRN Glucose LESS THAN 70 milligrams/deciliter  ibuprofen  Tablet. 400 milliGRAM(s) Oral every 8 hours PRN Temp greater or equal to 38C (100.4F), Moderate Pain (4 - 6)    Vital Signs Last 24 Hrs  T(F): 99.7 (03 Sep 2020 09:20), Max: 102 (02 Sep 2020 19:53)  HR: 77 (03 Sep 2020 07:42) (77 - 95)  BP: 94/60 (03 Sep 2020 07:42) (94/60 - 121/77)  RR: 16 (03 Sep 2020 07:42) (16 - 16)  SpO2: 94% (03 Sep 2020 07:42) (93% - 94%)  I&O's Summary    02 Sep 2020 07:01  -  03 Sep 2020 07:00  --------------------------------------------------------  IN: 0 mL / OUT: 600 mL / NET: -600 mL    03 Sep 2020 07:01  -  03 Sep 2020 11:56  --------------------------------------------------------  IN: 0 mL / OUT: 1000 mL / NET: -1000 mL      PHYSICAL EXAM:  General: NAD  ENT: MMM  Neck: Supple, No JVD  Lungs: Clear to auscultation bilaterally  Cardio: RRR, S1/S2, No murmurs  Abdomen: Soft, Nontender, Nondistended; Bowel sounds present  Extremities: No calf tenderness, No pitting edema  neuro: awake alert answering questions appropriately. left hemiparesis    LABS:                        11.3   6.98  )-----------( 119      ( 03 Sep 2020 05:03 )             33.1         135  |  99  |  37  ----------------------------<  122  3.3   |  29  |  0.81    Ca    8.1      03 Sep 2020 05:03  Mg     2.0         TPro  5.6  /  Alb  2.1  /  TBili  0.8  /  DBili  x   /  AST  21  /  ALT  43  /  AlkPhos  63      eGFR if Non African American: 86 mL/min/1.73M2 (20 @ 05:03)  eGFR if African American: 100 mL/min/1.73M2 (20 @ 05:03)      Lactate, Blood: 1.7 mmol/L ( @ 08:31)        POCT Blood Glucose.: 124 mg/dL (03 Sep 2020 07:43)  POCT Blood Glucose.: 115 mg/dL (03 Sep 2020 02:22)  POCT Blood Glucose.: 95 mg/dL (03 Sep 2020 01:16)  POCT Blood Glucose.: 84 mg/dL (03 Sep 2020 00:56)  POCT Blood Glucose.: 88 mg/dL (03 Sep 2020 00:35)  POCT Blood Glucose.: 98 mg/dL (03 Sep 2020 00:18)  POCT Blood Glucose.: 87 mg/dL (02 Sep 2020 23:39)  POCT Blood Glucose.: 88 mg/dL (02 Sep 2020 23:28)  POCT Blood Glucose.: 98 mg/dL (02 Sep 2020 23:13)  POCT Blood Glucose.: 89 mg/dL (02 Sep 2020 22:52)  POCT Blood Glucose.: 78 mg/dL (02 Sep 2020 22:35)  POCT Blood Glucose.: 67 mg/dL (02 Sep 2020 22:15)  POCT Blood Glucose.: 102 mg/dL (02 Sep 2020 17:16)      Urinalysis Basic - ( 02 Sep 2020 08:31 )    Color: Yellow / Appearance: Slightly Turbid / S.010 / pH: x  Gluc: x / Ketone: Negative  / Bili: Negative / Urobili: 1   Blood: x / Protein: 30 mg/dL / Nitrite: Positive   Leuk Esterase: Small / RBC: 5-10 /HPF / WBC 26-50 /HPF   Sq Epi: x / Non Sq Epi: Neg.-Few / Bacteria: TNTC /HPF          RADIOLOGY & ADDITIONAL TESTS:   < from: Xray Chest 1 View- PORTABLE-Urgent (20 @ 09:07) >    Low lung volumes are present.  There is minimal blunting at the left costophrenic angle which may reflect trace pleural effusion and/or pleural thickening.    No lobar lung consolidation is noted.    Impression:    Findings as discussed above.    < end of copied text >    Care Discussed with Consultants/Other Providers: discussed with rehab team Patient is a 79y old  Male who presents with a chief complaint of Glioblastoma (03 Sep 2020 07:45)      Patient seen and examined at bedside. pt reports feeling tired no other complaints. T max 102 overnight. Pt desaturated this morning. SpO2 to 85% on RA and 94% with 2L NC.    ALLERGIES:  No Known Allergies    MEDICATIONS  (STANDING):  ALPRAZolam 0.5 milliGRAM(s) Oral at bedtime  ALPRAZolam 0.25 milliGRAM(s) Oral three times a day  aspirin  chewable 81 milliGRAM(s) Oral daily  atorvastatin 20 milliGRAM(s) Oral at bedtime  bethanechol 10 milliGRAM(s) Oral three times a day  bisacodyl 5 milliGRAM(s) Oral every 12 hours  cefTRIAXone   IVPB 1000 milliGRAM(s) IV Intermittent every 24 hours  dextrose 5%. 1000 milliLiter(s) (50 mL/Hr) IV Continuous <Continuous>  dextrose 50% Injectable 12.5 Gram(s) IV Push once  dextrose 50% Injectable 25 Gram(s) IV Push once  dextrose 50% Injectable 25 Gram(s) IV Push once  docusate sodium 100 milliGRAM(s) Oral daily  enoxaparin Injectable 40 milliGRAM(s) SubCutaneous daily  escitalopram 10 milliGRAM(s) Oral <User Schedule>  furosemide    Tablet 40 milliGRAM(s) Oral daily  hemorrhoidal Ointment 1 Application(s) Rectal three times a day  insulin glargine Injectable (LANTUS) 4 Unit(s) SubCutaneous at bedtime  insulin lispro (HumaLOG) corrective regimen sliding scale   SubCutaneous three times a day before meals  insulin lispro (HumaLOG) corrective regimen sliding scale   SubCutaneous at bedtime  levETIRAcetam 1000 milliGRAM(s) Oral two times a day  lidocaine   Patch 1 Patch Transdermal daily  melatonin 3 milliGRAM(s) Oral at bedtime  metoprolol succinate ER 25 milliGRAM(s) Oral daily  pantoprazole    Tablet 40 milliGRAM(s) Oral before breakfast  polyethylene glycol 3350 17 Gram(s) Oral two times a day  senna 2 Tablet(s) Oral at bedtime  sodium chloride 0.9%. 1000 milliLiter(s) (75 mL/Hr) IV Continuous <Continuous>  triamcinolone 0.1% Cream 1 Application(s) Topical two times a day    MEDICATIONS  (PRN):  acetaminophen   Tablet .. 650 milliGRAM(s) Oral every 6 hours PRN Temp greater or equal to 38C (100.4F), Mild Pain (1 - 3)  ALPRAZolam 0.25 milliGRAM(s) Oral every 8 hours PRN Anxiety  benzocaine 15 mG/menthol 3.6 mG (Sugar-Free) Lozenge 1 Lozenge Oral two times a day PRN Sore Throat  bisacodyl Suppository 10 milliGRAM(s) Rectal daily PRN Constipation  dextrose 40% Gel 15 Gram(s) Oral once PRN Blood Glucose LESS THAN 70 milliGRAM(s)/deciliter  glucagon  Injectable 1 milliGRAM(s) IntraMuscular once PRN Glucose LESS THAN 70 milligrams/deciliter  ibuprofen  Tablet. 400 milliGRAM(s) Oral every 8 hours PRN Temp greater or equal to 38C (100.4F), Moderate Pain (4 - 6)    Vital Signs Last 24 Hrs  T(F): 99.7 (03 Sep 2020 09:20), Max: 102 (02 Sep 2020 19:53)  HR: 77 (03 Sep 2020 07:42) (77 - 95)  BP: 94/60 (03 Sep 2020 07:42) (94/60 - 121/77)  RR: 16 (03 Sep 2020 07:42) (16 - 16)  SpO2: 94% (03 Sep 2020 07:42) (93% - 94%)  I&O's Summary    02 Sep 2020 07:01  -  03 Sep 2020 07:00  --------------------------------------------------------  IN: 0 mL / OUT: 600 mL / NET: -600 mL    03 Sep 2020 07:01  -  03 Sep 2020 11:56  --------------------------------------------------------  IN: 0 mL / OUT: 1000 mL / NET: -1000 mL      PHYSICAL EXAM:  General: NAD  ENT: MMM  Neck: Supple, No JVD  Lungs: Clear to auscultation bilaterally  Cardio: RRR, S1/S2, No murmurs  Abdomen: Soft, Nontender, Nondistended; Bowel sounds present  Extremities: No calf tenderness, No pitting edema  neuro: awake alert answering questions appropriately. left hemiparesis    LABS:                        11.3   6.98  )-----------( 119      ( 03 Sep 2020 05:03 )             33.1         135  |  99  |  37  ----------------------------<  122  3.3   |  29  |  0.81    Ca    8.1      03 Sep 2020 05:03  Mg     2.0         TPro  5.6  /  Alb  2.1  /  TBili  0.8  /  DBili  x   /  AST  21  /  ALT  43  /  AlkPhos  63      eGFR if Non African American: 86 mL/min/1.73M2 (20 @ 05:03)  eGFR if African American: 100 mL/min/1.73M2 (20 @ 05:03)      Lactate, Blood: 1.7 mmol/L ( @ 08:31)        POCT Blood Glucose.: 124 mg/dL (03 Sep 2020 07:43)  POCT Blood Glucose.: 115 mg/dL (03 Sep 2020 02:22)  POCT Blood Glucose.: 95 mg/dL (03 Sep 2020 01:16)  POCT Blood Glucose.: 84 mg/dL (03 Sep 2020 00:56)  POCT Blood Glucose.: 88 mg/dL (03 Sep 2020 00:35)  POCT Blood Glucose.: 98 mg/dL (03 Sep 2020 00:18)  POCT Blood Glucose.: 87 mg/dL (02 Sep 2020 23:39)  POCT Blood Glucose.: 88 mg/dL (02 Sep 2020 23:28)  POCT Blood Glucose.: 98 mg/dL (02 Sep 2020 23:13)  POCT Blood Glucose.: 89 mg/dL (02 Sep 2020 22:52)  POCT Blood Glucose.: 78 mg/dL (02 Sep 2020 22:35)  POCT Blood Glucose.: 67 mg/dL (02 Sep 2020 22:15)  POCT Blood Glucose.: 102 mg/dL (02 Sep 2020 17:16)      Urinalysis Basic - ( 02 Sep 2020 08:31 )    Color: Yellow / Appearance: Slightly Turbid / S.010 / pH: x  Gluc: x / Ketone: Negative  / Bili: Negative / Urobili: 1   Blood: x / Protein: 30 mg/dL / Nitrite: Positive   Leuk Esterase: Small / RBC: 5-10 /HPF / WBC 26-50 /HPF   Sq Epi: x / Non Sq Epi: Neg.-Few / Bacteria: TNTC /HPF          RADIOLOGY & ADDITIONAL TESTS:   < from: Xray Chest 1 View- PORTABLE-Urgent (20 @ 09:07) >    Low lung volumes are present.  There is minimal blunting at the left costophrenic angle which may reflect trace pleural effusion and/or pleural thickening.    No lobar lung consolidation is noted.    Impression:    Findings as discussed above.    < end of copied text >    < from: CT Chest No Cont (20 @ 12:08) >  IMPRESSION:  Bilateral upper lobe and right middle lobe airspace infiltrates on the basisof pneumonia, pulmonary edema and/or motion artifact. Bibasilar dependent changes.    Mild cardiomegaly.    < end of copied text >      Care Discussed with Consultants/Other Providers: discussed with rehab team

## 2020-09-03 NOTE — PROGRESS NOTE ADULT - ASSESSMENT
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     Fever--due to UTI and PNA   -w/u ordered.-- urine cx pending  -Blood cltx NGTD   -CXR and Lactate neg.    -CT chest on 9/3 with bilateral upper and right middle infiltrate   -Started on Rocephin but changed to vanc and cefepime on 9/3 due to PNA seen on CT chest   -Continue vanc and cefepime     Dyspnea and desaturation   -CT chest 9/3 with bilateral infiltrates- sig for pulmonary edema?  -TTE ordered     GBM with vaso genic edema  --Pt. with poor prognosis-- Spoke with Dr. Del Cid regarding option of Avastin for treatment-- feels it is high risk for patient due to risk of thrombosis and difficulty with safe transportation to obtain. He has contacted pt. and d/w him as well    --palliative care following--d/w Dr. Mustafa --discussed advanced care directives/MOLST form --completed by pt.-- pt. is DNR/DNI  - tapered off decadron on 8/31   - Cont. Keppra 1000mg BID for Seizure ppx,  - c/w PPI for GI ppx on steroids  - continue Comprehensive Rehab Program of PT/OT & speech 3 hrs/day x 5days/week-- Therapy currently on hold     HTN  - Metoprolol XL 25mg + Lasix 40mg Daily  - Amlodipine to 7.5mg daily dc'd for now due to hypotension this AM 9/3     Urinary Retention -   - As per urology recs 8/28:  flomax stopped since patient is s/p prostatectomy. bowel care, avoid anticholinergics if possible, mobilize, IC prn.  - Stopped zyprexa as likely contributing to urinary retention -- d/w with Psych  - Continue bethanecol 5mg BID (started on 9/2)   - this AM at 11 on 9/3 with 1000mL SC       Diabetes type 2  - Was hypoglycemic overnight   - Humalog dc'd and lantus dc'd   - Monitor Blood glucose     Mood/ Cognition  - Anxiety and paranoid thought process---Zyprexa dc'd   -Depressed/Anxious-- cont.  lexapro 10mg --increased 8/31  -Pysch following, currently without suicidal ideations --d/c 1:1 supervision 9/1  -Neuropsych consulted- recs appreciated   -Continue xanax .5mg  qhs, started 8/31   -continue Xanax PRN  -cont.  Xanax TID  -Psych following --spoke with Dr. Beavers--pt. has decision making capacity    Hemorrhoids?   -Started on preparation H 9/21     GI/Bowel:   - Senna, dulcolax, Miralax    Diet/ dysphagia:   - regular/easy to chew with thin     ENT evaluation for poor vocal quality-> no abnormalities found     Stage I pressure ulcer  - offloading, turn q.2h, barrier cream    LE edema  - compression stockings  - Lasix as above    Pain  - Tylenol PRN      Functional Prognosis / Discharge Planning  - Meeting held on 9/1   - Nursing needs: urinary retention  - SW: lives with Adult son and spouse, used cane prior   - OT: Not much progress, Max A for all ADLs   - PT: Not much progress, Needs mechanical lift with 2 people assist   - SLP: Memory slightly improved but not much process   - Barriers: left visual field cut, flaccid weakness,  - MAGDALENA: d/c home with home  Hospice -- 9/7  will possibly need mechanical lift when dc home 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     Fever--due to UTI and PNA   -w/u ordered.-- urine cx pending  -Blood cltx NGTD   -CXR and Lactate neg.    -CT chest on 9/3 with bilateral upper and right middle infiltrate   -Started on Rocephin but changed to vanc and cefepime on 9/3 due to PNA seen on CT chest   -Continue vanc and cefepime     Dyspnea and desaturation   -CT chest 9/3 with bilateral infiltrates- sig for pulmonary edema?  -TTE ordered     GBM with vaso genic edema  --Pt. with poor prognosis-- Spoke with Dr. Del Cid regarding option of Avastin for treatment-- feels it is high risk for patient due to risk of thrombosis and difficulty with safe transportation to obtain. He has contacted pt. and d/w him as well    --palliative care following--d/w Dr. Mustafa --discussed advanced care directives/MOLST form --completed by pt.-- pt. is DNR/DNI  - tapered off decadron on 8/31   - Cont. Keppra 1000mg BID for Seizure ppx,  - c/w PPI for GI ppx on steroids  - continue Comprehensive Rehab Program of PT/OT & speech 3 hrs/day x 5days/week-- Therapy currently on hold     HTN  - Metoprolol XL 25mg + Lasix 40mg Daily  - Amlodipine daily dc'd for now due to hypotension this AM 9/3     Urinary Retention -   - As per urology recs 8/28:  flomax stopped since patient is s/p prostatectomy. bowel care, avoid anticholinergics if possible, mobilize, IC prn.  - Stopped zyprexa as likely contributing to urinary retention -- d/w with Psych  - Continue bethanecol 5mg BID (started on 9/2)   - this AM at 11 on 9/3 with 1000mL SC       Diabetes type 2  - Was hypoglycemic overnight   - Humalog dc'd and lantus dc'd   - Monitor Blood glucose     Mood/ Cognition  - Anxiety and paranoid thought process---Zyprexa dc'd   -Depressed/Anxious-- cont.  lexapro 10mg --increased 8/31  -Pysch following, currently without suicidal ideations --d/c 1:1 supervision 9/1  -Neuropsych consulted- recs appreciated   -Continue xanax .5mg  qhs, started 8/31   -continue Xanax PRN  -cont.  Xanax TID  -Psych following --spoke with Dr. Beavers--pt. has decision making capacity    Hemorrhoids?   -Started on preparation H 9/21     GI/Bowel:   - Senna, dulcolax, Miralax    Diet/ dysphagia:   - regular/easy to chew with thin     ENT evaluation for poor vocal quality-> no abnormalities found     Stage I pressure ulcer  - offloading, turn q.2h, barrier cream    LE edema  - compression stockings  - Lasix as above    Pain  - Tylenol PRN      Functional Prognosis / Discharge Planning  - Meeting held on 9/1   - Nursing needs: urinary retention  - SW: lives with Adult son and spouse, used cane prior   - OT: Not much progress, Max A for all ADLs   - PT: Not much progress, Needs mechanical lift with 2 people assist   - SLP: Memory slightly improved but not much process   - Barriers: left visual field cut, flaccid weakness,  - MAGDALENA: d/c home with home  Hospice -- 9/7  will possibly need mechanical lift when dc home 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     Fever--due to UTI and PNA   -w/u ordered.-- urine cx pending  -Blood cltx NGTD   -CXR and Lactate neg.    -CT chest on 9/3 with bilateral upper and right middle infiltrate   -Started on Rocephin but changed to vanc and cefepime on 9/3 due to PNA seen on CT chest   -Continue vanc and cefepime     Dyspnea and desaturation   -CT chest 9/3 with bilateral infiltrates- sig for pulmonary edema?  -TTE ordered     Hypokalemia   -K 3.3 9/3   -KCL x 1 given     GBM with vaso genic edema  --Pt. with poor prognosis-- Spoke with Dr. Del Cid regarding option of Avastin for treatment-- feels it is high risk for patient due to risk of thrombosis and difficulty with safe transportation to obtain. He has contacted pt. and d/w him as well    --palliative care following--d/w Dr. Mustafa --discussed advanced care directives/MOLST form --completed by pt.-- pt. is DNR/DNI  - tapered off decadron on 8/31   - Cont. Keppra 1000mg BID for Seizure ppx,  - c/w PPI for GI ppx on steroids  - continue Comprehensive Rehab Program of PT/OT & speech 3 hrs/day x 5days/week-- Therapy currently on hold     HTN  - Metoprolol XL 25mg + Lasix 40mg Daily  - Amlodipine daily dc'd for now due to hypotension this AM 9/3     Urinary Retention -   - As per urology recs 8/28:  flomax stopped since patient is s/p prostatectomy. bowel care, avoid anticholinergics if possible, mobilize, IC prn.  - Stopped zyprexa as likely contributing to urinary retention -- d/w with Psych  - Continue bethanecol 5mg BID (started on 9/2)   - this AM at 11 on 9/3 with 1000mL SC       Diabetes type 2  - Was hypoglycemic overnight   - Humalog dc'd and lantus dc'd   - Monitor Blood glucose     Mood/ Cognition  - Anxiety and paranoid thought process---Zyprexa dc'd   -Depressed/Anxious-- cont.  lexapro 10mg --increased 8/31  -Pysch following, currently without suicidal ideations --d/c 1:1 supervision 9/1  -Neuropsych consulted- recs appreciated   -Continue xanax .5mg  qhs, started 8/31   -continue Xanax PRN  -cont.  Xanax TID  -Psych following --spoke with Dr. Beavers--pt. has decision making capacity    Hemorrhoids?   -Started on preparation H 9/21     GI/Bowel:   - Senna, dulcolax, Miralax    Diet/ dysphagia:   - regular/easy to chew with thin     ENT evaluation for poor vocal quality-> no abnormalities found     Stage I pressure ulcer  - offloading, turn q.2h, barrier cream    LE edema  - compression stockings  - Lasix as above    Pain  - Tylenol PRN      Functional Prognosis / Discharge Planning  - Meeting held on 9/1   - Nursing needs: urinary retention  - SW: lives with Adult son and spouse, used cane prior   - OT: Not much progress, Max A for all ADLs   - PT: Not much progress, Needs mechanical lift with 2 people assist   - SLP: Memory slightly improved but not much process   - Barriers: left visual field cut, flaccid weakness,  - MAGDALENA: d/c home with home  Hospice -- 9/7  will possibly need mechanical lift when dc home 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     Fever--due to UTI and PNA   -w/u ordered.-- urine cx pending  -Blood cltx NGTD   -CXR and Lactate neg.    -STAT CT chest on 9/3 with bilateral upper and right middle infiltrate   -Started on Rocephin but changed to vanc and cefepime on 9/3 due to PNA seen on CT chest   -Continue vanc and cefepime  --d/w hospitalist     Dyspnea and desaturation   -CT chest 9/3 with bilateral infiltrates- sig for pulmonary edema?  -TTE ordered   Pro-BNP normal--  --Most likely due to pneumonia rather than CHF exacerbation --d/w Dr. Jolley--Hospitalist      Hypokalemia   -K 3.3 9/3   -KCL x 1 given     GBM with vaso genic edema  --Pt. with poor prognosis-- Spoke with Dr. Del Cid regarding option of Avastin for treatment-- feels it is high risk for patient due to risk of thrombosis and difficulty with safe transportation to obtain. He has contacted pt. and d/w him as well    --palliative care following--d/w Dr. Mustafa --discussed advanced care directives/MOLST form --completed by pt.-- pt. is DNR/DNI  - tapered off decadron on 8/31   - Cont. Keppra 1000mg BID for Seizure ppx,  - continue Comprehensive Rehab Program of PT/OT & speech 3 hrs/day x 5days/week-- Therapy currently on hold     HTN  - Metoprolol XL 25mg + Lasix 40mg Daily  - Amlodipine daily dc'd for now due to hypotension this AM 9/3     Urinary Retention -   - As per urology recs 8/28:  flomax stopped since patient is s/p prostatectomy. bowel care, avoid anticholinergics if possible, mobilize, IC prn.  - Stopped zyprexa as likely contributing to urinary retention -- d/w with Psych  - Continue bethanecol 5mg BID (started on 9/2)   - this AM at 11 on 9/3 with 1000mL SC       Diabetes-- steroid induced--- RxH4u=3.6  - Was hypoglycemic overnight   --Pt. now off steroids   - Humalog dc'd and lantus dc'd   - Monitor Blood glucose BID--d/c FS if normal    Mood/ Cognition  - Anxiety and paranoid thought process---Zyprexa dc'd   -Depressed/Anxious-- cont.  lexapro 10mg --increased 8/31  -Pysch following, currently without suicidal ideations --d/c 1:1 supervision 9/1  -Neuropsych consulted- recs appreciated   -Continue xanax .5mg  qhs, started 8/31   -continue Xanax PRN  -cont.  Xanax TID  -Psych following --spoke with Dr. Beavers--pt. has decision making capacity    Hemorrhoids?   -Started on preparation H 9/21     GI/Bowel:   - Senna, dulcolax, Miralax    Diet/ dysphagia:   - regular/easy to chew with thin     ENT evaluation for poor vocal quality-> no abnormalities found     Stage I pressure ulcer  - offloading, turn q.2h, barrier cream    LE edema  - compression stockings  - Lasix as above    Pain  - Tylenol PRN      Functional Prognosis / Discharge Planning  - Meeting held on 9/1   - Nursing needs: urinary retention  - SW: lives with Adult son and spouse, used cane prior   - OT: Not much progress, Max A for all ADLs   - PT: Not much progress, Needs mechanical lift with 2 people assist   - SLP: Memory slightly improved but not much process   - Barriers: left visual field cut, flaccid weakness,  - MAGDALENA: d/c home with home  Hospice -- 9/7  will possibly need mechanical lift when dc home

## 2020-09-03 NOTE — PROGRESS NOTE ADULT - SUBJECTIVE AND OBJECTIVE BOX
Patient is a 79y old  Male who presents with a chief complaint of Glioblastoma (02 Sep 2020 12:18)    HPI:  79 RHM w/ PMH of HTN, HLD, prostate cancer s/p prostectomy, basal cell skin cancer, GBM (dx in 2018, s/p resection 8/3/18, s/p 2nd resection 19, s/p 3rd resection 2020 s/p multiple does of Temodar admitted to [facility] on 20 w/ worsening of his L. sided weakness. No other complaints. Denied changes in speech, vision, hearing, swallowing, voice quality, numbness/tingling,  balance/room-spinning sensations.    He was seen in Dr. Harman's office on 20 where he was noted to have worsening strength, lethargy and decreased appetitie. He had been tapering his steroids for over a month. Recently tapered from 4mg QD --> 3mg QD ---> 2mg QD.    CT Head on admission 2020, showed- Worsening vasogenic edema involving the R. temporal frontal parietal region again identified increased when compared to previous CT in 2020.     Patient admitted to neurology floor, started on decadron 4mg q6h later increased to 8mg q8h. MR head w/ and w/o contrast with progression of disease, neuro-onc on board, patient to follow up outpatient for initiation of avastin therapy.   Patient also with urinary retention, urology consulted, suspect urinary retention is neurologic in etiology, on clean intermittent catheterization; further followup to continue at rehab.   Patient stable for discharge to acute rehab 20.     Of note--patient was admitted to  rehab after his most recent resection in May 2020.  He was at [facility] from 20  to 20.  At the time of discharge from  rehab, patient was supervision for eating, grooming, and dressing, and contact guard for transfers.  He was able to ambulate 150' with SAC with CG/min assist and to navigate 12 steps with u/l HR with min assist.  SLP at [facility] noted higher level cognitive deficits with mild impulsivity and reduced insight, necessitating supervision while at home. (07 Aug 2020 14:30)    incontinent but IC for high PVRs    Interval Events:  Patient seen and examined at bedside.    MEDICATIONS:  MEDICATIONS  (STANDING):  ALPRAZolam 0.5 milliGRAM(s) Oral at bedtime  ALPRAZolam 0.25 milliGRAM(s) Oral three times a day  amLODIPine   Tablet 7.5 milliGRAM(s) Oral daily  aspirin  chewable 81 milliGRAM(s) Oral daily  atorvastatin 20 milliGRAM(s) Oral at bedtime  bethanechol 10 milliGRAM(s) Oral three times a day  bisacodyl 5 milliGRAM(s) Oral every 12 hours  cefTRIAXone   IVPB 1000 milliGRAM(s) IV Intermittent every 24 hours  dextrose 5%. 1000 milliLiter(s) (50 mL/Hr) IV Continuous <Continuous>  dextrose 50% Injectable 12.5 Gram(s) IV Push once  dextrose 50% Injectable 25 Gram(s) IV Push once  dextrose 50% Injectable 25 Gram(s) IV Push once  docusate sodium 100 milliGRAM(s) Oral daily  enoxaparin Injectable 40 milliGRAM(s) SubCutaneous daily  escitalopram 10 milliGRAM(s) Oral <User Schedule>  furosemide    Tablet 40 milliGRAM(s) Oral daily  hemorrhoidal Ointment 1 Application(s) Rectal three times a day  insulin glargine Injectable (LANTUS) 4 Unit(s) SubCutaneous at bedtime  insulin lispro (HumaLOG) corrective regimen sliding scale   SubCutaneous three times a day before meals  insulin lispro (HumaLOG) corrective regimen sliding scale   SubCutaneous at bedtime  levETIRAcetam 1000 milliGRAM(s) Oral two times a day  lidocaine   Patch 1 Patch Transdermal daily  melatonin 3 milliGRAM(s) Oral at bedtime  metoprolol succinate ER 25 milliGRAM(s) Oral daily  pantoprazole    Tablet 40 milliGRAM(s) Oral before breakfast  polyethylene glycol 3350 17 Gram(s) Oral two times a day  potassium chloride    Tablet ER 40 milliEquivalent(s) Oral once  senna 2 Tablet(s) Oral at bedtime  triamcinolone 0.1% Cream 1 Application(s) Topical two times a day    MEDICATIONS  (PRN):  acetaminophen   Tablet .. 650 milliGRAM(s) Oral every 6 hours PRN Temp greater or equal to 38C (100.4F), Mild Pain (1 - 3)  ALPRAZolam 0.25 milliGRAM(s) Oral every 8 hours PRN Anxiety  benzocaine 15 mG/menthol 3.6 mG (Sugar-Free) Lozenge 1 Lozenge Oral two times a day PRN Sore Throat  bisacodyl Suppository 10 milliGRAM(s) Rectal daily PRN Constipation  dextrose 40% Gel 15 Gram(s) Oral once PRN Blood Glucose LESS THAN 70 milliGRAM(s)/deciliter  glucagon  Injectable 1 milliGRAM(s) IntraMuscular once PRN Glucose LESS THAN 70 milligrams/deciliter  ibuprofen  Tablet. 400 milliGRAM(s) Oral every 8 hours PRN Temp greater or equal to 38C (100.4F), Moderate Pain (4 - 6)      Allergies    No Known Allergies    Intolerances        T(C): 37.5 (20 @ 07:42), Max: 38.9 (20 @ 19:53)  T(F): 99.5 (20 @ 07:42), Max: 102 (20 @ 19:53)  HR: 77 (20 07:42) (56 - 95)  BP: 94/60 (20 @ 07:42) (94/60 - 148/81)  RR: 16 (20 @ 07:42) (14 - 16)  SpO2: 94% (20 @ 07:42) (93% - 100%)          20 @ 07:01  -  20 @ 07:00  --------------------------------------------------------  IN:  Total IN: 0 mL    OUT:    Intermittent Catheterization - Urethral: 600 mL  Total OUT: 600 mL    Total NET: -600 mL      20 @ 07:01  -  20 @ 07:00  --------------------------------------------------------  IN:  Total IN: 0 mL    OUT:    Intermittent Catheterization - Urethral: 600 mL  Total OUT: 600 mL    Total NET: -600 mL          LABS:      CBC Full  -  ( 03 Sep 2020 05:03 )  WBC Count : 6.98 K/uL  RBC Count : 3.49 M/uL  Hemoglobin : 11.3 g/dL  Hematocrit : 33.1 %  Platelet Count - Automated : 119 K/uL  Mean Cell Volume : 94.8 fl  Mean Cell Hemoglobin : 32.4 pg  Mean Cell Hemoglobin Concentration : 34.1 gm/dL  Auto Neutrophil # : 5.97 K/uL  Auto Lymphocyte # : 0.58 K/uL  Auto Monocyte # : 0.19 K/uL  Auto Eosinophil # : 0.03 K/uL  Auto Basophil # : 0.02 K/uL  Auto Neutrophil % : 85.6 %  Auto Lymphocyte % : 8.3 %  Auto Monocyte % : 2.7 %  Auto Eosinophil % : 0.4 %  Auto Basophil % : 0.3 %        135  |  99  |  37<H>  ----------------------------<  122<H>  3.3<L>   |  29  |  0.81    Ca    8.1<L>      03 Sep 2020 05:03  Mg     2.0         TPro  5.6<L>  /  Alb  2.1<L>  /  TBili  0.8  /  DBili  x   /  AST  21  /  ALT  43  /  AlkPhos  63  09-03        Urinalysis Basic - ( 02 Sep 2020 08:31 )    Color: Yellow / Appearance: Slightly Turbid / S.010 / pH: x  Gluc: x / Ketone: Negative  / Bili: Negative / Urobili: 1   Blood: x / Protein: 30 mg/dL / Nitrite: Positive   Leuk Esterase: Small / RBC: 5-10 /HPF / WBC 26-50 /HPF   Sq Epi: x / Non Sq Epi: Neg.-Few / Bacteria: TNTC /HPF              Physical Exam    Constitutional: alert, no acute distress    Abdomen: soft, nontender, nondistended, no HSM    Genitourinary: no bladder distention

## 2020-09-04 ENCOUNTER — TRANSCRIPTION ENCOUNTER (OUTPATIENT)
Age: 80
End: 2020-09-04

## 2020-09-04 ENCOUNTER — INPATIENT (INPATIENT)
Facility: HOSPITAL | Age: 80
LOS: 9 days | Discharge: HOME | DRG: 177 | End: 2020-09-14
Attending: STUDENT IN AN ORGANIZED HEALTH CARE EDUCATION/TRAINING PROGRAM | Admitting: STUDENT IN AN ORGANIZED HEALTH CARE EDUCATION/TRAINING PROGRAM
Payer: MEDICARE

## 2020-09-04 VITALS
RESPIRATION RATE: 16 BRPM | HEART RATE: 76 BPM | DIASTOLIC BLOOD PRESSURE: 78 MMHG | OXYGEN SATURATION: 94 % | TEMPERATURE: 98 F | SYSTOLIC BLOOD PRESSURE: 127 MMHG

## 2020-09-04 VITALS
TEMPERATURE: 100 F | SYSTOLIC BLOOD PRESSURE: 113 MMHG | HEART RATE: 82 BPM | RESPIRATION RATE: 14 BRPM | DIASTOLIC BLOOD PRESSURE: 55 MMHG | OXYGEN SATURATION: 90 %

## 2020-09-04 DIAGNOSIS — Z98.890 OTHER SPECIFIED POSTPROCEDURAL STATES: Chronic | ICD-10-CM

## 2020-09-04 DIAGNOSIS — C71.9 MALIGNANT NEOPLASM OF BRAIN, UNSPECIFIED: ICD-10-CM

## 2020-09-04 DIAGNOSIS — Z90.89 ACQUIRED ABSENCE OF OTHER ORGANS: Chronic | ICD-10-CM

## 2020-09-04 DIAGNOSIS — D17.0 BENIGN LIPOMATOUS NEOPLASM OF SKIN AND SUBCUTANEOUS TISSUE OF HEAD, FACE AND NECK: Chronic | ICD-10-CM

## 2020-09-04 DIAGNOSIS — Z90.79 ACQUIRED ABSENCE OF OTHER GENITAL ORGAN(S): Chronic | ICD-10-CM

## 2020-09-04 DIAGNOSIS — C44.711 BASAL CELL CARCINOMA OF SKIN OF UNSPECIFIED LOWER LIMB, INCLUDING HIP: Chronic | ICD-10-CM

## 2020-09-04 DIAGNOSIS — Z98.41 CATARACT EXTRACTION STATUS, RIGHT EYE: Chronic | ICD-10-CM

## 2020-09-04 LAB
-  AMIKACIN: SIGNIFICANT CHANGE UP
-  AMOXICILLIN/CLAVULANIC ACID: SIGNIFICANT CHANGE UP
-  AMPICILLIN/SULBACTAM: SIGNIFICANT CHANGE UP
-  AMPICILLIN: SIGNIFICANT CHANGE UP
-  AZTREONAM: SIGNIFICANT CHANGE UP
-  CEFAZOLIN: SIGNIFICANT CHANGE UP
-  CEFEPIME: SIGNIFICANT CHANGE UP
-  CEFOXITIN: SIGNIFICANT CHANGE UP
-  CEFTRIAXONE: SIGNIFICANT CHANGE UP
-  CIPROFLOXACIN: SIGNIFICANT CHANGE UP
-  ERTAPENEM: SIGNIFICANT CHANGE UP
-  GENTAMICIN: SIGNIFICANT CHANGE UP
-  IMIPENEM: SIGNIFICANT CHANGE UP
-  LEVOFLOXACIN: SIGNIFICANT CHANGE UP
-  MEROPENEM: SIGNIFICANT CHANGE UP
-  NITROFURANTOIN: SIGNIFICANT CHANGE UP
-  PIPERACILLIN/TAZOBACTAM: SIGNIFICANT CHANGE UP
-  TIGECYCLINE: SIGNIFICANT CHANGE UP
-  TOBRAMYCIN: SIGNIFICANT CHANGE UP
-  TRIMETHOPRIM/SULFAMETHOXAZOLE: SIGNIFICANT CHANGE UP
ANION GAP SERPL CALC-SCNC: 8 MMOL/L — SIGNIFICANT CHANGE UP (ref 5–17)
BUN SERPL-MCNC: 33 MG/DL — HIGH (ref 7–23)
CALCIUM SERPL-MCNC: 8.3 MG/DL — LOW (ref 8.4–10.5)
CHLORIDE SERPL-SCNC: 101 MMOL/L — SIGNIFICANT CHANGE UP (ref 96–108)
CO2 SERPL-SCNC: 27 MMOL/L — SIGNIFICANT CHANGE UP (ref 22–31)
CREAT SERPL-MCNC: 0.87 MG/DL — SIGNIFICANT CHANGE UP (ref 0.5–1.3)
CULTURE RESULTS: SIGNIFICANT CHANGE UP
GLUCOSE BLDC GLUCOMTR-MCNC: 157 MG/DL — HIGH (ref 70–99)
GLUCOSE BLDC GLUCOMTR-MCNC: 202 MG/DL — HIGH (ref 70–99)
GLUCOSE SERPL-MCNC: 141 MG/DL — HIGH (ref 70–99)
HCT VFR BLD CALC: 31.8 % — LOW (ref 39–50)
HGB BLD-MCNC: 10.9 G/DL — LOW (ref 13–17)
MCHC RBC-ENTMCNC: 32.3 PG — SIGNIFICANT CHANGE UP (ref 27–34)
MCHC RBC-ENTMCNC: 34.3 GM/DL — SIGNIFICANT CHANGE UP (ref 32–36)
MCV RBC AUTO: 94.4 FL — SIGNIFICANT CHANGE UP (ref 80–100)
METHOD TYPE: SIGNIFICANT CHANGE UP
NRBC # BLD: 0 /100 WBCS — SIGNIFICANT CHANGE UP (ref 0–0)
NT-PROBNP SERPL-SCNC: 134 PG/ML — SIGNIFICANT CHANGE UP (ref 0–300)
ORGANISM # SPEC MICROSCOPIC CNT: SIGNIFICANT CHANGE UP
ORGANISM # SPEC MICROSCOPIC CNT: SIGNIFICANT CHANGE UP
PLATELET # BLD AUTO: 124 K/UL — LOW (ref 150–400)
POTASSIUM SERPL-MCNC: 3.5 MMOL/L — SIGNIFICANT CHANGE UP (ref 3.5–5.3)
POTASSIUM SERPL-SCNC: 3.5 MMOL/L — SIGNIFICANT CHANGE UP (ref 3.5–5.3)
RBC # BLD: 3.37 M/UL — LOW (ref 4.2–5.8)
RBC # FLD: 13.2 % — SIGNIFICANT CHANGE UP (ref 10.3–14.5)
SODIUM SERPL-SCNC: 136 MMOL/L — SIGNIFICANT CHANGE UP (ref 135–145)
SPECIMEN SOURCE: SIGNIFICANT CHANGE UP
WBC # BLD: 5.69 K/UL — SIGNIFICANT CHANGE UP (ref 3.8–10.5)
WBC # FLD AUTO: 5.69 K/UL — SIGNIFICANT CHANGE UP (ref 3.8–10.5)

## 2020-09-04 PROCEDURE — 99239 HOSP IP/OBS DSCHRG MGMT >30: CPT

## 2020-09-04 PROCEDURE — 99223 1ST HOSP IP/OBS HIGH 75: CPT

## 2020-09-04 PROCEDURE — 99233 SBSQ HOSP IP/OBS HIGH 50: CPT

## 2020-09-04 PROCEDURE — 93306 TTE W/DOPPLER COMPLETE: CPT | Mod: 26

## 2020-09-04 RX ORDER — ESCITALOPRAM OXALATE 10 MG/1
10 TABLET, FILM COATED ORAL DAILY
Refills: 0 | Status: DISCONTINUED | OUTPATIENT
Start: 2020-09-04 | End: 2020-09-14

## 2020-09-04 RX ORDER — SACCHAROMYCES BOULARDII 250 MG
250 POWDER IN PACKET (EA) ORAL
Refills: 0 | Status: DISCONTINUED | OUTPATIENT
Start: 2020-09-04 | End: 2020-09-14

## 2020-09-04 RX ORDER — ESCITALOPRAM OXALATE 10 MG/1
1 TABLET, FILM COATED ORAL
Qty: 0 | Refills: 0 | DISCHARGE
Start: 2020-09-04

## 2020-09-04 RX ORDER — CEFEPIME 1 G/1
2000 INJECTION, POWDER, FOR SOLUTION INTRAMUSCULAR; INTRAVENOUS EVERY 8 HOURS
Refills: 0 | Status: DISCONTINUED | OUTPATIENT
Start: 2020-09-04 | End: 2020-09-11

## 2020-09-04 RX ORDER — ACETAMINOPHEN 500 MG
2 TABLET ORAL
Qty: 0 | Refills: 0 | DISCHARGE
Start: 2020-09-04

## 2020-09-04 RX ORDER — ASPIRIN/CALCIUM CARB/MAGNESIUM 324 MG
81 TABLET ORAL DAILY
Refills: 0 | Status: DISCONTINUED | OUTPATIENT
Start: 2020-09-04 | End: 2020-09-14

## 2020-09-04 RX ORDER — ALPRAZOLAM 0.25 MG
1 TABLET ORAL
Qty: 0 | Refills: 0 | DISCHARGE
Start: 2020-09-04

## 2020-09-04 RX ORDER — VANCOMYCIN HCL 1 G
1.25 VIAL (EA) INTRAVENOUS
Qty: 0 | Refills: 0 | DISCHARGE
Start: 2020-09-04

## 2020-09-04 RX ORDER — LEVETIRACETAM 250 MG/1
1 TABLET, FILM COATED ORAL
Qty: 0 | Refills: 0 | DISCHARGE
Start: 2020-09-04

## 2020-09-04 RX ORDER — BETHANECHOL CHLORIDE 25 MG
1 TABLET ORAL
Qty: 0 | Refills: 0 | DISCHARGE
Start: 2020-09-04

## 2020-09-04 RX ORDER — BENZOCAINE AND MENTHOL 5; 1 G/100ML; G/100ML
0 LIQUID ORAL
Qty: 0 | Refills: 0 | DISCHARGE
Start: 2020-09-04

## 2020-09-04 RX ORDER — ATORVASTATIN CALCIUM 80 MG/1
20 TABLET, FILM COATED ORAL AT BEDTIME
Refills: 0 | Status: DISCONTINUED | OUTPATIENT
Start: 2020-09-04 | End: 2020-09-14

## 2020-09-04 RX ORDER — POLYETHYLENE GLYCOL 3350 17 G/17G
17 POWDER, FOR SOLUTION ORAL
Refills: 0 | Status: DISCONTINUED | OUTPATIENT
Start: 2020-09-04 | End: 2020-09-14

## 2020-09-04 RX ORDER — LIDOCAINE 4 G/100G
1 CREAM TOPICAL DAILY
Refills: 0 | Status: DISCONTINUED | OUTPATIENT
Start: 2020-09-04 | End: 2020-09-14

## 2020-09-04 RX ORDER — VANCOMYCIN HCL 1 G
1250 VIAL (EA) INTRAVENOUS EVERY 12 HOURS
Refills: 0 | Status: DISCONTINUED | OUTPATIENT
Start: 2020-09-04 | End: 2020-09-04

## 2020-09-04 RX ORDER — POLYETHYLENE GLYCOL 3350 17 G/17G
17 POWDER, FOR SOLUTION ORAL
Qty: 0 | Refills: 0 | DISCHARGE
Start: 2020-09-04

## 2020-09-04 RX ORDER — CEFEPIME 1 G/1
2 INJECTION, POWDER, FOR SOLUTION INTRAMUSCULAR; INTRAVENOUS
Qty: 0 | Refills: 0 | DISCHARGE
Start: 2020-09-04

## 2020-09-04 RX ORDER — METOPROLOL TARTRATE 50 MG
25 TABLET ORAL DAILY
Refills: 0 | Status: DISCONTINUED | OUTPATIENT
Start: 2020-09-04 | End: 2020-09-14

## 2020-09-04 RX ORDER — ASPIRIN/CALCIUM CARB/MAGNESIUM 324 MG
1 TABLET ORAL
Qty: 0 | Refills: 0 | DISCHARGE
Start: 2020-09-04

## 2020-09-04 RX ORDER — DOCUSATE SODIUM 100 MG
1 CAPSULE ORAL
Qty: 0 | Refills: 0 | DISCHARGE
Start: 2020-09-04

## 2020-09-04 RX ORDER — ALPRAZOLAM 0.25 MG
0.5 TABLET ORAL AT BEDTIME
Refills: 0 | Status: DISCONTINUED | OUTPATIENT
Start: 2020-09-04 | End: 2020-09-11

## 2020-09-04 RX ORDER — PANTOPRAZOLE SODIUM 20 MG/1
1 TABLET, DELAYED RELEASE ORAL
Qty: 0 | Refills: 0 | DISCHARGE
Start: 2020-09-04

## 2020-09-04 RX ORDER — SACCHAROMYCES BOULARDII 250 MG
1 POWDER IN PACKET (EA) ORAL
Qty: 0 | Refills: 0 | DISCHARGE
Start: 2020-09-04

## 2020-09-04 RX ORDER — ATORVASTATIN CALCIUM 80 MG/1
1 TABLET, FILM COATED ORAL
Qty: 0 | Refills: 0 | DISCHARGE
Start: 2020-09-04

## 2020-09-04 RX ORDER — BETHANECHOL CHLORIDE 25 MG
10 TABLET ORAL THREE TIMES A DAY
Refills: 0 | Status: DISCONTINUED | OUTPATIENT
Start: 2020-09-04 | End: 2020-09-14

## 2020-09-04 RX ORDER — LANOLIN ALCOHOL/MO/W.PET/CERES
1 CREAM (GRAM) TOPICAL
Qty: 0 | Refills: 0 | DISCHARGE
Start: 2020-09-04

## 2020-09-04 RX ORDER — ACETAMINOPHEN 500 MG
650 TABLET ORAL EVERY 6 HOURS
Refills: 0 | Status: DISCONTINUED | OUTPATIENT
Start: 2020-09-04 | End: 2020-09-14

## 2020-09-04 RX ORDER — SENNA PLUS 8.6 MG/1
2 TABLET ORAL AT BEDTIME
Refills: 0 | Status: DISCONTINUED | OUTPATIENT
Start: 2020-09-04 | End: 2020-09-14

## 2020-09-04 RX ORDER — LEVETIRACETAM 250 MG/1
1000 TABLET, FILM COATED ORAL
Refills: 0 | Status: DISCONTINUED | OUTPATIENT
Start: 2020-09-04 | End: 2020-09-14

## 2020-09-04 RX ORDER — FUROSEMIDE 40 MG
1 TABLET ORAL
Qty: 0 | Refills: 0 | DISCHARGE
Start: 2020-09-04

## 2020-09-04 RX ORDER — ALPRAZOLAM 0.25 MG
0.25 TABLET ORAL
Refills: 0 | Status: DISCONTINUED | OUTPATIENT
Start: 2020-09-04 | End: 2020-09-11

## 2020-09-04 RX ORDER — PANTOPRAZOLE SODIUM 20 MG/1
40 TABLET, DELAYED RELEASE ORAL
Refills: 0 | Status: DISCONTINUED | OUTPATIENT
Start: 2020-09-04 | End: 2020-09-14

## 2020-09-04 RX ORDER — LANOLIN ALCOHOL/MO/W.PET/CERES
3 CREAM (GRAM) TOPICAL AT BEDTIME
Refills: 0 | Status: DISCONTINUED | OUTPATIENT
Start: 2020-09-04 | End: 2020-09-14

## 2020-09-04 RX ORDER — ENOXAPARIN SODIUM 100 MG/ML
40 INJECTION SUBCUTANEOUS DAILY
Refills: 0 | Status: DISCONTINUED | OUTPATIENT
Start: 2020-09-04 | End: 2020-09-14

## 2020-09-04 RX ORDER — FUROSEMIDE 40 MG
40 TABLET ORAL DAILY
Refills: 0 | Status: DISCONTINUED | OUTPATIENT
Start: 2020-09-04 | End: 2020-09-14

## 2020-09-04 RX ORDER — IBUPROFEN 200 MG
1 TABLET ORAL
Qty: 0 | Refills: 0 | DISCHARGE
Start: 2020-09-04

## 2020-09-04 RX ORDER — PHENYLEPHRINE-SHARK LIVER OIL-MINERAL OIL-PETROLATUM RECTAL OINTMENT
1 OINTMENT (GRAM) RECTAL THREE TIMES A DAY
Refills: 0 | Status: DISCONTINUED | OUTPATIENT
Start: 2020-09-04 | End: 2020-09-14

## 2020-09-04 RX ORDER — SENNA PLUS 8.6 MG/1
2 TABLET ORAL
Qty: 0 | Refills: 0 | DISCHARGE
Start: 2020-09-04

## 2020-09-04 RX ORDER — LIDOCAINE 4 G/100G
0 CREAM TOPICAL
Qty: 0 | Refills: 0 | DISCHARGE
Start: 2020-09-04

## 2020-09-04 RX ORDER — IBUPROFEN 200 MG
400 TABLET ORAL EVERY 8 HOURS
Refills: 0 | Status: DISCONTINUED | OUTPATIENT
Start: 2020-09-04 | End: 2020-09-14

## 2020-09-04 RX ORDER — METOPROLOL TARTRATE 50 MG
1 TABLET ORAL
Qty: 0 | Refills: 0 | DISCHARGE
Start: 2020-09-04

## 2020-09-04 RX ORDER — ENOXAPARIN SODIUM 100 MG/ML
40 INJECTION SUBCUTANEOUS
Qty: 0 | Refills: 0 | DISCHARGE
Start: 2020-09-04

## 2020-09-04 RX ORDER — VANCOMYCIN HCL 1 G
1250 VIAL (EA) INTRAVENOUS EVERY 12 HOURS
Refills: 0 | Status: DISCONTINUED | OUTPATIENT
Start: 2020-09-04 | End: 2020-09-07

## 2020-09-04 RX ADMIN — Medication 25 MILLIGRAM(S): at 06:15

## 2020-09-04 RX ADMIN — Medication 250 MILLIGRAM(S): at 18:06

## 2020-09-04 RX ADMIN — PANTOPRAZOLE SODIUM 40 MILLIGRAM(S): 20 TABLET, DELAYED RELEASE ORAL at 06:14

## 2020-09-04 RX ADMIN — Medication 0.5 MILLIGRAM(S): at 22:07

## 2020-09-04 RX ADMIN — Medication 3 MILLIGRAM(S): at 22:07

## 2020-09-04 RX ADMIN — Medication 100 MILLIGRAM(S): at 14:59

## 2020-09-04 RX ADMIN — LIDOCAINE 1 PATCH: 4 CREAM TOPICAL at 12:26

## 2020-09-04 RX ADMIN — Medication 10 MILLIGRAM(S): at 22:07

## 2020-09-04 RX ADMIN — LIDOCAINE 1 PATCH: 4 CREAM TOPICAL at 18:37

## 2020-09-04 RX ADMIN — Medication 1 APPLICATION(S): at 18:38

## 2020-09-04 RX ADMIN — PHENYLEPHRINE-SHARK LIVER OIL-MINERAL OIL-PETROLATUM RECTAL OINTMENT 1 APPLICATION(S): at 18:06

## 2020-09-04 RX ADMIN — Medication 5 MILLIGRAM(S): at 18:06

## 2020-09-04 RX ADMIN — Medication 250 MILLIGRAM(S): at 06:40

## 2020-09-04 RX ADMIN — Medication 10 MILLIGRAM(S): at 14:59

## 2020-09-04 RX ADMIN — CEFEPIME 100 MILLIGRAM(S): 1 INJECTION, POWDER, FOR SOLUTION INTRAMUSCULAR; INTRAVENOUS at 12:29

## 2020-09-04 RX ADMIN — POLYETHYLENE GLYCOL 3350 17 GRAM(S): 17 POWDER, FOR SOLUTION ORAL at 18:07

## 2020-09-04 RX ADMIN — Medication 5 MILLIGRAM(S): at 06:15

## 2020-09-04 RX ADMIN — CEFEPIME 100 MILLIGRAM(S): 1 INJECTION, POWDER, FOR SOLUTION INTRAMUSCULAR; INTRAVENOUS at 18:37

## 2020-09-04 RX ADMIN — ATORVASTATIN CALCIUM 20 MILLIGRAM(S): 80 TABLET, FILM COATED ORAL at 22:07

## 2020-09-04 RX ADMIN — PHENYLEPHRINE-SHARK LIVER OIL-MINERAL OIL-PETROLATUM RECTAL OINTMENT 1 APPLICATION(S): at 22:07

## 2020-09-04 RX ADMIN — Medication 650 MILLIGRAM(S): at 18:19

## 2020-09-04 RX ADMIN — CEFEPIME 100 MILLIGRAM(S): 1 INJECTION, POWDER, FOR SOLUTION INTRAMUSCULAR; INTRAVENOUS at 22:08

## 2020-09-04 RX ADMIN — Medication 0.25 MILLIGRAM(S): at 18:34

## 2020-09-04 RX ADMIN — Medication 0.25 MILLIGRAM(S): at 06:14

## 2020-09-04 RX ADMIN — LEVETIRACETAM 1000 MILLIGRAM(S): 250 TABLET, FILM COATED ORAL at 06:15

## 2020-09-04 RX ADMIN — Medication 10 MILLIGRAM(S): at 06:14

## 2020-09-04 RX ADMIN — LEVETIRACETAM 1000 MILLIGRAM(S): 250 TABLET, FILM COATED ORAL at 18:06

## 2020-09-04 RX ADMIN — SENNA PLUS 2 TABLET(S): 8.6 TABLET ORAL at 22:07

## 2020-09-04 RX ADMIN — Medication 40 MILLIGRAM(S): at 06:15

## 2020-09-04 RX ADMIN — Medication 400 MILLIGRAM(S): at 02:30

## 2020-09-04 RX ADMIN — Medication 250 MILLIGRAM(S): at 06:14

## 2020-09-04 RX ADMIN — Medication 400 MILLIGRAM(S): at 01:38

## 2020-09-04 RX ADMIN — ESCITALOPRAM OXALATE 10 MILLIGRAM(S): 10 TABLET, FILM COATED ORAL at 18:06

## 2020-09-04 RX ADMIN — Medication 1 APPLICATION(S): at 06:25

## 2020-09-04 RX ADMIN — Medication 81 MILLIGRAM(S): at 12:26

## 2020-09-04 RX ADMIN — ENOXAPARIN SODIUM 40 MILLIGRAM(S): 100 INJECTION SUBCUTANEOUS at 12:27

## 2020-09-04 NOTE — DISCHARGE NOTE PROVIDER - CARE PROVIDER_API CALL
Francisco Harman  NEUROLOGY  [facility], [address] [place] [address]  Phone: [phone]  Fax: [phone]  Follow Up Time:

## 2020-09-04 NOTE — H&P ADULT - HISTORY OF PRESENT ILLNESS
79M hx of HTN, HLD, prostate ca s/p prostatectomy, basal cell skin ca, hx of GBM dx in 2018 s/p 3 resections last in 5/2020 s/p multiple doses of Temodar admitted to [facility] 8/4/2020 for worsening L sided weakness. CT head and MRI with progression of dz and worsening vasogenic edema in R temporal/frontal/parietal regions treated with tapering Decadron, hospital course complicated by urinary retention. Pt tx to [facility] 8/7/2020 with limited progress in PT and followed by Palliative care and to be discharged to home hospice when stable. Pt is DNR/DNI. Rehab course complicated with fevers 9/2 with noted UTI growing Enterobacter cloacae and CT chest with multiple lobe pna and now transferred to medical floor for persistent fevers and hypoxia requiring supplemental oxygen. Pt currently denied any complaints on oxygen 3L NC sat 90-96% and normotensive, speaking full sentences. Denied SOB, chest pain, cough, abd pain.

## 2020-09-04 NOTE — DISCHARGE NOTE PROVIDER - NSDCCPCAREPLAN_GEN_ALL_CORE_FT
PRINCIPAL DISCHARGE DIAGNOSIS  Diagnosis: Acute UTI  Assessment and Plan of Treatment: UA +, UC with enterobacter currently awaiting sensitivity   Continue vanc and cefepime   ID following      SECONDARY DISCHARGE DIAGNOSES  Diagnosis: GBM (glioblastoma multiforme)  Assessment and Plan of Treatment:     Diagnosis: PNA (pneumonia)  Assessment and Plan of Treatment: CT chest with bilateral PNA and possible pleural effusion   TTE ordered and done. results pending

## 2020-09-04 NOTE — PROGRESS NOTE ADULT - SUBJECTIVE AND OBJECTIVE BOX
HPI:  79 RHM w/ PMH of HTN, HLD, prostate cancer s/p prostectomy, basal cell skin cancer, GBM (dx in 2018, s/p resection 8/3/18, s/p 2nd resection 2/7/19, s/p 3rd resection 5/2020 s/p multiple does of Temodar admitted to [facility] on 8/4/20 w/ worsening of his L. sided weakness. No other complaints. Denied changes in speech, vision, hearing, swallowing, voice quality, numbness/tingling,  balance/room-spinning sensations.    He was seen in Dr. Harman's office on 7/27/20 where he was noted to have worsening strength, lethargy and decreased appetitie. He had been tapering his steroids for over a month. Recently tapered from 4mg QD --> 3mg QD ---> 2mg QD.    CT Head on admission 8.4.2020, showed- Worsening vasogenic edema involving the R. temporal frontal parietal region again identified increased when compared to previous CT in 5/2020.     Patient admitted to neurology floor, started on decadron 4mg q6h later increased to 8mg q8h. MR head w/ and w/o contrast with progression of disease, neuro-onc on board, patient to follow up outpatient for initiation of avastin therapy.   Patient also with urinary retention, urology consulted, suspect urinary retention is neurologic in etiology, on clean intermittent catheterization; further followup to continue at rehab.   Patient stable for discharge to acute rehab 8/7/20.     Of note--patient was admitted to  rehab after his most recent resection in May 2020.  He was at [facility] from 6/4/20  to 6/17/20.  At the time of discharge from  rehab, patient was supervision for eating, grooming, and dressing, and contact guard for transfers.  He was able to ambulate 150' with SAC with CG/min assist and to navigate 12 steps with u/l HR with min assist.  SLP at [facility] noted higher level cognitive deficits with mild impulsivity and reduced insight, necessitating supervision while at home. (07 Aug 2020 14:30)      PAST MEDICAL & SURGICAL HISTORY:  Fall, sequela  Bilateral hearing loss, unspecified hearing loss type   activity: [employer] 1962-65    [place]  Type 2 diabetes mellitus without complication, without long-term current use of insulin: stop oral meds  3 weeks ago  Glioblastoma: biopsy 8/2018  surgery  6 weeks radiation  35 days of oral chemotherapy last 3 weeks  Basal cell carcinoma (BCC) of left lower leg: s/p resection  right lower leg top  left lower leg  Prostate cancer: s/p radical prostatectomy 1995  HLD (hyperlipidemia)  HTN (hypertension)  S/P colonoscopic polypectomy: 3 years benign polyps  S/P tonsillectomy: age 30  H/O bilateral cataract extraction: 3-4 years ago  S/P prostatectomy: 1995  S/P brain surgery: 8/2018  Lipoma of neck: s/p resection at age 30  Basal cell carcinoma (BCC) of lower leg: s/p resection  History of tonsillectomy: at age 30      Subjective:  Pt. had fever 102.2 at 1am last night.  Afebrile this AM but requiring 3L NC O2 -- Sat 94%.  No void.   Recieved 2 doses of IV Cefepime and Vanco prior to spiking fever.   Pt. very fatigued,  Denies cough/congestion,  increased SOB.  Slept during the night.     REVIEW OF SYMPTOMS  Positive:  Fever. Hypoxia, depressed, left HP, cognitive deficits, urinary retention   Denies: headache, lightheadedness, CP, SOB, abdominal pain, dysuria, nausea, constipation      VITALS  Vital Signs Last 24 Hrs  T(C): 36.7 (04 Sep 2020 08:36), Max: 39.2 (03 Sep 2020 17:30)  T(F): 98 (04 Sep 2020 08:36), Max: 102.5 (03 Sep 2020 17:30)  HR: 76 (04 Sep 2020 08:36) (72 - 97)  BP: 127/78 (04 Sep 2020 08:36) (114/70 - 150/81)  BP(mean): --  RR: 16 (04 Sep 2020 08:36) (16 - 17)  SpO2: 94% (04 Sep 2020 08:36) (93% - 97%)      PHYSICAL EXAM  Constitutional - NAD, Comfortable  	HEENT - NCAT, EOMI  	Chest -  CTAB   	Cardio - warm and well perfused, RRR, no murmur  	Abdomen -  Soft, NTND  	Extremities - No edema, No calf tenderness   	Neurologic Exam:                 	   Cognitive -   	          Orientation: Awake, Alert,  	      	          Thought:  slow processing, perseverative, agitated  	   Speech - Fluent, Comprehensible,   	   Cranial Nerves - left visual field deficit, left facial weakness, Tongue midline, EOMI, Shoulder shrug intact  	   Motor -  LEFT hemiparesis  	   Sensory - Impaired-- extinguishes on left  	   Coordination - impaired on left  Psychiatric - depressed, anxious    RECENT LABS                        10.9   5.69  )-----------( 124      ( 04 Sep 2020 05:00 )             31.8     09-04    136  |  101  |  33<H>  ----------------------------<  141<H>  3.5   |  27  |  0.87    Ca    8.3<L>      04 Sep 2020 05:00  Mg     2.0     09-03    TPro  5.6<L>  /  Alb  2.1<L>  /  TBili  0.8  /  DBili  x   /  AST  21  /  ALT  43  /  AlkPhos  63  09-03      Urine Microscopic-Add On (NC) (09.02.20 @ 08:31)    Red Blood Cell - Urine: 5-10 /HPF    White Blood Cell - Urine: 26-50 /HPF    Bacteria: TNTC /HPF    Epithelial Cells: Neg.-Few    Culture - Urine (09.02.20 @ 08:31)    Specimen Source: .Urine Catheterized    Culture Results:   >100,000 CFU/ml Enterobacter cloacae complex          RADIOLOGY/OTHER RESULTS      MEDICATIONS  (STANDING):  ALPRAZolam 0.5 milliGRAM(s) Oral at bedtime  ALPRAZolam 0.25 milliGRAM(s) Oral two times a day  aspirin  chewable 81 milliGRAM(s) Oral daily  atorvastatin 20 milliGRAM(s) Oral at bedtime  bethanechol 10 milliGRAM(s) Oral three times a day  bisacodyl 5 milliGRAM(s) Oral every 12 hours  cefepime   IVPB 2000 milliGRAM(s) IV Intermittent every 8 hours  docusate sodium 100 milliGRAM(s) Oral daily  enoxaparin Injectable 40 milliGRAM(s) SubCutaneous daily  escitalopram 10 milliGRAM(s) Oral <User Schedule>  furosemide    Tablet 40 milliGRAM(s) Oral daily  hemorrhoidal Ointment 1 Application(s) Rectal three times a day  levETIRAcetam 1000 milliGRAM(s) Oral two times a day  lidocaine   Patch 1 Patch Transdermal daily  melatonin 3 milliGRAM(s) Oral at bedtime  metoprolol succinate ER 25 milliGRAM(s) Oral daily  pantoprazole    Tablet 40 milliGRAM(s) Oral before breakfast  polyethylene glycol 3350 17 Gram(s) Oral two times a day  saccharomyces boulardii 250 milliGRAM(s) Oral two times a day  senna 2 Tablet(s) Oral at bedtime  triamcinolone 0.1% Cream 1 Application(s) Topical two times a day  vancomycin  IVPB 1250 milliGRAM(s) IV Intermittent every 12 hours    MEDICATIONS  (PRN):  acetaminophen   Tablet .. 650 milliGRAM(s) Oral every 6 hours PRN Temp greater or equal to 38C (100.4F), Mild Pain (1 - 3)  ALPRAZolam 0.25 milliGRAM(s) Oral every 8 hours PRN Anxiety  benzocaine 15 mG/menthol 3.6 mG (Sugar-Free) Lozenge 1 Lozenge Oral two times a day PRN Sore Throat  bisacodyl Suppository 10 milliGRAM(s) Rectal daily PRN Constipation  glucagon  Injectable 1 milliGRAM(s) IntraMuscular once PRN Glucose LESS THAN 70 milligrams/deciliter  ibuprofen  Tablet. 400 milliGRAM(s) Oral every 8 hours PRN Temp greater or equal to 38C (100.4F), Moderate Pain (4 - 6)

## 2020-09-04 NOTE — PROGRESS NOTE ADULT - PROVIDER SPECIALTY LIST ADULT
Hospitalist
Internal Medicine
Palliative Care
Physiatry
Physiatry
Rehab Medicine
Urology
Rehab Medicine
Hospitalist
Rehab Medicine

## 2020-09-04 NOTE — H&P ADULT - NSHPPHYSICALEXAM_GEN_ALL_CORE
Vital Signs Last 24 Hrs  T(C): 37.7 (04 Sep 2020 19:26), Max: 39 (04 Sep 2020 01:38)  T(F): 99.8 (04 Sep 2020 19:26), Max: 102.2 (04 Sep 2020 01:38)  HR: 82 (04 Sep 2020 19:26) (72 - 82)  BP: 113/55 (04 Sep 2020 19:26) (113/55 - 127/78)  BP(mean): --  RR: 14 (04 Sep 2020 19:26) (14 - 16)  SpO2: 90% (04 Sep 2020 19:26) (90% - 96%)  Daily     Daily   CAPILLARY BLOOD GLUCOSE      POCT Blood Glucose.: 202 mg/dL (04 Sep 2020 12:40)    I&O's Summary      GENERAL: NAD  HEAD:  Normocephalic  EYES: EOMI, PERRLA, conjunctiva and sclera clear  ENMT: No tonsillar erythema, exudates, or enlargement; Moist mucous membranes, No lesions  NECK: Supple, No JVD, no bruit, normal thyroid  NERVOUS SYSTEM:  alert, LUE and LLE weakness. LLE atrophy.   HEART: Regular rate and rhythm; No murmurs, rubs, or gallops  ABDOMEN: Soft, Nontender, Nondistended; Bowel sounds present  EXTREMITIES:  2+ Peripheral Pulses, No clubbing, cyanosis, or edema  LYMPH: No lymphadenopathy noted  SKIN: No rashes or lesions

## 2020-09-04 NOTE — PROVIDER CONTACT NOTE (OTHER) - REASON
BS 95
BS < 100
Hypoglycemia
Oral temp - 100.3, rectal temp 102.0
Rectal temp 102.2
Suicidal Ideation
patient febrile
update
Temp 102.5

## 2020-09-04 NOTE — PROGRESS NOTE ADULT - SUBJECTIVE AND OBJECTIVE BOX
Patient is a 79y old  Male who presents with a chief complaint of Glioblastoma (03 Sep 2020 13:29)    HPI:  79 RHM w/ PMH of HTN, HLD, prostate cancer s/p prostectomy, basal cell skin cancer, GBM (dx in 2018, s/p resection 8/3/18, s/p 2nd resection 19, s/p 3rd resection 2020 s/p multiple does of Temodar admitted to [facility] on 20 w/ worsening of his L. sided weakness. No other complaints. Denied changes in speech, vision, hearing, swallowing, voice quality, numbness/tingling,  balance/room-spinning sensations.    He was seen in Dr. Harman's office on 20 where he was noted to have worsening strength, lethargy and decreased appetitie. He had been tapering his steroids for over a month. Recently tapered from 4mg QD --> 3mg QD ---> 2mg QD.    CT Head on admission 2020, showed- Worsening vasogenic edema involving the R. temporal frontal parietal region again identified increased when compared to previous CT in 2020.     Patient admitted to neurology floor, started on decadron 4mg q6h later increased to 8mg q8h. MR head w/ and w/o contrast with progression of disease, neuro-onc on board, patient to follow up outpatient for initiation of avastin therapy.   Patient also with urinary retention, urology consulted, suspect urinary retention is neurologic in etiology, on clean intermittent catheterization; further followup to continue at rehab.   Patient stable for discharge to acute rehab 20.     Of note--patient was admitted to  rehab after his most recent resection in May 2020.  He was at [facility] from 20  to 20.  At the time of discharge from  rehab, patient was supervision for eating, grooming, and dressing, and contact guard for transfers.  He was able to ambulate 150' with SAC with CG/min assist and to navigate 12 steps with u/l HR with min assist.  SLP at [facility] noted higher level cognitive deficits with mild impulsivity and reduced insight, necessitating supervision while at home. (07 Aug 2020 14:30)    more spontaneous voiding, on bethanecol    Interval Events:  Patient seen and examined at bedside.    MEDICATIONS:  MEDICATIONS  (STANDING):  ALPRAZolam 0.5 milliGRAM(s) Oral at bedtime  ALPRAZolam 0.25 milliGRAM(s) Oral two times a day  aspirin  chewable 81 milliGRAM(s) Oral daily  atorvastatin 20 milliGRAM(s) Oral at bedtime  bethanechol 10 milliGRAM(s) Oral three times a day  bisacodyl 5 milliGRAM(s) Oral every 12 hours  cefepime   IVPB 2000 milliGRAM(s) IV Intermittent every 8 hours  docusate sodium 100 milliGRAM(s) Oral daily  enoxaparin Injectable 40 milliGRAM(s) SubCutaneous daily  escitalopram 10 milliGRAM(s) Oral <User Schedule>  furosemide    Tablet 40 milliGRAM(s) Oral daily  hemorrhoidal Ointment 1 Application(s) Rectal three times a day  levETIRAcetam 1000 milliGRAM(s) Oral two times a day  lidocaine   Patch 1 Patch Transdermal daily  melatonin 3 milliGRAM(s) Oral at bedtime  metoprolol succinate ER 25 milliGRAM(s) Oral daily  pantoprazole    Tablet 40 milliGRAM(s) Oral before breakfast  polyethylene glycol 3350 17 Gram(s) Oral two times a day  saccharomyces boulardii 250 milliGRAM(s) Oral two times a day  senna 2 Tablet(s) Oral at bedtime  triamcinolone 0.1% Cream 1 Application(s) Topical two times a day  vancomycin  IVPB 1000 milliGRAM(s) IV Intermittent every 12 hours    MEDICATIONS  (PRN):  acetaminophen   Tablet .. 650 milliGRAM(s) Oral every 6 hours PRN Temp greater or equal to 38C (100.4F), Mild Pain (1 - 3)  ALPRAZolam 0.25 milliGRAM(s) Oral every 8 hours PRN Anxiety  benzocaine 15 mG/menthol 3.6 mG (Sugar-Free) Lozenge 1 Lozenge Oral two times a day PRN Sore Throat  bisacodyl Suppository 10 milliGRAM(s) Rectal daily PRN Constipation  glucagon  Injectable 1 milliGRAM(s) IntraMuscular once PRN Glucose LESS THAN 70 milligrams/deciliter  ibuprofen  Tablet. 400 milliGRAM(s) Oral every 8 hours PRN Temp greater or equal to 38C (100.4F), Moderate Pain (4 - 6)      Allergies    No Known Allergies    Intolerances        T(C): 36.5 (20 @ 06:13), Max: 39.2 (20 @ 17:30)  T(F): 97.7 (20 @ 06:13), Max: 102.5 (20 @ 17:30)  HR: 72 (20 @ 06:13) (56 - 97)  BP: 114/70 (20 @ 06:13) (94/60 - 150/81)  RR: 16 (20 @ 06:13) (15 - 17)  SpO2: 96% (20 @ 06:13) (93% - 100%)      Weight (kg): 83.5 (20 @ 09:20)      20 @ 07:01  -  20 @ 07:00  --------------------------------------------------------  IN:  Total IN: 0 mL    OUT:    Intermittent Catheterization - Urethral: 600 mL  Total OUT: 600 mL    Total NET: -600 mL      20 @ 07:01  -  20 @ 07:00  --------------------------------------------------------  IN:  Total IN: 0 mL    OUT:    Intermittent Catheterization - Urethral: 1000 mL  Total OUT: 1000 mL    Total NET: -1000 mL          LABS:      CBC Full  -  ( 04 Sep 2020 05:00 )  WBC Count : 5.69 K/uL  RBC Count : 3.37 M/uL  Hemoglobin : 10.9 g/dL  Hematocrit : 31.8 %  Platelet Count - Automated : 124 K/uL  Mean Cell Volume : 94.4 fl  Mean Cell Hemoglobin : 32.3 pg  Mean Cell Hemoglobin Concentration : 34.3 gm/dL  Auto Neutrophil # : x  Auto Lymphocyte # : x  Auto Monocyte # : x  Auto Eosinophil # : x  Auto Basophil # : x  Auto Neutrophil % : x  Auto Lymphocyte % : x  Auto Monocyte % : x  Auto Eosinophil % : x  Auto Basophil % : x        136  |  101  |  33<H>  ----------------------------<  141<H>  3.5   |  27  |  0.87    Ca    8.3<L>      04 Sep 2020 05:00  Mg     2.0         TPro  5.6<L>  /  Alb  2.1<L>  /  TBili  0.8  /  DBili  x   /  AST  21  /  ALT  43  /  AlkPhos  63          Urinalysis Basic - ( 02 Sep 2020 08:31 )    Color: Yellow / Appearance: Slightly Turbid / S.010 / pH: x  Gluc: x / Ketone: Negative  / Bili: Negative / Urobili: 1   Blood: x / Protein: 30 mg/dL / Nitrite: Positive   Leuk Esterase: Small / RBC: 5-10 /HPF / WBC 26-50 /HPF   Sq Epi: x / Non Sq Epi: Neg.-Few / Bacteria: TNTC /HPF            Culture - Urine (collected 20 @ 08:31)  Source: .Urine Catheterized  Preliminary Report (20 @ 18:55):    >100,000 CFU/ml Enterobacter cloacae complex        Physical Exam    Constitutional: alert, no acute distress    Abdomen: soft, nontender, nondistended, no HSM    Genitourinary: no bladder distention

## 2020-09-04 NOTE — PROVIDER CONTACT NOTE (OTHER) - BACKGROUND
Patient w/o cough or respiratory symptoms.
Pt asked RN for a gun to kill himself. Pt states he is going to kill himself because he does not want to be a burden. Dr Ramos assessed pt at bedside 1:1 ordered and initiated pca sitting at bedside.
Pt continues on hypoglycemia protocol for BS still < 100, since initial reading.
Pt has been on and off febrile, on abx for PNA and UTI.
Pt was febrile earlier this morning. Labs, EKG, CXR, blood cultures, UA all collected. Pt started on rocephin IV.
Pt with hx of DM type 2, was on decadron. Pt is on regular diet. Pt is due for lantus 4 units, and sliding scale of humalog.
see emr
Pt with neoplasm of brain with hx of DMII, Prostate Ca, HTN, HLD, gout

## 2020-09-04 NOTE — PROVIDER CONTACT NOTE (OTHER) - ACTION/TREATMENT ORDERED:
Monitor pt
MD on call notified, no new orders at this time.
MD on call notified, will come assess pt.
Will continue constant observation
Continue to monitor.
MD on call made aware, will hold lantus. Continue hypoglycemia protocol, continue to monitor.
MD on call notified, no new orders at time.
MD on call notified, received orders to stop 15 minute glucose checks and recheck sugar in 1 hour.
continue constant observation, monitor patient status

## 2020-09-04 NOTE — PROVIDER CONTACT NOTE (OTHER) - ASSESSMENT
Pt alert, tolerating PO carb supplements, but now refusing to eat/drink anymore. Last blood sugar 88. Pt denies any s/s of hypoglycemia.
Pt alert, able to tolerate PO glucose, apple juice and crackers administered.
Pt is alert, able to make needs known. No acute distress noted. Pt with complaints of feeling cold/chills. Oral temp - 100.3, rectal temp 102.2.
Pt is alert, confused at times. Pt states he is feeling warm. Oral temp 100.3, rectal temp 102.0, BP - 121/77, P- 95, SpO2- 93% on room air.
Pt sleeping at this time, appears in no acute distress. Last blood sugar check noted to be 95. D5W infusing at 50mL/hr.
VS: /67, Pulse 56, 101 rectal temp., RR15, O2sat 100r/a.
Vital signs stable. Pt encouraged but still continues to say he would like to die today. Pt refused am medication
patient now alert, expressing sadness, denies pain at present
Vital signs as follows /81, HR 97, Temp 102.5, 95% on 2L NC. Temp rechecked after abx completed, temp at 98.2

## 2020-09-04 NOTE — PROGRESS NOTE ADULT - SUBJECTIVE AND OBJECTIVE BOX
Patient is a 79y old  Male who presents with a chief complaint of Glioblastoma (04 Sep 2020 11:55)      Patient seen and examined at bedside. pt reports feeling tired no other complaints.    ALLERGIES:  No Known Allergies    MEDICATIONS  (STANDING):  ALPRAZolam 0.5 milliGRAM(s) Oral at bedtime  ALPRAZolam 0.25 milliGRAM(s) Oral two times a day  aspirin  chewable 81 milliGRAM(s) Oral daily  atorvastatin 20 milliGRAM(s) Oral at bedtime  bethanechol 10 milliGRAM(s) Oral three times a day  bisacodyl 5 milliGRAM(s) Oral every 12 hours  cefepime   IVPB 2000 milliGRAM(s) IV Intermittent every 8 hours  docusate sodium 100 milliGRAM(s) Oral daily  enoxaparin Injectable 40 milliGRAM(s) SubCutaneous daily  escitalopram 10 milliGRAM(s) Oral <User Schedule>  furosemide    Tablet 40 milliGRAM(s) Oral daily  hemorrhoidal Ointment 1 Application(s) Rectal three times a day  levETIRAcetam 1000 milliGRAM(s) Oral two times a day  lidocaine   Patch 1 Patch Transdermal daily  melatonin 3 milliGRAM(s) Oral at bedtime  metoprolol succinate ER 25 milliGRAM(s) Oral daily  pantoprazole    Tablet 40 milliGRAM(s) Oral before breakfast  polyethylene glycol 3350 17 Gram(s) Oral two times a day  saccharomyces boulardii 250 milliGRAM(s) Oral two times a day  senna 2 Tablet(s) Oral at bedtime  triamcinolone 0.1% Cream 1 Application(s) Topical two times a day  vancomycin  IVPB 1250 milliGRAM(s) IV Intermittent every 12 hours    MEDICATIONS  (PRN):  acetaminophen   Tablet .. 650 milliGRAM(s) Oral every 6 hours PRN Temp greater or equal to 38C (100.4F), Mild Pain (1 - 3)  ALPRAZolam 0.25 milliGRAM(s) Oral every 8 hours PRN Anxiety  benzocaine 15 mG/menthol 3.6 mG (Sugar-Free) Lozenge 1 Lozenge Oral two times a day PRN Sore Throat  bisacodyl Suppository 10 milliGRAM(s) Rectal daily PRN Constipation  glucagon  Injectable 1 milliGRAM(s) IntraMuscular once PRN Glucose LESS THAN 70 milligrams/deciliter  ibuprofen  Tablet. 400 milliGRAM(s) Oral every 8 hours PRN Temp greater or equal to 38C (100.4F), Moderate Pain (4 - 6)    Vital Signs Last 24 Hrs  T(F): 98 (04 Sep 2020 08:36), Max: 102.5 (03 Sep 2020 17:30)  HR: 76 (04 Sep 2020 08:36) (72 - 97)  BP: 127/78 (04 Sep 2020 08:36) (114/70 - 150/81)  RR: 16 (04 Sep 2020 08:36) (16 - 17)  SpO2: 94% (04 Sep 2020 08:36) (93% - 97%)  I&O's Summary    03 Sep 2020 07:01  -  04 Sep 2020 07:00  --------------------------------------------------------  IN: 0 mL / OUT: 1000 mL / NET: -1000 mL      PHYSICAL EXAM:  General: NAD, A/O x 3  ENT: MMM  Neck: Supple, No JVD  Lungs: Clear to auscultation bilaterally  Cardio: RRR, S1/S2, No murmurs  Abdomen: Soft, Nontender, Nondistended; Bowel sounds present  Extremities: No calf tenderness, No pitting edema    LABS:                        10.9   5.69  )-----------( 124      ( 04 Sep 2020 05:00 )             31.8     -04    136  |  101  |  33  ----------------------------<  141  3.5   |  27  |  0.87    Ca    8.3      04 Sep 2020 05:00  Mg     2.0         TPro  5.6  /  Alb  2.1  /  TBili  0.8  /  DBili  x   /  AST  21  /  ALT  43  /  AlkPhos  63      eGFR if : 97 mL/min/1.73M2 (20 @ 05:00)  eGFR if Non African American: 83 mL/min/1.73M2 (20 @ 05:00)      Lactate, Blood: 1.7 mmol/L (09-02 @ 08:31)                      POCT Blood Glucose.: 202 mg/dL (04 Sep 2020 12:40)  POCT Blood Glucose.: 157 mg/dL (04 Sep 2020 08:43)  POCT Blood Glucose.: 130 mg/dL (03 Sep 2020 16:26)      Urinalysis Basic - ( 02 Sep 2020 08:31 )    Color: Yellow / Appearance: Slightly Turbid / S.010 / pH: x  Gluc: x / Ketone: Negative  / Bili: Negative / Urobili: 1   Blood: x / Protein: 30 mg/dL / Nitrite: Positive   Leuk Esterase: Small / RBC: 5-10 /HPF / WBC 26-50 /HPF   Sq Epi: x / Non Sq Epi: Neg.-Few / Bacteria: TNTC /HPF        Culture - Blood (collected 02 Sep 2020 09:03)  Source: .Blood Blood  Preliminary Report (03 Sep 2020 13:01):    No growth to date.    Culture - Blood (collected 02 Sep 2020 09:03)  Source: .Blood Blood  Preliminary Report (03 Sep 2020 13:01):    No growth to date.    Culture - Urine (collected 02 Sep 2020 08:31)  Source: .Urine Catheterized  Preliminary Report (03 Sep 2020 18:55):    >100,000 CFU/ml Enterobacter cloacae complex        RADIOLOGY & ADDITIONAL TESTS:    Care Discussed with Consultants/Other Providers: Patient is a 79y old  Male who presents with a chief complaint of Glioblastoma (04 Sep 2020 11:55)      Patient seen and examined at bedside. pt reports feeling tired no other complaints. Pt requires more oxygen requirement today, need 3-4L NC.    ALLERGIES:  No Known Allergies    MEDICATIONS  (STANDING):  ALPRAZolam 0.5 milliGRAM(s) Oral at bedtime  ALPRAZolam 0.25 milliGRAM(s) Oral two times a day  aspirin  chewable 81 milliGRAM(s) Oral daily  atorvastatin 20 milliGRAM(s) Oral at bedtime  bethanechol 10 milliGRAM(s) Oral three times a day  bisacodyl 5 milliGRAM(s) Oral every 12 hours  cefepime   IVPB 2000 milliGRAM(s) IV Intermittent every 8 hours  docusate sodium 100 milliGRAM(s) Oral daily  enoxaparin Injectable 40 milliGRAM(s) SubCutaneous daily  escitalopram 10 milliGRAM(s) Oral <User Schedule>  furosemide    Tablet 40 milliGRAM(s) Oral daily  hemorrhoidal Ointment 1 Application(s) Rectal three times a day  levETIRAcetam 1000 milliGRAM(s) Oral two times a day  lidocaine   Patch 1 Patch Transdermal daily  melatonin 3 milliGRAM(s) Oral at bedtime  metoprolol succinate ER 25 milliGRAM(s) Oral daily  pantoprazole    Tablet 40 milliGRAM(s) Oral before breakfast  polyethylene glycol 3350 17 Gram(s) Oral two times a day  saccharomyces boulardii 250 milliGRAM(s) Oral two times a day  senna 2 Tablet(s) Oral at bedtime  triamcinolone 0.1% Cream 1 Application(s) Topical two times a day  vancomycin  IVPB 1250 milliGRAM(s) IV Intermittent every 12 hours    MEDICATIONS  (PRN):  acetaminophen   Tablet .. 650 milliGRAM(s) Oral every 6 hours PRN Temp greater or equal to 38C (100.4F), Mild Pain (1 - 3)  ALPRAZolam 0.25 milliGRAM(s) Oral every 8 hours PRN Anxiety  benzocaine 15 mG/menthol 3.6 mG (Sugar-Free) Lozenge 1 Lozenge Oral two times a day PRN Sore Throat  bisacodyl Suppository 10 milliGRAM(s) Rectal daily PRN Constipation  glucagon  Injectable 1 milliGRAM(s) IntraMuscular once PRN Glucose LESS THAN 70 milligrams/deciliter  ibuprofen  Tablet. 400 milliGRAM(s) Oral every 8 hours PRN Temp greater or equal to 38C (100.4F), Moderate Pain (4 - 6)    Vital Signs Last 24 Hrs  T(F): 98 (04 Sep 2020 08:36), Max: 102.5 (03 Sep 2020 17:30)  HR: 76 (04 Sep 2020 08:36) (72 - 97)  BP: 127/78 (04 Sep 2020 08:36) (114/70 - 150/81)  RR: 16 (04 Sep 2020 08:36) (16 - 17)  SpO2: 94% (04 Sep 2020 08:36) (93% - 97%)  I&O's Summary    03 Sep 2020 07:01  -  04 Sep 2020 07:00  --------------------------------------------------------  IN: 0 mL / OUT: 1000 mL / NET: -1000 mL      PHYSICAL EXAM:  General: appears tired, but non-toxic.  awake alert  ENT: MMM  Neck: Supple, No JVD  Lungs: Clear to auscultation bilaterally  Cardio: RRR, S1/S2, No murmurs  Abdomen: Soft, Nontender, Nondistended; Bowel sounds present  Extremities: No calf tenderness, No pitting edema  neuro: left hemiparesis and left facial droop.    LABS:                        10.9   5.69  )-----------( 124      ( 04 Sep 2020 05:00 )             31.8         136  |  101  |  33  ----------------------------<  141  3.5   |  27  |  0.87    Ca    8.3      04 Sep 2020 05:00  Mg     2.0     -    TPro  5.6  /  Alb  2.1  /  TBili  0.8  /  DBili  x   /  AST  21  /  ALT  43  /  AlkPhos  63  -    eGFR if : 97 mL/min/1.73M2 (20 @ 05:00)  eGFR if Non African American: 83 mL/min/1.73M2 (20 @ 05:00)      Lactate, Blood: 1.7 mmol/L ( @ 08:31)      POCT Blood Glucose.: 202 mg/dL (04 Sep 2020 12:40)  POCT Blood Glucose.: 157 mg/dL (04 Sep 2020 08:43)  POCT Blood Glucose.: 130 mg/dL (03 Sep 2020 16:26)      Urinalysis Basic - ( 02 Sep 2020 08:31 )    Color: Yellow / Appearance: Slightly Turbid / S.010 / pH: x  Gluc: x / Ketone: Negative  / Bili: Negative / Urobili: 1   Blood: x / Protein: 30 mg/dL / Nitrite: Positive   Leuk Esterase: Small / RBC: 5-10 /HPF / WBC 26-50 /HPF   Sq Epi: x / Non Sq Epi: Neg.-Few / Bacteria: TNTC /HPF        Culture - Blood (collected 02 Sep 2020 09:03)  Source: .Blood Blood  Preliminary Report (03 Sep 2020 13:01):    No growth to date.    Culture - Blood (collected 02 Sep 2020 09:03)  Source: .Blood Blood  Preliminary Report (03 Sep 2020 13:01):    No growth to date.    Culture - Urine (collected 02 Sep 2020 08:31)  Source: .Urine Catheterized  Preliminary Report (03 Sep 2020 18:55):    >100,000 CFU/ml Enterobacter cloacae complex        RADIOLOGY & ADDITIONAL TESTS:    Care Discussed with Consultants/Other Providers:

## 2020-09-04 NOTE — PROGRESS NOTE ADULT - ASSESSMENT
80yo male with hx of HTN, HLD, prostate cancer s/p prostatectomy, BCC, GBM (dx in 2018, s/p resection 8/3/18, s/p 2nd resection 2/7/19, s/p 3rd resection 5/2020 s/p multiple does of Temodar), presented to [facility] for acute rehab from [facility] for worsening of his L. sided weakness, noted to have worsening vasogenic edema with progression of GBM, resulting in Left Hemiparesis, Gait and ADL impairment.     #Debility  -Gait Instability, ADL impairments and Functional impairments  -Comprehensive Rehab Program of PT/OT & speech per primary team    #GBM   #Cerebral Edema  #Left hemiplegia   -completed decadron taper  -palliative care. pt is DNR/DNI. pt's family agreeable to home hospice on discharge.      # urinary retention - able to urinate but requires straight cath  appreciate urology consult  anticholinergic med- Zyprexa discontinued   bowel care  cont bethanechol  no need for flomax as pt s/p prostatectomy     # UTI and complex PNA   - On Vancomycin and cefepime but pt with persistent 102 fevers  - urine cx grew enterobacter cloacae. blood cx NGTD  - ID consult: cont current antibiotics for now. check MRSA nasal swab, fungitell.    # Acute hypoxic resp failure likely due to complex PNA  - ct chest : bilateral upper lobes and right middle lobe infiltrates  - pt with increased O2 requirement today, saturating 94% on 3-4L NC  - cont vancomycin and cefepime  - keep O2 >94%, oxygen supplement prn  - transfer to medical floor for more frequent monitoring for worsening hypoxia and pt is at high risk of decompensation       # Depression/anxiety  - cont xanax and escitalopram  - seen by neuropsych    #HTN  - amlodipine discontinued on 9/3 for low BP  - cont Lasix, Metoprolol, BP stable currently    #Hyperglycemia  -Steroid induced  -Ha1c 5.6  -DC insulin  -Continue hypoglycemia protocol and accu-cheks    #Lower extremity edema - improving  - negative DVT  - cont leg elevation     DVT prophylaxis: Lovenox SQ    CODE status: DNR/DNI. Note that pt stated no IV Fluid on MOLST form  Palliative:  Although pt is home hospice appropriate it would still be proper to treat for pneumonia with iv abx.

## 2020-09-04 NOTE — H&P ADULT - NSHPREVIEWOFSYSTEMS_GEN_ALL_CORE
CONSTITUTIONAL: No fever, weight loss, or fatigue  EYES: No eye pain, visual disturbances, or discharge  ENMT:  No difficulty hearing, tinnitus, vertigo; No sinus or throat pain  NECK: No pain or stiffness  RESPIRATORY: No cough, wheezing, chills or hemoptysis; No shortness of breath  CARDIOVASCULAR: No chest pain, palpitations, dizziness, or leg swelling  GASTROINTESTINAL: No abdominal or epigastric pain. No nausea, vomiting, or hematemesis; No diarrhea or constipation. No melena or hematochezia.  GENITOURINARY: No dysuria, frequency, hematuria, or incontinence  NEUROLOGICAL: No headaches, memory loss, loss of strength, numbness, or tremors  SKIN: No itching, burning, rashes, or lesions   LYMPH NODES: No enlarged glands  ENDOCRINE: No heat or cold intolerance; No hair loss  MUSCULOSKELETAL: No joint pain or swelling; No muscle, back, or extremity pain  PSYCHIATRIC: No depression, anxiety, mood swings, or difficulty sleeping  HEME/LYMPH: No easy bruising, or bleeding gums  ALLERY AND IMMUNOLOGIC: No hives or eczema    IMPROVE VTE Individual Risk Assessment          RISK                                                          Points  [  ] Previous VTE                                                3  [  ] Thrombophilia                                             2  [ 2 ] Lower limb paralysis                                   2        (unable to hold up >15 seconds)    [ 2 ] Current Cancer                                             2         (within 6 months)  [  ] Immobilization > 24 hrs                              1  [  ] ICU/CCU stay > 24 hours                             1  [ 1 ] Age > 60                                                         1    IMPROVE VTE Score:         [  5       ]    Total Risk Factor Score:    0 - 1:   Consider IPC  >2 - 3:  Thromboprophylaxis required (enoxaparin or SQ heparin)        >4:   High Risk: Thromboprophylaxis required (enoxaparin or SQ heparin), optional add IPC  **If CONTRAINDICATION to enoxaparin or SQ heparin, USE IPCs**

## 2020-09-04 NOTE — H&P ADULT - ASSESSMENT
79M hx of HTN, HLD, prostate ca s/p prostatectomy, basal cell skin ca, hx of GBM dx in 2018 s/p 3 resections with worsening L sided weakness. CT head and MRI with progression of dz and worsening vasogenic edema in R temporal/frontal/parietal regions treated with tapering Decadron. Now DNR/DNI with multilobar pna and UTI requiring supplemental oxygen    # Multilobar pna with acute hypoxic resp failure and Enterobacter UTI.  cont Vanco and Cefepime.   ID consult appreciated. MRSA swab, fungitell  cont oxygen supplementation.     #GBM with cerebral edema and L sided weakness.   s/p Decadron taper. not candidate for Avastin   palliative care. Pt is DNR/DNI. for home hospice on discharge.     #Urinary retention  check pvr, cont ISC  cont bethanechol     # Depression/anxiety  - cont xanax and escitalopram    #HTN  cont Metoprolol and Lasix for now with holding parameters.     #Hyperglycemia  cont FS    #DVT proph with lovenox

## 2020-09-04 NOTE — PROGRESS NOTE ADULT - SUBJECTIVE AND OBJECTIVE BOX
Follow-up Pall Progress Note    Mike Mustafa MD  [phone]    Fever and pneumonia.    Medications:  Vital Signs Last 24 Hrs  T(C): 36.7 (04 Sep 2020 08:36), Max: 39.2 (03 Sep 2020 17:30)  T(F): 98 (04 Sep 2020 08:36), Max: 102.5 (03 Sep 2020 17:30)  HR: 76 (04 Sep 2020 08:36) (72 - 97)  BP: 127/78 (04 Sep 2020 08:36) (114/70 - 150/81)  BP(mean): --  RR: 16 (04 Sep 2020 08:36) (16 - 17)  SpO2: 94% (04 Sep 2020 08:36) (93% - 97%)          09-03 @ 07:01  -  09-04 @ 07:00  --------------------------------------------------------  IN: 0 mL / OUT: 1000 mL / NET: -1000 mL        LABS:                        10.9   5.69  )-----------( 124      ( 04 Sep 2020 05:00 )             31.8     09-04    136  |  101  |  33<H>  ----------------------------<  141<H>  3.5   |  27  |  0.87    Ca    8.3<L>      04 Sep 2020 05:00  Mg     2.0     09-03    TPro  5.6<L>  /  Alb  2.1<L>  /  TBili  0.8  /  DBili  x   /  AST  21  /  ALT  43  /  AlkPhos  63  09-03          Serum Pro-Brain Natriuretic Peptide: 134 pg/mL (09-04-20 @ 05:00)  Serum Pro-Brain Natriuretic Peptide: 121 pg/mL (09-03-20 @ 05:03)      CULTURES:  Culture Results:   No growth to date. (09-02 @ 09:03)  Culture Results:   No growth to date. (09-02 @ 09:03)  Culture Results:   >100,000 CFU/ml Enterobacter cloacae complex (09-02 @ 08:31)    Most recent blood culture -- 09-02 @ 09:03   -- -- .Blood Blood 09-02 @ 09:03  Most recent blood culture -- 09-02 @ 08:31   -- -- .Urine Catheterized 09-02 @ 08:31      Physical Examination:  PULM: Clear to auscultation bilaterally, no significant sputum production  CVS: Regular rate and rhythm, no murmurs, rubs, or gallops  ABD: Soft, non-tender  EXT:  No clubbing, cyanosis, or edema    RADIOLOGY REVIEWED  CXR:< from: Xray Chest 1 View- PORTABLE-Urgent (09.03.20 @ 09:44) >    EXAM:  XR CHEST PORTABLE URGENT 1V      PROCEDURE DATE:  09/03/2020        INTERPRETATION:  AP chest on September 3, 2020 at 9:08 AM. Patient is short of breath.    Heart is magnified by technique.    Again noted is slight blunting of the left baselaterally.    Small infiltrate in the anterior segment of the right upper lobe is also again seen.    There is chronic bilateral apical thickening.    Chest is similar to September 2.    IMPRESSION: Unchanged chest as above.    < end of copied text >      CT chest:< from: CT Chest No Cont (09.03.20 @ 12:08) >    EXAM:  CT CHEST      PROCEDURE DATE:  09/03/2020        INTERPRETATION:  CLINICAL INDICATION: 79 years  Male with Assess for PNA.    COMPARISON: 10/17/2019    PROCEDURE:  CT of the Chest was performed without intravenous contrast.  Sagittal and coronal reformats were performed.    FINDINGS:    LIMITATIONS: Absence of IV contrast limits evaluation of the vascular structures and jolanta. Motion artifact.    LUNGS AND AIRWAYS: Patent central airways.  Bilateral upper lobe and right middle lobe groundglass infiltrates. Bilateral lower lobe dependent atelectasis.  PLEURA: No pleural effusion. No pneumothorax.  MEDIASTINUM AND JOLANTA: No lymphadenopathy.  VESSELS: Atherosclerotic aorta without aneurysm. Coronary atherosclerosis.  HEART: Mild cardiomegaly. No pericardial effusion.  CHEST WALL AND LOWER NECK: Within normal limits.  VISUALIZED UPPER ABDOMEN: Within normal limits.  BONES: Degenerative changes.    IMPRESSION:  Bilateral upper lobe and right middle lobe airspace infiltrates on the basisof pneumonia, pulmonary edema and/or motion artifact. Bibasilar dependent changes.    Mild cardiomegaly.                  YANNICK GAFFNEY M.D., ATTENDING RADIOLOGIST  This document has been electronically signed. Sep  3 2020 12:23PM    < end of copied text >      TTE:

## 2020-09-04 NOTE — H&P ADULT - NSICDXPASTMEDICALHX_GEN_ALL_CORE_FT
PAST MEDICAL HISTORY:  Basal cell carcinoma (BCC) of left lower leg s/p resection  right lower leg top  left lower leg    Bilateral hearing loss, unspecified hearing loss type     Fall, sequela     Glioblastoma biopsy 8/2018  surgery  6 weeks radiation  35 days of oral chemotherapy last 3 weeks    HLD (hyperlipidemia)     HTN (hypertension)      activity [employer] 1962-65    [place]    Prostate cancer s/p radical prostatectomy 1995    Type 2 diabetes mellitus without complication, without long-term current use of insulin stop oral meds  3 weeks ago

## 2020-09-04 NOTE — PROVIDER CONTACT NOTE (OTHER) - NAME OF MD/NP/PA/DO NOTIFIED:
Dr Christine
Dr Ramos
Dr. Christine
Dr. De Santiago
Dr. Melendez
dr yun
Dr. Bill

## 2020-09-04 NOTE — DISCHARGE NOTE PROVIDER - HOSPITAL COURSE
79 RHM w/ PMH of HTN, HLD, prostate cancer s/p prostectomy, basal cell skin cancer, GBM (dx in 2018, s/p resection 8/3/18, s/p 2nd resection 2/7/19, s/p 3rd resection 5/2020 s/p multiple does of Temodar admitted to [facility] on 8/4/20 w/ worsening of his L. sided weakness. No other complaints. Denied changes in speech, vision, hearing, swallowing, voice quality, numbness/tingling,  balance/room-spinning sensations.        He was seen in Dr. Harman's office on 7/27/20 where he was noted to have worsening strength, lethargy and decreased appetitie. He had been tapering his steroids for over a month. Recently tapered from 4mg QD --> 3mg QD ---> 2mg QD.      CT Head on admission 8.4.2020, showed- Worsening vasogenic edema involving the R. temporal frontal parietal region again identified increased when compared to previous CT in 5/2020.         Patient admitted to neurology floor, started on decadron 4mg q6h later increased to 8mg q8h. MR head w/ and w/o contrast with progression of disease, neuro-onc on board, patient to follow up outpatient for initiation of avastin therapy.     Patient also with urinary retention, urology consulted, suspect urinary retention is neurologic in etiology, on clean intermittent catheterization; further followup to continue at rehab.     Patient stable for discharge to acute rehab 8/7/20.         Of note--patient was admitted to  rehab after his most recent resection in May 2020.  He was at [facility] from 6/4/20  to 6/17/20.  At the time of discharge from  rehab, patient was supervision for eating, grooming, and dressing, and contact guard for transfers.  He was able to ambulate 150' with SAC with CG/min assist and to navigate 12 steps with u/l HR with min assist.  SLP at [facility] noted higher level cognitive deficits with mild impulsivity and reduced insight, necessitating supervision while at home.          While in the acute rehab unit patient's hospital course significant for urinary retention with urology consulted and recs for discontinued flomax due to hx of prostatectomy and was eventually started on bethanecol. Patient developed fever on 9/2 with UA positive.  Patient was started on ceftriaxone. Patient's fever persisted with need for supplemntal O2 at 2L. CT chest was done which showed bilateral PNA and possible pulmonary edema. Patient's abx was switched to vanc and cefepime and had TTE ordered to assess cardiac function. Blood cultures NGTD, UC with enterobacter on prelim- awaiting sensitivities. Patient also noted to be hypoglycemic with lantus and humalog discontinued. Patient still persistently spiking fevers overnight with increased O2 demand of 3-4L on 9/4. After discussion with ID Dr. Montanez and Hospitalist Dr. Melendez, Patient deemed to be more appropriate on the acute medical floor. Patient set for transfer to telemetry unit. Patient's son was contacted and agreed with plan. 79 RHM w/ PMH of HTN, HLD, prostate cancer s/p prostectomy, basal cell skin cancer, GBM (dx in 2018, s/p resection 8/3/18, s/p 2nd resection 2/7/19, s/p 3rd resection 5/2020 s/p multiple does of Temodar admitted to [facility] on 8/4/20 w/ worsening of his L. sided weakness. No other complaints. Denied changes in speech, vision, hearing, swallowing, voice quality, numbness/tingling,  balance/room-spinning sensations.        He was seen in Dr. Harman's office on 7/27/20 where he was noted to have worsening strength, lethargy and decreased appetitie. He had been tapering his steroids for over a month. Recently tapered from 4mg QD --> 3mg QD ---> 2mg QD.      CT Head on admission 8.4.2020, showed- Worsening vasogenic edema involving the R. temporal frontal parietal region again identified increased when compared to previous CT in 5/2020.         Patient admitted to neurology floor, started on decadron 4mg q6h later increased to 8mg q8h. MR head w/ and w/o contrast with progression of disease, neuro-onc on board, patient to follow up outpatient for initiation of avastin therapy.     Patient also with urinary retention, urology consulted, suspect urinary retention is neurologic in etiology, on clean intermittent catheterization; further followup to continue at rehab.     Patient admitted to acute rehab 8/7/20.         Of note--patient was admitted to  rehab after his most recent resection in May 2020.  He was at [facility] from 6/4/20  to 6/17/20.  At the time of discharge from  rehab, patient was supervision for eating, grooming, and dressing, and contact guard for transfers.  He was able to ambulate 150' with SAC with CG/min assist and to navigate 12 steps with u/l HR with min assist.  SLP at [facility] noted higher level cognitive deficits with mild impulsivity and reduced insight, necessitating supervision while at home.          Patient made limited progress in therapy and continued to need max A with mobility.  Spoke with Dr. Del Cid and patient not a candidate for Avastin therapy.  Patient followed by Palliative care and referred to Hospice.  Psychiatry was following to help manage anxiety.      While in the acute rehab unit patient's hospital course significant for urinary retention with urology consulted and recs for discontinued flomax due to hx of prostatectomy and was eventually started on bethanecol. Patient developed fever on 9/2 with UA positive.  Patient was started on ceftriaxone. Patient's fever persisted with need for supplemntal O2 at 2L. CT chest was done which showed bilateral PNA and possible pulmonary edema. Patient's abx was switched to vanc and cefepime and had TTE ordered to assess cardiac function. Blood cultures NGTD, UC with enterobacter on prelim- awaiting sensitivities. Patient also noted to be hypoglycemic with lantus and humalog discontinued. Patient still persistently spiking fevers overnight on 9/3 with increased O2 demand of 3-4L on 9/4. After discussion with ID Dr. Montanez and Hospitalist Dr. Melendez, Patient deemed to be more appropriate on the acute medical floor. Patient set for transfer to telemetry unit. Patient's son was contacted and agreed with plan.        Patient's discharge plan is to home hospice when medically appropriate.

## 2020-09-04 NOTE — DISCHARGE NOTE PROVIDER - CARE PROVIDERS DIRECT ADDRESSES
[email or web address]

## 2020-09-04 NOTE — PROGRESS NOTE ADULT - REASON FOR ADMISSION
Glioblastoma

## 2020-09-04 NOTE — DISCHARGE NOTE PROVIDER - NSDCMRMEDTOKEN_GEN_ALL_CORE_FT
acetaminophen 325 mg oral tablet: 2 tab(s) orally every 6 hours, As needed, Temp greater or equal to 38C (100.4F), Mild Pain (1 - 3)  ALPRAZolam 0.25 mg oral tablet: 1 tab(s) orally every 8 hours, As needed, Anxiety  ALPRAZolam 0.25 mg oral tablet: 1 tab(s) orally 2 times a day  ALPRAZolam 0.5 mg oral tablet: 1 tab(s) orally once a day (at bedtime)  aspirin 81 mg oral tablet, chewable: 1 tab(s) orally once a day  atorvastatin 20 mg oral tablet: 1 tab(s) orally once a day (at bedtime)  bethanechol 10 mg oral tablet: 1 tab(s) orally 3 times a day  bisacodyl 10 mg rectal suppository: 1 suppository(ies) rectal once a day, As needed, Constipation  bisacodyl 5 mg oral delayed release tablet: 1 tab(s) orally every 12 hours  cefepime 2 g intravenous injection: 2 gram(s) intravenous every 8 hours  docusate sodium 100 mg oral capsule: 1 cap(s) orally once a day  enoxaparin: 40 unit(s) subcutaneous once a day  escitalopram 10 mg oral tablet: 1 tab(s) orally   furosemide 40 mg oral tablet: 1 tab(s) orally once a day  ibuprofen 400 mg oral tablet: 1 tab(s) orally every 8 hours, As needed, Temp greater or equal to 38C (100.4F), Moderate Pain (4 - 6)  levETIRAcetam 1000 mg oral tablet: 1 tab(s) orally 2 times a day  lidocaine 5% topical film:  topically   melatonin 3 mg oral tablet: 1 tab(s) orally once a day (at bedtime)  menthol-benzocaine 3.6 mg-15 mg mucous membrane lozenge:  mucous membrane   metoprolol succinate 25 mg oral tablet, extended release: 1 tab(s) orally once a day  pantoprazole 40 mg oral delayed release tablet: 1 tab(s) orally once a day (before a meal)  polyethylene glycol 3350 oral powder for reconstitution: 17 gram(s) orally 2 times a day  saccharomyces boulardii lyo 250 mg oral capsule: 1 cap(s) orally 2 times a day  senna oral tablet: 2 tab(s) orally once a day (at bedtime)  triamcinolone 0.1% topical cream: 1 application topically 2 times a day  vancomycin 1.25 g intravenous injection: 1.25 gram(s) intravenous every 12 hours

## 2020-09-04 NOTE — CONSULT NOTE ADULT - SUBJECTIVE AND OBJECTIVE BOX
HPI:   Patient is a 79y male with a past history of a GBM s/p 3 resections, transferred to [facility] on  after a brief admission to [facility] with progression of disease treated with increased decadron, s/p steroid taper, who has been febrile x 2 days.  He is s/p debulking surgery in ,,and .He is followed by neuro-oncology, he had increased rt sided cerebral edema in early  August, had progressive left sided weakness and speech difficulty, was placed on increased decadron.  He is s/p prostatectomy for prostate cancer, he has a neurogenic bladder, he has required  ISC .  He was tapered off decadron in late August.He developed fever on , was started on CTX and vanco,is now on cefepime   and vanco.He has negative blood cultures, enterobacter in urine, and has a CT scan showing patchy upper lobe infiltrates.He is lethargic,he is now requiring nasal oxygen,he is non ambulatory.    REVIEW OF SYSTEMS:  All other review of systems negative (Comprehensive ROS): limited, not very cooperative, nods off to sleep during exam    PAST MEDICAL & SURGICAL HISTORY:  Fall, sequela  Bilateral hearing loss, unspecified hearing loss type   activity: [employer] [place]    [place]  Type 2 diabetes mellitus without complication, without long-term current use of insulin: stop oral meds  3 weeks ago  Glioblastoma: biopsy 2018  surgery  6 weeks radiation  35 days of oral chemotherapy last 3 weeks  Basal cell carcinoma (BCC) of left lower leg: s/p resection  right lower leg top  left lower leg  Prostate cancer: s/p radical prostatectomy   HLD (hyperlipidemia)  HTN (hypertension)  S/P colonoscopic polypectomy: 3 years benign polyps  S/P tonsillectomy: age 30  H/O bilateral cataract extraction: 3-4 years ago  S/P prostatectomy:   S/P brain surgery: 2018  Lipoma of neck: s/p resection at age 30  Basal cell carcinoma (BCC) of lower leg: s/p resection  History of tonsillectomy: at age 30      Allergies    No Known Allergies    Intolerances        Antimicrobials Day #  :day 2  cefepime   IVPB 2000 milliGRAM(s) IV Intermittent every 8 hours  vancomycin  IVPB 1000 milliGRAM(s) IV Intermittent every 12 hours    Other Medications:  acetaminophen   Tablet .. 650 milliGRAM(s) Oral every 6 hours PRN  ALPRAZolam 0.25 milliGRAM(s) Oral every 8 hours PRN  ALPRAZolam 0.5 milliGRAM(s) Oral at bedtime  ALPRAZolam 0.25 milliGRAM(s) Oral two times a day  aspirin  chewable 81 milliGRAM(s) Oral daily  atorvastatin 20 milliGRAM(s) Oral at bedtime  benzocaine 15 mG/menthol 3.6 mG (Sugar-Free) Lozenge 1 Lozenge Oral two times a day PRN  bethanechol 10 milliGRAM(s) Oral three times a day  bisacodyl 5 milliGRAM(s) Oral every 12 hours  bisacodyl Suppository 10 milliGRAM(s) Rectal daily PRN  docusate sodium 100 milliGRAM(s) Oral daily  enoxaparin Injectable 40 milliGRAM(s) SubCutaneous daily  escitalopram 10 milliGRAM(s) Oral <User Schedule>  furosemide    Tablet 40 milliGRAM(s) Oral daily  glucagon  Injectable 1 milliGRAM(s) IntraMuscular once PRN  hemorrhoidal Ointment 1 Application(s) Rectal three times a day  ibuprofen  Tablet. 400 milliGRAM(s) Oral every 8 hours PRN  levETIRAcetam 1000 milliGRAM(s) Oral two times a day  lidocaine   Patch 1 Patch Transdermal daily  melatonin 3 milliGRAM(s) Oral at bedtime  metoprolol succinate ER 25 milliGRAM(s) Oral daily  pantoprazole    Tablet 40 milliGRAM(s) Oral before breakfast  polyethylene glycol 3350 17 Gram(s) Oral two times a day  saccharomyces boulardii 250 milliGRAM(s) Oral two times a day  senna 2 Tablet(s) Oral at bedtime  triamcinolone 0.1% Cream 1 Application(s) Topical two times a day      FAMILY HISTORY:  Family history of diabetes mellitus: Father   Family history of prostate cancer in father:   Family history of breast cancer in mother (Sibling): Mother and sister  Family history of lung cancer: mother , long standing smoker      SOCIAL HISTORY:  Smoking:x     ETOH:  x   Drug Use:x        T(F): 98 (20 @ 08:36), Max: 102.5 (20 @ 17:30)  HR: 76 (20 @ 08:36)  BP: 127/78 (20 @ 08:36)  RR: 16 (20 @ 08:36)  SpO2: 94% (20 @ 08:36)  Wt(kg): --    PHYSICAL EXAM:  General: lethargic,, no acute distress, rt craniotomy incision well healed  Eyes:  anicteric, no conjunctival injection, no discharge  Oropharynx: no lesions or injection 	  Neck: supple, without adenopathy  Lungs: scattered ronchi  Heart: regular rate and rhythm; no murmur, rubs or gallops  Abdomen: soft, nondistended, nontender, without mass or organomegaly  Skin: no lesions  Extremities: no clubbing, cyanosis, or edema  Neurologic: sleepy, lethargic,left sided hemiparesis    LAB RESULTS:                        10.9   5.69  )-----------( 124      ( 04 Sep 2020 05:00 )             31.8         136  |  101  |  33<H>  ----------------------------<  141<H>  3.5   |  27  |  0.87    Ca    8.3<L>      04 Sep 2020 05:00  Mg     2.0         TPro  5.6<L>  /  Alb  2.1<L>  /  TBili  0.8  /  DBili  x   /  AST  21  /  ALT  43  /  AlkPhos  63  03    LIVER FUNCTIONS - ( 03 Sep 2020 05:03 )  Alb: 2.1 g/dL / Pro: 5.6 g/dL / ALK PHOS: 63 U/L / ALT: 43 U/L / AST: 21 U/L / GGT: x               MICROBIOLOGY:  RECENT CULTURES:   @ 09:03 .Blood Blood     No growth to date.       @ 08:31 .Urine Catheterized     >100,000 CFU/ml Enterobacter cloacae complex            RADIOLOGY REVIEWED:  < from: CT Chest No Cont (20 @ 12:08) >  IMPRESSION:  Bilateral upper lobe and right middle lobe airspace infiltrates on the basisof pneumonia, pulmonary edema and/or motion artifact. Bibasilar dependent changes.    Mild cardiomegaly.    < from: MR Head w/wo IV Cont (20 @ 22:31) >  IMPRESSION: There are postoperative changes related to prior right frontal parietal craniotomy. There is significant progression of nodular enhancement along the margins of the right frontal resection cavity which extend to the lateral wall of the right lateral ventricle as described. Restricted diffusion is demonstrated within the medial component of the nodular enhancement which may suggest hypercellularity. There is moderate interval increase in extent of surrounding nonenhancing FLAIR hyperintensity.    < end of copied text >

## 2020-09-04 NOTE — PROVIDER CONTACT NOTE (OTHER) - SITUATION
2215 BG 67. Pt alert, offers no complaints at this time. Given 4oz of apple juice.
Oral temp - 100.3, rectal temp 102.0
Patient DX Malignant neoplasm of brain. Patient lethargic. Patient c/o head ache. Patient calm and cooperative.
Pt on D5W at 50mL/hr, last 15 minute blood glucose check was 95 0116.
Pt on hypoglycemia protocol, and blood sugar still not above 100, despite PO carb supplements.
Pt with complaints of feeling cold/chills, rectal temp 102.2
Pt with dx of GBM
update and review of night's events
Temp at 102.5 oral. Don CASAS running.

## 2020-09-04 NOTE — PROGRESS NOTE ADULT - ASSESSMENT
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     Fever--due to UTI and PNA   -urine cx --Enterobacter UTI  -Blood cltx NGTD   -STAT CT chest on 9/3 with bilateral upper and right middle infiltrate   -Started on Rocephin but changed to vanc and cefepime on 9/3 due to cover UTI and PNA seen on CT chest   --Concern that pt. spiking fever on antibiotic and worsening hypoxia--  --ID consult appreciated-- spoke with Dr. Harding  Continue vanc and cefepime for now-- awaiting cx sensitivities.    --Pt. at risk for decompensation given multiple medical comorbidities-  --d/w Dr. Morales and Dr. Jolley, hospitalist-- Prefer pt. transfer to Telemetry for closer medical monitoring.   Pt. not tolerating PT/OT     Dyspnea and desaturation   -CT chest 9/3 with bilateral infiltrates- sig for pulmonary edema?  -TTE performed this AM-- awaiting results  Pro-BNP normal--  --Most likely due to pneumonia rather than CHF exacerbation --d/w Dr. Jolley--Hospitalist      Hypokalemia   -resolved after supplementation    GBM with vaso genic edema  --Pt. with poor prognosis-- Spoke with Dr. Del Cid regarding option of Avastin for treatment-- feels it is high risk for patient due to risk of thrombosis and difficulty with safe transportation to obtain. He has contacted pt. and d/w him as well    --palliative care following--d/w Dr. Mustafa --discussed advanced care directives/MOLST form --completed by pt.-- pt. is DNR/DNI  - tapered off decadron on 8/31   - Cont. Keppra 1000mg BID for Seizure ppx,  - Stop PT/OT and Speech Therapy--    HTN  - Metoprolol XL 25mg + Lasix 40mg Daily  -BP controlled  - Amlodipine daily dc'd for now due to hypotension  9/3     Urinary Retention -   --worse due to UTI  --cont. IV antibiotics  - As per urology recs 8/28:  flomax stopped since patient is s/p prostatectomy. bowel care, avoid anticholinergics if possible, mobilize, IC prn.  - Stopped zyprexa as likely contributing to urinary retention -- d/w with Psych  - Continue bethanecol 5mg TID (started on 9/2)        Diabetes-- steroid induced--- SdA9r=0.6  --Pt. now off steroids   - Humalog dc'd and lantus dc'd  - Monitor Blood glucose BID-- FS controlled    Mood/ Cognition  - Anxiety and paranoid thought process---Zyprexa dc'd   -Depressed/Anxious-- cont.  lexapro 10mg --increased 8/31  -Continue xanax 0.5mg  qhs, and Xanax BID  -Psych -pt. has decision making capacity      GI/Bowel:   - Senna, dulcolax, Miralax    Diet/ dysphagia:   - regular/easy to chew with thin     ENT evaluation for poor vocal quality-> no abnormalities found     Stage I pressure ulcer  - offloading, turn q.2h, barrier cream    LE edema  - compression stockings  - Lasix as above    Pain  - Tylenol PRN      Functional Prognosis / Discharge Planning  - Meeting held on 9/1   - Nursing needs: urinary retention  - SW: lives with Adult son and spouse, used cane prior   - OT: Not much progress, Max A for all ADLs   - PT: Not much progress, Needs mechanical lift with 2 people assist   - SLP: Memory slightly improved but not much process   - Barriers: left visual field cut, flaccid weakness,  - MAGDALENA: d/c home with home  Hospice -- 9/7  will possibly need mechanical lift when dc home     ACUTE transfer to Telemetry  Patient and pt's son, Francisco,  updated on pt's status and need for transfer.  All questions answered.

## 2020-09-04 NOTE — PROVIDER CONTACT NOTE (OTHER) - DATE AND TIME:
02-Sep-2020 08:00
02-Sep-2020 20:01
02-Sep-2020 22:30
02-Sep-2020 23:40
03-Sep-2020 01:28
04-Sep-2020 01:55
30-Aug-2020 06:25
30-Aug-2020 09:40
03-Sep-2020 17:35

## 2020-09-04 NOTE — PROGRESS NOTE ADULT - ASSESSMENT
78yo male with hx of HTN, HLD, prostate cancer s/p prostatectomy, BCC, GBM (dx in 2018, s/p resection 8/3/18, s/p 2nd resection 2/7/19, s/p 3rd resection 5/2020 s/p multiple does of Temodar), presented to [facility] for acute rehab from [facility] for worsening of his L. sided weakness, noted to have worsening vasogenic edema with progression of GBM, resulting in Left Hemiparesis, Gait and ADL impairment.     #Debility  -Gait Instability, ADL impairments and Functional impairments  -Comprehensive Rehab Program of PT/OT & speech per primary team    #GBM   #Cerebral Edema  #Left hemiplegia   -completed decadron taper  -palliative care. pt is DNR/DNI. pt's family agreeable to home hospice on discharge.      # urinary retention - able to urinate but requires straight cath  appreciate urology consult  anticholinergic med- Zyprexa discontinued   bowel care  cont bethanechol  no need for flomax as pt s/p prostatectomy     # UTI and complex PNA   - On Vancomycin and cefepime but pt with persistent 102 fevers  - urine cx grew enterobacter cloacae. blood cx NGTD  - ID consult: cont current antibiotics for now. check MRSA nasal swab, fungitell.    # Acute hypoxic resp failure likely due to complex PNA  - ct chest : bilateral upper lobes and right middle lobe infiltrates  - pt with increased O2 requirement today, saturating 94% on 3-4L NC  - cont vancomycin and cefepime  - keep O2 >94%, oxygen supplement prn  - transfer to medical floor for more frequent monitoring for worsening hypoxia and pt is at high risk of decompensation       # Depression/anxiety  - cont xanax and escitalopram  - seen by neuropsych    #HTN  - amlodipine discontinued on 9/3 for low BP  - cont Lasix, Metoprolol, BP stable currently    #Hyperglycemia  -Steroid induced  -Ha1c 5.6  -DC insulin  -Continue hypoglycemia protocol and accu-cheks    #Lower extremity edema - improving  - negative DVT  - cont leg elevation     DVT prophylaxis: Lovenox SQ    CODE status: DNR/DNI. Note that pt stated no IV Fluid on MOLST form

## 2020-09-04 NOTE — H&P ADULT - NSHPLABSRESULTS_GEN_ALL_CORE
10.9   5.69  )-----------( 124      ( 04 Sep 2020 05:00 )             31.8       09-04    136  |  101  |  33<H>  ----------------------------<  141<H>  3.5   |  27  |  0.87    Ca    8.3<L>      04 Sep 2020 05:00  Mg     2.0     09-03    TPro  5.6<L>  /  Alb  2.1<L>  /  TBili  0.8  /  DBili  x   /  AST  21  /  ALT  43  /  AlkPhos  63  09-03    Lactate, Blood: 1.7 mmol/L (09-02 @ 08:31)       LIVER FUNCTIONS - ( 03 Sep 2020 05:03 )  Alb: 2.1 g/dL / Pro: 5.6 g/dL / ALK PHOS: 63 U/L / ALT: 43 U/L / AST: 21 U/L / GGT: x                       Serum Pro-Brain Natriuretic Peptide: 134 pg/mL (09-04-20 @ 05:00)  Serum Pro-Brain Natriuretic Peptide: 121 pg/mL (09-03-20 @ 05:03)        CAPILLARY BLOOD GLUCOSE      POCT Blood Glucose.: 202 mg/dL (04 Sep 2020 12:40)  POCT Blood Glucose.: 157 mg/dL (04 Sep 2020 08:43) 10.9   5.69  )-----------( 124      ( 04 Sep 2020 05:00 )             31.8       09-04    136  |  101  |  33<H>  ----------------------------<  141<H>  3.5   |  27  |  0.87    Ca    8.3<L>      04 Sep 2020 05:00  Mg     2.0     09-03    TPro  5.6<L>  /  Alb  2.1<L>  /  TBili  0.8  /  DBili  x   /  AST  21  /  ALT  43  /  AlkPhos  63  09-03    Lactate, Blood: 1.7 mmol/L (09-02 @ 08:31)       LIVER FUNCTIONS - ( 03 Sep 2020 05:03 )  Alb: 2.1 g/dL / Pro: 5.6 g/dL / ALK PHOS: 63 U/L / ALT: 43 U/L / AST: 21 U/L / GGT: x             Serum Pro-Brain Natriuretic Peptide: 134 pg/mL (09-04-20 @ 05:00)  Serum Pro-Brain Natriuretic Peptide: 121 pg/mL (09-03-20 @ 05:03)        CAPILLARY BLOOD GLUCOSE      POCT Blood Glucose.: 202 mg/dL (04 Sep 2020 12:40)  POCT Blood Glucose.: 157 mg/dL (04 Sep 2020 08:43)    < from: CT Chest No Cont (09.03.20 @ 12:08) >    IMPRESSION:  Bilateral upper lobe and right middle lobe airspace infiltrates on the basisof pneumonia, pulmonary edema and/or motion artifact. Bibasilar dependent changes.    Mild cardiomegaly.    < end of copied text >    Culture - Blood (09.02.20 @ 09:03)    Specimen Source: .Blood Blood    Culture Results:   No growth to date.  Culture - Urine (09.02.20 @ 08:31)    -  Amikacin: S <=16    -  Amoxicillin/Clavulanic Acid: R >16/8    -  Ampicillin: R 16 These ampicillin results predict results for amoxicillin    -  Ampicillin/Sulbactam: R 8/4 Enterobacter, Citrobacter, and Serratia may develop resistance during prolonged therapy (3-4 days)    -  Aztreonam: S <=4    -  Cefazolin: R >16    -  Cefepime: S <=2    -  Cefoxitin: R >16    -  Ceftriaxone: S <=1 Enterobacter, Citrobacter, and Serratia may develop resistance during prolonged therapy    -  Ciprofloxacin: S <=0.25    -  Ertapenem: S <=0.5    -  Gentamicin: S <=2    -  Imipenem: S <=1    -  Levofloxacin: S <=0.5    -  Meropenem: S <=1    -  Nitrofurantoin: I 64 Should not be used to treat pyelonephritis    -  Piperacillin/Tazobactam: S <=8    -  Tigecycline: S <=2    -  Tobramycin: S <=2    -  Trimethoprim/Sulfamethoxazole: S <=0.5/9.5    Specimen Source: .Urine Catheterized    Culture Results:   >100,000 CFU/ml Enterobacter cloacae complex    Organism Identification: Enterobacter cloacae complex    Organism: Enterobacter cloacae complex    Method Type: MCKAYLA

## 2020-09-05 LAB
ALBUMIN SERPL ELPH-MCNC: 1.9 G/DL — LOW (ref 3.3–5)
ALP SERPL-CCNC: 70 U/L — SIGNIFICANT CHANGE UP (ref 40–120)
ALT FLD-CCNC: 39 U/L — SIGNIFICANT CHANGE UP (ref 10–45)
ANION GAP SERPL CALC-SCNC: 8 MMOL/L — SIGNIFICANT CHANGE UP (ref 5–17)
AST SERPL-CCNC: 27 U/L — SIGNIFICANT CHANGE UP (ref 10–40)
BILIRUB SERPL-MCNC: 0.8 MG/DL — SIGNIFICANT CHANGE UP (ref 0.2–1.2)
BUN SERPL-MCNC: 26 MG/DL — HIGH (ref 7–23)
CALCIUM SERPL-MCNC: 8.5 MG/DL — SIGNIFICANT CHANGE UP (ref 8.4–10.5)
CHLORIDE SERPL-SCNC: 96 MMOL/L — SIGNIFICANT CHANGE UP (ref 96–108)
CO2 SERPL-SCNC: 30 MMOL/L — SIGNIFICANT CHANGE UP (ref 22–31)
CREAT SERPL-MCNC: 0.75 MG/DL — SIGNIFICANT CHANGE UP (ref 0.5–1.3)
FUNGITELL: 414 PG/ML — HIGH
GLUCOSE BLDC GLUCOMTR-MCNC: 116 MG/DL — HIGH (ref 70–99)
GLUCOSE BLDC GLUCOMTR-MCNC: 128 MG/DL — HIGH (ref 70–99)
GLUCOSE BLDC GLUCOMTR-MCNC: 160 MG/DL — HIGH (ref 70–99)
GLUCOSE BLDC GLUCOMTR-MCNC: 166 MG/DL — HIGH (ref 70–99)
GLUCOSE SERPL-MCNC: 179 MG/DL — HIGH (ref 70–99)
HCT VFR BLD CALC: 31.3 % — LOW (ref 39–50)
HGB BLD-MCNC: 10.5 G/DL — LOW (ref 13–17)
MCHC RBC-ENTMCNC: 31.4 PG — SIGNIFICANT CHANGE UP (ref 27–34)
MCHC RBC-ENTMCNC: 33.5 GM/DL — SIGNIFICANT CHANGE UP (ref 32–36)
MCV RBC AUTO: 93.7 FL — SIGNIFICANT CHANGE UP (ref 80–100)
NRBC # BLD: 0 /100 WBCS — SIGNIFICANT CHANGE UP (ref 0–0)
PLATELET # BLD AUTO: 145 K/UL — LOW (ref 150–400)
POTASSIUM SERPL-MCNC: 3.3 MMOL/L — LOW (ref 3.5–5.3)
POTASSIUM SERPL-SCNC: 3.3 MMOL/L — LOW (ref 3.5–5.3)
PROT SERPL-MCNC: 6.1 G/DL — SIGNIFICANT CHANGE UP (ref 6–8.3)
RBC # BLD: 3.34 M/UL — LOW (ref 4.2–5.8)
RBC # FLD: 13.2 % — SIGNIFICANT CHANGE UP (ref 10.3–14.5)
SODIUM SERPL-SCNC: 134 MMOL/L — LOW (ref 135–145)
VANCOMYCIN TROUGH SERPL-MCNC: 9.8 UG/ML — LOW (ref 10–20)
WBC # BLD: 5.29 K/UL — SIGNIFICANT CHANGE UP (ref 3.8–10.5)
WBC # FLD AUTO: 5.29 K/UL — SIGNIFICANT CHANGE UP (ref 3.8–10.5)

## 2020-09-05 PROCEDURE — 99233 SBSQ HOSP IP/OBS HIGH 50: CPT

## 2020-09-05 RX ORDER — POTASSIUM CHLORIDE 20 MEQ
40 PACKET (EA) ORAL ONCE
Refills: 0 | Status: COMPLETED | OUTPATIENT
Start: 2020-09-05 | End: 2020-09-05

## 2020-09-05 RX ADMIN — ENOXAPARIN SODIUM 40 MILLIGRAM(S): 100 INJECTION SUBCUTANEOUS at 13:56

## 2020-09-05 RX ADMIN — PHENYLEPHRINE-SHARK LIVER OIL-MINERAL OIL-PETROLATUM RECTAL OINTMENT 1 APPLICATION(S): at 13:58

## 2020-09-05 RX ADMIN — Medication 650 MILLIGRAM(S): at 18:24

## 2020-09-05 RX ADMIN — PHENYLEPHRINE-SHARK LIVER OIL-MINERAL OIL-PETROLATUM RECTAL OINTMENT 1 APPLICATION(S): at 22:11

## 2020-09-05 RX ADMIN — Medication 40 MILLIEQUIVALENT(S): at 17:23

## 2020-09-05 RX ADMIN — PANTOPRAZOLE SODIUM 40 MILLIGRAM(S): 20 TABLET, DELAYED RELEASE ORAL at 05:36

## 2020-09-05 RX ADMIN — LIDOCAINE 1 PATCH: 4 CREAM TOPICAL at 13:57

## 2020-09-05 RX ADMIN — Medication 3 MILLIGRAM(S): at 22:11

## 2020-09-05 RX ADMIN — Medication 40 MILLIGRAM(S): at 05:26

## 2020-09-05 RX ADMIN — LIDOCAINE 1 PATCH: 4 CREAM TOPICAL at 19:00

## 2020-09-05 RX ADMIN — Medication 25 MILLIGRAM(S): at 05:25

## 2020-09-05 RX ADMIN — Medication 5 MILLIGRAM(S): at 17:24

## 2020-09-05 RX ADMIN — LEVETIRACETAM 1000 MILLIGRAM(S): 250 TABLET, FILM COATED ORAL at 05:36

## 2020-09-05 RX ADMIN — LEVETIRACETAM 1000 MILLIGRAM(S): 250 TABLET, FILM COATED ORAL at 17:24

## 2020-09-05 RX ADMIN — Medication 81 MILLIGRAM(S): at 13:58

## 2020-09-05 RX ADMIN — Medication 1 APPLICATION(S): at 17:25

## 2020-09-05 RX ADMIN — ESCITALOPRAM OXALATE 10 MILLIGRAM(S): 10 TABLET, FILM COATED ORAL at 13:59

## 2020-09-05 RX ADMIN — CEFEPIME 100 MILLIGRAM(S): 1 INJECTION, POWDER, FOR SOLUTION INTRAMUSCULAR; INTRAVENOUS at 13:57

## 2020-09-05 RX ADMIN — Medication 0.25 MILLIGRAM(S): at 05:25

## 2020-09-05 RX ADMIN — Medication 10 MILLIGRAM(S): at 05:36

## 2020-09-05 RX ADMIN — CEFEPIME 100 MILLIGRAM(S): 1 INJECTION, POWDER, FOR SOLUTION INTRAMUSCULAR; INTRAVENOUS at 05:29

## 2020-09-05 RX ADMIN — SENNA PLUS 2 TABLET(S): 8.6 TABLET ORAL at 22:10

## 2020-09-05 RX ADMIN — Medication 1 APPLICATION(S): at 05:43

## 2020-09-05 RX ADMIN — Medication 650 MILLIGRAM(S): at 17:24

## 2020-09-05 RX ADMIN — Medication 166.67 MILLIGRAM(S): at 17:23

## 2020-09-05 RX ADMIN — PHENYLEPHRINE-SHARK LIVER OIL-MINERAL OIL-PETROLATUM RECTAL OINTMENT 1 APPLICATION(S): at 05:28

## 2020-09-05 RX ADMIN — Medication 250 MILLIGRAM(S): at 05:25

## 2020-09-05 RX ADMIN — Medication 5 MILLIGRAM(S): at 05:26

## 2020-09-05 RX ADMIN — CEFEPIME 100 MILLIGRAM(S): 1 INJECTION, POWDER, FOR SOLUTION INTRAMUSCULAR; INTRAVENOUS at 22:11

## 2020-09-05 RX ADMIN — Medication 10 MILLIGRAM(S): at 13:58

## 2020-09-05 RX ADMIN — POLYETHYLENE GLYCOL 3350 17 GRAM(S): 17 POWDER, FOR SOLUTION ORAL at 17:23

## 2020-09-05 RX ADMIN — Medication 0.5 MILLIGRAM(S): at 22:11

## 2020-09-05 RX ADMIN — Medication 10 MILLIGRAM(S): at 22:10

## 2020-09-05 RX ADMIN — Medication 0.25 MILLIGRAM(S): at 17:23

## 2020-09-05 RX ADMIN — Medication 166.67 MILLIGRAM(S): at 05:45

## 2020-09-05 RX ADMIN — ATORVASTATIN CALCIUM 20 MILLIGRAM(S): 80 TABLET, FILM COATED ORAL at 22:10

## 2020-09-05 RX ADMIN — Medication 250 MILLIGRAM(S): at 17:24

## 2020-09-05 NOTE — PROGRESS NOTE ADULT - SUBJECTIVE AND OBJECTIVE BOX
Patient is a 79y old  Male who presents with a chief complaint of pna with respiratory failure and hypoxia (05 Sep 2020 13:42)    Patient seen and examined at bedside, drowsy, arousable to name and touch.     ALLERGIES:  No Known Allergies    MEDICATIONS  (STANDING):  ALPRAZolam 0.5 milliGRAM(s) Oral at bedtime  ALPRAZolam 0.25 milliGRAM(s) Oral two times a day  aspirin  chewable 81 milliGRAM(s) Oral daily  atorvastatin 20 milliGRAM(s) Oral at bedtime  bethanechol 10 milliGRAM(s) Oral three times a day  bisacodyl 5 milliGRAM(s) Oral every 12 hours  cefepime   IVPB 2000 milliGRAM(s) IV Intermittent every 8 hours  enoxaparin Injectable 40 milliGRAM(s) SubCutaneous daily  escitalopram 10 milliGRAM(s) Oral daily  furosemide    Tablet 40 milliGRAM(s) Oral daily  hemorrhoidal Ointment 1 Application(s) Rectal three times a day  levETIRAcetam 1000 milliGRAM(s) Oral two times a day  lidocaine   Patch 1 Patch Transdermal daily  melatonin 3 milliGRAM(s) Oral at bedtime  metoprolol succinate ER 25 milliGRAM(s) Oral daily  pantoprazole    Tablet 40 milliGRAM(s) Oral before breakfast  polyethylene glycol 3350 17 Gram(s) Oral two times a day  saccharomyces boulardii 250 milliGRAM(s) Oral two times a day  senna 2 Tablet(s) Oral at bedtime  triamcinolone 0.1% Cream 1 Application(s) Topical two times a day  vancomycin  IVPB 1250 milliGRAM(s) IV Intermittent every 12 hours    MEDICATIONS  (PRN):  acetaminophen   Tablet .. 650 milliGRAM(s) Oral every 6 hours PRN Temp greater or equal to 38C (100.4F), Mild Pain (1 - 3)  bisacodyl Suppository 10 milliGRAM(s) Rectal daily PRN Constipation  ibuprofen  Tablet. 400 milliGRAM(s) Oral every 8 hours PRN Temp greater or equal to 38C (100.4F), Moderate Pain (4 - 6)    Vital Signs Last 24 Hrs  T(F): 99.8 (05 Sep 2020 06:00), Max: 99.8 (04 Sep 2020 19:26)  HR: 90 (05 Sep 2020 06:00) (74 - 90)  BP: 127/69 (05 Sep 2020 06:00) (107/58 - 127/69)  RR: 16 (05 Sep 2020 06:00) (14 - 16)  SpO2: 95% (05 Sep 2020 06:00) (90% - 96%)  I&O's Summary    04 Sep 2020 07:01  -  05 Sep 2020 07:00  --------------------------------------------------------  IN: 50 mL / OUT: 0 mL / NET: 50 mL        PHYSICAL EXAM:  General: NAD, drowsy  ENT: MMM, no scleral icterus  Neck: Supple, No JVD  Lungs: no labored breathing on NC. + course breath sounds lower lung fields b/l  Cardio: RRR, S1/S2, No murmurs  Abdomen: Soft, Nontender, Nondistended; Bowel sounds present  Extremities: No calf tenderness, No pitting edema. + generalized weakness. LLE atrophy    LABS:                        10.5   5.29  )-----------( 145      ( 05 Sep 2020 09:20 )             31.3       09-05    134  |  96  |  26  ----------------------------<  179  3.3   |  30  |  0.75    Ca    8.5      05 Sep 2020 09:20  Mg     2.0     09-03    TPro  6.1  /  Alb  1.9  /  TBili  0.8  /  DBili  x   /  AST  27  /  ALT  39  /  AlkPhos  70  09-05     eGFR if Non African American: 89 mL/min/1.73M2 (09-05-20 @ 09:20)  eGFR if : 103 mL/min/1.73M2 (09-05-20 @ 09:20)      POCT Blood Glucose.: 128 mg/dL (05 Sep 2020 11:23)  POCT Blood Glucose.: 160 mg/dL (05 Sep 2020 08:02)    Culture - Blood (collected 02 Sep 2020 09:03)  Source: .Blood Blood  Preliminary Report (03 Sep 2020 13:01):    No growth to date.    Culture - Blood (collected 02 Sep 2020 09:03)  Source: .Blood Blood  Preliminary Report (03 Sep 2020 13:01):    No growth to date.    Culture - Urine (collected 02 Sep 2020 08:31)  Source: .Urine Catheterized  Final Report (04 Sep 2020 17:15):    >100,000 CFU/ml Enterobacter cloacae complex  Organism: Enterobacter cloacae complex (04 Sep 2020 17:15)  Organism: Enterobacter cloacae complex (04 Sep 2020 17:15)      -  Amikacin: S <=16      -  Amoxicillin/Clavulanic Acid: R >16/8      -  Ampicillin: R 16 These ampicillin results predict results for amoxicillin      -  Ampicillin/Sulbactam: R 8/4 Enterobacter, Citrobacter, and Serratia may develop resistance during prolonged therapy (3-4 days)      -  Aztreonam: S <=4      -  Cefazolin: R >16      -  Cefepime: S <=2      -  Cefoxitin: R >16      -  Ceftriaxone: S <=1 Enterobacter, Citrobacter, and Serratia may develop resistance during prolonged therapy      -  Ciprofloxacin: S <=0.25      -  Ertapenem: S <=0.5      -  Gentamicin: S <=2      -  Imipenem: S <=1      -  Levofloxacin: S <=0.5      -  Meropenem: S <=1      -  Nitrofurantoin: I 64 Should not be used to treat pyelonephritis      -  Piperacillin/Tazobactam: S <=8      -  Tigecycline: S <=2      -  Tobramycin: S <=2      -  Trimethoprim/Sulfamethoxazole: S <=0.5/9.5      Method Type: MCKAYLA    RADIOLOGY & ADDITIONAL TESTS:    Care Discussed with Consultants/Other Providers: Infectious Disease Patient is a 79y old  Male who presents with a chief complaint of pna with respiratory failure and hypoxia (05 Sep 2020 13:42)    Patient seen and examined at bedside, drowsy, arousable to name and touch.     ALLERGIES:  No Known Allergies    MEDICATIONS  (STANDING):  ALPRAZolam 0.5 milliGRAM(s) Oral at bedtime  ALPRAZolam 0.25 milliGRAM(s) Oral two times a day  aspirin  chewable 81 milliGRAM(s) Oral daily  atorvastatin 20 milliGRAM(s) Oral at bedtime  bethanechol 10 milliGRAM(s) Oral three times a day  bisacodyl 5 milliGRAM(s) Oral every 12 hours  cefepime   IVPB 2000 milliGRAM(s) IV Intermittent every 8 hours  enoxaparin Injectable 40 milliGRAM(s) SubCutaneous daily  escitalopram 10 milliGRAM(s) Oral daily  furosemide    Tablet 40 milliGRAM(s) Oral daily  hemorrhoidal Ointment 1 Application(s) Rectal three times a day  levETIRAcetam 1000 milliGRAM(s) Oral two times a day  lidocaine   Patch 1 Patch Transdermal daily  melatonin 3 milliGRAM(s) Oral at bedtime  metoprolol succinate ER 25 milliGRAM(s) Oral daily  pantoprazole    Tablet 40 milliGRAM(s) Oral before breakfast  polyethylene glycol 3350 17 Gram(s) Oral two times a day  saccharomyces boulardii 250 milliGRAM(s) Oral two times a day  senna 2 Tablet(s) Oral at bedtime  triamcinolone 0.1% Cream 1 Application(s) Topical two times a day  vancomycin  IVPB 1250 milliGRAM(s) IV Intermittent every 12 hours    MEDICATIONS  (PRN):  acetaminophen   Tablet .. 650 milliGRAM(s) Oral every 6 hours PRN Temp greater or equal to 38C (100.4F), Mild Pain (1 - 3)  bisacodyl Suppository 10 milliGRAM(s) Rectal daily PRN Constipation  ibuprofen  Tablet. 400 milliGRAM(s) Oral every 8 hours PRN Temp greater or equal to 38C (100.4F), Moderate Pain (4 - 6)    Vital Signs Last 24 Hrs  T(F): 99.8 (05 Sep 2020 06:00), Max: 99.8 (04 Sep 2020 19:26)  HR: 90 (05 Sep 2020 06:00) (74 - 90)  BP: 127/69 (05 Sep 2020 06:00) (107/58 - 127/69)  RR: 16 (05 Sep 2020 06:00) (14 - 16)  SpO2: 95% (05 Sep 2020 06:00) (90% - 96%)  I&O's Summary    04 Sep 2020 07:01  -  05 Sep 2020 07:00  --------------------------------------------------------  IN: 50 mL / OUT: 0 mL / NET: 50 mL        PHYSICAL EXAM:  General: NAD, drowsy  ENT: MMM, no scleral icterus  Neck: Supple, No JVD  Lungs: no labored breathing on NC. + course breath sounds lower lung fields b/l  Cardio: RRR, S1/S2, No murmurs  Abdomen: Soft, Nontender, Nondistended; Bowel sounds present  Extremities: No calf tenderness, No pitting edema. + generalized weakness. LLE atrophy    LABS:                        10.5   5.29  )-----------( 145      ( 05 Sep 2020 09:20 )             31.3       09-05    134  |  96  |  26  ----------------------------<  179  3.3   |  30  |  0.75    Ca    8.5      05 Sep 2020 09:20  Mg     2.0     09-03    TPro  6.1  /  Alb  1.9  /  TBili  0.8  /  DBili  x   /  AST  27  /  ALT  39  /  AlkPhos  70  09-05     eGFR if Non African American: 89 mL/min/1.73M2 (09-05-20 @ 09:20)  eGFR if : 103 mL/min/1.73M2 (09-05-20 @ 09:20)      POCT Blood Glucose.: 128 mg/dL (05 Sep 2020 11:23)  POCT Blood Glucose.: 160 mg/dL (05 Sep 2020 08:02)    Culture - Blood (collected 02 Sep 2020 09:03)  Source: .Blood Blood  Preliminary Report (03 Sep 2020 13:01):    No growth to date.    Culture - Blood (collected 02 Sep 2020 09:03)  Source: .Blood Blood  Preliminary Report (03 Sep 2020 13:01):    No growth to date.    Culture - Urine (collected 02 Sep 2020 08:31)  Source: .Urine Catheterized  Final Report (04 Sep 2020 17:15):    >100,000 CFU/ml Enterobacter cloacae complex  Organism: Enterobacter cloacae complex (04 Sep 2020 17:15)  Organism: Enterobacter cloacae complex (04 Sep 2020 17:15)      -  Amikacin: S <=16      -  Amoxicillin/Clavulanic Acid: R >16/8      -  Ampicillin: R 16 These ampicillin results predict results for amoxicillin      -  Ampicillin/Sulbactam: R 8/4 Enterobacter, Citrobacter, and Serratia may develop resistance during prolonged therapy (3-4 days)      -  Aztreonam: S <=4      -  Cefazolin: R >16      -  Cefepime: S <=2      -  Cefoxitin: R >16      -  Ceftriaxone: S <=1 Enterobacter, Citrobacter, and Serratia may develop resistance during prolonged therapy      -  Ciprofloxacin: S <=0.25      -  Ertapenem: S <=0.5      -  Gentamicin: S <=2      -  Imipenem: S <=1      -  Levofloxacin: S <=0.5      -  Meropenem: S <=1      -  Nitrofurantoin: I 64 Should not be used to treat pyelonephritis      -  Piperacillin/Tazobactam: S <=8      -  Tigecycline: S <=2      -  Tobramycin: S <=2      -  Trimethoprim/Sulfamethoxazole: S <=0.5/9.5      Method Type: MCKAYLA    RADIOLOGY & ADDITIONAL TESTS:  < from: CT Chest No Cont (09.03.20 @ 12:08) >    EXAM:  CT CHEST      PROCEDURE DATE:  09/03/2020    INTERPRETATION:  CLINICAL INDICATION: 79 years  Male with Assess for PNA.  COMPARISON: 10/17/2019  PROCEDURE:  CT of the Chest was performed without intravenous contrast.  Sagittal and coronal reformats were performed.    FINDINGS:    LIMITATIONS: Absence of IV contrast limits evaluation of the vascular structures and jolanta. Motion artifact.    LUNGS AND AIRWAYS: Patent central airways.  Bilateral upper lobe and right middle lobe groundglass infiltrates. Bilateral lower lobe dependent atelectasis.  PLEURA: No pleural effusion. No pneumothorax.  MEDIASTINUM AND JOLANTA: No lymphadenopathy.  VESSELS: Atherosclerotic aorta without aneurysm. Coronary atherosclerosis.  HEART: Mild cardiomegaly. No pericardial effusion.  CHEST WALL AND LOWER NECK: Within normal limits.  VISUALIZED UPPER ABDOMEN: Within normal limits.  BONES: Degenerative changes.    IMPRESSION:  Bilateral upper lobe and right middle lobe airspace infiltrates on the basisof pneumonia, pulmonary edema and/or motion artifact. Bibasilar dependent changes.    Mild cardiomegaly.  < end of copied text >    Care Discussed with Consultants/Other Providers: Infectious Disease

## 2020-09-05 NOTE — PROGRESS NOTE ADULT - SUBJECTIVE AND OBJECTIVE BOX
CC: f/u for  fever, uti, pneumonia  Patient reports he wants orange juice and it hurts to turn    REVIEW OF SYSTEMS:  All other review of systems negative (Comprehensive ROS)    Antimicrobials Day #  :  cefepime   IVPB 2000 milliGRAM(s) IV Intermittent every 8 hours  vancomycin  IVPB 1250 milliGRAM(s) IV Intermittent every 12 hours    Other Medications Reviewed    T(F): 99.8 (09-05-20 @ 06:00), Max: 99.8 (09-04-20 @ 19:26)  HR: 90 (09-05-20 @ 06:00)  BP: 127/69 (09-05-20 @ 06:00)  RR: 16 (09-05-20 @ 06:00)  SpO2: 95% (09-05-20 @ 06:00)  Wt(kg): --    PHYSICAL EXAM:  General: alert, no acute distress  Eyes:  anicteric, no conjunctival injection, no discharge  Oropharynx: no lesions or injection 	  Neck: supple, without adenopathy  Lungs: grossly clear to auscultation  Heart: regular rate and rhythm; no murmur, rubs or gallops  Abdomen: soft, nondistended, nontender, without mass or organomegaly  Skin: no lesions  Extremities: no clubbing, cyanosis, or edema  Neurologic: alert, left , moves all extremities    LAB RESULTS:                        10.5   5.29  )-----------( 145      ( 05 Sep 2020 09:20 )             31.3     09-05    134<L>  |  96  |  26<H>  ----------------------------<  179<H>  3.3<L>   |  30  |  0.75    Ca    8.5      05 Sep 2020 09:20    TPro  6.1  /  Alb  1.9<L>  /  TBili  0.8  /  DBili  x   /  AST  27  /  ALT  39  /  AlkPhos  70  09-05    LIVER FUNCTIONS - ( 05 Sep 2020 09:20 )  Alb: 1.9 g/dL / Pro: 6.1 g/dL / ALK PHOS: 70 U/L / ALT: 39 U/L / AST: 27 U/L / GGT: x             MICROBIOLOGY:  RECENT CULTURES:  09-02 @ 09:03 .Blood Blood     No growth to date.      09-02 @ 08:31 .Urine Catheterized Enterobacter cloacae complex    >100,000 CFU/ml Enterobacter cloacae complex    Urine Microscopic-Add On (NC) (09.02.20 @ 08:31)    Bacteria: TNTC /HPF    Epithelial Cells: Neg.-Few    White Blood Cell - Urine: 26-50 /HPF    Red Blood Cell - Urine: 5-10 /HPF          RADIOLOGY REVIEWED:    < from: CT Chest No Cont (09.03.20 @ 12:08) >    EXAM:  CT CHEST      PROCEDURE DATE:  09/03/2020        INTERPRETATION:  CLINICAL INDICATION: 79 years  Male with Assess for PNA.    COMPARISON: 10/17/2019    PROCEDURE:  CT of the Chest was performed without intravenous contrast.  Sagittal and coronal reformats were performed.    FINDINGS:    LIMITATIONS: Absence of IV contrast limits evaluation of the vascular structures and jolanta. Motion artifact.    LUNGS AND AIRWAYS: Patent central airways.  Bilateral upper lobe and right middle lobe groundglass infiltrates. Bilateral lower lobe dependent atelectasis.  PLEURA: No pleural effusion. No pneumothorax.  MEDIASTINUM AND JOLANTA: No lymphadenopathy.  VESSELS: Atherosclerotic aorta without aneurysm. Coronary atherosclerosis.  HEART: Mild cardiomegaly. No pericardial effusion.  CHEST WALL AND LOWER NECK: Within normal limits.  VISUALIZED UPPER ABDOMEN: Within normal limits.  BONES: Degenerative changes.    IMPRESSION:  Bilateral upper lobe and right middle lobe airspace infiltrates on the basisof pneumonia, pulmonary edema and/or motion artifact. Bibasilar dependent changes.    Mild cardiomegaly.          < end of copied text >  < from: CT Chest No Cont (09.03.20 @ 12:08) >    EXAM:  CT CHEST      PROCEDURE DATE:  09/03/2020        INTERPRETATION:  CLINICAL INDICATION: 79 years  Male with Assess for PNA.    COMPARISON: 10/17/2019    PROCEDURE:  CT of the Chest was performed without intravenous contrast.  Sagittal and coronal reformats were performed.    FINDINGS:    LIMITATIONS: Absence of IV contrast limits evaluation of the vascular structures and jolanta. Motion artifact.    LUNGS AND AIRWAYS: Patent central airways.  Bilateral upper lobe and right middle lobe groundglass infiltrates. Bilateral lower lobe dependent atelectasis.  PLEURA: No pleural effusion. No pneumothorax.  MEDIASTINUM AND JOLANTA: No lymphadenopathy.  VESSELS: Atherosclerotic aorta without aneurysm. Coronary atherosclerosis.  HEART: Mild cardiomegaly. No pericardial effusion.  CHEST WALL AND LOWER NECK: Within normal limits.  VISUALIZED UPPER ABDOMEN: Within normal limits.  BONES: Degenerative changes.    IMPRESSION:  Bilateral upper lobe and right middle lobe airspace infiltrates on the basisof pneumonia, pulmonary edema and/or motion artifact. Bibasilar dependent changes.    Mild cardiomegaly.          < end of copied text >            Assessment:   78 yo male with progressive GBM now with fever x 2 days likely related to pneumonia and /or enterobacter UTI.  He has been debulked 3 times, is s/p recent admission to [facility] for speech difficulties and left sided weakness, treated with increased decadron, now tapered.  He is an aspiration risk and requires ISC.  He is DNR/DNI and is being considered for directed GBM treatment but may not be a candidate.  Suspect bacterial infection but if he does not respond will need to consider fungal infections as well-PCP, aspergillus.  Suggest:  1.continue vanco and cefepime for now-enterobacter  coverage and pneumonia coverage  2.MRSA nasal screen, if negative can stop vanco  3 check .Fungitell, if normal it will make PCP unlikely.

## 2020-09-05 NOTE — PROGRESS NOTE ADULT - ASSESSMENT
79M hx of HTN, HLD, prostate ca s/p prostatectomy, basal cell skin ca, hx of GBM dx in 2018 s/p 3 resections with worsening L sided weakness. CT head and MRI with progression of dz and worsening vasogenic edema in R temporal/frontal/parietal regions treated with tapering Decadron. Now DNR/DNI with multilobar pna and UTI requiring supplemental oxygen    # Multilobar pna with acute hypoxic resp failure and Enterobacter UTI.  cont Vanco and Cefepime.   ID consult appreciated. MRSA swab, fungitell  cont oxygen supplementation.     #GBM with cerebral edema and L sided weakness.   s/p Decadron taper. not candidate for Avastin   palliative care. Pt is DNR/DNI. for home hospice on discharge.     #Urinary retention  check pvr, cont ISC  cont bethanechol     # Depression/anxiety  - cont xanax and escitalopram    #HTN  cont Metoprolol and Lasix for now with holding parameters.     #Hyperglycemia  cont FS    #DVT proph with lovenox 79M hx of HTN, HLD, prostate ca s/p prostatectomy, basal cell skin ca, hx of GBM dx in 2018 s/p 3 resections with worsening L sided weakness. CT head and MRI with progression of dz and worsening vasogenic edema in R temporal/frontal/parietal regions treated with tapering Decadron. Now DNR/DNI with multilobar pna and UTI requiring supplemental oxygen    # Multilobar pna with acute hypoxic resp failure and Enterobacter UTI.  -ID consult appreciated.   -Cont vanco, follow trough. cont cefepime, follow MRSA swab, fungitell - pending collection  -cont oxygen supplementation.     #GBM with cerebral edema and L sided weakness.   -s/p Decadron taper. not candidate for Avastin. cont ppi  -palliative care. Pt is DNR/DNI. for home hospice on discharge.     #Urinary retention  -check pvr, cont ISC  -cont bethanechol     # Depression/anxiety  -cont xanax and escitalopram    #HTN  cont Metoprolol and Lasix for now with holding parameters.     #Hyperglycemia  cont FS    #hypokalemia  -Replete and recheck in am    #DVT ppx: lovenox

## 2020-09-06 LAB
ANION GAP SERPL CALC-SCNC: 6 MMOL/L — SIGNIFICANT CHANGE UP (ref 5–17)
ANION GAP SERPL CALC-SCNC: 7 MMOL/L — SIGNIFICANT CHANGE UP (ref 5–17)
BUN SERPL-MCNC: 27 MG/DL — HIGH (ref 7–23)
BUN SERPL-MCNC: 28 MG/DL — HIGH (ref 7–23)
CALCIUM SERPL-MCNC: 8.5 MG/DL — SIGNIFICANT CHANGE UP (ref 8.4–10.5)
CALCIUM SERPL-MCNC: 8.6 MG/DL — SIGNIFICANT CHANGE UP (ref 8.4–10.5)
CHLORIDE SERPL-SCNC: 98 MMOL/L — SIGNIFICANT CHANGE UP (ref 96–108)
CHLORIDE SERPL-SCNC: 98 MMOL/L — SIGNIFICANT CHANGE UP (ref 96–108)
CO2 SERPL-SCNC: 30 MMOL/L — SIGNIFICANT CHANGE UP (ref 22–31)
CO2 SERPL-SCNC: 30 MMOL/L — SIGNIFICANT CHANGE UP (ref 22–31)
CREAT SERPL-MCNC: 0.73 MG/DL — SIGNIFICANT CHANGE UP (ref 0.5–1.3)
CREAT SERPL-MCNC: 0.82 MG/DL — SIGNIFICANT CHANGE UP (ref 0.5–1.3)
GLUCOSE BLDC GLUCOMTR-MCNC: 117 MG/DL — HIGH (ref 70–99)
GLUCOSE BLDC GLUCOMTR-MCNC: 126 MG/DL — HIGH (ref 70–99)
GLUCOSE BLDC GLUCOMTR-MCNC: 159 MG/DL — HIGH (ref 70–99)
GLUCOSE BLDC GLUCOMTR-MCNC: 176 MG/DL — HIGH (ref 70–99)
GLUCOSE SERPL-MCNC: 130 MG/DL — HIGH (ref 70–99)
GLUCOSE SERPL-MCNC: 154 MG/DL — HIGH (ref 70–99)
HCT VFR BLD CALC: 28.1 % — LOW (ref 39–50)
HGB BLD-MCNC: 9.3 G/DL — LOW (ref 13–17)
MCHC RBC-ENTMCNC: 31.1 PG — SIGNIFICANT CHANGE UP (ref 27–34)
MCHC RBC-ENTMCNC: 33.1 GM/DL — SIGNIFICANT CHANGE UP (ref 32–36)
MCV RBC AUTO: 94 FL — SIGNIFICANT CHANGE UP (ref 80–100)
NRBC # BLD: 0 /100 WBCS — SIGNIFICANT CHANGE UP (ref 0–0)
PLATELET # BLD AUTO: 161 K/UL — SIGNIFICANT CHANGE UP (ref 150–400)
POTASSIUM SERPL-MCNC: 3.1 MMOL/L — LOW (ref 3.5–5.3)
POTASSIUM SERPL-MCNC: 4 MMOL/L — SIGNIFICANT CHANGE UP (ref 3.5–5.3)
POTASSIUM SERPL-SCNC: 3.1 MMOL/L — LOW (ref 3.5–5.3)
POTASSIUM SERPL-SCNC: 4 MMOL/L — SIGNIFICANT CHANGE UP (ref 3.5–5.3)
RBC # BLD: 2.99 M/UL — LOW (ref 4.2–5.8)
RBC # FLD: 13.2 % — SIGNIFICANT CHANGE UP (ref 10.3–14.5)
SARS-COV-2 IGG SERPL QL IA: NEGATIVE — SIGNIFICANT CHANGE UP
SARS-COV-2 IGM SERPL IA-ACNC: <0.1 INDEX — SIGNIFICANT CHANGE UP
SODIUM SERPL-SCNC: 134 MMOL/L — LOW (ref 135–145)
SODIUM SERPL-SCNC: 135 MMOL/L — SIGNIFICANT CHANGE UP (ref 135–145)
WBC # BLD: 5.57 K/UL — SIGNIFICANT CHANGE UP (ref 3.8–10.5)
WBC # FLD AUTO: 5.57 K/UL — SIGNIFICANT CHANGE UP (ref 3.8–10.5)

## 2020-09-06 PROCEDURE — 97163 PT EVAL HIGH COMPLEX 45 MIN: CPT

## 2020-09-06 PROCEDURE — 71250 CT THORAX DX C-: CPT

## 2020-09-06 PROCEDURE — 92526 ORAL FUNCTION THERAPY: CPT

## 2020-09-06 PROCEDURE — 92507 TX SP LANG VOICE COMM INDIV: CPT

## 2020-09-06 PROCEDURE — 99233 SBSQ HOSP IP/OBS HIGH 50: CPT

## 2020-09-06 PROCEDURE — 93306 TTE W/DOPPLER COMPLETE: CPT

## 2020-09-06 PROCEDURE — 97110 THERAPEUTIC EXERCISES: CPT

## 2020-09-06 PROCEDURE — 97116 GAIT TRAINING THERAPY: CPT

## 2020-09-06 PROCEDURE — 92523 SPEECH SOUND LANG COMPREHEN: CPT

## 2020-09-06 PROCEDURE — 97535 SELF CARE MNGMENT TRAINING: CPT

## 2020-09-06 PROCEDURE — 93005 ELECTROCARDIOGRAM TRACING: CPT

## 2020-09-06 PROCEDURE — 82962 GLUCOSE BLOOD TEST: CPT

## 2020-09-06 PROCEDURE — 87086 URINE CULTURE/COLONY COUNT: CPT

## 2020-09-06 PROCEDURE — 80053 COMPREHEN METABOLIC PANEL: CPT

## 2020-09-06 PROCEDURE — 71045 X-RAY EXAM CHEST 1 VIEW: CPT

## 2020-09-06 PROCEDURE — 92610 EVALUATE SWALLOWING FUNCTION: CPT

## 2020-09-06 PROCEDURE — 97542 WHEELCHAIR MNGMENT TRAINING: CPT

## 2020-09-06 PROCEDURE — 87449 NOS EACH ORGANISM AG IA: CPT

## 2020-09-06 PROCEDURE — 85027 COMPLETE CBC AUTOMATED: CPT

## 2020-09-06 PROCEDURE — 97167 OT EVAL HIGH COMPLEX 60 MIN: CPT

## 2020-09-06 PROCEDURE — 83735 ASSAY OF MAGNESIUM: CPT

## 2020-09-06 PROCEDURE — 36415 COLL VENOUS BLD VENIPUNCTURE: CPT

## 2020-09-06 PROCEDURE — 97112 NEUROMUSCULAR REEDUCATION: CPT

## 2020-09-06 PROCEDURE — 83605 ASSAY OF LACTIC ACID: CPT

## 2020-09-06 PROCEDURE — 87040 BLOOD CULTURE FOR BACTERIA: CPT

## 2020-09-06 PROCEDURE — 80048 BASIC METABOLIC PNL TOTAL CA: CPT

## 2020-09-06 PROCEDURE — 87186 SC STD MICRODIL/AGAR DIL: CPT

## 2020-09-06 PROCEDURE — 83880 ASSAY OF NATRIURETIC PEPTIDE: CPT

## 2020-09-06 PROCEDURE — U0003: CPT

## 2020-09-06 PROCEDURE — 80202 ASSAY OF VANCOMYCIN: CPT

## 2020-09-06 PROCEDURE — 97530 THERAPEUTIC ACTIVITIES: CPT

## 2020-09-06 PROCEDURE — 81001 URINALYSIS AUTO W/SCOPE: CPT

## 2020-09-06 RX ORDER — POTASSIUM CHLORIDE 20 MEQ
40 PACKET (EA) ORAL EVERY 4 HOURS
Refills: 0 | Status: COMPLETED | OUTPATIENT
Start: 2020-09-06 | End: 2020-09-06

## 2020-09-06 RX ADMIN — ENOXAPARIN SODIUM 40 MILLIGRAM(S): 100 INJECTION SUBCUTANEOUS at 12:23

## 2020-09-06 RX ADMIN — Medication 10 MILLIGRAM(S): at 21:29

## 2020-09-06 RX ADMIN — ESCITALOPRAM OXALATE 10 MILLIGRAM(S): 10 TABLET, FILM COATED ORAL at 12:20

## 2020-09-06 RX ADMIN — Medication 400 MILLIGRAM(S): at 02:11

## 2020-09-06 RX ADMIN — Medication 0.25 MILLIGRAM(S): at 05:21

## 2020-09-06 RX ADMIN — Medication 25 MILLIGRAM(S): at 05:20

## 2020-09-06 RX ADMIN — PHENYLEPHRINE-SHARK LIVER OIL-MINERAL OIL-PETROLATUM RECTAL OINTMENT 1 APPLICATION(S): at 21:29

## 2020-09-06 RX ADMIN — Medication 166.67 MILLIGRAM(S): at 05:21

## 2020-09-06 RX ADMIN — PANTOPRAZOLE SODIUM 40 MILLIGRAM(S): 20 TABLET, DELAYED RELEASE ORAL at 05:20

## 2020-09-06 RX ADMIN — Medication 40 MILLIEQUIVALENT(S): at 12:20

## 2020-09-06 RX ADMIN — CEFEPIME 100 MILLIGRAM(S): 1 INJECTION, POWDER, FOR SOLUTION INTRAMUSCULAR; INTRAVENOUS at 12:21

## 2020-09-06 RX ADMIN — LEVETIRACETAM 1000 MILLIGRAM(S): 250 TABLET, FILM COATED ORAL at 05:20

## 2020-09-06 RX ADMIN — Medication 650 MILLIGRAM(S): at 17:07

## 2020-09-06 RX ADMIN — SENNA PLUS 2 TABLET(S): 8.6 TABLET ORAL at 21:29

## 2020-09-06 RX ADMIN — Medication 400 MILLIGRAM(S): at 01:11

## 2020-09-06 RX ADMIN — Medication 1 APPLICATION(S): at 05:22

## 2020-09-06 RX ADMIN — Medication 650 MILLIGRAM(S): at 18:00

## 2020-09-06 RX ADMIN — Medication 0.5 MILLIGRAM(S): at 21:29

## 2020-09-06 RX ADMIN — POLYETHYLENE GLYCOL 3350 17 GRAM(S): 17 POWDER, FOR SOLUTION ORAL at 05:21

## 2020-09-06 RX ADMIN — LIDOCAINE 1 PATCH: 4 CREAM TOPICAL at 19:00

## 2020-09-06 RX ADMIN — Medication 5 MILLIGRAM(S): at 05:20

## 2020-09-06 RX ADMIN — LIDOCAINE 1 PATCH: 4 CREAM TOPICAL at 01:00

## 2020-09-06 RX ADMIN — PHENYLEPHRINE-SHARK LIVER OIL-MINERAL OIL-PETROLATUM RECTAL OINTMENT 1 APPLICATION(S): at 05:22

## 2020-09-06 RX ADMIN — Medication 3 MILLIGRAM(S): at 21:29

## 2020-09-06 RX ADMIN — Medication 250 MILLIGRAM(S): at 05:22

## 2020-09-06 RX ADMIN — CEFEPIME 100 MILLIGRAM(S): 1 INJECTION, POWDER, FOR SOLUTION INTRAMUSCULAR; INTRAVENOUS at 21:29

## 2020-09-06 RX ADMIN — ATORVASTATIN CALCIUM 20 MILLIGRAM(S): 80 TABLET, FILM COATED ORAL at 21:29

## 2020-09-06 RX ADMIN — Medication 0.25 MILLIGRAM(S): at 17:07

## 2020-09-06 RX ADMIN — Medication 10 MILLIGRAM(S): at 05:20

## 2020-09-06 RX ADMIN — Medication 250 MILLIGRAM(S): at 17:07

## 2020-09-06 RX ADMIN — POLYETHYLENE GLYCOL 3350 17 GRAM(S): 17 POWDER, FOR SOLUTION ORAL at 17:07

## 2020-09-06 RX ADMIN — PHENYLEPHRINE-SHARK LIVER OIL-MINERAL OIL-PETROLATUM RECTAL OINTMENT 1 APPLICATION(S): at 12:23

## 2020-09-06 RX ADMIN — LEVETIRACETAM 1000 MILLIGRAM(S): 250 TABLET, FILM COATED ORAL at 17:07

## 2020-09-06 RX ADMIN — Medication 5 MILLIGRAM(S): at 17:07

## 2020-09-06 RX ADMIN — Medication 166.67 MILLIGRAM(S): at 21:30

## 2020-09-06 RX ADMIN — Medication 40 MILLIGRAM(S): at 05:20

## 2020-09-06 RX ADMIN — LIDOCAINE 1 PATCH: 4 CREAM TOPICAL at 12:21

## 2020-09-06 RX ADMIN — Medication 10 MILLIGRAM(S): at 12:20

## 2020-09-06 RX ADMIN — CEFEPIME 100 MILLIGRAM(S): 1 INJECTION, POWDER, FOR SOLUTION INTRAMUSCULAR; INTRAVENOUS at 05:21

## 2020-09-06 RX ADMIN — Medication 1 APPLICATION(S): at 17:08

## 2020-09-06 RX ADMIN — Medication 81 MILLIGRAM(S): at 12:20

## 2020-09-06 NOTE — DIETITIAN INITIAL EVALUATION ADULT. - DIET TYPE
dysphagia 2, mechanical soft, nectar consistency fld/Will follow SLP recommendations s/p swallowing evaluation . Continue w/ Ensure Enlive BID (provide 350 calories, 20 gm protein per 8 oz)

## 2020-09-06 NOTE — DIETITIAN INITIAL EVALUATION ADULT. - PERTINENT LABORATORY DATA
20: H.3 L, Hct: 28.1 L, Na:  135, K: 3.1, BUN:  28 L, Creat:  0.73, Glucose:  130 H, eGFR: 89. 20: HgA1C 5.6  -: POCT B-202

## 2020-09-06 NOTE — SWALLOW BEDSIDE ASSESSMENT ADULT - PHARYNGEAL PHASE
Cough post oral intake/Delayed pharyngeal swallow/with thin liquids for all trials Delayed pharyngeal swallow

## 2020-09-06 NOTE — SWALLOW BEDSIDE ASSESSMENT ADULT - SWALLOW EVAL: RECOMMENDED FEEDING/EATING TECHNIQUES
allow for swallow between intakes/crush medication (when feasible)/maintain upright posture during/after eating for 30 mins/oral hygiene/check mouth frequently for oral residue/pocketing

## 2020-09-06 NOTE — PROGRESS NOTE ADULT - SUBJECTIVE AND OBJECTIVE BOX
Patient is a 79y old  Male who presents with a chief complaint of pna with respiratory failure and hypoxia (05 Sep 2020 14:39)      Patient seen and examined at bedside this morning. endorses mild constipation. no additional complaints    ALLERGIES:  No Known Allergies    MEDICATIONS  (STANDING):  ALPRAZolam 0.5 milliGRAM(s) Oral at bedtime  ALPRAZolam 0.25 milliGRAM(s) Oral two times a day  aspirin  chewable 81 milliGRAM(s) Oral daily  atorvastatin 20 milliGRAM(s) Oral at bedtime  bethanechol 10 milliGRAM(s) Oral three times a day  bisacodyl 5 milliGRAM(s) Oral every 12 hours  cefepime   IVPB 2000 milliGRAM(s) IV Intermittent every 8 hours  enoxaparin Injectable 40 milliGRAM(s) SubCutaneous daily  escitalopram 10 milliGRAM(s) Oral daily  furosemide    Tablet 40 milliGRAM(s) Oral daily  hemorrhoidal Ointment 1 Application(s) Rectal three times a day  levETIRAcetam 1000 milliGRAM(s) Oral two times a day  lidocaine   Patch 1 Patch Transdermal daily  melatonin 3 milliGRAM(s) Oral at bedtime  metoprolol succinate ER 25 milliGRAM(s) Oral daily  pantoprazole    Tablet 40 milliGRAM(s) Oral before breakfast  polyethylene glycol 3350 17 Gram(s) Oral two times a day  saccharomyces boulardii 250 milliGRAM(s) Oral two times a day  senna 2 Tablet(s) Oral at bedtime  triamcinolone 0.1% Cream 1 Application(s) Topical two times a day  vancomycin  IVPB 1250 milliGRAM(s) IV Intermittent every 12 hours    MEDICATIONS  (PRN):  acetaminophen   Tablet .. 650 milliGRAM(s) Oral every 6 hours PRN Temp greater or equal to 38C (100.4F), Mild Pain (1 - 3)  bisacodyl Suppository 10 milliGRAM(s) Rectal daily PRN Constipation  ibuprofen  Tablet. 400 milliGRAM(s) Oral every 8 hours PRN Temp greater or equal to 38C (100.4F), Moderate Pain (4 - 6)    Vital Signs Last 24 Hrs  T(F): 98.4 (06 Sep 2020 05:16), Max: 98.4 (06 Sep 2020 05:16)  HR: 72 (06 Sep 2020 05:16) (72 - 86)  BP: 125/62 (06 Sep 2020 05:16) (98/51 - 125/62)  RR: 15 (06 Sep 2020 05:16) (15 - 16)  SpO2: 96% (06 Sep 2020 05:16) (95% - 100%)  I&O's Summary    05 Sep 2020 07:01  -  06 Sep 2020 07:00  --------------------------------------------------------  IN: 350 mL / OUT: 0 mL / NET: 350 mL    06 Sep 2020 07:01  -  06 Sep 2020 15:13  --------------------------------------------------------  IN: 120 mL / OUT: 0 mL / NET: 120 mL        PHYSICAL EXAM:  General: NAD, A/O x 3  ENT: MMM, no scleral icterus  Neck: Supple, No JVD  Lungs: respirations non labored. diminished breath sounds at bases.   Cardio: RRR, S1/S2, No murmurs  Abdomen: Soft, Nontender, Nondistended; Bowel sounds present  Extremities: No calf tenderness, No pitting edema  MSK: generalized weakness UE, LE b/l. left sided weakness    LABS:                        9.3    5.57  )-----------( 161      ( 06 Sep 2020 06:05 )             28.1       09-06    135  |  98  |  28  ----------------------------<  130  3.1   |  30  |  0.73    Ca    8.5      06 Sep 2020 06:05    TPro  6.1  /  Alb  1.9  /  TBili  0.8  /  DBili  x   /  AST  27  /  ALT  39  /  AlkPhos  70  09-05     eGFR if Non African American: 89 mL/min/1.73M2 (09-06-20 @ 06:05)  eGFR if African American: 103 mL/min/1.73M2 (09-06-20 @ 06:05)    POCT Blood Glucose.: 126 mg/dL (06 Sep 2020 08:36)  POCT Blood Glucose.: 116 mg/dL (05 Sep 2020 22:08)  POCT Blood Glucose.: 166 mg/dL (05 Sep 2020 16:34)    Culture - Blood (collected 02 Sep 2020 09:03)  Source: .Blood Blood  Preliminary Report (03 Sep 2020 13:01):    No growth to date.    Culture - Blood (collected 02 Sep 2020 09:03)  Source: .Blood Blood  Preliminary Report (03 Sep 2020 13:01):    No growth to date.    Culture - Urine (collected 02 Sep 2020 08:31)  Source: .Urine Catheterized  Final Report (04 Sep 2020 17:15):    >100,000 CFU/ml Enterobacter cloacae complex  Organism: Enterobacter cloacae complex (04 Sep 2020 17:15)  Organism: Enterobacter cloacae complex (04 Sep 2020 17:15)      -  Amikacin: S <=16      -  Amoxicillin/Clavulanic Acid: R >16/8      -  Ampicillin: R 16 These ampicillin results predict results for amoxicillin      -  Ampicillin/Sulbactam: R 8/4 Enterobacter, Citrobacter, and Serratia may develop resistance during prolonged therapy (3-4 days)      -  Aztreonam: S <=4      -  Cefazolin: R >16      -  Cefepime: S <=2      -  Cefoxitin: R >16      -  Ceftriaxone: S <=1 Enterobacter, Citrobacter, and Serratia may develop resistance during prolonged therapy      -  Ciprofloxacin: S <=0.25      -  Ertapenem: S <=0.5      -  Gentamicin: S <=2      -  Imipenem: S <=1      -  Levofloxacin: S <=0.5      -  Meropenem: S <=1      -  Nitrofurantoin: I 64 Should not be used to treat pyelonephritis      -  Piperacillin/Tazobactam: S <=8      -  Tigecycline: S <=2      -  Tobramycin: S <=2      -  Trimethoprim/Sulfamethoxazole: S <=0.5/9.5      Method Type: MCKAYLA    RADIOLOGY & ADDITIONAL TESTS:  < from: CT Chest No Cont (09.03.20 @ 12:08) >    EXAM:  CT CHEST      PROCEDURE DATE:  09/03/2020    INTERPRETATION:  CLINICAL INDICATION: 79 years  Male with Assess for PNA.  COMPARISON: 10/17/2019  PROCEDURE:  CT of the Chest was performed without intravenous contrast.  Sagittal and coronal reformats were performed.    FINDINGS:    LIMITATIONS: Absence of IV contrast limits evaluation of the vascular structures and jolanta. Motion artifact.    LUNGS AND AIRWAYS: Patent central airways.  Bilateral upper lobe and right middle lobe groundglass infiltrates. Bilateral lower lobe dependent atelectasis.  PLEURA: No pleural effusion. No pneumothorax.  MEDIASTINUM AND JOLANTA: No lymphadenopathy.  VESSELS: Atherosclerotic aorta without aneurysm. Coronary atherosclerosis.  HEART: Mild cardiomegaly. No pericardial effusion.  CHEST WALL AND LOWER NECK: Within normal limits.  VISUALIZED UPPER ABDOMEN: Within normal limits.  BONES: Degenerative changes.    IMPRESSION:  Bilateral upper lobe and right middle lobe airspace infiltrates on the basisof pneumonia, pulmonary edema and/or motion artifact. Bibasilar dependent changes.    Mild cardiomegaly.  < end of copied text >    Care Discussed with Consultants/Other Providers:

## 2020-09-06 NOTE — SWALLOW BEDSIDE ASSESSMENT ADULT - SWALLOW EVAL: DIAGNOSIS
Oral-Pharyngeal Dysphagia characterized by reduced orientation to spoon/cup/straw, oral holding with reduced acceptance of solid consistencies. Suspected latent A-P transport and swallow delay. Hyolaryngeal excursion palpated to be WFL. Patient with weak reflexive cough after all trials of thin liquids.

## 2020-09-06 NOTE — PROGRESS NOTE ADULT - ASSESSMENT
79M hx of HTN, HLD, prostate ca s/p prostatectomy, basal cell skin ca, GBM dx in 2018 s/p 3 resections with worsening L sided weakness. CT head and MRI indicative of disease progression, and worsening vasogenic edema in R temporal/frontal/parietal regions treated with tapering Decadron. Now DNR/DNI with multilobar pna and enterobacter UTI requiring supplemental oxygen    #Multilobar pna with acute hypoxic resp failure and Enterobacter UTI.  -ID consult appreciated.   -cont vanco, follow trough. cont cefepime, florastor  -follow MRSA swab  -cont oxygen supplementation 3L NC, maintain O2>92%     #GBM with cerebral edema and L sided weakness.   -s/p Decadron taper. not a candidate for Avastin. cont ppi  -palliative care. Pt is DNR/DNI. for home hospice on discharge.   -lidocaine patch  -PT/OT as tolerated    #Severe protein calorie malnutrition in context of acute and chronic disease  -Aspiration precautions. total feeding assistance   -SLP appreciated - now dysphagia 2 mechanical soft/nectar consistency, supplement with ensure  -MVI   -I/Os    #Constipation   -cont ducolax, senna, miralax, consider imaging if no BM     #Urinary retention  -check pvr, cont ISC  -cont bethanechol     #Hypokalemia  -Replete and recheck bmp, mag    # Depression/anxiety  -cont xanax and escitalopram    #HTN, CAD  -cont Metoprolol and Lasix for now with holding parameters.   -cont ASA, lipitor    #Hyperglycemia  -cont FS    #DVT ppx: lovenox  #Code: DNR/DNI

## 2020-09-06 NOTE — SWALLOW BEDSIDE ASSESSMENT ADULT - SLP PERTINENT HISTORY OF CURRENT PROBLEM
79M hx of HTN, HLD, prostate ca s/p prostatectomy, basal cell skin ca, hx of GBM dx in 2018 s/p 3 resections last in 5/2020 s/p multiple doses of Temodar admitted to [facility] 8/4/2020 for worsening L sided weakness. CT head and MRI with progression of dz and worsening vasogenic edema in R temporal/frontal/parietal regions treated with tapering Decadron, hospital course complicated by urinary retention, was transferred to  rehab 8/7/2020, now admitted to acute care with fevers 9/2 with noted UTI growing Enterobacter cloacae and CT chest shows multiple lobe pna .

## 2020-09-06 NOTE — DIETITIAN INITIAL EVALUATION ADULT. - SIGNS/SYMPTOMS
Pt lost 35 Lbs (16.8%) in 1 year and NFPE show mild and severe muscle and fat loss, PO <25%. Pt dx w/ aspirational PNA, requiring dysphagia diet

## 2020-09-06 NOTE — SWALLOW BEDSIDE ASSESSMENT ADULT - COMMENTS
WBC: 5.57    CT Chest: 09/03/20 Bilateral upper lobe and right middle lobe airspace infiltrates on the basis of pneumonia, pulmonary edema and/or motion artifact. Bibasilar dependent changes.   Mild cardiomegaly.      CXR: 09/02/20 The evaluation of the cardiomediastinal silhouette is limited on portable technique.  Low lung volumes are present.  There is minimal blunting at the left costophrenic angle which may reflect trace pleural effusion and/or pleural thickening.  No lobar lung consolidation is noted.  Impression:  Findings as discussed above. Patient accepting very limited trials of solids, spoke with RN/PCA and patient tolerated soft solids at breakfast. Will continue to monitor

## 2020-09-06 NOTE — DIETITIAN INITIAL EVALUATION ADULT. - ENERGY NEEDS
Ht: 71 in (180 cm)   Wt: 181.2 Lbs (82.2 Kg)   IBW: 172 Lbs +/-10%  %IBW: 105%   BMI: 25.3                    Edema:  R arm +2     Skin:  WDL

## 2020-09-06 NOTE — CHART NOTE - FINDINGS AS BASED ON:
Comprehensive nutrition assessment and consultation/Food acceptance and intake status from observations by staff/Follow up

## 2020-09-06 NOTE — PROGRESS NOTE ADULT - SUBJECTIVE AND OBJECTIVE BOX
CC: f/u for  pneumonia and uti  Patient reports  everyone is mean  REVIEW OF SYSTEMS:  All other review of systems negative (Comprehensive ROS)    Antimicrobials Day #  :3/7  cefepime   IVPB 2000 milliGRAM(s) IV Intermittent every 8 hours  vancomycin  IVPB 1250 milliGRAM(s) IV Intermittent every 12 hours    Other Medications Reviewed    T(F): 97.4 (09-06-20 @ 16:00), Max: 98.4 (09-06-20 @ 05:16)  HR: 52 (09-06-20 @ 16:00)  BP: 126/63 (09-06-20 @ 16:00)  RR: 15 (09-06-20 @ 16:00)  SpO2: 100% (09-06-20 @ 16:00)  Wt(kg): --    PHYSICAL EXAM:  General: alert, no acute distress  Eyes:  anicteric, no conjunctival injection, no discharge  Oropharynx: no lesions or injection 	  Neck: supple, without adenopathy  Lungs: course  to auscultation  Heart: regular rate and rhythm; no murmur, rubs or gallops  Abdomen: soft, nondistended, nontender, without mass or organomegaly  Skin: no lesions  Extremities: no clubbing, cyanosis, or edema  Neurologic: alert, , moves right extremities    LAB RESULTS:                        9.3    5.57  )-----------( 161      ( 06 Sep 2020 06:05 )             28.1     09-06    135  |  98  |  28<H>  ----------------------------<  130<H>  3.1<L>   |  30  |  0.73    Ca    8.5      06 Sep 2020 06:05    TPro  6.1  /  Alb  1.9<L>  /  TBili  0.8  /  DBili  x   /  AST  27  /  ALT  39  /  AlkPhos  70  09-05    LIVER FUNCTIONS - ( 05 Sep 2020 09:20 )  Alb: 1.9 g/dL / Pro: 6.1 g/dL / ALK PHOS: 70 U/L / ALT: 39 U/L / AST: 27 U/L / GGT: x             MICROBIOLOGY:  RECENT CULTURES:  09-02 @ 09:03 .Blood Blood     No growth to date.      09-02 @ 08:31 .Urine Catheterized Enterobacter cloacae complex    >100,000 CFU/ml Enterobacter cloacae complex    Vancomycin Level, Trough (09.04.20 @ 18:50)    Vancomycin Level, Trough: 9.8: Vancomycin trough levels should be rapidly reached and maintained at  15-20 ug/ml for life threatening MRSA  infections such as sepsis, endocarditis, osteomyelitis and pneumonia. A  first trough level should be drawn  before the 3rd or 4th dose.  Risk of renal toxicity is increased for levels >15 ug/ml, in patients on  other nephrotoxic drugs, who are  hemodynamically unstable, have unstable renal function, or are on  Vancomycin therapy for >14 days. Renal function with  creatinine levels should be monitored for those patients. ug/mL          RADIOLOGY REVIEWED:    < from: CT Chest No Cont (09.03.20 @ 12:08) >  IMPRESSION:  Bilateral upper lobe and right middle lobe airspace infiltrates on the basisof pneumonia, pulmonary edema and/or motion artifact. Bibasilar dependent changes.    Mild cardiomegaly.    < end of copied text >            Assessment:  78 yo male with progressive GBM now with fever x 2 days likely related to pneumonia and /or enterobacter UTI.  He has been debulked 3 times, is s/p recent admission to [facility] for speech difficulties and left sided weakness, treated with increased decadron, now tapered.  He is an aspiration risk and requires ISC.  He is DNR/DNI and is being considered for directed GBM treatment but may not be a candidate.  Suspect bacterial infection but if he does not respond will need to consider fungal infections as well-PCP, aspergillus.  Suggest:  1.continue vanco and cefepime for now-enterobacter  coverage and pneumonia coverage for 4 more days, he is clearly improved  2.MRSA nasal screen, if negative can stop vanco  3 check .Fungitell, if normal it will make PCP unlikely. he seems to be responding well to antibiotics so pcp and fungal infection seems less likely

## 2020-09-06 NOTE — DIETITIAN INITIAL EVALUATION ADULT. - OTHER INFO
80 yo male with progressive GBM now with fever x 2 days likely related to pneumonia and /or enterobacter UTI.  He has been debulked 3 times, is s/p recent admission to [facility] for speech difficulties and left sided weakness, treated with increased decadron, now tapered. He is an aspiration risk and requires ISC.  As per RN, pt has poor appetite. PO intake 25% or <. Pt requires total feeding assistance. Suspected swallowing difficulties. Requested SLP swallowing evaluation. As per previous chart, NKFA. No GI distress reported. Last BM: no records since admission. Pt was no able to tell story. As per previous Dietitian note on 08/08/20, pt UBW:  217 Lbs one year ago, on 08/08/20: 189.8 Lbs. CBW: 181.2 lbs. Edema: R arm +2. Skin: WDL.    Pt was noted w/ Muscle loss: temporalis: severe [x], clavicle: mild [x], Interosseous: mild [x], Fat loss: below eyes: mild [x], buccal: mild [x], biceps / triceps: severe [x].

## 2020-09-06 NOTE — SWALLOW BEDSIDE ASSESSMENT ADULT - ASR SWALLOW ASPIRATION MONITOR
pneumonia/fever/throat clearing/upper respiratory infection/cough/change of breathing pattern/oral hygiene/position upright (90Y)/gurgly voice

## 2020-09-06 NOTE — CHART NOTE - NSREFPHYEXINPTDOCREFER_GEN_ALL_CORE
Pt was noted w/ Muscle loss: temporalis: severe [x], clavicle: mild [x], Interosseous: mild [x], Fat loss: below eyes: mild [x], buccal: mild [x], biceps / triceps: severe

## 2020-09-07 LAB
ANION GAP SERPL CALC-SCNC: 6 MMOL/L — SIGNIFICANT CHANGE UP (ref 5–17)
BUN SERPL-MCNC: 28 MG/DL — HIGH (ref 7–23)
CALCIUM SERPL-MCNC: 8.6 MG/DL — SIGNIFICANT CHANGE UP (ref 8.4–10.5)
CHLORIDE SERPL-SCNC: 101 MMOL/L — SIGNIFICANT CHANGE UP (ref 96–108)
CO2 SERPL-SCNC: 28 MMOL/L — SIGNIFICANT CHANGE UP (ref 22–31)
CREAT SERPL-MCNC: 0.69 MG/DL — SIGNIFICANT CHANGE UP (ref 0.5–1.3)
CULTURE RESULTS: SIGNIFICANT CHANGE UP
CULTURE RESULTS: SIGNIFICANT CHANGE UP
GLUCOSE BLDC GLUCOMTR-MCNC: 112 MG/DL — HIGH (ref 70–99)
GLUCOSE BLDC GLUCOMTR-MCNC: 117 MG/DL — HIGH (ref 70–99)
GLUCOSE BLDC GLUCOMTR-MCNC: 136 MG/DL — HIGH (ref 70–99)
GLUCOSE SERPL-MCNC: 112 MG/DL — HIGH (ref 70–99)
HCT VFR BLD CALC: 29.6 % — LOW (ref 39–50)
HGB BLD-MCNC: 9.8 G/DL — LOW (ref 13–17)
MCHC RBC-ENTMCNC: 31.9 PG — SIGNIFICANT CHANGE UP (ref 27–34)
MCHC RBC-ENTMCNC: 33.1 GM/DL — SIGNIFICANT CHANGE UP (ref 32–36)
MCV RBC AUTO: 96.4 FL — SIGNIFICANT CHANGE UP (ref 80–100)
NRBC # BLD: 0 /100 WBCS — SIGNIFICANT CHANGE UP (ref 0–0)
PLATELET # BLD AUTO: 189 K/UL — SIGNIFICANT CHANGE UP (ref 150–400)
POTASSIUM SERPL-MCNC: 4 MMOL/L — SIGNIFICANT CHANGE UP (ref 3.5–5.3)
POTASSIUM SERPL-SCNC: 4 MMOL/L — SIGNIFICANT CHANGE UP (ref 3.5–5.3)
RBC # BLD: 3.07 M/UL — LOW (ref 4.2–5.8)
RBC # FLD: 13 % — SIGNIFICANT CHANGE UP (ref 10.3–14.5)
SODIUM SERPL-SCNC: 135 MMOL/L — SIGNIFICANT CHANGE UP (ref 135–145)
SPECIMEN SOURCE: SIGNIFICANT CHANGE UP
SPECIMEN SOURCE: SIGNIFICANT CHANGE UP
VANCOMYCIN TROUGH SERPL-MCNC: 17 UG/ML — SIGNIFICANT CHANGE UP (ref 10–20)
WBC # BLD: 6.27 K/UL — SIGNIFICANT CHANGE UP (ref 3.8–10.5)
WBC # FLD AUTO: 6.27 K/UL — SIGNIFICANT CHANGE UP (ref 3.8–10.5)

## 2020-09-07 PROCEDURE — 99233 SBSQ HOSP IP/OBS HIGH 50: CPT

## 2020-09-07 RX ADMIN — LIDOCAINE 1 PATCH: 4 CREAM TOPICAL at 00:05

## 2020-09-07 RX ADMIN — Medication 10 MILLIGRAM(S): at 06:43

## 2020-09-07 RX ADMIN — CEFEPIME 100 MILLIGRAM(S): 1 INJECTION, POWDER, FOR SOLUTION INTRAMUSCULAR; INTRAVENOUS at 22:22

## 2020-09-07 RX ADMIN — PANTOPRAZOLE SODIUM 40 MILLIGRAM(S): 20 TABLET, DELAYED RELEASE ORAL at 06:42

## 2020-09-07 RX ADMIN — CEFEPIME 100 MILLIGRAM(S): 1 INJECTION, POWDER, FOR SOLUTION INTRAMUSCULAR; INTRAVENOUS at 12:21

## 2020-09-07 RX ADMIN — Medication 25 MILLIGRAM(S): at 06:42

## 2020-09-07 RX ADMIN — POLYETHYLENE GLYCOL 3350 17 GRAM(S): 17 POWDER, FOR SOLUTION ORAL at 06:43

## 2020-09-07 RX ADMIN — PHENYLEPHRINE-SHARK LIVER OIL-MINERAL OIL-PETROLATUM RECTAL OINTMENT 1 APPLICATION(S): at 22:22

## 2020-09-07 RX ADMIN — ATORVASTATIN CALCIUM 20 MILLIGRAM(S): 80 TABLET, FILM COATED ORAL at 22:22

## 2020-09-07 RX ADMIN — Medication 1 APPLICATION(S): at 18:46

## 2020-09-07 RX ADMIN — Medication 0.25 MILLIGRAM(S): at 06:42

## 2020-09-07 RX ADMIN — Medication 250 MILLIGRAM(S): at 06:43

## 2020-09-07 RX ADMIN — Medication 5 MILLIGRAM(S): at 06:42

## 2020-09-07 RX ADMIN — LEVETIRACETAM 1000 MILLIGRAM(S): 250 TABLET, FILM COATED ORAL at 06:43

## 2020-09-07 RX ADMIN — ESCITALOPRAM OXALATE 10 MILLIGRAM(S): 10 TABLET, FILM COATED ORAL at 12:20

## 2020-09-07 RX ADMIN — PHENYLEPHRINE-SHARK LIVER OIL-MINERAL OIL-PETROLATUM RECTAL OINTMENT 1 APPLICATION(S): at 06:44

## 2020-09-07 RX ADMIN — SENNA PLUS 2 TABLET(S): 8.6 TABLET ORAL at 22:22

## 2020-09-07 RX ADMIN — PHENYLEPHRINE-SHARK LIVER OIL-MINERAL OIL-PETROLATUM RECTAL OINTMENT 1 APPLICATION(S): at 12:21

## 2020-09-07 RX ADMIN — Medication 40 MILLIGRAM(S): at 06:42

## 2020-09-07 RX ADMIN — ENOXAPARIN SODIUM 40 MILLIGRAM(S): 100 INJECTION SUBCUTANEOUS at 12:19

## 2020-09-07 RX ADMIN — Medication 10 MILLIGRAM(S): at 12:20

## 2020-09-07 RX ADMIN — CEFEPIME 100 MILLIGRAM(S): 1 INJECTION, POWDER, FOR SOLUTION INTRAMUSCULAR; INTRAVENOUS at 06:43

## 2020-09-07 RX ADMIN — Medication 10 MILLIGRAM(S): at 22:22

## 2020-09-07 RX ADMIN — Medication 0.5 MILLIGRAM(S): at 22:22

## 2020-09-07 RX ADMIN — Medication 3 MILLIGRAM(S): at 22:24

## 2020-09-07 RX ADMIN — Medication 81 MILLIGRAM(S): at 12:20

## 2020-09-07 RX ADMIN — LIDOCAINE 1 PATCH: 4 CREAM TOPICAL at 12:20

## 2020-09-07 RX ADMIN — Medication 1 APPLICATION(S): at 06:43

## 2020-09-07 RX ADMIN — LIDOCAINE 1 PATCH: 4 CREAM TOPICAL at 20:04

## 2020-09-07 RX ADMIN — Medication 10 MILLIGRAM(S): at 18:41

## 2020-09-07 NOTE — PROGRESS NOTE ADULT - ASSESSMENT
78 yo M with progressive GBM a/w fever x 2 days likely related to pneumonia and /or enterobacter UTI.  He has been debulked 3 times, is s/p recent admission to [facility] for speech difficulties and left sided weakness, treated with increased decadron, now tapered.  He is an aspiration risk and requires ISC.  He is DNR/DNI and is being considered for directed GBM treatment but may not be a candidate.  Suspect bacterial infection but if he does not respond will need to consider fungal infections as well-PCP, aspergillus.    Suggest:    continue cefepime for now-enterobacter  coverage and pneumonia coverage for 3 more days  MRSA --neg, d/c Vancomycin  Fungitell--elevated, but he seems to be responding well to antibiotics so pcp and fungal infection seems less likely.

## 2020-09-07 NOTE — PROGRESS NOTE ADULT - SUBJECTIVE AND OBJECTIVE BOX
CC: f/u for  pneumonia and uti  Patient reports no new c/o, had fever 101 yesterday  REVIEW OF SYSTEMS:  All other review of systems negative (Comprehensive ROS)    Antimicrobials Day #  :4/7  cefepime   IVPB 2000 milliGRAM(s) IV Intermittent every 8 hours  vancomycin  IVPB 1250 milliGRAM(s) IV Intermittent every 12 hours    Other Medications Reviewed    Vital Signs Last 24 Hrs  T(C): 36.6 (07 Sep 2020 05:20), Max: 38.3 (06 Sep 2020 16:00)  T(F): 97.8 (07 Sep 2020 05:20), Max: 101 (06 Sep 2020 16:00)  HR: 79 (07 Sep 2020 05:20) (79 - 100)  BP: 121/63 (07 Sep 2020 05:20) (113/68 - 121/63)  BP(mean): --  RR: 17 (07 Sep 2020 05:20) (15 - 17)  SpO2: 99% (07 Sep 2020 05:20) (96% - 100%)    PHYSICAL EXAM:  General: alert, no acute distress  Eyes:  anicteric, no conjunctival injection, no discharge  Oropharynx: no lesions or injection 	  Neck: supple, without adenopathy  Lungs: course  to auscultation  Heart: regular rate and rhythm; no murmur, rubs or gallops  Abdomen: soft, nondistended, nontender, without mass or organomegaly  Skin: no lesions  Extremities: no clubbing, cyanosis, or edema  Neurologic: alert, , moves right extremities    LAB RESULTS:                                   9.8    6.27  )-----------( 189      ( 07 Sep 2020 07:40 )             29.6   09-07    135  |  101  |  28<H>  ----------------------------<  112<H>  4.0   |  28  |  0.69    Ca    8.6      07 Sep 2020 07:40             MICROBIOLOGY:  RECENT CULTURES:    Culture - Blood (09.02.20 @ 09:03)    Specimen Source: .Blood Blood    Culture Results:   No growth to date.      09-02 @ 08:31 .Urine Catheterized Enterobacter cloacae complex    >100,000 CFU/ml Enterobacter cloacae complex    Vancomycin Level, Trough: 17.0: L (09.06.20 @ 18:05)                RADIOLOGY REVIEWED:    < from: CT Chest No Cont (09.03.20 @ 12:08) >  IMPRESSION:  Bilateral upper lobe and right middle lobe airspace infiltrates on the basisof pneumonia, pulmonary edema and/or motion artifact. Bibasilar dependent changes.    Mild cardiomegaly.    < end of copied text >

## 2020-09-07 NOTE — PROGRESS NOTE ADULT - SUBJECTIVE AND OBJECTIVE BOX
Patient is a 79y old  Male who presents with a chief complaint of pna with respiratory failure and hypoxia (07 Sep 2020 11:28)      Patient seen and examined at bedside, somewhat irritable today.     ALLERGIES:  No Known Allergies    MEDICATIONS  (STANDING):  ALPRAZolam 0.5 milliGRAM(s) Oral at bedtime  ALPRAZolam 0.25 milliGRAM(s) Oral two times a day  aspirin  chewable 81 milliGRAM(s) Oral daily  atorvastatin 20 milliGRAM(s) Oral at bedtime  bethanechol 10 milliGRAM(s) Oral three times a day  bisacodyl 5 milliGRAM(s) Oral every 12 hours  cefepime   IVPB 2000 milliGRAM(s) IV Intermittent every 8 hours  enoxaparin Injectable 40 milliGRAM(s) SubCutaneous daily  escitalopram 10 milliGRAM(s) Oral daily  furosemide    Tablet 40 milliGRAM(s) Oral daily  hemorrhoidal Ointment 1 Application(s) Rectal three times a day  levETIRAcetam 1000 milliGRAM(s) Oral two times a day  lidocaine   Patch 1 Patch Transdermal daily  melatonin 3 milliGRAM(s) Oral at bedtime  metoprolol succinate ER 25 milliGRAM(s) Oral daily  pantoprazole    Tablet 40 milliGRAM(s) Oral before breakfast  polyethylene glycol 3350 17 Gram(s) Oral two times a day  saccharomyces boulardii 250 milliGRAM(s) Oral two times a day  senna 2 Tablet(s) Oral at bedtime  triamcinolone 0.1% Cream 1 Application(s) Topical two times a day    MEDICATIONS  (PRN):  acetaminophen   Tablet .. 650 milliGRAM(s) Oral every 6 hours PRN Temp greater or equal to 38C (100.4F), Mild Pain (1 - 3)  bisacodyl Suppository 10 milliGRAM(s) Rectal daily PRN Constipation  ibuprofen  Tablet. 400 milliGRAM(s) Oral every 8 hours PRN Temp greater or equal to 38C (100.4F), Moderate Pain (4 - 6)    Vital Signs Last 24 Hrs  T(F): 97.8 (07 Sep 2020 05:20), Max: 101 (06 Sep 2020 16:00)  HR: 79 (07 Sep 2020 05:20) (79 - 100)  BP: 121/63 (07 Sep 2020 05:20) (113/68 - 121/63)  RR: 17 (07 Sep 2020 05:20) (15 - 17)  SpO2: 99% (07 Sep 2020 05:20) (96% - 100%)  I&O's Summary    06 Sep 2020 07:01  -  07 Sep 2020 07:00  --------------------------------------------------------  IN: 240 mL / OUT: 0 mL / NET: 240 mL        PHYSICAL EXAM:  General: NAD, A/O x 3  ENT: MMM, no scleral icterus  Neck: Supple, No JVD  Lungs: Clear to auscultation bilaterally, no wheezes, rales, rhonchi  Cardio: RRR, S1/S2, No murmurs  Abdomen: Soft, Nontender, Nondistended; Bowel sounds present  Extremities: No calf tenderness, No pitting edema    LABS:                        9.8    6.27  )-----------( 189      ( 07 Sep 2020 07:40 )             29.6       09-07    135  |  101  |  28  ----------------------------<  112  4.0   |  28  |  0.69    Ca    8.6      07 Sep 2020 07:40    TPro  6.1  /  Alb  1.9  /  TBili  0.8  /  DBili  x   /  AST  27  /  ALT  39  /  AlkPhos  70  09-05     eGFR if Non African American: 91 mL/min/1.73M2 (09-07-20 @ 07:40)  eGFR if : 106 mL/min/1.73M2 (09-07-20 @ 07:40)                           POCT Blood Glucose.: 117 mg/dL (07 Sep 2020 08:17)  POCT Blood Glucose.: 117 mg/dL (06 Sep 2020 21:28)  POCT Blood Glucose.: 159 mg/dL (06 Sep 2020 17:04)  POCT Blood Glucose.: 176 mg/dL (06 Sep 2020 17:03)          Culture - Blood (collected 02 Sep 2020 09:03)  Source: .Blood Blood  Preliminary Report (03 Sep 2020 13:01):    No growth to date.    Culture - Blood (collected 02 Sep 2020 09:03)  Source: .Blood Blood  Preliminary Report (03 Sep 2020 13:01):    No growth to date.    Culture - Urine (collected 02 Sep 2020 08:31)  Source: .Urine Catheterized  Final Report (04 Sep 2020 17:15):    >100,000 CFU/ml Enterobacter cloacae complex  Organism: Enterobacter cloacae complex (04 Sep 2020 17:15)  Organism: Enterobacter cloacae complex (04 Sep 2020 17:15)      -  Amikacin: S <=16      -  Amoxicillin/Clavulanic Acid: R >16/8      -  Ampicillin: R 16 These ampicillin results predict results for amoxicillin      -  Ampicillin/Sulbactam: R 8/4 Enterobacter, Citrobacter, and Serratia may develop resistance during prolonged therapy (3-4 days)      -  Aztreonam: S <=4      -  Cefazolin: R >16      -  Cefepime: S <=2      -  Cefoxitin: R >16      -  Ceftriaxone: S <=1 Enterobacter, Citrobacter, and Serratia may develop resistance during prolonged therapy      -  Ciprofloxacin: S <=0.25      -  Ertapenem: S <=0.5      -  Gentamicin: S <=2      -  Imipenem: S <=1      -  Levofloxacin: S <=0.5      -  Meropenem: S <=1      -  Nitrofurantoin: I 64 Should not be used to treat pyelonephritis      -  Piperacillin/Tazobactam: S <=8      -  Tigecycline: S <=2      -  Tobramycin: S <=2      -  Trimethoprim/Sulfamethoxazole: S <=0.5/9.5      Method Type: MCKAYLA        RADIOLOGY & ADDITIONAL TESTS:    Care Discussed with Consultants/Other Providers: Patient is a 79y old  Male who presents with a chief complaint of pna with respiratory failure and hypoxia (07 Sep 2020 11:28)    Patient seen and examined at bedside, somewhat irritable today.     ALLERGIES:  No Known Allergies    MEDICATIONS  (STANDING):  ALPRAZolam 0.5 milliGRAM(s) Oral at bedtime  ALPRAZolam 0.25 milliGRAM(s) Oral two times a day  aspirin  chewable 81 milliGRAM(s) Oral daily  atorvastatin 20 milliGRAM(s) Oral at bedtime  bethanechol 10 milliGRAM(s) Oral three times a day  bisacodyl 5 milliGRAM(s) Oral every 12 hours  cefepime   IVPB 2000 milliGRAM(s) IV Intermittent every 8 hours  enoxaparin Injectable 40 milliGRAM(s) SubCutaneous daily  escitalopram 10 milliGRAM(s) Oral daily  furosemide    Tablet 40 milliGRAM(s) Oral daily  hemorrhoidal Ointment 1 Application(s) Rectal three times a day  levETIRAcetam 1000 milliGRAM(s) Oral two times a day  lidocaine   Patch 1 Patch Transdermal daily  melatonin 3 milliGRAM(s) Oral at bedtime  metoprolol succinate ER 25 milliGRAM(s) Oral daily  pantoprazole    Tablet 40 milliGRAM(s) Oral before breakfast  polyethylene glycol 3350 17 Gram(s) Oral two times a day  saccharomyces boulardii 250 milliGRAM(s) Oral two times a day  senna 2 Tablet(s) Oral at bedtime  triamcinolone 0.1% Cream 1 Application(s) Topical two times a day    MEDICATIONS  (PRN):  acetaminophen   Tablet .. 650 milliGRAM(s) Oral every 6 hours PRN Temp greater or equal to 38C (100.4F), Mild Pain (1 - 3)  bisacodyl Suppository 10 milliGRAM(s) Rectal daily PRN Constipation  ibuprofen  Tablet. 400 milliGRAM(s) Oral every 8 hours PRN Temp greater or equal to 38C (100.4F), Moderate Pain (4 - 6)    Vital Signs Last 24 Hrs  T(F): 97.8 (07 Sep 2020 05:20), Max: 101 (06 Sep 2020 16:00)  HR: 79 (07 Sep 2020 05:20) (79 - 100)  BP: 121/63 (07 Sep 2020 05:20) (113/68 - 121/63)  RR: 17 (07 Sep 2020 05:20) (15 - 17)  SpO2: 99% (07 Sep 2020 05:20) (96% - 100%)  I&O's Summary    06 Sep 2020 07:01  -  07 Sep 2020 07:00  --------------------------------------------------------  IN: 240 mL / OUT: 0 mL / NET: 240 mL        PHYSICAL EXAM:  General: NAD, A/O x 3  ENT: MMM, no scleral icterus  Neck: Supple, No JVD  Lungs: respirations non labored. diminished breath sounds at bases.   Cardio: RRR, S1/S2, No murmurs  Abdomen: Soft, Nontender, Nondistended; Bowel sounds present  Extremities: No calf tenderness, No pitting edema  MSK: generalized weakness UE, LE b/l. left sided weakness      LABS:                        9.8    6.27  )-----------( 189      ( 07 Sep 2020 07:40 )             29.6       09-07    135  |  101  |  28  ----------------------------<  112  4.0   |  28  |  0.69    Ca    8.6      07 Sep 2020 07:40    TPro  6.1  /  Alb  1.9  /  TBili  0.8  /  DBili  x   /  AST  27  /  ALT  39  /  AlkPhos  70  09-05     eGFR if Non African American: 91 mL/min/1.73M2 (09-07-20 @ 07:40)  eGFR if : 106 mL/min/1.73M2 (09-07-20 @ 07:40)    POCT Blood Glucose.: 117 mg/dL (07 Sep 2020 08:17)  POCT Blood Glucose.: 117 mg/dL (06 Sep 2020 21:28)  POCT Blood Glucose.: 159 mg/dL (06 Sep 2020 17:04)  POCT Blood Glucose.: 176 mg/dL (06 Sep 2020 17:03)      Culture - Blood (collected 02 Sep 2020 09:03)  Source: .Blood Blood  Preliminary Report (03 Sep 2020 13:01):    No growth to date.    Culture - Blood (collected 02 Sep 2020 09:03)  Source: .Blood Blood  Preliminary Report (03 Sep 2020 13:01):    No growth to date.    Culture - Urine (collected 02 Sep 2020 08:31)  Source: .Urine Catheterized  Final Report (04 Sep 2020 17:15):    >100,000 CFU/ml Enterobacter cloacae complex  Organism: Enterobacter cloacae complex (04 Sep 2020 17:15)  Organism: Enterobacter cloacae complex (04 Sep 2020 17:15)      -  Amikacin: S <=16      -  Amoxicillin/Clavulanic Acid: R >16/8      -  Ampicillin: R 16 These ampicillin results predict results for amoxicillin      -  Ampicillin/Sulbactam: R 8/4 Enterobacter, Citrobacter, and Serratia may develop resistance during prolonged therapy (3-4 days)      -  Aztreonam: S <=4      -  Cefazolin: R >16      -  Cefepime: S <=2      -  Cefoxitin: R >16      -  Ceftriaxone: S <=1 Enterobacter, Citrobacter, and Serratia may develop resistance during prolonged therapy      -  Ciprofloxacin: S <=0.25      -  Ertapenem: S <=0.5      -  Gentamicin: S <=2      -  Imipenem: S <=1      -  Levofloxacin: S <=0.5      -  Meropenem: S <=1      -  Nitrofurantoin: I 64 Should not be used to treat pyelonephritis      -  Piperacillin/Tazobactam: S <=8      -  Tigecycline: S <=2      -  Tobramycin: S <=2      -  Trimethoprim/Sulfamethoxazole: S <=0.5/9.5      Method Type: MCKAYLA    RADIOLOGY & ADDITIONAL TESTS:  < from: CT Chest No Cont (09.03.20 @ 12:08) >    EXAM:  CT CHEST      PROCEDURE DATE:  09/03/2020    INTERPRETATION:  CLINICAL INDICATION: 79 years  Male with Assess for PNA.  COMPARISON: 10/17/2019  PROCEDURE:  CT of the Chest was performed without intravenous contrast.  Sagittal and coronal reformats were performed.    FINDINGS:    LIMITATIONS: Absence of IV contrast limits evaluation of the vascular structures and jolanta. Motion artifact.    LUNGS AND AIRWAYS: Patent central airways.  Bilateral upper lobe and right middle lobe groundglass infiltrates. Bilateral lower lobe dependent atelectasis.  PLEURA: No pleural effusion. No pneumothorax.  MEDIASTINUM AND JOLANTA: No lymphadenopathy.  VESSELS: Atherosclerotic aorta without aneurysm. Coronary atherosclerosis.  HEART: Mild cardiomegaly. No pericardial effusion.  CHEST WALL AND LOWER NECK: Within normal limits.  VISUALIZED UPPER ABDOMEN: Within normal limits.  BONES: Degenerative changes.    IMPRESSION:  Bilateral upper lobe and right middle lobe airspace infiltrates on the basisof pneumonia, pulmonary edema and/or motion artifact. Bibasilar dependent changes.    Mild cardiomegaly.  < end of copied text >      Care Discussed with Consultants/Other Providers: ID

## 2020-09-07 NOTE — CHART NOTE - NSCHARTNOTEFT_GEN_A_CORE
Patient seen for follow-up to assess least restrictive diet and to assess diet advancement. Initially, patient refused however when given moderate encouragement, patient agreed to therapy. Patient was educated on purpose of therapy to assess candidacy for advancement. Mealtray from breakfast at bedside untouched and when prompted, patient stated no appetite and distaste towards nectar thickened liquids. Patient given thin liquids via straw and without straw sip. Patient self fed cup of water however unable to control amount and refused for SLP to engage, therefore patient demonstrated clinical s/s of penetration/aspiration (cough) post thin liquids for straw and without straw due to increased rate. Patient required re-orientation to place as patient stated "who is that in the living room." Patient left in no distress, will continue to follow-up to assess least restrictive diet.     Recommendation: continue current diet of mechanical soft solids, nectar thickened liquids, maintain strict aspiration precaution, encourage PO intake

## 2020-09-07 NOTE — PROGRESS NOTE ADULT - ASSESSMENT
79M hx of HTN, HLD, prostate ca s/p prostatectomy, basal cell skin ca, GBM dx in 2018 s/p 3 resections with worsening L sided weakness. CT head and MRI indicative of disease progression, and worsening vasogenic edema in R temporal/frontal/parietal regions treated with tapering Decadron. Now DNR/DNI with multilobar pna and enterobacter UTI requiring supplemental oxygen    #Multilobar pna with acute hypoxic resp failure and Enterobacter UTI.  -ID consult appreciated   -Cont cefepime, florastor x 3 more days  -MRSA negative, discontinue vanco  -Fungitell elevated, but pcp and fungal infx unlikely due to improvement on current abx regimen   -cont oxygen supplementation 3L NC, maintain O2>92%     #GBM with cerebral edema and L sided weakness.   -s/p Decadron taper. not a candidate for Avastin. cont ppi  -palliative care. Pt is DNR/DNI  -lidocaine patch  -PT/OT as tolerated    #Severe protein calorie malnutrition in context of acute and chronic disease  -Aspiration precautions. total feeding assistance   -SLP appreciated - now dysphagia 2 mechanical soft/nectar consistency, supplement with ensure  -MVI   -I/Os    #Constipation   -cont ducolax, senna, miralax, consider imaging if no BM     #Urinary retention  -cont bethanechol     #Hypokalemia  -Replete and recheck bmp, mag    # Depression/anxiety  -cont xanax and escitalopram    #HTN, CAD  -cont Metoprolol and Lasix for now with holding parameters.   -cont ASA, lipitor    #Hyperglycemia  -cont FS    #DVT ppx: lovenox  #Code: DNR/DNI 79M hx of HTN, HLD, prostate ca s/p prostatectomy, basal cell skin ca, GBM dx in 2018 s/p 3 resections with worsening L sided weakness. CT head and MRI indicative of disease progression, and worsening vasogenic edema in R temporal/frontal/parietal regions treated with tapering Decadron. Now DNR/DNI with multilobar pna and enterobacter UTI requiring supplemental oxygen    #Multilobar pna with acute hypoxic resp failure and Enterobacter UTI.  -ID consult appreciated   -Cont cefepime, florastor x 3 more days  -MRSA negative, discontinue vanco  -Fungitell elevated, but pcp and fungal infx unlikely due to improvement on current abx regimen   -cont oxygen supplementation 3L NC, maintain O2>92%     #GBM with cerebral edema and L sided weakness.   -s/p Decadron taper. not a candidate for Avastin. cont ppi  -palliative care. Pt is DNR/DNI  -lidocaine patch  -PT/OT as tolerated    #Severe protein calorie malnutrition in context of acute and chronic disease  -Aspiration precautions. total feeding assistance   -SLP appreciated - now dysphagia 2 mechanical soft/nectar consistency, supplement with ensure  -MVI   -I/Os    #Constipation   -cont ducolax, senna, miralax    #Urinary retention  -cont bethanechol     #Hypokalemia - resolved     # Depression/anxiety  -cont xanax and escitalopram    #HTN, CAD  -cont Metoprolol and Lasix for now with holding parameters.   -cont ASA, lipitor    #Hyperglycemia  -cont FS    #DVT ppx: lovenox  #Code: DNR/DNI

## 2020-09-08 LAB — GLUCOSE BLDC GLUCOMTR-MCNC: 119 MG/DL — HIGH (ref 70–99)

## 2020-09-08 PROCEDURE — 99233 SBSQ HOSP IP/OBS HIGH 50: CPT

## 2020-09-08 RX ORDER — DEXAMETHASONE 0.5 MG/5ML
12 ELIXIR ORAL ONCE
Refills: 0 | Status: COMPLETED | OUTPATIENT
Start: 2020-09-08 | End: 2020-09-08

## 2020-09-08 RX ORDER — DEXAMETHASONE 0.5 MG/5ML
4 ELIXIR ORAL
Refills: 0 | Status: DISCONTINUED | OUTPATIENT
Start: 2020-09-09 | End: 2020-09-14

## 2020-09-08 RX ORDER — DEXAMETHASONE 0.5 MG/5ML
8 ELIXIR ORAL ONCE
Refills: 0 | Status: COMPLETED | OUTPATIENT
Start: 2020-09-09 | End: 2020-09-09

## 2020-09-08 RX ADMIN — CEFEPIME 100 MILLIGRAM(S): 1 INJECTION, POWDER, FOR SOLUTION INTRAMUSCULAR; INTRAVENOUS at 15:37

## 2020-09-08 RX ADMIN — ESCITALOPRAM OXALATE 10 MILLIGRAM(S): 10 TABLET, FILM COATED ORAL at 12:05

## 2020-09-08 RX ADMIN — LIDOCAINE 1 PATCH: 4 CREAM TOPICAL at 17:54

## 2020-09-08 RX ADMIN — Medication 650 MILLIGRAM(S): at 10:03

## 2020-09-08 RX ADMIN — ENOXAPARIN SODIUM 40 MILLIGRAM(S): 100 INJECTION SUBCUTANEOUS at 12:03

## 2020-09-08 RX ADMIN — Medication 1 APPLICATION(S): at 06:50

## 2020-09-08 RX ADMIN — PHENYLEPHRINE-SHARK LIVER OIL-MINERAL OIL-PETROLATUM RECTAL OINTMENT 1 APPLICATION(S): at 15:38

## 2020-09-08 RX ADMIN — LEVETIRACETAM 1000 MILLIGRAM(S): 250 TABLET, FILM COATED ORAL at 17:18

## 2020-09-08 RX ADMIN — Medication 0.5 MILLIGRAM(S): at 22:07

## 2020-09-08 RX ADMIN — POLYETHYLENE GLYCOL 3350 17 GRAM(S): 17 POWDER, FOR SOLUTION ORAL at 17:18

## 2020-09-08 RX ADMIN — SENNA PLUS 2 TABLET(S): 8.6 TABLET ORAL at 22:07

## 2020-09-08 RX ADMIN — Medication 650 MILLIGRAM(S): at 10:30

## 2020-09-08 RX ADMIN — Medication 1 APPLICATION(S): at 17:20

## 2020-09-08 RX ADMIN — ATORVASTATIN CALCIUM 20 MILLIGRAM(S): 80 TABLET, FILM COATED ORAL at 22:07

## 2020-09-08 RX ADMIN — LIDOCAINE 1 PATCH: 4 CREAM TOPICAL at 00:43

## 2020-09-08 RX ADMIN — Medication 10 MILLIGRAM(S): at 22:07

## 2020-09-08 RX ADMIN — Medication 12 MILLIGRAM(S): at 12:01

## 2020-09-08 RX ADMIN — CEFEPIME 100 MILLIGRAM(S): 1 INJECTION, POWDER, FOR SOLUTION INTRAMUSCULAR; INTRAVENOUS at 06:50

## 2020-09-08 RX ADMIN — Medication 3 MILLIGRAM(S): at 22:07

## 2020-09-08 RX ADMIN — Medication 250 MILLIGRAM(S): at 17:18

## 2020-09-08 RX ADMIN — PHENYLEPHRINE-SHARK LIVER OIL-MINERAL OIL-PETROLATUM RECTAL OINTMENT 1 APPLICATION(S): at 06:50

## 2020-09-08 RX ADMIN — LIDOCAINE 1 PATCH: 4 CREAM TOPICAL at 12:05

## 2020-09-08 RX ADMIN — Medication 10 MILLIGRAM(S): at 15:38

## 2020-09-08 RX ADMIN — PHENYLEPHRINE-SHARK LIVER OIL-MINERAL OIL-PETROLATUM RECTAL OINTMENT 1 APPLICATION(S): at 22:07

## 2020-09-08 RX ADMIN — CEFEPIME 100 MILLIGRAM(S): 1 INJECTION, POWDER, FOR SOLUTION INTRAMUSCULAR; INTRAVENOUS at 22:08

## 2020-09-08 RX ADMIN — Medication 81 MILLIGRAM(S): at 12:02

## 2020-09-08 RX ADMIN — Medication 5 MILLIGRAM(S): at 17:18

## 2020-09-08 NOTE — PROVIDER CONTACT NOTE (OTHER) - ASSESSMENT
Per order from 09/04/2020, pt is accucheck ACHS however there is no order for sliding scale. VSS. Pt sleeping with no apparent issue.

## 2020-09-08 NOTE — PROGRESS NOTE ADULT - SUBJECTIVE AND OBJECTIVE BOX
CC: f/u for  pneumonia and uti  Patient reports no new c/o, no fevers today  REVIEW OF SYSTEMS:  All other review of systems negative (Comprehensive ROS)    Antimicrobials Day #  :5/7  cefepime   IVPB 2000 milliGRAM(s) IV Intermittent every 8 hours        Other Medications Reviewed    Vital Signs Last 24 Hrs  T(C): 36.6 (07 Sep 2020 20:00), Max: 36.8 (07 Sep 2020 16:45)  T(F): 97.8 (07 Sep 2020 20:00), Max: 98.3 (07 Sep 2020 16:45)  HR: 84 (08 Sep 2020 06:46) (80 - 84)  BP: 136/68 (08 Sep 2020 06:46) (124/65 - 136/68)  BP(mean): --  RR: 20 (07 Sep 2020 20:00) (20 - 20)  SpO2: 95% (07 Sep 2020 20:00) (95% - 95%)    PHYSICAL EXAM:  General: alert, no acute distress  Eyes:  anicteric, no conjunctival injection, no discharge  Oropharynx: no lesions or injection 	  Neck: supple, without adenopathy  Lungs: course  to auscultation  Heart: regular rate and rhythm; no murmur, rubs or gallops  Abdomen: soft, nondistended, nontender, without mass or organomegaly  Skin: no lesions  Extremities: no clubbing, cyanosis, or edema  Neurologic: alert, , moves right extremities    LAB RESULTS:                                                 9.8    6.27  )-----------( 189      ( 07 Sep 2020 07:40 )             29.6   09-07    135  |  101  |  28<H>  ----------------------------<  112<H>  4.0   |  28  |  0.69    Ca    8.6      07 Sep 2020 07:40                 MICROBIOLOGY:  RECENT CULTURES:    Culture - Blood (09.02.20 @ 09:03)    Specimen Source: .Blood Blood    Culture Results:   No growth to date.      09-02 @ 08:31 .Urine Catheterized Enterobacter cloacae complex    >100,000 CFU/ml Enterobacter cloacae complex    RADIOLOGY REVIEWED:    < from: CT Chest No Cont (09.03.20 @ 12:08) >  IMPRESSION:  Bilateral upper lobe and right middle lobe airspace infiltrates on the basis of pneumonia, pulmonary edema and/or motion artifact. Bibasilar dependent changes.    Mild cardiomegaly.    < end of copied text >

## 2020-09-08 NOTE — PROGRESS NOTE ADULT - ASSESSMENT
79M hx of HTN, HLD, prostate ca s/p prostatectomy, basal cell skin ca, GBM dx in 2018 s/p 3 resections with worsening L sided weakness. CT head and MRI indicative of disease progression, and worsening vasogenic edema in R temporal/frontal/parietal regions treated with tapering Decadron. 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 not a candidate for Avastin. cont ppi  -palliative care. Pt is DNR/DNI  -lidocaine patch  -PT/OT as tolerated    Severe protein calorie malnutrition in context of GBM  -Aspiration precautions. total feeding assistance   -SLP appreciated - now dysphagia 2 mechanical soft/nectar consistency, supplement with ensure  -MVI   -I/Os    Constipation   -continue ducolax, senna, miralax    Urinary retention  -cont bethanechol     Hypokalemia - resolved     Depression/anxiety  -cont xanax and escitalopram    HTN, CAD  -cont Metoprolol and Lasix for now with holding parameters.   -cont ASA, lipitor    Hyperglycemia  -cont FS    DVT ppx: lovenox    DNR/DNI  Palliative:  Reviewed with Dr Del Cid(oncology).  To restart decadron.

## 2020-09-08 NOTE — PROGRESS NOTE ADULT - SUBJECTIVE AND OBJECTIVE BOX
Patient is a 79y old  Male who presents with a chief complaint of pna with respiratory failure and hypoxia (07 Sep 2020 11:52)    Sleepy.      Patient seen and examined at bedside.    ALLERGIES:  No Known Allergies    MEDICATIONS  (STANDING):  ALPRAZolam 0.5 milliGRAM(s) Oral at bedtime  ALPRAZolam 0.25 milliGRAM(s) Oral two times a day  aspirin  chewable 81 milliGRAM(s) Oral daily  atorvastatin 20 milliGRAM(s) Oral at bedtime  bethanechol 10 milliGRAM(s) Oral three times a day  bisacodyl 5 milliGRAM(s) Oral every 12 hours  cefepime   IVPB 2000 milliGRAM(s) IV Intermittent every 8 hours  enoxaparin Injectable 40 milliGRAM(s) SubCutaneous daily  escitalopram 10 milliGRAM(s) Oral daily  furosemide    Tablet 40 milliGRAM(s) Oral daily  hemorrhoidal Ointment 1 Application(s) Rectal three times a day  levETIRAcetam 1000 milliGRAM(s) Oral two times a day  lidocaine   Patch 1 Patch Transdermal daily  melatonin 3 milliGRAM(s) Oral at bedtime  metoprolol succinate ER 25 milliGRAM(s) Oral daily  pantoprazole    Tablet 40 milliGRAM(s) Oral before breakfast  polyethylene glycol 3350 17 Gram(s) Oral two times a day  saccharomyces boulardii 250 milliGRAM(s) Oral two times a day  senna 2 Tablet(s) Oral at bedtime  triamcinolone 0.1% Cream 1 Application(s) Topical two times a day    MEDICATIONS  (PRN):  acetaminophen   Tablet .. 650 milliGRAM(s) Oral every 6 hours PRN Temp greater or equal to 38C (100.4F), Mild Pain (1 - 3)  bisacodyl Suppository 10 milliGRAM(s) Rectal daily PRN Constipation  ibuprofen  Tablet. 400 milliGRAM(s) Oral every 8 hours PRN Temp greater or equal to 38C (100.4F), Moderate Pain (4 - 6)    Vital Signs Last 24 Hrs  T(F): 97.8 (07 Sep 2020 20:00), Max: 98.3 (07 Sep 2020 16:45)  HR: 84 (08 Sep 2020 06:46) (80 - 84)  BP: 136/68 (08 Sep 2020 06:46) (124/65 - 136/68)  RR: 20 (07 Sep 2020 20:00) (20 - 20)  SpO2: 95% (07 Sep 2020 20:00) (95% - 95%)  I&O's Summary    07 Sep 2020 07:01  -  08 Sep 2020 07:00  --------------------------------------------------------  IN: 100 mL / OUT: 0 mL / NET: 100 mL    PHYSICAL EXAM:  General: NAD, A/O x 3  ENT: MMM, no scleral icterus  Neck: Supple, No JVD  Lungs: Clear to auscultation bilaterally, no wheezes, rales, rhonchi  Cardio: RRR, S1/S2, No murmurs  Abdomen: Soft, Nontender, Nondistended; Bowel sounds present  Extremities: No calf tenderness, No pitting edema    LABS:                9.8    6.27  )-----------( 189      ( 07 Sep 2020 07:40 )             29.6       09-07  135  |  101  |  28  ----------------------------<  112  4.0   |  28  |  0.69    Ca    8.6      07 Sep 2020 07:40    TPro  6.1  /  Alb  1.9  /  TBili  0.8  /  DBili  x   /  AST  27  /  ALT  39  /  AlkPhos  70  09-05     eGFR if Non African American: 91 mL/min/1.73M2 (09-07-20 @ 07:40)  eGFR if : 106 mL/min/1.73M2 (09-07-20 @ 07:40)    POCT Blood Glucose.: 119 mg/dL (08 Sep 2020 06:40)  POCT Blood Glucose.: 112 mg/dL (07 Sep 2020 17:25)  POCT Blood Glucose.: 136 mg/dL (07 Sep 2020 12:14)    Culture - Blood (collected 02 Sep 2020 09:03)  Source: .Blood Blood  Final Report (07 Sep 2020 13:01):    No Growth Final    Culture - Blood (collected 02 Sep 2020 09:03)  Source: .Blood Blood  Final Report (07 Sep 2020 13:01):    No Growth Final    Culture - Urine (collected 02 Sep 2020 08:31)  Source: .Urine Catheterized  Final Report (04 Sep 2020 17:15):    >100,000 CFU/ml Enterobacter cloacae complex  Organism: Enterobacter cloacae complex (04 Sep 2020 17:15)  Organism: Enterobacter cloacae complex (04 Sep 2020 17:15)      -  Amikacin: S <=16      -  Amoxicillin/Clavulanic Acid: R >16/8      -  Ampicillin: R 16 These ampicillin results predict results for amoxicillin      -  Ampicillin/Sulbactam: R 8/4 Enterobacter, Citrobacter, and Serratia may develop resistance during prolonged therapy (3-4 days)      -  Aztreonam: S <=4      -  Cefazolin: R >16      -  Cefepime: S <=2      -  Cefoxitin: R >16      -  Ceftriaxone: S <=1 Enterobacter, Citrobacter, and Serratia may develop resistance during prolonged therapy      -  Ciprofloxacin: S <=0.25      -  Ertapenem: S <=0.5      -  Gentamicin: S <=2      -  Imipenem: S <=1      -  Levofloxacin: S <=0.5      -  Meropenem: S <=1      -  Nitrofurantoin: I 64 Should not be used to treat pyelonephritis      -  Piperacillin/Tazobactam: S <=8      -  Tigecycline: S <=2      -  Tobramycin: S <=2      -  Trimethoprim/Sulfamethoxazole: S <=0.5/9.5      Method Type: MCKAYLA    RADIOLOGY & ADDITIONAL TESTS:    Care Discussed with Consultants/Other Providers:

## 2020-09-08 NOTE — PROVIDER CONTACT NOTE (OTHER) - ACTION/TREATMENT ORDERED:
Per PA is okay not to wake up the pt tonight to do Accu-Chek since there is no coverage ordered. I was told to do an Accucheck at 5AM.

## 2020-09-08 NOTE — PROGRESS NOTE ADULT - SUBJECTIVE AND OBJECTIVE BOX
Follow-up Pall Progress Note    Mike Mustafa MD  [phone]    No new respiratory events overnight.  Denies SOB/CP. Pt very lethargic today.    Medications:  Vital Signs Last 24 Hrs  T(C): 36.6 (07 Sep 2020 20:00), Max: 36.8 (07 Sep 2020 16:45)  T(F): 97.8 (07 Sep 2020 20:00), Max: 98.3 (07 Sep 2020 16:45)  HR: 84 (08 Sep 2020 06:46) (80 - 84)  BP: 136/68 (08 Sep 2020 06:46) (124/65 - 136/68)  BP(mean): --  RR: 20 (07 Sep 2020 20:00) (20 - 20)  SpO2: 95% (07 Sep 2020 20:00) (95% - 95%)          09-07 @ 07:01  -  09-08 @ 07:00  --------------------------------------------------------  IN: 100 mL / OUT: 0 mL / NET: 100 mL        LABS:                        9.8    6.27  )-----------( 189      ( 07 Sep 2020 07:40 )             29.6     09-07    135  |  101  |  28<H>  ----------------------------<  112<H>  4.0   |  28  |  0.69    Ca    8.6      07 Sep 2020 07:40                CULTURES:  Culture Results:   No Growth Final (09-02 @ 09:03)  Culture Results:   No Growth Final (09-02 @ 09:03)  Culture Results:   >100,000 CFU/ml Enterobacter cloacae complex (09-02 @ 08:31)        Physical Examination:  PULM: Clear to auscultation bilaterally, no significant sputum production  CVS: Regular rate and rhythm, no murmurs, rubs, or gallops  ABD: Soft, non-tender  EXT:  No clubbing, cyanosis, or edema  neuro: cpmatose  RADIOLOGY REVIEWED  CXR:    CT chest:    TTE:

## 2020-09-08 NOTE — PROVIDER CONTACT NOTE (OTHER) - RECOMMENDATIONS
Spoke to PA informed her that the Accu-Chek was not checked at bedtime. She stated that it was fine and to check it at 5 AM.

## 2020-09-08 NOTE — PROGRESS NOTE ADULT - ASSESSMENT
80 yo M with progressive GBM a/w fever x 2 days likely related to pneumonia and /or enterobacter UTI.  He has been debulked 3 times, is s/p recent admission to [facility] for speech difficulties and left sided weakness, treated with increased decadron, now tapered.  He is an aspiration risk and requires ISC.  He is DNR/DNI and is being considered for directed GBM treatment but may not be a candidate.  Suspect bacterial infection but if he does not respond will need to consider fungal infections as well-PCP, aspergillus.  MRSA --neg, Vancomycin--d/cd  Fungitell--elevated, but he seems to be responding well to antibiotics so pcp and fungal infection seems less likely.    Suggest:    continue cefepime for now-enterobacter  coverage and pneumonia coverage for 2 more days  monitor WBC and fever trend

## 2020-09-08 NOTE — PROGRESS NOTE ADULT - ASSESSMENT
79M hx of HTN, HLD, prostate ca s/p prostatectomy, basal cell skin ca, GBM dx in 2018 s/p 3 resections with worsening L sided weakness. CT head and MRI indicative of disease progression, and worsening vasogenic edema in R temporal/frontal/parietal regions treated with tapering Decadron. 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 not a candidate for Avastin. cont ppi  -palliative care. Pt is DNR/DNI  -lidocaine patch  -PT/OT as tolerated    Severe protein calorie malnutrition in context of GBM  -Aspiration precautions. total feeding assistance   -SLP appreciated - now dysphagia 2 mechanical soft/nectar consistency, supplement with ensure  -MVI   -I/Os    Constipation   -continue ducolax, senna, miralax    Urinary retention  -cont bethanechol     Hypokalemia - resolved     Depression/anxiety  -cont xanax and escitalopram    HTN, CAD  -cont Metoprolol and Lasix for now with holding parameters.   -cont ASA, lipitor    Hyperglycemia  -cont FS    DVT ppx: lovenox    DNR/DNI

## 2020-09-09 ENCOUNTER — APPOINTMENT (OUTPATIENT)
Dept: MRI IMAGING | Facility: CLINIC | Age: 80
End: 2020-09-09

## 2020-09-09 PROCEDURE — 99232 SBSQ HOSP IP/OBS MODERATE 35: CPT

## 2020-09-09 PROCEDURE — 99233 SBSQ HOSP IP/OBS HIGH 50: CPT

## 2020-09-09 RX ADMIN — Medication 0.25 MILLIGRAM(S): at 17:44

## 2020-09-09 RX ADMIN — PANTOPRAZOLE SODIUM 40 MILLIGRAM(S): 20 TABLET, DELAYED RELEASE ORAL at 05:45

## 2020-09-09 RX ADMIN — Medication 1 APPLICATION(S): at 05:42

## 2020-09-09 RX ADMIN — Medication 5 MILLIGRAM(S): at 05:38

## 2020-09-09 RX ADMIN — Medication 1 APPLICATION(S): at 17:44

## 2020-09-09 RX ADMIN — LIDOCAINE 1 PATCH: 4 CREAM TOPICAL at 00:00

## 2020-09-09 RX ADMIN — Medication 4 MILLIGRAM(S): at 17:44

## 2020-09-09 RX ADMIN — Medication 0.5 MILLIGRAM(S): at 22:42

## 2020-09-09 RX ADMIN — Medication 3 MILLIGRAM(S): at 22:42

## 2020-09-09 RX ADMIN — Medication 250 MILLIGRAM(S): at 17:44

## 2020-09-09 RX ADMIN — CEFEPIME 100 MILLIGRAM(S): 1 INJECTION, POWDER, FOR SOLUTION INTRAMUSCULAR; INTRAVENOUS at 22:44

## 2020-09-09 RX ADMIN — LEVETIRACETAM 1000 MILLIGRAM(S): 250 TABLET, FILM COATED ORAL at 05:42

## 2020-09-09 RX ADMIN — Medication 250 MILLIGRAM(S): at 05:38

## 2020-09-09 RX ADMIN — LIDOCAINE 1 PATCH: 4 CREAM TOPICAL at 19:30

## 2020-09-09 RX ADMIN — CEFEPIME 100 MILLIGRAM(S): 1 INJECTION, POWDER, FOR SOLUTION INTRAMUSCULAR; INTRAVENOUS at 05:38

## 2020-09-09 RX ADMIN — PHENYLEPHRINE-SHARK LIVER OIL-MINERAL OIL-PETROLATUM RECTAL OINTMENT 1 APPLICATION(S): at 13:34

## 2020-09-09 RX ADMIN — Medication 25 MILLIGRAM(S): at 05:38

## 2020-09-09 RX ADMIN — LIDOCAINE 1 PATCH: 4 CREAM TOPICAL at 23:30

## 2020-09-09 RX ADMIN — Medication 40 MILLIGRAM(S): at 05:42

## 2020-09-09 RX ADMIN — CEFEPIME 100 MILLIGRAM(S): 1 INJECTION, POWDER, FOR SOLUTION INTRAMUSCULAR; INTRAVENOUS at 13:33

## 2020-09-09 RX ADMIN — PHENYLEPHRINE-SHARK LIVER OIL-MINERAL OIL-PETROLATUM RECTAL OINTMENT 1 APPLICATION(S): at 05:42

## 2020-09-09 RX ADMIN — LEVETIRACETAM 1000 MILLIGRAM(S): 250 TABLET, FILM COATED ORAL at 17:44

## 2020-09-09 RX ADMIN — Medication 10 MILLIGRAM(S): at 05:38

## 2020-09-09 RX ADMIN — Medication 10 MILLIGRAM(S): at 17:44

## 2020-09-09 RX ADMIN — Medication 10 MILLIGRAM(S): at 22:44

## 2020-09-09 RX ADMIN — Medication 10 MILLIGRAM(S): at 13:34

## 2020-09-09 RX ADMIN — Medication 8 MILLIGRAM(S): at 08:48

## 2020-09-09 RX ADMIN — SENNA PLUS 2 TABLET(S): 8.6 TABLET ORAL at 23:04

## 2020-09-09 RX ADMIN — PHENYLEPHRINE-SHARK LIVER OIL-MINERAL OIL-PETROLATUM RECTAL OINTMENT 1 APPLICATION(S): at 22:45

## 2020-09-09 RX ADMIN — ATORVASTATIN CALCIUM 20 MILLIGRAM(S): 80 TABLET, FILM COATED ORAL at 22:45

## 2020-09-09 RX ADMIN — ENOXAPARIN SODIUM 40 MILLIGRAM(S): 100 INJECTION SUBCUTANEOUS at 11:56

## 2020-09-09 RX ADMIN — LIDOCAINE 1 PATCH: 4 CREAM TOPICAL at 11:57

## 2020-09-09 RX ADMIN — Medication 0.25 MILLIGRAM(S): at 05:38

## 2020-09-09 NOTE — PROGRESS NOTE ADULT - SUBJECTIVE AND OBJECTIVE BOX
Follow-up Pall Progress Note    Mike Mustafa MD  [phone]    No new respiratory events overnight.  Denies SOB/CP. Pt more awake today and appears comfortable.    Medications:  Vital Signs Last 24 Hrs  T(C): 36.4 (09 Sep 2020 05:37), Max: 37.1 (08 Sep 2020 20:07)  T(F): 97.6 (09 Sep 2020 05:37), Max: 98.7 (08 Sep 2020 20:07)  HR: 76 (09 Sep 2020 05:37) (76 - 85)  BP: 125/55 (09 Sep 2020 05:37) (121/64 - 134/69)  BP(mean): --  RR: 20 (09 Sep 2020 05:37) (18 - 20)  SpO2: 95% (09 Sep 2020 05:37) (93% - 95%)          09-08 @ 07:01  -  09-09 @ 07:00  --------------------------------------------------------  IN: 220 mL / OUT: 1100 mL / NET: -880 mL        LABS:                    CULTURES:        Physical Examination:  PULM: Clear to auscultation bilaterally, no significant sputum production  CVS: Regular rate and rhythm, no murmurs, rubs, or gallops  ABD: Soft, non-tender  EXT:  No clubbing, cyanosis, or edema    RADIOLOGY REVIEWED  CXR:    CT chest:    TTE:

## 2020-09-09 NOTE — PROGRESS NOTE ADULT - SUBJECTIVE AND OBJECTIVE BOX
CC: f/u for pneumonia    Patient reports: he is sleepy, not very interactive with examiner    REVIEW OF SYSTEMS:  All other review of systems negative (Comprehensive ROS): limited by condition    Antimicrobials Day #  :day 6/7  cefepime   IVPB 2000 milliGRAM(s) IV Intermittent every 8 hours    Other Medications Reviewed  MEDICATIONS  (STANDING):  ALPRAZolam 0.5 milliGRAM(s) Oral at bedtime  ALPRAZolam 0.25 milliGRAM(s) Oral two times a day  aspirin  chewable 81 milliGRAM(s) Oral daily  atorvastatin 20 milliGRAM(s) Oral at bedtime  bethanechol 10 milliGRAM(s) Oral three times a day  bisacodyl 5 milliGRAM(s) Oral every 12 hours  cefepime   IVPB 2000 milliGRAM(s) IV Intermittent every 8 hours  dexAMETHasone     Tablet 4 milliGRAM(s) Oral two times a day  enoxaparin Injectable 40 milliGRAM(s) SubCutaneous daily  escitalopram 10 milliGRAM(s) Oral daily  furosemide    Tablet 40 milliGRAM(s) Oral daily  hemorrhoidal Ointment 1 Application(s) Rectal three times a day  levETIRAcetam 1000 milliGRAM(s) Oral two times a day  lidocaine   Patch 1 Patch Transdermal daily  melatonin 3 milliGRAM(s) Oral at bedtime  metoprolol succinate ER 25 milliGRAM(s) Oral daily  pantoprazole    Tablet 40 milliGRAM(s) Oral before breakfast  polyethylene glycol 3350 17 Gram(s) Oral two times a day  saccharomyces boulardii 250 milliGRAM(s) Oral two times a day  senna 2 Tablet(s) Oral at bedtime  triamcinolone 0.1% Cream 1 Application(s) Topical two times a day    T(F): 97.6 (09-09-20 @ 05:37), Max: 98.7 (09-08-20 @ 20:07)  HR: 76 (09-09-20 @ 05:37)  BP: 125/55 (09-09-20 @ 05:37)  RR: 20 (09-09-20 @ 05:37)  SpO2: 95% (09-09-20 @ 05:37)  Wt(kg): --    PHYSICAL EXAM:  General: sleepy, no acute distress  Eyes:  anicteric, no conjunctival injection, no discharge  Oropharynx: no lesions or injection 	  Neck: supple, without adenopathy  Lungs: few ronchi  Heart: regular rate and rhythm; no murmur, rubs or gallops  Abdomen: soft, nondistended, nontender, without mass or organomegaly  Skin: no lesions  Extremities: no clubbing, cyanosis, or edema  Neurologic: alert, oriented, rt side is weak    LAB RESULTS:              MICROBIOLOGY:  RECENT CULTURES:      RADIOLOGY REVIEWED:    < from: CT Chest No Cont (09.03.20 @ 12:08) >  IMPRESSION:  Bilateral upper lobe and right middle lobe airspace infiltrates on the basisof pneumonia, pulmonary edema and/or motion artifact. Bibasilar dependent changes.    Mild cardiomegaly.    < end of copied text >

## 2020-09-09 NOTE — PROGRESS NOTE ADULT - SUBJECTIVE AND OBJECTIVE BOX
Patient is a 79y old  Male who presents with a chief complaint of pna with respiratory failure and hypoxia (09 Sep 2020 14:28)      Patient seen and examined at bedside. No overnight events reported.     ALLERGIES:  No Known Allergies    MEDICATIONS  (STANDING):  ALPRAZolam 0.5 milliGRAM(s) Oral at bedtime  ALPRAZolam 0.25 milliGRAM(s) Oral two times a day  aspirin  chewable 81 milliGRAM(s) Oral daily  atorvastatin 20 milliGRAM(s) Oral at bedtime  bethanechol 10 milliGRAM(s) Oral three times a day  bisacodyl 5 milliGRAM(s) Oral every 12 hours  cefepime   IVPB 2000 milliGRAM(s) IV Intermittent every 8 hours  dexAMETHasone     Tablet 4 milliGRAM(s) Oral two times a day  enoxaparin Injectable 40 milliGRAM(s) SubCutaneous daily  escitalopram 10 milliGRAM(s) Oral daily  furosemide    Tablet 40 milliGRAM(s) Oral daily  hemorrhoidal Ointment 1 Application(s) Rectal three times a day  levETIRAcetam 1000 milliGRAM(s) Oral two times a day  lidocaine   Patch 1 Patch Transdermal daily  melatonin 3 milliGRAM(s) Oral at bedtime  metoprolol succinate ER 25 milliGRAM(s) Oral daily  pantoprazole    Tablet 40 milliGRAM(s) Oral before breakfast  polyethylene glycol 3350 17 Gram(s) Oral two times a day  saccharomyces boulardii 250 milliGRAM(s) Oral two times a day  senna 2 Tablet(s) Oral at bedtime  triamcinolone 0.1% Cream 1 Application(s) Topical two times a day    MEDICATIONS  (PRN):  acetaminophen   Tablet .. 650 milliGRAM(s) Oral every 6 hours PRN Temp greater or equal to 38C (100.4F), Mild Pain (1 - 3)  bisacodyl Suppository 10 milliGRAM(s) Rectal daily PRN Constipation  ibuprofen  Tablet. 400 milliGRAM(s) Oral every 8 hours PRN Temp greater or equal to 38C (100.4F), Moderate Pain (4 - 6)    Vital Signs Last 24 Hrs  T(F): 97.6 (09 Sep 2020 05:37), Max: 98.7 (08 Sep 2020 20:07)  HR: 76 (09 Sep 2020 05:37) (76 - 85)  BP: 125/55 (09 Sep 2020 05:37) (121/64 - 134/69)  RR: 20 (09 Sep 2020 05:37) (18 - 20)  SpO2: 95% (09 Sep 2020 05:37) (93% - 95%)  I&O's Summary    08 Sep 2020 07:01  -  09 Sep 2020 07:00  --------------------------------------------------------  IN: 220 mL / OUT: 1100 mL / NET: -880 mL    09 Sep 2020 07:01  -  09 Sep 2020 15:34  --------------------------------------------------------  IN: 240 mL / OUT: 500 mL / NET: -260 mL      PHYSICAL EXAM:  General: NAD, A/O x 3, slurred speech  ENT: MMM, no thrush  Neck: Supple, No JVD  Lungs: Clear to auscultation bilaterally, good air entry, non-labored breathing  Cardio: RRR, S1/S2, No murmur  Abdomen: Soft, Nontender, Nondistended; Bowel sounds present  Extremities: No calf tenderness, No pitting edema  Neuro L sided paralysis    LABS:                        9.8    6.27  )-----------( 189      ( 07 Sep 2020 07:40 )             29.6     09-07    135  |  101  |  28  ----------------------------<  112  4.0   |  28  |  0.69    Ca    8.6      07 Sep 2020 07:40          eGFR if Non African American: 91 mL/min/1.73M2 (09-07-20 @ 07:40)  eGFR if : 106 mL/min/1.73M2 (09-07-20 @ 07:40)                                    RADIOLOGY & ADDITIONAL TESTS:    Care Discussed with Consultants/Other Providers: Palliative

## 2020-09-09 NOTE — PROGRESS NOTE ADULT - ASSESSMENT
79M hx of HTN, HLD, prostate ca s/p prostatectomy, basal cell skin ca, GBM dx in 2018 s/p 3 resections with worsening L sided weakness. CT head and MRI indicative of disease progression, and worsening vasogenic edema in R temporal/frontal/parietal regions treated with tapering Decadron. Now DNR/DNI with multilobar pna and enterobacter UTI requiring supplemental oxygen    Multilobular Gram Negative PNA with acute hypoxic resp failure with hypoxia   Enterobacter Urinary Tract Infection  -Continue cefepime for now-enterobacter  coverage and pneumonia coverage for 1 more day  Monitor fever trend  - continue oxygen   -ID following    Glioblastoma multiforma with cerebral edema and L sided weakness  -s/p Decadron taper, restarted 9/8 . not a candidate for Avastin. cont ppi  -palliative care. Pt is DNR/DNI  -lidocaine patch  -PT/OT as tolerated    Severe protein calorie malnutrition in context of GBM  -Aspiration precautions. total feeding assistance   -SLP appreciated - now dysphagia 2 mechanical soft/nectar consistency, supplement with ensure  -MVI   -I/Os    Constipation   -continue ducolax, senna, miralax    Urinary retention  -cont bethanechol     Hypokalemia - resolved     Depression/anxiety  -cont xanax and escitalopram    HTN, CAD  -cont Metoprolol and Lasix for now with holding parameters.   -cont ASA, lipitor    Hyperglycemia  -cont FS    DVT ppx: lovenox    DNR/DNI    Dispo- tx to home hospice likely in am

## 2020-09-09 NOTE — PROGRESS NOTE ADULT - ASSESSMENT
80 yo M with progressive GBM a/w fever x 2 days likely related to pneumonia and /or enterobacter UTI.  He has been debulked 3 times, is s/p recent admission to [facility] for speech difficulties and left sided weakness, treated with increased decadron, now tapered.  He is an aspiration risk and requires ISC.  He is DNR/DNI and is being considered for directed GBM treatment but may not be a candidate.  Suspect bacterial infection but if he does not respond will need to consider fungal infections as well-PCP, aspergillus.  MRSA --neg, Vancomycin--d/cd  Fungitell--elevated, but he seems to be responding well to antibiotics so pcp and fungal infection seems less likely.    Suggest:    continue cefepime for now-enterobacter  coverage and pneumonia coverage for 2 more days  monitor WBC and fever trend  Palliative:  Reviewed with [facility].  They are aware that he is close to dc.

## 2020-09-09 NOTE — GOALS OF CARE CONVERSATION - ADVANCED CARE PLANNING - CONVERSATION DETAILS
[facility]    This writer placed t/c to pts son Davidson to discuss plan for d/c on home hospice. Left message for call back.     Anais Wild RN

## 2020-09-09 NOTE — PROGRESS NOTE ADULT - ASSESSMENT
78 yo M with progressive GBM a/w fever x 2 days likely related to pneumonia and /or enterobacter UTI.  He has been debulked 3 times, is s/p recent admission to [facility] for speech difficulties and left sided weakness, treated with increased decadron, tapered  in rehab  He is an aspiration risk and requires ISC.  He is DNR/DNI and is being considered for directed GBM treatment but may not be a candidate.  Suspect bacterial infection but if he does not respond will need to consider fungal infections as well-PCP, aspergillus.  MRSA --neg, Vancomycin--d/cd  Fungitell--elevated, but he seems to be responding well to antibiotics so pcp and fungal infection seems less likely.  Day 6/7 cefepime, he is also now back on high doses of decadron  Suggest:  Continue cefepime for now-enterobacter  coverage and pneumonia coverage for 1 more day  Monitor WBC and fever trend  Steroids per medicine, back on high dose  Prognosis guarded

## 2020-09-10 ENCOUNTER — APPOINTMENT (OUTPATIENT)
Dept: NEUROLOGY | Facility: CLINIC | Age: 80
End: 2020-09-10

## 2020-09-10 PROCEDURE — 99233 SBSQ HOSP IP/OBS HIGH 50: CPT

## 2020-09-10 PROCEDURE — 99232 SBSQ HOSP IP/OBS MODERATE 35: CPT

## 2020-09-10 RX ORDER — ALPRAZOLAM 0.25 MG
0.25 TABLET ORAL
Refills: 0 | Status: DISCONTINUED | OUTPATIENT
Start: 2020-09-12 | End: 2020-09-14

## 2020-09-10 RX ORDER — ALPRAZOLAM 0.25 MG
0.5 TABLET ORAL AT BEDTIME
Refills: 0 | Status: DISCONTINUED | OUTPATIENT
Start: 2020-09-12 | End: 2020-09-14

## 2020-09-10 RX ADMIN — Medication 81 MILLIGRAM(S): at 12:32

## 2020-09-10 RX ADMIN — Medication 4 MILLIGRAM(S): at 06:27

## 2020-09-10 RX ADMIN — CEFEPIME 100 MILLIGRAM(S): 1 INJECTION, POWDER, FOR SOLUTION INTRAMUSCULAR; INTRAVENOUS at 06:26

## 2020-09-10 RX ADMIN — Medication 3 MILLIGRAM(S): at 22:40

## 2020-09-10 RX ADMIN — ESCITALOPRAM OXALATE 10 MILLIGRAM(S): 10 TABLET, FILM COATED ORAL at 12:32

## 2020-09-10 RX ADMIN — Medication 10 MILLIGRAM(S): at 22:40

## 2020-09-10 RX ADMIN — Medication 25 MILLIGRAM(S): at 06:27

## 2020-09-10 RX ADMIN — PANTOPRAZOLE SODIUM 40 MILLIGRAM(S): 20 TABLET, DELAYED RELEASE ORAL at 06:27

## 2020-09-10 RX ADMIN — Medication 1 APPLICATION(S): at 18:46

## 2020-09-10 RX ADMIN — ATORVASTATIN CALCIUM 20 MILLIGRAM(S): 80 TABLET, FILM COATED ORAL at 22:43

## 2020-09-10 RX ADMIN — Medication 0.5 MILLIGRAM(S): at 22:40

## 2020-09-10 RX ADMIN — Medication 5 MILLIGRAM(S): at 18:29

## 2020-09-10 RX ADMIN — Medication 10 MILLIGRAM(S): at 06:27

## 2020-09-10 RX ADMIN — Medication 250 MILLIGRAM(S): at 22:40

## 2020-09-10 RX ADMIN — PHENYLEPHRINE-SHARK LIVER OIL-MINERAL OIL-PETROLATUM RECTAL OINTMENT 1 APPLICATION(S): at 22:41

## 2020-09-10 RX ADMIN — PHENYLEPHRINE-SHARK LIVER OIL-MINERAL OIL-PETROLATUM RECTAL OINTMENT 1 APPLICATION(S): at 06:28

## 2020-09-10 RX ADMIN — Medication 10 MILLIGRAM(S): at 18:28

## 2020-09-10 RX ADMIN — Medication 40 MILLIGRAM(S): at 06:27

## 2020-09-10 RX ADMIN — Medication 250 MILLIGRAM(S): at 06:27

## 2020-09-10 RX ADMIN — Medication 5 MILLIGRAM(S): at 06:27

## 2020-09-10 RX ADMIN — Medication 1 APPLICATION(S): at 06:28

## 2020-09-10 RX ADMIN — Medication 0.25 MILLIGRAM(S): at 06:27

## 2020-09-10 RX ADMIN — CEFEPIME 100 MILLIGRAM(S): 1 INJECTION, POWDER, FOR SOLUTION INTRAMUSCULAR; INTRAVENOUS at 18:28

## 2020-09-10 RX ADMIN — SENNA PLUS 2 TABLET(S): 8.6 TABLET ORAL at 22:40

## 2020-09-10 RX ADMIN — PHENYLEPHRINE-SHARK LIVER OIL-MINERAL OIL-PETROLATUM RECTAL OINTMENT 1 APPLICATION(S): at 18:46

## 2020-09-10 RX ADMIN — CEFEPIME 100 MILLIGRAM(S): 1 INJECTION, POWDER, FOR SOLUTION INTRAMUSCULAR; INTRAVENOUS at 22:40

## 2020-09-10 RX ADMIN — ENOXAPARIN SODIUM 40 MILLIGRAM(S): 100 INJECTION SUBCUTANEOUS at 12:32

## 2020-09-10 RX ADMIN — LEVETIRACETAM 1000 MILLIGRAM(S): 250 TABLET, FILM COATED ORAL at 18:28

## 2020-09-10 RX ADMIN — LEVETIRACETAM 1000 MILLIGRAM(S): 250 TABLET, FILM COATED ORAL at 06:27

## 2020-09-10 RX ADMIN — Medication 4 MILLIGRAM(S): at 18:28

## 2020-09-10 RX ADMIN — LIDOCAINE 1 PATCH: 4 CREAM TOPICAL at 12:32

## 2020-09-10 NOTE — PROGRESS NOTE ADULT - SUBJECTIVE AND OBJECTIVE BOX
Follow-up Pall Progress Note    Mike Mustafa MD  [phone]    No new respiratory events overnight. Patient comfortable.     Medications:  Vital Signs Last 24 Hrs  T(C): 36.4 (10 Sep 2020 05:35), Max: 36.4 (10 Sep 2020 05:35)  T(F): 97.6 (10 Sep 2020 05:35), Max: 97.6 (10 Sep 2020 05:35)  HR: 59 (10 Sep 2020 06:22) (51 - 65)  BP: 140/59 (10 Sep 2020 06:22) (137/60 - 140/59)  BP(mean): --  RR: 17 (10 Sep 2020 05:35) (17 - 19)  SpO2: 99% (10 Sep 2020 05:35) (95% - 99%)          09-09 @ 07:01  -  09-10 @ 07:00  --------------------------------------------------------  IN: 540 mL / OUT: 500 mL / NET: 40 mL        LABS:                    CULTURES:        Physical Examination:  PULM: Clear to auscultation bilaterally, no significant sputum production  CVS: Regular rate and rhythm, no murmurs, rubs, or gallops  ABD: Soft, non-tender

## 2020-09-10 NOTE — PROGRESS NOTE ADULT - ASSESSMENT
80 yo M with progressive GBM a/w fever x 2 days likely related to pneumonia and /or enterobacter UTI.  He has been debulked 3 times, is s/p recent admission to [facility] for speech difficulties and left sided weakness, treated with increased decadron, tapered  in rehab  He is an aspiration risk and requires ISC.  He is DNR/DNI and is being considered for directed GBM treatment but may not be a candidate.  Suspect bacterial infection but if he does not respond will need to consider fungal infections as well-PCP, aspergillus.  MRSA --neg, Vancomycin--d/cd  Fungitell--elevated, but he seems to be responding well to antibiotics so pcp and fungal infection seems less likely.    Suggest:  last dose of cefepime today day 7 for enterobacter  coverage and pneumonia coverage   Monitor WBC and fever trend  Steroids per medicine, back on high dose decadron  Prognosis guarded, goals of care to be addressed

## 2020-09-10 NOTE — PROGRESS NOTE ADULT - ASSESSMENT
79M hx of HTN, HLD, prostate ca s/p prostatectomy, basal cell skin ca, GBM dx in 2018 s/p 3 resections with worsening L sided weakness. CT head and MRI indicative of disease progression, and worsening vasogenic edema in R temporal/frontal/parietal regions treated with tapering Decadron. Now DNR/DNI with multilobar pna and enterobacter UTI requiring supplemental oxygen    Multilobular Gram Negative PNA with acute hypoxic resp failure with hypoxia   Enterobacter Urinary Tract Infection  -Continue cefepime for now-enterobacter  coverage and pneumonia coverage- last dose tonight at 10pm  Monitor fever trend  - continue oxygen   -ID following    Glioblastoma multiforma with cerebral edema and L sided weakness  -s/p Decadron taper, restarted 9/8 . not a candidate for Avastin. cont ppi  -taper to be completed after 2 more doses  -palliative care. Pt is DNR/DNI  -lidocaine patch  -PT/OT as tolerated    Severe protein calorie malnutrition in context of GBM  -Aspiration precautions. total feeding assistance   -SLP appreciated - now dysphagia 2 mechanical soft/nectar consistency, supplement with ensure  -MVI   -I/Os    Constipation   -continue ducolax, senna, miralax    Urinary retention  -cont bethanechol     Hypokalemia - resolved     Depression/anxiety  -cont xanax and escitalopram    HTN, CAD  -cont Metoprolol and Lasix for now with holding parameters.   -cont ASA, lipitor    Hyperglycemia  -cont FS    DVT ppx: lovenox    DNR/DNI  Palliative:  Pt scheduled for dc home soon on hospice    Dispo- tx to home hospice likely in am after completion of IV abx

## 2020-09-10 NOTE — PROGRESS NOTE ADULT - ASSESSMENT
79M hx of HTN, HLD, prostate ca s/p prostatectomy, basal cell skin ca, GBM dx in 2018 s/p 3 resections with worsening L sided weakness. CT head and MRI indicative of disease progression, and worsening vasogenic edema in R temporal/frontal/parietal regions treated with tapering Decadron. Now DNR/DNI with multilobar pna and enterobacter UTI requiring supplemental oxygen    Multilobular Gram Negative PNA with acute hypoxic resp failure with hypoxia   Enterobacter Urinary Tract Infection  -Continue cefepime for now-enterobacter  coverage and pneumonia coverage- last dose tonight at 10pm  Monitor fever trend  - continue oxygen   -ID following    Glioblastoma multiforma with cerebral edema and L sided weakness  -s/p Decadron taper, restarted 9/8 . not a candidate for Avastin. cont ppi  -taper to be completed after 2 more doses  -palliative care. Pt is DNR/DNI  -lidocaine patch  -PT/OT as tolerated    Severe protein calorie malnutrition in context of GBM  -Aspiration precautions. total feeding assistance   -SLP appreciated - now dysphagia 2 mechanical soft/nectar consistency, supplement with ensure  -MVI   -I/Os    Constipation   -continue ducolax, senna, miralax    Urinary retention  -cont bethanechol     Hypokalemia - resolved     Depression/anxiety  -cont xanax and escitalopram    HTN, CAD  -cont Metoprolol and Lasix for now with holding parameters.   -cont ASA, lipitor    Hyperglycemia  -cont FS    DVT ppx: lovenox    DNR/DNI    Dispo- tx to home hospice likely in am after completion of IV abx 79M hx of HTN, HLD, prostate ca s/p prostatectomy, basal cell skin ca, GBM dx in 2018 s/p 3 resections with worsening L sided weakness. CT head and MRI indicative of disease progression, and worsening vasogenic edema in R temporal/frontal/parietal regions treated with tapering Decadron. Now DNR/DNI with multilobar pna and enterobacter UTI requiring supplemental oxygen    Multilobular Gram Negative PNA with acute hypoxic resp failure with hypoxia   Enterobacter Urinary Tract Infection  -Continue cefepime for now-enterobacter  coverage and pneumonia coverage- last dose tonight at 10pm  Monitor fever trend  -Continue oxygen   -ID following    Glioblastoma multiforma with cerebral edema and L sided weakness  -s/p Decadron taper, restarted 9/8 . not a candidate for Avastin. cont ppi  -taper to be completed after 2 more doses  -palliative care. Pt is DNR/DNI  -lidocaine patch  -PT/OT as tolerated    Severe protein calorie malnutrition in context of GBM  -Aspiration precautions. total feeding assistance   -SLP appreciated - now dysphagia 2 mechanical soft/nectar consistency, supplement with ensure  -MVI   -I/Os    Constipation   -continue ducolax, senna, miralax    Urinary retention  -cont bethanechol     Hypokalemia - resolved     Depression/anxiety  -cont xanax and escitalopram    HTN, CAD  -cont Metoprolol and Lasix for now with holding parameters.   -cont ASA, lipitor    Hyperglycemia  -cont FS    DVT ppx: lovenox    DNR/DNI    Dispo- tx to home hospice likely in am after completion of IV abx

## 2020-09-10 NOTE — GOALS OF CARE CONVERSATION - ADVANCED CARE PLANNING - CONVERSATION DETAILS
[facility]    This writer spoke with patients son Davidson. Per Davidson he is nervous about bringing patient home but is willing to take him home. This writer to call Davidson tomorrow morning for DME delivery. This writer requested for hospice [facility] to start Monday 9/14. Tentative d/c for 9/14 as family would like for [facility] to start when pt is discharged. [facility] to continue to follow up.      Anais Wild RN

## 2020-09-10 NOTE — PROGRESS NOTE ADULT - SUBJECTIVE AND OBJECTIVE BOX
CC: f/u for pneumonia    Patient is sleepy, currently not afebrile and not toxic    REVIEW OF SYSTEMS:  All other review of systems negative (Comprehensive ROS): limited by condition    Antimicrobials Day #  :day 7  cefepime   IVPB 2000 milliGRAM(s) IV Intermittent every 8 hours    Vital Signs Last 24 Hrs  T(C): 36.4 (10 Sep 2020 05:35), Max: 36.4 (10 Sep 2020 05:35)  T(F): 97.6 (10 Sep 2020 05:35), Max: 97.6 (10 Sep 2020 05:35)  HR: 59 (10 Sep 2020 06:22) (51 - 65)  BP: 140/59 (10 Sep 2020 06:22) (137/60 - 140/59)  BP(mean): --  RR: 17 (10 Sep 2020 05:35) (17 - 19)  SpO2: 99% (10 Sep 2020 05:35) (95% - 99%)    PHYSICAL EXAM:  General: sleepy, no acute distress  Eyes:  anicteric, no conjunctival injection, no discharge  Oropharynx: no lesions or injection 	  Neck: supple, without adenopathy  Lungs: few ronchi  Heart: regular rate and rhythm; no murmur, rubs or gallops  Abdomen: soft, nondistended, nontender, without mass or organomegaly  Skin: no lesions  Extremities: no clubbing, cyanosis, or edema  Neurologic: alert, oriented, rt side is weak    LAB RESULTS:  no new labs            MICROBIOLOGY:  RECENT CULTURES:  Culture - Blood (09.02.20 @ 09:03)    Specimen Source: .Blood Blood    Culture Results:   No Growth Final        RADIOLOGY REVIEWED:    < from: CT Chest No Cont (09.03.20 @ 12:08) >  IMPRESSION:  Bilateral upper lobe and right middle lobe airspace infiltrates on the basisof pneumonia, pulmonary edema and/or motion artifact. Bibasilar dependent changes.    Mild cardiomegaly.    < end of copied text >

## 2020-09-10 NOTE — CHART NOTE - NSCHARTNOTEFT_GEN_A_CORE
Patient seen for follow-up to assess candidacy for diet advancement. Patient seen at bedside, alert and cooperative, stated that he had breakfast with good tolerance. Patient given trials of thin liquids via controlled cup sip, nectar thickened liquids and mechanical soft consistency. Patient required assistance with orientation to cup/spoon, adequate oral preparation, mastication patterns functional, delayed oral transit time, pharyngeal trigger suspected to be delayed, palpable hyo-laryngeal elevation, clinical s/s of penetration/aspiration with thin liquids despite controlled cup sip. Patient tolerated mechanical soft solids and nectar thickened liquids    Recommendation: Continue diet of mechanical soft solids/nectar thickened liquids, 1:1 assist, maintain aspiration precaution, will continue to f/u

## 2020-09-10 NOTE — PROGRESS NOTE ADULT - SUBJECTIVE AND OBJECTIVE BOX
Patient is a 79y old  Male who presents with a chief complaint of pna with respiratory failure and hypoxia (09 Sep 2020 15:34)      Patient seen and examined at bedside. No overnight events reported. Feels well today. Gave me a thumbs up sign.    ALLERGIES:  No Known Allergies    MEDICATIONS  (STANDING):  ALPRAZolam 0.5 milliGRAM(s) Oral at bedtime  ALPRAZolam 0.25 milliGRAM(s) Oral two times a day  aspirin  chewable 81 milliGRAM(s) Oral daily  atorvastatin 20 milliGRAM(s) Oral at bedtime  bethanechol 10 milliGRAM(s) Oral three times a day  bisacodyl 5 milliGRAM(s) Oral every 12 hours  cefepime   IVPB 2000 milliGRAM(s) IV Intermittent every 8 hours  dexAMETHasone     Tablet 4 milliGRAM(s) Oral two times a day  enoxaparin Injectable 40 milliGRAM(s) SubCutaneous daily  escitalopram 10 milliGRAM(s) Oral daily  furosemide    Tablet 40 milliGRAM(s) Oral daily  hemorrhoidal Ointment 1 Application(s) Rectal three times a day  levETIRAcetam 1000 milliGRAM(s) Oral two times a day  lidocaine   Patch 1 Patch Transdermal daily  melatonin 3 milliGRAM(s) Oral at bedtime  metoprolol succinate ER 25 milliGRAM(s) Oral daily  pantoprazole    Tablet 40 milliGRAM(s) Oral before breakfast  polyethylene glycol 3350 17 Gram(s) Oral two times a day  saccharomyces boulardii 250 milliGRAM(s) Oral two times a day  senna 2 Tablet(s) Oral at bedtime  triamcinolone 0.1% Cream 1 Application(s) Topical two times a day    MEDICATIONS  (PRN):  acetaminophen   Tablet .. 650 milliGRAM(s) Oral every 6 hours PRN Temp greater or equal to 38C (100.4F), Mild Pain (1 - 3)  bisacodyl Suppository 10 milliGRAM(s) Rectal daily PRN Constipation  ibuprofen  Tablet. 400 milliGRAM(s) Oral every 8 hours PRN Temp greater or equal to 38C (100.4F), Moderate Pain (4 - 6)    Vital Signs Last 24 Hrs  T(F): 97.6 (10 Sep 2020 05:35), Max: 97.6 (10 Sep 2020 05:35)  HR: 59 (10 Sep 2020 06:22) (51 - 73)  BP: 140/59 (10 Sep 2020 06:22) (131/66 - 140/59)  RR: 17 (10 Sep 2020 05:35) (17 - 19)  SpO2: 99% (10 Sep 2020 05:35) (92% - 99%)  I&O's Summary    09 Sep 2020 07:01  -  10 Sep 2020 07:00  --------------------------------------------------------  IN: 540 mL / OUT: 500 mL / NET: 40 mL    10 Sep 2020 07:01  -  10 Sep 2020 12:26  --------------------------------------------------------  IN: 120 mL / OUT: 0 mL / NET: 120 mL      PHYSICAL EXAM:  General: NAD, A/O x 3  ENT: MMM, no thrush  Neck: Supple, No JVD  Lungs: Clear to auscultation bilaterally, good air entry, non-labored breathing  Cardio: RRR, S1/S2, No murmur  Abdomen: Soft, Nontender, Nondistended; Bowel sounds present  Extremities: No calf tenderness, No pitting edema    LABS:   no labs drawn                                              RADIOLOGY & ADDITIONAL TESTS:    Care Discussed with Consultants/Other Providers: Patient is a 79y old  Male who presents with a chief complaint of pna with respiratory failure and hypoxia (09 Sep 2020 15:34)      Patient seen and examined at bedside. No overnight events reported. Feels well today. Gave me a thumbs up sign.    ALLERGIES:  No Known Allergies    MEDICATIONS  (STANDING):  ALPRAZolam 0.5 milliGRAM(s) Oral at bedtime  ALPRAZolam 0.25 milliGRAM(s) Oral two times a day  aspirin  chewable 81 milliGRAM(s) Oral daily  atorvastatin 20 milliGRAM(s) Oral at bedtime  bethanechol 10 milliGRAM(s) Oral three times a day  bisacodyl 5 milliGRAM(s) Oral every 12 hours  cefepime   IVPB 2000 milliGRAM(s) IV Intermittent every 8 hours  dexAMETHasone     Tablet 4 milliGRAM(s) Oral two times a day  enoxaparin Injectable 40 milliGRAM(s) SubCutaneous daily  escitalopram 10 milliGRAM(s) Oral daily  furosemide    Tablet 40 milliGRAM(s) Oral daily  hemorrhoidal Ointment 1 Application(s) Rectal three times a day  levETIRAcetam 1000 milliGRAM(s) Oral two times a day  lidocaine   Patch 1 Patch Transdermal daily  melatonin 3 milliGRAM(s) Oral at bedtime  metoprolol succinate ER 25 milliGRAM(s) Oral daily  pantoprazole    Tablet 40 milliGRAM(s) Oral before breakfast  polyethylene glycol 3350 17 Gram(s) Oral two times a day  saccharomyces boulardii 250 milliGRAM(s) Oral two times a day  senna 2 Tablet(s) Oral at bedtime  triamcinolone 0.1% Cream 1 Application(s) Topical two times a day    MEDICATIONS  (PRN):  acetaminophen   Tablet .. 650 milliGRAM(s) Oral every 6 hours PRN Temp greater or equal to 38C (100.4F), Mild Pain (1 - 3)  bisacodyl Suppository 10 milliGRAM(s) Rectal daily PRN Constipation  ibuprofen  Tablet. 400 milliGRAM(s) Oral every 8 hours PRN Temp greater or equal to 38C (100.4F), Moderate Pain (4 - 6)    Vital Signs Last 24 Hrs  T(F): 97.6 (10 Sep 2020 05:35), Max: 97.6 (10 Sep 2020 05:35)  HR: 59 (10 Sep 2020 06:22) (51 - 73)  BP: 140/59 (10 Sep 2020 06:22) (131/66 - 140/59)  RR: 17 (10 Sep 2020 05:35) (17 - 19)  SpO2: 99% (10 Sep 2020 05:35) (92% - 99%)  I&O's Summary    09 Sep 2020 07:01  -  10 Sep 2020 07:00  --------------------------------------------------------  IN: 540 mL / OUT: 500 mL / NET: 40 mL    10 Sep 2020 07:01  -  10 Sep 2020 12:26  --------------------------------------------------------  IN: 120 mL / OUT: 0 mL / NET: 120 mL      PHYSICAL EXAM:  General: NAD, A/O x 3  ENT: MMM, no thrush  Neck: Supple, No JVD  Lungs: Clear to auscultation bilaterally, good air entry, non-labored breathing  Cardio: RRR, S1/S2, No murmur  Abdomen: Soft, Nontender, Nondistended; Bowel sounds present  Extremities: No calf tenderness, No pitting edema    LABS:   no labs drawn      RADIOLOGY & ADDITIONAL TESTS:    Care Discussed with Consultants/Other Providers:

## 2020-09-10 NOTE — GOALS OF CARE CONVERSATION - ADVANCED CARE PLANNING - CONVERSATION DETAILS
[facility]    This writer placed t/c to pts son (twice) today to discuss d/c plan for home hospice. Left  for call back.    Anais Wild RN

## 2020-09-10 NOTE — CHART NOTE - NSCHARTNOTEFT_GEN_A_CORE
NUTRITION FOLLOW UP    SOURCE: Patient [)   Family [ ]     other [X ] Staff    DIET: Dysphagia 2 w/ nectar thick liquids, ensure enlive tid    PATIENT REPORT[ ] nausea  [ ] vomiting [ ] diarrhea [ ] constipation  [ ]chewing problems [X ] swallowing issues  [ ] other: no GI distress    PO INTAKE:  < 50% [X ]   50-75%  [ ]   %  [X]  other : Tolerating supplements well as reported    SOURCE: for PO intake [] Patient [ ] family [ ] chart [X ] staff [ ] other    ENTERAL/PARENTERAL NUTRITION: n/a    CURRENT WEIGHT:  9/5  82.2 KG.    PERTINENT LABS:      Creatinine    ACCUCHECK      SKIN: Intact, no edema noted, last bm was 9/8, severe malnutrition noted. hospice referral made    ESTIMATED NEEDS:   [X] no change since previous assessment  [ ] recalculated:     PREVIOUS NUTRITION DIAGNOSIS: addressed    NUTRITION DIAGNOSIS is   [X] ongoing    NEW NUTRITION DIAGNOSIS: [X] not applicable    MONITORING AND EVALUATION:   Current diet order is appropriate and is well tolerated, but will monitor for any changes that may be needed    [X] PO intake [X] Tolerance to diet prescription [X] weights [X] follow up per protocol    NUTRITION RECOMMENDATIONS:    RD remains available MERLYN Ferreira RD CDE

## 2020-09-11 PROCEDURE — 99233 SBSQ HOSP IP/OBS HIGH 50: CPT

## 2020-09-11 PROCEDURE — 99232 SBSQ HOSP IP/OBS MODERATE 35: CPT

## 2020-09-11 RX ADMIN — ESCITALOPRAM OXALATE 10 MILLIGRAM(S): 10 TABLET, FILM COATED ORAL at 11:55

## 2020-09-11 RX ADMIN — PHENYLEPHRINE-SHARK LIVER OIL-MINERAL OIL-PETROLATUM RECTAL OINTMENT 1 APPLICATION(S): at 07:01

## 2020-09-11 RX ADMIN — Medication 10 MILLIGRAM(S): at 07:01

## 2020-09-11 RX ADMIN — Medication 0.5 MILLIGRAM(S): at 20:26

## 2020-09-11 RX ADMIN — Medication 40 MILLIGRAM(S): at 07:01

## 2020-09-11 RX ADMIN — POLYETHYLENE GLYCOL 3350 17 GRAM(S): 17 POWDER, FOR SOLUTION ORAL at 07:01

## 2020-09-11 RX ADMIN — LIDOCAINE 1 PATCH: 4 CREAM TOPICAL at 00:02

## 2020-09-11 RX ADMIN — Medication 81 MILLIGRAM(S): at 11:54

## 2020-09-11 RX ADMIN — LEVETIRACETAM 1000 MILLIGRAM(S): 250 TABLET, FILM COATED ORAL at 07:01

## 2020-09-11 RX ADMIN — ATORVASTATIN CALCIUM 20 MILLIGRAM(S): 80 TABLET, FILM COATED ORAL at 20:27

## 2020-09-11 RX ADMIN — Medication 5 MILLIGRAM(S): at 17:00

## 2020-09-11 RX ADMIN — PHENYLEPHRINE-SHARK LIVER OIL-MINERAL OIL-PETROLATUM RECTAL OINTMENT 1 APPLICATION(S): at 13:32

## 2020-09-11 RX ADMIN — Medication 250 MILLIGRAM(S): at 17:00

## 2020-09-11 RX ADMIN — Medication 10 MILLIGRAM(S): at 13:35

## 2020-09-11 RX ADMIN — LIDOCAINE 1 PATCH: 4 CREAM TOPICAL at 20:30

## 2020-09-11 RX ADMIN — Medication 1 APPLICATION(S): at 17:00

## 2020-09-11 RX ADMIN — Medication 3 MILLIGRAM(S): at 20:26

## 2020-09-11 RX ADMIN — LIDOCAINE 1 PATCH: 4 CREAM TOPICAL at 11:54

## 2020-09-11 RX ADMIN — Medication 10 MILLIGRAM(S): at 20:26

## 2020-09-11 RX ADMIN — PANTOPRAZOLE SODIUM 40 MILLIGRAM(S): 20 TABLET, DELAYED RELEASE ORAL at 07:01

## 2020-09-11 RX ADMIN — LEVETIRACETAM 1000 MILLIGRAM(S): 250 TABLET, FILM COATED ORAL at 17:00

## 2020-09-11 RX ADMIN — SENNA PLUS 2 TABLET(S): 8.6 TABLET ORAL at 20:26

## 2020-09-11 RX ADMIN — CEFEPIME 100 MILLIGRAM(S): 1 INJECTION, POWDER, FOR SOLUTION INTRAMUSCULAR; INTRAVENOUS at 07:02

## 2020-09-11 RX ADMIN — Medication 250 MILLIGRAM(S): at 07:01

## 2020-09-11 RX ADMIN — POLYETHYLENE GLYCOL 3350 17 GRAM(S): 17 POWDER, FOR SOLUTION ORAL at 17:00

## 2020-09-11 RX ADMIN — Medication 1 APPLICATION(S): at 07:01

## 2020-09-11 RX ADMIN — PHENYLEPHRINE-SHARK LIVER OIL-MINERAL OIL-PETROLATUM RECTAL OINTMENT 1 APPLICATION(S): at 20:27

## 2020-09-11 RX ADMIN — Medication 4 MILLIGRAM(S): at 17:00

## 2020-09-11 RX ADMIN — Medication 5 MILLIGRAM(S): at 07:00

## 2020-09-11 RX ADMIN — Medication 25 MILLIGRAM(S): at 07:00

## 2020-09-11 RX ADMIN — Medication 4 MILLIGRAM(S): at 07:00

## 2020-09-11 RX ADMIN — ENOXAPARIN SODIUM 40 MILLIGRAM(S): 100 INJECTION SUBCUTANEOUS at 11:54

## 2020-09-11 RX ADMIN — Medication 0.25 MILLIGRAM(S): at 17:00

## 2020-09-11 RX ADMIN — Medication 0.25 MILLIGRAM(S): at 07:00

## 2020-09-11 NOTE — GOALS OF CARE CONVERSATION - ADVANCED CARE PLANNING - CONVERSATION DETAILS
[facility]    This writer f/u with pts son Davidson. DME ordered for tomorrow between 10am and 2pm.  [facility] placement pending. Tentative d/c for Monday.  CM aware     Anais Wild RN

## 2020-09-11 NOTE — PROGRESS NOTE ADULT - SUBJECTIVE AND OBJECTIVE BOX
Patient is a 79y old  Male who presents with a chief complaint of pna with respiratory failure and hypoxia (10 Sep 2020 17:01)    Patient seen and examined at bedside.  no acute events overnight    ALLERGIES:  No Known Allergies        Vital Signs Last 24 Hrs  T(F): 97.7 (11 Sep 2020 05:35), Max: 98 (10 Sep 2020 22:45)  HR: 65 (11 Sep 2020 05:35) (61 - 65)  BP: 130/69 (11 Sep 2020 05:35) (130/69 - 137/65)  RR: 15 (11 Sep 2020 05:35) (15 - 16)  SpO2: 95% (11 Sep 2020 05:35) (95% - 97%)  I&O's Summary    10 Sep 2020 07:01  -  11 Sep 2020 07:00  --------------------------------------------------------  IN: 340 mL / OUT: 0 mL / NET: 340 mL      MEDICATIONS:  acetaminophen   Tablet .. 650 milliGRAM(s) Oral every 6 hours PRN  ALPRAZolam 0.5 milliGRAM(s) Oral at bedtime  ALPRAZolam 0.25 milliGRAM(s) Oral two times a day  aspirin  chewable 81 milliGRAM(s) Oral daily  atorvastatin 20 milliGRAM(s) Oral at bedtime  bethanechol 10 milliGRAM(s) Oral three times a day  bisacodyl 5 milliGRAM(s) Oral every 12 hours  bisacodyl Suppository 10 milliGRAM(s) Rectal daily PRN  dexAMETHasone     Tablet 4 milliGRAM(s) Oral two times a day  enoxaparin Injectable 40 milliGRAM(s) SubCutaneous daily  escitalopram 10 milliGRAM(s) Oral daily  furosemide    Tablet 40 milliGRAM(s) Oral daily  hemorrhoidal Ointment 1 Application(s) Rectal three times a day  ibuprofen  Tablet. 400 milliGRAM(s) Oral every 8 hours PRN  levETIRAcetam 1000 milliGRAM(s) Oral two times a day  lidocaine   Patch 1 Patch Transdermal daily  melatonin 3 milliGRAM(s) Oral at bedtime  metoprolol succinate ER 25 milliGRAM(s) Oral daily  pantoprazole    Tablet 40 milliGRAM(s) Oral before breakfast  polyethylene glycol 3350 17 Gram(s) Oral two times a day  saccharomyces boulardii 250 milliGRAM(s) Oral two times a day  senna 2 Tablet(s) Oral at bedtime  triamcinolone 0.1% Cream 1 Application(s) Topical two times a day      PHYSICAL EXAM:  General: NAD, confused  ENT: MMM, no oral thrush  Neck: Supple, No JVD  Lungs: Clear to auscultation bilaterally, non labored breathing  Cardio: RRR, S1/S2, No murmurs  Abdomen: Soft, Nontender, Nondistended; Bowel sounds present  Extremities: No cyanosis, No edema    LABS:                                            RADIOLOGY & ADDITIONAL TESTS:    Care Discussed with Consultants/Other Providers: Patient is a 79y old  Male who presents with a chief complaint of pna with respiratory failure and hypoxia (10 Sep 2020 17:01)    Patient seen and examined at bedside.  no acute events overnight    ALLERGIES:  No Known Allergies    Vital Signs Last 24 Hrs  T(F): 97.7 (11 Sep 2020 05:35), Max: 98 (10 Sep 2020 22:45)  HR: 65 (11 Sep 2020 05:35) (61 - 65)  BP: 130/69 (11 Sep 2020 05:35) (130/69 - 137/65)  RR: 15 (11 Sep 2020 05:35) (15 - 16)  SpO2: 95% (11 Sep 2020 05:35) (95% - 97%)  I&O's Summary    10 Sep 2020 07:01  -  11 Sep 2020 07:00  --------------------------------------------------------  IN: 340 mL / OUT: 0 mL / NET: 340 mL      MEDICATIONS:  acetaminophen   Tablet .. 650 milliGRAM(s) Oral every 6 hours PRN  ALPRAZolam 0.5 milliGRAM(s) Oral at bedtime  ALPRAZolam 0.25 milliGRAM(s) Oral two times a day  aspirin  chewable 81 milliGRAM(s) Oral daily  atorvastatin 20 milliGRAM(s) Oral at bedtime  bethanechol 10 milliGRAM(s) Oral three times a day  bisacodyl 5 milliGRAM(s) Oral every 12 hours  bisacodyl Suppository 10 milliGRAM(s) Rectal daily PRN  dexAMETHasone     Tablet 4 milliGRAM(s) Oral two times a day  enoxaparin Injectable 40 milliGRAM(s) SubCutaneous daily  escitalopram 10 milliGRAM(s) Oral daily  furosemide    Tablet 40 milliGRAM(s) Oral daily  hemorrhoidal Ointment 1 Application(s) Rectal three times a day  ibuprofen  Tablet. 400 milliGRAM(s) Oral every 8 hours PRN  levETIRAcetam 1000 milliGRAM(s) Oral two times a day  lidocaine   Patch 1 Patch Transdermal daily  melatonin 3 milliGRAM(s) Oral at bedtime  metoprolol succinate ER 25 milliGRAM(s) Oral daily  pantoprazole    Tablet 40 milliGRAM(s) Oral before breakfast  polyethylene glycol 3350 17 Gram(s) Oral two times a day  saccharomyces boulardii 250 milliGRAM(s) Oral two times a day  senna 2 Tablet(s) Oral at bedtime  triamcinolone 0.1% Cream 1 Application(s) Topical two times a day      PHYSICAL EXAM:  General: NAD, confused  ENT: MMM, no oral thrush  Neck: Supple, No JVD  Lungs: Clear to auscultation bilaterally, non labored breathing  Cardio: RRR, S1/S2, No murmurs  Abdomen: Soft, Nontender, Nondistended; Bowel sounds present  Extremities: No cyanosis, No edema    LABS:  RADIOLOGY & ADDITIONAL TESTS:    Care Discussed with Consultants/Other Providers: ID, palliative care

## 2020-09-11 NOTE — PROGRESS NOTE ADULT - SUBJECTIVE AND OBJECTIVE BOX
Follow-up Pall Progress Note    Mike Mustafa MD  [phone]    No new respiratory events overnight.  Denies SOB/CP. Pt comfortable.    Medications:  Vital Signs Last 24 Hrs  T(C): 36.5 (11 Sep 2020 05:35), Max: 36.7 (10 Sep 2020 22:45)  T(F): 97.7 (11 Sep 2020 05:35), Max: 98 (10 Sep 2020 22:45)  HR: 65 (11 Sep 2020 05:35) (61 - 65)  BP: 130/69 (11 Sep 2020 05:35) (130/69 - 137/65)  BP(mean): --  RR: 15 (11 Sep 2020 05:35) (15 - 16)  SpO2: 95% (11 Sep 2020 05:35) (95% - 97%)          09-10 @ 07:01  -  09-11 @ 07:00  --------------------------------------------------------  IN: 340 mL / OUT: 0 mL / NET: 340 mL        LABS:                    CULTURES:        Physical Examination:  PULM: Clear to auscultation bilaterally, no significant sputum production  CVS: Regular rate and rhythm, no murmurs, rubs, or gallops  ABD: Soft, non-tender  EXT:  No clubbing, cyanosis, or edema    RADIOLOGY REVIEWED  CXR:    CT chest:    TTE:

## 2020-09-11 NOTE — PROGRESS NOTE ADULT - ASSESSMENT
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    # Acute Hypoxic Respiratory Failure  # Multilobar Gram Negative PNA  # Enterobacter UTI  now resolved, pt completed seven day course of IV cefepime  ID following, montior fever curve, CBC  supplemental oxygen therapy via nasal canula maintain SPo2 > 88%    # Glioblastoma Multiforma with left sided weakness and vasogenic edema  Imaging c/w disease progression and worsening vasogenic edema in right temporal/frontal/parietal regions  plan to continue decadron taper, restarted 9/8/20  not a candidate for avastatin  taper to be completed after one more dose  palliative care DNR/DNI  lidocaine patch    # Severe Protein Calorie Malnutrition in context of GBM  SLP consult appreciated  continue dysphagia diet with supplemental ensure  continue MVI  aspiration precautions, total feeding assistance    # Constipation  continue bowel regimen - dulcolax, senna, miralax    # Urinary Retention  contineu bethanechol    # Depression/Anxiety  continue lexapro, xanax    # CAD  # HTN  chronic condition  continue metoprolol with parameters, cont lasix  continue asa and lipitor    # GOC  DNR/DNI    # Dispo  home with hospice once set up

## 2020-09-11 NOTE — PROGRESS NOTE ADULT - SUBJECTIVE AND OBJECTIVE BOX
CC: f/u for pneumonia, he has completed his full course of treatment     Patient is awake and alert in bed, he is afebrile     REVIEW OF SYSTEMS:  All other review of systems negative (Comprehensive ROS): limited by condition    Antimicrobials Day #  :day 7  cefepime   IVPB 2000 milliGRAM(s) IV Intermittent every 8 hours    Vital Signs Last 24 Hrs  T(C): 36.5 (11 Sep 2020 05:35), Max: 36.7 (10 Sep 2020 22:45)  T(F): 97.7 (11 Sep 2020 05:35), Max: 98 (10 Sep 2020 22:45)  HR: 65 (11 Sep 2020 05:35) (61 - 65)  BP: 130/69 (11 Sep 2020 05:35) (130/69 - 137/65)  BP(mean): --  RR: 15 (11 Sep 2020 05:35) (15 - 16)  SpO2: 95% (11 Sep 2020 05:35) (95% - 97%)    PHYSICAL EXAM:  General: sleepy, no acute distress  Eyes:  anicteric, no conjunctival injection, no discharge  Oropharynx: no lesions or injection 	  Lungs: few ronchi  Heart: regular rate and rhythm; no murmur, rubs or gallops  Abdomen: soft, nondistended, nontender, without mass or organomegaly  Skin: no lesions  Extremities: no clubbing, cyanosis, or edema  Neurologic: alert, oriented, rt side is weak    LAB RESULTS:    no new labs for today         MICROBIOLOGY:  RECENT CULTURES:  Culture - Blood (09.02.20 @ 09:03)    Specimen Source: .Blood Blood    Culture Results:   No Growth Final        RADIOLOGY REVIEWED:    < from: CT Chest No Cont (09.03.20 @ 12:08) >  IMPRESSION:  Bilateral upper lobe and right middle lobe airspace infiltrates on the basisof pneumonia, pulmonary edema and/or motion artifact. Bibasilar dependent changes.    Mild cardiomegaly.    < end of copied text >

## 2020-09-11 NOTE — PROGRESS NOTE ADULT - ATTENDING COMMENTS
I have personally seen and examined patient on the above date.  I discussed the case with DAKSHA Balbuena and I agree with findings and plan as detailed per note above, which I have amended where appropriate.
I have personally seen and examined patient on the above date. I discussed the case with BRANDI Sofia and I agree with the findings and plan as detailed per note above, which I have amended where appropriate.
I have personally seen and examined patient on the above date.  I discussed the case with Loyd Balbuena PA and I agree with findings and plan as detailed per note above, which I have amended where appropriate.

## 2020-09-11 NOTE — PROGRESS NOTE ADULT - ASSESSMENT
78 yo M with progressive GBM a/w fever x 2 days likely related to pneumonia and /or enterobacter UTI.  He has been debulked 3 times, is s/p recent admission to [facility] for speech difficulties and left sided weakness, treated with increased decadron, tapered  in rehab  He is an aspiration risk and requires ISC.  He is DNR/DNI and is being considered for directed GBM treatment but may not be a candidate.  Suspect bacterial infection but if he does not respond will need to consider fungal infections as well-PCP, aspergillus.  MRSA --neg, Vancomycin--d/cd  Fungitell--elevated, but he seems to be responding well to antibiotics so pcp and fungal infection seems less likely.  flow sheets reviewed he is afebrile , most recent wbc was in normal range   micro reviewed blood cx are no growth   no signs of infection     Suggest:  He has completed full course of Cefepime as planned   reviewed notes from palliative care and they report that patient will be DC on hospice

## 2020-09-12 PROCEDURE — 99233 SBSQ HOSP IP/OBS HIGH 50: CPT

## 2020-09-12 RX ADMIN — Medication 10 MILLIGRAM(S): at 21:42

## 2020-09-12 RX ADMIN — Medication 1 APPLICATION(S): at 06:30

## 2020-09-12 RX ADMIN — POLYETHYLENE GLYCOL 3350 17 GRAM(S): 17 POWDER, FOR SOLUTION ORAL at 17:17

## 2020-09-12 RX ADMIN — Medication 4 MILLIGRAM(S): at 06:30

## 2020-09-12 RX ADMIN — Medication 0.25 MILLIGRAM(S): at 06:30

## 2020-09-12 RX ADMIN — Medication 10 MILLIGRAM(S): at 06:30

## 2020-09-12 RX ADMIN — LEVETIRACETAM 1000 MILLIGRAM(S): 250 TABLET, FILM COATED ORAL at 06:30

## 2020-09-12 RX ADMIN — PANTOPRAZOLE SODIUM 40 MILLIGRAM(S): 20 TABLET, DELAYED RELEASE ORAL at 06:30

## 2020-09-12 RX ADMIN — ENOXAPARIN SODIUM 40 MILLIGRAM(S): 100 INJECTION SUBCUTANEOUS at 11:55

## 2020-09-12 RX ADMIN — Medication 40 MILLIGRAM(S): at 06:30

## 2020-09-12 RX ADMIN — PHENYLEPHRINE-SHARK LIVER OIL-MINERAL OIL-PETROLATUM RECTAL OINTMENT 1 APPLICATION(S): at 06:30

## 2020-09-12 RX ADMIN — LIDOCAINE 1 PATCH: 4 CREAM TOPICAL at 11:56

## 2020-09-12 RX ADMIN — POLYETHYLENE GLYCOL 3350 17 GRAM(S): 17 POWDER, FOR SOLUTION ORAL at 06:30

## 2020-09-12 RX ADMIN — LEVETIRACETAM 1000 MILLIGRAM(S): 250 TABLET, FILM COATED ORAL at 17:18

## 2020-09-12 RX ADMIN — LIDOCAINE 1 PATCH: 4 CREAM TOPICAL at 19:55

## 2020-09-12 RX ADMIN — Medication 250 MILLIGRAM(S): at 17:17

## 2020-09-12 RX ADMIN — Medication 10 MILLIGRAM(S): at 13:09

## 2020-09-12 RX ADMIN — Medication 0.5 MILLIGRAM(S): at 21:43

## 2020-09-12 RX ADMIN — Medication 250 MILLIGRAM(S): at 06:30

## 2020-09-12 RX ADMIN — Medication 4 MILLIGRAM(S): at 17:17

## 2020-09-12 RX ADMIN — PHENYLEPHRINE-SHARK LIVER OIL-MINERAL OIL-PETROLATUM RECTAL OINTMENT 1 APPLICATION(S): at 21:49

## 2020-09-12 RX ADMIN — Medication 0.25 MILLIGRAM(S): at 17:17

## 2020-09-12 RX ADMIN — ESCITALOPRAM OXALATE 10 MILLIGRAM(S): 10 TABLET, FILM COATED ORAL at 11:56

## 2020-09-12 RX ADMIN — Medication 81 MILLIGRAM(S): at 11:56

## 2020-09-12 RX ADMIN — ATORVASTATIN CALCIUM 20 MILLIGRAM(S): 80 TABLET, FILM COATED ORAL at 21:43

## 2020-09-12 RX ADMIN — PHENYLEPHRINE-SHARK LIVER OIL-MINERAL OIL-PETROLATUM RECTAL OINTMENT 1 APPLICATION(S): at 13:09

## 2020-09-12 RX ADMIN — Medication 5 MILLIGRAM(S): at 06:30

## 2020-09-12 RX ADMIN — Medication 5 MILLIGRAM(S): at 17:18

## 2020-09-12 RX ADMIN — SENNA PLUS 2 TABLET(S): 8.6 TABLET ORAL at 21:43

## 2020-09-12 RX ADMIN — Medication 25 MILLIGRAM(S): at 06:30

## 2020-09-12 RX ADMIN — Medication 3 MILLIGRAM(S): at 21:43

## 2020-09-12 NOTE — PROGRESS NOTE ADULT - SUBJECTIVE AND OBJECTIVE BOX
Patient is a 79y old  Male who presents with a chief complaint of pna with respiratory failure and hypoxia (11 Sep 2020 16:15)    Denies new symptoms.  Patient seen and examined at bedside.    ALLERGIES:  No Known Allergies    MEDICATIONS  (STANDING):  aspirin  chewable 81 milliGRAM(s) Oral daily  atorvastatin 20 milliGRAM(s) Oral at bedtime  bethanechol 10 milliGRAM(s) Oral three times a day  bisacodyl 5 milliGRAM(s) Oral every 12 hours  dexAMETHasone     Tablet 4 milliGRAM(s) Oral two times a day  enoxaparin Injectable 40 milliGRAM(s) SubCutaneous daily  escitalopram 10 milliGRAM(s) Oral daily  furosemide    Tablet 40 milliGRAM(s) Oral daily  hemorrhoidal Ointment 1 Application(s) Rectal three times a day  levETIRAcetam 1000 milliGRAM(s) Oral two times a day  lidocaine   Patch 1 Patch Transdermal daily  melatonin 3 milliGRAM(s) Oral at bedtime  metoprolol succinate ER 25 milliGRAM(s) Oral daily  pantoprazole    Tablet 40 milliGRAM(s) Oral before breakfast  polyethylene glycol 3350 17 Gram(s) Oral two times a day  saccharomyces boulardii 250 milliGRAM(s) Oral two times a day  senna 2 Tablet(s) Oral at bedtime  triamcinolone 0.1% Cream 1 Application(s) Topical two times a day    MEDICATIONS  (PRN):  acetaminophen   Tablet .. 650 milliGRAM(s) Oral every 6 hours PRN Temp greater or equal to 38C (100.4F), Mild Pain (1 - 3)  bisacodyl Suppository 10 milliGRAM(s) Rectal daily PRN Constipation  ibuprofen  Tablet. 400 milliGRAM(s) Oral every 8 hours PRN Temp greater or equal to 38C (100.4F), Moderate Pain (4 - 6)    Vital Signs Last 24 Hrs  T(F): 98.5 (11 Sep 2020 19:48), Max: 98.5 (11 Sep 2020 19:48)  HR: 88 (11 Sep 2020 19:48) (84 - 88)  BP: 128/79 (11 Sep 2020 19:48) (120/71 - 128/79)  RR: 18 (11 Sep 2020 19:48) (18 - 21)  SpO2: 95% (11 Sep 2020 19:48) (92% - 95%)      PHYSICAL EXAM:  General: NAD, A/O x 3  Neck: Supple, No JVD  Lungs:  clear to auscultation b/l no wheezing/rales  Cardio: RRR, S1/S2, No murmurs  Abdomen: soft nontender +bs  Extremities: no pitting edema   Neuro: face symmetric  Skin: no rashes    LABS:      most recent labs reviewed.

## 2020-09-12 NOTE — CHART NOTE - NSCHARTNOTEFT_GEN_A_CORE
Chart contents noted. Seen for swallow follow-up to subjectively assess diet tolerance at bedside and candidacy for diet upgrade. Patient received sleeping, but easily awoken and willing to participate. Willing to accept trials of thin liquids via cup, nectar thick liquids and minimal trials of mechanical soft (minced and moist) solids. patient demonstrated adequate acceptance and anterior containment of all boluses. Laryngeal movement appreciated via digital palpation. Immediate and delayed cough following trials of thin liquids via cup sip. Cough suspected to be ineffective, as patient continued to present with intermittent wet vocal quality. Patient accepted limited trials of mechanical soft (minced and moist) textures with mild lingual residue, which cleared with cued liquid wash. Patient left without distress or c/o pain. Recommend to continue current diet of mechanical soft (minced and moist) solids with nectar thick liquids secondary to overt s/s of penetration/aspiration at bedside with thin liquids. Continue universal aspiration precautions and oral care. Chart contents noted. Seen for swallow follow-up to subjectively assess diet tolerance at bedside and candidacy for diet upgrade. Patient received sleeping, but easily awoken and willing to participate. Willing to accept trials of thin liquids via cup, nectar thick liquids and minimal trials of mechanical soft (minced and moist) solids. patient demonstrated adequate acceptance and anterior containment of all boluses. Laryngeal movement appreciated via digital palpation. Immediate and delayed cough following trials of thin liquids via cup sip. Cough suspected to be ineffective, as patient continued to present with intermittent wet vocal quality. Patient accepted limited trials of mechanical soft (minced and moist) textures with mild lingual residue, which cleared with cued liquid wash. Patient left without distress or c/o pain. Recommend to continue current diet of mechanical soft (minced and moist) solids with nectar thick liquids secondary to overt s/s of penetration/aspiration at bedside with thin liquids. Continue universal aspiration precautions and oral care. Will continue to follow-up to subjectively assess diet tolerance and candidacy for upgrade.

## 2020-09-12 NOTE — PROGRESS NOTE ADULT - ASSESSMENT
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    # Acute Hypoxic Respiratory Failure  # Multilobar Gram Negative PNA  # Enterobacter UTI  now resolved, pt completed seven day course of IV cefepime  ID following, montior fever curve, CBC  supplemental oxygen therapy via nasal canula maintain SPo2 > 88%    # Glioblastoma Multiforma with left sided weakness and vasogenic edema  Imaging c/w disease progression and worsening vasogenic edema in right temporal/frontal/parietal regions  plan to continue decadron taper, restarted 9/8/20  not a candidate for avastatin  taper to be completed after one more dose  palliative care DNR/DNI  lidocaine patch    # Severe Protein Calorie Malnutrition in context of GBM  SLP consult appreciated  continue dysphagia diet with supplemental ensure  continue MVI  aspiration precautions, total feeding assistance    # Constipation  continue bowel regimen - dulcolax, senna, miralax    # Urinary Retention  continue bethanechol    # Depression/Anxiety  continue lexapro, xanax    # CAD  # HTN  chronic condition  continue metoprolol with parameters, cont lasix  continue asa and lipitor    # GOC: DNR/DNI    # Dispo: home with hospice, tentative discharge for Monday

## 2020-09-13 VITALS — RESPIRATION RATE: 17 BRPM | OXYGEN SATURATION: 98 % | TEMPERATURE: 99 F | HEART RATE: 70 BPM

## 2020-09-13 LAB
ALBUMIN SERPL ELPH-MCNC: 2.7 G/DL — LOW (ref 3.3–5)
ALP SERPL-CCNC: 89 U/L — SIGNIFICANT CHANGE UP (ref 40–120)
ALT FLD-CCNC: 30 U/L — SIGNIFICANT CHANGE UP (ref 10–45)
ANION GAP SERPL CALC-SCNC: 8 MMOL/L — SIGNIFICANT CHANGE UP (ref 5–17)
AST SERPL-CCNC: 22 U/L — SIGNIFICANT CHANGE UP (ref 10–40)
BILIRUB SERPL-MCNC: 0.6 MG/DL — SIGNIFICANT CHANGE UP (ref 0.2–1.2)
BUN SERPL-MCNC: 46 MG/DL — HIGH (ref 7–23)
CALCIUM SERPL-MCNC: 9.5 MG/DL — SIGNIFICANT CHANGE UP (ref 8.4–10.5)
CHLORIDE SERPL-SCNC: 99 MMOL/L — SIGNIFICANT CHANGE UP (ref 96–108)
CO2 SERPL-SCNC: 31 MMOL/L — SIGNIFICANT CHANGE UP (ref 22–31)
CREAT SERPL-MCNC: 1.11 MG/DL — SIGNIFICANT CHANGE UP (ref 0.5–1.3)
GLUCOSE SERPL-MCNC: 120 MG/DL — HIGH (ref 70–99)
HCT VFR BLD CALC: 35.8 % — LOW (ref 39–50)
HGB BLD-MCNC: 11.4 G/DL — LOW (ref 13–17)
MCHC RBC-ENTMCNC: 30.9 PG — SIGNIFICANT CHANGE UP (ref 27–34)
MCHC RBC-ENTMCNC: 31.8 GM/DL — LOW (ref 32–36)
MCV RBC AUTO: 97 FL — SIGNIFICANT CHANGE UP (ref 80–100)
NRBC # BLD: 2 /100 WBCS — HIGH (ref 0–0)
PLATELET # BLD AUTO: 373 K/UL — SIGNIFICANT CHANGE UP (ref 150–400)
POTASSIUM SERPL-MCNC: 3.7 MMOL/L — SIGNIFICANT CHANGE UP (ref 3.5–5.3)
POTASSIUM SERPL-SCNC: 3.7 MMOL/L — SIGNIFICANT CHANGE UP (ref 3.5–5.3)
PROT SERPL-MCNC: 7 G/DL — SIGNIFICANT CHANGE UP (ref 6–8.3)
RBC # BLD: 3.69 M/UL — LOW (ref 4.2–5.8)
RBC # FLD: 13.3 % — SIGNIFICANT CHANGE UP (ref 10.3–14.5)
SODIUM SERPL-SCNC: 138 MMOL/L — SIGNIFICANT CHANGE UP (ref 135–145)
WBC # BLD: 10.62 K/UL — HIGH (ref 3.8–10.5)
WBC # FLD AUTO: 10.62 K/UL — HIGH (ref 3.8–10.5)

## 2020-09-13 PROCEDURE — 99232 SBSQ HOSP IP/OBS MODERATE 35: CPT

## 2020-09-13 RX ORDER — SODIUM CHLORIDE 9 MG/ML
1000 INJECTION, SOLUTION INTRAVENOUS
Refills: 0 | Status: DISCONTINUED | OUTPATIENT
Start: 2020-09-13 | End: 2020-09-14

## 2020-09-13 RX ADMIN — Medication 650 MILLIGRAM(S): at 22:23

## 2020-09-13 RX ADMIN — Medication 0.25 MILLIGRAM(S): at 17:22

## 2020-09-13 RX ADMIN — Medication 650 MILLIGRAM(S): at 03:35

## 2020-09-13 RX ADMIN — POLYETHYLENE GLYCOL 3350 17 GRAM(S): 17 POWDER, FOR SOLUTION ORAL at 05:37

## 2020-09-13 RX ADMIN — POLYETHYLENE GLYCOL 3350 17 GRAM(S): 17 POWDER, FOR SOLUTION ORAL at 17:20

## 2020-09-13 RX ADMIN — PANTOPRAZOLE SODIUM 40 MILLIGRAM(S): 20 TABLET, DELAYED RELEASE ORAL at 05:40

## 2020-09-13 RX ADMIN — Medication 650 MILLIGRAM(S): at 23:20

## 2020-09-13 RX ADMIN — Medication 400 MILLIGRAM(S): at 01:20

## 2020-09-13 RX ADMIN — SODIUM CHLORIDE 75 MILLILITER(S): 9 INJECTION, SOLUTION INTRAVENOUS at 16:11

## 2020-09-13 RX ADMIN — Medication 4 MILLIGRAM(S): at 17:22

## 2020-09-13 RX ADMIN — Medication 250 MILLIGRAM(S): at 17:22

## 2020-09-13 RX ADMIN — Medication 5 MILLIGRAM(S): at 05:37

## 2020-09-13 RX ADMIN — Medication 81 MILLIGRAM(S): at 11:40

## 2020-09-13 RX ADMIN — Medication 25 MILLIGRAM(S): at 05:36

## 2020-09-13 RX ADMIN — Medication 0.25 MILLIGRAM(S): at 05:37

## 2020-09-13 RX ADMIN — LIDOCAINE 1 PATCH: 4 CREAM TOPICAL at 00:00

## 2020-09-13 RX ADMIN — ESCITALOPRAM OXALATE 10 MILLIGRAM(S): 10 TABLET, FILM COATED ORAL at 11:40

## 2020-09-13 RX ADMIN — Medication 400 MILLIGRAM(S): at 19:59

## 2020-09-13 RX ADMIN — Medication 650 MILLIGRAM(S): at 02:35

## 2020-09-13 RX ADMIN — ENOXAPARIN SODIUM 40 MILLIGRAM(S): 100 INJECTION SUBCUTANEOUS at 11:40

## 2020-09-13 RX ADMIN — Medication 10 MILLIGRAM(S): at 05:36

## 2020-09-13 RX ADMIN — PHENYLEPHRINE-SHARK LIVER OIL-MINERAL OIL-PETROLATUM RECTAL OINTMENT 1 APPLICATION(S): at 05:40

## 2020-09-13 RX ADMIN — Medication 10 MILLIGRAM(S): at 14:47

## 2020-09-13 RX ADMIN — LEVETIRACETAM 1000 MILLIGRAM(S): 250 TABLET, FILM COATED ORAL at 05:37

## 2020-09-13 RX ADMIN — Medication 250 MILLIGRAM(S): at 05:36

## 2020-09-13 RX ADMIN — Medication 4 MILLIGRAM(S): at 05:37

## 2020-09-13 RX ADMIN — Medication 40 MILLIGRAM(S): at 05:37

## 2020-09-13 RX ADMIN — LIDOCAINE 1 PATCH: 4 CREAM TOPICAL at 19:14

## 2020-09-13 RX ADMIN — Medication 1 APPLICATION(S): at 05:37

## 2020-09-13 RX ADMIN — LIDOCAINE 1 PATCH: 4 CREAM TOPICAL at 11:40

## 2020-09-13 RX ADMIN — Medication 400 MILLIGRAM(S): at 21:00

## 2020-09-13 RX ADMIN — LEVETIRACETAM 1000 MILLIGRAM(S): 250 TABLET, FILM COATED ORAL at 17:22

## 2020-09-13 RX ADMIN — Medication 400 MILLIGRAM(S): at 02:20

## 2020-09-13 NOTE — PROGRESS NOTE ADULT - SUBJECTIVE AND OBJECTIVE BOX
Patient is a 79y old  Male who presents with a chief complaint of pna with respiratory failure and hypoxia (12 Sep 2020 12:10)    No overnight events noted.  Patient seen and examined at bedside.    ALLERGIES:  No Known Allergies    MEDICATIONS  (STANDING):  aspirin  chewable 81 milliGRAM(s) Oral daily  atorvastatin 20 milliGRAM(s) Oral at bedtime  bethanechol 10 milliGRAM(s) Oral three times a day  bisacodyl 5 milliGRAM(s) Oral every 12 hours  dexAMETHasone     Tablet 4 milliGRAM(s) Oral two times a day  enoxaparin Injectable 40 milliGRAM(s) SubCutaneous daily  escitalopram 10 milliGRAM(s) Oral daily  furosemide    Tablet 40 milliGRAM(s) Oral daily  hemorrhoidal Ointment 1 Application(s) Rectal three times a day  levETIRAcetam 1000 milliGRAM(s) Oral two times a day  lidocaine   Patch 1 Patch Transdermal daily  melatonin 3 milliGRAM(s) Oral at bedtime  metoprolol succinate ER 25 milliGRAM(s) Oral daily  pantoprazole    Tablet 40 milliGRAM(s) Oral before breakfast  polyethylene glycol 3350 17 Gram(s) Oral two times a day  saccharomyces boulardii 250 milliGRAM(s) Oral two times a day  senna 2 Tablet(s) Oral at bedtime  triamcinolone 0.1% Cream 1 Application(s) Topical two times a day    MEDICATIONS  (PRN):  acetaminophen   Tablet .. 650 milliGRAM(s) Oral every 6 hours PRN Temp greater or equal to 38C (100.4F), Mild Pain (1 - 3)  bisacodyl Suppository 10 milliGRAM(s) Rectal daily PRN Constipation  ibuprofen  Tablet. 400 milliGRAM(s) Oral every 8 hours PRN Temp greater or equal to 38C (100.4F), Moderate Pain (4 - 6)    Vital Signs Last 24 Hrs  T(F): 98.4 (13 Sep 2020 05:30), Max: 98.4 (13 Sep 2020 05:30)  HR: 74 (13 Sep 2020 05:30) (70 - 78)  BP: 136/75 (13 Sep 2020 05:30) (132/75 - 159/79)  RR: 16 (13 Sep 2020 05:30) (16 - 18)  SpO2: 96% (13 Sep 2020 05:30) (90% - 96%)    PHYSICAL EXAM:  General: NAD, A/O x3  Neck: supple  Lungs:  clear to auscultation b/l no wheezing/rales  Cardio: RRR, S1/S2  Abdomen: soft nontender +bs  Extremities: no pitting edema   Neuro: face symmetric  Skin: no rashes    LABS:      most recent labs reviewed, cbc, bmp 9/07

## 2020-09-13 NOTE — PROGRESS NOTE ADULT - ASSESSMENT
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    # Acute Hypoxic Respiratory Failure  # Multilobar Gram Negative PNA  # Enterobacter UTI  resolved  s/p seven day course of IV cefepime  ID following, montior fever curve, CBC  supplemental oxygen therapy via nasal canula maintain SPo2 > 88%    # Glioblastoma Multiforma with left sided weakness and vasogenic edema  Imaging c/w disease progression and worsening vasogenic edema in right temporal/frontal/parietal regions  plan to continue decadron taper, restarted 9/8/20  not a candidate for avastatin  taper to be completed after one more dose  palliative care DNR/DNI  lidocaine patch    # Severe Protein Calorie Malnutrition in context of GBM  SLP consult appreciated  continue dysphagia diet with supplemental ensure  continue MVI  aspiration precautions, total feeding assistance    # Constipation  continue bowel regimen - dulcolax, senna, miralax    # Urinary Retention  continue bethanechol    # Depression/Anxiety  continue lexapro, xanax    # CAD  # HTN  chronic condition  continue metoprolol with parameters, cont lasix  continue asa and lipitor    # GOC: DNR/DNI    # Dispo: home with hospice, tentative discharge for Monday       79 male with htn, hld, prostate ca s/p prostatectomy, basal cell skin ca, 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    # Acute Hypoxic Respiratory Failure  # Multilobar Gram Negative PNA  # Enterobacter UTI  resolved  s/p seven day course of IV cefepime  ID following, montior fever curve, CBC  supplemental oxygen therapy via nasal canula maintain SPo2 > 88%    # Glioblastoma Multiforma with left sided weakness and vasogenic edema  Imaging c/w disease progression and worsening vasogenic edema in right temporal/frontal/parietal regions  plan to continue decadron taper, restarted 9/8/20  not a candidate for avastatin  taper to be completed after one more dose  palliative care DNR/DNI  lidocaine patch    # Severe Protein Calorie Malnutrition in context of GBM  SLP consult appreciated  continue dysphagia diet with supplemental ensure  continue MVI  aspiration precautions, total feeding assistance    # Azotemia, mild MARYJANE  cbc and bmp 9/13 demonstrating heme concentration/ MARYJANE  start gentle IVF while in hospital  Nacl 0.45 at 75cc/hr to complete 1000ml only  patient will be on home hospice thereafter    # Constipation  continue bowel regimen - dulcolax, senna, miralax    # Urinary Retention  continue bethanechol    # Depression/Anxiety  continue lexapro, xanax    # CAD  # HTN  chronic condition  continue metoprolol with parameters, cont lasix  continue asa and lipitor    # GOC: DNR/DNI    # Dispo: home with hospice, tentative discharge for Monday

## 2020-09-14 ENCOUNTER — TRANSCRIPTION ENCOUNTER (OUTPATIENT)
Age: 80
End: 2020-09-14

## 2020-09-14 PROCEDURE — 92526 ORAL FUNCTION THERAPY: CPT

## 2020-09-14 PROCEDURE — 99239 HOSP IP/OBS DSCHRG MGMT >30: CPT

## 2020-09-14 PROCEDURE — 99233 SBSQ HOSP IP/OBS HIGH 50: CPT | Mod: GW

## 2020-09-14 PROCEDURE — 80048 BASIC METABOLIC PNL TOTAL CA: CPT

## 2020-09-14 PROCEDURE — 36415 COLL VENOUS BLD VENIPUNCTURE: CPT

## 2020-09-14 PROCEDURE — 80202 ASSAY OF VANCOMYCIN: CPT

## 2020-09-14 PROCEDURE — 92610 EVALUATE SWALLOWING FUNCTION: CPT

## 2020-09-14 PROCEDURE — 80053 COMPREHEN METABOLIC PANEL: CPT

## 2020-09-14 PROCEDURE — 85027 COMPLETE CBC AUTOMATED: CPT

## 2020-09-14 PROCEDURE — 86769 SARS-COV-2 COVID-19 ANTIBODY: CPT

## 2020-09-14 PROCEDURE — 82962 GLUCOSE BLOOD TEST: CPT

## 2020-09-14 RX ORDER — FUROSEMIDE 40 MG
40 TABLET ORAL
Qty: 30 | Refills: 0
Start: 2020-09-14

## 2020-09-14 RX ORDER — DEXAMETHASONE 0.5 MG/5ML
1 ELIXIR ORAL
Qty: 4 | Refills: 0
Start: 2020-09-14 | End: 2020-09-17

## 2020-09-14 RX ORDER — LANOLIN ALCOHOL/MO/W.PET/CERES
1 CREAM (GRAM) TOPICAL
Qty: 30 | Refills: 0
Start: 2020-09-14 | End: 2020-10-13

## 2020-09-14 RX ORDER — PANTOPRAZOLE SODIUM 20 MG/1
1 TABLET, DELAYED RELEASE ORAL
Qty: 30 | Refills: 0
Start: 2020-09-14

## 2020-09-14 RX ORDER — ALPRAZOLAM 0.25 MG
1 TABLET ORAL
Qty: 0 | Refills: 0 | DISCHARGE
Start: 2020-09-14

## 2020-09-14 RX ORDER — ATORVASTATIN CALCIUM 80 MG/1
1 TABLET, FILM COATED ORAL
Qty: 30 | Refills: 0
Start: 2020-09-14 | End: 2020-10-13

## 2020-09-14 RX ORDER — BETHANECHOL CHLORIDE 25 MG
1 TABLET ORAL
Qty: 90 | Refills: 0
Start: 2020-09-14 | End: 2020-10-13

## 2020-09-14 RX ORDER — POLYETHYLENE GLYCOL 3350 17 G/17G
17 POWDER, FOR SOLUTION ORAL
Qty: 510 | Refills: 0
Start: 2020-09-14 | End: 2020-10-13

## 2020-09-14 RX ORDER — ALPRAZOLAM 0.25 MG
1 TABLET ORAL
Qty: 60 | Refills: 0
Start: 2020-09-14 | End: 2020-10-13

## 2020-09-14 RX ORDER — ALPRAZOLAM 0.25 MG
1 TABLET ORAL
Qty: 30 | Refills: 0
Start: 2020-09-14 | End: 2020-10-13

## 2020-09-14 RX ORDER — LEVETIRACETAM 250 MG/1
1 TABLET, FILM COATED ORAL
Qty: 60 | Refills: 0
Start: 2020-09-14 | End: 2020-10-13

## 2020-09-14 RX ORDER — DOCUSATE SODIUM 100 MG
1 CAPSULE ORAL
Qty: 30 | Refills: 0
Start: 2020-09-14

## 2020-09-14 RX ORDER — DEXAMETHASONE 0.5 MG/5ML
1 ELIXIR ORAL
Qty: 4 | Refills: 0
Start: 2020-09-14 | End: 2020-09-15

## 2020-09-14 RX ORDER — SENNA PLUS 8.6 MG/1
2 TABLET ORAL
Qty: 60 | Refills: 0
Start: 2020-09-14

## 2020-09-14 RX ORDER — DEXAMETHASONE 0.5 MG/5ML
1 ELIXIR ORAL
Qty: 0 | Refills: 0 | DISCHARGE
Start: 2020-09-14

## 2020-09-14 RX ORDER — ESCITALOPRAM OXALATE 10 MG/1
1 TABLET, FILM COATED ORAL
Qty: 30 | Refills: 0
Start: 2020-09-14

## 2020-09-14 RX ADMIN — LIDOCAINE 1 PATCH: 4 CREAM TOPICAL at 00:18

## 2020-09-14 NOTE — PROGRESS NOTE ADULT - SUBJECTIVE AND OBJECTIVE BOX
Patient is a 79y old  Male who presents with a chief complaint of pna with respiratory failure and hypoxia (13 Sep 2020 08:36)      Patient seen and examined at bedside.  No acute events overnight.  For home hospice.    ALLERGIES:  No Known Allergies    MEDICATIONS  (STANDING):  aspirin  chewable 81 milliGRAM(s) Oral daily  atorvastatin 20 milliGRAM(s) Oral at bedtime  bethanechol 10 milliGRAM(s) Oral three times a day  bisacodyl 5 milliGRAM(s) Oral every 12 hours  dexAMETHasone     Tablet 4 milliGRAM(s) Oral two times a day  enoxaparin Injectable 40 milliGRAM(s) SubCutaneous daily  escitalopram 10 milliGRAM(s) Oral daily  furosemide    Tablet 40 milliGRAM(s) Oral daily  hemorrhoidal Ointment 1 Application(s) Rectal three times a day  levETIRAcetam 1000 milliGRAM(s) Oral two times a day  lidocaine   Patch 1 Patch Transdermal daily  melatonin 3 milliGRAM(s) Oral at bedtime  metoprolol succinate ER 25 milliGRAM(s) Oral daily  pantoprazole    Tablet 40 milliGRAM(s) Oral before breakfast  polyethylene glycol 3350 17 Gram(s) Oral two times a day  saccharomyces boulardii 250 milliGRAM(s) Oral two times a day  senna 2 Tablet(s) Oral at bedtime  sodium chloride 0.45%. 1000 milliLiter(s) (75 mL/Hr) IV Continuous <Continuous>  triamcinolone 0.1% Cream 1 Application(s) Topical two times a day    MEDICATIONS  (PRN):  acetaminophen   Tablet .. 650 milliGRAM(s) Oral every 6 hours PRN Temp greater or equal to 38C (100.4F), Mild Pain (1 - 3)  bisacodyl Suppository 10 milliGRAM(s) Rectal daily PRN Constipation  ibuprofen  Tablet. 400 milliGRAM(s) Oral every 8 hours PRN Temp greater or equal to 38C (100.4F), Moderate Pain (4 - 6)    Vital Signs Last 24 Hrs  T(F): 98.6 (13 Sep 2020 21:26), Max: 98.6 (13 Sep 2020 21:26)  HR: 70 (13 Sep 2020 21:26) (70 - 74)  BP: 133/69 (13 Sep 2020 17:18) (133/69 - 133/69)  RR: 17 (13 Sep 2020 21:26) (16 - 17)  SpO2: 98% (13 Sep 2020 21:26) (97% - 98%)  I&O's Summary    13 Sep 2020 07:01  -  14 Sep 2020 07:00  --------------------------------------------------------  IN: 650 mL / OUT: 0 mL / NET: 650 mL        PHYSICAL EXAM:  General: NAD, A/O x 3  ENT: MMM  Neck: Supple, No JVD  Lungs: Clear to auscultation bilaterally  Cardio: RRR, S1/S2, No murmurs  Abdomen: Soft, Nontender, Nondistended; Bowel sounds present  Extremities: No calf tenderness, No pitting edema    LABS:                        11.4   10.62 )-----------( 373      ( 13 Sep 2020 07:00 )             35.8       09-13    138  |  99  |  46  ----------------------------<  120  3.7   |  31  |  1.11    Ca    9.5      13 Sep 2020 07:00    TPro  7.0  /  Alb  2.7  /  TBili  0.6  /  DBili  x   /  AST  22  /  ALT  30  /  AlkPhos  89  09-13     eGFR if Non African American: 64 mL/min/1.73M2 (09-13-20 @ 07:00)  eGFR if : 74 mL/min/1.73M2 (09-13-20 @ 07:00)                                     RADIOLOGY & ADDITIONAL TESTS:    Care Discussed with Consultants/Other Providers:

## 2020-09-14 NOTE — PROGRESS NOTE ADULT - ASSESSMENT
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    # Acute Hypoxic Respiratory Failure  # Multilobar Gram Negative PNA  # Enterobacter UTI  resolved  s/p seven day course of IV cefepime  ID following, montior fever curve, CBC  supplemental oxygen therapy via nasal canula maintain SPo2 > 88%    # Glioblastoma Multiforma with left sided weakness and vasogenic edema  Imaging c/w disease progression and worsening vasogenic edema in right temporal/frontal/parietal regions  plan to continue decadron taper, restarted 9/8/20  not a candidate for avastatin  palliative care DNR/DNI  lidocaine patch    # Severe Protein Calorie Malnutrition in context of GBM  SLP consult appreciated  continue dysphagia diet with supplemental ensure  continue MVI  aspiration precautions, total feeding assistance    # Azotemia, mild MARYJANE on CKD stage 2  cbc and bmp 9/13 demonstrating heme concentration/ MARYJANE - follow up labs on 9/14  gentle hydration as clinically indicated  Nacl 0.45 at 75cc/hr to complete 1000ml only  patient will be on home hospice thereafter    # Constipation  continue bowel regimen - dulcolax, senna, miralax    # Urinary Retention  continue bethanechol    # Depression/Anxiety  continue lexapro, xanax    # CAD  # HTN  chronic condition  continue metoprolol with parameters, cont lasix  continue asa and lipitor    # GOC: DNR/DNI    # Dispo: home with hospice, tentative discharge when HH is set up    #Palliative:  Reviewed with [facility] today.

## 2020-09-14 NOTE — CHART NOTE - NSCHARTNOTEFT_GEN_A_CORE
Attempted to see patient, however per charting, patient approved for home hospice and transportation present to bring patient home. Due to dysphagia diet, this SLP spoke with son Hermilo via phone call on current diet consistency of mechanical soft solids/nectar thickened liquids. All questions/concerns addressed, education provided on where to purchase thickening agent as well safe swallow strategies to adhere to in order to improve quality of life. Spoke with RN that education was provided to family.

## 2020-09-14 NOTE — PROGRESS NOTE ADULT - NUTRITIONAL ASSESSMENT
This patient has been assessed with a concern for Malnutrition and has been determined to have a diagnosis/diagnoses of Severe protein-calorie malnutrition.    This patient is being managed with:   Diet Dysphagia 2 Mechanical Soft-Nectar Consistency Fluid-  Supplement Feeding Modality:  Oral  Ensure Enlive Servings Per Day:  1       Frequency:  Three Times a day  Entered: Sep  6 2020  2:42PM
This patient has been assessed with a concern for Malnutrition and has been determined to have a diagnosis/diagnoses of Severe protein-calorie malnutrition.    This patient is being managed with:   Diet Dysphagia 3 Soft-Nectar Consistency Fluid-  Supplement Feeding Modality:  Oral  Ensure Enlive Servings Per Day:  1       Frequency:  Three Times a day  Entered: Sep  6 2020 11:56AM    The following pending diet order is being considered for treatment of Severe protein-calorie malnutrition:  Diet Dysphagia 2 Mechanical Soft-Nectar Consistency Fluid-  Supplement Feeding Modality:  Oral  Ensure Enlive Servings Per Day:  1       Frequency:  Three Times a day  Entered: Sep  6 2020  2:42PM
This patient has been assessed with a concern for Malnutrition and has been determined to have a diagnosis/diagnoses of Severe protein-calorie malnutrition.    This patient is being managed with:   Diet Dysphagia 2 Mechanical Soft-Nectar Consistency Fluid-  Supplement Feeding Modality:  Oral  Ensure Enlive Servings Per Day:  1       Frequency:  Three Times a day  Entered: Sep  6 2020  2:42PM

## 2020-09-14 NOTE — PROGRESS NOTE ADULT - ASSESSMENT
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    # Acute Hypoxic Respiratory Failure  # Multilobar Gram Negative PNA  # Enterobacter UTI  resolved  s/p seven day course of IV cefepime  ID following, montior fever curve, CBC  supplemental oxygen therapy via nasal canula maintain SPo2 > 88%    # Glioblastoma Multiforma with left sided weakness and vasogenic edema  Imaging c/w disease progression and worsening vasogenic edema in right temporal/frontal/parietal regions  plan to continue decadron taper, restarted 9/8/20  not a candidate for avastatin  palliative care DNR/DNI  lidocaine patch    # Severe Protein Calorie Malnutrition in context of GBM  SLP consult appreciated  continue dysphagia diet with supplemental ensure  continue MVI  aspiration precautions, total feeding assistance    # Azotemia, mild MARYJANE on CKD stage 2  cbc and bmp 9/13 demonstrating heme concentration/ MARYJANE - follow up labs on 9/14  gentle hydration as clinically indicated  Nacl 0.45 at 75cc/hr to complete 1000ml only  patient will be on home hospice thereafter    # Constipation  continue bowel regimen - dulcolax, senna, miralax    # Urinary Retention  continue bethanechol    # Depression/Anxiety  continue lexapro, xanax    # CAD  # HTN  chronic condition  continue metoprolol with parameters, cont lasix  continue asa and lipitor    # GOC: DNR/DNI    # Dispo: home with hospice, tentative discharge when HH is set up

## 2020-09-14 NOTE — DISCHARGE NOTE NURSING/CASE MANAGEMENT/SOCIAL WORK - PATIENT PORTAL LINK FT
You can access the FollowMyHealth Patient Portal offered by [facility] by registering at the following website: [email or web address]. By joining [employer]’s FollowMyHealth portal, you will also be able to view your health information using other applications (apps) compatible with our system.

## 2020-09-14 NOTE — DISCHARGE NOTE PROVIDER - HOSPITAL COURSE
History of Present Illness:   79M hx of HTN, HLD, prostate ca s/p prostatectomy, basal cell skin ca, hx of GBM dx in 2018 s/p 3 resections last in 5/2020 s/p multiple doses of Temodar admitted to [facility] 8/4/2020 for worsening L sided weakness. CT head and MRI with progression of dz and worsening vasogenic edema in R temporal/frontal/parietal regions treated with tapering Decadron, hospital course complicated by urinary retention. Pt tx to [facility] 8/7/2020 with limited progress in PT and followed by Palliative care and to be discharged to home hospice when stable. Pt is DNR/DNI. Rehab course complicated with fevers 9/2 with noted UTI growing Enterobacter cloacae and CT chest with multiple lobe pna and now transferred to medical floor for persistent fevers and hypoxia requiring supplemental oxygen. Pt currently denied any complaints on oxygen 3L NC sat 90-96% and normotensive, speaking full sentences. Denied SOB, chest pain, cough, abd pain.     Pt was admitted and treated for pna with IV abx.  Decadron taper for GBM. currently on decadron 4mg BID for 2 more days. Then 4mg daily for 4 days.  to be discharged home on home hospice. Will be treated for urinary retention with bethanechol. to follow up with urologist as needed.

## 2020-09-14 NOTE — DISCHARGE NOTE PROVIDER - NSDCMRMEDTOKEN_GEN_ALL_CORE_FT
acetaminophen 325 mg oral tablet: 2 tab(s) orally every 6 hours, As needed, Temp greater or equal to 38C (100.4F), Mild Pain (1 - 3)  ALPRAZolam 0.25 mg oral tablet: 1 tab(s) orally 2 times a day  ALPRAZolam 0.5 mg oral tablet: 1 tab(s) orally once a day (at bedtime)  aspirin 81 mg oral tablet, chewable: 1 tab(s) orally once a day  atorvastatin 20 mg oral tablet: 1 tab(s) orally once a day (at bedtime)  atorvastatin 20 mg oral tablet: 1 tab(s) orally once a day (at bedtime)   bethanechol 10 mg oral tablet: 1 tab(s) orally 3 times a day  bethanechol 10 mg oral tablet: 1 tab(s) orally 3 times a day   bisacodyl 10 mg rectal suppository: 1 suppository(ies) rectal once a day, As needed, Constipation  bisacodyl 5 mg oral delayed release tablet: 1 tab(s) orally every 12 hours  bisacodyl 5 mg oral delayed release tablet: 1 tab(s) orally 2 times a day   Colace 100 mg oral capsule: 1 cap(s) orally once a day   dexamethasone 4 mg oral tablet: 1 tab(s) orally 2 times a day  dexamethasone 4 mg oral tablet: 1 tab(s) orally once a day   docusate sodium 100 mg oral capsule: 1 cap(s) orally once a day  escitalopram 10 mg oral tablet: 1 tab(s) orally   furosemide 40 mg oral tablet: 40 tab(s) orally once a day   furosemide 40 mg oral tablet: 1 tab(s) orally once a day  Keppra 1000 mg oral tablet: 1 tab(s) orally 2 times a day   levETIRAcetam 1000 mg oral tablet: 1 tab(s) orally 2 times a day  Lexapro 10 mg oral tablet: 1 tab(s) orally once a day   lidocaine 5% topical film:  topically   melatonin 3 mg oral tablet: 1 tab(s) orally once a day (at bedtime)  melatonin 3 mg oral tablet: 1 tab(s) orally once a day (at bedtime)   menthol-benzocaine 3.6 mg-15 mg mucous membrane lozenge:  mucous membrane   metoprolol succinate 25 mg oral tablet, extended release: 1 tab(s) orally once a day  MiraLax oral powder for reconstitution: 17 gram(s) orally once a day   pantoprazole 40 mg oral delayed release tablet: 1 tab(s) orally once a day (before a meal)  polyethylene glycol 3350 oral powder for reconstitution: 17 gram(s) orally 2 times a day  Protonix 40 mg oral delayed release tablet: 1 tab(s) orally once a day   saccharomyces boulardii lyo 250 mg oral capsule: 1 cap(s) orally 2 times a day  senna oral tablet: 2 tab(s) orally once a day (at bedtime)  senna oral tablet: 2 tab(s) orally once a day (at bedtime)   triamcinolone 0.1% topical cream: 1 application topically 2 times a day  Xanax 0.25 mg oral tablet: 1 tab(s) orally 2 times a day MDD:1 mg  Xanax 0.5 mg oral tablet: 1 tab(s) orally once a day (at bedtime) MDD:1mg

## 2020-09-14 NOTE — PROGRESS NOTE ADULT - PROVIDER SPECIALTY LIST ADULT
Hospitalist
Infectious Disease
Internal Medicine
Internal Medicine
Palliative Care
Hospitalist
Hospitalist

## 2020-09-14 NOTE — PROGRESS NOTE ADULT - SUBJECTIVE AND OBJECTIVE BOX
Follow-up Pall Progress Note    Mike Mustafa MD  [phone]    No new respiratory events overnight.  Denies SOB/CP. Pt comfortable.  awaiting transfer to home hospice.    Medications:  Vital Signs Last 24 Hrs  T(C): 37 (13 Sep 2020 21:26), Max: 37 (13 Sep 2020 21:26)  T(F): 98.6 (13 Sep 2020 21:26), Max: 98.6 (13 Sep 2020 21:26)  HR: 70 (13 Sep 2020 21:26) (70 - 74)  BP: 133/69 (13 Sep 2020 17:18) (133/69 - 133/69)  BP(mean): --  RR: 17 (13 Sep 2020 21:26) (16 - 17)  SpO2: 98% (13 Sep 2020 21:26) (97% - 98%)          09-13 @ 07:01  -  09-14 @ 07:00  --------------------------------------------------------  IN: 650 mL / OUT: 0 mL / NET: 650 mL        LABS:                        11.4   10.62 )-----------( 373      ( 13 Sep 2020 07:00 )             35.8     09-13    138  |  99  |  46<H>  ----------------------------<  120<H>  3.7   |  31  |  1.11    Ca    9.5      13 Sep 2020 07:00    TPro  7.0  /  Alb  2.7<L>  /  TBili  0.6  /  DBili  x   /  AST  22  /  ALT  30  /  AlkPhos  89  09-13              CULTURES:        Physical Examination:  PULM: Clear to auscultation bilaterally, no significant sputum production  CVS: Regular rate and rhythm, no murmurs, rubs, or gallops  ABD: Soft, non-tender  EXT:  No clubbing, cyanosis, or edema  Neuro: Lethargic

## 2020-09-14 NOTE — PROGRESS NOTE ADULT - REASON FOR ADMISSION
pna with respiratory failure and hypoxia

## 2020-09-14 NOTE — DISCHARGE NOTE PROVIDER - NSDCCPCAREPLAN_GEN_ALL_CORE_FT
PRINCIPAL DISCHARGE DIAGNOSIS  Diagnosis: Pneumonia  Assessment and Plan of Treatment: You were admitted for pneumonia  You were diagnosed with acute hypoxic respiratory failure  You were treated with IV antibiotics (cefepime)  Source of Infection:  Antibiotic / Last Day: Last day of antibiotics was as an inpatient  You will need to follow up with your primary care physician.  Discharging Provider:  Liz Whitney DO  Contact Info: [phone] - Please call with any questions or concerns.        SECONDARY DISCHARGE DIAGNOSES  Diagnosis: Anxiety  Assessment and Plan of Treatment:     Diagnosis: Depression  Assessment and Plan of Treatment:     Diagnosis: GBM (glioblastoma multiforme)  Assessment and Plan of Treatment:

## 2020-10-11 NOTE — ED ADULT NURSE NOTE - ED STAT RN HANDOFF DETAILS
Abdomen soft, nondistended. Epigastric + periumbilical tenderness to palpation without guarding or rebound tenderness. No organomegaly or masses appreciated.
Report given to Pham WELLS

## 2020-11-05 NOTE — ED ADULT NURSE NOTE - NS ED NOTE  TALK SOMEONE YN
No Transposition Flap Text: The defect edges were debeveled with a #15 scalpel blade.  Given the location of the defect and the proximity to free margins a transposition flap was deemed most appropriate.  Using a sterile surgical marker, an appropriate transposition flap was drawn incorporating the defect.    The area thus outlined was incised deep to adipose tissue with a #15 scalpel blade.  The skin margins were undermined to an appropriate distance in all directions utilizing iris scissors.

## 2020-11-20 NOTE — CONSULT NOTE ADULT - PROBLEM SELECTOR RECOMMENDATION 9
GBM  post op this month  Neuro eval noted  eval for seizure  on AED   Keppra  monitor sx and serial labs and serial PE No

## 2020-11-30 NOTE — H&P ADULT - NSICDXFAMILYHX_GEN_ALL_CORE_FT
FAMILY HISTORY:  Family history of diabetes mellitus, Father   Family history of lung cancer, mother , long standing smoker  Family history of prostate cancer in father,     Sibling  Still living? Yes, Estimated age: Age Unknown  Family history of breast cancer in mother, Age at diagnosis: Age Unknown loop recorder

## 2021-01-19 NOTE — BRIEF OPERATIVE NOTE - SPECIMENS
Right brain tumor Island Pedicle Flap Text: The defect edges were debeveled with a #15 scalpel blade.  Given the location of the defect, shape of the defect and the proximity to free margins an island pedicle advancement flap was deemed most appropriate.  Using a sterile surgical marker, an appropriate advancement flap was drawn incorporating the defect, outlining the appropriate donor tissue and placing the expected incisions within the relaxed skin tension lines where possible.    The area thus outlined was incised deep to adipose tissue with a #15 scalpel blade.  The skin margins were undermined to an appropriate distance in all directions around the primary defect and laterally outward around the island pedicle utilizing iris scissors.  There was minimal undermining beneath the pedicle flap.

## 2021-01-20 NOTE — PROGRESS NOTE ADULT - SUBJECTIVE AND OBJECTIVE BOX
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  He then went to play baseball as scheduled for 4 hrs, then felt concerned went to see his PMD, who sent him to [facility] concerned for CVA.  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  Denies focal weakness, dysphagia, LOC, or seizure like activity.   on 8/3/18 S/P R crani for resection of tumor.  OVERNIGHT EVENTS:   no acute events overnight     ADMISSION SCORES:   GCS: HH: MF: NIHSS: RASS: CAM-ICU: ICP:  CLINICAL TRIALS:  Allergies    No Known Allergies    Intolerances        REVIEW OF SYSTEMS: [ ] Unable to Assess due to neurologic exam   [ ] All ROS addressed below are non-contributory, except:  Neuro: [ ] Headache [ ] Back pain [ ] Numbness [ ] Weakness [ ] Ataxia [ ] Dizziness [ ] Aphasia [ ] Dysarthria [ ] Visual disturbance  Resp: [ ] Shortness of breath/dyspnea, [ ] Orthopnea [ ] Cough  CV: [ ] Chest pain [ ] Palpitation [ ] Lightheadedness [ ] Syncope  Renal: [ ] Thirst [ ] Edema  GI: [ ] Nausea [ ] Emesis [ ] Abdominal pain [ ] Constipation [ ] Diarrhea  Hem: [ ] Hematemesis [ ] bright red blood per rectum  ID: [ ] Fever [ ] Chills [ ] Dysuria  ENT: [ ] Rhinorrhea    DEVICES:   [ ] Restraints [ ] ET tube [ ] central line [ ] arterial line [ ] mejia [ ] NGT/OGT [ ] EVD [ ] LD [ ] ELISEO/HMV [ ] Trach [ ] PEG [ ] Chest Tube     VITALS: [ ] Reviewed  Vital Signs Last 24 Hrs  T(C): 36.6 (06 Aug 2018 03:00), Max: 36.7 (05 Aug 2018 23:00)  T(F): 97.9 (06 Aug 2018 03:00), Max: 98.1 (05 Aug 2018 23:00)  HR: 52 (06 Aug 2018 07:00) (44 - 70)  BP: 146/69 (06 Aug 2018 07:00) (123/44 - 146/69)  BP(mean): 87 (06 Aug 2018 07:00) (63 - 87)  RR: 19 (06 Aug 2018 07:00) (16 - 21)  SpO2: 97% (06 Aug 2018 07:00) (94% - 98%)  CAPILLARY BLOOD GLUCOSE      POCT Blood Glucose.: 126 mg/dL (05 Aug 2018 08:25)        LABS:              STROKE CORE MEASURES:      MEDICATION LEVELS:     IMAGING/DATA: [ ] Reviewed    IVF FLUIDS/MEDICATIONS: [ ] Reviewed  MEDICATIONS  (STANDING):  allopurinol 300 milliGRAM(s) Oral daily  dexamethasone     Tablet 3 milliGRAM(s) Oral three times a day  docusate sodium 100 milliGRAM(s) Oral three times a day  enoxaparin Injectable 40 milliGRAM(s) SubCutaneous <User Schedule>  levETIRAcetam 1000 milliGRAM(s) Oral every 12 hours  losartan 50 milliGRAM(s) Oral daily  multivitamin 1 Tablet(s) Oral daily  NIFEdipine XL 90 milliGRAM(s) Oral daily  psyllium Powder 1 Packet(s) Oral daily  senna 2 Tablet(s) Oral at bedtime  sodium chloride 0.9%. 1000 milliLiter(s) (50 mL/Hr) IV Continuous <Continuous>    MEDICATIONS  (PRN):  acetaminophen   Tablet 650 milliGRAM(s) Oral every 6 hours PRN For Temp greater than 38 C (100.4 F)  acetaminophen   Tablet. 650 milliGRAM(s) Oral every 6 hours PRN Mild Pain (1 - 3)  ALPRAZolam 0.5 milliGRAM(s) Oral daily PRN anxiety  benzocaine 15 mG/menthol 3.6 mG Lozenge 1 Lozenge Oral three times a day PRN Sore Throat  ondansetron Injectable 4 milliGRAM(s) IV Push every 6 hours PRN Nausea and/or Vomiting    I&O's Summary    05 Aug 2018 07:01  -  06 Aug 2018 07:00  --------------------------------------------------------  IN: 800 mL / OUT: 700 mL / NET: 100 mL        EXAMINATION:  Physical exaM:   Constitutional: No Acute Distress     Neurological: Awake, alert oriented to person, place and time, Following Commands, PERRL, EOMI, No Gaze Preference, L facial ; dysarthria;  +_ left sided  nystagmus; visual field intact     Motor exam:          Upper extremity                         Delt     Bicep     Tricep    HG                                                 R         5/5 5/5 5/5 5/5                                               L          5/5 5/5 5/5 5/5          Lower extremity                        HF         KF        KE       DF         PF                                                  R        5/5 5/5 5/5 5/5 5/5                                               L         5/5 5/5 5/5 5/5 5/5                                                 Sensation: [X ] intact to light touch     Reflexes: Deep Tendon Reflexes Intact     Pulmonary: Clear to Auscultation, No rales, No rhonchi, No wheezes     Cardiovascular: S1, S2, Regular rate and rhythm     Gastrointestinal: Soft, Non-tender, Non-distended     Extremities: No calf tenderness SUMMARY:HPI:  78 yo male Hx HTN, HLD, prostate ca s/p prostatectomy, 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  He then went to play baseball as scheduled for 4 hrs, then felt concerned went to see his PMD, who sent him to [facility] concerned for CVA.  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  Denies focal weakness, dysphagia, LOC, or seizure like activity.   on 8/3/18 S/P R crani for resection of tumor.  OVERNIGHT EVENTS:   no acute events overnight       Allergies    No Known Allergies    Intolerances        REVIEW OF SYSTEMS: [ ] Unable to Assess due to neurologic exam   [X ] All ROS addressed below are non-contributory, except:  Neuro: [ ] Headache [ ] Back pain [ ] Numbness [ ] Weakness [ ] Ataxia [ ] Dizziness [ ] Aphasia [X ] Dysarthria [ ] Visual disturbance  Resp: [ ] Shortness of breath/dyspnea, [ ] Orthopnea [ ] Cough  CV: [ ] Chest pain [ ] Palpitation [ ] Lightheadedness [ ] Syncope  Renal: [ ] Thirst [ ] Edema  GI: [ ] Nausea [ ] Emesis [ ] Abdominal pain [ ] Constipation [ ] Diarrhea  Hem: [ ] Hematemesis [ ] bright red blood per rectum  ID: [ ] Fever [ ] Chills [ ] Dysuria  ENT: [ ] Rhinorrhea    DEVICES:   [ ] Restraints [ ] ET tube [ ] central line [ ] arterial line [ ] mejia [ ] NGT/OGT [ ] EVD [ ] LD [ ] ELISEO/HMV [ ] Trach [ ] PEG [ ] Chest Tube     VITALS: [X ] Reviewed  Vital Signs Last 24 Hrs  T(C): 36.6 (06 Aug 2018 03:00), Max: 36.7 (05 Aug 2018 23:00)  T(F): 97.9 (06 Aug 2018 03:00), Max: 98.1 (05 Aug 2018 23:00)  HR: 52 (06 Aug 2018 07:00) (44 - 70)  BP: 146/69 (06 Aug 2018 07:00) (123/44 - 146/69)  BP(mean): 87 (06 Aug 2018 07:00) (63 - 87)  RR: 19 (06 Aug 2018 07:00) (16 - 21)  SpO2: 97% (06 Aug 2018 07:00) (94% - 98%)  CAPILLARY BLOOD GLUCOSE      POCT Blood Glucose.: 126 mg/dL (05 Aug 2018 08:25)        LABS:              STROKE CORE MEASURES:      MEDICATION LEVELS:     IMAGING/DATA: [X ] Reviewed    IVF FLUIDS/MEDICATIONS: [X ] Reviewed  MEDICATIONS  (STANDING):  allopurinol 300 milliGRAM(s) Oral daily  dexamethasone     Tablet 3 milliGRAM(s) Oral three times a day  docusate sodium 100 milliGRAM(s) Oral three times a day  enoxaparin Injectable 40 milliGRAM(s) SubCutaneous <User Schedule>  levETIRAcetam 1000 milliGRAM(s) Oral every 12 hours  losartan 50 milliGRAM(s) Oral daily  multivitamin 1 Tablet(s) Oral daily  NIFEdipine XL 90 milliGRAM(s) Oral daily  psyllium Powder 1 Packet(s) Oral daily  senna 2 Tablet(s) Oral at bedtime  sodium chloride 0.9%. 1000 milliLiter(s) (50 mL/Hr) IV Continuous <Continuous>    MEDICATIONS  (PRN):  acetaminophen   Tablet 650 milliGRAM(s) Oral every 6 hours PRN For Temp greater than 38 C (100.4 F)  acetaminophen   Tablet. 650 milliGRAM(s) Oral every 6 hours PRN Mild Pain (1 - 3)  ALPRAZolam 0.5 milliGRAM(s) Oral daily PRN anxiety  benzocaine 15 mG/menthol 3.6 mG Lozenge 1 Lozenge Oral three times a day PRN Sore Throat  ondansetron Injectable 4 milliGRAM(s) IV Push every 6 hours PRN Nausea and/or Vomiting    I&O's Summary    05 Aug 2018 07:01  -  06 Aug 2018 07:00  --------------------------------------------------------  IN: 800 mL / OUT: 700 mL / NET: 100 mL        EXAMINATION:  Physical exaM:   Constitutional: No Acute Distress     Neurological: Awake, alert oriented to person, place and time, Following Commands, PERRL, EOMI, No Gaze Preference, L facial ; dysarthria;  +_ left sided  nystagmus; visual field intact     Motor exam:          Upper extremity                         Delt     Bicep     Tricep    HG                                                 R         5/5 5/5 5/5 5/5                                               L          5/5 5/5 5/5 5/5          Lower extremity                        HF         KF        KE       DF         PF                                                  R        5/5 5/5 5/5 5/5 5/5                                               L         5/5 5/5 5/5 5/5 5/5                                                 Sensation: [X ] intact to light touch     Reflexes: Deep Tendon Reflexes Intact     Pulmonary: Clear to Auscultation, No rales, No rhonchi, No wheezes     Cardiovascular: S1, S2, Regular rate and rhythm     Gastrointestinal: Soft, Non-tender, Non-distended     Extremities: No calf tenderness SUMMARY:HPI:  78 yo male Hx HTN, HLD, prostate ca s/p prostatectomy, 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  He then went to play baseball as scheduled for 4 hrs, then felt concerned went to see his PMD, who sent him to [facility] concerned for CVA.  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  Denies focal weakness, dysphagia, LOC, or seizure like activity.   on 8/3/18 S/P R crani for resection of tumor.  OVERNIGHT EVENTS:   no acute events overnight       Allergies    No Known Allergies    Intolerances        REVIEW OF SYSTEMS: [ ] Unable to Assess due to neurologic exam   [X ] All ROS addressed below are non-contributory, except:  Neuro: [ ] Headache [ ] Back pain [ ] Numbness [ ] Weakness [ ] Ataxia [ ] Dizziness [ ] Aphasia [X ] Dysarthria [ ] Visual disturbance  Resp: [ ] Shortness of breath/dyspnea, [ ] Orthopnea [ ] Cough  CV: [ ] Chest pain [ ] Palpitation [ ] Lightheadedness [ ] Syncope  Renal: [ ] Thirst [ ] Edema  GI: [ ] Nausea [ ] Emesis [ ] Abdominal pain [ ] Constipation [ ] Diarrhea  Hem: [ ] Hematemesis [ ] bright red blood per rectum  ID: [ ] Fever [ ] Chills [ ] Dysuria  ENT: [ ] Rhinorrhea    VITALS: [X ] Reviewed  Vital Signs Last 24 Hrs  T(C): 36.6 (06 Aug 2018 03:00), Max: 36.7 (05 Aug 2018 23:00)  T(F): 97.9 (06 Aug 2018 03:00), Max: 98.1 (05 Aug 2018 23:00)  HR: 52 (06 Aug 2018 07:00) (44 - 70)  BP: 146/69 (06 Aug 2018 07:00) (123/44 - 146/69)  BP(mean): 87 (06 Aug 2018 07:00) (63 - 87)  RR: 19 (06 Aug 2018 07:00) (16 - 21)  SpO2: 97% (06 Aug 2018 07:00) (94% - 98%)  CAPILLARY BLOOD GLUCOSE      POCT Blood Glucose.: 126 mg/dL (05 Aug 2018 08:25)        LABS:              STROKE CORE MEASURES:      MEDICATION LEVELS:     IMAGING/DATA: [X ] Reviewed    IVF FLUIDS/MEDICATIONS: [X ] Reviewed  MEDICATIONS  (STANDING):  allopurinol 300 milliGRAM(s) Oral daily  dexamethasone     Tablet 3 milliGRAM(s) Oral three times a day  docusate sodium 100 milliGRAM(s) Oral three times a day  enoxaparin Injectable 40 milliGRAM(s) SubCutaneous <User Schedule>  levETIRAcetam 1000 milliGRAM(s) Oral every 12 hours  losartan 50 milliGRAM(s) Oral daily  multivitamin 1 Tablet(s) Oral daily  NIFEdipine XL 90 milliGRAM(s) Oral daily  psyllium Powder 1 Packet(s) Oral daily  senna 2 Tablet(s) Oral at bedtime  sodium chloride 0.9%. 1000 milliLiter(s) (50 mL/Hr) IV Continuous <Continuous>    MEDICATIONS  (PRN):  acetaminophen   Tablet 650 milliGRAM(s) Oral every 6 hours PRN For Temp greater than 38 C (100.4 F)  acetaminophen   Tablet. 650 milliGRAM(s) Oral every 6 hours PRN Mild Pain (1 - 3)  ALPRAZolam 0.5 milliGRAM(s) Oral daily PRN anxiety  benzocaine 15 mG/menthol 3.6 mG Lozenge 1 Lozenge Oral three times a day PRN Sore Throat  ondansetron Injectable 4 milliGRAM(s) IV Push every 6 hours PRN Nausea and/or Vomiting    I&O's Summary    05 Aug 2018 07:01  -  06 Aug 2018 07:00  --------------------------------------------------------  IN: 800 mL / OUT: 700 mL / NET: 100 mL        EXAMINATION:  Physical exaM:   Constitutional: No Acute Distress     Neurological: Awake, alert oriented to person, place and time, Following Commands, PERRL, EOMI, No Gaze Preference, L facial ; dysarthria;  +_ left sided  nystagmus; visual field intact     Motor exam:          Upper extremity                         Delt     Bicep     Tricep    HG                                                 R         5/5        5/5        5/5       5/5                                               L          5/5 5/5 5/5 5/5          Lower extremity                        HF         KF        KE       DF         PF                                                  R        5/5 5/5 5/5 5/5 5/5                                               L         5/5 5/5 5/5 5/5 5/5                                                 Sensation: [X ] intact to light touch     Reflexes: Deep Tendon Reflexes Intact     Pulmonary: Clear to Auscultation, No rales, No rhonchi, No wheezes     Cardiovascular: S1, S2, Regular rate and rhythm     Gastrointestinal: Soft, Non-tender, Non-distended     Extremities: No calf tenderness 64.68

## 2021-04-13 NOTE — PROGRESS NOTE ADULT - ASSESSMENT
Visit Information    Have you changed or started any medications since your last visit including any over-the-counter medicines, vitamins, or herbal medicines? no   Have you stopped taking any of your medications? Is so, why? -  no  Are you having any side effects from any of your medications? - no    Have you seen any other physician or provider since your last visit?  no   Have you had any other diagnostic tests since your last visit? yes - US RENAL   Have you been seen in the emergency room and/or had an admission in a hospital since we last saw you?  no   Have you had your routine dental cleaning in the past 6 months?  no     Do you have an active MyChart account? If no, what is the barrier?   Yes    Patient Care Team:  Devika Mullen MD as PCP - General (Family Medicine)  Devika Mullen MD as PCP - [facility] Provider  Tammy Ch DO as Consulting Physician (Obstetrics & Gynecology)  Jean Paul Antonio MD as Consulting Physician (Pulmonology)  Lamar Kamara MD as Consulting Physician (Gastroenterology)  Ned Sanders RN as Nurse Navigator    Medical History Review  Past Medical, Family, and Social History reviewed and does contribute to the patient presenting condition    Health Maintenance   Topic Date Due    COVID-19 Vaccine (1) Never done    Cervical cancer screen  01/06/2019    Pneumococcal 0-64 years Vaccine (3 of 3 - PPSV23) 05/06/2021    Lipid screen  09/09/2021    Low dose CT lung screening  09/09/2021    Breast cancer screen  12/21/2022    Colon cancer screen colonoscopy  02/15/2023    DTaP/Tdap/Td vaccine (2 - Td) 09/09/2026    Flu vaccine  Completed    Shingles Vaccine  Completed    Hepatitis C screen  Completed    HIV screen  Completed    Hepatitis A vaccine  Aged Out    Hepatitis B vaccine  Aged Out    Hib vaccine  Aged Out    Meningococcal (ACWY) vaccine  Aged Out PE - continues on Lovenox    GBM - out-pt management with neuro-onc    Anemia - AOCD - Hgb stable and can monitor.    Thrombocytosis - reactive picture.

## 2021-06-22 NOTE — PHYSICAL THERAPY INITIAL EVALUATION ADULT - IMPAIRMENTS CONTRIBUTING IMPAIRED BED MOBILITY, REHAB EVAL
From: Gabbi Curry  To: Bri Nicholson  Sent: 6/22/2021 4:16 PM CDT  Subject: Medication Question    I know that there is a time period for my body to adjust to a medication change but the increased dosage of Trulicity ( we doubled it ) is really making me sick. Since the change I have been very nauseous and vomiting.   It is normally in the morning. First week was vomiting 2-3 times a week, next week was good, this week is bad again. I am taking Sunday night as it is once a week along with the tresiba. Numbers vary as I am eating what ever I find will stay down. Any Suggestions?    Gabbi Curry  
Left voicemail with information and sent message.   
See message and advise please.    
This patient saw Dr. Nicholson on 5/26. Trulicity was increased at that time.   I recommend she be seen in office for eval given h/o diabetes, in the next 24  Hours. MAT  
decreased flexibility/impaired coordination/decreased ROM/impaired balance/impaired postural control/abnormal muscle tone/decreased sensation/decreased strength

## 2021-07-22 NOTE — PATIENT PROFILE ADULT - HAS THE PATIENT USED TOBACCO IN THE PAST 30 DAYS?
Patient rated anxiety 10 and depression 10. She continue to endorsed Si, not able to contract for safety. She was on 1:1. Patient denied HI. She also endorsed auditory hallucination, stated that she was hearing her father's voice, the voice was kind to her. Patient was sad and anxious about discharge plan. She was crying, expressing that she doesn't feel ready and safe to go to her group home yet. Staff reassured her that she will be ok and there will be staff at the group home that help her anything that she need. Spoke to group home staff and updated them about patient's mood and status. Information regarding appointment, medication,and activity given to group home staff. Patient discharged at 1050.    No

## 2021-07-26 NOTE — DISCHARGE NOTE ADULT - NS AS ACTIVITY OBS
Alert and interactive, no focal deficits
Walking-Indoors allowed/Showering allowed/No Heavy lifting/straining/Walking-Outdoors allowed/Stairs allowed

## 2021-11-17 NOTE — ED PROVIDER NOTE - PLAN OF CARE
Hello-Would you be ok with this patient taking low dose oral minoxidil for her hairloss? We would start her at 1/4 of a 2.5mg tablet daily if ok with you. Thanks, Kavitha Stratton
hypoxic resp failure

## 2022-01-19 NOTE — ED ADULT NURSE NOTE - NS ED NURSE LEVEL OF CONSCIOUSNESS MENTAL STATUS
Patient requesting refill(s) of: Lisinopril     Last filled: 6/30/21 90 tabs 1 refill   Last appt: 6/30/21  Next appt: 1/27/22  Pharmacy: [facility] aid Awake/Alert

## 2022-03-01 NOTE — BEHAVIORAL HEALTH ASSESSMENT NOTE - NSBHADMITCOUNSEL_PSY_A_CORE
risks and benefits of treatment options Non-Graft Cartilage Fenestration Text: The cartilage was fenestrated with a 2mm punch biopsy to help facilitate healing.

## 2022-03-21 NOTE — ED ADULT NURSE NOTE - PMH
[de-identified] : no pain now  Basal cell carcinoma (BCC) of left lower leg  s/p resection  right lower leg top  left lower leg  Bilateral hearing loss, unspecified hearing loss type    Fall, sequela    Glioblastoma  biopsy 8/2018  surgery  6 weeks radiation  35 days of oral chemotherapy last 3 weeks  HLD (hyperlipidemia)    HTN (hypertension)     activity  [employer] 1962-65    [place]  Prostate cancer  s/p radical prostatectomy 1995  Type 2 diabetes mellitus without complication, without long-term current use of insulin  stop oral meds  3 weeks ago

## 2022-05-26 NOTE — ED PROVIDER NOTE - GASTROINTESTINAL, MLM
Pt lives with her daughter [address], 6 steps to negotiate in the house.
Abdomen soft, non-tender, no guarding.

## 2022-06-27 NOTE — PATIENT PROFILE ADULT - HAVE YOU EXPERIENCED VIOLENCE OR A TRAUMATIC EVENT?
Daily Note     Today's date: 2022  Patient name: Carrie Leon  : 2005  MRN: [id number]  Referring provider: MACRINA Keith  Dx:   Encounter Diagnosis     ICD-10-CM    1  Closed fracture distal radius and ulna, left, initial encounter  S52 529A     S54 110I                   Subjective: No issues w/ wrist      Objective: See treatment diary below    Assessment: Completed exercise with correct technique and without reports of pain  Demonstrated moderate fatigue after exercise  Pt would benefit from continued PT services to reduce pain and increase level of function  Plan: Continue per plan of care  Precautions: Left Ulna ORIF - 22      Manuals                                                         Neuro Re-Ed             Push up walks      2x4 floor, 2x5 table 10x lap w/ PTB 10x laps w/ PTB Hole in one 2x5    KB OHP      20# 3x6       Inverse KB walk       5# 4 laps 5# 4 laps 6# 4 laps                                                        Ther Ex             Wrist sup str  HEP Reviewed           Wrist pron str  HEP Reviewed           Wrist ext str  HEP Reviewed           Wrist flex str  HEP Reviewed           Towel gripping HEP Reviewed           Claw  DB   8# 10xF 8# 5xF 8# 5 laps  8# 4 laps 8# 4 laps 9# 5 laps    Therabar    Green 3x8 flex/ext Green 3x10 flex/ext Blue 3x8 flex/ext Blue 3x8 flex/ext Blue 3x8 flex/ext Blue 3x8 flex/ext    Therabar bends    Green 2x10x2" Green 3x8 Blue 3x8  Blue 3x8 Blue 3x8 Blue 3x8    KB carry    20#   20# 4 laps w/ twists 20# 4 laps w/ twists 25# 4 laps w/ twists 25# 4 laps w/ twists 25# 4 laps    UBE    3'/3' 3'/3' 3'/3' 3'/3' 3'/3' 3'/3'    Wrist flex walkout     12 5# 2x10                                  Wrist TB   Sup/Por/Flex/Ext GTB 10x ea - HEP Reviewed         PROM  MM MM MM MM MM MM MM MM    Digiflex             Wrist UD str  Wrist RD str               Ther Activity Gait Training                                       Modalities no

## 2022-07-20 NOTE — PRE-OP CHECKLIST - ASSESSMENT, HISTORY & PHYSICAL COMPLETED AND ON MEDICAL RECORD
Continue all current medications    Think about the right heart cath procedure, if you decide to do it call us [phone] to get scheduled    Return to clinic in 2 months with Labs   done

## 2022-08-11 NOTE — DIETITIAN INITIAL EVALUATION ADULT. - +GENDER
Detail Level: Detailed Depth Of Biopsy: dermis Was A Bandage Applied: Yes Size Of Lesion In Cm: 0 Biopsy Type: H and E Biopsy Method: Dermablade Anesthesia Type: 1% lidocaine with 1:100,000 epinephrine and a 1:10 solution of 8.4% sodium bicarbonate Anesthesia Volume In Cc (Will Not Render If 0): 1.5 Hemostasis: Drysol Wound Care: No ointment Dressing: bandage Destruction After The Procedure: No Type Of Destruction Used: Curettage Curettage Text: The wound bed was treated with curettage after the biopsy was performed. Cryotherapy Text: The wound bed was treated with cryotherapy after the biopsy was performed. Electrodesiccation Text: The wound bed was treated with electrodesiccation after the biopsy was performed. Electrodesiccation And Curettage Text: The wound bed was treated with electrodesiccation and curettage after the biopsy was performed. Silver Nitrate Text: The wound bed was treated with silver nitrate after the biopsy was performed. Lab: [id number] Lab Facility: [id number] Consent: Written consent was obtained and risks were reviewed including but not limited to scarring, infection, bleeding, scabbing, incomplete removal, nerve damage and allergy to anesthesia. Statement Selected Post-Care Instructions: I reviewed with the patient in detail post-care instructions. Patient is to keep the biopsy site dry overnight, and then apply bacitracin twice daily until healed. Patient may apply hydrogen peroxide soaks to remove any crusting. Notification Instructions: Patient will be notified of biopsy results. However, patient instructed to call the office if not contacted within 2 weeks. Billing Type: Third-Party Bill Information: Selecting Yes will display possible errors in your note based on the variables you have selected. This validation is only offered as a suggestion for you. PLEASE NOTE THAT THE VALIDATION TEXT WILL BE REMOVED WHEN YOU FINALIZE YOUR NOTE. IF YOU WANT TO FAX A PRELIMINARY NOTE YOU WILL NEED TO TOGGLE THIS TO 'NO' IF YOU DO NOT WANT IT IN YOUR FAXED NOTE.

## 2022-08-13 NOTE — ASU PATIENT PROFILE, ADULT - PSH
(V5) oriented, appropriate
Basal cell carcinoma (BCC) of lower leg  s/p resection  H/O bilateral cataract extraction  3-4 years ago  History of tonsillectomy  at age 30  Lipoma of neck  s/p resection at age 30  S/P brain surgery  8/2018  S/P colonoscopic polypectomy  3 years benign polyps  S/P prostatectomy  1995  S/P tonsillectomy  age 30

## 2022-08-30 NOTE — ED PROVIDER NOTE - ATTENDING CONTRIBUTION TO CARE
sob  clear lungs  hypoxia to mid 80s on nc that goes up occ to high 90s.  will do high flow o2, call pert team, pe Mohs Histo Method Verbiage: Each section was then chromacoded and processed in the Mohs lab using the Mohs protocol and submitted for frozen section.

## 2022-09-27 NOTE — DISCHARGE NOTE ADULT - VISION (WITH CORRECTIVE LENSES IF THE PATIENT USUALLY WEARS THEM):
I called the patient with these results.  
Normal vision: sees adequately in most situations; can see medication labels, newsprint

## 2022-09-27 NOTE — CONSULT NOTE ADULT - PROBLEM SELECTOR RECOMMENDATION 5
s/p radical prostatectomy  f/u outpatient Doxepin Pregnancy And Lactation Text: This medication is Pregnancy Category C and it isn't known if it is safe during pregnancy. It is also excreted in breast milk and breast feeding isn't recommended.

## 2022-10-05 NOTE — H&P PST ADULT - CIGARETTE, PACK YRS
Abdomen , soft, nontender, nondistended , no guarding or rigidity , no masses palpable , normal bowel sounds , Liver and Spleen , no hepatomegaly present , no hepatosplenomegaly , liver nontender , spleen not palpable 20

## 2022-10-11 NOTE — PROGRESS NOTE ADULT - ASSESSMENT
Called patient. Informed her of the info below. Pt states she understands and has no further questions or concerns.    80yo male with hx of HTN, HLD, prostate cancer s/p prostatectomy, BCC, GBM (dx in 2018, s/p resection 8/3/18, s/p 2nd resection 2/7/19, s/p 3rd resection 5/2020 s/p multiple does of Temodar), presented to [facility] for acute rehab from [facility] for worsening of his L. sided weakness, noted to have worsening vasogenic edema with progression of GBM, resulting in Left Hemiparesis, Dysphagia, Gait and ADL impairment.     #Debility  -Gait Instability, ADL impairments and Functional impairments  -Continue Comprehensive Rehab Program of PT/OT & speech    #GBM   #Cerebral Edema  #Left hemiplagisa  -Rapidly progressive GBM since prior surgery  -Neuro Onc, Dr. Harman, recs noted   -Continue Decadron 8mg PO BID  -Keppra 1000mg BID for Seizure ppx    #HTN  -Continue Amlodipine, Lasix, Metoprolol  -Monitor vitals    #Hyperglycemia  -Steroid induced  -Ha1c 5.6  -Increase Humalog to 12 units TID, continue moderate dose sliding scale  -Continue hypoglycemia protocol and accu-cheks    #Leukocytosis  -Likely steroid induced  -Continue to monitor    #BPH  -Continue Flomax  -TOV with Bladder scan with prn intermittent catheterization     #Lower extremity edema - improving  -Secondary to Left sided hemiplegia  -LLE Doppler negative for DVT  -Continue Lasix daily  -Continue to monitor     #Prophylactic Measure  -DVT prophylaxis: Lovenox  -Bowel regimen   -Primary to discuss with patient regarding GOC and eventual dc to home hospice no[facility]

## 2022-11-07 NOTE — SWALLOW BEDSIDE ASSESSMENT ADULT - SWALLOW EVAL: THERAPY FREQUENCY
[FreeTextEntry1] : - LOM \par - AMOXICILLIN PRESCRIBED \par - SIDE EFFECTS DISCUSSED\par - SALINE NEBULIZATIONS AS PER MOM REQUEST \par  as schedule permits

## 2022-12-29 NOTE — PROGRESS NOTE BEHAVIORAL HEALTH - AFFECT QUALITY
Called patient to discuss low risk panorama results and fetal sex. Patient verbalized understanding and requesting a call back with sex to be left on her voicemail. Call placed, message left.   
Depressed
Euthymic
Depressed
Depressed

## 2023-02-22 NOTE — PROGRESS NOTE BEHAVIORAL HEALTH - ESTIMATED INTELLIGENCE
Average
Dutasteride Pregnancy And Lactation Text: This medication is absolutely contraindicated in women, especially during pregnancy and breast feeding. Feminization of male fetuses is possible if taking while pregnant.

## 2023-03-29 NOTE — ED ADULT NURSE REASSESSMENT NOTE - REASSESS COMMUNICATION
Medication refills ordered this visit: No medication refills needed    Medications reconciled and all medications are available in the home this visit. Education was provided regarding medications, medication interactions, and look alike medications. Response to teaching:   verbalized understanding. Medications are effective at this time. Supplies by type and quantity ordered this visit include: none needed this visit    Consulted medical director/attending physician regarding: Not at this time    Instructed patient/family/caregiver on 24-hour hospice availability and phone number.     Plan for next visit:  Continued EOL education and support
ED physician notified

## 2023-04-05 NOTE — PHYSICAL THERAPY INITIAL EVALUATION ADULT - IMPAIRMENTS FOUND, PT EVAL
Include Z78.9 (Other Specified Conditions Influencing Health Status) As An Associated Diagnosis?: No eeing ortho now. THey told her cannot have surgery until BMI is down.  She failed Voltaren gel 1 gm 4 times a day and unable to take oral NSAIDs. On Tramadol 50 mg tid for pain control pending weight loss and surgery.    X Size Of Lesion In Cm (Optional): 0 Treatment Number (Optional): 1 Consent: The risks of atrophy were reviewed with the patient. Medical Necessity Clause: This procedure was medically necessary because the lesions that were treated were: Detail Level: Detailed Kenalog Preparation: Kenalog Administered By (Optional): Rana Terrell, PA-C Total Volume Injected (Ccs- Only Use Numbers And Decimals): 0.1 Concentration Of Solution Injected (Mg/Ml): 10.0 gait, locomotion, and balance/aerobic capacity/endurance/muscle strength

## 2023-04-14 NOTE — OCCUPATIONAL THERAPY INITIAL EVALUATION ADULT - VISUAL ACUITY
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    not tested

## 2023-05-03 NOTE — ED ADULT NURSE NOTE - NSINTERVENTIONOPT_GEN_ALL_ED
Aklief Pregnancy And Lactation Text: It is unknown if this medication is safe to use during pregnancy.  It is unknown if this medication is excreted in breast milk.  Breastfeeding women should use the topical cream on the smallest area of the skin for the shortest time needed while breastfeeding.  Do not apply to nipple and areola. Tazorac Counseling:  Patient advised that medication is irritating and drying.  Patient may need to apply sparingly and wash off after an hour before eventually leaving it on overnight.  The patient verbalized understanding of the proper use and possible adverse effects of tazorac.  All of the patient's questions and concerns were addressed. Winlevi Pregnancy And Lactation Text: This medication is considered safe during pregnancy and breastfeeding. Use Enhanced Medication Counseling?: No Sarecycline Counseling: Patient advised regarding possible photosensitivity and discoloration of the teeth, skin, lips, tongue and gums.  Patient instructed to avoid sunlight, if possible.  When exposed to sunlight, patients should wear protective clothing, sunglasses, and sunscreen.  The patient was instructed to call the office immediately if the following severe adverse effects occur:  hearing changes, easy bruising/bleeding, severe headache, or vision changes.  The patient verbalized understanding of the proper use and possible adverse effects of sarecycline.  All of the patient's questions and concerns were addressed. Birth Control Pills Counseling: Birth Control Pill Counseling: I discussed with the patient the potential side effects of OCPs including but not limited to increased risk of stroke, heart attack, thrombophlebitis, deep venous thrombosis, hepatic adenomas, breast changes, GI upset, headaches, and depression.  The patient verbalized understanding of the proper use and possible adverse effects of OCPs. All of the patient's questions and concerns were addressed. Erythromycin Pregnancy And Lactation Text: This medication is Pregnancy Category B and is considered safe during pregnancy. It is also excreted in breast milk. Isotretinoin Pregnancy And Lactation Text: This medication is Pregnancy Category X and is considered extremely dangerous during pregnancy. It is unknown if it is excreted in breast milk. Spironolactone Counseling: Patient advised regarding risks of diarrhea, abdominal pain, hyperkalemia, birth defects (for female patients), liver toxicity and renal toxicity. The patient may need blood work to monitor liver and kidney function and potassium levels while on therapy. The patient verbalized understanding of the proper use and possible adverse effects of spironolactone.  All of the patient's questions and concerns were addressed. Azelaic Acid Pregnancy And Lactation Text: This medication is considered safe during pregnancy and breast feeding. Topical Clindamycin Counseling: Patient counseled that this medication may cause skin irritation or allergic reactions.  In the event of skin irritation, the patient was advised to reduce the amount of the drug applied or use it less frequently.   The patient verbalized understanding of the proper use and possible adverse effects of clindamycin.  All of the patient's questions and concerns were addressed. Azithromycin Counseling:  I discussed with the patient the risks of azithromycin including but not limited to GI upset, allergic reaction, drug rash, diarrhea, and yeast infections. High Dose Vitamin A Counseling: Side effects reviewed, pt to contact office should one occur. Spironolactone Pregnancy And Lactation Text: This medication can cause feminization of the male fetus and should be avoided during pregnancy. The active metabolite is also found in breast milk. Dapsone Pregnancy And Lactation Text: This medication is Pregnancy Category C and is not considered safe during pregnancy or breast feeding. Benzoyl Peroxide Counseling: Patient counseled that medicine may cause skin irritation and bleach clothing.  In the event of skin irritation, the patient was advised to reduce the amount of the drug applied or use it less frequently.   The patient verbalized understanding of the proper use and possible adverse effects of benzoyl peroxide.  All of the patient's questions and concerns were addressed. Topical Clindamycin Pregnancy And Lactation Text: This medication is Pregnancy Category B and is considered safe during pregnancy. It is unknown if it is excreted in breast milk. Minocycline Counseling: Patient advised regarding possible photosensitivity and discoloration of the teeth, skin, lips, tongue and gums.  Patient instructed to avoid sunlight, if possible.  When exposed to sunlight, patients should wear protective clothing, sunglasses, and sunscreen.  The patient was instructed to call the office immediately if the following severe adverse effects occur:  hearing changes, easy bruising/bleeding, severe headache, or vision changes.  The patient verbalized understanding of the proper use and possible adverse effects of minocycline.  All of the patient's questions and concerns were addressed. Doxycycline Pregnancy And Lactation Text: This medication is Pregnancy Category D and not consider safe during pregnancy. It is also excreted in breast milk but is considered safe for shorter treatment courses. Tetracycline Pregnancy And Lactation Text: This medication is Pregnancy Category D and not consider safe during pregnancy. It is also excreted in breast milk. Bactrim Counseling:  I discussed with the patient the risks of sulfa antibiotics including but not limited to GI upset, allergic reaction, drug rash, diarrhea, dizziness, photosensitivity, and yeast infections.  Rarely, more serious reactions can occur including but not limited to aplastic anemia, agranulocytosis, methemoglobinemia, blood dyscrasias, liver or kidney failure, lung infiltrates or desquamative/blistering drug rashes. Topical Retinoid counseling:  Patient advised to apply a pea-sized amount only at bedtime and wait 30 minutes after washing their face before applying.  If too drying, patient may add a non-comedogenic moisturizer. The patient verbalized understanding of the proper use and possible adverse effects of retinoids.  All of the patient's questions and concerns were addressed. Topical Sulfur Applications Pregnancy And Lactation Text: This medication is Pregnancy Category C and has an unknown safety profile during pregnancy. It is unknown if this topical medication is excreted in breast milk. Erythromycin Counseling:  I discussed with the patient the risks of erythromycin including but not limited to GI upset, allergic reaction, drug rash, diarrhea, increase in liver enzymes, and yeast infections. Bactrim Pregnancy And Lactation Text: This medication is Pregnancy Category D and is known to cause fetal risk.  It is also excreted in breast milk. Aklief counseling:  Patient advised to apply a pea-sized amount only at bedtime and wait 30 minutes after washing their face before applying.  If too drying, patient may add a non-comedogenic moisturizer.  The most commonly reported side effects including irritation, redness, scaling, dryness, stinging, burning, itching, and increased risk of sunburn.  The patient verbalized understanding of the proper use and possible adverse effects of retinoids.  All of the patient's questions and concerns were addressed. Detail Level: Detailed Azelaic Acid Counseling: Patient counseled that medicine may cause skin irritation and to avoid applying near the eyes.  In the event of skin irritation, the patient was advised to reduce the amount of the drug applied or use it less frequently.   The patient verbalized understanding of the proper use and possible adverse effects of azelaic acid.  All of the patient's questions and concerns were addressed. Tazorac Pregnancy And Lactation Text: This medication is not safe during pregnancy. It is unknown if this medication is excreted in breast milk. Birth Control Pills Pregnancy And Lactation Text: This medication should be avoided if pregnant and for the first 30 days post-partum. Isotretinoin Counseling: Patient should get monthly blood tests, not donate blood, not drive at night if vision affected, not share medication, and not undergo elective surgery for 6 months after tx completed. Side effects reviewed, pt to contact office should one occur. Dapsone Counseling: I discussed with the patient the risks of dapsone including but not limited to hemolytic anemia, agranulocytosis, rashes, methemoglobinemia, kidney failure, peripheral neuropathy, headaches, GI upset, and liver toxicity.  Patients who start dapsone require monitoring including baseline LFTs and weekly CBCs for the first month, then every month thereafter.  The patient verbalized understanding of the proper use and possible adverse effects of dapsone.  All of the patient's questions and concerns were addressed. Doxycycline Counseling:  Patient counseled regarding possible photosensitivity and increased risk for sunburn.  Patient instructed to avoid sunlight, if possible.  When exposed to sunlight, patients should wear protective clothing, sunglasses, and sunscreen.  The patient was instructed to call the office immediately if the following severe adverse effects occur:  hearing changes, easy bruising/bleeding, severe headache, or vision changes.  The patient verbalized understanding of the proper use and possible adverse effects of doxycycline.  All of the patient's questions and concerns were addressed. Azithromycin Pregnancy And Lactation Text: This medication is considered safe during pregnancy and is also secreted in breast milk. Tetracycline Counseling: Patient counseled regarding possible photosensitivity and increased risk for sunburn.  Patient instructed to avoid sunlight, if possible.  When exposed to sunlight, patients should wear protective clothing, sunglasses, and sunscreen.  The patient was instructed to call the office immediately if the following severe adverse effects occur:  hearing changes, easy bruising/bleeding, severe headache, or vision changes.  The patient verbalized understanding of the proper use and possible adverse effects of tetracycline.  All of the patient's questions and concerns were addressed. Patient understands to avoid pregnancy while on therapy due to potential birth defects. High Dose Vitamin A Pregnancy And Lactation Text: High dose vitamin A therapy is contraindicated during pregnancy and breast feeding. Benzoyl Peroxide Pregnancy And Lactation Text: This medication is Pregnancy Category C. It is unknown if benzoyl peroxide is excreted in breast milk. Topical Sulfur Applications Counseling: Topical Sulfur Counseling: Patient counseled that this medication may cause skin irritation or allergic reactions.  In the event of skin irritation, the patient was advised to reduce the amount of the drug applied or use it less frequently.   The patient verbalized understanding of the proper use and possible adverse effects of topical sulfur application.  All of the patient's questions and concerns were addressed. Topical Retinoid Pregnancy And Lactation Text: This medication is Pregnancy Category C. It is unknown if this medication is excreted in breast milk. Winlevi Counseling:  I discussed with the patient the risks of topical clascoterone including but not limited to erythema, scaling, itching, and stinging. Patient voiced their understanding. Hourly Rounding

## 2023-07-04 NOTE — PATIENT PROFILE ADULT - NSPRESCRUSEDDRG_GEN_A_NUR
Impression: S/P Cataract Extraction by phacoemulsification with IOL placement OS - 1 Day. Encounter for surgical aftercare following surgery on a sense organ  Z48.810.
-- mature cataract --> significant k edema as expected; pt advised Plan: Trimoxi RTC as scheduled
No

## 2023-12-26 NOTE — DIETITIAN INITIAL EVALUATION ADULT. - NUTRITIONGOAL OUTCOME1
Fall with Harm Risk
Pt will meet at least 75% of the estimated nutritional needs during hospitalization.

## 2024-03-16 NOTE — ED PROVIDER NOTE - NS ED MD TWO NIGHTS YN
Problem: Adult Inpatient Plan of Care  Goal: Plan of Care Review  Outcome: Ongoing, Progressing  Goal: Patient-Specific Goal (Individualized)  Outcome: Ongoing, Progressing  Goal: Absence of Hospital-Acquired Illness or Injury  Outcome: Ongoing, Progressing  Goal: Optimal Comfort and Wellbeing  Outcome: Ongoing, Progressing  Goal: Readiness for Transition of Care  Outcome: Ongoing, Progressing     Problem: Bariatric Environmental Safety  Goal: Safety Maintained with Care  Outcome: Ongoing, Progressing     Problem: Diabetes Comorbidity  Goal: Blood Glucose Level Within Targeted Range  Outcome: Ongoing, Progressing     Problem: Infection  Goal: Absence of Infection Signs and Symptoms  Outcome: Ongoing, Progressing     Problem: Fall Injury Risk  Goal: Absence of Fall and Fall-Related Injury  Outcome: Ongoing, Progressing     Problem: Adjustment to Illness (Bowel Disease, Inflammatory)  Goal: Optimal Adaptation to Chronic Illness  Outcome: Ongoing, Progressing     Problem: Diarrhea (Bowel Disease, Inflammatory)  Goal: Diarrhea Symptom Relief  Outcome: Ongoing, Progressing     Problem: Infection (Bowel Disease, Inflammatory)  Goal: Absence of Infection Signs and Symptoms  Outcome: Ongoing, Progressing     Problem: Nutrition Impaired (Bowel Disease, Inflammatory)  Goal: Optimal Nutrition  Outcome: Ongoing, Progressing     Problem: Pain (Bowel Disease, Inflammatory)  Goal: Acceptable Pain Control  Outcome: Ongoing, Progressing      Yes

## 2024-06-28 NOTE — ED PROVIDER NOTE - CADM POA URETHRAL CATHETER
Diagnosis:   1. Iron deficiency anemia due to chronic blood loss        Patient arrived for Venofer #2/5 today.     Dr Vanessa is the ordering clinician, therapy plan orders reviewed and signed by clinician.     Vital Signs:  ONC OP Encounter Vitals  BP: 107/69  Heart Rate: (!) 101  Resp: 16  Temp: 98.1 °F (36.7 °C)  Temp src: Oral  SpO2: 97 %      Allergies:  ALLERGIES:  No Known Allergies      Labs reviewed with the patient: N/A    Nursing Summary:  Pt here for Venofer #2/5. Pt reports fatigue and ice cravings. PIV inserted to R AC. Pt tolerated slow IVP well. Pt A&Ox4 upon DC. NADN. Ambulatory with steady gait. +blood return to PIV prior to and post infusion. PIV removed prior to DC.      Patient condition stable during treatment? Yes  Questions were answered and understanding was verbalized. Yes   Education provided Yes and Previously given    Patient advised on follow up, any and all questions answered.  Patient Discharged to home Ambulatory with self.    Follow up:  7/8- Venofer #3  7/12- Venofer #4  7/18- MD visit, Venofer #5   No

## 2024-10-04 NOTE — PROGRESS NOTE ADULT - SUBJECTIVE AND OBJECTIVE BOX
Assessment:   Last exam 5/15, fully vascularized.     Plan:  Follow up in 4-6 months  Due: (September-November, 2024)      Nystagmus has been noted                           HPI:  Pt is a 78 y/o M with PMHx of HTN, HLD, prostate cancer s/p prostatectomy, basal cell skin cancer, glioblastoma (Dx in Aug. 2018-- 8/3/18 - s/p resection ([facility]) 8/3/18;  s/p chemoradiation treatment ([facility]) followed by s/p temozolomide x 3 cycles 10/12/18;  s/p re-resection ([facility]) right frontal lobe 2/7/19;  radiation, chemotherapy cycle 9 on 9/23/19) and seizures on keppra who presented to [facility] on 5/27 for scheduled stereotactic right craniotomy for brain tumor.   Procedure was performed on 5/27 by Dr. Rivers without any intraoperative complications. Post-op, pt was unstable with ambulation. Post-op imaging was notable for vasogenic edema on MRI brain and CTH. Pt has been on a decadron taper. Pt has chronic left upper extremity pain and weakness, and he was at his baseline level.   Pt was evaluated by PT and PM&R and was recommended for acute inpatient rehabilitation when medically stable. Pt was deemed ready for discharge on 6/4/20 and was transferred to [facility] for acute inpatient rehabilitation. Patient report mild increased weakness on the LUE and no other changes since most recent surgery.    SUBJECTIVE / INTERVAL HPI: Patient seen and examined in PT gym. Pt continues to do well and is preparing to go home this week. He has no pain or complaints. He has questions about his home therapy and his outpatient appointments.     REVIEW OF SYSTEMS:    CONSTITUTIONAL: No fevers or chills  EYES/ENT: No visual changes;  No vertigo or throat pain   NECK: No pain or stiffness  RESPIRATORY: No cough, wheezing, or shortness of breath  CARDIOVASCULAR: No chest pain or palpitations  GASTROINTESTINAL: No abdominal or epigastric pain. No nausea or vomiting. No diarrhea or constipation.   GENITOURINARY: No dysuria, frequency or hematuria  NEUROLOGICAL: No numbness, + weakness and coordination deficits  SKIN: No itching, rashes      VITAL SIGNS:  Vital Signs Last 24 Hrs  T(C): 36.4 (15 Michael 2020 11:17), Max: 36.4 (14 Jun 2020 19:45)  T(F): 97.6 (15 Michael 2020 11:17), Max: 97.6 (15 Michael 2020 11:17)  HR: 72 (15 Michael 2020 11:17) (54 - 72)  BP: 126/68 (15 Michael 2020 11:17) (125/56 - 138/76)  BP(mean): --  RR: 15 (15 Michael 2020 11:17) (15 - 15)  SpO2: 98% (15 Michael 2020 11:17) (96% - 98%)    PHYSICAL EXAM:    General: NAD, in PT gym sitting up comfortably  HEENT: craniotomy incision is clean and closed, PERRL, anicteric sclera; MMM  Neck: supple  Cardiovascular: +S1/S2, RRR  Respiratory: CTA B/L; no W/R/R, no crackles  Gastrointestinal: soft, NT/ND; bowel sounds intact x4  Extremities: warm, well perfused; no edema, clubbing or cyanosis  Neurological: AAOx3; no new motor or sensory deficits, motor exam stable with improving left upper extremity weakness    MEDICATIONS:  MEDICATIONS  (STANDING):  amLODIPine   Tablet 5 milliGRAM(s) Oral daily  aspirin enteric coated 81 milliGRAM(s) Oral daily  atorvastatin 20 milliGRAM(s) Oral at bedtime  dexAMETHasone     Tablet 2 milliGRAM(s) Oral every 12 hours  enoxaparin Injectable 40 milliGRAM(s) SubCutaneous at bedtime  escitalopram 5 milliGRAM(s) Oral <User Schedule>  furosemide    Tablet 40 milliGRAM(s) Oral daily  levETIRAcetam 1000 milliGRAM(s) Oral two times a day  magnesium oxide 400 milliGRAM(s) Oral daily  metoprolol succinate ER 25 milliGRAM(s) Oral daily  pantoprazole    Tablet 40 milliGRAM(s) Oral two times a day  polyethylene glycol 3350 17 Gram(s) Oral <User Schedule>  senna 2 Tablet(s) Oral at bedtime  tamsulosin 0.4 milliGRAM(s) Oral at bedtime    MEDICATIONS  (PRN):  acetaminophen   Tablet .. 650 milliGRAM(s) Oral every 6 hours PRN Temp greater or equal to 38C (100.4F), Mild Pain (1 - 3)  ALPRAZolam 0.25 milliGRAM(s) Oral every 8 hours PRN Anxiety  benzocaine 15 mG/menthol 3.6 mG (Sugar-Free) Lozenge 1 Lozenge Oral three times a day PRN Sore Throat      ALLERGIES:  Allergies    No Known Allergies    Intolerances        LABS:                        12.8   6.60  )-----------( 191      ( 15 Michael 2020 07:28 )             37.9     06-15    140  |  102  |  35<H>  ----------------------------<  102<H>  3.8   |  31  |  1.08    Ca    8.8      15 Michael 2020 07:28          CAPILLARY BLOOD GLUCOSE          RADIOLOGY & ADDITIONAL TESTS: Reviewed.

## 2024-10-21 NOTE — DISCHARGE NOTE NURSING/CASE MANAGEMENT/SOCIAL WORK - NSDCDMENAME_GEN_ALL_CORE_FT
Continue same medications/treatment.  Patient educated on proper medication use.  Patient educated on risk factor modification.  Please bring any lab results from other providers/physicians to your next appointment.    Please bring all medicines, vitamins, and herbal supplements with you when you come to the office.    Prescriptions will not be filled unless you are compliant with your follow up appointments or have a follow up appointment scheduled as per instruction of your physician. Refills should be requested at the time of your visit.    Follow up with Dr. Hopkins in 1 year with device check  Continue monthly remote checks    EVELYN JARAMILLO RN, AM SCRIBING FOR AND IN THE PRESENCE OF DR. CYNTHIA HOPKINS MD, FACC, FACP, RS    
[facility]

## 2024-10-29 NOTE — OCCUPATIONAL THERAPY INITIAL EVALUATION ADULT - LIVES WITH, PROFILE
Current patient location:  Pt states at work in [place]    Please call patient at work number [phone]    Is the patient currently in the state of [place]? YES    Visit mode:TELEPHONE  if call dropped, please call back:  [phone]      Will anyone else be joining the visit? NO  (If patient encounters technical issues they should call [phone] :[phone])    Are changes needed to the allergy or medication list? No    Are refills needed on medications prescribed by this physician? Discuss with provider    Rooming Documentation:  Questionnaire(s) not pre-assigned    Pt states 3/10 ankle/foot pain chronic.  Pt states no height/weight available within last week.      Reason for visit: Medication Therapy Management    King MARTINEZ       spouse/children/Per pt report, lives in 2 story private home with spouse, adult son and daughter no steps to enter, first floor bedroom/tub shower setup . has 3 grab bars and shower chair

## 2024-12-02 NOTE — PHYSICAL THERAPY INITIAL EVALUATION ADULT - BALANCE DISTURBANCE, IDENTIFIED IMPAIRMENT CONTRIBUTE, REHAB EVAL
Zuleika from  called and stated that pt is done with HC tomorrow and needs an appt. I told her I would give the pt a call to get him scheduled.     I spoke with the Pharmacist and she stated we discharged this pt (see encounter 11/22/24). Pt is being monitored by PCP and son in law who's a Doctor.     I lvm explaining this to Zuleika.    
decreased strength

## 2025-02-28 NOTE — CDI QUERY NOTE - NSCDINOTEQUERY_GEN_A_CORE
Lab Results   Component Value Date    HSTNI0 134 (H) 02/28/2025    HSTNI2 142 (H) 02/28/2025    HSTNID2 8 02/28/2025    HSTNI4 138 (H) 02/28/2025    HSTNID4 4 02/28/2025      Patient presents with chest pain and Hgb 4.6  Troponins downtrending.  EKG unremarkable.  Etiology nonischemic myocardial injury in setting of symptomatic anemia   Vasogenic edema

## 2025-06-02 NOTE — H&P PST ADULT - NSALCOHOLAMT_GEN_A_CORE_SD
Immediate Brief Procedure Note    Patient: Jay Dalton    Pre-op Diagnosis: ascites     Post-op Diagnosis: Same    Procedure: left paracentesis     Proceduralist: Gayatri Giron PA-C    Assistants: none     Anesthesia Staff: No anesthesia staff entered.    Anesthesia Type: local     Findings: 650 cc cloudy serous fluid removed     Estimated Blood Loss: < 5 cc     Complications: none immediately     Specimens Removed: 650 cc cloudy serous fluid removed    1-2 drinks

## 2025-07-05 NOTE — PHYSICAL THERAPY INITIAL EVALUATION ADULT - IMPAIRMENTS CONTRIBUTING TO GAIT DEVIATIONS, PT EVAL
impaired balance/impaired coordination/impaired motor control/decreased endurance/cognition/decreased strength/decreased ROM/impaired postural control Never
